# Patient Record
Sex: MALE | Race: WHITE | NOT HISPANIC OR LATINO | Employment: OTHER | URBAN - NONMETROPOLITAN AREA
[De-identification: names, ages, dates, MRNs, and addresses within clinical notes are randomized per-mention and may not be internally consistent; named-entity substitution may affect disease eponyms.]

---

## 2023-04-07 ENCOUNTER — APPOINTMENT (OUTPATIENT)
Dept: CT IMAGING | Facility: HOSPITAL | Age: 78
End: 2023-04-07
Payer: MEDICARE

## 2023-04-07 ENCOUNTER — HOSPITAL ENCOUNTER (EMERGENCY)
Facility: HOSPITAL | Age: 78
Discharge: ANOTHER HOSPITAL | End: 2023-04-07
Attending: EMERGENCY MEDICINE | Admitting: EMERGENCY MEDICINE
Payer: MEDICARE

## 2023-04-07 VITALS
DIASTOLIC BLOOD PRESSURE: 84 MMHG | OXYGEN SATURATION: 97 % | HEIGHT: 72 IN | HEART RATE: 79 BPM | BODY MASS INDEX: 23.03 KG/M2 | TEMPERATURE: 98.3 F | SYSTOLIC BLOOD PRESSURE: 135 MMHG | WEIGHT: 170 LBS | RESPIRATION RATE: 16 BRPM

## 2023-04-07 DIAGNOSIS — I96 GANGRENE: ICD-10-CM

## 2023-04-07 DIAGNOSIS — H60.12 CELLULITIS OF LEFT PINNA: ICD-10-CM

## 2023-04-07 DIAGNOSIS — H70.92 MASTOIDITIS OF LEFT SIDE: Primary | ICD-10-CM

## 2023-04-07 DIAGNOSIS — M86.28 SUBACUTE OSTEOMYELITIS, OTHER SITE: ICD-10-CM

## 2023-04-07 LAB
ALBUMIN SERPL-MCNC: 3.5 G/DL (ref 3.5–5.2)
ALBUMIN/GLOB SERPL: 1.3 G/DL
ALP SERPL-CCNC: 53 U/L (ref 39–117)
ALT SERPL W P-5'-P-CCNC: 11 U/L (ref 1–41)
ANION GAP SERPL CALCULATED.3IONS-SCNC: 10.7 MMOL/L (ref 5–15)
AST SERPL-CCNC: 18 U/L (ref 1–40)
BASOPHILS # BLD AUTO: 0.01 10*3/MM3 (ref 0–0.2)
BASOPHILS NFR BLD AUTO: 0.2 % (ref 0–1.5)
BILIRUB SERPL-MCNC: 0.6 MG/DL (ref 0–1.2)
BUN SERPL-MCNC: 7 MG/DL (ref 8–23)
BUN/CREAT SERPL: 6 (ref 7–25)
CALCIUM SPEC-SCNC: 8.9 MG/DL (ref 8.6–10.5)
CHLORIDE SERPL-SCNC: 101 MMOL/L (ref 98–107)
CO2 SERPL-SCNC: 25.3 MMOL/L (ref 22–29)
CREAT SERPL-MCNC: 1.16 MG/DL (ref 0.76–1.27)
CRP SERPL-MCNC: 2.76 MG/DL (ref 0–0.5)
D-LACTATE SERPL-SCNC: 1.1 MMOL/L (ref 0.5–2)
DEPRECATED RDW RBC AUTO: 45.8 FL (ref 37–54)
EGFRCR SERPLBLD CKD-EPI 2021: 64.1 ML/MIN/1.73
EOSINOPHIL # BLD AUTO: 0.02 10*3/MM3 (ref 0–0.4)
EOSINOPHIL NFR BLD AUTO: 0.3 % (ref 0.3–6.2)
ERYTHROCYTE [DISTWIDTH] IN BLOOD BY AUTOMATED COUNT: 13.8 % (ref 12.3–15.4)
ERYTHROCYTE [SEDIMENTATION RATE] IN BLOOD: 10 MM/HR (ref 0–20)
GLOBULIN UR ELPH-MCNC: 2.7 GM/DL
GLUCOSE SERPL-MCNC: 104 MG/DL (ref 65–99)
HCT VFR BLD AUTO: 26.4 % (ref 37.5–51)
HGB BLD-MCNC: 8.5 G/DL (ref 13–17.7)
HOLD SPECIMEN: NORMAL
HOLD SPECIMEN: NORMAL
IMM GRANULOCYTES # BLD AUTO: 0.03 10*3/MM3 (ref 0–0.05)
IMM GRANULOCYTES NFR BLD AUTO: 0.5 % (ref 0–0.5)
LYMPHOCYTES # BLD AUTO: 0.91 10*3/MM3 (ref 0.7–3.1)
LYMPHOCYTES NFR BLD AUTO: 14.9 % (ref 19.6–45.3)
MCH RBC QN AUTO: 29.2 PG (ref 26.6–33)
MCHC RBC AUTO-ENTMCNC: 32.2 G/DL (ref 31.5–35.7)
MCV RBC AUTO: 90.7 FL (ref 79–97)
MONOCYTES # BLD AUTO: 0.42 10*3/MM3 (ref 0.1–0.9)
MONOCYTES NFR BLD AUTO: 6.9 % (ref 5–12)
NEUTROPHILS NFR BLD AUTO: 4.7 10*3/MM3 (ref 1.7–7)
NEUTROPHILS NFR BLD AUTO: 77.2 % (ref 42.7–76)
NRBC BLD AUTO-RTO: 0 /100 WBC (ref 0–0.2)
PLATELET # BLD AUTO: 190 10*3/MM3 (ref 140–450)
PMV BLD AUTO: 9.6 FL (ref 6–12)
POTASSIUM SERPL-SCNC: 3.4 MMOL/L (ref 3.5–5.2)
PROT SERPL-MCNC: 6.2 G/DL (ref 6–8.5)
RBC # BLD AUTO: 2.91 10*6/MM3 (ref 4.14–5.8)
SODIUM SERPL-SCNC: 137 MMOL/L (ref 136–145)
WBC NRBC COR # BLD: 6.09 10*3/MM3 (ref 3.4–10.8)
WHOLE BLOOD HOLD COAG: NORMAL
WHOLE BLOOD HOLD SPECIMEN: NORMAL

## 2023-04-07 PROCEDURE — 70460 CT HEAD/BRAIN W/DYE: CPT

## 2023-04-07 PROCEDURE — 25010000002 CEFEPIME-DEXTROSE 2-5 GM-%(50ML) RECONSTITUTED SOLUTION: Performed by: PHYSICIAN ASSISTANT

## 2023-04-07 PROCEDURE — 99284 EMERGENCY DEPT VISIT MOD MDM: CPT

## 2023-04-07 PROCEDURE — 86140 C-REACTIVE PROTEIN: CPT | Performed by: PHYSICIAN ASSISTANT

## 2023-04-07 PROCEDURE — 83605 ASSAY OF LACTIC ACID: CPT | Performed by: PHYSICIAN ASSISTANT

## 2023-04-07 PROCEDURE — 25510000001 IOPAMIDOL 61 % SOLUTION: Performed by: EMERGENCY MEDICINE

## 2023-04-07 PROCEDURE — 87150 DNA/RNA AMPLIFIED PROBE: CPT | Performed by: PHYSICIAN ASSISTANT

## 2023-04-07 PROCEDURE — 96366 THER/PROPH/DIAG IV INF ADDON: CPT

## 2023-04-07 PROCEDURE — 25010000002 MORPHINE PER 10 MG: Performed by: EMERGENCY MEDICINE

## 2023-04-07 PROCEDURE — 96367 TX/PROPH/DG ADDL SEQ IV INF: CPT

## 2023-04-07 PROCEDURE — 85652 RBC SED RATE AUTOMATED: CPT | Performed by: PHYSICIAN ASSISTANT

## 2023-04-07 PROCEDURE — 80053 COMPREHEN METABOLIC PANEL: CPT | Performed by: PHYSICIAN ASSISTANT

## 2023-04-07 PROCEDURE — 87040 BLOOD CULTURE FOR BACTERIA: CPT | Performed by: PHYSICIAN ASSISTANT

## 2023-04-07 PROCEDURE — 87186 SC STD MICRODIL/AGAR DIL: CPT | Performed by: PHYSICIAN ASSISTANT

## 2023-04-07 PROCEDURE — 87077 CULTURE AEROBIC IDENTIFY: CPT | Performed by: PHYSICIAN ASSISTANT

## 2023-04-07 PROCEDURE — 85025 COMPLETE CBC W/AUTO DIFF WBC: CPT | Performed by: PHYSICIAN ASSISTANT

## 2023-04-07 PROCEDURE — 36415 COLL VENOUS BLD VENIPUNCTURE: CPT

## 2023-04-07 PROCEDURE — 96365 THER/PROPH/DIAG IV INF INIT: CPT

## 2023-04-07 PROCEDURE — 25010000002 VANCOMYCIN 5 G RECONSTITUTED SOLUTION: Performed by: PHYSICIAN ASSISTANT

## 2023-04-07 RX ORDER — SODIUM CHLORIDE 0.9 % (FLUSH) 0.9 %
10 SYRINGE (ML) INJECTION AS NEEDED
Status: DISCONTINUED | OUTPATIENT
Start: 2023-04-07 | End: 2023-04-07 | Stop reason: HOSPADM

## 2023-04-07 RX ORDER — CEFEPIME HYDROCHLORIDE 2 G/50ML
2 INJECTION, SOLUTION INTRAVENOUS ONCE
Status: COMPLETED | OUTPATIENT
Start: 2023-04-07 | End: 2023-04-07

## 2023-04-07 RX ADMIN — VANCOMYCIN HYDROCHLORIDE 1500 MG: 5 INJECTION, POWDER, LYOPHILIZED, FOR SOLUTION INTRAVENOUS at 13:34

## 2023-04-07 RX ADMIN — IOPAMIDOL 100 ML: 612 INJECTION, SOLUTION INTRAVENOUS at 14:48

## 2023-04-07 RX ADMIN — MORPHINE SULFATE 4 MG: 4 INJECTION, SOLUTION INTRAMUSCULAR; INTRAVENOUS at 13:47

## 2023-04-07 RX ADMIN — CEFEPIME HYDROCHLORIDE 2 G: 2 INJECTION, SOLUTION INTRAVENOUS at 12:57

## 2023-04-07 NOTE — ED NOTES
Multiple small bugs found crawling on pt per lab staff while @ [facility]. Primary nurse notified, precautions taken. Pts belongings gathered and placed in clear bags in pts room per Jay Jay LUCAS, a new gown and linens provided and placed on pts stretcher.

## 2023-04-07 NOTE — ED PROVIDER NOTES
Subjective   History of Present Illness  Chief Complaint: Left head and ear bleeding  History of Present Illness: This is a 78-year-old male poor historian and has not followed up with a doctor in many many years, presents today for evaluation of bleeding to the left side of his head and left ear.  Was seen at [facility] yesterday was told it was cancer and discharged and told to see his primary care doctor.  States is just continue to ooze, says he has had these lesions for at least a year.  Onset: 1 year  Duration: Worsening  Exacerbating / Alleviating factors: None  Associated symptoms: None    Nurses Notes reviewed and agree, including vitals, allergies, social history and prior medical history.    REVIEW OF SYSTEMS: All systems reviewed and not pertinent unless noted.    Positive for: Necrotic mass left head near    Negative for: All other review of systems reviewed negative unspecified  Review of Systems    History reviewed. No pertinent past medical history.    Allergies   Allergen Reactions   • Aspirin Unknown - Low Severity       History reviewed. No pertinent surgical history.    History reviewed. No pertinent family history.    Social History     Socioeconomic History   • Marital status:    Tobacco Use   • Smoking status: Never   • Smokeless tobacco: Current     Types: Chew   Vaping Use   • Vaping Use: Never used   Substance and Sexual Activity   • Alcohol use: Never   • Drug use: Never   • Sexual activity: Defer           Objective   Physical Exam  CONSTITUTIONAL: Well developed, obese male,  in no acute distress.  VITAL SIGNS: Per nursing, reviewed and noted  Head: Patient has a large necrotic mass to his left ear that appears to be eating away at the mastoid on the left, there is necrotic tissue and purulent drainage as well as oozing of blood, no acute bleeding noted, also maggots present inside the wound.  EYES: PERRLA. EOMI.  ENT: Normal voice.  Patient maintained wearing a mask throughout patient  encounter due to coronavirus pandemic  RESPIRATORY:  No increased work of breathing. No retractions.  CARDIOVASCULAR:  Regular rate and rhythm, no murmurs.  Good Peripheral pulses. Good cap refill to extremities.  MUSCULOSKELETAL:  No tenderness. Full ROM. Strength and tone grossly normal.  no spasms. no neck or back tenderness or spasm.  NEUROLOGIC: Alert, oriented x 3. No gross deficits. GCS 15.  PSYCH: Appropriate affect.      Procedures           ED Course  ED Course as of 04/07/23 1604   Fri Apr 07, 2023 1207 BP: 144/76 [CC]   1208 Temp: 98.3 °F (36.8 °C) [CC]   1208 Temp src: Oral [CC]   1208 Heart Rate: 79 [CC]   1208 Resp: 16 [CC]   1208 SpO2: 99 % [CC]   1208 Patient Position: Sitting [CC]   1546 Awaiting return call from [facility]. [CC]      ED Course User Index  [CC] Willie Jernigan, PA                                           Medical Decision Making  Initial impression of presenting illness: 78-year-old male presents for evaluation of bleeding and mass to the left side of his head and left ear he says been present for at least a year, was seen yesterday at [facility] and just discharged home and told it was cancer without any work-up done    DDX includes but is not limited to necrotic cancerous mass    Patient arrives hemodynamically stable, afebrile with vitals interpreted by me. Pertinent features from physical exam: Very malodorous, large necrotic mass to the left ear and left mastoid with oozing of blood, purulent drainage, necrotic tissue and maggots.    Initial diagnostic plan: Septic work-up, CT scan, spoke with radiologist who recommended a CT scan of the head with contrast for best visualization    Results from initial plan were reviewed and interpreted by me revealing Labs are grossly unremarkable, CT scan does show osteomyelitis and mastoiditis and gangrene of the left pinna with a large abscess and mass at this sternocleidomastoid muscle.    Diagnostic information from other  sources: Reviewed note from Tiesha yesterday    Interventions / Re-evaluation: Patient given pain medicine, cefepime and vancomycin    Results/clinical rationale were discussed with the patient    Consultations/Discussion of results with other physicians: I spoke with  also spoke with ENT at [facility], they accepted the patient into the emergency department and patient will be going via EMS.    Disposition plan: Patient stable for transfer, be transferred to [facility] for higher level of care.    Amount and/or Complexity of Data Reviewed  Labs: ordered.  Radiology: ordered.      Risk  Prescription drug management.          Final diagnoses:   Mastoiditis of left side   Subacute osteomyelitis, other site   Gangrene   Cellulitis of left pinna       ED Disposition  ED Disposition     ED Disposition   Transfer to Another Facility     Condition   --    Comment   [facility]             No follow-up provider specified.       Medication List      No changes were made to your prescriptions during this visit.          Willie Jernigan, PA  04/07/23 1600

## 2023-04-08 LAB — BACTERIA BLD CULT: NORMAL

## 2023-04-12 LAB — BACTERIA SPEC AEROBE CULT: NORMAL

## 2023-04-18 LAB
BACTERIA SPEC AEROBE CULT: ABNORMAL
BACTERIA SPEC AEROBE CULT: ABNORMAL
GRAM STN SPEC: ABNORMAL
ISOLATED FROM: ABNORMAL
ISOLATED FROM: ABNORMAL

## 2023-05-10 ENCOUNTER — CONSULT (OUTPATIENT)
Dept: ONCOLOGY | Facility: CLINIC | Age: 78
End: 2023-05-10
Payer: MEDICARE

## 2023-05-10 ENCOUNTER — HOSPITAL ENCOUNTER (OUTPATIENT)
Dept: MRI IMAGING | Facility: HOSPITAL | Age: 78
Discharge: HOME | End: 2023-05-10
Admitting: INTERNAL MEDICINE
Payer: MEDICARE

## 2023-05-10 ENCOUNTER — DOCUMENTATION (OUTPATIENT)
Dept: FAMILY MEDICINE CLINIC | Facility: CLINIC | Age: 78
End: 2023-05-10
Payer: MEDICARE

## 2023-05-10 VITALS
RESPIRATION RATE: 16 BRPM | BODY MASS INDEX: 27.09 KG/M2 | HEART RATE: 74 BPM | SYSTOLIC BLOOD PRESSURE: 127 MMHG | TEMPERATURE: 98 F | OXYGEN SATURATION: 99 % | WEIGHT: 200 LBS | HEIGHT: 72 IN | DIASTOLIC BLOOD PRESSURE: 62 MMHG

## 2023-05-10 DIAGNOSIS — C44.229 SQUAMOUS CELL CARCINOMA OF SKIN OF LEFT EAR AND EXTERNAL AURICULAR CANAL: Primary | ICD-10-CM

## 2023-05-10 DIAGNOSIS — Z51.81 ENCOUNTER FOR THERAPEUTIC DRUG MONITORING: ICD-10-CM

## 2023-05-10 DIAGNOSIS — C44.229 SQUAMOUS CELL CARCINOMA OF SKIN OF LEFT EAR AND EXTERNAL AURICULAR CANAL: ICD-10-CM

## 2023-05-10 PROBLEM — F17.220 NICOTINE DEPENDENCE, CHEWING TOBACCO, UNCOMPLICATED: Status: ACTIVE | Noted: 2023-05-10

## 2023-05-10 PROBLEM — I80.9 PHLEBITIS AND THROMBOPHLEBITIS OF UNSPECIFIED SITE: Status: ACTIVE | Noted: 2023-05-10

## 2023-05-10 PROBLEM — H70.92: Status: ACTIVE | Noted: 2023-05-10

## 2023-05-10 PROBLEM — M86.10 ACUTE OSTEOMYELITIS: Status: ACTIVE | Noted: 2023-05-10

## 2023-05-10 PROBLEM — R52 PAIN: Status: ACTIVE | Noted: 2023-05-10

## 2023-05-10 PROBLEM — H60.12 CELLULITIS OF LEFT EXTERNAL EAR: Status: ACTIVE | Noted: 2023-05-10

## 2023-05-10 PROBLEM — G89.3 NEOPLASM RELATED PAIN (ACUTE) (CHRONIC): Status: ACTIVE | Noted: 2023-05-10

## 2023-05-10 PROBLEM — D64.9 ANEMIA, UNSPECIFIED: Status: ACTIVE | Noted: 2023-05-10

## 2023-05-10 PROBLEM — N40.0 BENIGN PROSTATIC HYPERPLASIA WITHOUT LOWER URINARY TRACT SYMPTOMS: Status: ACTIVE | Noted: 2023-05-10

## 2023-05-10 PROBLEM — I96 GANGRENE, NOT ELSEWHERE CLASSIFIED: Status: ACTIVE | Noted: 2023-05-10

## 2023-05-10 PROBLEM — Z51.5 PALLIATIVE CARE BY SPECIALIST: Status: ACTIVE | Noted: 2023-05-10

## 2023-05-10 PROCEDURE — A9577 INJ MULTIHANCE: HCPCS | Performed by: INTERNAL MEDICINE

## 2023-05-10 PROCEDURE — 70553 MRI BRAIN STEM W/O & W/DYE: CPT

## 2023-05-10 PROCEDURE — 0 GADOBENATE DIMEGLUMINE 529 MG/ML SOLUTION: Performed by: INTERNAL MEDICINE

## 2023-05-10 RX ORDER — CYANOCOBALAMIN 1000 UG/ML
100 INJECTION, SOLUTION INTRAMUSCULAR; SUBCUTANEOUS
COMMUNITY
Start: 2023-05-17

## 2023-05-10 RX ORDER — HYDROCORTISONE 25 MG/G
1 CREAM TOPICAL
COMMUNITY
Start: 2023-05-09

## 2023-05-10 RX ORDER — BISACODYL 10 MG
10 SUPPOSITORY, RECTAL RECTAL DAILY PRN
COMMUNITY
Start: 2023-05-09 | End: 2023-05-19

## 2023-05-10 RX ORDER — FINASTERIDE 5 MG/1
5 TABLET, FILM COATED ORAL DAILY
Qty: 30 TABLET | Refills: 1 | COMMUNITY
Start: 2023-05-10 | End: 2023-07-09

## 2023-05-10 RX ORDER — TAMSULOSIN HYDROCHLORIDE 0.4 MG/1
0.4 CAPSULE ORAL
COMMUNITY
Start: 2023-05-09

## 2023-05-10 RX ORDER — ONDANSETRON HYDROCHLORIDE 8 MG/1
8 TABLET, FILM COATED ORAL 3 TIMES DAILY PRN
Qty: 30 TABLET | Refills: 5 | Status: SHIPPED | OUTPATIENT
Start: 2023-05-10

## 2023-05-10 RX ORDER — ACETAMINOPHEN 325 MG/1
650 TABLET ORAL EVERY 6 HOURS PRN
COMMUNITY

## 2023-05-10 RX ORDER — SENNA PLUS 8.6 MG/1
1 TABLET ORAL DAILY PRN
COMMUNITY

## 2023-05-10 RX ORDER — SODIUM CHLORIDE 9 MG/ML
250 INJECTION, SOLUTION INTRAVENOUS ONCE
OUTPATIENT
Start: 2023-06-12

## 2023-05-10 RX ORDER — POLYETHYLENE GLYCOL 3350 17 G/17G
17 POWDER, FOR SOLUTION ORAL DAILY
Qty: 51 G | Refills: 0 | COMMUNITY
Start: 2023-05-10 | End: 2023-05-13

## 2023-05-10 RX ORDER — DULOXETIN HYDROCHLORIDE 30 MG/1
30 CAPSULE, DELAYED RELEASE ORAL DAILY
Qty: 30 CAPSULE | Refills: 1 | COMMUNITY
Start: 2023-05-10 | End: 2023-07-09

## 2023-05-10 RX ORDER — OXYCODONE HYDROCHLORIDE 5 MG/1
5 TABLET ORAL EVERY 6 HOURS PRN
COMMUNITY
Start: 2023-05-09 | End: 2023-05-14

## 2023-05-10 RX ORDER — HYDROCORTISONE 25 MG/G
1 CREAM TOPICAL DAILY PRN
COMMUNITY
Start: 2023-05-09

## 2023-05-10 RX ORDER — SODIUM CHLORIDE 9 MG/ML
250 INJECTION, SOLUTION INTRAVENOUS ONCE
OUTPATIENT
Start: 2023-05-22

## 2023-05-10 RX ADMIN — GADOBENATE DIMEGLUMINE 18 ML: 529 INJECTION, SOLUTION INTRAVENOUS at 19:16

## 2023-05-10 NOTE — PROGRESS NOTES
DATE OF CONSULTATION: 5/10/2023    REFERRING PHYSICIAN: Provider, No Known    Dear  Provider, No Known  Thank you for asking for my medical advice on this patient. I saw him in the  [place] office on 5/10/2023    REASON FOR CONSULTATION: Locally advanced left ear squamous cell carcinoma    PROBLEM LIST:   1.  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  Presented with gradually growing mass over the left ear for the last 3 years.  B.  Patient did not seek any medical attention until April 2023.  C.  CT neck and chest done at  April 2023 confirmed locally advanced lesion eroding into the left mastoid with left cervical lymphadenopathy but no evidence of chest metastases.  D.  Biopsy done April 2023 confirmed squamous cell carcinoma.  E.  Status post palliative radiation completed April 28, 2023  F.  We will start immunotherapy with Libtayo 350 mg IV every 3 weeks May 22, 2023.  2.  Cancer-related pain  3.  Mild depression  4.  BPH    HISTORY OF PRESENT ILLNESS: The patient is a very pleasant 78 y.o.  male   who was in his usual state of health until April 2023.  The patient was admitted to  hospital with growing left ear mass that has been neglected for more than 2 years.  He had scans done that revealed locally advanced malignancy no evidence of distant metastases.  Biopsy-proven squamous cell carcinoma.  Medical oncology were consulted patient was not a candidate for chemotherapy.  ENT also were consulted did not recommend surgery given the extension of the malignancy and local invasion.  He completed short course of prior radiation and was referred to me for further recommendations.    SUBJECTIVE: When I saw the patient today he is here with his son and daughter-in-law.  He is complaining of pain at the lesion site.  Never been diagnosed with cancer before.  He is currently getting rehab however he will be getting discharged home soon.    Review of Systems   Constitutional:  Positive for fatigue.   HENT: Positive for ear pain.    Eyes: Negative.    Respiratory: Negative.    Cardiovascular: Negative.    Gastrointestinal: Negative.    Endocrine: Negative.    Genitourinary: Negative.    Musculoskeletal: Positive for arthralgias.   Allergic/Immunologic: Negative.    Neurological: Positive for dizziness.   Hematological: Negative.    Psychiatric/Behavioral: The patient is nervous/anxious.        Past Medical History:   Diagnosis Date   • Anemia    • Arthritis    • Cancer        Social History     Socioeconomic History   • Marital status:    Tobacco Use   • Smoking status: Never   • Smokeless tobacco: Current     Types: Chew   Vaping Use   • Vaping Use: Never used   Substance and Sexual Activity   • Alcohol use: Not Currently   • Drug use: Never   • Sexual activity: Defer       Family History   Problem Relation Age of Onset   • Cancer Mother    • Cancer Father        Past Surgical History:   Procedure Laterality Date   • NAIL BIOPSY N/A    • NECK DISSECTION N/A    • PAROTIDECTOMY N/A    • SKIN CANCER EXCISION         No Known Allergies       Current Outpatient Medications:   •  acetaminophen (TYLENOL) 325 MG tablet, Take 2 tablets by mouth Every 6 (Six) Hours As Needed., Disp: , Rfl:   •  bisacodyl (DULCOLAX) 10 MG suppository, Insert 1 suppository into the rectum., Disp: , Rfl:   •  [START ON 5/17/2023] cyanocobalamin 1000 MCG/ML injection, Inject 100 mcg into the appropriate muscle as directed by prescriber Every 30 (Thirty) Days., Disp: , Rfl:   •  DULoxetine (CYMBALTA) 30 MG capsule, Take 1 capsule by mouth Daily., Disp: 30 capsule, Rfl: 1  •  finasteride (PROSCAR) 5 MG tablet, Take 1 tablet by mouth Daily., Disp: 30 tablet, Rfl: 1  •  Hydrocortisone, Perianal, (ANUSOL-HC) 2.5 % rectal cream, Insert 1 application into the rectum., Disp: , Rfl:   •  oxyCODONE (ROXICODONE) 5 MG immediate release tablet, Take 1 tablet by mouth Every 6 (Six) Hours As Needed., Disp: , Rfl:   •   "polyethylene glycol (MIRALAX) 17 GM/SCOOP powder, Take 17 g by mouth Daily., Disp: 51 g, Rfl: 0  •  Procto-Med HC 2.5 % rectal cream, , Disp: , Rfl:   •  senna 8.6 MG tablet, Take 1 tablet by mouth Daily., Disp: , Rfl:   •  tamsulosin (FLOMAX) 0.4 MG capsule 24 hr capsule, Take 1 capsule by mouth., Disp: , Rfl:     PHYSICAL EXAMINATION:   /62   Pulse 74   Temp 98 °F (36.7 °C) (Temporal)   Resp 16   Ht 182.9 cm (72\")   Wt 90.7 kg (200 lb)   SpO2 99%   BMI 27.12 kg/m²   Pain Score    05/10/23 0927   PainSc: 0-No pain                   ECOG Performance Status: 1 - Symptomatic but completely ambulatory  General Appearance:  alert, cooperative, no apparent distress and appears stated age   Neurologic/Psychiatric: A&O x 3, gait steady, appropriate affect, strength 5/5 in all muscle groups   HEENT:  Normocephalic, without obvious abnormality, mucous membranes moist   Neck: Supple, symmetrical, trachea midline, no adenopathy;  No thyromegaly, masses, or tenderness   Lungs:   Clear to auscultation bilaterally; respirations regular, even, and unlabored bilaterally   Heart:  Regular rate and rhythm, no murmurs appreciated   Abdomen:   Soft, non-tender, non-distended and no organomegaly   Lymph nodes: No cervical, supraclavicular, inguinal or axillary adenopathy noted   Extremities: Normal, atraumatic; no clubbing, cyanosis, or edema    Skin: No rashes, ulcers, or suspicious lesions noted       No visits with results within 2 Week(s) from this visit.   Latest known visit with results is:   Admission on 04/07/2023, Discharged on 04/07/2023   Component Date Value Ref Range Status   • Glucose 04/07/2023 104 (H)  65 - 99 mg/dL Final   • BUN 04/07/2023 7 (L)  8 - 23 mg/dL Final   • Creatinine 04/07/2023 1.16  0.76 - 1.27 mg/dL Final   • Sodium 04/07/2023 137  136 - 145 mmol/L Final   • Potassium 04/07/2023 3.4 (L)  3.5 - 5.2 mmol/L Final   • Chloride 04/07/2023 101  98 - 107 mmol/L Final   • CO2 04/07/2023 25.3  22.0 - " 29.0 mmol/L Final   • Calcium 04/07/2023 8.9  8.6 - 10.5 mg/dL Final   • Total Protein 04/07/2023 6.2  6.0 - 8.5 g/dL Final   • Albumin 04/07/2023 3.5  3.5 - 5.2 g/dL Final   • ALT (SGPT) 04/07/2023 11  1 - 41 U/L Final   • AST (SGOT) 04/07/2023 18  1 - 40 U/L Final   • Alkaline Phosphatase 04/07/2023 53  39 - 117 U/L Final   • Total Bilirubin 04/07/2023 0.6  0.0 - 1.2 mg/dL Final   • Globulin 04/07/2023 2.7  gm/dL Final   • A/G Ratio 04/07/2023 1.3  g/dL Final   • BUN/Creatinine Ratio 04/07/2023 6.0 (L)  7.0 - 25.0 Final   • Anion Gap 04/07/2023 10.7  5.0 - 15.0 mmol/L Final   • eGFR 04/07/2023 64.1  >60.0 mL/min/1.73 Final   • Lactate 04/07/2023 1.1  0.5 - 2.0 mmol/L Final   • Blood Culture 04/07/2023 No growth at 5 days   Final   • Blood Culture 04/07/2023 Wohlfahrtiimonas chitiniclastica    ID/Sensi performed at [facility] laboratory, see attached report.    Final   • Isolated from 04/07/2023 Anaerobic Bottle   Final   • Blood Culture 04/07/2023 Aerococcus viridans (C)   Final      Aerococcus viridans is usually susceptible to vancomycin. Resistance has been reported to the fluoroquinolones and beta-lactams. Please call lab to request further workup.   • Isolated from 04/07/2023 Anaerobic Bottle   Final   • Gram Stain 04/07/2023 Aerobic Bottle Gram positive cocci in pairs (C)   Final   • Extra Tube 04/07/2023 Hold for add-ons.   Final    Auto resulted.   • Extra Tube 04/07/2023 hold for add-on   Final    Auto resulted   • Extra Tube 04/07/2023 Hold for add-ons.   Final    Auto resulted.   • Extra Tube 04/07/2023 Hold for add-ons.   Final    Auto resulted   • WBC 04/07/2023 6.09  3.40 - 10.80 10*3/mm3 Final   • RBC 04/07/2023 2.91 (L)  4.14 - 5.80 10*6/mm3 Final   • Hemoglobin 04/07/2023 8.5 (L)  13.0 - 17.7 g/dL Final   • Hematocrit 04/07/2023 26.4 (L)  37.5 - 51.0 % Final   • MCV 04/07/2023 90.7  79.0 - 97.0 fL Final   • MCH 04/07/2023 29.2  26.6 - 33.0 pg Final   • MCHC 04/07/2023 32.2  31.5 - 35.7 g/dL Final   •  RDW 04/07/2023 13.8  12.3 - 15.4 % Final   • RDW-SD 04/07/2023 45.8  37.0 - 54.0 fl Final   • MPV 04/07/2023 9.6  6.0 - 12.0 fL Final   • Platelets 04/07/2023 190  140 - 450 10*3/mm3 Final   • Neutrophil % 04/07/2023 77.2 (H)  42.7 - 76.0 % Final   • Lymphocyte % 04/07/2023 14.9 (L)  19.6 - 45.3 % Final   • Monocyte % 04/07/2023 6.9  5.0 - 12.0 % Final   • Eosinophil % 04/07/2023 0.3  0.3 - 6.2 % Final   • Basophil % 04/07/2023 0.2  0.0 - 1.5 % Final   • Immature Grans % 04/07/2023 0.5  0.0 - 0.5 % Final   • Neutrophils, Absolute 04/07/2023 4.70  1.70 - 7.00 10*3/mm3 Final   • Lymphocytes, Absolute 04/07/2023 0.91  0.70 - 3.10 10*3/mm3 Final   • Monocytes, Absolute 04/07/2023 0.42  0.10 - 0.90 10*3/mm3 Final   • Eosinophils, Absolute 04/07/2023 0.02  0.00 - 0.40 10*3/mm3 Final   • Basophils, Absolute 04/07/2023 0.01  0.00 - 0.20 10*3/mm3 Final   • Immature Grans, Absolute 04/07/2023 0.03  0.00 - 0.05 10*3/mm3 Final   • nRBC 04/07/2023 0.0  0.0 - 0.2 /100 WBC Final   • Sed Rate 04/07/2023 10  0 - 20 mm/hr Final   • C-Reactive Protein 04/07/2023 2.76 (H)  0.00 - 0.50 mg/dL Final   • BCID, PCR 04/07/2023 Negative by BCID PCR. Culture to Follow.  Negative by BCID PCR. Culture to Follow. Final        XR Abdomen 1 View    Result Date: 5/7/2023  Narrative: CLINICAL INDICATION: Assess for stool burden/constipation TECHNIQUE: XR ABDOMEN 1 VIEW COMPARISON: None. FINDINGS: Gas and stool throughout normal caliber colon with moderate stool burden. No gas-filled dilated small bowel. Rounded calcification projecting over the pelvis may represent a bladder calculus. Imaged lungs are clear.    Impression: Nonobstructive bowel gas pattern. Moderate stool burden. CRITICAL RESULT:   No. COMMUNICATION: Per this written report. Drafted by Margaux Villa MD on 5/7/2023 6:39 PM Final report signed by Margaux Villa MD on 5/7/2023 6:41 PM        DIAGNOSTIC DATA:   1.  Extensive patient medical records including doctor notes blood results,  pathology report and imaging reports reviewed by me and documented in the patient's chart.    ASSESSMENT: The patient is a very pleasant 78 y.o.  male  with locally advanced left ear squamous cell carcinoma    PLAN:     1.  Locally advanced left ear squamous cell carcinoma T4 N1 M0 stage IV:  A.  I had a long discussion today with the patient and his son and daughter-in-law about his new diagnosis of locally advanced skin cancer. I did go over the final pathology report in detail with both of them. I reviewed the patient's documents including refereing provider's notes, lab results, imaging, and path report.   B.  The patient is not a candidate for surgical excision given the local invasion.  C.  He will be treated with IV immunotherapy using Libtayo 350 mg IV every 3 weeks.  This is an FDA approved treatment.  D. We discussed potential side effects of immunotherapy including but not limited to immune mediated reactions with thyroiditis, pneumonitis, hepatitis, colitis, rash, and electrolyte abnormalities, fatigue, multiorgan failure, and possibly death.  E.  I will monitor the patient blood work including blood counts kidney function liver function and electrolytes per  F.  I will order MRI brain with and without contrast to evaluate the local invasion.  G.  I will set him up to meet with my nurse practitioner as well as oncology pharmacist for treatment education.  H.  We will plan start treatment in 12 days to give enough time for wound healing after radiation.  I.  He will follow-up with me for cycle #2.    2.  Cancer-related pain:  A.  He will continue oxycodone as needed.    3.  Wound care:  A.  I explained to the patient and the family this is extremely important to assure wound cleanness.  B.  He will have home health with wound care after getting discharged from rehab.    4.  Depression:  A.  He will continue Cymbalta daily    FOLLOW UP: With cycle #2.    Total time of patient care including preparation of  patient chart prior to arrival, face-to-face with patient and following visit spent in reviewing records, lab results, imaging studies, discussion with patient, and documentation/charting was 60 minutes         Benitez Medellin MD  5/10/2023

## 2023-05-15 NOTE — PROGRESS NOTES
"CHEMOTHERAPY PREPARATION    Hardik Natarajan  [id number]  1945    Subjective   Chief Complaint: Treatment Preparation and Needs Assessment    History of present illness:  Hardik Natarajan is a 78 y.o. year old male who is here today for chemotherapy preparation and needs assessment. 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     Past Medical History, Past Surgical History, Social History, Family History have been reviewed and are without significant changes except as mentioned.      Review of Systems   Constitutional: Positive for fatigue.   Skin: Positive for wound.        Left ear squamous cell   Neurological: Positive for weakness.   Psychiatric/Behavioral: The patient is nervous/anxious.        Objective   Physical Exam  Vital Signs: /66   Pulse 112   Temp 97.3 °F (36.3 °C) (Temporal)   Resp 16   Ht 182.9 cm (72\")   Wt 90.7 kg (200 lb)   SpO2 97%   BMI 27.12 kg/m²    General Appearance:  alert, cooperative, no apparent distress and appears stated age   Neurologic/Psychiatric: A&O x 3, gait steady, appropriate affect   HEENT:  Normocephalic, without obvious abnormality, mucous membranes moist   Lungs:   Clear to auscultation bilaterally; respirations regular, even, and unlabored bilaterally   Heart:  Regular rate and rhythm, no " murmurs appreciated   Extremities: Normal, atraumatic; no clubbing, cyanosis, or edema    Skin: No rashes, lesions, or abnormal coloration noted     ECOG Performance Status: 1 - Symptomatic but completely ambulatory            NEEDS ASSESSMENTS    Genetics  The patient's new diagnosis and family history have been reviewed for genetic counseling needs. A genetic referral is not recommended.     Psychosocial  The patient has completed a PHQ-9 Depression Screening and the Distress Thermometer (DT) today.   PHQ-9 results show 20-27 (Severe Depression). The patient scored their distress today as 10 on a scale of 0-10 with 0 being no distress and 10 being extreme distress.   Problems marked as being an issue for him within the last week include practical problems, emotional problems and physical problems.   Results were reviewed along with psychosocial resources offered by our cancer center. Our oncology social worker will be flagged for a DT score of 4 or above, and a same day call will be made for a score of 9 or 10. A mental health referral is recommended at this time. The patient is not accepting of a referral to [facility].   Copies of patient's questionnaires will be scanned into EMR for details and further reference.    Barriers to care  A barriers form was also completed by the patient today. We discussed services offered by our facility to help him have adequate access to care. The patient was given the name and card for our Oncology Social Worker. Based upon barriers assessment today, the patient will not require a follow-up call from the  to further discuss needs.   A copy of the barriers form will also be scanned into EMR for details and further reference.     VAD Assessment  The patient and I discussed planned intervenous chemotherapy as well as other IV treatments that are often needed throughout the course of treatment. These may include, but are not limited to blood transfusions,  "antibiotics, and IV hydration. The patient's vasculature does appear to be adequate for multiple peripheral IVs throughout their treatment course. Discussed risks and benefits of VADs. The patient would not like to pursue Port-A-Cath insertion prior to initiation of treatment.       Advance Care Planning   The patient and I discussed advanced care planning, \"Conversations that Matter\".   This service was offered, free of charge, for development of advance directives with a certified ACP facilitator.  The patient does not have an up-to-date advanced directive. This document is not on file with our office. The patient is not interested in an appointment with one of our facilitators to create or update their advanced directives.         Palliative Care  The patient and I discussed palliative care services. Palliative care is not the same as Hospice care. This is specialized medical care for people living with serious illness with the goal of improving quality of life for the patient and their family. [facility] has partnered with [facility] to offer our patients outpatient palliative care early along with their treatment to assist in coordination of care, symptom management, pain management, and medical decision making.  Oncology criteria for palliative care referral is not met at this time. The patient is not interested in a palliative care consultation.     Additional Referral needs  Nutrition      IV CHEMOTHERAPY EDUCATION    Booklets Given: Chemotherapy and You [x]  Eating Hints [x]    Sexuality/Fertility Books []      Chemotherapy/Biotherapy Education Sheets: (list all that apply)  nausea management, acid reflux management, diarrhea management, Cancer resourse contacts information, skin and mouth care and vaccination information                                                                                                                                                                 Chemotherapy " Regimen:   Treatment Plans     Name Type Plan Dates Plan Provider         Active    OP [facility] Cemiplimab-rwlc ONCOLOGY TREATMENT  5/10/2023 - Present Benitez Medellin MD                        Chemotherapy education comprehension reviewed. Questions answered and additional information discussed on topics including:  Anemia, Thrombocytopenia, Neutropenia, Nutrition and appetite changes, Constipation, Diarrhea, Nausea & vomiting, Mouth sores, Alopecia, Infertility & sexuality, Nervous system changes, Pain, Skin & nail changes, Organ toxicities, Survivorship, Home care and Vaccinations         TOPICS EDUCATION PROVIDED EDUCATION REINFORCED COMMENTS   ANEMIA:  role of RBC, cause, s/s, ways to manage, role of transfusion [x] []    THROMBOCYTOPENIA:  role of platelet, cause, s/s, ways to prevent bleeding, things to avoid, when to seek help [x] []    NEUTROPENIA:  role of WBC, cause, infection precautions, s/s of infection, when to call MD [x] []    NUTRITION & APPETITE CHANGES:  importance of maintaining healthy diet & weight, ways to manage to improve intake, dietary consult, exercise regimen [x] []    DIARRHEA:  causes, s/s of dehydration, ways to manage, dietary changes, when to call MD [x] []    CONSTIPATION:  causes, ways to manage, dietary changes, when to call MD [x] []    NAUSEA & VOMITING:  cause, use of antiemetics, dietary changes, when to call MD [x] []    MOUTH SORES:  causes, oral care, ways to manage [x] []    ALOPECIA:  cause, ways to manage, resources [] []    INFERTILITY & SEXUALITY:  causes, fertility preservation options, sexuality changes, ways to manage, importance of birth control [] []    NERVOUS SYSTEM CHANGES:  causes, s/s, neuropathies, cognitive changes, ways to manage [x] []    PAIN:  causes, ways to manage [x] []    SKIN & NAIL CHANGES:  cause, s/s, ways to manage [x] []    ORGAN TOXICITIES:  cause, s/s, need for diagnostic tests, labs, when to notify MD [x] []    SURVIVORSHIP:  distress,  distress assessment, secondary malignancies, early/late effects, follow-up, social issues, social support [x] []    HOME CARE:  use of spill kits, storing of PO chemo, how to manage bodily fluids [x] []    MISCELLANEOUS:  drug interactions, administration, vesicant, et [x] []                Assessment and Plan:    Diagnoses and all orders for this visit:    1. 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   The patient and I have reviewed their new cancer diagnosis and scheduled treatment plan. Needs assessment was completed including genetics, psychosocial needs, barriers to care, VAD evaluation, advanced care planning, and palliative care services. Referrals have been ordered as appropriate based upon our evaluation and patient desires.     Zofran was sent to his pharmacy.  We discussed he will take 1 tablet by mouth every 8 hours as needed for nausea and vomiting.    Nutrition will see patient on day 1 of treatment.  Social work has been initiated at his long-term care facility.  He is pending discharge tomorrow.  Declines referral to mental health provider and pastoral care along with financial counseling.  They will notify us if they change their mind.    Advance Care Planning   ACP discussion was held with the patient during this visit. Patient does not have an advance directive, declines further assistance.       IV chemotherapy teaching was also completed today as documented above. Adequate time was given to answer all questions to his satisfaction. Patient and family are aware of their care team members and contact information if they have questions or problems throughout the treatment course. Needs assessments and education has been completed. The patient is adequately prepared to begin treatment as scheduled.     Squamous cell carcinoma of  the left ear.  This does have a significant odor today.  This is a worsening odor.  There was a slight odor at last visit 1 week ago today.  However, this is worsened significantly.  The long-term care facility has initiated wound care.  I do recommend that he be seen by a nurse practitioner or physician prior to discharge to consider infection and culture of the left ear.    Electronically signed by MARTÍNE[facility] Gaines on 05/16/23 at 12:22 EDT.

## 2023-05-16 ENCOUNTER — OFFICE VISIT (OUTPATIENT)
Dept: ONCOLOGY | Facility: CLINIC | Age: 78
End: 2023-05-16
Payer: MEDICARE

## 2023-05-16 ENCOUNTER — NURSING HOME (OUTPATIENT)
Dept: FAMILY MEDICINE CLINIC | Facility: CLINIC | Age: 78
End: 2023-05-16
Payer: MEDICARE

## 2023-05-16 VITALS
OXYGEN SATURATION: 97 % | BODY MASS INDEX: 27.09 KG/M2 | HEART RATE: 112 BPM | HEIGHT: 72 IN | TEMPERATURE: 97.3 F | RESPIRATION RATE: 16 BRPM | WEIGHT: 200 LBS | SYSTOLIC BLOOD PRESSURE: 125 MMHG | DIASTOLIC BLOOD PRESSURE: 66 MMHG

## 2023-05-16 VITALS
BODY MASS INDEX: 26.83 KG/M2 | OXYGEN SATURATION: 100 % | RESPIRATION RATE: 18 BRPM | HEART RATE: 100 BPM | WEIGHT: 197.8 LBS | SYSTOLIC BLOOD PRESSURE: 125 MMHG | DIASTOLIC BLOOD PRESSURE: 77 MMHG | TEMPERATURE: 98.2 F

## 2023-05-16 DIAGNOSIS — C44.229 SQUAMOUS CELL CARCINOMA OF SKIN OF LEFT EAR AND EXTERNAL AURICULAR CANAL: Primary | ICD-10-CM

## 2023-05-16 DIAGNOSIS — C44.229 SQUAMOUS CELL CARCINOMA OF SKIN OF LEFT EAR AND EXTERNAL AURICULAR CANAL: ICD-10-CM

## 2023-05-16 DIAGNOSIS — L24.A9 DRAINAGE FROM WOUND: ICD-10-CM

## 2023-05-16 DIAGNOSIS — Z51.81 ENCOUNTER FOR THERAPEUTIC DRUG MONITORING: ICD-10-CM

## 2023-05-16 DIAGNOSIS — M86.10 ACUTE OSTEOMYELITIS: ICD-10-CM

## 2023-05-16 DIAGNOSIS — H60.12 CELLULITIS OF LEFT EXTERNAL EAR: Primary | ICD-10-CM

## 2023-05-16 RX ORDER — ONDANSETRON HYDROCHLORIDE 8 MG/1
8 TABLET, FILM COATED ORAL 3 TIMES DAILY PRN
Qty: 30 TABLET | Refills: 5 | Status: SHIPPED | OUTPATIENT
Start: 2023-05-16

## 2023-05-16 NOTE — LETTER
Nursing Home Follow Up Note      Ashwin Aldana DO []   [facility] [x]  [address] [place] [address]  Phone: [phone]  Fax: [phone] Kennedy Olvera MD []    Juan Rojo DO []   [address] [place] [address]  Phone: [phone]  Fax: [phone]     PATIENT NAME: Hardik Natarajan                                                                          YOB: 1945           DATE OF SERVICE: 05/16/2023  FACILITY:  [][facility]   [] [facility]   [x] [facility]   [] [facility]   [] Other ______________________________________________________________________      CHIEF COMPLAINT:  Discharging home tomorrow.       HISTORY OF PRESENT ILLNESS:     Patient is a 79 yo male that originally presented to [facility] on 4/7 for a mass, bleeding and green drainage from left ear. Wound also found to contain maggots. 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 He was also noted to have enlarge surrounding lymph nodes concerning for metastasis. Surgical intervention was discussed with patient and family and they did not wich to proceed with this. He did agree to follow up with Oncology after some rehabilitation and strengthening here in facility. He was admitted her on 5/9, and has done very well with therapy. He is able to move about facility on his own. He did see Oncology yesterday to discuss a chemotherapy regimen.  But due to increased outer drainage there were concerns about infection and chemotherapy was not started yesterday. Palliative care was also discussed with him. Will obtain culture from the wound and contact POA with results when they are available in 72 hours or so.  We will go ahead and start doxycycline 100 mg p.o. twice daily for 10 days while awaiting results he will start this tomorrow as he gets home.    His vital  signs and labs have remained stable during his visit and he does not have any known medical history but according to his POA/son he does not have a PCP that he follows and has only been to the doctor about 5 times in his entire life.  An appointment has been made with the PCP for him.  He has no complaints today and is looking forward to go home tomorrow.    PAST MEDICAL & SURGICAL HISTORY:   Past Medical History:   Diagnosis Date   • Anemia    • Arthritis    • Cancer       Past Surgical History:   Procedure Laterality Date   • NAIL BIOPSY N/A    • NECK DISSECTION N/A    • PAROTIDECTOMY N/A    • SKIN CANCER EXCISION           MEDICATIONS:  I have reviewed and reconciled the patients medication list in the patients chart at the [facility] nursing [facility] today.      ALLERGIES:    No Known Allergies      SOCIAL HISTORY:    Social History     Socioeconomic History   • Marital status:    Tobacco Use   • Smoking status: Never   • Smokeless tobacco: Current     Types: Chew   Vaping Use   • Vaping Use: Never used   Substance and Sexual Activity   • Alcohol use: Not Currently   • Drug use: Never   • Sexual activity: Defer       FAMILY HISTORY:    Family History   Problem Relation Age of Onset   • Cancer Mother    • Cancer Father        REVIEW OF SYSTEMS:    Review of Systems   Constitutional: Negative for activity change, appetite change, chills, diaphoresis, fatigue, fever, unexpected weight gain and unexpected weight loss.   HENT: Positive for ear pain (external left ear) and swollen glands. Negative for congestion, mouth sores, nosebleeds, postnasal drip, rhinorrhea, sneezing, sore throat and trouble swallowing.    Eyes: Negative for pain, discharge, redness, itching and visual disturbance.   Respiratory: Negative for apnea, cough, choking, chest tightness, shortness of breath and wheezing.    Cardiovascular: Negative for chest pain, palpitations and leg swelling.   Gastrointestinal: Negative for abdominal  distention, abdominal pain, blood in stool, constipation, diarrhea, nausea, vomiting, GERD and indigestion.   Genitourinary: Negative for decreased urine volume, difficulty urinating, dysuria, flank pain, frequency, hematuria, urgency and urinary incontinence.   Musculoskeletal: Positive for arthralgias. Negative for back pain, gait problem, joint swelling and myalgias.   Skin: Positive for skin lesions and wound. Negative for dry skin and rash.        Left ear    Foul odor to left ear   Neurological: Positive for memory problem. Negative for dizziness, seizures, speech difficulty, weakness and confusion.   Psychiatric/Behavioral: Negative for behavioral problems, dysphoric mood, hallucinations, sleep disturbance and depressed mood. The patient is not nervous/anxious.          PHYSICAL EXAMINATION:   VITAL SIGNS:   Vitals:    05/16/23 1507   BP: 125/77   Pulse: 100   Resp: 18   Temp: 98.2 °F (36.8 °C)   SpO2: 100%   Weight: 89.7 kg (197 lb 12.8 oz)       Physical Exam  Vitals and nursing note reviewed.   Constitutional:       Appearance: He is well-developed.   HENT:      Head: Normocephalic.      Right Ear: External ear normal.      Left Ear: Drainage, swelling and tenderness present.      Ears:        Comments: Left ear drainage with foul odor     Nose: Nose normal.   Eyes:      Conjunctiva/sclera: Conjunctivae normal.   Cardiovascular:      Rate and Rhythm: Normal rate and regular rhythm.      Heart sounds: Normal heart sounds.   Pulmonary:      Effort: Pulmonary effort is normal. No respiratory distress.      Breath sounds: Normal breath sounds. No wheezing or rales.   Abdominal:      General: Bowel sounds are normal. There is no distension.      Palpations: Abdomen is soft. There is no mass.      Tenderness: There is no abdominal tenderness.   Musculoskeletal:      Cervical back: Normal range of motion.      Comments: NROM all major joints   Skin:     General: Skin is warm and dry.      Findings: No rash.    Neurological:      Mental Status: He is alert and oriented to person, place, and time.   Psychiatric:         Mood and Affect: Mood and affect normal.         Speech: Speech normal.         Behavior: Behavior normal.         Cognition and Memory: Cognition and memory normal.         RECORDS REVIEW:   I have reviewed and interpreted the records in [facility] and [facility]    ASSESSMENT     Diagnoses and all orders for this visit:    1. Cellulitis of left external ear (Primary)    2. 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 Drainage from wound    4. Acute osteomyelitis        PLAN    He is discharging home with family tomorrow. He lives alone but family are near. He will continue to follow up with Oncology as directed. A culture will be obtained of the cancerous wound of the left ear and will contact the son/POA about treatment. Will start Doxycycline for him to take while awaiting culture results. He is aware when to seek treatment at emergency department, such as fever, mental status changes, etc. An appointment was made with a PCP. Patient and family have no concerns at this time.    Patient and nursing encouraged to keep me informed of any acute changes, or any new concerning symptoms.    Continue supportive care for all ADLs.     [x]  Discussed Patient in detail with nursing/staff, addressed all needs today.     [x]  Plan of Care Reviewed   [x]  PT/OT Reviewed   [x]  Order Changes  [x]  Discharge Plans Reviewed  [x]  Advance Directive on file with Nursing Home.   [x]  POA on file with Nursing Home.   [x]  Code Status listed: [x]  Full Code   []  DNR     I spent 40 minutes caring for Hardik on this date of service. This time includes time spent by me in the following activities:preparing for the visit, reviewing tests, obtaining and/or reviewing a separately obtained history, performing a medically appropriate examination and/or evaluation , counseling and educating the  patient/family/caregiver, ordering medications, tests, or procedures, referring and communicating with other health care professionals , documenting information in the medical record, independently interpreting results and communicating that information with the patient/family/caregiver and care coordination  [facility] Salgado.

## 2023-05-17 ENCOUNTER — DOCUMENTATION (OUTPATIENT)
Dept: SOCIAL WORK | Facility: HOSPITAL | Age: 78
End: 2023-05-17
Payer: MEDICARE

## 2023-05-17 NOTE — PROGRESS NOTES
Case Management/Social Work    Patient Name:  Hardik Natarajan  YOB: 1945  MRN: [id number]  Admit Date:  (Not on file)        SW called and spoke with Pt. Son. Pt. Son stated that Pt. Will be discharging from [facility] today. Pt. Son stated that they currently do not have any needs. Pt. Son stated that the facility has set up home health services for Pt. SW will follow up as needed.       Electronically signed by:  Jaime Bernal  05/17/23 15:09 EDT

## 2023-05-17 NOTE — PROGRESS NOTES
Nursing Home Follow Up Note      Ashwin Aldana DO []   MARTÍNEZ Peters [x]  [address] [place] [address]  Phone: [phone]  Fax: [phone] Kennedy Olvera MD []    Juan Rojo DO []   [address] [place] [address]  Phone: [phone]  Fax: [phone]     PATIENT NAME: Hardik Natarajan                                                                          YOB: 1945           DATE OF SERVICE: 05/16/2023  FACILITY:  [][facility]   [] [facility]   [x] [facility]   [] [facility]   [] Other ______________________________________________________________________      CHIEF COMPLAINT:  Discharging home tomorrow.       HISTORY OF PRESENT ILLNESS:     Patient is a 77 yo male that originally presented to [facility] on 4/7 for a mass, bleeding and green drainage from left ear. Wound also found to contain maggots. 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 He was also noted to have enlarge surrounding lymph nodes concerning for metastasis. Surgical intervention was discussed with patient and family and they did not wich to proceed with this. He did agree to follow up with Oncology after some rehabilitation and strengthening here in facility. He was admitted her on 5/9, and has done very well with therapy. He is able to move about facility on his own. He did see Oncology yesterday to discuss a chemotherapy regimen.  But due to increased outer drainage there were concerns about infection and chemotherapy was not started yesterday. Palliative care was also discussed with him. Will obtain culture from the wound and contact POA with results when they are available in 72 hours or so.  We will go ahead and start doxycycline 100 mg p.o. twice daily for 10 days while awaiting results he will start this tomorrow as he gets home.    His vital  signs and labs have remained stable during his visit and he does not have any known medical history but according to his POA/son he does not have a PCP that he follows and has only been to the doctor about 5 times in his entire life.  An appointment has been made with the PCP for him.  He has no complaints today and is looking forward to go home tomorrow.    PAST MEDICAL & SURGICAL HISTORY:   Past Medical History:   Diagnosis Date   • Anemia    • Arthritis    • Cancer       Past Surgical History:   Procedure Laterality Date   • NAIL BIOPSY N/A    • NECK DISSECTION N/A    • PAROTIDECTOMY N/A    • SKIN CANCER EXCISION           MEDICATIONS:  I have reviewed and reconciled the patients medication list in the patients chart at the [facility] today.      ALLERGIES:    No Known Allergies      SOCIAL HISTORY:    Social History     Socioeconomic History   • Marital status:    Tobacco Use   • Smoking status: Never   • Smokeless tobacco: Current     Types: Chew   Vaping Use   • Vaping Use: Never used   Substance and Sexual Activity   • Alcohol use: Not Currently   • Drug use: Never   • Sexual activity: Defer       FAMILY HISTORY:    Family History   Problem Relation Age of Onset   • Cancer Mother    • Cancer Father        REVIEW OF SYSTEMS:    Review of Systems   Constitutional: Negative for activity change, appetite change, chills, diaphoresis, fatigue, fever, unexpected weight gain and unexpected weight loss.   HENT: Positive for ear pain (external left ear) and swollen glands. Negative for congestion, mouth sores, nosebleeds, postnasal drip, rhinorrhea, sneezing, sore throat and trouble swallowing.    Eyes: Negative for pain, discharge, redness, itching and visual disturbance.   Respiratory: Negative for apnea, cough, choking, chest tightness, shortness of breath and wheezing.    Cardiovascular: Negative for chest pain, palpitations and leg swelling.   Gastrointestinal: Negative for abdominal  distention, abdominal pain, blood in stool, constipation, diarrhea, nausea, vomiting, GERD and indigestion.   Genitourinary: Negative for decreased urine volume, difficulty urinating, dysuria, flank pain, frequency, hematuria, urgency and urinary incontinence.   Musculoskeletal: Positive for arthralgias. Negative for back pain, gait problem, joint swelling and myalgias.   Skin: Positive for skin lesions and wound. Negative for dry skin and rash.        Left ear    Foul odor to left ear   Neurological: Positive for memory problem. Negative for dizziness, seizures, speech difficulty, weakness and confusion.   Psychiatric/Behavioral: Negative for behavioral problems, dysphoric mood, hallucinations, sleep disturbance and depressed mood. The patient is not nervous/anxious.          PHYSICAL EXAMINATION:   VITAL SIGNS:   Vitals:    05/16/23 1507   BP: 125/77   Pulse: 100   Resp: 18   Temp: 98.2 °F (36.8 °C)   SpO2: 100%   Weight: 89.7 kg (197 lb 12.8 oz)       Physical Exam  Vitals and nursing note reviewed.   Constitutional:       Appearance: He is well-developed.   HENT:      Head: Normocephalic.      Right Ear: External ear normal.      Left Ear: Drainage, swelling and tenderness present.      Ears:        Comments: Left ear drainage with foul odor     Nose: Nose normal.   Eyes:      Conjunctiva/sclera: Conjunctivae normal.   Cardiovascular:      Rate and Rhythm: Normal rate and regular rhythm.      Heart sounds: Normal heart sounds.   Pulmonary:      Effort: Pulmonary effort is normal. No respiratory distress.      Breath sounds: Normal breath sounds. No wheezing or rales.   Abdominal:      General: Bowel sounds are normal. There is no distension.      Palpations: Abdomen is soft. There is no mass.      Tenderness: There is no abdominal tenderness.   Musculoskeletal:      Cervical back: Normal range of motion.      Comments: NROM all major joints   Skin:     General: Skin is warm and dry.      Findings: No rash.    Neurological:      Mental Status: He is alert and oriented to person, place, and time.   Psychiatric:         Mood and Affect: Mood and affect normal.         Speech: Speech normal.         Behavior: Behavior normal.         Cognition and Memory: Cognition and memory normal.         RECORDS REVIEW:   I have reviewed and interpreted the records in [facility] and [facility]    ASSESSMENT     Diagnoses and all orders for this visit:    1. Cellulitis of left external ear (Primary)    2. 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 Drainage from wound    4. Acute osteomyelitis        PLAN    He is discharging home with family tomorrow. He lives alone but family are near. He will continue to follow up with Oncology as directed. A culture will be obtained of the cancerous wound of the left ear and will contact the son/POA about treatment. Will start Doxycycline for him to take while awaiting culture results. He is aware when to seek treatment at emergency department, such as fever, mental status changes, etc. An appointment was made with a PCP. Patient and family have no concerns at this time.    Patient and nursing encouraged to keep me informed of any acute changes, or any new concerning symptoms.    Continue supportive care for all ADLs.     [x]  Discussed Patient in detail with nursing/staff, addressed all needs today.     [x]  Plan of Care Reviewed   [x]  PT/OT Reviewed   [x]  Order Changes  [x]  Discharge Plans Reviewed  [x]  Advance Directive on file with Nursing Home.   [x]  POA on file with Nursing Home.   [x]  Code Status listed: [x]  Full Code   []  DNR     I spent 40 minutes caring for Hardik on this date of service. This time includes time spent by me in the following activities:preparing for the visit, reviewing tests, obtaining and/or reviewing a separately obtained history, performing a medically appropriate examination and/or evaluation , counseling and educating the  patient/family/caregiver, ordering medications, tests, or procedures, referring and communicating with other health care professionals , documenting information in the medical record, independently interpreting results and communicating that information with the patient/family/caregiver and care coordination  MARTÍNEZ Salgado.

## 2023-05-19 ENCOUNTER — TELEPHONE (OUTPATIENT)
Dept: ONCOLOGY | Facility: CLINIC | Age: 78
End: 2023-05-19
Payer: MEDICARE

## 2023-05-19 NOTE — TELEPHONE ENCOUNTER
Caller: SEBASTIAN    Relationship: [facility]    Best call back number: [phone]    Who are you requesting to speak with (clinical staff, provider,  specific staff member): CLINICAL    What was the call regarding:CALLING TO VERIFY IF MD. SWAN WILL INTIATE ORDERS FOR HOME HEALTH AS PATIENT IS NOT ESTABLISHED WITH A PCP    Do you require a callback: YES

## 2023-05-19 NOTE — TELEPHONE ENCOUNTER
I returned Jyoti's phone call and she advised they have no one to sign home health orders for patient and have got him an appointment late next week to establish with a PCP.  I advised I will talk to Yady on Monday when she is back in office and see if ok to initiate home health for him.

## 2023-05-22 ENCOUNTER — INFUSION (OUTPATIENT)
Dept: ONCOLOGY | Facility: HOSPITAL | Age: 78
End: 2023-05-22
Payer: MEDICARE

## 2023-05-22 VITALS
HEART RATE: 78 BPM | RESPIRATION RATE: 16 BRPM | BODY MASS INDEX: 27.08 KG/M2 | DIASTOLIC BLOOD PRESSURE: 70 MMHG | OXYGEN SATURATION: 98 % | SYSTOLIC BLOOD PRESSURE: 145 MMHG | WEIGHT: 199.7 LBS

## 2023-05-22 DIAGNOSIS — C44.229 SQUAMOUS CELL CARCINOMA OF SKIN OF LEFT EAR AND EXTERNAL AURICULAR CANAL: ICD-10-CM

## 2023-05-22 DIAGNOSIS — Z51.81 ENCOUNTER FOR THERAPEUTIC DRUG MONITORING: Primary | ICD-10-CM

## 2023-05-22 LAB
ALBUMIN SERPL-MCNC: 3.2 G/DL (ref 3.5–5.2)
ALBUMIN/GLOB SERPL: 1.1 G/DL
ALP SERPL-CCNC: 54 U/L (ref 39–117)
ALT SERPL W P-5'-P-CCNC: 20 U/L (ref 1–41)
ANION GAP SERPL CALCULATED.3IONS-SCNC: 11.6 MMOL/L (ref 5–15)
AST SERPL-CCNC: 19 U/L (ref 1–40)
BASOPHILS # BLD AUTO: 0.03 10*3/MM3 (ref 0–0.2)
BASOPHILS NFR BLD AUTO: 0.8 % (ref 0–1.5)
BILIRUB SERPL-MCNC: 0.3 MG/DL (ref 0–1.2)
BUN SERPL-MCNC: 9 MG/DL (ref 8–23)
BUN/CREAT SERPL: 9.4 (ref 7–25)
CALCIUM SPEC-SCNC: 8.8 MG/DL (ref 8.6–10.5)
CHLORIDE SERPL-SCNC: 101 MMOL/L (ref 98–107)
CO2 SERPL-SCNC: 23.4 MMOL/L (ref 22–29)
CREAT SERPL-MCNC: 0.96 MG/DL (ref 0.76–1.27)
DEPRECATED RDW RBC AUTO: 44.2 FL (ref 37–54)
EGFRCR SERPLBLD CKD-EPI 2021: 80.4 ML/MIN/1.73
EOSINOPHIL # BLD AUTO: 0.02 10*3/MM3 (ref 0–0.4)
EOSINOPHIL NFR BLD AUTO: 0.5 % (ref 0.3–6.2)
ERYTHROCYTE [DISTWIDTH] IN BLOOD BY AUTOMATED COUNT: 13.9 % (ref 12.3–15.4)
GLOBULIN UR ELPH-MCNC: 2.8 GM/DL
GLUCOSE SERPL-MCNC: 105 MG/DL (ref 65–99)
HCT VFR BLD AUTO: 27.7 % (ref 37.5–51)
HGB BLD-MCNC: 8.6 G/DL (ref 13–17.7)
IMM GRANULOCYTES # BLD AUTO: 0.04 10*3/MM3 (ref 0–0.05)
IMM GRANULOCYTES NFR BLD AUTO: 1.1 % (ref 0–0.5)
LYMPHOCYTES # BLD AUTO: 1.06 10*3/MM3 (ref 0.7–3.1)
LYMPHOCYTES NFR BLD AUTO: 29 % (ref 19.6–45.3)
MCH RBC QN AUTO: 26.8 PG (ref 26.6–33)
MCHC RBC AUTO-ENTMCNC: 31 G/DL (ref 31.5–35.7)
MCV RBC AUTO: 86.3 FL (ref 79–97)
MONOCYTES # BLD AUTO: 0.3 10*3/MM3 (ref 0.1–0.9)
MONOCYTES NFR BLD AUTO: 8.2 % (ref 5–12)
NEUTROPHILS NFR BLD AUTO: 2.2 10*3/MM3 (ref 1.7–7)
NEUTROPHILS NFR BLD AUTO: 60.4 % (ref 42.7–76)
NRBC BLD AUTO-RTO: 0 /100 WBC (ref 0–0.2)
PLATELET # BLD AUTO: 181 10*3/MM3 (ref 140–450)
PMV BLD AUTO: 8.4 FL (ref 6–12)
POTASSIUM SERPL-SCNC: 3.6 MMOL/L (ref 3.5–5.2)
PROT SERPL-MCNC: 6 G/DL (ref 6–8.5)
RBC # BLD AUTO: 3.21 10*6/MM3 (ref 4.14–5.8)
SODIUM SERPL-SCNC: 136 MMOL/L (ref 136–145)
T4 FREE SERPL-MCNC: 0.79 NG/DL (ref 0.93–1.7)
TSH SERPL DL<=0.05 MIU/L-ACNC: 6.33 UIU/ML (ref 0.27–4.2)
WBC NRBC COR # BLD: 3.65 10*3/MM3 (ref 3.4–10.8)

## 2023-05-22 PROCEDURE — 80053 COMPREHEN METABOLIC PANEL: CPT

## 2023-05-22 PROCEDURE — 84439 ASSAY OF FREE THYROXINE: CPT

## 2023-05-22 PROCEDURE — 85025 COMPLETE CBC W/AUTO DIFF WBC: CPT

## 2023-05-22 PROCEDURE — 96413 CHEMO IV INFUSION 1 HR: CPT

## 2023-05-22 PROCEDURE — 36415 COLL VENOUS BLD VENIPUNCTURE: CPT

## 2023-05-22 PROCEDURE — 84443 ASSAY THYROID STIM HORMONE: CPT

## 2023-05-22 PROCEDURE — 25010000002 CEMIPLIMAB-RWLC 350 MG/7ML SOLUTION 7 ML VIAL: Performed by: INTERNAL MEDICINE

## 2023-05-22 RX ORDER — SODIUM CHLORIDE 9 MG/ML
250 INJECTION, SOLUTION INTRAVENOUS ONCE
Status: DISCONTINUED | OUTPATIENT
Start: 2023-05-22 | End: 2023-05-22 | Stop reason: HOSPADM

## 2023-05-22 RX ADMIN — CEMIPLIMAB-RWLC 350 MG: 50 INJECTION INTRAVENOUS at 11:31

## 2023-05-22 NOTE — TELEPHONE ENCOUNTER
Yes. However, they need to establish care in the next week or so and they will need to take over. I told them during chemotherapy visit last week he had to establish care with a PCP that we would not be able to be his primary care and his wound needs to be monitored by a PCP.

## 2023-05-22 NOTE — TELEPHONE ENCOUNTER
I returned Jyoti's call and advised we are willing to sign the initiation orders bu the PCP will need to take over in next week or so as we are unable to be his primary care and wound needs to be monitored by PCP.  She verbalized understanding.

## 2023-05-31 ENCOUNTER — DOCUMENTATION (OUTPATIENT)
Dept: NUTRITION | Facility: HOSPITAL | Age: 78
End: 2023-05-31

## 2023-05-31 NOTE — PROGRESS NOTES
"Adult Outpatient Nutrition  Assessment/PES    Patient Name:  Hardik Natarajan  YOB: 1945  MRN: [id number]    Assessment Date:  5/31/2023    Comments:    Oncology Nutrition Screening    Patient Name:  Hardik Natarajan  YOB: 1945  MRN: [id number]  Date:  05/31/23  Physician:  Dr. Guajardo    Type of Cancer Treatment:   Chemotherapy    RD called pt 5/30/21 regarding referral from Dr. Guajardo. Pt son answered the phone, provided medical history, pertinent information. Pt does not have any other medical conditions. His -262 lbs,  lbs per son report. He states that he had lost some weight, has started gaining it back. His appetite \"is better than it has been in a long time\", no nutrition-related symptoms. He remains active, mows his lawn often. Pt son did note pt has reported some occasional ear pain, RD encouraged him to reach out to PCP, oncologist. No questions or concerns at this time, RD encouraged them to reach out with future issues.     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   • Anemia, unspecified   • Nicotine dependence, chewing tobacco, uncomplicated   • Neoplasm related pain (acute) (chronic)   • Unspecified mastoiditis, left ear   • Cellulitis of left external ear   • Phlebitis and thrombophlebitis of unspecified site   • Gangrene, not elsewhere classified   • Acute osteomyelitis   • Benign prostatic hyperplasia without lower urinary tract symptoms   • Pain   • Palliative care by specialist   • Encounter for therapeutic drug monitoring       Current Outpatient Medications   Medication Sig Dispense Refill   • acetaminophen (TYLENOL) 325 MG tablet Take 2 tablets by mouth Every 6 (Six) Hours As Needed.     • cyanocobalamin 1000 MCG/ML injection Inject 100 mcg into the appropriate muscle as directed by prescriber Every 30 (Thirty) Days.     • DULoxetine (CYMBALTA) 30 MG capsule Take 1 capsule by mouth " "Daily. 30 capsule 1   • finasteride (PROSCAR) 5 MG tablet Take 1 tablet by mouth Daily. 30 tablet 1   • Hydrocortisone, Perianal, (ANUSOL-HC) 2.5 % rectal cream Insert 1 application into the rectum.     • ondansetron (ZOFRAN) 8 MG tablet Take 1 tablet by mouth 3 (Three) Times a Day As Needed for Nausea or Vomiting. 30 tablet 5   • Procto-Med HC 2.5 % rectal cream 1 application Daily As Needed.     • senna (SENOKOT) 8.6 MG tablet Take 1 tablet by mouth Daily As Needed.     • tamsulosin (FLOMAX) 0.4 MG capsule 24 hr capsule Take 1 capsule by mouth.       No current facility-administered medications for this visit.     Weight:   Height: 6 ' (72\")  Weight: 201 lbs.  Usual Body Weight: 230-262 lbs.   BMI: 27.26  Normal  Weight has been stable    Oral Food Intake:  Regular Diet - No Restrictions    Enteral Feeding:   N/A    Nutrition Symptoms:   None    Activity:   Normal with no limitations     reports that he has never smoked. His smokeless tobacco use includes chew. He reports that he does not currently use alcohol. He reports that he does not use drugs.    Evaluation of Nutritional Risk:   Patient is not identified as a nutritional risk at time of screening; will follow patient through course of treatment for nutritional consultation as warranted.        Electronically signed by:  Kellen Barton RD  15:43 EDT        "

## 2023-06-06 NOTE — PROGRESS NOTES
DATE OF VISIT: 6/7/2023    REASON FOR VISIT: Followup for locally advanced squamous cell carcinoma of the left ear     PROBLEM LIST:  1. 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  Presented with gradually growing mass over the left ear for the last 3 years.  B.  Patient did not seek any medical attention until April 2023.  C.  CT neck and chest done at  April 2023 confirmed locally advanced lesion eroding into the left mastoid with left cervical lymphadenopathy but no evidence of chest metastases.  D.  Biopsy done April 2023 confirmed squamous cell carcinoma.  E.  Status post palliative radiation completed April 28, 2023  F.  We will start immunotherapy with Libtayo 350 mg IV every 3 weeks May 22, 2023.  2.  Cancer-related pain  3.  Mild depression  4.  BPH    HISTORY OF PRESENT ILLNESS: The patient is a very pleasant 78 y.o. male  with past medical history significant for locally advanced left ear squamous cell carcinoma diagnosed April 2023.  The patient was started on Libtayo May 16, 2023. The patient is here today for scheduled follow-up visit with treatment cycle #2.    SUBJECTIVE: The patient is here today with his son and daughter-in-law.  He is complaining of severe pain in his left ear however he believes the color has improved significantly since he started immunotherapy.    Past History:  Medical History: has a past medical history of Anemia, Arthritis, and Cancer.   Surgical History: has a past surgical history that includes Skin cancer excision; Neck dissection (N/A); Parotidectomy (N/A); and NAIL BIOPSY (N/A).   Family History: family history includes Cancer in his father and mother.   Social History: reports that he has never smoked. His smokeless tobacco use includes chew. He reports that he does not currently use alcohol. He reports that he does not use drugs.    (Not in a hospital admission)     Allergies: Patient has no known allergies.     Review of Systems  "  Constitutional:  Positive for fatigue.   Respiratory: Negative.     Cardiovascular: Negative.    Gastrointestinal: Negative.      PHYSICAL EXAMINATION:   /74   Pulse 77   Temp 97.9 °F (36.6 °C) (Infrared)   Ht 182.9 cm (72.01\")   Wt 95.7 kg (211 lb)   SpO2 99%   BMI 28.61 kg/m²    Pain Score    06/07/23 1456   PainSc:   9                     ECOG Performance Status: 1 - Symptomatic but completely ambulatory      General Appearance:      alert, cooperative, no apparent distress and appears stated age   Lungs:   Clear to auscultation bilaterally; respirations regular, even, and unlabored bilaterally   Heart:  Regular rate and rhythm, no murmurs appreciated   Abdomen:   Soft, non-tender, non-distended, and no organomegaly                 No visits with results within 2 Week(s) from this visit.   Latest known visit with results is:   Infusion on 05/22/2023   Component Date Value Ref Range Status    Free T4 05/22/2023 0.79 (L)  0.93 - 1.70 ng/dL Final    TSH 05/22/2023 6.330 (H)  0.270 - 4.200 uIU/mL Final    Glucose 05/22/2023 105 (H)  65 - 99 mg/dL Final    BUN 05/22/2023 9  8 - 23 mg/dL Final    Creatinine 05/22/2023 0.96  0.76 - 1.27 mg/dL Final    Sodium 05/22/2023 136  136 - 145 mmol/L Final    Potassium 05/22/2023 3.6  3.5 - 5.2 mmol/L Final    Chloride 05/22/2023 101  98 - 107 mmol/L Final    CO2 05/22/2023 23.4  22.0 - 29.0 mmol/L Final    Calcium 05/22/2023 8.8  8.6 - 10.5 mg/dL Final    Total Protein 05/22/2023 6.0  6.0 - 8.5 g/dL Final    Albumin 05/22/2023 3.2 (L)  3.5 - 5.2 g/dL Final    ALT (SGPT) 05/22/2023 20  1 - 41 U/L Final    AST (SGOT) 05/22/2023 19  1 - 40 U/L Final    Alkaline Phosphatase 05/22/2023 54  39 - 117 U/L Final    Total Bilirubin 05/22/2023 0.3  0.0 - 1.2 mg/dL Final    Globulin 05/22/2023 2.8  gm/dL Final    A/G Ratio 05/22/2023 1.1  g/dL Final    BUN/Creatinine Ratio 05/22/2023 9.4  7.0 - 25.0 Final    Anion Gap 05/22/2023 11.6  5.0 - 15.0 mmol/L Final    eGFR 05/22/2023 " 80.4  >60.0 mL/min/1.73 Final    WBC 05/22/2023 3.65  3.40 - 10.80 10*3/mm3 Final    RBC 05/22/2023 3.21 (L)  4.14 - 5.80 10*6/mm3 Final    Hemoglobin 05/22/2023 8.6 (L)  13.0 - 17.7 g/dL Final    Hematocrit 05/22/2023 27.7 (L)  37.5 - 51.0 % Final    MCV 05/22/2023 86.3  79.0 - 97.0 fL Final    MCH 05/22/2023 26.8  26.6 - 33.0 pg Final    MCHC 05/22/2023 31.0 (L)  31.5 - 35.7 g/dL Final    RDW 05/22/2023 13.9  12.3 - 15.4 % Final    RDW-SD 05/22/2023 44.2  37.0 - 54.0 fl Final    MPV 05/22/2023 8.4  6.0 - 12.0 fL Final    Platelets 05/22/2023 181  140 - 450 10*3/mm3 Final    Neutrophil % 05/22/2023 60.4  42.7 - 76.0 % Final    Lymphocyte % 05/22/2023 29.0  19.6 - 45.3 % Final    Monocyte % 05/22/2023 8.2  5.0 - 12.0 % Final    Eosinophil % 05/22/2023 0.5  0.3 - 6.2 % Final    Basophil % 05/22/2023 0.8  0.0 - 1.5 % Final    Immature Grans % 05/22/2023 1.1 (H)  0.0 - 0.5 % Final    Neutrophils, Absolute 05/22/2023 2.20  1.70 - 7.00 10*3/mm3 Final    Lymphocytes, Absolute 05/22/2023 1.06  0.70 - 3.10 10*3/mm3 Final    Monocytes, Absolute 05/22/2023 0.30  0.10 - 0.90 10*3/mm3 Final    Eosinophils, Absolute 05/22/2023 0.02  0.00 - 0.40 10*3/mm3 Final    Basophils, Absolute 05/22/2023 0.03  0.00 - 0.20 10*3/mm3 Final    Immature Grans, Absolute 05/22/2023 0.04  0.00 - 0.05 10*3/mm3 Final    nRBC 05/22/2023 0.0  0.0 - 0.2 /100 WBC Final        MRI Brain With & Without Contrast    Result Date: 5/11/2023  Narrative: PROCEDURE: MRI BRAIN W WO CONTRAST-  HISTORY: staging scans; C44.229-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  FINDINGS: Diffusion sequences show no signal abnormalities to indicate acute infarct or other process.  Mild generalized atrophy is present. Moderate chronic microvascular changes are noted in the periventricular regions.  A dominant infiltrative mass is noted of the left  auricular region. The mass involves the entire ear as well as pre and posterior auricular tissues extending into the calvarium. The mass extends into the external auditory canal to the level of the tympanic membrane. There is no definite middle ear space invasion. However, tumor infiltrates into the left mastoid air cells. The calvarial defect associated with bone destruction measures up to 39 mm in diameter. The mass extends to invade the dura abutting the transverse sinus-sigmoid sinus junction without definite intraluminal invasion. However the mass invades the sinus wall. There is no direct invasion of the brain parenchyma.  A necrotic mass is noted in the infraauricular region posterior to the left mandibular ramus suspicious for a dominant necrotic lymph node. This measures 4.9 x 3.9 cm.  There is fluid filling the left middle ear space, external auditory canal and left mastoid air cells, compatible with otitis media and mastoiditis.      Impression: 1. Large mass of the left ureter with extensive invasion and infiltration. There is a nearly 4 cm area of calvarial destruction involving the mastoid bone with tumor extension intracranially to involve the mastoid air cells, underlying dura and external auditory canal. No invasion of the adjacent brain parenchyma. 2. Approximately 5 cm necrotic appearing inferior irregular mass most likely due to a dominant necrotic lymph node.  This report was signed and finalized on 5/11/2023 8:26 AM by Hayden Chavarria MD.     ASSESSMENT: The patient is a very pleasant 78 y.o. male  with locally advanced left ear squamous cell carcinoma      PLAN:    1.  Locally advanced squamous cell carcinoma of the left ear T4 N1 M0 stage IV:  A.  I will proceed with treatment as scheduled today Libtayo IV every 3 weeks cycle #2.  B.  The patient will follow-up with us in 3 weeks for cycle #2.  C.  I will monitor the patient blood work including blood counts kidney function liver function and  electrolytes.  D.  I will hold off on doing imaging.  We will follow patient clinically for response.  E.  I explained to the patient the goal of treatment is palliative given his locally advanced disease with ipsilateral lymph node metastases.  F. We discussed potential side effects of immunotherapy including but not limited to immune mediated reactions with thyroiditis, pneumonitis, hepatitis, colitis, rash, and electrolyte abnormalities, fatigue, multiorgan failure, and possibly death.    2. Cancer-related pain:  A.  He will continue oxycodone as needed.     3.  Wound care:  A.  I explained to the patient and the family this is extremely important to assure wound cleanness.  B.  We will continue wound care with home health.     4.  Depression:  A.  He will continue Cymbalta daily    FOLLOW UP: 3 weeks with cycle #3.    Benitez Medellin MD  6/7/2023

## 2023-06-07 ENCOUNTER — OFFICE VISIT (OUTPATIENT)
Dept: ONCOLOGY | Facility: CLINIC | Age: 78
End: 2023-06-07
Payer: MEDICARE

## 2023-06-07 VITALS
WEIGHT: 211 LBS | BODY MASS INDEX: 28.58 KG/M2 | TEMPERATURE: 97.9 F | HEIGHT: 72 IN | DIASTOLIC BLOOD PRESSURE: 74 MMHG | SYSTOLIC BLOOD PRESSURE: 140 MMHG | HEART RATE: 77 BPM | OXYGEN SATURATION: 99 %

## 2023-06-07 DIAGNOSIS — C44.229 SQUAMOUS CELL CARCINOMA OF SKIN OF LEFT EAR AND EXTERNAL AURICULAR CANAL: Primary | ICD-10-CM

## 2023-06-07 PROCEDURE — 1125F AMNT PAIN NOTED PAIN PRSNT: CPT | Performed by: INTERNAL MEDICINE

## 2023-06-07 PROCEDURE — 99214 OFFICE O/P EST MOD 30 MIN: CPT | Performed by: INTERNAL MEDICINE

## 2023-06-07 RX ORDER — DICLOFENAC SODIUM 75 MG/1
TABLET, DELAYED RELEASE ORAL
COMMUNITY
Start: 2023-05-24

## 2023-06-07 RX ORDER — DOXYCYCLINE HYCLATE 100 MG/1
CAPSULE ORAL
COMMUNITY
Start: 2023-05-18

## 2023-06-08 ENCOUNTER — HOSPITAL ENCOUNTER (EMERGENCY)
Facility: HOSPITAL | Age: 78
Discharge: HOME | End: 2023-06-08
Attending: EMERGENCY MEDICINE
Payer: MEDICARE

## 2023-06-08 VITALS
TEMPERATURE: 98.1 F | BODY MASS INDEX: 28.58 KG/M2 | HEART RATE: 80 BPM | OXYGEN SATURATION: 94 % | HEIGHT: 72 IN | WEIGHT: 211 LBS | RESPIRATION RATE: 18 BRPM | SYSTOLIC BLOOD PRESSURE: 137 MMHG | DIASTOLIC BLOOD PRESSURE: 87 MMHG

## 2023-06-08 DIAGNOSIS — B87.9 MAGGOT INFESTATION: Primary | ICD-10-CM

## 2023-06-08 DIAGNOSIS — G89.3 CANCER RELATED PAIN: ICD-10-CM

## 2023-06-08 DIAGNOSIS — C44.229 SQUAMOUS CELL CARCINOMA, EAR, LEFT: ICD-10-CM

## 2023-06-08 PROCEDURE — 99283 EMERGENCY DEPT VISIT LOW MDM: CPT

## 2023-06-08 RX ORDER — HYDROCODONE BITARTRATE AND ACETAMINOPHEN 10; 325 MG/1; MG/1
1 TABLET ORAL ONCE
Status: COMPLETED | OUTPATIENT
Start: 2023-06-08 | End: 2023-06-08

## 2023-06-08 RX ORDER — CEPHALEXIN 250 MG/1
500 CAPSULE ORAL ONCE
Status: COMPLETED | OUTPATIENT
Start: 2023-06-08 | End: 2023-06-08

## 2023-06-08 RX ORDER — OXYCODONE HYDROCHLORIDE 5 MG/1
5 TABLET ORAL EVERY 6 HOURS PRN
Qty: 20 TABLET | Refills: 0 | Status: SHIPPED | OUTPATIENT
Start: 2023-06-08

## 2023-06-08 RX ADMIN — HYDROCODONE BITARTRATE AND ACETAMINOPHEN 1 TABLET: 10; 325 TABLET ORAL at 03:24

## 2023-06-08 RX ADMIN — CEPHALEXIN 500 MG: 250 CAPSULE ORAL at 03:24

## 2023-06-08 NOTE — DISCHARGE INSTRUCTIONS
I placed a consult to our case management team to help facilitate wound care.  Please contact the ER at [phone] if you have any questions

## 2023-06-08 NOTE — ED PROVIDER NOTES
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  Family also reports that they have been doing daily dressing changes and home health has been coming out once a week, patient's son was doing dressing change yesterday and noticed maggots.  They notified patient's oncologist who requested home health to come out later today.  Due to patient's pain patient presents to ER for further evaluation    Nurses Notes reviewed and agree, including vitals, allergies, social history and prior medical history.     REVIEW OF SYSTEMS: All systems reviewed and not pertinent unless noted.  Review of Systems      Positive for: Maggots in wound, ear pain related to locally advanced left ear squamous cell carcinoma    Negative for: Fever, bleeding, drainage    Past Medical History:   Diagnosis Date    Anemia     Arthritis     Cancer        Allergies:    Patient has no known allergies.      Past Surgical History:   Procedure Laterality Date    NAIL BIOPSY N/A     NECK DISSECTION N/A     PAROTIDECTOMY N/A     SKIN CANCER EXCISION           Social History     Socioeconomic History    Marital status:    Tobacco Use    Smoking status: Never    Smokeless tobacco: Current     Types: Chew   Vaping Use    Vaping Use: Never used   Substance and Sexual Activity    Alcohol use: Not Currently    Drug use: Never    Sexual activity: Defer         Family History   Problem Relation Age of Onset    Cancer Mother     Cancer Father        Objective  Physical Exam:  /85 (BP Location: Left arm, Patient Position: Sitting)   Pulse 78   Temp 98.2 °F (36.8 °C) (Oral)   Resp 16   Ht 182.9 cm (72\")   Wt 95.7 kg (211 lb)   SpO2 98%   BMI 28.62 kg/m²      Physical " Exam    CONSTITUTIONAL: Well developed, nontoxic 78-year-old male,  in mild discomfort.  VITAL SIGNS: per nursing, reviewed and noted  SKIN: Gauze dressing in place over the left ear.  Dressing was taken down revealing extensive maggot infestation with eroding lesion extending to the temporal region and the dorsum of the ear with posterior segment of the ear  from the scalp.  Otherwise no significant cellulitis.  No drainage, overlying chronic appearing skin changes.  EYES: Grossly EOMI, no icterus  ENT: Normal voice.  Moist mucous membranes.  left ear and temporal scalp pain exam as previously mentioned  RESPIRATORY:  No increased work of breathing. No retractions.   CARDIOVASCULAR:   Extremities pink and warm.  Good cap refill to extremities.   MUSCULOSKELETAL: Age-appropriate bulk and tone, moves all 4 extremities  NEUROLOGIC: Alert, oriented x 3. No gross deficits. GCS 15.   PSYCH: appropriate affect.      Procedures  Wound debridement.  Foreign body removals.  Several dozen maggots were removed with saline and peroxide irrigation as well as forceps removal.  Minimal debridement with forceps of granulated tissue.  No further maggots were identified on exam.  Tolerated well.  No complication.  Covered with Betadine and roll gauze.      ED Course:    Lab Results (last 24 hours)       ** No results found for the last 24 hours. 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  Family also reports that they have been doing daily dressing changes and home health has been coming out once a week, patient's son was doing dressing change yesterday and noticed maggots    DDX: includes but is not limited to: Maggot infestation  cancerous bed left ear and scalp         Patient arrives hypertensive blood pressure 150/85 afebrile no tachycardia oxygen saturations 98% with vitals interpreted by myself.     Pertinent features from physical exam: extensive maggot infestation with eroding lesion extending to the temporal region and the dorsum of the ear with posterior segment of the ear  from the scalp.  Otherwise no significant cellulitis.  No drainage,.    Initial diagnostic plan: Defer diagnostics, performed foreign body removal of several dozen maggots  Diagnostic information from other sources: Patient's son and daughter-in-law    Interventions / Re-evaluation: Norco for pain while in the emergency department, added Keflex    Results/clinical rationale were discussed with patient and family members    Consultations/Discussion of results with other physicians: None    Disposition plan: Patient will need daily wound care assessments for evaluation and removal of potential further maggot infestation.  Will place order for case management to assist with patient's home health for increased frequency of wound care.  Will provide oxycodone for pain, 5 mg immediate release #20 prescription as patient does not have appointment to see oncology again until the 13th.  Prescription Keflex.  Outpatient follow-up and return precautions were discussed with the patient and family.  -----      Final diagnoses:   Maggot infestation   Squamous cell carcinoma, ear, left   Cancer related pain     SAMIA reviewed by Keyur Lemos, Keyur Hammond,   06/08/23 0451       Keyur Lemos,   06/08/23 0457

## 2023-06-09 ENCOUNTER — DOCUMENTATION (OUTPATIENT)
Dept: SOCIAL WORK | Facility: HOSPITAL | Age: 78
End: 2023-06-09
Payer: MEDICARE

## 2023-06-09 NOTE — PROGRESS NOTES
Case Management/Social Work    Patient Name:  Hardik Natarajan  YOB: 1945  MRN: [id number]  Admit Date:  (Not on file)        CLARISSA received consult regarding additional wound care services for HH. CLARISSA spoke with daughter Leonora who states pt is receiving HH through [facility] [phone] 1x day for wound care. Pt still has maggots and family is wanting daily cleaning. CLARISSA advised daughter, HH usually does not come to home 1x day. SW did contact agency to see about additional resources, spoke to  and waiting on return call back from On-call RN at agency. SW provided daughter with office number for any additional questions or concerns.       Electronically signed by:  TAMIR Hall  06/09/23 15:24 EDT

## 2023-06-13 ENCOUNTER — INFUSION (OUTPATIENT)
Dept: ONCOLOGY | Facility: HOSPITAL | Age: 78
End: 2023-06-13
Payer: MEDICARE

## 2023-06-13 VITALS
HEART RATE: 74 BPM | WEIGHT: 209.7 LBS | TEMPERATURE: 97.8 F | RESPIRATION RATE: 16 BRPM | OXYGEN SATURATION: 99 % | SYSTOLIC BLOOD PRESSURE: 138 MMHG | DIASTOLIC BLOOD PRESSURE: 72 MMHG | BODY MASS INDEX: 28.44 KG/M2

## 2023-06-13 DIAGNOSIS — C44.229 SQUAMOUS CELL CARCINOMA OF SKIN OF LEFT EAR AND EXTERNAL AURICULAR CANAL: Primary | ICD-10-CM

## 2023-06-13 DIAGNOSIS — Z51.81 ENCOUNTER FOR THERAPEUTIC DRUG MONITORING: ICD-10-CM

## 2023-06-13 LAB
ALBUMIN SERPL-MCNC: 3.2 G/DL (ref 3.5–5.2)
ALBUMIN/GLOB SERPL: 1.1 G/DL
ALP SERPL-CCNC: 63 U/L (ref 39–117)
ALT SERPL W P-5'-P-CCNC: 16 U/L (ref 1–41)
ANION GAP SERPL CALCULATED.3IONS-SCNC: 11.1 MMOL/L (ref 5–15)
AST SERPL-CCNC: 19 U/L (ref 1–40)
BASOPHILS # BLD AUTO: 0.04 10*3/MM3 (ref 0–0.2)
BASOPHILS NFR BLD AUTO: 1 % (ref 0–1.5)
BILIRUB SERPL-MCNC: 0.4 MG/DL (ref 0–1.2)
BUN SERPL-MCNC: 12 MG/DL (ref 8–23)
BUN/CREAT SERPL: 9.9 (ref 7–25)
CALCIUM SPEC-SCNC: 9.1 MG/DL (ref 8.6–10.5)
CHLORIDE SERPL-SCNC: 99 MMOL/L (ref 98–107)
CO2 SERPL-SCNC: 22.9 MMOL/L (ref 22–29)
CREAT SERPL-MCNC: 1.21 MG/DL (ref 0.76–1.27)
DEPRECATED RDW RBC AUTO: 48.8 FL (ref 37–54)
EGFRCR SERPLBLD CKD-EPI 2021: 60.9 ML/MIN/1.73
EOSINOPHIL # BLD AUTO: 0.08 10*3/MM3 (ref 0–0.4)
EOSINOPHIL NFR BLD AUTO: 1.9 % (ref 0.3–6.2)
ERYTHROCYTE [DISTWIDTH] IN BLOOD BY AUTOMATED COUNT: 15.9 % (ref 12.3–15.4)
GLOBULIN UR ELPH-MCNC: 3 GM/DL
GLUCOSE SERPL-MCNC: 101 MG/DL (ref 65–99)
HCT VFR BLD AUTO: 29.2 % (ref 37.5–51)
HGB BLD-MCNC: 9.3 G/DL (ref 13–17.7)
IMM GRANULOCYTES # BLD AUTO: 0.04 10*3/MM3 (ref 0–0.05)
IMM GRANULOCYTES NFR BLD AUTO: 1 % (ref 0–0.5)
LYMPHOCYTES # BLD AUTO: 1.3 10*3/MM3 (ref 0.7–3.1)
LYMPHOCYTES NFR BLD AUTO: 31.4 % (ref 19.6–45.3)
MCH RBC QN AUTO: 27 PG (ref 26.6–33)
MCHC RBC AUTO-ENTMCNC: 31.8 G/DL (ref 31.5–35.7)
MCV RBC AUTO: 84.6 FL (ref 79–97)
MONOCYTES # BLD AUTO: 0.42 10*3/MM3 (ref 0.1–0.9)
MONOCYTES NFR BLD AUTO: 10.1 % (ref 5–12)
NEUTROPHILS NFR BLD AUTO: 2.26 10*3/MM3 (ref 1.7–7)
NEUTROPHILS NFR BLD AUTO: 54.6 % (ref 42.7–76)
NRBC BLD AUTO-RTO: 0 /100 WBC (ref 0–0.2)
PLATELET # BLD AUTO: 169 10*3/MM3 (ref 140–450)
PMV BLD AUTO: 9.3 FL (ref 6–12)
POTASSIUM SERPL-SCNC: 4.4 MMOL/L (ref 3.5–5.2)
PROT SERPL-MCNC: 6.2 G/DL (ref 6–8.5)
RBC # BLD AUTO: 3.45 10*6/MM3 (ref 4.14–5.8)
SODIUM SERPL-SCNC: 133 MMOL/L (ref 136–145)
WBC NRBC COR # BLD: 4.14 10*3/MM3 (ref 3.4–10.8)

## 2023-06-13 PROCEDURE — 36415 COLL VENOUS BLD VENIPUNCTURE: CPT

## 2023-06-13 PROCEDURE — 25010000002 CEMIPLIMAB-RWLC 350 MG/7ML SOLUTION 7 ML VIAL: Performed by: INTERNAL MEDICINE

## 2023-06-13 PROCEDURE — 80053 COMPREHEN METABOLIC PANEL: CPT

## 2023-06-13 PROCEDURE — 85025 COMPLETE CBC W/AUTO DIFF WBC: CPT

## 2023-06-13 PROCEDURE — 96413 CHEMO IV INFUSION 1 HR: CPT

## 2023-06-13 RX ORDER — SODIUM CHLORIDE 9 MG/ML
250 INJECTION, SOLUTION INTRAVENOUS ONCE
Status: DISCONTINUED | OUTPATIENT
Start: 2023-06-13 | End: 2023-06-13 | Stop reason: HOSPADM

## 2023-06-13 RX ADMIN — CEMIPLIMAB-RWLC 350 MG: 50 INJECTION INTRAVENOUS at 10:18

## 2023-07-18 ENCOUNTER — TELEPHONE (OUTPATIENT)
Dept: ONCOLOGY | Facility: CLINIC | Age: 78
End: 2023-07-18

## 2023-07-18 NOTE — TELEPHONE ENCOUNTER
I called [facility] as no release to speak with them and he gave permission on the phone to speak with Leonora.  I called Leonora and she said last visit that Yobany was not allowed to go back for the appointment.  I advised I apologize for this and don't understand why not allowed back, but that he can have a visitor at his appointments.  She said patient thought we were doing surgery on him at his return visit this next time and I reassured her we do not do surgery.  She is sure he just misunderstood and I advised her Dr. Medellin's note doesn't even mention him needing surgery in the future at this point.  I told her when they come in we need patient to sign a release giving us permission to speak with them and that patient is always allowed to have a visitor with him and we like him to since he is hard of hearing.

## 2023-07-18 NOTE — TELEPHONE ENCOUNTER
Caller: MAC BROWNE/ZITA (NO BH VERBAL)    Relationship: Emergency Contact    Best call back number: [phone]     What is the best time to reach you: ANYTIME    Who are you requesting to speak with (clinical staff, provider,  specific staff member): CLINICAL    What was the call regarding: PT DAUGHTER IN LAW IS CALLING TO SEE IF THEY CAN SEE WHAT IS GOING ON AND SEE WHAT WAS SAID AT THE LAST OFFICE VISIT. PT SON WAS NOT ABLE TO GO BACK WITH THE PT AT THE LAST OFFICE VISIT AND WOULD JUST LIKE SOME CLARIFICATION ON IF THE PT IS HAVING A SURGERY.

## 2023-07-26 ENCOUNTER — INFUSION (OUTPATIENT)
Dept: ONCOLOGY | Facility: HOSPITAL | Age: 78
End: 2023-07-26
Payer: MEDICARE

## 2023-07-26 ENCOUNTER — OFFICE VISIT (OUTPATIENT)
Dept: ONCOLOGY | Facility: CLINIC | Age: 78
End: 2023-07-26
Payer: MEDICARE

## 2023-07-26 VITALS
BODY MASS INDEX: 28.85 KG/M2 | TEMPERATURE: 97.7 F | RESPIRATION RATE: 18 BRPM | DIASTOLIC BLOOD PRESSURE: 75 MMHG | SYSTOLIC BLOOD PRESSURE: 147 MMHG | HEART RATE: 76 BPM | WEIGHT: 213 LBS | OXYGEN SATURATION: 99 % | HEIGHT: 72 IN

## 2023-07-26 DIAGNOSIS — Z51.81 ENCOUNTER FOR THERAPEUTIC DRUG MONITORING: ICD-10-CM

## 2023-07-26 DIAGNOSIS — C44.229 SQUAMOUS CELL CARCINOMA OF SKIN OF LEFT EAR AND EXTERNAL AURICULAR CANAL: Primary | ICD-10-CM

## 2023-07-26 LAB
ALBUMIN SERPL-MCNC: 4 G/DL (ref 3.5–5.2)
ALBUMIN/GLOB SERPL: 1.7 G/DL
ALP SERPL-CCNC: 55 U/L (ref 39–117)
ALT SERPL W P-5'-P-CCNC: 21 U/L (ref 1–41)
ANION GAP SERPL CALCULATED.3IONS-SCNC: 11.3 MMOL/L (ref 5–15)
AST SERPL-CCNC: 24 U/L (ref 1–40)
BASOPHILS # BLD AUTO: 0.04 10*3/MM3 (ref 0–0.2)
BASOPHILS NFR BLD AUTO: 1.3 % (ref 0–1.5)
BILIRUB SERPL-MCNC: 0.8 MG/DL (ref 0–1.2)
BUN SERPL-MCNC: 10 MG/DL (ref 8–23)
BUN/CREAT SERPL: 7.6 (ref 7–25)
CALCIUM SPEC-SCNC: 9.2 MG/DL (ref 8.6–10.5)
CHLORIDE SERPL-SCNC: 98 MMOL/L (ref 98–107)
CO2 SERPL-SCNC: 24.7 MMOL/L (ref 22–29)
CREAT SERPL-MCNC: 1.31 MG/DL (ref 0.76–1.27)
DEPRECATED RDW RBC AUTO: 56.2 FL (ref 37–54)
EGFRCR SERPLBLD CKD-EPI 2021: 55.4 ML/MIN/1.73
EOSINOPHIL # BLD AUTO: 0.17 10*3/MM3 (ref 0–0.4)
EOSINOPHIL NFR BLD AUTO: 5.3 % (ref 0.3–6.2)
ERYTHROCYTE [DISTWIDTH] IN BLOOD BY AUTOMATED COUNT: 17.8 % (ref 12.3–15.4)
GLOBULIN UR ELPH-MCNC: 2.4 GM/DL
GLUCOSE SERPL-MCNC: 91 MG/DL (ref 65–99)
HCT VFR BLD AUTO: 30.6 % (ref 37.5–51)
HGB BLD-MCNC: 9.9 G/DL (ref 13–17.7)
IMM GRANULOCYTES # BLD AUTO: 0.02 10*3/MM3 (ref 0–0.05)
IMM GRANULOCYTES NFR BLD AUTO: 0.6 % (ref 0–0.5)
LYMPHOCYTES # BLD AUTO: 1.06 10*3/MM3 (ref 0.7–3.1)
LYMPHOCYTES NFR BLD AUTO: 33.3 % (ref 19.6–45.3)
MCH RBC QN AUTO: 27.7 PG (ref 26.6–33)
MCHC RBC AUTO-ENTMCNC: 32.4 G/DL (ref 31.5–35.7)
MCV RBC AUTO: 85.5 FL (ref 79–97)
MONOCYTES # BLD AUTO: 0.4 10*3/MM3 (ref 0.1–0.9)
MONOCYTES NFR BLD AUTO: 12.6 % (ref 5–12)
NEUTROPHILS NFR BLD AUTO: 1.49 10*3/MM3 (ref 1.7–7)
NEUTROPHILS NFR BLD AUTO: 46.9 % (ref 42.7–76)
NRBC BLD AUTO-RTO: 0 /100 WBC (ref 0–0.2)
PLATELET # BLD AUTO: 152 10*3/MM3 (ref 140–450)
PMV BLD AUTO: 9.6 FL (ref 6–12)
POTASSIUM SERPL-SCNC: 4.8 MMOL/L (ref 3.5–5.2)
PROT SERPL-MCNC: 6.4 G/DL (ref 6–8.5)
RBC # BLD AUTO: 3.58 10*6/MM3 (ref 4.14–5.8)
SODIUM SERPL-SCNC: 134 MMOL/L (ref 136–145)
WBC NRBC COR # BLD: 3.18 10*3/MM3 (ref 3.4–10.8)

## 2023-07-26 PROCEDURE — 25010000002 CEMIPLIMAB-RWLC 350 MG/7ML SOLUTION 7 ML VIAL: Performed by: NURSE PRACTITIONER

## 2023-07-26 PROCEDURE — 80053 COMPREHEN METABOLIC PANEL: CPT

## 2023-07-26 PROCEDURE — 36415 COLL VENOUS BLD VENIPUNCTURE: CPT

## 2023-07-26 PROCEDURE — 85025 COMPLETE CBC W/AUTO DIFF WBC: CPT

## 2023-07-26 PROCEDURE — 96413 CHEMO IV INFUSION 1 HR: CPT

## 2023-07-26 RX ORDER — SODIUM CHLORIDE 9 MG/ML
250 INJECTION, SOLUTION INTRAVENOUS ONCE
Status: CANCELLED | OUTPATIENT
Start: 2023-07-26

## 2023-07-26 RX ORDER — SODIUM CHLORIDE 9 MG/ML
250 INJECTION, SOLUTION INTRAVENOUS ONCE
Status: DISCONTINUED | OUTPATIENT
Start: 2023-07-26 | End: 2023-07-26 | Stop reason: HOSPADM

## 2023-07-26 RX ADMIN — CEMIPLIMAB-RWLC 350 MG: 50 INJECTION INTRAVENOUS at 10:41

## 2023-07-26 NOTE — PROGRESS NOTES
DATE OF VISIT: 7/26/2023    REASON FOR VISIT: Followup for locally advanced squamous cell carcinoma of the left ear     PROBLEM LIST:  1. 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  Presented with gradually growing mass over the left ear for the last 3 years.  B.  Patient did not seek any medical attention until April 2023.  C.  CT neck and chest done at  April 2023 confirmed locally advanced lesion eroding into the left mastoid with left cervical lymphadenopathy but no evidence of chest metastases.  D.  Biopsy done April 2023 confirmed squamous cell carcinoma.  E.  Status post palliative radiation completed April 28, 2023  F.  Started immunotherapy with Libtayo 350 mg IV every 3 weeks May 22, 2023.  Status post 2 cycles.  2.  Cancer-related pain  3.  Mild depression  4.  BPH    HISTORY OF PRESENT ILLNESS: The patient is a very pleasant 78 y.o. male  with past medical history significant for locally advanced left ear squamous cell carcinoma diagnosed April 2023.  The patient was started on Libtayo May 16, 2023. The patient is here today for scheduled follow-up visit with treatment cycle #3.    SUBJECTIVE: The patient is here today with his son.  He has been able to tolerate treatment fairly well.  Lesion continues to improve.  He continues with home health to assist with dressings.  Patient's son states home health nurse measurements are down 25% since starting treatment.  Denies any shortness of breath, nausea, vomiting, and diarrhea.      Past History:  Medical History: has a past medical history of Anemia, Arthritis, and Cancer.   Surgical History: has a past surgical history that includes Skin cancer excision; Neck dissection (N/A); Parotidectomy (N/A); and NAIL BIOPSY (N/A).   Family History: family history includes Cancer in his father and mother.   Social History: reports that he has never smoked. His smokeless tobacco use includes chew. He reports that he does not currently  "use alcohol. He reports that he does not use drugs.    (Not in a hospital admission)     Allergies: Patient has no known allergies.     Review of Systems   Constitutional:  Positive for fatigue.   Respiratory: Negative.     Gastrointestinal: Negative.    All other systems reviewed and are negative.    PHYSICAL EXAMINATION:   /75   Pulse 76   Temp 97.7 °F (36.5 °C) (Infrared)   Resp 18   Ht 182.9 cm (72.01\")   Wt 96.6 kg (213 lb)   SpO2 99%   BMI 28.88 kg/m²    Pain Score    07/26/23 0858   PainSc: 0-No pain                       ECOG Performance Status: 1 - Symptomatic but completely ambulatory      General Appearance:      alert, cooperative, no apparent distress and appears stated age   Lungs:   Clear to auscultation bilaterally; respirations regular, even, and unlabored bilaterally   Heart:  Regular rate and rhythm, no murmurs appreciated   Abdomen:   Soft, non-tender, and non-distended                 No visits with results within 2 Week(s) from this visit.   Latest known visit with results is:   Infusion on 07/05/2023   Component Date Value Ref Range Status    Free T4 07/05/2023 0.54 (L)  0.93 - 1.70 ng/dL Final    TSH 07/05/2023 30.370 (H)  0.270 - 4.200 uIU/mL Final    Glucose 07/05/2023 98  65 - 99 mg/dL Final    BUN 07/05/2023 10  8 - 23 mg/dL Final    Creatinine 07/05/2023 1.25  0.76 - 1.27 mg/dL Final    Sodium 07/05/2023 133 (L)  136 - 145 mmol/L Final    Potassium 07/05/2023 4.2  3.5 - 5.2 mmol/L Final    Chloride 07/05/2023 99  98 - 107 mmol/L Final    CO2 07/05/2023 24.9  22.0 - 29.0 mmol/L Final    Calcium 07/05/2023 9.2  8.6 - 10.5 mg/dL Final    Total Protein 07/05/2023 6.6  6.0 - 8.5 g/dL Final    Albumin 07/05/2023 3.6  3.5 - 5.2 g/dL Final    ALT (SGPT) 07/05/2023 17  1 - 41 U/L Final    AST (SGOT) 07/05/2023 24  1 - 40 U/L Final    Alkaline Phosphatase 07/05/2023 56  39 - 117 U/L Final    Total Bilirubin 07/05/2023 0.6  0.0 - 1.2 mg/dL Final    Globulin 07/05/2023 3.0  gm/dL Final "    A/G Ratio 07/05/2023 1.2  g/dL Final    BUN/Creatinine Ratio 07/05/2023 8.0  7.0 - 25.0 Final    Anion Gap 07/05/2023 9.1  5.0 - 15.0 mmol/L Final    eGFR 07/05/2023 58.6 (L)  >60.0 mL/min/1.73 Final    WBC 07/05/2023 2.90 (L)  3.40 - 10.80 10*3/mm3 Final    RBC 07/05/2023 3.68 (L)  4.14 - 5.80 10*6/mm3 Final    Hemoglobin 07/05/2023 9.9 (L)  13.0 - 17.7 g/dL Final    Hematocrit 07/05/2023 31.3 (L)  37.5 - 51.0 % Final    MCV 07/05/2023 85.1  79.0 - 97.0 fL Final    MCH 07/05/2023 26.9  26.6 - 33.0 pg Final    MCHC 07/05/2023 31.6  31.5 - 35.7 g/dL Final    RDW 07/05/2023 17.4 (H)  12.3 - 15.4 % Final    RDW-SD 07/05/2023 53.9  37.0 - 54.0 fl Final    MPV 07/05/2023 9.1  6.0 - 12.0 fL Final    Platelets 07/05/2023 139 (L)  140 - 450 10*3/mm3 Final    Neutrophil % 07/05/2023 53.2  42.7 - 76.0 % Final    Lymphocyte % 07/05/2023 30.3  19.6 - 45.3 % Final    Monocyte % 07/05/2023 12.4 (H)  5.0 - 12.0 % Final    Eosinophil % 07/05/2023 2.8  0.3 - 6.2 % Final    Basophil % 07/05/2023 1.0  0.0 - 1.5 % Final    Immature Grans % 07/05/2023 0.3  0.0 - 0.5 % Final    Neutrophils, Absolute 07/05/2023 1.54 (L)  1.70 - 7.00 10*3/mm3 Final    Lymphocytes, Absolute 07/05/2023 0.88  0.70 - 3.10 10*3/mm3 Final    Monocytes, Absolute 07/05/2023 0.36  0.10 - 0.90 10*3/mm3 Final    Eosinophils, Absolute 07/05/2023 0.08  0.00 - 0.40 10*3/mm3 Final    Basophils, Absolute 07/05/2023 0.03  0.00 - 0.20 10*3/mm3 Final    Immature Grans, Absolute 07/05/2023 0.01  0.00 - 0.05 10*3/mm3 Final    nRBC 07/05/2023 0.0  0.0 - 0.2 /100 WBC Final        No results found.    ASSESSMENT: The patient is a very pleasant 78 y.o. male  with locally advanced left ear squamous cell carcinoma      PLAN:    1.  Locally advanced squamous cell carcinoma of the left ear T4 N1 M0 stage IV:  A.  I will proceed with treatment as scheduled today Libtayo IV every 3 weeks cycle #4.  B.  The patient will follow-up with us in 3 weeks for cycle #5.  C.  I will continue  to monitor the patient blood work including blood counts kidney function liver function and electrolytes.  D.  I will hold off on doing imaging.  We will follow patient clinically for response.  E.  I explained to the patient the goal of treatment is palliative given his locally advanced disease with ipsilateral lymph node metastases.  F. We reviewed potential side effects of immunotherapy including but not limited to immune mediated reactions with thyroiditis, pneumonitis, hepatitis, colitis, rash, and electrolyte abnormalities, fatigue, multiorgan failure, and possibly death.    2. Cancer-related pain:  A.  He will continue oxycodone as needed.     3.  Wound care:  A.  I reviewed with the patient and the family this is extremely important to assure wound cleanness.  B.  We will continue wound care with home health.     4.  Depression:  A.  He will continue Cymbalta daily    FOLLOW UP: 3 weeks with cycle #5.    Total time of patient care including time prior to, face to face with patient, and following visit spent in reviewing records, lab results, imaging studies, discussion with patient, and documentation/charting was > 32 minutes    Yady Guajardo, APRN  7/26/2023

## 2023-08-15 ENCOUNTER — TELEPHONE (OUTPATIENT)
Dept: ONCOLOGY | Facility: CLINIC | Age: 78
End: 2023-08-15
Payer: MEDICARE

## 2023-08-15 NOTE — TELEPHONE ENCOUNTER
Caller: MICHAEL    Relationship: [facility]    Best call back number: [phone]    What is the best time to reach you: ANY    Who are you requesting to speak with (clinical staff, provider,  specific staff member): CLINICAL        What was the call regarding: MICHAEL FROM [facility] CALLED TO SEE IF SHE CAN PICTURES OF PATIENT'S EARS TO DR SWAN VIA EMAIL OR CELL PHONE. OFFERED FAX NUMBER BUT SHE IS UNABLE TO SEND THAT WAY.    Is it okay if the provider responds through [facility]: NO

## 2023-08-15 NOTE — TELEPHONE ENCOUNTER
I called and let her know Dr. Medellin would like a pic monthly if they can.  Gave my work email to send to.

## 2023-08-16 ENCOUNTER — INFUSION (OUTPATIENT)
Dept: ONCOLOGY | Facility: HOSPITAL | Age: 78
End: 2023-08-16
Payer: MEDICARE

## 2023-08-16 ENCOUNTER — OFFICE VISIT (OUTPATIENT)
Dept: ONCOLOGY | Facility: CLINIC | Age: 78
End: 2023-08-16
Payer: MEDICARE

## 2023-08-16 VITALS
DIASTOLIC BLOOD PRESSURE: 74 MMHG | BODY MASS INDEX: 28.58 KG/M2 | RESPIRATION RATE: 16 BRPM | TEMPERATURE: 97.3 F | WEIGHT: 211 LBS | HEIGHT: 72 IN | SYSTOLIC BLOOD PRESSURE: 154 MMHG | OXYGEN SATURATION: 94 % | HEART RATE: 72 BPM

## 2023-08-16 DIAGNOSIS — C44.229 SQUAMOUS CELL CARCINOMA OF SKIN OF LEFT EAR AND EXTERNAL AURICULAR CANAL: ICD-10-CM

## 2023-08-16 DIAGNOSIS — Z51.81 ENCOUNTER FOR THERAPEUTIC DRUG MONITORING: Primary | ICD-10-CM

## 2023-08-16 DIAGNOSIS — C44.229 SQUAMOUS CELL CARCINOMA OF SKIN OF LEFT EAR AND EXTERNAL AURICULAR CANAL: Primary | ICD-10-CM

## 2023-08-16 DIAGNOSIS — Z51.81 ENCOUNTER FOR THERAPEUTIC DRUG MONITORING: ICD-10-CM

## 2023-08-16 LAB
ALBUMIN SERPL-MCNC: 4.2 G/DL (ref 3.5–5.2)
ALBUMIN/GLOB SERPL: 1.6 G/DL
ALP SERPL-CCNC: 53 U/L (ref 39–117)
ALT SERPL W P-5'-P-CCNC: 17 U/L (ref 1–41)
ANION GAP SERPL CALCULATED.3IONS-SCNC: 9.1 MMOL/L (ref 5–15)
AST SERPL-CCNC: 25 U/L (ref 1–40)
BASOPHILS # BLD AUTO: 0.03 10*3/MM3 (ref 0–0.2)
BASOPHILS NFR BLD AUTO: 1 % (ref 0–1.5)
BILIRUB SERPL-MCNC: 0.9 MG/DL (ref 0–1.2)
BUN SERPL-MCNC: 12 MG/DL (ref 8–23)
BUN/CREAT SERPL: 8.1 (ref 7–25)
CALCIUM SPEC-SCNC: 9.5 MG/DL (ref 8.6–10.5)
CHLORIDE SERPL-SCNC: 100 MMOL/L (ref 98–107)
CO2 SERPL-SCNC: 25.9 MMOL/L (ref 22–29)
CREAT SERPL-MCNC: 1.48 MG/DL (ref 0.76–1.27)
DEPRECATED RDW RBC AUTO: 56.1 FL (ref 37–54)
EGFRCR SERPLBLD CKD-EPI 2021: 47.8 ML/MIN/1.73
EOSINOPHIL # BLD AUTO: 0.15 10*3/MM3 (ref 0–0.4)
EOSINOPHIL NFR BLD AUTO: 4.9 % (ref 0.3–6.2)
ERYTHROCYTE [DISTWIDTH] IN BLOOD BY AUTOMATED COUNT: 17.5 % (ref 12.3–15.4)
GLOBULIN UR ELPH-MCNC: 2.7 GM/DL
GLUCOSE SERPL-MCNC: 94 MG/DL (ref 65–99)
HCT VFR BLD AUTO: 34.4 % (ref 37.5–51)
HGB BLD-MCNC: 11.1 G/DL (ref 13–17.7)
IMM GRANULOCYTES # BLD AUTO: 0.01 10*3/MM3 (ref 0–0.05)
IMM GRANULOCYTES NFR BLD AUTO: 0.3 % (ref 0–0.5)
LYMPHOCYTES # BLD AUTO: 1.29 10*3/MM3 (ref 0.7–3.1)
LYMPHOCYTES NFR BLD AUTO: 41.7 % (ref 19.6–45.3)
MCH RBC QN AUTO: 28 PG (ref 26.6–33)
MCHC RBC AUTO-ENTMCNC: 32.3 G/DL (ref 31.5–35.7)
MCV RBC AUTO: 86.9 FL (ref 79–97)
MONOCYTES # BLD AUTO: 0.36 10*3/MM3 (ref 0.1–0.9)
MONOCYTES NFR BLD AUTO: 11.7 % (ref 5–12)
NEUTROPHILS NFR BLD AUTO: 1.25 10*3/MM3 (ref 1.7–7)
NEUTROPHILS NFR BLD AUTO: 40.4 % (ref 42.7–76)
NRBC BLD AUTO-RTO: 0 /100 WBC (ref 0–0.2)
PLATELET # BLD AUTO: 181 10*3/MM3 (ref 140–450)
PMV BLD AUTO: 10.2 FL (ref 6–12)
POTASSIUM SERPL-SCNC: 4.8 MMOL/L (ref 3.5–5.2)
PROT SERPL-MCNC: 6.9 G/DL (ref 6–8.5)
RBC # BLD AUTO: 3.96 10*6/MM3 (ref 4.14–5.8)
SODIUM SERPL-SCNC: 135 MMOL/L (ref 136–145)
T4 FREE SERPL-MCNC: 1.06 NG/DL (ref 0.93–1.7)
TSH SERPL DL<=0.05 MIU/L-ACNC: 5.44 UIU/ML (ref 0.27–4.2)
WBC NRBC COR # BLD: 3.09 10*3/MM3 (ref 3.4–10.8)

## 2023-08-16 PROCEDURE — 84439 ASSAY OF FREE THYROXINE: CPT

## 2023-08-16 PROCEDURE — 25010000002 CEMIPLIMAB-RWLC 350 MG/7ML SOLUTION 7 ML VIAL: Performed by: NURSE PRACTITIONER

## 2023-08-16 PROCEDURE — 80053 COMPREHEN METABOLIC PANEL: CPT

## 2023-08-16 PROCEDURE — 84443 ASSAY THYROID STIM HORMONE: CPT

## 2023-08-16 PROCEDURE — 85025 COMPLETE CBC W/AUTO DIFF WBC: CPT

## 2023-08-16 PROCEDURE — 96413 CHEMO IV INFUSION 1 HR: CPT

## 2023-08-16 PROCEDURE — 36415 COLL VENOUS BLD VENIPUNCTURE: CPT

## 2023-08-16 RX ORDER — HYDROXYZINE HYDROCHLORIDE 25 MG/1
25 TABLET, FILM COATED ORAL EVERY 6 HOURS PRN
Qty: 60 TABLET | Refills: 2 | Status: SHIPPED | OUTPATIENT
Start: 2023-08-16

## 2023-08-16 RX ORDER — SODIUM CHLORIDE 9 MG/ML
250 INJECTION, SOLUTION INTRAVENOUS ONCE
Status: DISCONTINUED | OUTPATIENT
Start: 2023-08-16 | End: 2023-08-16 | Stop reason: HOSPADM

## 2023-08-16 RX ORDER — SODIUM CHLORIDE 9 MG/ML
250 INJECTION, SOLUTION INTRAVENOUS ONCE
Status: CANCELLED | OUTPATIENT
Start: 2023-08-16

## 2023-08-16 RX ADMIN — CEMIPLIMAB-RWLC 350 MG: 50 INJECTION INTRAVENOUS at 10:14

## 2023-08-16 NOTE — PROGRESS NOTES
DATE OF VISIT: 8/16/2023    REASON FOR VISIT: Followup for locally advanced squamous cell carcinoma of the left ear     PROBLEM LIST:  1. 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  Presented with gradually growing mass over the left ear for the last 3 years.  B.  Patient did not seek any medical attention until April 2023.  C.  CT neck and chest done at  April 2023 confirmed locally advanced lesion eroding into the left mastoid with left cervical lymphadenopathy but no evidence of chest metastases.  D.  Biopsy done April 2023 confirmed squamous cell carcinoma.  E.  Status post palliative radiation completed April 28, 2023  F.  Started immunotherapy with Libtayo 350 mg IV every 3 weeks May 22, 2023.  Status post 2 cycles.  2.  Cancer-related pain  3.  Mild depression  4.  BPH    HISTORY OF PRESENT ILLNESS: The patient is a very pleasant 78 y.o. male  with past medical history significant for locally advanced left ear squamous cell carcinoma diagnosed April 2023.  The patient was started on Libtayo May 16, 2023. The patient is here today for scheduled follow-up visit with treatment cycle #4.    SUBJECTIVE: The patient is here today by himself.  The son declines to come into the office visit today.  He has been able to tolerate treatment fairly well.  Home health reports that the lesion continues to improve and is supposed to send us imaging via email in the next few days.  He has noticed that he is itching quite a bit.  He has tried a few things but he is unable to tell me what he has tried.  Home health continues to assist with dressings.  Denies any shortness of breath, nausea, vomiting, diarrhea.     Past History:  Medical History: has a past medical history of Anemia, Arthritis, and Cancer.   Surgical History: has a past surgical history that includes Skin cancer excision; Neck dissection (N/A); Parotidectomy (N/A); and NAIL BIOPSY (N/A).   Family History: family history includes  "Cancer in his father and mother.   Social History: reports that he has never smoked. His smokeless tobacco use includes chew. He reports that he does not currently use alcohol. He reports that he does not use drugs.    (Not in a hospital admission)     Allergies: Patient has no known allergies.     Review of Systems   Constitutional:  Positive for fatigue.   Respiratory: Negative.     Gastrointestinal: Negative.    Skin:  Positive for wound.   All other systems reviewed and are negative.    PHYSICAL EXAMINATION:   /74   Pulse 72   Temp 97.3 øF (36.3 øC) (Temporal)   Resp 16   Ht 182.9 cm (72\")   Wt 95.7 kg (211 lb)   SpO2 94%   BMI 28.62 kg/mý    Pain Score    08/16/23 0857   PainSc: 0-No pain                       ECOG Performance Status: 1 - Symptomatic but completely ambulatory      General Appearance:      alert, cooperative, no apparent distress and appears stated age   Lungs:   Clear to auscultation bilaterally; respirations regular, even, and unlabored bilaterally   Heart:  Regular rate and rhythm, no murmurs appreciated   Abdomen:   Soft, non-tender, and non-distended                 No visits with results within 2 Week(s) from this visit.   Latest known visit with results is:   Infusion on 07/26/2023   Component Date Value Ref Range Status    Glucose 07/26/2023 91  65 - 99 mg/dL Final    BUN 07/26/2023 10  8 - 23 mg/dL Final    Creatinine 07/26/2023 1.31 (H)  0.76 - 1.27 mg/dL Final    Sodium 07/26/2023 134 (L)  136 - 145 mmol/L Final    Potassium 07/26/2023 4.8  3.5 - 5.2 mmol/L Final    Chloride 07/26/2023 98  98 - 107 mmol/L Final    CO2 07/26/2023 24.7  22.0 - 29.0 mmol/L Final    Calcium 07/26/2023 9.2  8.6 - 10.5 mg/dL Final    Total Protein 07/26/2023 6.4  6.0 - 8.5 g/dL Final    Albumin 07/26/2023 4.0  3.5 - 5.2 g/dL Final    ALT (SGPT) 07/26/2023 21  1 - 41 U/L Final    AST (SGOT) 07/26/2023 24  1 - 40 U/L Final    Alkaline Phosphatase 07/26/2023 55  39 - 117 U/L Final    Total " Bilirubin 07/26/2023 0.8  0.0 - 1.2 mg/dL Final    Globulin 07/26/2023 2.4  gm/dL Final    A/G Ratio 07/26/2023 1.7  g/dL Final    BUN/Creatinine Ratio 07/26/2023 7.6  7.0 - 25.0 Final    Anion Gap 07/26/2023 11.3  5.0 - 15.0 mmol/L Final    eGFR 07/26/2023 55.4 (L)  >60.0 mL/min/1.73 Final    WBC 07/26/2023 3.18 (L)  3.40 - 10.80 10*3/mm3 Final    RBC 07/26/2023 3.58 (L)  4.14 - 5.80 10*6/mm3 Final    Hemoglobin 07/26/2023 9.9 (L)  13.0 - 17.7 g/dL Final    Hematocrit 07/26/2023 30.6 (L)  37.5 - 51.0 % Final    MCV 07/26/2023 85.5  79.0 - 97.0 fL Final    MCH 07/26/2023 27.7  26.6 - 33.0 pg Final    MCHC 07/26/2023 32.4  31.5 - 35.7 g/dL Final    RDW 07/26/2023 17.8 (H)  12.3 - 15.4 % Final    RDW-SD 07/26/2023 56.2 (H)  37.0 - 54.0 fl Final    MPV 07/26/2023 9.6  6.0 - 12.0 fL Final    Platelets 07/26/2023 152  140 - 450 10*3/mm3 Final    Neutrophil % 07/26/2023 46.9  42.7 - 76.0 % Final    Lymphocyte % 07/26/2023 33.3  19.6 - 45.3 % Final    Monocyte % 07/26/2023 12.6 (H)  5.0 - 12.0 % Final    Eosinophil % 07/26/2023 5.3  0.3 - 6.2 % Final    Basophil % 07/26/2023 1.3  0.0 - 1.5 % Final    Immature Grans % 07/26/2023 0.6 (H)  0.0 - 0.5 % Final    Neutrophils, Absolute 07/26/2023 1.49 (L)  1.70 - 7.00 10*3/mm3 Final    Lymphocytes, Absolute 07/26/2023 1.06  0.70 - 3.10 10*3/mm3 Final    Monocytes, Absolute 07/26/2023 0.40  0.10 - 0.90 10*3/mm3 Final    Eosinophils, Absolute 07/26/2023 0.17  0.00 - 0.40 10*3/mm3 Final    Basophils, Absolute 07/26/2023 0.04  0.00 - 0.20 10*3/mm3 Final    Immature Grans, Absolute 07/26/2023 0.02  0.00 - 0.05 10*3/mm3 Final    nRBC 07/26/2023 0.0  0.0 - 0.2 /100 WBC Final        No results found.    ASSESSMENT: The patient is a very pleasant 78 y.o. male  with locally advanced left ear squamous cell carcinoma      PLAN:    1.  Locally advanced squamous cell carcinoma of the left ear T4 N1 M0 stage IV:  A.  I will proceed with treatment as scheduled today Libtayo IV every 3 weeks  cycle #5.  B.  The patient will follow-up with us in 3 weeks for cycle #6.  C.  I will continue to monitor the patient blood work including blood counts kidney function liver function and electrolytes.  D.  I will hold off on doing imaging.  We will follow patient clinically for response.  Discussed with home health nurse to send recent imaging of his left ear.  This is supposed to arrive via email in the next 2 to 3 days.  At that time we will load into his chart as well as into our note.  E.  I explained to the patient the goal of treatment is palliative given his locally advanced disease with ipsilateral lymph node metastases.  F. We reviewed potential side effects of immunotherapy including but not limited to immune mediated reactions with thyroiditis, pneumonitis, hepatitis, colitis, rash, and electrolyte abnormalities, fatigue, multiorgan failure, and possibly death.    2. Cancer-related pain:  A.  He will continue oxycodone as needed.     3.  Wound care:  A.  I reviewed with the patient and the family this is extremely important to assure wound cleanness.  B.  We will continue wound care with home health.     4.  Depression:  A.  He will continue Cymbalta daily    5.  Treatment related itching  A.  I will send in hydroxyzine to see if this will help with his itching  B.  I asked the patient to make sure he is keeping his skin hydrated with long perfumed lotions as well.    FOLLOW UP: 3 weeks with cycle #6.    Total time of patient care including time prior to, face to face with patient, and following visit spent in reviewing records, lab results, imaging studies, discussion with patient, and documentation/charting was > 35 minutes      Yady Guajardo, APRN  8/16/2023

## 2023-09-06 ENCOUNTER — OFFICE VISIT (OUTPATIENT)
Dept: ONCOLOGY | Facility: CLINIC | Age: 78
End: 2023-09-06
Payer: MEDICARE

## 2023-09-06 ENCOUNTER — INFUSION (OUTPATIENT)
Dept: ONCOLOGY | Facility: HOSPITAL | Age: 78
End: 2023-09-06
Payer: MEDICARE

## 2023-09-06 VITALS
WEIGHT: 209 LBS | BODY MASS INDEX: 28.31 KG/M2 | OXYGEN SATURATION: 98 % | HEART RATE: 81 BPM | HEIGHT: 72 IN | TEMPERATURE: 98.4 F | RESPIRATION RATE: 16 BRPM | DIASTOLIC BLOOD PRESSURE: 75 MMHG | SYSTOLIC BLOOD PRESSURE: 127 MMHG

## 2023-09-06 DIAGNOSIS — C44.229 SQUAMOUS CELL CARCINOMA OF SKIN OF LEFT EAR AND EXTERNAL AURICULAR CANAL: Primary | ICD-10-CM

## 2023-09-06 DIAGNOSIS — Z51.81 ENCOUNTER FOR THERAPEUTIC DRUG MONITORING: Primary | ICD-10-CM

## 2023-09-06 DIAGNOSIS — C44.229 SQUAMOUS CELL CARCINOMA OF SKIN OF LEFT EAR AND EXTERNAL AURICULAR CANAL: ICD-10-CM

## 2023-09-06 DIAGNOSIS — Z51.81 ENCOUNTER FOR THERAPEUTIC DRUG MONITORING: ICD-10-CM

## 2023-09-06 LAB
ALBUMIN SERPL-MCNC: 4.3 G/DL (ref 3.5–5.2)
ALBUMIN/GLOB SERPL: 1.7 G/DL
ALP SERPL-CCNC: 49 U/L (ref 39–117)
ALT SERPL W P-5'-P-CCNC: 13 U/L (ref 1–41)
ANION GAP SERPL CALCULATED.3IONS-SCNC: 11.6 MMOL/L (ref 5–15)
AST SERPL-CCNC: 22 U/L (ref 1–40)
BASOPHILS # BLD AUTO: 0.04 10*3/MM3 (ref 0–0.2)
BASOPHILS NFR BLD AUTO: 1.2 % (ref 0–1.5)
BILIRUB SERPL-MCNC: 1 MG/DL (ref 0–1.2)
BUN SERPL-MCNC: 14 MG/DL (ref 8–23)
BUN/CREAT SERPL: 8.4 (ref 7–25)
CALCIUM SPEC-SCNC: 9.6 MG/DL (ref 8.6–10.5)
CHLORIDE SERPL-SCNC: 102 MMOL/L (ref 98–107)
CO2 SERPL-SCNC: 25.4 MMOL/L (ref 22–29)
CREAT SERPL-MCNC: 1.66 MG/DL (ref 0.76–1.27)
DEPRECATED RDW RBC AUTO: 52.6 FL (ref 37–54)
EGFRCR SERPLBLD CKD-EPI 2021: 41.7 ML/MIN/1.73
EOSINOPHIL # BLD AUTO: 0.09 10*3/MM3 (ref 0–0.4)
EOSINOPHIL NFR BLD AUTO: 2.6 % (ref 0.3–6.2)
ERYTHROCYTE [DISTWIDTH] IN BLOOD BY AUTOMATED COUNT: 15.9 % (ref 12.3–15.4)
GLOBULIN UR ELPH-MCNC: 2.5 GM/DL
GLUCOSE SERPL-MCNC: 92 MG/DL (ref 65–99)
HCT VFR BLD AUTO: 33.8 % (ref 37.5–51)
HGB BLD-MCNC: 11.1 G/DL (ref 13–17.7)
IMM GRANULOCYTES # BLD AUTO: 0.01 10*3/MM3 (ref 0–0.05)
IMM GRANULOCYTES NFR BLD AUTO: 0.3 % (ref 0–0.5)
LYMPHOCYTES # BLD AUTO: 1.33 10*3/MM3 (ref 0.7–3.1)
LYMPHOCYTES NFR BLD AUTO: 39 % (ref 19.6–45.3)
MCH RBC QN AUTO: 29.5 PG (ref 26.6–33)
MCHC RBC AUTO-ENTMCNC: 32.8 G/DL (ref 31.5–35.7)
MCV RBC AUTO: 89.9 FL (ref 79–97)
MONOCYTES # BLD AUTO: 0.36 10*3/MM3 (ref 0.1–0.9)
MONOCYTES NFR BLD AUTO: 10.6 % (ref 5–12)
NEUTROPHILS NFR BLD AUTO: 1.58 10*3/MM3 (ref 1.7–7)
NEUTROPHILS NFR BLD AUTO: 46.3 % (ref 42.7–76)
NRBC BLD AUTO-RTO: 0 /100 WBC (ref 0–0.2)
PLATELET # BLD AUTO: 178 10*3/MM3 (ref 140–450)
PMV BLD AUTO: 9.6 FL (ref 6–12)
POTASSIUM SERPL-SCNC: 4.3 MMOL/L (ref 3.5–5.2)
PROT SERPL-MCNC: 6.8 G/DL (ref 6–8.5)
RBC # BLD AUTO: 3.76 10*6/MM3 (ref 4.14–5.8)
SODIUM SERPL-SCNC: 139 MMOL/L (ref 136–145)
WBC NRBC COR # BLD: 3.41 10*3/MM3 (ref 3.4–10.8)

## 2023-09-06 PROCEDURE — 25010000002 CEMIPLIMAB-RWLC 350 MG/7ML SOLUTION 7 ML VIAL: Performed by: NURSE PRACTITIONER

## 2023-09-06 PROCEDURE — 96413 CHEMO IV INFUSION 1 HR: CPT

## 2023-09-06 PROCEDURE — 36415 COLL VENOUS BLD VENIPUNCTURE: CPT

## 2023-09-06 PROCEDURE — 96361 HYDRATE IV INFUSION ADD-ON: CPT

## 2023-09-06 PROCEDURE — 85025 COMPLETE CBC W/AUTO DIFF WBC: CPT

## 2023-09-06 PROCEDURE — 80053 COMPREHEN METABOLIC PANEL: CPT

## 2023-09-06 RX ORDER — SODIUM CHLORIDE 9 MG/ML
250 INJECTION, SOLUTION INTRAVENOUS ONCE
Status: CANCELLED | OUTPATIENT
Start: 2023-09-06

## 2023-09-06 RX ORDER — SODIUM CHLORIDE 9 MG/ML
250 INJECTION, SOLUTION INTRAVENOUS ONCE
Status: DISCONTINUED | OUTPATIENT
Start: 2023-09-06 | End: 2023-09-06 | Stop reason: HOSPADM

## 2023-09-06 RX ADMIN — SODIUM CHLORIDE 1000 ML: 9 INJECTION, SOLUTION INTRAVENOUS at 11:00

## 2023-09-06 RX ADMIN — CEMIPLIMAB-RWLC 350 MG: 50 INJECTION INTRAVENOUS at 10:48

## 2023-09-06 NOTE — PROGRESS NOTES
DATE OF VISIT: 9/6/2023    REASON FOR VISIT: Followup for locally advanced squamous cell carcinoma of the left ear     PROBLEM LIST:  1. 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  Presented with gradually growing mass over the left ear for the last 3 years.  B.  Patient did not seek any medical attention until April 2023.  C.  CT neck and chest done at  April 2023 confirmed locally advanced lesion eroding into the left mastoid with left cervical lymphadenopathy but no evidence of chest metastases.  D.  Biopsy done April 2023 confirmed squamous cell carcinoma.  E.  Status post palliative radiation completed April 28, 2023  F.  Started immunotherapy with Libtayo 350 mg IV every 3 weeks May 22, 2023.  Status post 5 cycles.  2.  Cancer-related pain  3.  Mild depression  4.  BPH    HISTORY OF PRESENT ILLNESS: The patient is a very pleasant 78 y.o. male  with past medical history significant for locally advanced left ear squamous cell carcinoma diagnosed April 2023.  The patient was started on Libtayo May 16, 2023. The patient is here today for scheduled follow-up visit with treatment cycle #6.    SUBJECTIVE: The patient is here today by himself.  The son declines to come into the office for visit today.  He continues to tolerate treatment fairly well.  Home health reports lesion continues to improve.  Patient reports its nearly half the size as it was in the beginning. Home health continues to assist with dressings.  The son change his dressing every morning.  Denies any shortness of breath, nausea, vomiting, diarrhea.  He continues to have significant itching.  He says he feels like his whole body itches at times.  It is worse at the site of the wound.    Past History:  Medical History: has a past medical history of Anemia, Arthritis, and Cancer.   Surgical History: has a past surgical history that includes Skin cancer excision; Neck dissection (N/A); Parotidectomy (N/A); and NAIL  "BIOPSY (N/A).   Family History: family history includes Cancer in his father and mother.   Social History: reports that he has never smoked. His smokeless tobacco use includes chew. He reports that he does not currently use alcohol. He reports that he does not use drugs.    (Not in a hospital admission)     Allergies: Patient has no known allergies.     Review of Systems   Constitutional:  Positive for fatigue.   Respiratory: Negative.     Gastrointestinal: Negative.    Skin:  Positive for wound.   Neurological: Negative.    Psychiatric/Behavioral: Negative.     All other systems reviewed and are negative.    PHYSICAL EXAMINATION:   /75   Pulse 81   Temp 98.4 °F (36.9 °C) (Temporal)   Resp 16   Ht 182.9 cm (72\")   Wt 94.8 kg (209 lb)   SpO2 98%   BMI 28.35 kg/m²    Pain Score    09/06/23 0900   PainSc: 0-No pain                       ECOG Performance Status: 1 - Symptomatic but completely ambulatory      General Appearance:      alert, cooperative, no apparent distress and appears stated age   Lungs:   Clear to auscultation bilaterally; respirations regular, even, and unlabored bilaterally   Heart:  Regular rate and rhythm, no murmurs appreciated   Abdomen:   Soft, non-tender, and non-distended                 No visits with results within 2 Week(s) from this visit.   Latest known visit with results is:   Infusion on 08/16/2023   Component Date Value Ref Range Status    Glucose 08/16/2023 94  65 - 99 mg/dL Final    BUN 08/16/2023 12  8 - 23 mg/dL Final    Creatinine 08/16/2023 1.48 (H)  0.76 - 1.27 mg/dL Final    Sodium 08/16/2023 135 (L)  136 - 145 mmol/L Final    Potassium 08/16/2023 4.8  3.5 - 5.2 mmol/L Final    Slight hemolysis detected by analyzer. Results may be affected.    Chloride 08/16/2023 100  98 - 107 mmol/L Final    CO2 08/16/2023 25.9  22.0 - 29.0 mmol/L Final    Calcium 08/16/2023 9.5  8.6 - 10.5 mg/dL Final    Total Protein 08/16/2023 6.9  6.0 - 8.5 g/dL Final    Albumin 08/16/2023 4.2 "  3.5 - 5.2 g/dL Final    ALT (SGPT) 08/16/2023 17  1 - 41 U/L Final    AST (SGOT) 08/16/2023 25  1 - 40 U/L Final    Alkaline Phosphatase 08/16/2023 53  39 - 117 U/L Final    Total Bilirubin 08/16/2023 0.9  0.0 - 1.2 mg/dL Final    Globulin 08/16/2023 2.7  gm/dL Final    A/G Ratio 08/16/2023 1.6  g/dL Final    BUN/Creatinine Ratio 08/16/2023 8.1  7.0 - 25.0 Final    Anion Gap 08/16/2023 9.1  5.0 - 15.0 mmol/L Final    eGFR 08/16/2023 47.8 (L)  >60.0 mL/min/1.73 Final    TSH 08/16/2023 5.440 (H)  0.270 - 4.200 uIU/mL Final    Free T4 08/16/2023 1.06  0.93 - 1.70 ng/dL Final    WBC 08/16/2023 3.09 (L)  3.40 - 10.80 10*3/mm3 Final    RBC 08/16/2023 3.96 (L)  4.14 - 5.80 10*6/mm3 Final    Hemoglobin 08/16/2023 11.1 (L)  13.0 - 17.7 g/dL Final    Hematocrit 08/16/2023 34.4 (L)  37.5 - 51.0 % Final    MCV 08/16/2023 86.9  79.0 - 97.0 fL Final    MCH 08/16/2023 28.0  26.6 - 33.0 pg Final    MCHC 08/16/2023 32.3  31.5 - 35.7 g/dL Final    RDW 08/16/2023 17.5 (H)  12.3 - 15.4 % Final    RDW-SD 08/16/2023 56.1 (H)  37.0 - 54.0 fl Final    MPV 08/16/2023 10.2  6.0 - 12.0 fL Final    Platelets 08/16/2023 181  140 - 450 10*3/mm3 Final    Neutrophil % 08/16/2023 40.4 (L)  42.7 - 76.0 % Final    Lymphocyte % 08/16/2023 41.7  19.6 - 45.3 % Final    Monocyte % 08/16/2023 11.7  5.0 - 12.0 % Final    Eosinophil % 08/16/2023 4.9  0.3 - 6.2 % Final    Basophil % 08/16/2023 1.0  0.0 - 1.5 % Final    Immature Grans % 08/16/2023 0.3  0.0 - 0.5 % Final    Neutrophils, Absolute 08/16/2023 1.25 (L)  1.70 - 7.00 10*3/mm3 Final    Lymphocytes, Absolute 08/16/2023 1.29  0.70 - 3.10 10*3/mm3 Final    Monocytes, Absolute 08/16/2023 0.36  0.10 - 0.90 10*3/mm3 Final    Eosinophils, Absolute 08/16/2023 0.15  0.00 - 0.40 10*3/mm3 Final    Basophils, Absolute 08/16/2023 0.03  0.00 - 0.20 10*3/mm3 Final    Immature Grans, Absolute 08/16/2023 0.01  0.00 - 0.05 10*3/mm3 Final    nRBC 08/16/2023 0.0  0.0 - 0.2 /100 WBC Final        No results  found.  05/23/23 08/01/2023      ASSESSMENT: The patient is a very pleasant 78 y.o. male  with locally advanced left ear squamous cell carcinoma      PLAN:    1.  Locally advanced squamous cell carcinoma of the left ear T4 N1 M0 stage IV:  A.  I will proceed with treatment as scheduled today Libtayo IV every 3 weeks cycle #6.  B.  The patient will follow-up with us in 3 weeks for cycle #7.  C.  I will continue to monitor the patient blood work including blood counts kidney function liver function and electrolytes.  D.  I will continue to hold off on doing imaging.  We will follow patient clinically for response.  We again discussed with home health nurse to send recent imaging of his left ear.  This is supposed to arrive via email in the next 2 to 3 days.  At that time we will load into his chart as well as into our note.  E.  I explained to the patient the goal of treatment is palliative given his locally advanced disease with ipsilateral lymph node metastases.  F. We reviewed potential side effects of immunotherapy including but not limited to immune mediated reactions with thyroiditis, pneumonitis, hepatitis, colitis, rash, and electrolyte abnormalities, fatigue, multiorgan failure, and possibly death.    2. Cancer-related pain:  A.  He will continue oxycodone as needed.     3.  Wound care:  A.  I reviewed with the patient and the family this is extremely important to assure wound cleanness.  B.  We will continue wound care with home health.  C.  We again requested imaging from home health.     4.  Depression:  A.  He will continue Cymbalta daily    5.  Treatment related itching  A.  I we will continue hydroxyzine.  We will try to increase his dose to 50 mg to see if this helps with itching.  I did advise the patient and his son to be careful with sedation and if this occurs to stop.  B.  I asked the patient to make sure he is keeping his skin hydrated with non perfumed lotions as well.    FOLLOW UP: 3 weeks  with cycle #7.    Total time of patient care including time prior to, face to face with patient, and following visit spent in reviewing records, lab results, imaging studies, discussion with patient, and documentation/charting was > 35 minutes          Yady Guajardo, MARTÍNEZ  9/6/2023

## 2023-09-13 ENCOUNTER — TELEPHONE (OUTPATIENT)
Dept: ONCOLOGY | Facility: CLINIC | Age: 78
End: 2023-09-13

## 2023-09-13 NOTE — TELEPHONE ENCOUNTER
Caller: CHANDLER BROWNE    Relationship to patient: DAUGHTER    Best call back number: [phone]    [facility] IS CALLING TO GET AV[employer] PAPER SENT TO THEM. PLEASE MAIL TO:  [id number], [place] [address].

## 2023-09-26 NOTE — PROGRESS NOTES
DATE OF VISIT: 9/27/2023    REASON FOR VISIT: Followup for locally advanced squamous cell carcinoma of the left ear     PROBLEM LIST:  1. 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  Presented with gradually growing mass over the left ear for the last 3 years.  B.  Patient did not seek any medical attention until April 2023.  C.  CT neck and chest done at  April 2023 confirmed locally advanced lesion eroding into the left mastoid with left cervical lymphadenopathy but no evidence of chest metastases.  D.  Biopsy done April 2023 confirmed squamous cell carcinoma.  E.  Status post palliative radiation completed April 28, 2023  F.  Started immunotherapy with Libtayo 350 mg IV every 3 weeks May 22, 2023.  Status post 6 cycles.  2.  Cancer-related pain  3.  Mild depression  4.  BPH    HISTORY OF PRESENT ILLNESS: The patient is a very pleasant 78 y.o. male  with past medical history significant for locally advanced left ear squamous cell carcinoma diagnosed April 2023.  The patient was started on Libtayo May 16, 2023. The patient is here today for scheduled follow-up visit with treatment cycle #7.    SUBJECTIVE: The patient is here today by himself.  He has been able to tolerate treatment without any serious side effects.  His wound is healing nicely.    Past History:  Medical History: has a past medical history of Anemia, Arthritis, and Cancer.   Surgical History: has a past surgical history that includes Skin cancer excision; Neck dissection (N/A); Parotidectomy (N/A); and NAIL BIOPSY (N/A).   Family History: family history includes Cancer in his father and mother.   Social History: reports that he has never smoked. His smokeless tobacco use includes chew. He reports that he does not currently use alcohol. He reports that he does not use drugs.    (Not in a hospital admission)     Allergies: Patient has no known allergies.     Review of Systems   Constitutional:  Positive for fatigue.    Respiratory: Negative.     Gastrointestinal: Negative.    Skin:  Positive for wound.   Neurological: Negative.    Psychiatric/Behavioral: Negative.     All other systems reviewed and are negative.    PHYSICAL EXAMINATION:   There were no vitals taken for this visit.   There were no vitals filed for this visit.                      ECOG Performance Status: 1 - Symptomatic but completely ambulatory      General Appearance:      alert, cooperative, no apparent distress and appears stated age   Lungs:   Clear to auscultation bilaterally; respirations regular, even, and unlabored bilaterally   Heart:  Regular rate and rhythm, no murmurs appreciated   Abdomen:   Soft, non-tender, and non-distended                 No visits with results within 2 Week(s) from this visit.   Latest known visit with results is:   Infusion on 09/06/2023   Component Date Value Ref Range Status    Glucose 09/06/2023 92  65 - 99 mg/dL Final    BUN 09/06/2023 14  8 - 23 mg/dL Final    Creatinine 09/06/2023 1.66 (H)  0.76 - 1.27 mg/dL Final    Sodium 09/06/2023 139  136 - 145 mmol/L Final    Potassium 09/06/2023 4.3  3.5 - 5.2 mmol/L Final    Chloride 09/06/2023 102  98 - 107 mmol/L Final    CO2 09/06/2023 25.4  22.0 - 29.0 mmol/L Final    Calcium 09/06/2023 9.6  8.6 - 10.5 mg/dL Final    Total Protein 09/06/2023 6.8  6.0 - 8.5 g/dL Final    Albumin 09/06/2023 4.3  3.5 - 5.2 g/dL Final    ALT (SGPT) 09/06/2023 13  1 - 41 U/L Final    AST (SGOT) 09/06/2023 22  1 - 40 U/L Final    Alkaline Phosphatase 09/06/2023 49  39 - 117 U/L Final    Total Bilirubin 09/06/2023 1.0  0.0 - 1.2 mg/dL Final    Globulin 09/06/2023 2.5  gm/dL Final    A/G Ratio 09/06/2023 1.7  g/dL Final    BUN/Creatinine Ratio 09/06/2023 8.4  7.0 - 25.0 Final    Anion Gap 09/06/2023 11.6  5.0 - 15.0 mmol/L Final    eGFR 09/06/2023 41.7 (L)  >60.0 mL/min/1.73 Final    WBC 09/06/2023 3.41  3.40 - 10.80 10*3/mm3 Final    RBC 09/06/2023 3.76 (L)  4.14 - 5.80 10*6/mm3 Final    Hemoglobin  09/06/2023 11.1 (L)  13.0 - 17.7 g/dL Final    Hematocrit 09/06/2023 33.8 (L)  37.5 - 51.0 % Final    MCV 09/06/2023 89.9  79.0 - 97.0 fL Final    MCH 09/06/2023 29.5  26.6 - 33.0 pg Final    MCHC 09/06/2023 32.8  31.5 - 35.7 g/dL Final    RDW 09/06/2023 15.9 (H)  12.3 - 15.4 % Final    RDW-SD 09/06/2023 52.6  37.0 - 54.0 fl Final    MPV 09/06/2023 9.6  6.0 - 12.0 fL Final    Platelets 09/06/2023 178  140 - 450 10*3/mm3 Final    Neutrophil % 09/06/2023 46.3  42.7 - 76.0 % Final    Lymphocyte % 09/06/2023 39.0  19.6 - 45.3 % Final    Monocyte % 09/06/2023 10.6  5.0 - 12.0 % Final    Eosinophil % 09/06/2023 2.6  0.3 - 6.2 % Final    Basophil % 09/06/2023 1.2  0.0 - 1.5 % Final    Immature Grans % 09/06/2023 0.3  0.0 - 0.5 % Final    Neutrophils, Absolute 09/06/2023 1.58 (L)  1.70 - 7.00 10*3/mm3 Final    Lymphocytes, Absolute 09/06/2023 1.33  0.70 - 3.10 10*3/mm3 Final    Monocytes, Absolute 09/06/2023 0.36  0.10 - 0.90 10*3/mm3 Final    Eosinophils, Absolute 09/06/2023 0.09  0.00 - 0.40 10*3/mm3 Final    Basophils, Absolute 09/06/2023 0.04  0.00 - 0.20 10*3/mm3 Final    Immature Grans, Absolute 09/06/2023 0.01  0.00 - 0.05 10*3/mm3 Final    nRBC 09/06/2023 0.0  0.0 - 0.2 /100 WBC Final        No results found.  05/23/23    08/01/2023      ASSESSMENT: The patient is a very pleasant 78 y.o. male  with locally advanced left ear squamous cell carcinoma      PLAN:    1.  Locally advanced squamous cell carcinoma of the left ear T4 N1 M0 stage IV:  A.  I will proceed with treatment as scheduled today Libtayo IV every 3 weeks cycle #7.  B.  The patient will follow-up with us in 3 weeks for cycle #8.  C.  I will continue to monitor the patient blood work including blood counts kidney function liver function and electrolytes.  D.  I will continue to hold off on doing imaging.  We will follow patient clinically for response.    E.  I explained to the patient the goal of treatment is palliative given his locally advanced disease  with ipsilateral lymph node metastases.  F. We reviewed potential side effects of immunotherapy including but not limited to immune mediated reactions with thyroiditis, pneumonitis, hepatitis, colitis, rash, and electrolyte abnormalities, fatigue, multiorgan failure, and possibly death.    2. Cancer-related pain:  A.  He will continue oxycodone as needed.     3.  Wound care:  A.  I reviewed with the patient and the family this is extremely important to assure wound cleanness.  B.  We will continue wound care with home health.  C.  We again requested imaging from home health.     4.  Depression:  A.  He will continue Cymbalta daily    5.  Treatment related itching  A.  I we will continue hydroxyzine.  We will try to increase his dose to 50 mg to see if this helps with itching.  I did advise the patient and his son to be careful with sedation and if this occurs to stop.  B.  I asked the patient to make sure he is keeping his skin hydrated with non perfumed lotions as well.    FOLLOW UP: 3 weeks with cycle #8.      Benitez Medellin MD  9/27/2023

## 2023-09-27 ENCOUNTER — INFUSION (OUTPATIENT)
Dept: ONCOLOGY | Facility: HOSPITAL | Age: 78
End: 2023-09-27
Payer: MEDICARE

## 2023-09-27 ENCOUNTER — OFFICE VISIT (OUTPATIENT)
Dept: ONCOLOGY | Facility: CLINIC | Age: 78
End: 2023-09-27
Payer: MEDICARE

## 2023-09-27 VITALS
SYSTOLIC BLOOD PRESSURE: 128 MMHG | RESPIRATION RATE: 16 BRPM | HEIGHT: 72 IN | HEART RATE: 92 BPM | OXYGEN SATURATION: 99 % | DIASTOLIC BLOOD PRESSURE: 65 MMHG | WEIGHT: 211 LBS | BODY MASS INDEX: 28.58 KG/M2 | TEMPERATURE: 97.7 F

## 2023-09-27 DIAGNOSIS — Z51.81 ENCOUNTER FOR THERAPEUTIC DRUG MONITORING: ICD-10-CM

## 2023-09-27 DIAGNOSIS — C44.229 SQUAMOUS CELL CARCINOMA OF SKIN OF LEFT EAR AND EXTERNAL AURICULAR CANAL: Primary | ICD-10-CM

## 2023-09-27 LAB
ALBUMIN SERPL-MCNC: 4.4 G/DL (ref 3.5–5.2)
ALBUMIN/GLOB SERPL: 1.8 G/DL
ALP SERPL-CCNC: 47 U/L (ref 39–117)
ALT SERPL W P-5'-P-CCNC: 16 U/L (ref 1–41)
ANION GAP SERPL CALCULATED.3IONS-SCNC: 10.9 MMOL/L (ref 5–15)
AST SERPL-CCNC: 26 U/L (ref 1–40)
BASOPHILS # BLD AUTO: 0.03 10*3/MM3 (ref 0–0.2)
BASOPHILS NFR BLD AUTO: 0.8 % (ref 0–1.5)
BILIRUB SERPL-MCNC: 1.1 MG/DL (ref 0–1.2)
BUN SERPL-MCNC: 12 MG/DL (ref 8–23)
BUN/CREAT SERPL: 8.5 (ref 7–25)
CALCIUM SPEC-SCNC: 9.7 MG/DL (ref 8.6–10.5)
CHLORIDE SERPL-SCNC: 100 MMOL/L (ref 98–107)
CO2 SERPL-SCNC: 25.1 MMOL/L (ref 22–29)
CREAT SERPL-MCNC: 1.41 MG/DL (ref 0.76–1.27)
DEPRECATED RDW RBC AUTO: 50.5 FL (ref 37–54)
EGFRCR SERPLBLD CKD-EPI 2021: 50.7 ML/MIN/1.73
EOSINOPHIL # BLD AUTO: 0.08 10*3/MM3 (ref 0–0.4)
EOSINOPHIL NFR BLD AUTO: 2.1 % (ref 0.3–6.2)
ERYTHROCYTE [DISTWIDTH] IN BLOOD BY AUTOMATED COUNT: 14.9 % (ref 12.3–15.4)
GLOBULIN UR ELPH-MCNC: 2.4 GM/DL
GLUCOSE SERPL-MCNC: 97 MG/DL (ref 65–99)
HCT VFR BLD AUTO: 36.6 % (ref 37.5–51)
HGB BLD-MCNC: 12.1 G/DL (ref 13–17.7)
IMM GRANULOCYTES # BLD AUTO: 0.01 10*3/MM3 (ref 0–0.05)
IMM GRANULOCYTES NFR BLD AUTO: 0.3 % (ref 0–0.5)
LYMPHOCYTES # BLD AUTO: 1.22 10*3/MM3 (ref 0.7–3.1)
LYMPHOCYTES NFR BLD AUTO: 32.1 % (ref 19.6–45.3)
MCH RBC QN AUTO: 30.4 PG (ref 26.6–33)
MCHC RBC AUTO-ENTMCNC: 33.1 G/DL (ref 31.5–35.7)
MCV RBC AUTO: 92 FL (ref 79–97)
MONOCYTES # BLD AUTO: 0.33 10*3/MM3 (ref 0.1–0.9)
MONOCYTES NFR BLD AUTO: 8.7 % (ref 5–12)
NEUTROPHILS NFR BLD AUTO: 2.13 10*3/MM3 (ref 1.7–7)
NEUTROPHILS NFR BLD AUTO: 56 % (ref 42.7–76)
NRBC BLD AUTO-RTO: 0 /100 WBC (ref 0–0.2)
PLATELET # BLD AUTO: 163 10*3/MM3 (ref 140–450)
PMV BLD AUTO: 10.5 FL (ref 6–12)
POTASSIUM SERPL-SCNC: 4.6 MMOL/L (ref 3.5–5.2)
PROT SERPL-MCNC: 6.8 G/DL (ref 6–8.5)
RBC # BLD AUTO: 3.98 10*6/MM3 (ref 4.14–5.8)
SODIUM SERPL-SCNC: 136 MMOL/L (ref 136–145)
T4 FREE SERPL-MCNC: 1.11 NG/DL (ref 0.93–1.7)
TSH SERPL DL<=0.05 MIU/L-ACNC: 2.42 UIU/ML (ref 0.27–4.2)
WBC NRBC COR # BLD: 3.8 10*3/MM3 (ref 3.4–10.8)

## 2023-09-27 PROCEDURE — 99214 OFFICE O/P EST MOD 30 MIN: CPT | Performed by: INTERNAL MEDICINE

## 2023-09-27 PROCEDURE — 96413 CHEMO IV INFUSION 1 HR: CPT

## 2023-09-27 PROCEDURE — 85025 COMPLETE CBC W/AUTO DIFF WBC: CPT

## 2023-09-27 PROCEDURE — 25010000002 CEMIPLIMAB-RWLC 350 MG/7ML SOLUTION 7 ML VIAL: Performed by: INTERNAL MEDICINE

## 2023-09-27 PROCEDURE — 80053 COMPREHEN METABOLIC PANEL: CPT

## 2023-09-27 PROCEDURE — 84443 ASSAY THYROID STIM HORMONE: CPT

## 2023-09-27 PROCEDURE — 1126F AMNT PAIN NOTED NONE PRSNT: CPT | Performed by: INTERNAL MEDICINE

## 2023-09-27 PROCEDURE — 84439 ASSAY OF FREE THYROXINE: CPT

## 2023-09-27 PROCEDURE — 36415 COLL VENOUS BLD VENIPUNCTURE: CPT

## 2023-09-27 RX ORDER — SODIUM CHLORIDE 9 MG/ML
250 INJECTION, SOLUTION INTRAVENOUS ONCE
Status: DISCONTINUED | OUTPATIENT
Start: 2023-09-27 | End: 2023-09-27 | Stop reason: HOSPADM

## 2023-09-27 RX ORDER — DULOXETIN HYDROCHLORIDE 30 MG/1
CAPSULE, DELAYED RELEASE ORAL
COMMUNITY
Start: 2023-09-11

## 2023-09-27 RX ORDER — FINASTERIDE 5 MG/1
TABLET, FILM COATED ORAL
COMMUNITY
Start: 2023-09-11

## 2023-09-27 RX ORDER — SODIUM CHLORIDE 9 MG/ML
250 INJECTION, SOLUTION INTRAVENOUS ONCE
Status: CANCELLED | OUTPATIENT
Start: 2023-09-27

## 2023-09-27 RX ADMIN — CEMIPLIMAB-RWLC 350 MG: 50 INJECTION INTRAVENOUS at 14:40

## 2023-10-18 ENCOUNTER — INFUSION (OUTPATIENT)
Dept: ONCOLOGY | Facility: HOSPITAL | Age: 78
End: 2023-10-18
Payer: MEDICARE

## 2023-10-18 ENCOUNTER — OFFICE VISIT (OUTPATIENT)
Dept: ONCOLOGY | Facility: CLINIC | Age: 78
End: 2023-10-18
Payer: MEDICARE

## 2023-10-18 VITALS
DIASTOLIC BLOOD PRESSURE: 68 MMHG | OXYGEN SATURATION: 99 % | RESPIRATION RATE: 16 BRPM | SYSTOLIC BLOOD PRESSURE: 123 MMHG | WEIGHT: 211 LBS | TEMPERATURE: 97.5 F | HEART RATE: 89 BPM | BODY MASS INDEX: 28.58 KG/M2 | HEIGHT: 72 IN

## 2023-10-18 DIAGNOSIS — Z51.81 ENCOUNTER FOR THERAPEUTIC DRUG MONITORING: Primary | ICD-10-CM

## 2023-10-18 DIAGNOSIS — Z51.81 ENCOUNTER FOR THERAPEUTIC DRUG MONITORING: ICD-10-CM

## 2023-10-18 DIAGNOSIS — C44.229 SQUAMOUS CELL CARCINOMA OF SKIN OF LEFT EAR AND EXTERNAL AURICULAR CANAL: ICD-10-CM

## 2023-10-18 DIAGNOSIS — E03.2 HYPOTHYROIDISM DUE TO MEDICATION: ICD-10-CM

## 2023-10-18 DIAGNOSIS — C44.229 SQUAMOUS CELL CARCINOMA OF SKIN OF LEFT EAR AND EXTERNAL AURICULAR CANAL: Primary | ICD-10-CM

## 2023-10-18 LAB
ALBUMIN SERPL-MCNC: 4.4 G/DL (ref 3.5–5.2)
ALBUMIN/GLOB SERPL: 1.6 G/DL
ALP SERPL-CCNC: 45 U/L (ref 39–117)
ALT SERPL W P-5'-P-CCNC: 15 U/L (ref 1–41)
ANION GAP SERPL CALCULATED.3IONS-SCNC: 11.4 MMOL/L (ref 5–15)
AST SERPL-CCNC: 28 U/L (ref 1–40)
BASOPHILS # BLD AUTO: 0.03 10*3/MM3 (ref 0–0.2)
BASOPHILS NFR BLD AUTO: 0.8 % (ref 0–1.5)
BILIRUB SERPL-MCNC: 1.2 MG/DL (ref 0–1.2)
BUN SERPL-MCNC: 14 MG/DL (ref 8–23)
BUN/CREAT SERPL: 8.5 (ref 7–25)
CALCIUM SPEC-SCNC: 9.9 MG/DL (ref 8.6–10.5)
CHLORIDE SERPL-SCNC: 102 MMOL/L (ref 98–107)
CO2 SERPL-SCNC: 24.6 MMOL/L (ref 22–29)
CREAT SERPL-MCNC: 1.65 MG/DL (ref 0.76–1.27)
DEPRECATED RDW RBC AUTO: 47.5 FL (ref 37–54)
EGFRCR SERPLBLD CKD-EPI 2021: 42 ML/MIN/1.73
EOSINOPHIL # BLD AUTO: 0.12 10*3/MM3 (ref 0–0.4)
EOSINOPHIL NFR BLD AUTO: 3.3 % (ref 0.3–6.2)
ERYTHROCYTE [DISTWIDTH] IN BLOOD BY AUTOMATED COUNT: 14 % (ref 12.3–15.4)
GLOBULIN UR ELPH-MCNC: 2.7 GM/DL
GLUCOSE SERPL-MCNC: 93 MG/DL (ref 65–99)
HCT VFR BLD AUTO: 35.4 % (ref 37.5–51)
HGB BLD-MCNC: 11.7 G/DL (ref 13–17.7)
IMM GRANULOCYTES # BLD AUTO: 0.03 10*3/MM3 (ref 0–0.05)
IMM GRANULOCYTES NFR BLD AUTO: 0.8 % (ref 0–0.5)
LYMPHOCYTES # BLD AUTO: 1.24 10*3/MM3 (ref 0.7–3.1)
LYMPHOCYTES NFR BLD AUTO: 34.3 % (ref 19.6–45.3)
MCH RBC QN AUTO: 30.4 PG (ref 26.6–33)
MCHC RBC AUTO-ENTMCNC: 33.1 G/DL (ref 31.5–35.7)
MCV RBC AUTO: 91.9 FL (ref 79–97)
MONOCYTES # BLD AUTO: 0.38 10*3/MM3 (ref 0.1–0.9)
MONOCYTES NFR BLD AUTO: 10.5 % (ref 5–12)
NEUTROPHILS NFR BLD AUTO: 1.82 10*3/MM3 (ref 1.7–7)
NEUTROPHILS NFR BLD AUTO: 50.3 % (ref 42.7–76)
NRBC BLD AUTO-RTO: 0 /100 WBC (ref 0–0.2)
PLATELET # BLD AUTO: 170 10*3/MM3 (ref 140–450)
PMV BLD AUTO: 9.9 FL (ref 6–12)
POTASSIUM SERPL-SCNC: 4.5 MMOL/L (ref 3.5–5.2)
PROT SERPL-MCNC: 7.1 G/DL (ref 6–8.5)
RBC # BLD AUTO: 3.85 10*6/MM3 (ref 4.14–5.8)
SODIUM SERPL-SCNC: 138 MMOL/L (ref 136–145)
WBC NRBC COR # BLD: 3.62 10*3/MM3 (ref 3.4–10.8)

## 2023-10-18 PROCEDURE — 36415 COLL VENOUS BLD VENIPUNCTURE: CPT

## 2023-10-18 PROCEDURE — 85025 COMPLETE CBC W/AUTO DIFF WBC: CPT

## 2023-10-18 PROCEDURE — 80053 COMPREHEN METABOLIC PANEL: CPT

## 2023-10-18 PROCEDURE — 25010000002 CEMIPLIMAB-RWLC 350 MG/7ML SOLUTION 7 ML VIAL: Performed by: NURSE PRACTITIONER

## 2023-10-18 PROCEDURE — 96413 CHEMO IV INFUSION 1 HR: CPT

## 2023-10-18 RX ORDER — LEVOTHYROXINE SODIUM 0.05 MG/1
50 TABLET ORAL DAILY
Qty: 30 TABLET | Refills: 3 | Status: SHIPPED | OUTPATIENT
Start: 2023-10-18

## 2023-10-18 RX ORDER — SODIUM CHLORIDE 9 MG/ML
250 INJECTION, SOLUTION INTRAVENOUS ONCE
Status: DISCONTINUED | OUTPATIENT
Start: 2023-10-18 | End: 2023-10-18 | Stop reason: HOSPADM

## 2023-10-18 RX ORDER — SODIUM CHLORIDE 9 MG/ML
250 INJECTION, SOLUTION INTRAVENOUS ONCE
Status: CANCELLED | OUTPATIENT
Start: 2023-10-18

## 2023-10-18 RX ADMIN — CEMIPLIMAB-RWLC 350 MG: 50 INJECTION INTRAVENOUS at 11:05

## 2023-10-18 NOTE — PROGRESS NOTES
DATE OF VISIT: 10/18/2023    REASON FOR VISIT: Followup for locally advanced squamous cell carcinoma of the left ear     PROBLEM LIST:  1. 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  Presented with gradually growing mass over the left ear for the last 3 years.  B.  Patient did not seek any medical attention until April 2023.  C.  CT neck and chest done at  April 2023 confirmed locally advanced lesion eroding into the left mastoid with left cervical lymphadenopathy but no evidence of chest metastases.  D.  Biopsy done April 2023 confirmed squamous cell carcinoma.  E.  Status post palliative radiation completed April 28, 2023  F.  Started immunotherapy with Libtayo 350 mg IV every 3 weeks May 22, 2023.  Status post 6 cycles.  2.  Cancer-related pain  3.  Mild depression  4.  BPH    HISTORY OF PRESENT ILLNESS: The patient is a very pleasant 78 y.o. male  with past medical history significant for locally advanced left ear squamous cell carcinoma diagnosed April 2023.  The patient was started on Libtayo May 16, 2023. The patient is here today for scheduled follow-up visit with treatment cycle #7.    SUBJECTIVE: The patient is here today by himself.  His son is in the waiting room.  He has been able to tolerate treatment without any serious side effects.  Home health reports his wound continues to heal nicely.  Denies any fever, shortness of breath.     Past History:  Medical History: has a past medical history of Anemia, Arthritis, and Cancer.   Surgical History: has a past surgical history that includes Skin cancer excision; Neck dissection (N/A); Parotidectomy (N/A); and NAIL BIOPSY (N/A).   Family History: family history includes Cancer in his father and mother.   Social History: reports that he has never smoked. His smokeless tobacco use includes chew. He reports that he does not currently use alcohol. He reports that he does not use drugs.    (Not in a hospital admission)    "  Allergies: Patient has no known allergies.     Review of Systems   Constitutional:  Positive for fatigue.   HENT:  Positive for hearing loss.    Respiratory: Negative.     Gastrointestinal: Negative.    Skin:  Positive for wound.   Neurological: Negative.    Psychiatric/Behavioral: Negative.     All other systems reviewed and are negative.      PHYSICAL EXAMINATION:   /68   Pulse 89   Temp 97.5 °F (36.4 °C) (Temporal)   Resp 16   Ht 182.9 cm (72\")   Wt 95.7 kg (211 lb)   SpO2 99%   BMI 28.62 kg/m²    Pain Score    10/18/23 0900   PainSc: 0-No pain                         ECOG Performance Status: 1 - Symptomatic but completely ambulatory      General Appearance:      alert, cooperative, no apparent distress and appears stated age   Lungs:   Clear to auscultation bilaterally; respirations regular, even, and unlabored bilaterally   Heart:  Regular rate and rhythm, no murmurs appreciated   Abdomen:   Soft, non-tender, and non-distended                 No visits with results within 2 Week(s) from this visit.   Latest known visit with results is:   Infusion on 09/27/2023   Component Date Value Ref Range Status    Glucose 09/27/2023 97  65 - 99 mg/dL Final    BUN 09/27/2023 12  8 - 23 mg/dL Final    Creatinine 09/27/2023 1.41 (H)  0.76 - 1.27 mg/dL Final    Sodium 09/27/2023 136  136 - 145 mmol/L Final    Potassium 09/27/2023 4.6  3.5 - 5.2 mmol/L Final    Chloride 09/27/2023 100  98 - 107 mmol/L Final    CO2 09/27/2023 25.1  22.0 - 29.0 mmol/L Final    Calcium 09/27/2023 9.7  8.6 - 10.5 mg/dL Final    Total Protein 09/27/2023 6.8  6.0 - 8.5 g/dL Final    Albumin 09/27/2023 4.4  3.5 - 5.2 g/dL Final    ALT (SGPT) 09/27/2023 16  1 - 41 U/L Final    AST (SGOT) 09/27/2023 26  1 - 40 U/L Final    Alkaline Phosphatase 09/27/2023 47  39 - 117 U/L Final    Total Bilirubin 09/27/2023 1.1  0.0 - 1.2 mg/dL Final    Globulin 09/27/2023 2.4  gm/dL Final    A/G Ratio 09/27/2023 1.8  g/dL Final    BUN/Creatinine Ratio " 09/27/2023 8.5  7.0 - 25.0 Final    Anion Gap 09/27/2023 10.9  5.0 - 15.0 mmol/L Final    eGFR 09/27/2023 50.7 (L)  >60.0 mL/min/1.73 Final    TSH 09/27/2023 2.420  0.270 - 4.200 uIU/mL Final    Free T4 09/27/2023 1.11  0.93 - 1.70 ng/dL Final    WBC 09/27/2023 3.80  3.40 - 10.80 10*3/mm3 Final    RBC 09/27/2023 3.98 (L)  4.14 - 5.80 10*6/mm3 Final    Hemoglobin 09/27/2023 12.1 (L)  13.0 - 17.7 g/dL Final    Hematocrit 09/27/2023 36.6 (L)  37.5 - 51.0 % Final    MCV 09/27/2023 92.0  79.0 - 97.0 fL Final    MCH 09/27/2023 30.4  26.6 - 33.0 pg Final    MCHC 09/27/2023 33.1  31.5 - 35.7 g/dL Final    RDW 09/27/2023 14.9  12.3 - 15.4 % Final    RDW-SD 09/27/2023 50.5  37.0 - 54.0 fl Final    MPV 09/27/2023 10.5  6.0 - 12.0 fL Final    Platelets 09/27/2023 163  140 - 450 10*3/mm3 Final    Neutrophil % 09/27/2023 56.0  42.7 - 76.0 % Final    Lymphocyte % 09/27/2023 32.1  19.6 - 45.3 % Final    Monocyte % 09/27/2023 8.7  5.0 - 12.0 % Final    Eosinophil % 09/27/2023 2.1  0.3 - 6.2 % Final    Basophil % 09/27/2023 0.8  0.0 - 1.5 % Final    Immature Grans % 09/27/2023 0.3  0.0 - 0.5 % Final    Neutrophils, Absolute 09/27/2023 2.13  1.70 - 7.00 10*3/mm3 Final    Lymphocytes, Absolute 09/27/2023 1.22  0.70 - 3.10 10*3/mm3 Final    Monocytes, Absolute 09/27/2023 0.33  0.10 - 0.90 10*3/mm3 Final    Eosinophils, Absolute 09/27/2023 0.08  0.00 - 0.40 10*3/mm3 Final    Basophils, Absolute 09/27/2023 0.03  0.00 - 0.20 10*3/mm3 Final    Immature Grans, Absolute 09/27/2023 0.01  0.00 - 0.05 10*3/mm3 Final    nRBC 09/27/2023 0.0  0.0 - 0.2 /100 WBC Final        No results found.  05/23/23 08/01/2023      ASSESSMENT: The patient is a very pleasant 78 y.o. male  with locally advanced left ear squamous cell carcinoma      PLAN:    1.  Locally advanced squamous cell carcinoma of the left ear T4 N1 M0 stage IV:  A.  I will proceed with treatment as scheduled today Libtayo IV every 3 weeks cycle #8.  B.  The patient will follow-up with us  in 3 weeks for cycle #9.  C.  I will continue to monitor the patient blood work including blood counts kidney function liver function and electrolytes.  D.  I will continue to hold off on doing imaging.  We will follow patient clinically for response.    E.  I explained to the patient the goal of treatment is palliative given his locally advanced disease with ipsilateral lymph node metastases.  F. We reviewed potential side effects of immunotherapy including but not limited to immune mediated reactions with thyroiditis, pneumonitis, hepatitis, colitis, rash, and electrolyte abnormalities, fatigue, multiorgan failure, and possibly death.    2. Cancer-related pain:  A.  He will continue oxycodone as needed.     3.  Wound care:  A.  I reviewed with the patient and the family this is extremely important to assure wound cleanness.  B.  We will continue wound care with home health.  C.  We requested imaging from home [facility] for updated picture this month.     4.  Depression:  A.  He will continue Cymbalta daily    5.  Treatment related itching  A.  I we will continue hydroxyzine.  We will try to increase his dose to 50 mg to see if this helps with itching.  I did advise the patient and his son to be careful with sedation and if this occurs to stop.  B.  I asked the patient to make sure he is keeping his skin hydrated with non perfumed lotions as well.    6. Treatment related hypothyroidism  A. Continue Synthroid 50mcg.   B. I will refill today    FOLLOW UP: 3 weeks with cycle #9.      Yady Guajardo, APRN  10/18/2023

## 2023-11-08 ENCOUNTER — INFUSION (OUTPATIENT)
Dept: ONCOLOGY | Facility: HOSPITAL | Age: 78
End: 2023-11-08
Payer: MEDICARE

## 2023-11-08 ENCOUNTER — OFFICE VISIT (OUTPATIENT)
Dept: ONCOLOGY | Facility: CLINIC | Age: 78
End: 2023-11-08
Payer: MEDICARE

## 2023-11-08 VITALS
HEART RATE: 100 BPM | DIASTOLIC BLOOD PRESSURE: 68 MMHG | RESPIRATION RATE: 18 BRPM | SYSTOLIC BLOOD PRESSURE: 132 MMHG | OXYGEN SATURATION: 99 % | HEIGHT: 72 IN | WEIGHT: 213 LBS | TEMPERATURE: 97.5 F | BODY MASS INDEX: 28.85 KG/M2

## 2023-11-08 DIAGNOSIS — C44.229 SQUAMOUS CELL CARCINOMA OF SKIN OF LEFT EAR AND EXTERNAL AURICULAR CANAL: ICD-10-CM

## 2023-11-08 DIAGNOSIS — Z51.81 ENCOUNTER FOR THERAPEUTIC DRUG MONITORING: ICD-10-CM

## 2023-11-08 DIAGNOSIS — Z51.81 ENCOUNTER FOR THERAPEUTIC DRUG MONITORING: Primary | ICD-10-CM

## 2023-11-08 DIAGNOSIS — C44.229 SQUAMOUS CELL CARCINOMA OF SKIN OF LEFT EAR AND EXTERNAL AURICULAR CANAL: Primary | ICD-10-CM

## 2023-11-08 LAB
ALBUMIN SERPL-MCNC: 4.3 G/DL (ref 3.5–5.2)
ALBUMIN/GLOB SERPL: 1.6 G/DL
ALP SERPL-CCNC: 50 U/L (ref 39–117)
ALT SERPL W P-5'-P-CCNC: 12 U/L (ref 1–41)
ANION GAP SERPL CALCULATED.3IONS-SCNC: 9.6 MMOL/L (ref 5–15)
AST SERPL-CCNC: 25 U/L (ref 1–40)
BASOPHILS # BLD AUTO: 0.04 10*3/MM3 (ref 0–0.2)
BASOPHILS NFR BLD AUTO: 1.1 % (ref 0–1.5)
BILIRUB SERPL-MCNC: 1.1 MG/DL (ref 0–1.2)
BUN SERPL-MCNC: 13 MG/DL (ref 8–23)
BUN/CREAT SERPL: 9.4 (ref 7–25)
CALCIUM SPEC-SCNC: 9.6 MG/DL (ref 8.6–10.5)
CHLORIDE SERPL-SCNC: 100 MMOL/L (ref 98–107)
CO2 SERPL-SCNC: 25.4 MMOL/L (ref 22–29)
CREAT SERPL-MCNC: 1.39 MG/DL (ref 0.76–1.27)
DEPRECATED RDW RBC AUTO: 46.1 FL (ref 37–54)
EGFRCR SERPLBLD CKD-EPI 2021: 51.6 ML/MIN/1.73
EOSINOPHIL # BLD AUTO: 0.16 10*3/MM3 (ref 0–0.4)
EOSINOPHIL NFR BLD AUTO: 4.3 % (ref 0.3–6.2)
ERYTHROCYTE [DISTWIDTH] IN BLOOD BY AUTOMATED COUNT: 13.4 % (ref 12.3–15.4)
GLOBULIN UR ELPH-MCNC: 2.7 GM/DL
GLUCOSE SERPL-MCNC: 94 MG/DL (ref 65–99)
HCT VFR BLD AUTO: 34.4 % (ref 37.5–51)
HGB BLD-MCNC: 11.5 G/DL (ref 13–17.7)
IMM GRANULOCYTES # BLD AUTO: 0.02 10*3/MM3 (ref 0–0.05)
IMM GRANULOCYTES NFR BLD AUTO: 0.5 % (ref 0–0.5)
LYMPHOCYTES # BLD AUTO: 1.04 10*3/MM3 (ref 0.7–3.1)
LYMPHOCYTES NFR BLD AUTO: 28 % (ref 19.6–45.3)
MCH RBC QN AUTO: 30.7 PG (ref 26.6–33)
MCHC RBC AUTO-ENTMCNC: 33.4 G/DL (ref 31.5–35.7)
MCV RBC AUTO: 92 FL (ref 79–97)
MONOCYTES # BLD AUTO: 0.33 10*3/MM3 (ref 0.1–0.9)
MONOCYTES NFR BLD AUTO: 8.9 % (ref 5–12)
NEUTROPHILS NFR BLD AUTO: 2.13 10*3/MM3 (ref 1.7–7)
NEUTROPHILS NFR BLD AUTO: 57.2 % (ref 42.7–76)
NRBC BLD AUTO-RTO: 0 /100 WBC (ref 0–0.2)
PLATELET # BLD AUTO: 165 10*3/MM3 (ref 140–450)
PMV BLD AUTO: 9.7 FL (ref 6–12)
POTASSIUM SERPL-SCNC: 4.4 MMOL/L (ref 3.5–5.2)
PROT SERPL-MCNC: 7 G/DL (ref 6–8.5)
RBC # BLD AUTO: 3.74 10*6/MM3 (ref 4.14–5.8)
SODIUM SERPL-SCNC: 135 MMOL/L (ref 136–145)
T4 FREE SERPL-MCNC: 0.85 NG/DL (ref 0.93–1.7)
TSH SERPL DL<=0.05 MIU/L-ACNC: 6.31 UIU/ML (ref 0.27–4.2)
WBC NRBC COR # BLD: 3.72 10*3/MM3 (ref 3.4–10.8)

## 2023-11-08 PROCEDURE — 85025 COMPLETE CBC W/AUTO DIFF WBC: CPT

## 2023-11-08 PROCEDURE — 84443 ASSAY THYROID STIM HORMONE: CPT

## 2023-11-08 PROCEDURE — 84439 ASSAY OF FREE THYROXINE: CPT

## 2023-11-08 PROCEDURE — 80053 COMPREHEN METABOLIC PANEL: CPT

## 2023-11-08 PROCEDURE — 1126F AMNT PAIN NOTED NONE PRSNT: CPT | Performed by: INTERNAL MEDICINE

## 2023-11-08 PROCEDURE — 25010000002 CEMIPLIMAB-RWLC 350 MG/7ML SOLUTION 7 ML VIAL: Performed by: INTERNAL MEDICINE

## 2023-11-08 PROCEDURE — 96413 CHEMO IV INFUSION 1 HR: CPT

## 2023-11-08 PROCEDURE — 36415 COLL VENOUS BLD VENIPUNCTURE: CPT

## 2023-11-08 RX ORDER — SODIUM CHLORIDE 9 MG/ML
250 INJECTION, SOLUTION INTRAVENOUS ONCE
OUTPATIENT
Start: 2023-11-29

## 2023-11-08 RX ORDER — SODIUM CHLORIDE 9 MG/ML
250 INJECTION, SOLUTION INTRAVENOUS ONCE
Status: CANCELLED | OUTPATIENT
Start: 2023-11-08

## 2023-11-08 RX ORDER — SODIUM CHLORIDE 9 MG/ML
250 INJECTION, SOLUTION INTRAVENOUS ONCE
Status: DISCONTINUED | OUTPATIENT
Start: 2023-11-08 | End: 2023-11-08 | Stop reason: HOSPADM

## 2023-11-08 RX ADMIN — CEMIPLIMAB-RWLC 350 MG: 50 INJECTION INTRAVENOUS at 11:01

## 2023-11-29 ENCOUNTER — OFFICE VISIT (OUTPATIENT)
Dept: ONCOLOGY | Facility: CLINIC | Age: 78
End: 2023-11-29
Payer: MEDICARE

## 2023-11-29 ENCOUNTER — INFUSION (OUTPATIENT)
Dept: ONCOLOGY | Facility: HOSPITAL | Age: 78
End: 2023-11-29
Payer: MEDICARE

## 2023-11-29 VITALS
HEART RATE: 100 BPM | RESPIRATION RATE: 16 BRPM | DIASTOLIC BLOOD PRESSURE: 69 MMHG | OXYGEN SATURATION: 99 % | HEIGHT: 72 IN | BODY MASS INDEX: 28.71 KG/M2 | WEIGHT: 212 LBS | TEMPERATURE: 97.8 F | SYSTOLIC BLOOD PRESSURE: 106 MMHG

## 2023-11-29 DIAGNOSIS — C44.229 SQUAMOUS CELL CARCINOMA OF SKIN OF LEFT EAR AND EXTERNAL AURICULAR CANAL: Primary | ICD-10-CM

## 2023-11-29 DIAGNOSIS — C44.229 SQUAMOUS CELL CARCINOMA OF SKIN OF LEFT EAR AND EXTERNAL AURICULAR CANAL: ICD-10-CM

## 2023-11-29 DIAGNOSIS — Z51.81 ENCOUNTER FOR THERAPEUTIC DRUG MONITORING: Primary | ICD-10-CM

## 2023-11-29 DIAGNOSIS — Z51.81 ENCOUNTER FOR THERAPEUTIC DRUG MONITORING: ICD-10-CM

## 2023-11-29 LAB
ALBUMIN SERPL-MCNC: 4 G/DL (ref 3.5–5.2)
ALBUMIN/GLOB SERPL: 1.4 G/DL
ALP SERPL-CCNC: 62 U/L (ref 39–117)
ALT SERPL W P-5'-P-CCNC: 11 U/L (ref 1–41)
ANION GAP SERPL CALCULATED.3IONS-SCNC: 10.4 MMOL/L (ref 5–15)
AST SERPL-CCNC: 22 U/L (ref 1–40)
BASOPHILS # BLD AUTO: 0.04 10*3/MM3 (ref 0–0.2)
BASOPHILS NFR BLD AUTO: 0.8 % (ref 0–1.5)
BILIRUB SERPL-MCNC: 0.9 MG/DL (ref 0–1.2)
BUN SERPL-MCNC: 12 MG/DL (ref 8–23)
BUN/CREAT SERPL: 8.6 (ref 7–25)
CALCIUM SPEC-SCNC: 9.7 MG/DL (ref 8.6–10.5)
CHLORIDE SERPL-SCNC: 97 MMOL/L (ref 98–107)
CO2 SERPL-SCNC: 25.6 MMOL/L (ref 22–29)
CREAT SERPL-MCNC: 1.39 MG/DL (ref 0.76–1.27)
DEPRECATED RDW RBC AUTO: 42.5 FL (ref 37–54)
EGFRCR SERPLBLD CKD-EPI 2021: 51.6 ML/MIN/1.73
EOSINOPHIL # BLD AUTO: 0.14 10*3/MM3 (ref 0–0.4)
EOSINOPHIL NFR BLD AUTO: 2.9 % (ref 0.3–6.2)
ERYTHROCYTE [DISTWIDTH] IN BLOOD BY AUTOMATED COUNT: 12.7 % (ref 12.3–15.4)
GLOBULIN UR ELPH-MCNC: 2.8 GM/DL
GLUCOSE SERPL-MCNC: 90 MG/DL (ref 65–99)
HCT VFR BLD AUTO: 34.5 % (ref 37.5–51)
HGB BLD-MCNC: 11.8 G/DL (ref 13–17.7)
IMM GRANULOCYTES # BLD AUTO: 0.04 10*3/MM3 (ref 0–0.05)
IMM GRANULOCYTES NFR BLD AUTO: 0.8 % (ref 0–0.5)
LYMPHOCYTES # BLD AUTO: 0.91 10*3/MM3 (ref 0.7–3.1)
LYMPHOCYTES NFR BLD AUTO: 19.1 % (ref 19.6–45.3)
MCH RBC QN AUTO: 30.9 PG (ref 26.6–33)
MCHC RBC AUTO-ENTMCNC: 34.2 G/DL (ref 31.5–35.7)
MCV RBC AUTO: 90.3 FL (ref 79–97)
MONOCYTES # BLD AUTO: 0.41 10*3/MM3 (ref 0.1–0.9)
MONOCYTES NFR BLD AUTO: 8.6 % (ref 5–12)
NEUTROPHILS NFR BLD AUTO: 3.22 10*3/MM3 (ref 1.7–7)
NEUTROPHILS NFR BLD AUTO: 67.8 % (ref 42.7–76)
NRBC BLD AUTO-RTO: 0 /100 WBC (ref 0–0.2)
PLATELET # BLD AUTO: 208 10*3/MM3 (ref 140–450)
PMV BLD AUTO: 9.3 FL (ref 6–12)
POTASSIUM SERPL-SCNC: 4.3 MMOL/L (ref 3.5–5.2)
PROT SERPL-MCNC: 6.8 G/DL (ref 6–8.5)
RBC # BLD AUTO: 3.82 10*6/MM3 (ref 4.14–5.8)
SODIUM SERPL-SCNC: 133 MMOL/L (ref 136–145)
T4 FREE SERPL-MCNC: 0.83 NG/DL (ref 0.93–1.7)
TSH SERPL DL<=0.05 MIU/L-ACNC: 6.68 UIU/ML (ref 0.27–4.2)
WBC NRBC COR # BLD AUTO: 4.76 10*3/MM3 (ref 3.4–10.8)

## 2023-11-29 PROCEDURE — 80053 COMPREHEN METABOLIC PANEL: CPT

## 2023-11-29 PROCEDURE — 84439 ASSAY OF FREE THYROXINE: CPT

## 2023-11-29 PROCEDURE — 85025 COMPLETE CBC W/AUTO DIFF WBC: CPT

## 2023-11-29 PROCEDURE — 25010000002 CEMIPLIMAB-RWLC 350 MG/7ML SOLUTION 7 ML VIAL: Performed by: INTERNAL MEDICINE

## 2023-11-29 PROCEDURE — 84443 ASSAY THYROID STIM HORMONE: CPT

## 2023-11-29 PROCEDURE — 96413 CHEMO IV INFUSION 1 HR: CPT

## 2023-11-29 PROCEDURE — 36415 COLL VENOUS BLD VENIPUNCTURE: CPT

## 2023-11-29 RX ORDER — SODIUM CHLORIDE 9 MG/ML
250 INJECTION, SOLUTION INTRAVENOUS ONCE
Status: DISCONTINUED | OUTPATIENT
Start: 2023-11-29 | End: 2023-11-29 | Stop reason: HOSPADM

## 2023-11-29 RX ORDER — LEVOTHYROXINE SODIUM 0.07 MG/1
75 TABLET ORAL DAILY
Qty: 30 TABLET | Refills: 3 | Status: SHIPPED | OUTPATIENT
Start: 2023-11-29

## 2023-11-29 RX ORDER — SODIUM CHLORIDE 9 MG/ML
250 INJECTION, SOLUTION INTRAVENOUS ONCE
OUTPATIENT
Start: 2023-12-20

## 2023-11-29 RX ADMIN — CEMIPLIMAB-RWLC 350 MG: 50 INJECTION INTRAVENOUS at 11:05

## 2023-11-29 NOTE — PROGRESS NOTES
DATE OF VISIT: 11/29/2023    REASON FOR VISIT: Followup for locally advanced squamous cell carcinoma of the left ear     PROBLEM LIST:  1. 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  Presented with gradually growing mass over the left ear for the last 3 years.  B.  Patient did not seek any medical attention until April 2023.  C.  CT neck and chest done at  April 2023 confirmed locally advanced lesion eroding into the left mastoid with left cervical lymphadenopathy but no evidence of chest metastases.  D.  Biopsy done April 2023 confirmed squamous cell carcinoma.  E.  Status post palliative radiation completed April 28, 2023  F.  Started immunotherapy with Libtayo 350 mg IV every 3 weeks May 22, 2023.  Status post 9 cycles.  2.  Cancer-related pain  3.  Mild depression  4.  BPH    HISTORY OF PRESENT ILLNESS: The patient is a very pleasant 78 y.o. male with past medical history significant for locally advanced left ear squamous cell carcinoma diagnosed April 2023.  The patient was started on Libtayo May 16, 2023. The patient is here today for scheduled follow-up visit with treatment cycle #10.    SUBJECTIVE: The patient is here today by himself.  His son has chosen to stay in the waiting room.  Overall he is doing well.  He reports that his left ear has significantly improved.  He continues to see home health once a week.  His son is doing dressing changes.  Overall he feels like he is doing well.  Eating and drinking normally.  Denies any nausea or vomiting.  Bowels are moving normally.    Past History:  Medical History: has a past medical history of Anemia, Arthritis, and Cancer.   Surgical History: has a past surgical history that includes Skin cancer excision; Neck dissection (N/A); Parotidectomy (N/A); and NAIL BIOPSY (N/A).   Family History: family history includes Cancer in his father and mother.   Social History: reports that he has never smoked. His smokeless tobacco use  "includes chew. He reports that he does not currently use alcohol. He reports that he does not use drugs.    (Not in a hospital admission)     Allergies: Patient has no known allergies.     Review of Systems   Constitutional:  Positive for fatigue.   HENT:  Positive for hearing loss.    Respiratory: Negative.     Gastrointestinal: Negative.    Skin:  Positive for wound.   Neurological: Negative.    Psychiatric/Behavioral: Negative.         PHYSICAL EXAMINATION:   /69   Pulse 100   Temp 97.8 °F (36.6 °C) (Temporal)   Resp 16   Ht 182.9 cm (72\")   Wt 96.2 kg (212 lb)   SpO2 99%   BMI 28.75 kg/m²    Pain Score    11/29/23 0924   PainSc: 0-No pain                           ECOG Performance Status: 1 - Symptomatic but completely ambulatory      General Appearance:      alert, cooperative, no apparent distress and appears stated age   Lungs:   Clear to auscultation bilaterally; respirations regular, even, and unlabored bilaterally   Heart:  Regular rate and rhythm, no murmurs appreciated   Abdomen:   Soft, non-tender, and non-distended                 No visits with results within 2 Week(s) from this visit.   Latest known visit with results is:   Infusion on 11/08/2023   Component Date Value Ref Range Status    Free T4 11/08/2023 0.85 (L)  0.93 - 1.70 ng/dL Final    TSH 11/08/2023 6.310 (H)  0.270 - 4.200 uIU/mL Final    Glucose 11/08/2023 94  65 - 99 mg/dL Final    BUN 11/08/2023 13  8 - 23 mg/dL Final    Creatinine 11/08/2023 1.39 (H)  0.76 - 1.27 mg/dL Final    Sodium 11/08/2023 135 (L)  136 - 145 mmol/L Final    Potassium 11/08/2023 4.4  3.5 - 5.2 mmol/L Final    Chloride 11/08/2023 100  98 - 107 mmol/L Final    CO2 11/08/2023 25.4  22.0 - 29.0 mmol/L Final    Calcium 11/08/2023 9.6  8.6 - 10.5 mg/dL Final    Total Protein 11/08/2023 7.0  6.0 - 8.5 g/dL Final    Albumin 11/08/2023 4.3  3.5 - 5.2 g/dL Final    ALT (SGPT) 11/08/2023 12  1 - 41 U/L Final    AST (SGOT) 11/08/2023 25  1 - 40 U/L Final    " Alkaline Phosphatase 11/08/2023 50  39 - 117 U/L Final    Total Bilirubin 11/08/2023 1.1  0.0 - 1.2 mg/dL Final    Globulin 11/08/2023 2.7  gm/dL Final    A/G Ratio 11/08/2023 1.6  g/dL Final    BUN/Creatinine Ratio 11/08/2023 9.4  7.0 - 25.0 Final    Anion Gap 11/08/2023 9.6  5.0 - 15.0 mmol/L Final    eGFR 11/08/2023 51.6 (L)  >60.0 mL/min/1.73 Final    WBC 11/08/2023 3.72  3.40 - 10.80 10*3/mm3 Final    RBC 11/08/2023 3.74 (L)  4.14 - 5.80 10*6/mm3 Final    Hemoglobin 11/08/2023 11.5 (L)  13.0 - 17.7 g/dL Final    Hematocrit 11/08/2023 34.4 (L)  37.5 - 51.0 % Final    MCV 11/08/2023 92.0  79.0 - 97.0 fL Final    MCH 11/08/2023 30.7  26.6 - 33.0 pg Final    MCHC 11/08/2023 33.4  31.5 - 35.7 g/dL Final    RDW 11/08/2023 13.4  12.3 - 15.4 % Final    RDW-SD 11/08/2023 46.1  37.0 - 54.0 fl Final    MPV 11/08/2023 9.7  6.0 - 12.0 fL Final    Platelets 11/08/2023 165  140 - 450 10*3/mm3 Final    Neutrophil % 11/08/2023 57.2  42.7 - 76.0 % Final    Lymphocyte % 11/08/2023 28.0  19.6 - 45.3 % Final    Monocyte % 11/08/2023 8.9  5.0 - 12.0 % Final    Eosinophil % 11/08/2023 4.3  0.3 - 6.2 % Final    Basophil % 11/08/2023 1.1  0.0 - 1.5 % Final    Immature Grans % 11/08/2023 0.5  0.0 - 0.5 % Final    Neutrophils, Absolute 11/08/2023 2.13  1.70 - 7.00 10*3/mm3 Final    Lymphocytes, Absolute 11/08/2023 1.04  0.70 - 3.10 10*3/mm3 Final    Monocytes, Absolute 11/08/2023 0.33  0.10 - 0.90 10*3/mm3 Final    Eosinophils, Absolute 11/08/2023 0.16  0.00 - 0.40 10*3/mm3 Final    Basophils, Absolute 11/08/2023 0.04  0.00 - 0.20 10*3/mm3 Final    Immature Grans, Absolute 11/08/2023 0.02  0.00 - 0.05 10*3/mm3 Final    nRBC 11/08/2023 0.0  0.0 - 0.2 /100 WBC Final        No results found.  05/23/23 08/01/2023 11/30/2023      ASSESSMENT: The patient is a very pleasant 78 y.o. male  with locally advanced left ear squamous cell carcinoma      PLAN:    1.  Locally advanced squamous cell carcinoma of the left ear T4 N1 M0 stage  IV:  A.  I will proceed with treatment as scheduled today Libtayo IV every 3 weeks cycle #10.  B.  The patient will follow-up with us in 3 weeks for cycle #11.  C.  I will continue to monitor the patient blood work including blood counts kidney function liver function and electrolytes.  D.  I will continue to hold off on doing imaging.  We will follow patient clinically for response.    E.  I reviewed with the patient the goal of treatment is palliative given his locally advanced disease with ipsilateral lymph node metastases.  F. We reviewed potential side effects of immunotherapy including but not limited to immune mediated reactions with thyroiditis, pneumonitis, hepatitis, colitis, rash, and electrolyte abnormalities, fatigue, multiorgan failure, and possibly death.  G.  I did go over the blood work results with the patient from November 8 , 2023.  His creatinine did increase to 1.39.  Will continue to monitor closely.  We will consider stopping treatment with some steroids if it increases above 2.    2. Cancer-related pain:  A.  He will continue oxycodone as needed.     3.  Wound care:  A.  I reviewed with the patient this is extremely important to assure wound cleanness.  B.  We will continue wound care with home health.  C.  We requested imaging from home [facility] for updated picture this month.     4.  Depression:  A.  He will continue Cymbalta daily    5.  Treatment related itching  A.  I will continue hydroxyzine.  Continue increased dose at 50 mg to see if this helps with itching.  I did advise the patient and his son to be careful with sedation or dizziness and if this occurs to stop.  B.  I asked the patient to make sure he is keeping his skin hydrated with non perfumed lotions as well.    6. Treatment related hypothyroidism  A. Continue Synthroid 50mcg.   B. I will refill today    FOLLOW UP: 3 weeks with cycle #11.    Addendum.  TSH is elevated today at 6 with decreased T4.  We will increase his  Synthroid to 75 mcg.  As discussed with the patient sent the new prescription in for      Yady Guajardo, APRN  11/29/2023

## 2023-12-20 ENCOUNTER — INFUSION (OUTPATIENT)
Dept: ONCOLOGY | Facility: HOSPITAL | Age: 78
End: 2023-12-20
Payer: MEDICARE

## 2023-12-20 ENCOUNTER — OFFICE VISIT (OUTPATIENT)
Dept: ONCOLOGY | Facility: CLINIC | Age: 78
End: 2023-12-20
Payer: MEDICARE

## 2023-12-20 VITALS
DIASTOLIC BLOOD PRESSURE: 73 MMHG | WEIGHT: 211 LBS | TEMPERATURE: 97.4 F | OXYGEN SATURATION: 98 % | HEART RATE: 92 BPM | SYSTOLIC BLOOD PRESSURE: 131 MMHG | BODY MASS INDEX: 28.58 KG/M2 | HEIGHT: 72 IN | RESPIRATION RATE: 16 BRPM

## 2023-12-20 DIAGNOSIS — C44.229 SQUAMOUS CELL CARCINOMA OF SKIN OF LEFT EAR AND EXTERNAL AURICULAR CANAL: Primary | ICD-10-CM

## 2023-12-20 DIAGNOSIS — Z51.81 ENCOUNTER FOR THERAPEUTIC DRUG MONITORING: ICD-10-CM

## 2023-12-20 LAB
ALBUMIN SERPL-MCNC: 4.3 G/DL (ref 3.5–5.2)
ALBUMIN/GLOB SERPL: 1.5 G/DL
ALP SERPL-CCNC: 50 U/L (ref 39–117)
ALT SERPL W P-5'-P-CCNC: 13 U/L (ref 1–41)
ANION GAP SERPL CALCULATED.3IONS-SCNC: 8.5 MMOL/L (ref 5–15)
AST SERPL-CCNC: 26 U/L (ref 1–40)
BASOPHILS # BLD AUTO: 0.03 10*3/MM3 (ref 0–0.2)
BASOPHILS NFR BLD AUTO: 0.7 % (ref 0–1.5)
BILIRUB SERPL-MCNC: 1 MG/DL (ref 0–1.2)
BUN SERPL-MCNC: 13 MG/DL (ref 8–23)
BUN/CREAT SERPL: 9.4 (ref 7–25)
CALCIUM SPEC-SCNC: 9.8 MG/DL (ref 8.6–10.5)
CHLORIDE SERPL-SCNC: 99 MMOL/L (ref 98–107)
CO2 SERPL-SCNC: 27.5 MMOL/L (ref 22–29)
CREAT SERPL-MCNC: 1.38 MG/DL (ref 0.76–1.27)
DEPRECATED RDW RBC AUTO: 44.3 FL (ref 37–54)
EGFRCR SERPLBLD CKD-EPI 2021: 52 ML/MIN/1.73
EOSINOPHIL # BLD AUTO: 0.15 10*3/MM3 (ref 0–0.4)
EOSINOPHIL NFR BLD AUTO: 3.5 % (ref 0.3–6.2)
ERYTHROCYTE [DISTWIDTH] IN BLOOD BY AUTOMATED COUNT: 13.2 % (ref 12.3–15.4)
GLOBULIN UR ELPH-MCNC: 2.8 GM/DL
GLUCOSE SERPL-MCNC: 99 MG/DL (ref 65–99)
HCT VFR BLD AUTO: 35 % (ref 37.5–51)
HGB BLD-MCNC: 11.8 G/DL (ref 13–17.7)
IMM GRANULOCYTES # BLD AUTO: 0.03 10*3/MM3 (ref 0–0.05)
IMM GRANULOCYTES NFR BLD AUTO: 0.7 % (ref 0–0.5)
LYMPHOCYTES # BLD AUTO: 1 10*3/MM3 (ref 0.7–3.1)
LYMPHOCYTES NFR BLD AUTO: 23.4 % (ref 19.6–45.3)
MCH RBC QN AUTO: 31.1 PG (ref 26.6–33)
MCHC RBC AUTO-ENTMCNC: 33.7 G/DL (ref 31.5–35.7)
MCV RBC AUTO: 92.1 FL (ref 79–97)
MONOCYTES # BLD AUTO: 0.43 10*3/MM3 (ref 0.1–0.9)
MONOCYTES NFR BLD AUTO: 10 % (ref 5–12)
NEUTROPHILS NFR BLD AUTO: 2.64 10*3/MM3 (ref 1.7–7)
NEUTROPHILS NFR BLD AUTO: 61.7 % (ref 42.7–76)
NRBC BLD AUTO-RTO: 0 /100 WBC (ref 0–0.2)
PLATELET # BLD AUTO: 155 10*3/MM3 (ref 140–450)
PMV BLD AUTO: 9.5 FL (ref 6–12)
POTASSIUM SERPL-SCNC: 4.6 MMOL/L (ref 3.5–5.2)
PROT SERPL-MCNC: 7.1 G/DL (ref 6–8.5)
RBC # BLD AUTO: 3.8 10*6/MM3 (ref 4.14–5.8)
SODIUM SERPL-SCNC: 135 MMOL/L (ref 136–145)
T4 FREE SERPL-MCNC: 1.09 NG/DL (ref 0.93–1.7)
TSH SERPL DL<=0.05 MIU/L-ACNC: 2.13 UIU/ML (ref 0.27–4.2)
WBC NRBC COR # BLD AUTO: 4.28 10*3/MM3 (ref 3.4–10.8)

## 2023-12-20 PROCEDURE — 96413 CHEMO IV INFUSION 1 HR: CPT

## 2023-12-20 PROCEDURE — 36415 COLL VENOUS BLD VENIPUNCTURE: CPT

## 2023-12-20 PROCEDURE — 80053 COMPREHEN METABOLIC PANEL: CPT

## 2023-12-20 PROCEDURE — 25010000002 CEMIPLIMAB-RWLC 350 MG/7ML SOLUTION 7 ML VIAL: Performed by: NURSE PRACTITIONER

## 2023-12-20 PROCEDURE — 85025 COMPLETE CBC W/AUTO DIFF WBC: CPT

## 2023-12-20 RX ORDER — SODIUM CHLORIDE 9 MG/ML
250 INJECTION, SOLUTION INTRAVENOUS ONCE
OUTPATIENT
Start: 2024-01-10

## 2023-12-20 RX ORDER — SODIUM CHLORIDE 9 MG/ML
250 INJECTION, SOLUTION INTRAVENOUS ONCE
Status: DISCONTINUED | OUTPATIENT
Start: 2023-12-20 | End: 2023-12-20 | Stop reason: HOSPADM

## 2023-12-20 RX ADMIN — CEMIPLIMAB-RWLC 350 MG: 50 INJECTION INTRAVENOUS at 10:50

## 2023-12-20 NOTE — PROGRESS NOTES
DATE OF VISIT: 12/20/2023    REASON FOR VISIT: Followup for locally advanced squamous cell carcinoma of the left ear     PROBLEM LIST:  1. 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  Presented with gradually growing mass over the left ear for the last 3 years.  B.  Patient did not seek any medical attention until April 2023.  C.  CT neck and chest done at  April 2023 confirmed locally advanced lesion eroding into the left mastoid with left cervical lymphadenopathy but no evidence of chest metastases.  D.  Biopsy done April 2023 confirmed squamous cell carcinoma.  E.  Status post palliative radiation completed April 28, 2023  F.  Started immunotherapy with Libtayo 350 mg IV every 3 weeks May 22, 2023.  Status post 10 cycles.  2.  Cancer-related pain  3.  Mild depression  4.  BPH    HISTORY OF PRESENT ILLNESS: The patient is a very pleasant 78 y.o. male with past medical history significant for locally advanced left ear squamous cell carcinoma diagnosed April 2023.  The patient was started on Libtayo May 16, 2023. The patient is here today for scheduled follow-up visit with treatment cycle #11.    SUBJECTIVE: The patient is here today by himself.  All in all he is doing well.  He is any fever chills or night sweats.    Past History:  Medical History: has a past medical history of Anemia, Arthritis, and Cancer.   Surgical History: has a past surgical history that includes Skin cancer excision; Neck dissection (N/A); Parotidectomy (N/A); and NAIL BIOPSY (N/A).   Family History: family history includes Cancer in his father and mother.   Social History: reports that he has never smoked. His smokeless tobacco use includes chew. He reports that he does not currently use alcohol. He reports that he does not use drugs.    (Not in a hospital admission)     Allergies: Patient has no known allergies.     Review of Systems   Constitutional:  Positive for fatigue.   HENT:  Positive for hearing  "loss.    Respiratory: Negative.     Gastrointestinal: Negative.    Skin:  Positive for wound.   Neurological: Negative.    Psychiatric/Behavioral: Negative.         PHYSICAL EXAMINATION:   /73   Pulse 92   Temp 97.4 °F (36.3 °C) (Temporal)   Resp 16   Ht 182.9 cm (72\")   Wt 95.7 kg (211 lb)   SpO2 98%   BMI 28.62 kg/m²    Pain Score    12/20/23 0905   PainSc: 0-No pain                             ECOG Performance Status: 1 - Symptomatic but completely ambulatory      General Appearance:      alert, cooperative, no apparent distress and appears stated age   Lungs:   Clear to auscultation bilaterally; respirations regular, even, and unlabored bilaterally   Heart:  Regular rate and rhythm, no murmurs appreciated   Abdomen:   Soft, non-tender, and non-distended                 No visits with results within 2 Week(s) from this visit.   Latest known visit with results is:   Infusion on 11/29/2023   Component Date Value Ref Range Status    Glucose 11/29/2023 90  65 - 99 mg/dL Final    BUN 11/29/2023 12  8 - 23 mg/dL Final    Creatinine 11/29/2023 1.39 (H)  0.76 - 1.27 mg/dL Final    Sodium 11/29/2023 133 (L)  136 - 145 mmol/L Final    Potassium 11/29/2023 4.3  3.5 - 5.2 mmol/L Final    Chloride 11/29/2023 97 (L)  98 - 107 mmol/L Final    CO2 11/29/2023 25.6  22.0 - 29.0 mmol/L Final    Calcium 11/29/2023 9.7  8.6 - 10.5 mg/dL Final    Total Protein 11/29/2023 6.8  6.0 - 8.5 g/dL Final    Albumin 11/29/2023 4.0  3.5 - 5.2 g/dL Final    ALT (SGPT) 11/29/2023 11  1 - 41 U/L Final    AST (SGOT) 11/29/2023 22  1 - 40 U/L Final    Alkaline Phosphatase 11/29/2023 62  39 - 117 U/L Final    Total Bilirubin 11/29/2023 0.9  0.0 - 1.2 mg/dL Final    Globulin 11/29/2023 2.8  gm/dL Final    A/G Ratio 11/29/2023 1.4  g/dL Final    BUN/Creatinine Ratio 11/29/2023 8.6  7.0 - 25.0 Final    Anion Gap 11/29/2023 10.4  5.0 - 15.0 mmol/L Final    eGFR 11/29/2023 51.6 (L)  >60.0 mL/min/1.73 Final    WBC 11/29/2023 4.76  3.40 - 10.80 " 10*3/mm3 Final    RBC 11/29/2023 3.82 (L)  4.14 - 5.80 10*6/mm3 Final    Hemoglobin 11/29/2023 11.8 (L)  13.0 - 17.7 g/dL Final    Hematocrit 11/29/2023 34.5 (L)  37.5 - 51.0 % Final    MCV 11/29/2023 90.3  79.0 - 97.0 fL Final    MCH 11/29/2023 30.9  26.6 - 33.0 pg Final    MCHC 11/29/2023 34.2  31.5 - 35.7 g/dL Final    RDW 11/29/2023 12.7  12.3 - 15.4 % Final    RDW-SD 11/29/2023 42.5  37.0 - 54.0 fl Final    MPV 11/29/2023 9.3  6.0 - 12.0 fL Final    Platelets 11/29/2023 208  140 - 450 10*3/mm3 Final    Neutrophil % 11/29/2023 67.8  42.7 - 76.0 % Final    Lymphocyte % 11/29/2023 19.1 (L)  19.6 - 45.3 % Final    Monocyte % 11/29/2023 8.6  5.0 - 12.0 % Final    Eosinophil % 11/29/2023 2.9  0.3 - 6.2 % Final    Basophil % 11/29/2023 0.8  0.0 - 1.5 % Final    Immature Grans % 11/29/2023 0.8 (H)  0.0 - 0.5 % Final    Neutrophils, Absolute 11/29/2023 3.22  1.70 - 7.00 10*3/mm3 Final    Lymphocytes, Absolute 11/29/2023 0.91  0.70 - 3.10 10*3/mm3 Final    Monocytes, Absolute 11/29/2023 0.41  0.10 - 0.90 10*3/mm3 Final    Eosinophils, Absolute 11/29/2023 0.14  0.00 - 0.40 10*3/mm3 Final    Basophils, Absolute 11/29/2023 0.04  0.00 - 0.20 10*3/mm3 Final    Immature Grans, Absolute 11/29/2023 0.04  0.00 - 0.05 10*3/mm3 Final    nRBC 11/29/2023 0.0  0.0 - 0.2 /100 WBC Final    TSH 11/29/2023 6.680 (H)  0.270 - 4.200 uIU/mL Final    Free T4 11/29/2023 0.83 (L)  0.93 - 1.70 ng/dL Final        No results found.  05/23/23 08/01/2023 11/30/2023      ASSESSMENT: The patient is a very pleasant 78 y.o. male  with locally advanced left ear squamous cell carcinoma      PLAN:    1.  Locally advanced squamous cell carcinoma of the left ear T4 N1 M0 stage IV:  A.  I will proceed with treatment as scheduled today Libtayo IV every 3 weeks cycle #11.  B.  The patient will follow-up with us in 3 weeks for cycle #12.  C.  I will continue to monitor the patient blood work including blood counts kidney function liver function and  electrolytes.  D.  I will continue to hold off on doing imaging.  We will follow patient clinically for response.    E.  I reviewed with the patient the goal of treatment is palliative given his locally advanced disease with ipsilateral lymph node metastases.  F. We reviewed potential side effects of immunotherapy including but not limited to immune mediated reactions with thyroiditis, pneumonitis, hepatitis, colitis, rash, and electrolyte abnormalities, fatigue, multiorgan failure, and possibly death.  G.  Go over the blood results with the patient from November 29, 2023.  Hemoglobin stable at 11.8 creatinine improved to 1.4.  TSH elevated 6.7.    2. Cancer-related pain:  A.  He will continue oxycodone as needed.     3.  Wound care:  A.  I reviewed with the patient this is extremely important to assure wound cleanness.  B.  We will continue wound care with home health.  C.  We requested imaging from home [facility] for updated picture this month.     4.  Depression:  A.  He will continue Cymbalta daily    5.  Treatment related itching  A.  I will continue hydroxyzine.  Continue increased dose at 50 mg to see if this helps with itching.  I did advise the patient and his son to be careful with sedation or dizziness and if this occurs to stop.  B.  I asked the patient to make sure he is keeping his skin hydrated with non perfumed lotions as well.    6. Treatment related hypothyroidism  A. Continue Synthroid 75 mcg.   B. I will follow-up on thyroid function from today.    FOLLOW UP: 3 weeks with cycle #12.    Benitze Medellin MD  12/20/2023

## 2024-01-15 NOTE — PROGRESS NOTES
DATE OF VISIT: 1/18/2024    REASON FOR VISIT: Followup for locally advanced squamous cell carcinoma of the left ear     PROBLEM LIST:  1. 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  Presented with gradually growing mass over the left ear for the last 3 years.  B.  Patient did not seek any medical attention until April 2023.  C.  CT neck and chest done at  April 2023 confirmed locally advanced lesion eroding into the left mastoid with left cervical lymphadenopathy but no evidence of chest metastases.  D.  Biopsy done April 2023 confirmed squamous cell carcinoma.  E.  Status post palliative radiation completed April 28, 2023  F.  Started immunotherapy with Libtayo 350 mg IV every 3 weeks May 22, 2023.  Status post 11 cycles.  2.  Cancer-related pain  3.  Mild depression  4.  BPH    HISTORY OF PRESENT ILLNESS: The patient is a very pleasant 78 y.o. male with past medical history significant for locally advanced left ear squamous cell carcinoma diagnosed April 2023.  The patient was started on Libtayo May 16, 2023. The patient is here today for scheduled follow-up visit with treatment cycle #12.    SUBJECTIVE: The patient is here today by himself.  Overall he is doing fairly well.  He reports that his ear has almost completely healed.  There is a small area that is still healing.  He keeps it covered because he tends to scratch and pick at it.  But overall he is doing well.  Denies any fever or chills.  No night sweats.  No nausea or vomiting.  Bowels are moving normally.        Past History:  Medical History: has a past medical history of Anemia, Arthritis, and Cancer.   Surgical History: has a past surgical history that includes Skin cancer excision; Neck dissection (N/A); Parotidectomy (N/A); and NAIL BIOPSY (N/A).   Family History: family history includes Cancer in his father and mother.   Social History: reports that he has never smoked. His smokeless tobacco use includes chew. He  "reports that he does not currently use alcohol. He reports that he does not use drugs.    (Not in a hospital admission)     Allergies: Patient has no known allergies.     Review of Systems   Constitutional:  Positive for fatigue.   HENT:  Positive for hearing loss.    Respiratory: Negative.     Gastrointestinal: Negative.    Skin:  Positive for wound.   Neurological: Negative.    Psychiatric/Behavioral: Negative.         PHYSICAL EXAMINATION:   /70   Pulse 89   Temp 97.5 °F (36.4 °C)   Resp 16   Ht 182.9 cm (72\")   Wt 96.2 kg (212 lb)   SpO2 98%   BMI 28.75 kg/m²    Pain Score    01/18/24 0857   PainSc: 0-No pain                               ECOG Performance Status: 1 - Symptomatic but completely ambulatory      General Appearance:      alert, cooperative, no apparent distress and appears stated age   Lungs:   Clear to auscultation bilaterally; respirations regular, even, and unlabored bilaterally   Heart:  Regular rate and rhythm, no murmurs appreciated   Abdomen:   Soft, non-tender, and non-distended                 No visits with results within 2 Week(s) from this visit.   Latest known visit with results is:   Infusion on 12/20/2023   Component Date Value Ref Range Status    Glucose 12/20/2023 99  65 - 99 mg/dL Final    BUN 12/20/2023 13  8 - 23 mg/dL Final    Creatinine 12/20/2023 1.38 (H)  0.76 - 1.27 mg/dL Final    Sodium 12/20/2023 135 (L)  136 - 145 mmol/L Final    Potassium 12/20/2023 4.6  3.5 - 5.2 mmol/L Final    Chloride 12/20/2023 99  98 - 107 mmol/L Final    CO2 12/20/2023 27.5  22.0 - 29.0 mmol/L Final    Calcium 12/20/2023 9.8  8.6 - 10.5 mg/dL Final    Total Protein 12/20/2023 7.1  6.0 - 8.5 g/dL Final    Albumin 12/20/2023 4.3  3.5 - 5.2 g/dL Final    ALT (SGPT) 12/20/2023 13  1 - 41 U/L Final    AST (SGOT) 12/20/2023 26  1 - 40 U/L Final    Alkaline Phosphatase 12/20/2023 50  39 - 117 U/L Final    Total Bilirubin 12/20/2023 1.0  0.0 - 1.2 mg/dL Final    Globulin 12/20/2023 2.8  gm/dL " Final    A/G Ratio 12/20/2023 1.5  g/dL Final    BUN/Creatinine Ratio 12/20/2023 9.4  7.0 - 25.0 Final    Anion Gap 12/20/2023 8.5  5.0 - 15.0 mmol/L Final    eGFR 12/20/2023 52.0 (L)  >60.0 mL/min/1.73 Final    WBC 12/20/2023 4.28  3.40 - 10.80 10*3/mm3 Final    RBC 12/20/2023 3.80 (L)  4.14 - 5.80 10*6/mm3 Final    Hemoglobin 12/20/2023 11.8 (L)  13.0 - 17.7 g/dL Final    Hematocrit 12/20/2023 35.0 (L)  37.5 - 51.0 % Final    MCV 12/20/2023 92.1  79.0 - 97.0 fL Final    MCH 12/20/2023 31.1  26.6 - 33.0 pg Final    MCHC 12/20/2023 33.7  31.5 - 35.7 g/dL Final    RDW 12/20/2023 13.2  12.3 - 15.4 % Final    RDW-SD 12/20/2023 44.3  37.0 - 54.0 fl Final    MPV 12/20/2023 9.5  6.0 - 12.0 fL Final    Platelets 12/20/2023 155  140 - 450 10*3/mm3 Final    Neutrophil % 12/20/2023 61.7  42.7 - 76.0 % Final    Lymphocyte % 12/20/2023 23.4  19.6 - 45.3 % Final    Monocyte % 12/20/2023 10.0  5.0 - 12.0 % Final    Eosinophil % 12/20/2023 3.5  0.3 - 6.2 % Final    Basophil % 12/20/2023 0.7  0.0 - 1.5 % Final    Immature Grans % 12/20/2023 0.7 (H)  0.0 - 0.5 % Final    Neutrophils, Absolute 12/20/2023 2.64  1.70 - 7.00 10*3/mm3 Final    Lymphocytes, Absolute 12/20/2023 1.00  0.70 - 3.10 10*3/mm3 Final    Monocytes, Absolute 12/20/2023 0.43  0.10 - 0.90 10*3/mm3 Final    Eosinophils, Absolute 12/20/2023 0.15  0.00 - 0.40 10*3/mm3 Final    Basophils, Absolute 12/20/2023 0.03  0.00 - 0.20 10*3/mm3 Final    Immature Grans, Absolute 12/20/2023 0.03  0.00 - 0.05 10*3/mm3 Final    nRBC 12/20/2023 0.0  0.0 - 0.2 /100 WBC Final        No results found.  05/23/23 08/01/2023 11/30/2023      ASSESSMENT: The patient is a very pleasant 78 y.o. male  with locally advanced left ear squamous cell carcinoma      PLAN:    1.  Locally advanced squamous cell carcinoma of the left ear T4 N1 M0 stage IV:  A.  I will proceed with treatment as scheduled today Libtayo IV every 3 weeks cycle #12.  B.  The patient will follow-up with us in 3 weeks  for cycle #13.  C.  I will continue to monitor the patient blood work including blood counts kidney function liver function and electrolytes.  D.  I will continue to hold off on doing imaging.  We will follow patient clinically for response.  Clinically, patient continues to have response.  E.  I reviewed with the patient the goal of treatment is palliative given his locally advanced disease with ipsilateral lymph node metastases.  F. We reviewed potential side effects of immunotherapy including but not limited to immune mediated reactions with thyroiditis, pneumonitis, hepatitis, colitis, rash, and electrolyte abnormalities, fatigue, multiorgan failure, and possibly death.  G.  I did go over the blood results with the patient from December 20 , 2023.  Hemoglobin stable at 11.8 creatinine improved to 1.38.  TSH improved to 2.13    2. Cancer-related pain:  A.  He will continue oxycodone as needed.     3.  Wound care:  A.  I again reviewed with the patient this is extremely important to assure wound cleanness.  B.  We will continue wound care with home health.  C.  Today, we requested imaging from home health for updated picture this month.     4.  Depression:  A.  He will continue Cymbalta daily    5.  Treatment related itching  A.  Improved.  I will continue hydroxyzine.  Continue increased dose at 50 mg to see if this helps with itching.  I did again advise the patient and his son to be careful with sedation or dizziness and if this occurs to stop.  B.  I asked the patient to make sure he is keeping his skin hydrated with non perfumed lotions as well.    6. Treatment related hypothyroidism  A. Continue Synthroid 75 mcg.   B. I will follow-up on thyroid function from today.    FOLLOW UP: 3 weeks with cycle #13.    Yady Guajardo, APRN  1/18/2024

## 2024-01-18 ENCOUNTER — OFFICE VISIT (OUTPATIENT)
Dept: ONCOLOGY | Facility: CLINIC | Age: 79
End: 2024-01-18
Payer: MEDICARE

## 2024-01-18 ENCOUNTER — INFUSION (OUTPATIENT)
Dept: ONCOLOGY | Facility: HOSPITAL | Age: 79
End: 2024-01-18
Payer: MEDICARE

## 2024-01-18 VITALS
OXYGEN SATURATION: 98 % | HEIGHT: 72 IN | BODY MASS INDEX: 28.71 KG/M2 | TEMPERATURE: 97.5 F | SYSTOLIC BLOOD PRESSURE: 160 MMHG | DIASTOLIC BLOOD PRESSURE: 70 MMHG | HEART RATE: 89 BPM | WEIGHT: 212 LBS | RESPIRATION RATE: 16 BRPM

## 2024-01-18 DIAGNOSIS — Z51.81 ENCOUNTER FOR THERAPEUTIC DRUG MONITORING: ICD-10-CM

## 2024-01-18 DIAGNOSIS — Z51.81 ENCOUNTER FOR THERAPEUTIC DRUG MONITORING: Primary | ICD-10-CM

## 2024-01-18 DIAGNOSIS — C44.229 SQUAMOUS CELL CARCINOMA OF SKIN OF LEFT EAR AND EXTERNAL AURICULAR CANAL: Primary | ICD-10-CM

## 2024-01-18 DIAGNOSIS — C44.229 SQUAMOUS CELL CARCINOMA OF SKIN OF LEFT EAR AND EXTERNAL AURICULAR CANAL: ICD-10-CM

## 2024-01-18 LAB
ALBUMIN SERPL-MCNC: 4.2 G/DL (ref 3.5–5.2)
ALBUMIN/GLOB SERPL: 1.5 G/DL
ALP SERPL-CCNC: 45 U/L (ref 39–117)
ALT SERPL W P-5'-P-CCNC: 12 U/L (ref 1–41)
ANION GAP SERPL CALCULATED.3IONS-SCNC: 8.7 MMOL/L (ref 5–15)
AST SERPL-CCNC: 21 U/L (ref 1–40)
BASOPHILS # BLD AUTO: 0.03 10*3/MM3 (ref 0–0.2)
BASOPHILS NFR BLD AUTO: 0.8 % (ref 0–1.5)
BILIRUB SERPL-MCNC: 1.1 MG/DL (ref 0–1.2)
BUN SERPL-MCNC: 14 MG/DL (ref 8–23)
BUN/CREAT SERPL: 9.9 (ref 7–25)
CALCIUM SPEC-SCNC: 9.5 MG/DL (ref 8.6–10.5)
CHLORIDE SERPL-SCNC: 101 MMOL/L (ref 98–107)
CO2 SERPL-SCNC: 27.3 MMOL/L (ref 22–29)
CREAT SERPL-MCNC: 1.42 MG/DL (ref 0.76–1.27)
DEPRECATED RDW RBC AUTO: 46.4 FL (ref 37–54)
EGFRCR SERPLBLD CKD-EPI 2021: 50.3 ML/MIN/1.73
EOSINOPHIL # BLD AUTO: 0.13 10*3/MM3 (ref 0–0.4)
EOSINOPHIL NFR BLD AUTO: 3.3 % (ref 0.3–6.2)
ERYTHROCYTE [DISTWIDTH] IN BLOOD BY AUTOMATED COUNT: 13.5 % (ref 12.3–15.4)
GLOBULIN UR ELPH-MCNC: 2.8 GM/DL
GLUCOSE SERPL-MCNC: 88 MG/DL (ref 65–99)
HCT VFR BLD AUTO: 35.4 % (ref 37.5–51)
HGB BLD-MCNC: 11.6 G/DL (ref 13–17.7)
IMM GRANULOCYTES # BLD AUTO: 0.02 10*3/MM3 (ref 0–0.05)
IMM GRANULOCYTES NFR BLD AUTO: 0.5 % (ref 0–0.5)
LYMPHOCYTES # BLD AUTO: 1.23 10*3/MM3 (ref 0.7–3.1)
LYMPHOCYTES NFR BLD AUTO: 30.8 % (ref 19.6–45.3)
MCH RBC QN AUTO: 30.3 PG (ref 26.6–33)
MCHC RBC AUTO-ENTMCNC: 32.8 G/DL (ref 31.5–35.7)
MCV RBC AUTO: 92.4 FL (ref 79–97)
MONOCYTES # BLD AUTO: 0.41 10*3/MM3 (ref 0.1–0.9)
MONOCYTES NFR BLD AUTO: 10.3 % (ref 5–12)
NEUTROPHILS NFR BLD AUTO: 2.18 10*3/MM3 (ref 1.7–7)
NEUTROPHILS NFR BLD AUTO: 54.3 % (ref 42.7–76)
NRBC BLD AUTO-RTO: 0 /100 WBC (ref 0–0.2)
PLATELET # BLD AUTO: 173 10*3/MM3 (ref 140–450)
PMV BLD AUTO: 9.5 FL (ref 6–12)
POTASSIUM SERPL-SCNC: 4.2 MMOL/L (ref 3.5–5.2)
PROT SERPL-MCNC: 7 G/DL (ref 6–8.5)
RBC # BLD AUTO: 3.83 10*6/MM3 (ref 4.14–5.8)
SODIUM SERPL-SCNC: 137 MMOL/L (ref 136–145)
T4 FREE SERPL-MCNC: 1.05 NG/DL (ref 0.93–1.7)
TSH SERPL DL<=0.05 MIU/L-ACNC: 2.59 UIU/ML (ref 0.27–4.2)
WBC NRBC COR # BLD AUTO: 4 10*3/MM3 (ref 3.4–10.8)

## 2024-01-18 PROCEDURE — 80053 COMPREHEN METABOLIC PANEL: CPT

## 2024-01-18 PROCEDURE — 84439 ASSAY OF FREE THYROXINE: CPT

## 2024-01-18 PROCEDURE — 25010000002 CEMIPLIMAB-RWLC 350 MG/7ML SOLUTION 7 ML VIAL: Performed by: INTERNAL MEDICINE

## 2024-01-18 PROCEDURE — 96413 CHEMO IV INFUSION 1 HR: CPT

## 2024-01-18 PROCEDURE — 85025 COMPLETE CBC W/AUTO DIFF WBC: CPT

## 2024-01-18 PROCEDURE — 36415 COLL VENOUS BLD VENIPUNCTURE: CPT

## 2024-01-18 PROCEDURE — 84443 ASSAY THYROID STIM HORMONE: CPT

## 2024-01-18 PROCEDURE — 96365 THER/PROPH/DIAG IV INF INIT: CPT

## 2024-01-18 RX ORDER — SODIUM CHLORIDE 9 MG/ML
250 INJECTION, SOLUTION INTRAVENOUS ONCE
Status: DISCONTINUED | OUTPATIENT
Start: 2024-01-18 | End: 2024-01-18 | Stop reason: HOSPADM

## 2024-01-18 RX ADMIN — CEMIPLIMAB-RWLC 350 MG: 50 INJECTION INTRAVENOUS at 10:20

## 2024-02-07 ENCOUNTER — OFFICE VISIT (OUTPATIENT)
Dept: ONCOLOGY | Facility: CLINIC | Age: 79
End: 2024-02-07
Payer: MEDICARE

## 2024-02-07 ENCOUNTER — INFUSION (OUTPATIENT)
Dept: ONCOLOGY | Facility: HOSPITAL | Age: 79
End: 2024-02-07
Payer: MEDICARE

## 2024-02-07 VITALS
WEIGHT: 212 LBS | OXYGEN SATURATION: 95 % | HEIGHT: 72 IN | BODY MASS INDEX: 28.71 KG/M2 | DIASTOLIC BLOOD PRESSURE: 77 MMHG | HEART RATE: 119 BPM | SYSTOLIC BLOOD PRESSURE: 129 MMHG | TEMPERATURE: 97.9 F | RESPIRATION RATE: 16 BRPM

## 2024-02-07 DIAGNOSIS — C44.229 SQUAMOUS CELL CARCINOMA OF SKIN OF LEFT EAR AND EXTERNAL AURICULAR CANAL: Primary | ICD-10-CM

## 2024-02-07 DIAGNOSIS — Z51.81 ENCOUNTER FOR THERAPEUTIC DRUG MONITORING: ICD-10-CM

## 2024-02-07 LAB
ALBUMIN SERPL-MCNC: 4.4 G/DL (ref 3.5–5.2)
ALBUMIN/GLOB SERPL: 1.6 G/DL
ALP SERPL-CCNC: 50 U/L (ref 39–117)
ALT SERPL W P-5'-P-CCNC: 11 U/L (ref 1–41)
ANION GAP SERPL CALCULATED.3IONS-SCNC: 11.4 MMOL/L (ref 5–15)
AST SERPL-CCNC: 22 U/L (ref 1–40)
BASOPHILS # BLD AUTO: 0.04 10*3/MM3 (ref 0–0.2)
BASOPHILS NFR BLD AUTO: 0.8 % (ref 0–1.5)
BILIRUB SERPL-MCNC: 0.9 MG/DL (ref 0–1.2)
BUN SERPL-MCNC: 17 MG/DL (ref 8–23)
BUN/CREAT SERPL: 12.3 (ref 7–25)
CALCIUM SPEC-SCNC: 9.3 MG/DL (ref 8.6–10.5)
CHLORIDE SERPL-SCNC: 99 MMOL/L (ref 98–107)
CO2 SERPL-SCNC: 24.6 MMOL/L (ref 22–29)
CREAT SERPL-MCNC: 1.38 MG/DL (ref 0.76–1.27)
DEPRECATED RDW RBC AUTO: 44.2 FL (ref 37–54)
EGFRCR SERPLBLD CKD-EPI 2021: 52 ML/MIN/1.73
EOSINOPHIL # BLD AUTO: 0.09 10*3/MM3 (ref 0–0.4)
EOSINOPHIL NFR BLD AUTO: 1.9 % (ref 0.3–6.2)
ERYTHROCYTE [DISTWIDTH] IN BLOOD BY AUTOMATED COUNT: 13.3 % (ref 12.3–15.4)
GLOBULIN UR ELPH-MCNC: 2.7 GM/DL
GLUCOSE SERPL-MCNC: 95 MG/DL (ref 65–99)
HCT VFR BLD AUTO: 35.9 % (ref 37.5–51)
HGB BLD-MCNC: 12.1 G/DL (ref 13–17.7)
IMM GRANULOCYTES # BLD AUTO: 0.06 10*3/MM3 (ref 0–0.05)
IMM GRANULOCYTES NFR BLD AUTO: 1.2 % (ref 0–0.5)
LYMPHOCYTES # BLD AUTO: 0.94 10*3/MM3 (ref 0.7–3.1)
LYMPHOCYTES NFR BLD AUTO: 19.4 % (ref 19.6–45.3)
MCH RBC QN AUTO: 30.4 PG (ref 26.6–33)
MCHC RBC AUTO-ENTMCNC: 33.7 G/DL (ref 31.5–35.7)
MCV RBC AUTO: 90.2 FL (ref 79–97)
MONOCYTES # BLD AUTO: 0.38 10*3/MM3 (ref 0.1–0.9)
MONOCYTES NFR BLD AUTO: 7.9 % (ref 5–12)
NEUTROPHILS NFR BLD AUTO: 3.33 10*3/MM3 (ref 1.7–7)
NEUTROPHILS NFR BLD AUTO: 68.8 % (ref 42.7–76)
NRBC BLD AUTO-RTO: 0 /100 WBC (ref 0–0.2)
PLATELET # BLD AUTO: 211 10*3/MM3 (ref 140–450)
PMV BLD AUTO: 10 FL (ref 6–12)
POTASSIUM SERPL-SCNC: 4.9 MMOL/L (ref 3.5–5.2)
PROT SERPL-MCNC: 7.1 G/DL (ref 6–8.5)
RBC # BLD AUTO: 3.98 10*6/MM3 (ref 4.14–5.8)
SODIUM SERPL-SCNC: 135 MMOL/L (ref 136–145)
T4 FREE SERPL-MCNC: 0.98 NG/DL (ref 0.93–1.7)
TSH SERPL DL<=0.05 MIU/L-ACNC: 4.05 UIU/ML (ref 0.27–4.2)
WBC NRBC COR # BLD AUTO: 4.84 10*3/MM3 (ref 3.4–10.8)

## 2024-02-07 PROCEDURE — 84439 ASSAY OF FREE THYROXINE: CPT

## 2024-02-07 PROCEDURE — 85025 COMPLETE CBC W/AUTO DIFF WBC: CPT

## 2024-02-07 PROCEDURE — 80053 COMPREHEN METABOLIC PANEL: CPT

## 2024-02-07 PROCEDURE — 25010000002 CEMIPLIMAB-RWLC 350 MG/7ML SOLUTION 7 ML VIAL: Performed by: INTERNAL MEDICINE

## 2024-02-07 PROCEDURE — 96413 CHEMO IV INFUSION 1 HR: CPT

## 2024-02-07 PROCEDURE — 1126F AMNT PAIN NOTED NONE PRSNT: CPT | Performed by: INTERNAL MEDICINE

## 2024-02-07 PROCEDURE — 84443 ASSAY THYROID STIM HORMONE: CPT

## 2024-02-07 PROCEDURE — 36415 COLL VENOUS BLD VENIPUNCTURE: CPT

## 2024-02-07 PROCEDURE — 99214 OFFICE O/P EST MOD 30 MIN: CPT | Performed by: INTERNAL MEDICINE

## 2024-02-07 PROCEDURE — 96365 THER/PROPH/DIAG IV INF INIT: CPT

## 2024-02-07 RX ORDER — SODIUM CHLORIDE 9 MG/ML
250 INJECTION, SOLUTION INTRAVENOUS ONCE
Status: DISCONTINUED | OUTPATIENT
Start: 2024-02-07 | End: 2024-02-07 | Stop reason: HOSPADM

## 2024-02-07 RX ORDER — SODIUM CHLORIDE 9 MG/ML
250 INJECTION, SOLUTION INTRAVENOUS ONCE
Status: CANCELLED | OUTPATIENT
Start: 2024-02-08

## 2024-02-07 RX ADMIN — CEMIPLIMAB-RWLC 350 MG: 50 INJECTION INTRAVENOUS at 12:28

## 2024-02-07 NOTE — PROGRESS NOTES
DATE OF VISIT: 2/7/2024    REASON FOR VISIT: Followup for locally advanced squamous cell carcinoma of the left ear     PROBLEM LIST:  1. 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  Presented with gradually growing mass over the left ear for the last 3 years.  B.  Patient did not seek any medical attention until April 2023.  C.  CT neck and chest done at  April 2023 confirmed locally advanced lesion eroding into the left mastoid with left cervical lymphadenopathy but no evidence of chest metastases.  D.  Biopsy done April 2023 confirmed squamous cell carcinoma.  E.  Status post palliative radiation completed April 28, 2023  F.  Started immunotherapy with Libtayo 350 mg IV every 3 weeks May 22, 2023.  Status post 12 cycles.  2.  Cancer-related pain  3.  Mild depression  4.  BPH    HISTORY OF PRESENT ILLNESS: The patient is a very pleasant 78 y.o. male with past medical history significant for locally advanced left ear squamous cell carcinoma diagnosed April 2023.  The patient was started on Libtayo May 16, 2023. The patient is here today for scheduled follow-up visit with treatment cycle #13.    SUBJECTIVE: Hardik is here today by himself.  He is doing fairly well.  He is feeling better.      Past History:  Medical History: has a past medical history of Anemia, Arthritis, and Cancer.   Surgical History: has a past surgical history that includes Skin cancer excision; Neck dissection (N/A); Parotidectomy (N/A); and NAIL BIOPSY (N/A).   Family History: family history includes Cancer in his father and mother.   Social History: reports that he has never smoked. His smokeless tobacco use includes chew. He reports that he does not currently use alcohol. He reports that he does not use drugs.    (Not in a hospital admission)     Allergies: Patient has no known allergies.     Review of Systems   Constitutional:  Positive for fatigue.   Respiratory: Negative.     Gastrointestinal: Negative.   "  Skin:  Positive for wound.   Psychiatric/Behavioral: Negative.         PHYSICAL EXAMINATION:   /77   Pulse 119   Temp 97.9 °F (36.6 °C)   Resp 16   Ht 182.9 cm (72\")   Wt 96.2 kg (212 lb)   SpO2 95%   BMI 28.75 kg/m²    Pain Score    02/07/24 1037   PainSc: 0-No pain                               ECOG Performance Status: 1 - Symptomatic but completely ambulatory      General Appearance:      alert, cooperative, no apparent distress and appears stated age   Lungs:   Clear to auscultation bilaterally; respirations regular, even, and unlabored bilaterally   Heart:  Regular rate and rhythm, no murmurs appreciated   Abdomen:   Soft, non-tender, and non-distended                 No visits with results within 2 Week(s) from this visit.   Latest known visit with results is:   Infusion on 01/18/2024   Component Date Value Ref Range Status    TSH 01/18/2024 2.590  0.270 - 4.200 uIU/mL Final    Free T4 01/18/2024 1.05  0.93 - 1.70 ng/dL Final    Glucose 01/18/2024 88  65 - 99 mg/dL Final    BUN 01/18/2024 14  8 - 23 mg/dL Final    Creatinine 01/18/2024 1.42 (H)  0.76 - 1.27 mg/dL Final    Sodium 01/18/2024 137  136 - 145 mmol/L Final    Potassium 01/18/2024 4.2  3.5 - 5.2 mmol/L Final    Chloride 01/18/2024 101  98 - 107 mmol/L Final    CO2 01/18/2024 27.3  22.0 - 29.0 mmol/L Final    Calcium 01/18/2024 9.5  8.6 - 10.5 mg/dL Final    Total Protein 01/18/2024 7.0  6.0 - 8.5 g/dL Final    Albumin 01/18/2024 4.2  3.5 - 5.2 g/dL Final    ALT (SGPT) 01/18/2024 12  1 - 41 U/L Final    AST (SGOT) 01/18/2024 21  1 - 40 U/L Final    Alkaline Phosphatase 01/18/2024 45  39 - 117 U/L Final    Total Bilirubin 01/18/2024 1.1  0.0 - 1.2 mg/dL Final    Globulin 01/18/2024 2.8  gm/dL Final    A/G Ratio 01/18/2024 1.5  g/dL Final    BUN/Creatinine Ratio 01/18/2024 9.9  7.0 - 25.0 Final    Anion Gap 01/18/2024 8.7  5.0 - 15.0 mmol/L Final    eGFR 01/18/2024 50.3 (L)  >60.0 mL/min/1.73 Final    WBC 01/18/2024 4.00  3.40 - 10.80 " 10*3/mm3 Final    RBC 01/18/2024 3.83 (L)  4.14 - 5.80 10*6/mm3 Final    Hemoglobin 01/18/2024 11.6 (L)  13.0 - 17.7 g/dL Final    Hematocrit 01/18/2024 35.4 (L)  37.5 - 51.0 % Final    MCV 01/18/2024 92.4  79.0 - 97.0 fL Final    MCH 01/18/2024 30.3  26.6 - 33.0 pg Final    MCHC 01/18/2024 32.8  31.5 - 35.7 g/dL Final    RDW 01/18/2024 13.5  12.3 - 15.4 % Final    RDW-SD 01/18/2024 46.4  37.0 - 54.0 fl Final    MPV 01/18/2024 9.5  6.0 - 12.0 fL Final    Platelets 01/18/2024 173  140 - 450 10*3/mm3 Final    Neutrophil % 01/18/2024 54.3  42.7 - 76.0 % Final    Lymphocyte % 01/18/2024 30.8  19.6 - 45.3 % Final    Monocyte % 01/18/2024 10.3  5.0 - 12.0 % Final    Eosinophil % 01/18/2024 3.3  0.3 - 6.2 % Final    Basophil % 01/18/2024 0.8  0.0 - 1.5 % Final    Immature Grans % 01/18/2024 0.5  0.0 - 0.5 % Final    Neutrophils, Absolute 01/18/2024 2.18  1.70 - 7.00 10*3/mm3 Final    Lymphocytes, Absolute 01/18/2024 1.23  0.70 - 3.10 10*3/mm3 Final    Monocytes, Absolute 01/18/2024 0.41  0.10 - 0.90 10*3/mm3 Final    Eosinophils, Absolute 01/18/2024 0.13  0.00 - 0.40 10*3/mm3 Final    Basophils, Absolute 01/18/2024 0.03  0.00 - 0.20 10*3/mm3 Final    Immature Grans, Absolute 01/18/2024 0.02  0.00 - 0.05 10*3/mm3 Final    nRBC 01/18/2024 0.0  0.0 - 0.2 /100 WBC Final        No results found.  05/23/23 08/01/2023 11/30/2023      ASSESSMENT: The patient is a very pleasant 78 y.o. male  with locally advanced left ear squamous cell carcinoma      PLAN:    1.  Locally advanced squamous cell carcinoma of the left ear T4 N1 M0 stage IV:  A.  I will proceed with treatment as scheduled today Libtayo IV every 3 weeks cycle #13.  B.  The patient will follow-up with us in 3 weeks for cycle #14.  C.  I will continue to monitor the patient blood work including blood counts kidney function liver function and electrolytes.  D.  I will continue to hold off on doing imaging.  We will follow patient clinically for response.   Clinically, patient continues to have response.  E.  I reviewed with the patient the goal of treatment is palliative given his locally advanced disease with ipsilateral lymph node metastases.  F. We reviewed potential side effects of immunotherapy including but not limited to immune mediated reactions with thyroiditis, pneumonitis, hepatitis, colitis, rash, and electrolyte abnormalities, fatigue, multiorgan failure, and possibly death.  G.  I did go over the blood results with the patient from January 18, 2024.  His hemoglobin is stable at 11.6.  Creatinine stable at 1.4.    2. Cancer-related pain:  A.  He will continue oxycodone as needed.     3.  Wound care:  A.  I again reviewed with the patient this is extremely important to assure wound cleanness.  B.  We will continue wound care with home health.  C.  Today, we requested imaging from home [facility] for updated picture this month.     4.  Depression:  A.  He will continue Cymbalta daily    5.  Treatment related itching  A.  Improved.  I will continue hydroxyzine.  Continue increased dose at 50 mg to see if this helps with itching.  I did again advise the patient and his son to be careful with sedation or dizziness and if this occurs to stop.  B.  I asked the patient to make sure he is keeping his skin hydrated with non perfumed lotions as well.    6. Treatment related hypothyroidism  A. Continue Synthroid 75 mcg.   B. I will follow-up on thyroid function from today.    FOLLOW UP: 3 weeks with cycle #14.    Benitez Medellin MD  2/7/2024

## 2024-02-29 ENCOUNTER — OFFICE VISIT (OUTPATIENT)
Dept: ONCOLOGY | Facility: CLINIC | Age: 79
End: 2024-02-29
Payer: MEDICARE

## 2024-02-29 ENCOUNTER — INFUSION (OUTPATIENT)
Dept: ONCOLOGY | Facility: HOSPITAL | Age: 79
End: 2024-02-29
Payer: MEDICARE

## 2024-02-29 VITALS
WEIGHT: 214 LBS | OXYGEN SATURATION: 90 % | TEMPERATURE: 97.3 F | BODY MASS INDEX: 28.99 KG/M2 | SYSTOLIC BLOOD PRESSURE: 132 MMHG | HEIGHT: 72 IN | HEART RATE: 88 BPM | RESPIRATION RATE: 16 BRPM | DIASTOLIC BLOOD PRESSURE: 65 MMHG

## 2024-02-29 DIAGNOSIS — C44.229 SQUAMOUS CELL CARCINOMA OF SKIN OF LEFT EAR AND EXTERNAL AURICULAR CANAL: ICD-10-CM

## 2024-02-29 DIAGNOSIS — Z51.81 ENCOUNTER FOR THERAPEUTIC DRUG MONITORING: ICD-10-CM

## 2024-02-29 DIAGNOSIS — Z51.81 ENCOUNTER FOR THERAPEUTIC DRUG MONITORING: Primary | ICD-10-CM

## 2024-02-29 DIAGNOSIS — C44.229 SQUAMOUS CELL CARCINOMA OF SKIN OF LEFT EAR AND EXTERNAL AURICULAR CANAL: Primary | ICD-10-CM

## 2024-02-29 LAB
ALBUMIN SERPL-MCNC: 4.2 G/DL (ref 3.5–5.2)
ALBUMIN/GLOB SERPL: 1.6 G/DL
ALP SERPL-CCNC: 54 U/L (ref 39–117)
ALT SERPL W P-5'-P-CCNC: 10 U/L (ref 1–41)
ANION GAP SERPL CALCULATED.3IONS-SCNC: 11 MMOL/L (ref 5–15)
AST SERPL-CCNC: 22 U/L (ref 1–40)
BASOPHILS # BLD AUTO: 0.03 10*3/MM3 (ref 0–0.2)
BASOPHILS NFR BLD AUTO: 0.8 % (ref 0–1.5)
BILIRUB SERPL-MCNC: 0.8 MG/DL (ref 0–1.2)
BUN SERPL-MCNC: 19 MG/DL (ref 8–23)
BUN/CREAT SERPL: 14.1 (ref 7–25)
CALCIUM SPEC-SCNC: 9.5 MG/DL (ref 8.6–10.5)
CHLORIDE SERPL-SCNC: 100 MMOL/L (ref 98–107)
CK SERPL-CCNC: 118 U/L (ref 20–200)
CO2 SERPL-SCNC: 24 MMOL/L (ref 22–29)
CREAT SERPL-MCNC: 1.35 MG/DL (ref 0.76–1.27)
CRP SERPL-MCNC: 2.03 MG/DL (ref 0–0.5)
DEPRECATED RDW RBC AUTO: 43.7 FL (ref 37–54)
EGFRCR SERPLBLD CKD-EPI 2021: 53.4 ML/MIN/1.73
EOSINOPHIL # BLD AUTO: 0.12 10*3/MM3 (ref 0–0.4)
EOSINOPHIL NFR BLD AUTO: 3.4 % (ref 0.3–6.2)
ERYTHROCYTE [DISTWIDTH] IN BLOOD BY AUTOMATED COUNT: 13.2 % (ref 12.3–15.4)
ERYTHROCYTE [SEDIMENTATION RATE] IN BLOOD: 26 MM/HR (ref 0–20)
GLOBULIN UR ELPH-MCNC: 2.6 GM/DL
GLUCOSE SERPL-MCNC: 92 MG/DL (ref 65–99)
HCT VFR BLD AUTO: 32.9 % (ref 37.5–51)
HGB BLD-MCNC: 11.2 G/DL (ref 13–17.7)
IMM GRANULOCYTES # BLD AUTO: 0.04 10*3/MM3 (ref 0–0.05)
IMM GRANULOCYTES NFR BLD AUTO: 1.1 % (ref 0–0.5)
LDH SERPL-CCNC: 151 U/L (ref 135–225)
LYMPHOCYTES # BLD AUTO: 0.8 10*3/MM3 (ref 0.7–3.1)
LYMPHOCYTES NFR BLD AUTO: 22.6 % (ref 19.6–45.3)
MCH RBC QN AUTO: 30.6 PG (ref 26.6–33)
MCHC RBC AUTO-ENTMCNC: 34 G/DL (ref 31.5–35.7)
MCV RBC AUTO: 89.9 FL (ref 79–97)
MONOCYTES # BLD AUTO: 0.35 10*3/MM3 (ref 0.1–0.9)
MONOCYTES NFR BLD AUTO: 9.9 % (ref 5–12)
NEUTROPHILS NFR BLD AUTO: 2.2 10*3/MM3 (ref 1.7–7)
NEUTROPHILS NFR BLD AUTO: 62.2 % (ref 42.7–76)
NRBC BLD AUTO-RTO: 0 /100 WBC (ref 0–0.2)
PLATELET # BLD AUTO: 185 10*3/MM3 (ref 140–450)
PMV BLD AUTO: 8.9 FL (ref 6–12)
POTASSIUM SERPL-SCNC: 4.6 MMOL/L (ref 3.5–5.2)
PROT SERPL-MCNC: 6.8 G/DL (ref 6–8.5)
RBC # BLD AUTO: 3.66 10*6/MM3 (ref 4.14–5.8)
SODIUM SERPL-SCNC: 135 MMOL/L (ref 136–145)
T4 FREE SERPL-MCNC: 0.86 NG/DL (ref 0.93–1.7)
TSH SERPL DL<=0.05 MIU/L-ACNC: 5.05 UIU/ML (ref 0.27–4.2)
WBC NRBC COR # BLD AUTO: 3.54 10*3/MM3 (ref 3.4–10.8)

## 2024-02-29 PROCEDURE — 36415 COLL VENOUS BLD VENIPUNCTURE: CPT

## 2024-02-29 PROCEDURE — 96365 THER/PROPH/DIAG IV INF INIT: CPT

## 2024-02-29 PROCEDURE — 80053 COMPREHEN METABOLIC PANEL: CPT

## 2024-02-29 PROCEDURE — 82550 ASSAY OF CK (CPK): CPT

## 2024-02-29 PROCEDURE — 96413 CHEMO IV INFUSION 1 HR: CPT

## 2024-02-29 PROCEDURE — 85025 COMPLETE CBC W/AUTO DIFF WBC: CPT

## 2024-02-29 PROCEDURE — 86140 C-REACTIVE PROTEIN: CPT

## 2024-02-29 PROCEDURE — 85652 RBC SED RATE AUTOMATED: CPT

## 2024-02-29 PROCEDURE — 83615 LACTATE (LD) (LDH) ENZYME: CPT

## 2024-02-29 PROCEDURE — 84439 ASSAY OF FREE THYROXINE: CPT

## 2024-02-29 PROCEDURE — 84443 ASSAY THYROID STIM HORMONE: CPT

## 2024-02-29 PROCEDURE — 25010000002 CEMIPLIMAB-RWLC 350 MG/7ML SOLUTION 7 ML VIAL: Performed by: NURSE PRACTITIONER

## 2024-02-29 RX ORDER — SODIUM CHLORIDE 9 MG/ML
250 INJECTION, SOLUTION INTRAVENOUS ONCE
Status: CANCELLED | OUTPATIENT
Start: 2024-02-29

## 2024-02-29 RX ORDER — SODIUM CHLORIDE 9 MG/ML
250 INJECTION, SOLUTION INTRAVENOUS ONCE
Status: DISCONTINUED | OUTPATIENT
Start: 2024-02-29 | End: 2024-02-29 | Stop reason: HOSPADM

## 2024-02-29 RX ADMIN — CEMIPLIMAB-RWLC 350 MG: 50 INJECTION INTRAVENOUS at 10:52

## 2024-02-29 NOTE — PROGRESS NOTES
DATE OF VISIT: 2/29/2024    REASON FOR VISIT: Followup for locally advanced squamous cell carcinoma of the left ear     PROBLEM LIST:  1. 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  Presented with gradually growing mass over the left ear for the last 3 years.  B.  Patient did not seek any medical attention until April 2023.  C.  CT neck and chest done at  April 2023 confirmed locally advanced lesion eroding into the left mastoid with left cervical lymphadenopathy but no evidence of chest metastases.  D.  Biopsy done April 2023 confirmed squamous cell carcinoma.  E.  Status post palliative radiation completed April 28, 2023  F.  Started immunotherapy with Libtayo 350 mg IV every 3 weeks May 22, 2023.  Status post 13 cycles.  2.  Cancer-related pain  3.  Mild depression  4.  BPH    HISTORY OF PRESENT ILLNESS: The patient is a very pleasant 78 y.o. male with past medical history significant for locally advanced left ear squamous cell carcinoma diagnosed April 2023.  The patient was started on Libtayo May 16, 2023. The patient is here today for scheduled follow-up visit with treatment cycle #14.    SUBJECTIVE: Hardik is here today by himself.  He is doing fairly well.  He is feeling better.      Past History:  Medical History: has a past medical history of Anemia, Arthritis, and Cancer.   Surgical History: has a past surgical history that includes Skin cancer excision; Neck dissection (N/A); Parotidectomy (N/A); and NAIL BIOPSY (N/A).   Family History: family history includes Cancer in his father and mother.   Social History: reports that he has never smoked. His smokeless tobacco use includes chew. He reports that he does not currently use alcohol. He reports that he does not use drugs.    (Not in a hospital admission)     Allergies: Patient has no known allergies.     Review of Systems   Constitutional:  Positive for fatigue.   Respiratory: Negative.     Gastrointestinal: Negative.   "  Skin:  Positive for wound.   Psychiatric/Behavioral: Negative.         PHYSICAL EXAMINATION:   /65   Pulse 88   Temp 97.3 °F (36.3 °C)   Resp 16   Ht 182.9 cm (72\")   Wt 97.1 kg (214 lb)   SpO2 90%   BMI 29.02 kg/m²    Pain Score    02/29/24 0932   PainSc: 0-No pain                                 ECOG Performance Status: 1 - Symptomatic but completely ambulatory      General Appearance:      alert, cooperative, no apparent distress and appears stated age   Lungs:   Clear to auscultation bilaterally; respirations regular, even, and unlabored bilaterally   Heart:  Regular rate and rhythm, no murmurs appreciated   Abdomen:   Soft, non-tender, and non-distended                 No visits with results within 2 Week(s) from this visit.   Latest known visit with results is:   Infusion on 02/07/2024   Component Date Value Ref Range Status    Glucose 02/07/2024 95  65 - 99 mg/dL Final    BUN 02/07/2024 17  8 - 23 mg/dL Final    Creatinine 02/07/2024 1.38 (H)  0.76 - 1.27 mg/dL Final    Sodium 02/07/2024 135 (L)  136 - 145 mmol/L Final    Potassium 02/07/2024 4.9  3.5 - 5.2 mmol/L Final    Slight hemolysis detected by analyzer. Result may be falsely elevated.    Chloride 02/07/2024 99  98 - 107 mmol/L Final    CO2 02/07/2024 24.6  22.0 - 29.0 mmol/L Final    Calcium 02/07/2024 9.3  8.6 - 10.5 mg/dL Final    Total Protein 02/07/2024 7.1  6.0 - 8.5 g/dL Final    Albumin 02/07/2024 4.4  3.5 - 5.2 g/dL Final    ALT (SGPT) 02/07/2024 11  1 - 41 U/L Final    AST (SGOT) 02/07/2024 22  1 - 40 U/L Final    Slight hemolysis detected by analyzer. Result may be falsely elevated.    Alkaline Phosphatase 02/07/2024 50  39 - 117 U/L Final    Total Bilirubin 02/07/2024 0.9  0.0 - 1.2 mg/dL Final    Globulin 02/07/2024 2.7  gm/dL Final    A/G Ratio 02/07/2024 1.6  g/dL Final    BUN/Creatinine Ratio 02/07/2024 12.3  7.0 - 25.0 Final    Anion Gap 02/07/2024 11.4  5.0 - 15.0 mmol/L Final    eGFR 02/07/2024 52.0 (L)  >60.0 " mL/min/1.73 Final    TSH 02/07/2024 4.050  0.270 - 4.200 uIU/mL Final    Free T4 02/07/2024 0.98  0.93 - 1.70 ng/dL Final    WBC 02/07/2024 4.84  3.40 - 10.80 10*3/mm3 Final    RBC 02/07/2024 3.98 (L)  4.14 - 5.80 10*6/mm3 Final    Hemoglobin 02/07/2024 12.1 (L)  13.0 - 17.7 g/dL Final    Hematocrit 02/07/2024 35.9 (L)  37.5 - 51.0 % Final    MCV 02/07/2024 90.2  79.0 - 97.0 fL Final    MCH 02/07/2024 30.4  26.6 - 33.0 pg Final    MCHC 02/07/2024 33.7  31.5 - 35.7 g/dL Final    RDW 02/07/2024 13.3  12.3 - 15.4 % Final    RDW-SD 02/07/2024 44.2  37.0 - 54.0 fl Final    MPV 02/07/2024 10.0  6.0 - 12.0 fL Final    Platelets 02/07/2024 211  140 - 450 10*3/mm3 Final    Neutrophil % 02/07/2024 68.8  42.7 - 76.0 % Final    Lymphocyte % 02/07/2024 19.4 (L)  19.6 - 45.3 % Final    Monocyte % 02/07/2024 7.9  5.0 - 12.0 % Final    Eosinophil % 02/07/2024 1.9  0.3 - 6.2 % Final    Basophil % 02/07/2024 0.8  0.0 - 1.5 % Final    Immature Grans % 02/07/2024 1.2 (H)  0.0 - 0.5 % Final    Neutrophils, Absolute 02/07/2024 3.33  1.70 - 7.00 10*3/mm3 Final    Lymphocytes, Absolute 02/07/2024 0.94  0.70 - 3.10 10*3/mm3 Final    Monocytes, Absolute 02/07/2024 0.38  0.10 - 0.90 10*3/mm3 Final    Eosinophils, Absolute 02/07/2024 0.09  0.00 - 0.40 10*3/mm3 Final    Basophils, Absolute 02/07/2024 0.04  0.00 - 0.20 10*3/mm3 Final    Immature Grans, Absolute 02/07/2024 0.06 (H)  0.00 - 0.05 10*3/mm3 Final    nRBC 02/07/2024 0.0  0.0 - 0.2 /100 WBC Final        No results found.  05/23/23 08/01/2023 11/30/2023      ASSESSMENT: The patient is a very pleasant 78 y.o. male  with locally advanced left ear squamous cell carcinoma      PLAN:    1.  Locally advanced squamous cell carcinoma of the left ear T4 N1 M0 stage IV:  A.  I will proceed with treatment as scheduled today Libtayo IV every 3 weeks cycle #14.  B.  The patient will follow-up with us in 3 weeks for cycle #15.  C.  I will continue to monitor the patient blood work including  blood counts kidney function liver function and electrolytes.  D.  I will continue to hold off on doing imaging.  We will follow patient clinically for response.  Clinically, patient continues to have response.  E.  I reviewed with the patient the goal of treatment is palliative given his locally advanced disease with ipsilateral lymph node metastases.  F. We reviewed potential side effects of immunotherapy including but not limited to immune mediated reactions with thyroiditis, pneumonitis, hepatitis, colitis, rash, and electrolyte abnormalities, fatigue, multiorgan failure, and possibly death.  G.  I did go over the blood results with the patient from February 7, 2024.  His hemoglobin is stable at 12.1.  Creatinine stable at 1.38.    2. Cancer-related pain:  A.  He will continue oxycodone as needed.     3.  Wound care:  A.  I again reviewed with the patient this is extremely important to assure wound cleanness.  B.  We will continue wound care with home health.  C.  Today, we requested imaging from home [facility] for updated picture this month.     4.  Depression:  A.  He will continue Cymbalta daily    5.  Treatment related itching  A.  Improved.  I will continue hydroxyzine.  Continue increased dose at 50 mg to see if this helps with itching.  I did again advise the patient and his son to be careful with sedation or dizziness and if this occurs to stop.  B.  I asked the patient to make sure he is keeping his skin hydrated with non perfumed lotions as well.    6. Treatment related hypothyroidism  A. Continue Synthroid 75 mcg.   B. I will follow-up on thyroid function from today.    7. Muscle pain  A.  I will add CK, LDH, sed rate, and CPK to plan to ensure he is not having myositis related to his Libtayo.  I have ordered the stat we will follow-up with those today.    FOLLOW UP: 3 weeks with cycle #14.    Yady Guajardo, APRN  2/29/2024

## 2024-03-20 ENCOUNTER — INFUSION (OUTPATIENT)
Dept: ONCOLOGY | Facility: HOSPITAL | Age: 79
End: 2024-03-20
Payer: MEDICARE

## 2024-03-20 ENCOUNTER — OFFICE VISIT (OUTPATIENT)
Dept: ONCOLOGY | Facility: CLINIC | Age: 79
End: 2024-03-20
Payer: MEDICARE

## 2024-03-20 VITALS
WEIGHT: 219 LBS | DIASTOLIC BLOOD PRESSURE: 74 MMHG | TEMPERATURE: 97.3 F | BODY MASS INDEX: 29.66 KG/M2 | OXYGEN SATURATION: 97 % | RESPIRATION RATE: 16 BRPM | HEIGHT: 72 IN | SYSTOLIC BLOOD PRESSURE: 160 MMHG | HEART RATE: 80 BPM

## 2024-03-20 DIAGNOSIS — C44.229 SQUAMOUS CELL CARCINOMA OF SKIN OF LEFT EAR AND EXTERNAL AURICULAR CANAL: Primary | ICD-10-CM

## 2024-03-20 DIAGNOSIS — Z51.81 ENCOUNTER FOR THERAPEUTIC DRUG MONITORING: Primary | ICD-10-CM

## 2024-03-20 DIAGNOSIS — C44.229 SQUAMOUS CELL CARCINOMA OF SKIN OF LEFT EAR AND EXTERNAL AURICULAR CANAL: ICD-10-CM

## 2024-03-20 DIAGNOSIS — Z51.81 ENCOUNTER FOR THERAPEUTIC DRUG MONITORING: ICD-10-CM

## 2024-03-20 DIAGNOSIS — R42 DIZZINESS: ICD-10-CM

## 2024-03-20 LAB
ALBUMIN SERPL-MCNC: 3.8 G/DL (ref 3.5–5.2)
ALBUMIN/GLOB SERPL: 1.5 G/DL
ALP SERPL-CCNC: 55 U/L (ref 39–117)
ALT SERPL W P-5'-P-CCNC: 12 U/L (ref 1–41)
ANION GAP SERPL CALCULATED.3IONS-SCNC: 8.2 MMOL/L (ref 5–15)
AST SERPL-CCNC: 21 U/L (ref 1–40)
BASOPHILS # BLD AUTO: 0.03 10*3/MM3 (ref 0–0.2)
BASOPHILS NFR BLD AUTO: 0.9 % (ref 0–1.5)
BILIRUB SERPL-MCNC: 0.8 MG/DL (ref 0–1.2)
BUN SERPL-MCNC: 15 MG/DL (ref 8–23)
BUN/CREAT SERPL: 11.1 (ref 7–25)
CALCIUM SPEC-SCNC: 9.2 MG/DL (ref 8.6–10.5)
CHLORIDE SERPL-SCNC: 100 MMOL/L (ref 98–107)
CO2 SERPL-SCNC: 24.8 MMOL/L (ref 22–29)
CREAT SERPL-MCNC: 1.35 MG/DL (ref 0.76–1.27)
DEPRECATED RDW RBC AUTO: 44 FL (ref 37–54)
EGFRCR SERPLBLD CKD-EPI 2021: 53.4 ML/MIN/1.73
EOSINOPHIL # BLD AUTO: 0.21 10*3/MM3 (ref 0–0.4)
EOSINOPHIL NFR BLD AUTO: 6.2 % (ref 0.3–6.2)
ERYTHROCYTE [DISTWIDTH] IN BLOOD BY AUTOMATED COUNT: 13.3 % (ref 12.3–15.4)
GLOBULIN UR ELPH-MCNC: 2.6 GM/DL
GLUCOSE SERPL-MCNC: 90 MG/DL (ref 65–99)
HCT VFR BLD AUTO: 31.1 % (ref 37.5–51)
HGB BLD-MCNC: 10.4 G/DL (ref 13–17.7)
IMM GRANULOCYTES # BLD AUTO: 0.03 10*3/MM3 (ref 0–0.05)
IMM GRANULOCYTES NFR BLD AUTO: 0.9 % (ref 0–0.5)
LYMPHOCYTES # BLD AUTO: 0.78 10*3/MM3 (ref 0.7–3.1)
LYMPHOCYTES NFR BLD AUTO: 23.1 % (ref 19.6–45.3)
MCH RBC QN AUTO: 30 PG (ref 26.6–33)
MCHC RBC AUTO-ENTMCNC: 33.4 G/DL (ref 31.5–35.7)
MCV RBC AUTO: 89.6 FL (ref 79–97)
MONOCYTES # BLD AUTO: 0.26 10*3/MM3 (ref 0.1–0.9)
MONOCYTES NFR BLD AUTO: 7.7 % (ref 5–12)
NEUTROPHILS NFR BLD AUTO: 2.07 10*3/MM3 (ref 1.7–7)
NEUTROPHILS NFR BLD AUTO: 61.2 % (ref 42.7–76)
NRBC BLD AUTO-RTO: 0 /100 WBC (ref 0–0.2)
PLATELET # BLD AUTO: 192 10*3/MM3 (ref 140–450)
PMV BLD AUTO: 9.3 FL (ref 6–12)
POTASSIUM SERPL-SCNC: 4.5 MMOL/L (ref 3.5–5.2)
PROT SERPL-MCNC: 6.4 G/DL (ref 6–8.5)
RBC # BLD AUTO: 3.47 10*6/MM3 (ref 4.14–5.8)
SODIUM SERPL-SCNC: 133 MMOL/L (ref 136–145)
T4 FREE SERPL-MCNC: 0.85 NG/DL (ref 0.93–1.7)
TSH SERPL DL<=0.05 MIU/L-ACNC: 6.15 UIU/ML (ref 0.27–4.2)
WBC NRBC COR # BLD AUTO: 3.38 10*3/MM3 (ref 3.4–10.8)

## 2024-03-20 PROCEDURE — 96413 CHEMO IV INFUSION 1 HR: CPT

## 2024-03-20 PROCEDURE — 84443 ASSAY THYROID STIM HORMONE: CPT

## 2024-03-20 PROCEDURE — 36415 COLL VENOUS BLD VENIPUNCTURE: CPT

## 2024-03-20 PROCEDURE — 96365 THER/PROPH/DIAG IV INF INIT: CPT

## 2024-03-20 PROCEDURE — 84439 ASSAY OF FREE THYROXINE: CPT

## 2024-03-20 PROCEDURE — 85025 COMPLETE CBC W/AUTO DIFF WBC: CPT

## 2024-03-20 PROCEDURE — 80053 COMPREHEN METABOLIC PANEL: CPT

## 2024-03-20 PROCEDURE — 25010000002 CEMIPLIMAB-RWLC 350 MG/7ML SOLUTION 7 ML VIAL: Performed by: NURSE PRACTITIONER

## 2024-03-20 RX ORDER — SODIUM CHLORIDE 9 MG/ML
250 INJECTION, SOLUTION INTRAVENOUS ONCE
Status: DISCONTINUED | OUTPATIENT
Start: 2024-03-20 | End: 2024-03-20 | Stop reason: HOSPADM

## 2024-03-20 RX ORDER — SODIUM CHLORIDE 9 MG/ML
250 INJECTION, SOLUTION INTRAVENOUS ONCE
Status: CANCELLED | OUTPATIENT
Start: 2024-03-21

## 2024-03-20 RX ADMIN — CEMIPLIMAB-RWLC 350 MG: 50 INJECTION INTRAVENOUS at 11:05

## 2024-03-20 NOTE — PROGRESS NOTES
DATE OF VISIT: 3/20/2024    REASON FOR VISIT: Followup for locally advanced squamous cell carcinoma of the left ear     PROBLEM LIST:  1. 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  Presented with gradually growing mass over the left ear for the last 3 years.  B.  Patient did not seek any medical attention until April 2023.  C.  CT neck and chest done at  April 2023 confirmed locally advanced lesion eroding into the left mastoid with left cervical lymphadenopathy but no evidence of chest metastases.  D.  Biopsy done April 2023 confirmed squamous cell carcinoma.  E.  Status post palliative radiation completed April 28, 2023  F.  Started immunotherapy with Libtayo 350 mg IV every 3 weeks May 22, 2023.  Status post 14 cycles.  2.  Cancer-related pain  3.  Mild depression  4.  BPH    HISTORY OF PRESENT ILLNESS: The patient is a very pleasant 78 y.o. male with past medical history significant for locally advanced left ear squamous cell carcinoma diagnosed April 2023.  The patient was started on Libtayo May 16, 2023. The patient is here today for scheduled follow-up visit with treatment cycle #15.    SUBJECTIVE: Peter is here today by himself.  Overall he is doing fairly well.  He says his ear is getting better and better.  He is happy with how it looks.  However he continues to have some dizziness.  He saw his primary care doctor who remove wax and that has not helped.      Past History:  Medical History: has a past medical history of Anemia, Arthritis, and Cancer.   Surgical History: has a past surgical history that includes Skin cancer excision; Neck dissection (N/A); Parotidectomy (N/A); and NAIL BIOPSY (N/A).   Family History: family history includes Cancer in his father and mother.   Social History: reports that he has never smoked. His smokeless tobacco use includes chew. He reports that he does not currently use alcohol. He reports that he does not use drugs.    (Not in a  "hospital admission)     Allergies: Patient has no known allergies.     Review of Systems   Constitutional:  Positive for fatigue.   Respiratory: Negative.     Gastrointestinal: Negative.    Skin:  Positive for wound.   Psychiatric/Behavioral: Negative.         PHYSICAL EXAMINATION:   /74   Pulse 80   Temp 97.3 °F (36.3 °C)   Resp 16   Ht 182.9 cm (72\")   Wt 99.3 kg (219 lb)   SpO2 97%   BMI 29.70 kg/m²    Pain Score    03/20/24 0918   PainSc: 0-No pain                                 ECOG Performance Status: 1 - Symptomatic but completely ambulatory      General Appearance:      alert, cooperative, no apparent distress and appears stated age   Lungs:   Clear to auscultation bilaterally; respirations regular, even, and unlabored bilaterally   Heart:  Regular rate and rhythm, no murmurs appreciated   Abdomen:   Soft, non-tender, and non-distended                 No visits with results within 2 Week(s) from this visit.   Latest known visit with results is:   Infusion on 02/29/2024   Component Date Value Ref Range Status    Glucose 02/29/2024 92  65 - 99 mg/dL Final    BUN 02/29/2024 19  8 - 23 mg/dL Final    Creatinine 02/29/2024 1.35 (H)  0.76 - 1.27 mg/dL Final    Sodium 02/29/2024 135 (L)  136 - 145 mmol/L Final    Potassium 02/29/2024 4.6  3.5 - 5.2 mmol/L Final    Chloride 02/29/2024 100  98 - 107 mmol/L Final    CO2 02/29/2024 24.0  22.0 - 29.0 mmol/L Final    Calcium 02/29/2024 9.5  8.6 - 10.5 mg/dL Final    Total Protein 02/29/2024 6.8  6.0 - 8.5 g/dL Final    Albumin 02/29/2024 4.2  3.5 - 5.2 g/dL Final    ALT (SGPT) 02/29/2024 10  1 - 41 U/L Final    AST (SGOT) 02/29/2024 22  1 - 40 U/L Final    Alkaline Phosphatase 02/29/2024 54  39 - 117 U/L Final    Total Bilirubin 02/29/2024 0.8  0.0 - 1.2 mg/dL Final    Globulin 02/29/2024 2.6  gm/dL Final    A/G Ratio 02/29/2024 1.6  g/dL Final    BUN/Creatinine Ratio 02/29/2024 14.1  7.0 - 25.0 Final    Anion Gap 02/29/2024 11.0  5.0 - 15.0 mmol/L Final    " eGFR 02/29/2024 53.4 (L)  >60.0 mL/min/1.73 Final    WBC 02/29/2024 3.54  3.40 - 10.80 10*3/mm3 Final    RBC 02/29/2024 3.66 (L)  4.14 - 5.80 10*6/mm3 Final    Hemoglobin 02/29/2024 11.2 (L)  13.0 - 17.7 g/dL Final    Hematocrit 02/29/2024 32.9 (L)  37.5 - 51.0 % Final    MCV 02/29/2024 89.9  79.0 - 97.0 fL Final    MCH 02/29/2024 30.6  26.6 - 33.0 pg Final    MCHC 02/29/2024 34.0  31.5 - 35.7 g/dL Final    RDW 02/29/2024 13.2  12.3 - 15.4 % Final    RDW-SD 02/29/2024 43.7  37.0 - 54.0 fl Final    MPV 02/29/2024 8.9  6.0 - 12.0 fL Final    Platelets 02/29/2024 185  140 - 450 10*3/mm3 Final    Neutrophil % 02/29/2024 62.2  42.7 - 76.0 % Final    Lymphocyte % 02/29/2024 22.6  19.6 - 45.3 % Final    Monocyte % 02/29/2024 9.9  5.0 - 12.0 % Final    Eosinophil % 02/29/2024 3.4  0.3 - 6.2 % Final    Basophil % 02/29/2024 0.8  0.0 - 1.5 % Final    Immature Grans % 02/29/2024 1.1 (H)  0.0 - 0.5 % Final    Neutrophils, Absolute 02/29/2024 2.20  1.70 - 7.00 10*3/mm3 Final    Lymphocytes, Absolute 02/29/2024 0.80  0.70 - 3.10 10*3/mm3 Final    Monocytes, Absolute 02/29/2024 0.35  0.10 - 0.90 10*3/mm3 Final    Eosinophils, Absolute 02/29/2024 0.12  0.00 - 0.40 10*3/mm3 Final    Basophils, Absolute 02/29/2024 0.03  0.00 - 0.20 10*3/mm3 Final    Immature Grans, Absolute 02/29/2024 0.04  0.00 - 0.05 10*3/mm3 Final    nRBC 02/29/2024 0.0  0.0 - 0.2 /100 WBC Final    TSH 02/29/2024 5.050 (H)  0.270 - 4.200 uIU/mL Final    Free T4 02/29/2024 0.86 (L)  0.93 - 1.70 ng/dL Final    Creatine Kinase 02/29/2024 118  20 - 200 U/L Final    LDH 02/29/2024 151  135 - 225 U/L Final    Sed Rate 02/29/2024 26 (H)  0 - 20 mm/hr Final    C-Reactive Protein 02/29/2024 2.03 (H)  0.00 - 0.50 mg/dL Final        No results found.  05/23/23 03/20/24      ASSESSMENT: The patient is a very pleasant 78 y.o. male  with locally advanced left ear squamous cell carcinoma      PLAN:    1.  Locally advanced squamous cell carcinoma of the left ear T4 N1 M0 stage  IV:  A.  I will proceed with treatment as scheduled today Libtayo IV every 3 weeks cycle #15.  B.  The patient will follow-up with us in 3 weeks for cycle #16.  C.  I will continue to monitor the patient blood work including blood counts kidney function liver function and electrolytes.  D.  I will continue to hold off on doing imaging.  We will follow patient clinically for response.  Clinically, patient continues to have response.  E.  I again reviewed with the patient the goal of treatment is palliative given his locally advanced disease with ipsilateral lymph node metastases.  F. We reviewed potential side effects of immunotherapy including but not limited to immune mediated reactions with thyroiditis, pneumonitis, hepatitis, colitis, rash, and electrolyte abnormalities, fatigue, multiorgan failure, and possibly death.  G.  I did go over the blood results with the patient from February 29, 2024.  His hemoglobin is stable at 11.2.  Creatinine stable at 1.35.    2. Cancer-related pain:  A.  He will continue oxycodone as needed.     3.  Wound care:  A.  I reviewed with the patient this is extremely important to assure wound cleanness.  B.  We will continue wound care with home health.  C.  Wound reviewed today images below.     4.  Depression:  A.  He will continue Cymbalta daily    5.  Treatment related itching  A.  Improved.  I will continue hydroxyzine.  Continue increased dose at 50 mg to see if this helps with itching.  I did again advise the patient and his son to be careful with sedation or dizziness and if this occurs to stop.  B.  I asked the patient to make sure he is keeping his skin hydrated with non perfumed lotions as well.    6. Treatment related hypothyroidism  A. Continue Synthroid 75 mcg.   B. I will follow-up on thyroid function from today.    7. Muscle pain  A.  I will add CK, LDH, sed rate, and CPK to plan to ensure he is not having myositis related to his Libtayo.  I have ordered the stat we  will follow-up with those today.    FOLLOW UP: 3 weeks with cycle #16.    Yady Guajardo, APRN  3/20/2024

## 2024-04-10 ENCOUNTER — INFUSION (OUTPATIENT)
Dept: ONCOLOGY | Facility: HOSPITAL | Age: 79
End: 2024-04-10
Payer: MEDICARE

## 2024-04-10 ENCOUNTER — OFFICE VISIT (OUTPATIENT)
Dept: ONCOLOGY | Facility: CLINIC | Age: 79
End: 2024-04-10
Payer: MEDICARE

## 2024-04-10 VITALS
SYSTOLIC BLOOD PRESSURE: 141 MMHG | WEIGHT: 216 LBS | OXYGEN SATURATION: 97 % | BODY MASS INDEX: 29.26 KG/M2 | TEMPERATURE: 98 F | DIASTOLIC BLOOD PRESSURE: 81 MMHG | HEART RATE: 99 BPM | HEIGHT: 72 IN

## 2024-04-10 DIAGNOSIS — C44.229 SQUAMOUS CELL CARCINOMA OF SKIN OF LEFT EAR AND EXTERNAL AURICULAR CANAL: ICD-10-CM

## 2024-04-10 DIAGNOSIS — C44.229 SQUAMOUS CELL CARCINOMA OF SKIN OF LEFT EAR AND EXTERNAL AURICULAR CANAL: Primary | ICD-10-CM

## 2024-04-10 DIAGNOSIS — Z51.81 ENCOUNTER FOR THERAPEUTIC DRUG MONITORING: Primary | ICD-10-CM

## 2024-04-10 LAB
ALBUMIN SERPL-MCNC: 4.1 G/DL (ref 3.5–5.2)
ALBUMIN/GLOB SERPL: 1.5 G/DL
ALP SERPL-CCNC: 62 U/L (ref 39–117)
ALT SERPL W P-5'-P-CCNC: 11 U/L (ref 1–41)
ANION GAP SERPL CALCULATED.3IONS-SCNC: 10.5 MMOL/L (ref 5–15)
AST SERPL-CCNC: 21 U/L (ref 1–40)
BASOPHILS # BLD AUTO: 0.03 10*3/MM3 (ref 0–0.2)
BASOPHILS NFR BLD AUTO: 0.7 % (ref 0–1.5)
BILIRUB SERPL-MCNC: 1 MG/DL (ref 0–1.2)
BUN SERPL-MCNC: 13 MG/DL (ref 8–23)
BUN/CREAT SERPL: 8.4 (ref 7–25)
CALCIUM SPEC-SCNC: 9.5 MG/DL (ref 8.6–10.5)
CHLORIDE SERPL-SCNC: 98 MMOL/L (ref 98–107)
CO2 SERPL-SCNC: 24.5 MMOL/L (ref 22–29)
CREAT SERPL-MCNC: 1.55 MG/DL (ref 0.76–1.27)
DEPRECATED RDW RBC AUTO: 43.8 FL (ref 37–54)
EGFRCR SERPLBLD CKD-EPI 2021: 45 ML/MIN/1.73
EOSINOPHIL # BLD AUTO: 0.21 10*3/MM3 (ref 0–0.4)
EOSINOPHIL NFR BLD AUTO: 4.9 % (ref 0.3–6.2)
ERYTHROCYTE [DISTWIDTH] IN BLOOD BY AUTOMATED COUNT: 13.4 % (ref 12.3–15.4)
GLOBULIN UR ELPH-MCNC: 2.8 GM/DL
GLUCOSE SERPL-MCNC: 93 MG/DL (ref 65–99)
HCT VFR BLD AUTO: 32.7 % (ref 37.5–51)
HGB BLD-MCNC: 11 G/DL (ref 13–17.7)
IMM GRANULOCYTES # BLD AUTO: 0.03 10*3/MM3 (ref 0–0.05)
IMM GRANULOCYTES NFR BLD AUTO: 0.7 % (ref 0–0.5)
LYMPHOCYTES # BLD AUTO: 1.27 10*3/MM3 (ref 0.7–3.1)
LYMPHOCYTES NFR BLD AUTO: 29.5 % (ref 19.6–45.3)
MCH RBC QN AUTO: 29.7 PG (ref 26.6–33)
MCHC RBC AUTO-ENTMCNC: 33.6 G/DL (ref 31.5–35.7)
MCV RBC AUTO: 88.4 FL (ref 79–97)
MONOCYTES # BLD AUTO: 0.44 10*3/MM3 (ref 0.1–0.9)
MONOCYTES NFR BLD AUTO: 10.2 % (ref 5–12)
NEUTROPHILS NFR BLD AUTO: 2.33 10*3/MM3 (ref 1.7–7)
NEUTROPHILS NFR BLD AUTO: 54 % (ref 42.7–76)
NRBC BLD AUTO-RTO: 0 /100 WBC (ref 0–0.2)
PLATELET # BLD AUTO: 207 10*3/MM3 (ref 140–450)
PMV BLD AUTO: 9.1 FL (ref 6–12)
POTASSIUM SERPL-SCNC: 4.9 MMOL/L (ref 3.5–5.2)
PROT SERPL-MCNC: 6.9 G/DL (ref 6–8.5)
RBC # BLD AUTO: 3.7 10*6/MM3 (ref 4.14–5.8)
SODIUM SERPL-SCNC: 133 MMOL/L (ref 136–145)
WBC NRBC COR # BLD AUTO: 4.31 10*3/MM3 (ref 3.4–10.8)

## 2024-04-10 PROCEDURE — 25010000002 CEMIPLIMAB-RWLC 350 MG/7ML SOLUTION 7 ML VIAL: Performed by: INTERNAL MEDICINE

## 2024-04-10 PROCEDURE — 80053 COMPREHEN METABOLIC PANEL: CPT

## 2024-04-10 PROCEDURE — 99214 OFFICE O/P EST MOD 30 MIN: CPT | Performed by: INTERNAL MEDICINE

## 2024-04-10 PROCEDURE — 96365 THER/PROPH/DIAG IV INF INIT: CPT

## 2024-04-10 PROCEDURE — 96413 CHEMO IV INFUSION 1 HR: CPT

## 2024-04-10 PROCEDURE — 36415 COLL VENOUS BLD VENIPUNCTURE: CPT

## 2024-04-10 PROCEDURE — 1125F AMNT PAIN NOTED PAIN PRSNT: CPT | Performed by: INTERNAL MEDICINE

## 2024-04-10 PROCEDURE — 85025 COMPLETE CBC W/AUTO DIFF WBC: CPT

## 2024-04-10 RX ORDER — SODIUM CHLORIDE 9 MG/ML
250 INJECTION, SOLUTION INTRAVENOUS ONCE
Status: CANCELLED | OUTPATIENT
Start: 2024-04-11

## 2024-04-10 RX ORDER — SODIUM CHLORIDE 9 MG/ML
250 INJECTION, SOLUTION INTRAVENOUS ONCE
Status: DISCONTINUED | OUTPATIENT
Start: 2024-04-10 | End: 2024-04-10 | Stop reason: HOSPADM

## 2024-04-10 RX ADMIN — CEMIPLIMAB-RWLC 350 MG: 50 INJECTION INTRAVENOUS at 10:12

## 2024-04-10 NOTE — PROGRESS NOTES
DATE OF VISIT: 4/10/2024    REASON FOR VISIT: Followup for locally advanced squamous cell carcinoma of the left ear     PROBLEM LIST:  1. 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  Presented with gradually growing mass over the left ear for the last 3 years.  B.  Patient did not seek any medical attention until April 2023.  C.  CT neck and chest done at  April 2023 confirmed locally advanced lesion eroding into the left mastoid with left cervical lymphadenopathy but no evidence of chest metastases.  D.  Biopsy done April 2023 confirmed squamous cell carcinoma.  E.  Status post palliative radiation completed April 28, 2023  F.  Started immunotherapy with Libtayo 350 mg IV every 3 weeks May 22, 2023.  Status post 14 cycles.  2.  Cancer-related pain  3.  Mild depression  4.  BPH    HISTORY OF PRESENT ILLNESS: The patient is a very pleasant 79 y.o. male with past medical history significant for locally advanced left ear squamous cell carcinoma diagnosed April 2023.  The patient was started on Libtayo May 16, 2023. The patient is here today for scheduled follow-up visit with treatment cycle #16.    SUBJECTIVE: Peter is here today by himself.  He is doing fairly well.  Denies any fever chills or night sweats.  All in all he is doing good.    Past History:  Medical History: has a past medical history of Anemia, Arthritis, and Cancer.   Surgical History: has a past surgical history that includes Skin cancer excision; Neck dissection (N/A); Parotidectomy (N/A); and NAIL BIOPSY (N/A).   Family History: family history includes Cancer in his father and mother.   Social History: reports that he has never smoked. His smokeless tobacco use includes chew. He reports that he does not currently use alcohol. He reports that he does not use drugs.    (Not in a hospital admission)     Allergies: Patient has no known allergies.     Review of Systems   Constitutional:  Positive for fatigue.  "  Respiratory: Negative.     Gastrointestinal: Negative.    Skin:  Positive for wound.   Psychiatric/Behavioral: Negative.         PHYSICAL EXAMINATION:   /81   Pulse 99   Temp 98 °F (36.7 °C)   Ht 182.9 cm (72\")   Wt 98 kg (216 lb)   SpO2 97%   BMI 29.29 kg/m²    Pain Score    04/10/24 0918   PainSc: 10-Worst pain ever   PainLoc: Shoulder                                 ECOG Performance Status: 1 - Symptomatic but completely ambulatory      General Appearance:      alert, cooperative, no apparent distress and appears stated age   Lungs:   Clear to auscultation bilaterally; respirations regular, even, and unlabored bilaterally   Heart:  Regular rate and rhythm, no murmurs appreciated   Abdomen:   Soft, non-tender, and non-distended                 Infusion on 04/10/2024   Component Date Value Ref Range Status    WBC 04/10/2024 4.31  3.40 - 10.80 10*3/mm3 Final    RBC 04/10/2024 3.70 (L)  4.14 - 5.80 10*6/mm3 Final    Hemoglobin 04/10/2024 11.0 (L)  13.0 - 17.7 g/dL Final    Hematocrit 04/10/2024 32.7 (L)  37.5 - 51.0 % Final    MCV 04/10/2024 88.4  79.0 - 97.0 fL Final    MCH 04/10/2024 29.7  26.6 - 33.0 pg Final    MCHC 04/10/2024 33.6  31.5 - 35.7 g/dL Final    RDW 04/10/2024 13.4  12.3 - 15.4 % Final    RDW-SD 04/10/2024 43.8  37.0 - 54.0 fl Final    MPV 04/10/2024 9.1  6.0 - 12.0 fL Final    Platelets 04/10/2024 207  140 - 450 10*3/mm3 Final    Neutrophil % 04/10/2024 54.0  42.7 - 76.0 % Final    Lymphocyte % 04/10/2024 29.5  19.6 - 45.3 % Final    Monocyte % 04/10/2024 10.2  5.0 - 12.0 % Final    Eosinophil % 04/10/2024 4.9  0.3 - 6.2 % Final    Basophil % 04/10/2024 0.7  0.0 - 1.5 % Final    Immature Grans % 04/10/2024 0.7 (H)  0.0 - 0.5 % Final    Neutrophils, Absolute 04/10/2024 2.33  1.70 - 7.00 10*3/mm3 Final    Lymphocytes, Absolute 04/10/2024 1.27  0.70 - 3.10 10*3/mm3 Final    Monocytes, Absolute 04/10/2024 0.44  0.10 - 0.90 10*3/mm3 Final    Eosinophils, Absolute 04/10/2024 0.21  0.00 - " 0.40 10*3/mm3 Final    Basophils, Absolute 04/10/2024 0.03  0.00 - 0.20 10*3/mm3 Final    Immature Grans, Absolute 04/10/2024 0.03  0.00 - 0.05 10*3/mm3 Final    nRBC 04/10/2024 0.0  0.0 - 0.2 /100 WBC Final        No results found.  05/23/23 03/20/24      ASSESSMENT: The patient is a very pleasant 79 y.o. male  with locally advanced left ear squamous cell carcinoma      PLAN:    1.  Locally advanced squamous cell carcinoma of the left ear T4 N1 M0 stage IV:  A.  I will proceed with treatment as scheduled today Libtayo IV every 3 weeks cycle #16.  B.  The patient will follow-up with us in 3 weeks for cycle #17.  C.  I will continue to monitor the patient blood work including blood counts kidney function liver function and electrolytes.  D.  I will continue to hold off on doing imaging.  We will follow patient clinically for response.  Clinically, patient continues to have response.  E.  I again reviewed with the patient the goal of treatment is palliative given his locally advanced disease with ipsilateral lymph node metastases.  F. We reviewed potential side effects of immunotherapy including but not limited to immune mediated reactions with thyroiditis, pneumonitis, hepatitis, colitis, rash, and electrolyte abnormalities, fatigue, multiorgan failure, and possibly death.  G.  I did go over the blood results with the patient from today.  His CBC revealed stable anemia hemoglobin 11.0.  Normal white cells and platelets.  I will follow-up on the CMP.    2. Cancer-related pain:  A.  He will continue oxycodone as needed.     3.  Wound care:  A.  I reviewed with the patient this is extremely important to assure wound cleanness.  B.  We will continue wound care with home health.  C.  Wound reviewed today images below.     4.  Depression:  A.  He will continue Cymbalta daily    5.  Treatment related itching  A.  Improved.  I will continue hydroxyzine.  Continue increased dose at 50 mg to see if this helps with itching.   I did again advise the patient and his son to be careful with sedation or dizziness and if this occurs to stop.  B.  I asked the patient to make sure he is keeping his skin hydrated with non perfumed lotions as well.    6. Treatment related hypothyroidism  A. Continue Synthroid 75 mcg.   B. I will follow-up on thyroid function from today.    7. Muscle pain  A.  I will add CK, LDH, sed rate, and CPK to plan to ensure he is not having myositis related to his Libtayo.  I have ordered the stat we will follow-up with those today.    FOLLOW UP: 3 weeks with cycle #17.    Benitez Medellin MD  4/10/2024

## 2024-05-01 ENCOUNTER — OFFICE VISIT (OUTPATIENT)
Dept: ONCOLOGY | Facility: CLINIC | Age: 79
End: 2024-05-01
Payer: MEDICARE

## 2024-05-01 ENCOUNTER — INFUSION (OUTPATIENT)
Dept: ONCOLOGY | Facility: HOSPITAL | Age: 79
End: 2024-05-01
Payer: MEDICARE

## 2024-05-01 VITALS
WEIGHT: 218 LBS | DIASTOLIC BLOOD PRESSURE: 76 MMHG | OXYGEN SATURATION: 98 % | HEIGHT: 72 IN | SYSTOLIC BLOOD PRESSURE: 144 MMHG | BODY MASS INDEX: 29.53 KG/M2 | TEMPERATURE: 97.1 F | HEART RATE: 85 BPM | RESPIRATION RATE: 16 BRPM

## 2024-05-01 DIAGNOSIS — C44.229 SQUAMOUS CELL CARCINOMA OF SKIN OF LEFT EAR AND EXTERNAL AURICULAR CANAL: Primary | ICD-10-CM

## 2024-05-01 DIAGNOSIS — R42 DIZZINESS: ICD-10-CM

## 2024-05-01 DIAGNOSIS — C44.229 SQUAMOUS CELL CARCINOMA OF SKIN OF LEFT EAR AND EXTERNAL AURICULAR CANAL: ICD-10-CM

## 2024-05-01 DIAGNOSIS — Z51.81 ENCOUNTER FOR THERAPEUTIC DRUG MONITORING: Primary | ICD-10-CM

## 2024-05-01 DIAGNOSIS — Z51.81 ENCOUNTER FOR THERAPEUTIC DRUG MONITORING: ICD-10-CM

## 2024-05-01 LAB
ALBUMIN SERPL-MCNC: 3.9 G/DL (ref 3.5–5.2)
ALBUMIN/GLOB SERPL: 1.3 G/DL
ALP SERPL-CCNC: 59 U/L (ref 39–117)
ALT SERPL W P-5'-P-CCNC: 11 U/L (ref 1–41)
ANION GAP SERPL CALCULATED.3IONS-SCNC: 10.3 MMOL/L (ref 5–15)
AST SERPL-CCNC: 22 U/L (ref 1–40)
BASOPHILS # BLD AUTO: 0.03 10*3/MM3 (ref 0–0.2)
BASOPHILS NFR BLD AUTO: 0.8 % (ref 0–1.5)
BILIRUB SERPL-MCNC: 0.9 MG/DL (ref 0–1.2)
BUN SERPL-MCNC: 15 MG/DL (ref 8–23)
BUN/CREAT SERPL: 11 (ref 7–25)
CALCIUM SPEC-SCNC: 9.7 MG/DL (ref 8.6–10.5)
CHLORIDE SERPL-SCNC: 98 MMOL/L (ref 98–107)
CK SERPL-CCNC: 104 U/L (ref 20–200)
CO2 SERPL-SCNC: 23.7 MMOL/L (ref 22–29)
CREAT SERPL-MCNC: 1.36 MG/DL (ref 0.76–1.27)
DEPRECATED RDW RBC AUTO: 44.8 FL (ref 37–54)
EGFRCR SERPLBLD CKD-EPI 2021: 52.6 ML/MIN/1.73
EOSINOPHIL # BLD AUTO: 0.18 10*3/MM3 (ref 0–0.4)
EOSINOPHIL NFR BLD AUTO: 4.8 % (ref 0.3–6.2)
ERYTHROCYTE [DISTWIDTH] IN BLOOD BY AUTOMATED COUNT: 13.7 % (ref 12.3–15.4)
ERYTHROCYTE [SEDIMENTATION RATE] IN BLOOD: 22 MM/HR (ref 0–20)
GLOBULIN UR ELPH-MCNC: 3 GM/DL
GLUCOSE SERPL-MCNC: 89 MG/DL (ref 65–99)
HCT VFR BLD AUTO: 32.1 % (ref 37.5–51)
HGB BLD-MCNC: 10.8 G/DL (ref 13–17.7)
IMM GRANULOCYTES # BLD AUTO: 0.02 10*3/MM3 (ref 0–0.05)
IMM GRANULOCYTES NFR BLD AUTO: 0.5 % (ref 0–0.5)
LDH SERPL-CCNC: 155 U/L (ref 135–225)
LYMPHOCYTES # BLD AUTO: 0.93 10*3/MM3 (ref 0.7–3.1)
LYMPHOCYTES NFR BLD AUTO: 24.8 % (ref 19.6–45.3)
MCH RBC QN AUTO: 30 PG (ref 26.6–33)
MCHC RBC AUTO-ENTMCNC: 33.6 G/DL (ref 31.5–35.7)
MCV RBC AUTO: 89.2 FL (ref 79–97)
MONOCYTES # BLD AUTO: 0.41 10*3/MM3 (ref 0.1–0.9)
MONOCYTES NFR BLD AUTO: 10.9 % (ref 5–12)
NEUTROPHILS NFR BLD AUTO: 2.18 10*3/MM3 (ref 1.7–7)
NEUTROPHILS NFR BLD AUTO: 58.2 % (ref 42.7–76)
NRBC BLD AUTO-RTO: 0 /100 WBC (ref 0–0.2)
PLATELET # BLD AUTO: 187 10*3/MM3 (ref 140–450)
PMV BLD AUTO: 8.8 FL (ref 6–12)
POTASSIUM SERPL-SCNC: 4.9 MMOL/L (ref 3.5–5.2)
PROT SERPL-MCNC: 6.9 G/DL (ref 6–8.5)
RBC # BLD AUTO: 3.6 10*6/MM3 (ref 4.14–5.8)
SODIUM SERPL-SCNC: 132 MMOL/L (ref 136–145)
T4 FREE SERPL-MCNC: 0.96 NG/DL (ref 0.93–1.7)
TSH SERPL DL<=0.05 MIU/L-ACNC: 8.38 UIU/ML (ref 0.27–4.2)
WBC NRBC COR # BLD AUTO: 3.75 10*3/MM3 (ref 3.4–10.8)

## 2024-05-01 PROCEDURE — 85025 COMPLETE CBC W/AUTO DIFF WBC: CPT

## 2024-05-01 PROCEDURE — 84443 ASSAY THYROID STIM HORMONE: CPT

## 2024-05-01 PROCEDURE — 85652 RBC SED RATE AUTOMATED: CPT

## 2024-05-01 PROCEDURE — 36415 COLL VENOUS BLD VENIPUNCTURE: CPT

## 2024-05-01 PROCEDURE — 96413 CHEMO IV INFUSION 1 HR: CPT

## 2024-05-01 PROCEDURE — 83615 LACTATE (LD) (LDH) ENZYME: CPT

## 2024-05-01 PROCEDURE — 82550 ASSAY OF CK (CPK): CPT

## 2024-05-01 PROCEDURE — 25010000002 CEMIPLIMAB-RWLC 350 MG/7ML SOLUTION 7 ML VIAL: Performed by: NURSE PRACTITIONER

## 2024-05-01 PROCEDURE — 84439 ASSAY OF FREE THYROXINE: CPT

## 2024-05-01 PROCEDURE — 80053 COMPREHEN METABOLIC PANEL: CPT

## 2024-05-01 PROCEDURE — 1125F AMNT PAIN NOTED PAIN PRSNT: CPT | Performed by: NURSE PRACTITIONER

## 2024-05-01 RX ORDER — SODIUM CHLORIDE 9 MG/ML
250 INJECTION, SOLUTION INTRAVENOUS ONCE
Status: DISCONTINUED | OUTPATIENT
Start: 2024-05-01 | End: 2024-05-01 | Stop reason: HOSPADM

## 2024-05-01 RX ORDER — SODIUM CHLORIDE 9 MG/ML
250 INJECTION, SOLUTION INTRAVENOUS ONCE
Status: CANCELLED | OUTPATIENT
Start: 2024-05-02

## 2024-05-01 RX ADMIN — CEMIPLIMAB-RWLC 350 MG: 50 INJECTION INTRAVENOUS at 11:28

## 2024-05-01 NOTE — PROGRESS NOTES
DATE OF VISIT: 5/1/2024    REASON FOR VISIT: Followup for locally advanced squamous cell carcinoma of the left ear     PROBLEM LIST:  1. 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  Presented with gradually growing mass over the left ear for the last 3 years.  B.  Patient did not seek any medical attention until April 2023.  C.  CT neck and chest done at  April 2023 confirmed locally advanced lesion eroding into the left mastoid with left cervical lymphadenopathy but no evidence of chest metastases.  D.  Biopsy done April 2023 confirmed squamous cell carcinoma.  E.  Status post palliative radiation completed April 28, 2023  F.  Started immunotherapy with Libtayo 350 mg IV every 3 weeks May 22, 2023.  Status post 14 cycles.  2.  Cancer-related pain  3.  Mild depression  4.  BPH    HISTORY OF PRESENT ILLNESS: The patient is a very pleasant 79 y.o. male with past medical history significant for locally advanced left ear squamous cell carcinoma diagnosed April 2023.  The patient was started on Libtayo May 16, 2023. The patient is here today for scheduled follow-up visit with treatment cycle #17.    SUBJECTIVE: Peter is here today by himself.  His son is in the waiting room.  Overall he is doing fairly well.  Denies any fever, chills, sweats.  Overall he is doing well and he is very happy with how his skin is looking by his ear.  He still covers it but there is very little scab left at this point.        Past History:  Medical History: has a past medical history of Anemia, Arthritis, and Cancer.   Surgical History: has a past surgical history that includes Skin cancer excision; Neck dissection (N/A); Parotidectomy (N/A); and NAIL BIOPSY (N/A).   Family History: family history includes Cancer in his father and mother.   Social History: reports that he has never smoked. His smokeless tobacco use includes chew. He reports that he does not currently use alcohol. He reports that he does not  use drugs.    (Not in a hospital admission)     Allergies: Patient has no known allergies.     Review of Systems   Constitutional:  Positive for fatigue.   Respiratory: Negative.     Gastrointestinal: Negative.    Skin:  Positive for wound.   Psychiatric/Behavioral: Negative.         PHYSICAL EXAMINATION:   There were no vitals taken for this visit.   There were no vitals filed for this visit.                                ECOG Performance Status: 1 - Symptomatic but completely ambulatory      General Appearance:      alert, cooperative, no apparent distress and appears stated age   Lungs:   Clear to auscultation bilaterally; respirations regular, even, and unlabored bilaterally   Heart:  Regular rate and rhythm, no murmurs appreciated   Abdomen:   Soft, non-tender, and non-distended                 No visits with results within 2 Week(s) from this visit.   Latest known visit with results is:   Infusion on 04/10/2024   Component Date Value Ref Range Status    Glucose 04/10/2024 93  65 - 99 mg/dL Final    BUN 04/10/2024 13  8 - 23 mg/dL Final    Creatinine 04/10/2024 1.55 (H)  0.76 - 1.27 mg/dL Final    Sodium 04/10/2024 133 (L)  136 - 145 mmol/L Final    Potassium 04/10/2024 4.9  3.5 - 5.2 mmol/L Final    Chloride 04/10/2024 98  98 - 107 mmol/L Final    CO2 04/10/2024 24.5  22.0 - 29.0 mmol/L Final    Calcium 04/10/2024 9.5  8.6 - 10.5 mg/dL Final    Total Protein 04/10/2024 6.9  6.0 - 8.5 g/dL Final    Albumin 04/10/2024 4.1  3.5 - 5.2 g/dL Final    ALT (SGPT) 04/10/2024 11  1 - 41 U/L Final    AST (SGOT) 04/10/2024 21  1 - 40 U/L Final    Alkaline Phosphatase 04/10/2024 62  39 - 117 U/L Final    Total Bilirubin 04/10/2024 1.0  0.0 - 1.2 mg/dL Final    Globulin 04/10/2024 2.8  gm/dL Final    A/G Ratio 04/10/2024 1.5  g/dL Final    BUN/Creatinine Ratio 04/10/2024 8.4  7.0 - 25.0 Final    Anion Gap 04/10/2024 10.5  5.0 - 15.0 mmol/L Final    eGFR 04/10/2024 45.0 (L)  >60.0 mL/min/1.73 Final    WBC 04/10/2024 4.31   3.40 - 10.80 10*3/mm3 Final    RBC 04/10/2024 3.70 (L)  4.14 - 5.80 10*6/mm3 Final    Hemoglobin 04/10/2024 11.0 (L)  13.0 - 17.7 g/dL Final    Hematocrit 04/10/2024 32.7 (L)  37.5 - 51.0 % Final    MCV 04/10/2024 88.4  79.0 - 97.0 fL Final    MCH 04/10/2024 29.7  26.6 - 33.0 pg Final    MCHC 04/10/2024 33.6  31.5 - 35.7 g/dL Final    RDW 04/10/2024 13.4  12.3 - 15.4 % Final    RDW-SD 04/10/2024 43.8  37.0 - 54.0 fl Final    MPV 04/10/2024 9.1  6.0 - 12.0 fL Final    Platelets 04/10/2024 207  140 - 450 10*3/mm3 Final    Neutrophil % 04/10/2024 54.0  42.7 - 76.0 % Final    Lymphocyte % 04/10/2024 29.5  19.6 - 45.3 % Final    Monocyte % 04/10/2024 10.2  5.0 - 12.0 % Final    Eosinophil % 04/10/2024 4.9  0.3 - 6.2 % Final    Basophil % 04/10/2024 0.7  0.0 - 1.5 % Final    Immature Grans % 04/10/2024 0.7 (H)  0.0 - 0.5 % Final    Neutrophils, Absolute 04/10/2024 2.33  1.70 - 7.00 10*3/mm3 Final    Lymphocytes, Absolute 04/10/2024 1.27  0.70 - 3.10 10*3/mm3 Final    Monocytes, Absolute 04/10/2024 0.44  0.10 - 0.90 10*3/mm3 Final    Eosinophils, Absolute 04/10/2024 0.21  0.00 - 0.40 10*3/mm3 Final    Basophils, Absolute 04/10/2024 0.03  0.00 - 0.20 10*3/mm3 Final    Immature Grans, Absolute 04/10/2024 0.03  0.00 - 0.05 10*3/mm3 Final    nRBC 04/10/2024 0.0  0.0 - 0.2 /100 WBC Final        No results found.  05/23/23 05/01/2024      ASSESSMENT: The patient is a very pleasant 79 y.o. male  with locally advanced left ear squamous cell carcinoma      PLAN:    1.  Locally advanced squamous cell carcinoma of the left ear T4 N1 M0 stage IV:  A.  I will proceed with treatment as scheduled today Libtayo IV every 3 weeks cycle #17.  B.  The patient will follow-up with us in 3 weeks for cycle #18.  C.  I will continue to monitor the patient blood work including blood counts kidney function liver function and electrolytes.  D.  I will continue to hold off on doing imaging.  We will follow patient clinically for response.   Clinically, patient continues to have response.  E.  I again reviewed with the patient the goal of treatment is palliative given his locally advanced disease with ipsilateral lymph node metastases.  F. We reviewed potential side effects of immunotherapy including but not limited to immune mediated reactions with thyroiditis, pneumonitis, hepatitis, colitis, rash, and electrolyte abnormalities, fatigue, multiorgan failure, and possibly death.  G.  I did go over the blood results with the patient from today.  His CBC revealed stable anemia hemoglobin 11.0.  Normal white cells and platelets.  I will follow-up on the CMP.    2. Cancer-related pain:  A.  He will continue oxycodone as needed.     3.  Wound care:  A.  I reviewed with the patient this is extremely important to assure wound cleanness.  B.  We will continue wound care as needed.  C.  Wound reviewed today images below.     4.  Depression:  A.  He will continue Cymbalta daily    5.  Treatment related itching  A. Stable.  I will continue hydroxyzine.  Continue increased dose at 50 mg to see if this helps with itching.  I I again dvise the patient and his son to be careful with sedation or dizziness and if this occurs to stop.  Recommended he follow-up with ENT.  B.  I asked the patient to make sure he is keeping his skin hydrated with non perfumed lotions as well.    6. Treatment related hypothyroidism  A. Continue Synthroid 75 mcg.   B. I will follow-up on thyroid function from today.    7. Muscle pain  A.  With this worsening muscle pain over the past week we will add CK, LDH, sed rate, and CPK to plan to ensure he is not having myositis related to his Libtayo.  I have ordered the stat we will follow-up with those today.    8. Dizziness.   A. I will refer to ENT for further evaluation.       FOLLOW UP: 3 weeks with cycle #18.    Yady Guajardo, APRN  5/1/2024

## 2024-05-22 ENCOUNTER — INFUSION (OUTPATIENT)
Dept: ONCOLOGY | Facility: HOSPITAL | Age: 79
End: 2024-05-22
Payer: MEDICARE

## 2024-05-22 ENCOUNTER — OFFICE VISIT (OUTPATIENT)
Dept: ONCOLOGY | Facility: CLINIC | Age: 79
End: 2024-05-22
Payer: MEDICARE

## 2024-05-22 VITALS
WEIGHT: 217 LBS | SYSTOLIC BLOOD PRESSURE: 139 MMHG | RESPIRATION RATE: 16 BRPM | TEMPERATURE: 97.5 F | HEART RATE: 74 BPM | OXYGEN SATURATION: 99 % | HEIGHT: 72 IN | BODY MASS INDEX: 29.39 KG/M2 | DIASTOLIC BLOOD PRESSURE: 81 MMHG

## 2024-05-22 DIAGNOSIS — Z51.81 ENCOUNTER FOR THERAPEUTIC DRUG MONITORING: Primary | ICD-10-CM

## 2024-05-22 DIAGNOSIS — C44.229 SQUAMOUS CELL CARCINOMA OF SKIN OF LEFT EAR AND EXTERNAL AURICULAR CANAL: Primary | ICD-10-CM

## 2024-05-22 DIAGNOSIS — Z51.81 ENCOUNTER FOR THERAPEUTIC DRUG MONITORING: ICD-10-CM

## 2024-05-22 DIAGNOSIS — C44.229 SQUAMOUS CELL CARCINOMA OF SKIN OF LEFT EAR AND EXTERNAL AURICULAR CANAL: ICD-10-CM

## 2024-05-22 LAB
ALBUMIN SERPL-MCNC: 3.9 G/DL (ref 3.5–5.2)
ALBUMIN/GLOB SERPL: 1.5 G/DL
ALP SERPL-CCNC: 65 U/L (ref 39–117)
ALT SERPL W P-5'-P-CCNC: 11 U/L (ref 1–41)
ANION GAP SERPL CALCULATED.3IONS-SCNC: 9.7 MMOL/L (ref 5–15)
AST SERPL-CCNC: 20 U/L (ref 1–40)
BASOPHILS # BLD AUTO: 0.05 10*3/MM3 (ref 0–0.2)
BASOPHILS NFR BLD AUTO: 1.4 % (ref 0–1.5)
BILIRUB SERPL-MCNC: 0.8 MG/DL (ref 0–1.2)
BUN SERPL-MCNC: 13 MG/DL (ref 8–23)
BUN/CREAT SERPL: 9.5 (ref 7–25)
CALCIUM SPEC-SCNC: 9.6 MG/DL (ref 8.6–10.5)
CHLORIDE SERPL-SCNC: 98 MMOL/L (ref 98–107)
CO2 SERPL-SCNC: 24.3 MMOL/L (ref 22–29)
CREAT SERPL-MCNC: 1.37 MG/DL (ref 0.76–1.27)
DEPRECATED RDW RBC AUTO: 45.4 FL (ref 37–54)
EGFRCR SERPLBLD CKD-EPI 2021: 52.1 ML/MIN/1.73
EOSINOPHIL # BLD AUTO: 0.29 10*3/MM3 (ref 0–0.4)
EOSINOPHIL NFR BLD AUTO: 8 % (ref 0.3–6.2)
ERYTHROCYTE [DISTWIDTH] IN BLOOD BY AUTOMATED COUNT: 14 % (ref 12.3–15.4)
GLOBULIN UR ELPH-MCNC: 2.6 GM/DL
GLUCOSE SERPL-MCNC: 90 MG/DL (ref 65–99)
HCT VFR BLD AUTO: 33.3 % (ref 37.5–51)
HGB BLD-MCNC: 11 G/DL (ref 13–17.7)
IMM GRANULOCYTES # BLD AUTO: 0.02 10*3/MM3 (ref 0–0.05)
IMM GRANULOCYTES NFR BLD AUTO: 0.5 % (ref 0–0.5)
LYMPHOCYTES # BLD AUTO: 0.92 10*3/MM3 (ref 0.7–3.1)
LYMPHOCYTES NFR BLD AUTO: 25.3 % (ref 19.6–45.3)
MCH RBC QN AUTO: 29.5 PG (ref 26.6–33)
MCHC RBC AUTO-ENTMCNC: 33 G/DL (ref 31.5–35.7)
MCV RBC AUTO: 89.3 FL (ref 79–97)
MONOCYTES # BLD AUTO: 0.39 10*3/MM3 (ref 0.1–0.9)
MONOCYTES NFR BLD AUTO: 10.7 % (ref 5–12)
NEUTROPHILS NFR BLD AUTO: 1.97 10*3/MM3 (ref 1.7–7)
NEUTROPHILS NFR BLD AUTO: 54.1 % (ref 42.7–76)
NRBC BLD AUTO-RTO: 0 /100 WBC (ref 0–0.2)
PLATELET # BLD AUTO: 187 10*3/MM3 (ref 140–450)
PMV BLD AUTO: 9 FL (ref 6–12)
POTASSIUM SERPL-SCNC: 4.5 MMOL/L (ref 3.5–5.2)
PROT SERPL-MCNC: 6.5 G/DL (ref 6–8.5)
RBC # BLD AUTO: 3.73 10*6/MM3 (ref 4.14–5.8)
SODIUM SERPL-SCNC: 132 MMOL/L (ref 136–145)
T4 FREE SERPL-MCNC: 0.84 NG/DL (ref 0.93–1.7)
TSH SERPL DL<=0.05 MIU/L-ACNC: 8.07 UIU/ML (ref 0.27–4.2)
WBC NRBC COR # BLD AUTO: 3.64 10*3/MM3 (ref 3.4–10.8)

## 2024-05-22 PROCEDURE — 36415 COLL VENOUS BLD VENIPUNCTURE: CPT

## 2024-05-22 PROCEDURE — 85025 COMPLETE CBC W/AUTO DIFF WBC: CPT

## 2024-05-22 PROCEDURE — 25010000002 CEMIPLIMAB-RWLC 350 MG/7ML SOLUTION 7 ML VIAL: Performed by: NURSE PRACTITIONER

## 2024-05-22 PROCEDURE — 96413 CHEMO IV INFUSION 1 HR: CPT

## 2024-05-22 PROCEDURE — 80053 COMPREHEN METABOLIC PANEL: CPT

## 2024-05-22 RX ORDER — SODIUM CHLORIDE 9 MG/ML
250 INJECTION, SOLUTION INTRAVENOUS ONCE
Status: CANCELLED | OUTPATIENT
Start: 2024-05-23

## 2024-05-22 RX ORDER — SODIUM CHLORIDE 9 MG/ML
250 INJECTION, SOLUTION INTRAVENOUS ONCE
Status: DISCONTINUED | OUTPATIENT
Start: 2024-05-22 | End: 2024-05-22 | Stop reason: HOSPADM

## 2024-05-22 RX ADMIN — CEMIPLIMAB-RWLC 350 MG: 50 INJECTION INTRAVENOUS at 10:50

## 2024-05-22 NOTE — PROGRESS NOTES
DATE OF VISIT: 5/22/2024    REASON FOR VISIT: Followup for locally advanced squamous cell carcinoma of the left ear     PROBLEM LIST:  1. 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  Presented with gradually growing mass over the left ear for the last 3 years.  B.  Patient did not seek any medical attention until April 2023.  C.  CT neck and chest done at  April 2023 confirmed locally advanced lesion eroding into the left mastoid with left cervical lymphadenopathy but no evidence of chest metastases.  D.  Biopsy done April 2023 confirmed squamous cell carcinoma.  E.  Status post palliative radiation completed April 28, 2023  F.  Started immunotherapy with Libtayo 350 mg IV every 3 weeks May 22, 2023.  Status post 14 cycles.  2.  Cancer-related pain  3.  Mild depression  4.  BPH    HISTORY OF PRESENT ILLNESS: The patient is a very pleasant 79 y.o. male with past medical history significant for locally advanced left ear squamous cell carcinoma diagnosed April 2023.  The patient was started on Libtayo May 16, 2023. The patient is here today for scheduled follow-up visit with treatment cycle #18.    SUBJECTIVE: Peter is here today by himself.  His son is in the waiting room.  Overall he is doing fairly well.  Denies any fever, chills, night sweats.  He is having some chronic back pain that is stable overall.  This is not new and not changed.  He has noticed that his hearing has gotten worse.  Dizziness is stable.  He has been referred to ENT in the past but has not been contacted.  Denies any fever, chills, night sweats.  His ear has almost completely healed and he is very happy with how it is looking at this point.  Unfortunately he has no hearing whatsoever from that ear.      Past History:  Medical History: has a past medical history of Anemia, Arthritis, and Cancer.   Surgical History: has a past surgical history that includes Skin cancer excision; Neck dissection (N/A);  "Parotidectomy (N/A); and NAIL BIOPSY (N/A).   Family History: family history includes Cancer in his father and mother.   Social History: reports that he has never smoked. His smokeless tobacco use includes chew. He reports that he does not currently use alcohol. He reports that he does not use drugs.    (Not in a hospital admission)     Allergies: Patient has no known allergies.     Review of Systems   Constitutional:  Positive for fatigue.   Respiratory: Negative.     Gastrointestinal: Negative.    Skin:  Positive for wound.   Psychiatric/Behavioral: Negative.         PHYSICAL EXAMINATION:   /81   Pulse 74   Temp 97.5 °F (36.4 °C)   Resp 16   Ht 182.9 cm (72\")   Wt 98.4 kg (217 lb)   SpO2 99%   BMI 29.43 kg/m²    Pain Score    05/22/24 0907   PainSc:   5   PainLoc: Back                                   ECOG Performance Status: 1 - Symptomatic but completely ambulatory      General Appearance:      alert, cooperative, no apparent distress and appears stated age   Lungs:   Clear to auscultation bilaterally; respirations regular, even, and unlabored bilaterally   Heart:  Regular rate and rhythm, no murmurs appreciated   Abdomen:   Soft, non-tender, and non-distended                 No visits with results within 2 Week(s) from this visit.   Latest known visit with results is:   Infusion on 05/01/2024   Component Date Value Ref Range Status    Glucose 05/01/2024 89  65 - 99 mg/dL Final    BUN 05/01/2024 15  8 - 23 mg/dL Final    Creatinine 05/01/2024 1.36 (H)  0.76 - 1.27 mg/dL Final    Sodium 05/01/2024 132 (L)  136 - 145 mmol/L Final    Potassium 05/01/2024 4.9  3.5 - 5.2 mmol/L Final    Chloride 05/01/2024 98  98 - 107 mmol/L Final    CO2 05/01/2024 23.7  22.0 - 29.0 mmol/L Final    Calcium 05/01/2024 9.7  8.6 - 10.5 mg/dL Final    Total Protein 05/01/2024 6.9  6.0 - 8.5 g/dL Final    Albumin 05/01/2024 3.9  3.5 - 5.2 g/dL Final    ALT (SGPT) 05/01/2024 11  1 - 41 U/L Final    AST (SGOT) 05/01/2024 22  " 1 - 40 U/L Final    Alkaline Phosphatase 05/01/2024 59  39 - 117 U/L Final    Total Bilirubin 05/01/2024 0.9  0.0 - 1.2 mg/dL Final    Globulin 05/01/2024 3.0  gm/dL Final    A/G Ratio 05/01/2024 1.3  g/dL Final    BUN/Creatinine Ratio 05/01/2024 11.0  7.0 - 25.0 Final    Anion Gap 05/01/2024 10.3  5.0 - 15.0 mmol/L Final    eGFR 05/01/2024 52.6 (L)  >60.0 mL/min/1.73 Final    TSH 05/01/2024 8.380 (H)  0.270 - 4.200 uIU/mL Final    Free T4 05/01/2024 0.96  0.93 - 1.70 ng/dL Final    Creatine Kinase 05/01/2024 104  20 - 200 U/L Final    Sed Rate 05/01/2024 22 (H)  0 - 20 mm/hr Final    LDH 05/01/2024 155  135 - 225 U/L Final    WBC 05/01/2024 3.75  3.40 - 10.80 10*3/mm3 Final    RBC 05/01/2024 3.60 (L)  4.14 - 5.80 10*6/mm3 Final    Hemoglobin 05/01/2024 10.8 (L)  13.0 - 17.7 g/dL Final    Hematocrit 05/01/2024 32.1 (L)  37.5 - 51.0 % Final    MCV 05/01/2024 89.2  79.0 - 97.0 fL Final    MCH 05/01/2024 30.0  26.6 - 33.0 pg Final    MCHC 05/01/2024 33.6  31.5 - 35.7 g/dL Final    RDW 05/01/2024 13.7  12.3 - 15.4 % Final    RDW-SD 05/01/2024 44.8  37.0 - 54.0 fl Final    MPV 05/01/2024 8.8  6.0 - 12.0 fL Final    Platelets 05/01/2024 187  140 - 450 10*3/mm3 Final    Neutrophil % 05/01/2024 58.2  42.7 - 76.0 % Final    Lymphocyte % 05/01/2024 24.8  19.6 - 45.3 % Final    Monocyte % 05/01/2024 10.9  5.0 - 12.0 % Final    Eosinophil % 05/01/2024 4.8  0.3 - 6.2 % Final    Basophil % 05/01/2024 0.8  0.0 - 1.5 % Final    Immature Grans % 05/01/2024 0.5  0.0 - 0.5 % Final    Neutrophils, Absolute 05/01/2024 2.18  1.70 - 7.00 10*3/mm3 Final    Lymphocytes, Absolute 05/01/2024 0.93  0.70 - 3.10 10*3/mm3 Final    Monocytes, Absolute 05/01/2024 0.41  0.10 - 0.90 10*3/mm3 Final    Eosinophils, Absolute 05/01/2024 0.18  0.00 - 0.40 10*3/mm3 Final    Basophils, Absolute 05/01/2024 0.03  0.00 - 0.20 10*3/mm3 Final    Immature Grans, Absolute 05/01/2024 0.02  0.00 - 0.05 10*3/mm3 Final    nRBC 05/01/2024 0.0  0.0 - 0.2 /100 WBC Final         No results found.  05/23/23 05/01/2024      ASSESSMENT: The patient is a very pleasant 79 y.o. male  with locally advanced left ear squamous cell carcinoma      PLAN:    1.  Locally advanced squamous cell carcinoma of the left ear T4 N1 M0 stage IV:  A.  I will proceed with treatment as scheduled today Libtayo IV every 3 weeks cycle #18.  B.  The patient will follow-up with us in 3 weeks for cycle #19.  C.  I will continue to monitor the patient blood work including blood counts kidney function liver function and electrolytes.  D.  I will continue to hold off on doing imaging.  We will follow patient clinically for response.  Clinically, patient continues to have response.  E.  I  reviewed with the patient the goal of treatment is palliative given his locally advanced disease with ipsilateral lymph node metastases.  F. We reviewed potential side effects of immunotherapy including but not limited to immune mediated reactions with thyroiditis, pneumonitis, hepatitis, colitis, rash, and electrolyte abnormalities, fatigue, multiorgan failure, and possibly death.  G.  I did go over the blood results with the patient from May 1, 2024 his CBC revealed stable anemia hemoglobin 10.8.  Normal white cells and platelets.  CMP showed a slightly elevated but overall improved creatinine at 1.36, sodium level is 132    2. Cancer-related pain:  A.  He will continue oxycodone as needed.     3.  Wound care:  A.  He is no longer receiving wound care as his wound has completely healed.  He is keeping it clean and dry  B.  We will continue wound care as needed.       4.  Depression:  A.  He will continue Cymbalta daily    5.  Treatment related itching  A. Stable.  I will continue hydroxyzine.  Continue increased dose at 50 mg to see if this helps with itching.   I again advises the patient and his son to be careful with sedation or dizziness and if this occurs to stop.  Recommended he follow-up with ENT.  B.  I asked the  patient to make sure he is keeping his skin hydrated with non perfumed lotions as well.    6. Treatment related hypothyroidism  A. Continue Synthroid 75 mcg.   B. I will follow-up on thyroid function from today.    7. Muscle pain  A.  I last check CK, LDH, and sed rate were within normal limits.    8. Dizziness.   A. I will follow-up with ENT referral.  I will ask my office to make the appointment.      FOLLOW UP: 3 weeks with cycle #19.    Total time of patient care including time prior to, face to face with patient, and following visit spent in reviewing records, lab results, imaging studies, discussion with patient, and documentation/charting was > 35 minutes      Yady Guajardo, APRN  5/22/2024

## 2024-06-12 ENCOUNTER — OFFICE VISIT (OUTPATIENT)
Dept: ONCOLOGY | Facility: CLINIC | Age: 79
End: 2024-06-12
Payer: MEDICARE

## 2024-06-12 ENCOUNTER — INFUSION (OUTPATIENT)
Dept: ONCOLOGY | Facility: HOSPITAL | Age: 79
End: 2024-06-12
Payer: MEDICARE

## 2024-06-12 VITALS
BODY MASS INDEX: 29.53 KG/M2 | HEIGHT: 72 IN | SYSTOLIC BLOOD PRESSURE: 161 MMHG | TEMPERATURE: 97.8 F | OXYGEN SATURATION: 99 % | DIASTOLIC BLOOD PRESSURE: 76 MMHG | WEIGHT: 218 LBS | RESPIRATION RATE: 16 BRPM | HEART RATE: 77 BPM

## 2024-06-12 DIAGNOSIS — Z51.81 ENCOUNTER FOR THERAPEUTIC DRUG MONITORING: ICD-10-CM

## 2024-06-12 DIAGNOSIS — C44.229 SQUAMOUS CELL CARCINOMA OF SKIN OF LEFT EAR AND EXTERNAL AURICULAR CANAL: Primary | ICD-10-CM

## 2024-06-12 LAB
ALBUMIN SERPL-MCNC: 3.9 G/DL (ref 3.5–5.2)
ALBUMIN/GLOB SERPL: 1.3 G/DL
ALP SERPL-CCNC: 59 U/L (ref 39–117)
ALT SERPL W P-5'-P-CCNC: 11 U/L (ref 1–41)
ANION GAP SERPL CALCULATED.3IONS-SCNC: 10.9 MMOL/L (ref 5–15)
AST SERPL-CCNC: 22 U/L (ref 1–40)
BASOPHILS # BLD AUTO: 0.02 10*3/MM3 (ref 0–0.2)
BASOPHILS NFR BLD AUTO: 0.5 % (ref 0–1.5)
BILIRUB SERPL-MCNC: 0.9 MG/DL (ref 0–1.2)
BUN SERPL-MCNC: 16 MG/DL (ref 8–23)
BUN/CREAT SERPL: 11 (ref 7–25)
CALCIUM SPEC-SCNC: 9.8 MG/DL (ref 8.6–10.5)
CHLORIDE SERPL-SCNC: 97 MMOL/L (ref 98–107)
CO2 SERPL-SCNC: 25.1 MMOL/L (ref 22–29)
CREAT SERPL-MCNC: 1.46 MG/DL (ref 0.76–1.27)
DEPRECATED RDW RBC AUTO: 47 FL (ref 37–54)
EGFRCR SERPLBLD CKD-EPI 2021: 48.3 ML/MIN/1.73
EOSINOPHIL # BLD AUTO: 0.21 10*3/MM3 (ref 0–0.4)
EOSINOPHIL NFR BLD AUTO: 5.1 % (ref 0.3–6.2)
ERYTHROCYTE [DISTWIDTH] IN BLOOD BY AUTOMATED COUNT: 14.4 % (ref 12.3–15.4)
GLOBULIN UR ELPH-MCNC: 3.1 GM/DL
GLUCOSE SERPL-MCNC: 85 MG/DL (ref 65–99)
HCT VFR BLD AUTO: 34 % (ref 37.5–51)
HGB BLD-MCNC: 11 G/DL (ref 13–17.7)
IMM GRANULOCYTES # BLD AUTO: 0.02 10*3/MM3 (ref 0–0.05)
IMM GRANULOCYTES NFR BLD AUTO: 0.5 % (ref 0–0.5)
LYMPHOCYTES # BLD AUTO: 0.78 10*3/MM3 (ref 0.7–3.1)
LYMPHOCYTES NFR BLD AUTO: 18.9 % (ref 19.6–45.3)
MCH RBC QN AUTO: 29.2 PG (ref 26.6–33)
MCHC RBC AUTO-ENTMCNC: 32.4 G/DL (ref 31.5–35.7)
MCV RBC AUTO: 90.2 FL (ref 79–97)
MONOCYTES # BLD AUTO: 0.34 10*3/MM3 (ref 0.1–0.9)
MONOCYTES NFR BLD AUTO: 8.3 % (ref 5–12)
NEUTROPHILS NFR BLD AUTO: 2.75 10*3/MM3 (ref 1.7–7)
NEUTROPHILS NFR BLD AUTO: 66.7 % (ref 42.7–76)
NRBC BLD AUTO-RTO: 0 /100 WBC (ref 0–0.2)
PLATELET # BLD AUTO: 180 10*3/MM3 (ref 140–450)
PMV BLD AUTO: 9.2 FL (ref 6–12)
POTASSIUM SERPL-SCNC: 5.1 MMOL/L (ref 3.5–5.2)
PROT SERPL-MCNC: 7 G/DL (ref 6–8.5)
RBC # BLD AUTO: 3.77 10*6/MM3 (ref 4.14–5.8)
SODIUM SERPL-SCNC: 133 MMOL/L (ref 136–145)
T4 FREE SERPL-MCNC: 0.96 NG/DL (ref 0.92–1.68)
TSH SERPL DL<=0.05 MIU/L-ACNC: 5.34 UIU/ML (ref 0.27–4.2)
WBC NRBC COR # BLD AUTO: 4.12 10*3/MM3 (ref 3.4–10.8)

## 2024-06-12 PROCEDURE — 80053 COMPREHEN METABOLIC PANEL: CPT

## 2024-06-12 PROCEDURE — 96413 CHEMO IV INFUSION 1 HR: CPT

## 2024-06-12 PROCEDURE — 1126F AMNT PAIN NOTED NONE PRSNT: CPT | Performed by: INTERNAL MEDICINE

## 2024-06-12 PROCEDURE — 85025 COMPLETE CBC W/AUTO DIFF WBC: CPT

## 2024-06-12 PROCEDURE — 84443 ASSAY THYROID STIM HORMONE: CPT

## 2024-06-12 PROCEDURE — 36415 COLL VENOUS BLD VENIPUNCTURE: CPT

## 2024-06-12 PROCEDURE — 84439 ASSAY OF FREE THYROXINE: CPT

## 2024-06-12 PROCEDURE — 25010000002 CEMIPLIMAB-RWLC 350 MG/7ML SOLUTION 7 ML VIAL: Performed by: INTERNAL MEDICINE

## 2024-06-12 PROCEDURE — 99214 OFFICE O/P EST MOD 30 MIN: CPT | Performed by: INTERNAL MEDICINE

## 2024-06-12 RX ORDER — SODIUM CHLORIDE 9 MG/ML
250 INJECTION, SOLUTION INTRAVENOUS ONCE
Status: CANCELLED | OUTPATIENT
Start: 2024-06-13

## 2024-06-12 RX ORDER — SODIUM CHLORIDE 9 MG/ML
250 INJECTION, SOLUTION INTRAVENOUS ONCE
Status: DISCONTINUED | OUTPATIENT
Start: 2024-06-12 | End: 2024-06-12 | Stop reason: HOSPADM

## 2024-06-12 RX ADMIN — CEMIPLIMAB-RWLC 350 MG: 50 INJECTION INTRAVENOUS at 12:31

## 2024-06-12 NOTE — PROGRESS NOTES
DATE OF VISIT: 6/12/2024    REASON FOR VISIT: Followup for locally advanced squamous cell carcinoma of the left ear     PROBLEM LIST:  1. 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  Presented with gradually growing mass over the left ear for the last 3 years.  B.  Patient did not seek any medical attention until April 2023.  C.  CT neck and chest done at  April 2023 confirmed locally advanced lesion eroding into the left mastoid with left cervical lymphadenopathy but no evidence of chest metastases.  D.  Biopsy done April 2023 confirmed squamous cell carcinoma.  E.  Status post palliative radiation completed April 28, 2023  F.  Started immunotherapy with Libtayo 350 mg IV every 3 weeks May 22, 2023.  Status post 18 cycles.  2.  Cancer-related pain  3.  Mild depression  4.  BPH    HISTORY OF PRESENT ILLNESS: The patient is a very pleasant 79 y.o. male with past medical history significant for locally advanced left ear squamous cell carcinoma diagnosed April 2023.  The patient was started on Libtayo May 16, 2023. The patient is here today for scheduled follow-up visit with treatment cycle #19.    SUBJECTIVE: Peter is here today with his son.  All in all he is doing well.  Denies any fever chills or night sweats.    Past History:  Medical History: has a past medical history of Anemia, Arthritis, and Cancer.   Surgical History: has a past surgical history that includes Skin cancer excision; Neck dissection (N/A); Parotidectomy (N/A); and NAIL BIOPSY (N/A).   Family History: family history includes Cancer in his father and mother.   Social History: reports that he has never smoked. His smokeless tobacco use includes chew. He reports that he does not currently use alcohol. He reports that he does not use drugs.    (Not in a hospital admission)     Allergies: Patient has no known allergies.     Review of Systems   Constitutional:  Positive for fatigue.   Respiratory: Negative.    "  Gastrointestinal: Negative.    Skin:  Positive for wound.   Psychiatric/Behavioral: Negative.         PHYSICAL EXAMINATION:   /76   Pulse 77   Temp 97.8 °F (36.6 °C)   Resp 16   Ht 182.9 cm (72.01\")   Wt 98.9 kg (218 lb)   SpO2 99%   BMI 29.56 kg/m²    Pain Score    06/12/24 1104   PainSc: 0-No pain                                  ECOG Performance Status: 1 - Symptomatic but completely ambulatory      General Appearance:      alert, cooperative, no apparent distress and appears stated age   Lungs:   Clear to auscultation bilaterally; respirations regular, even, and unlabored bilaterally   Heart:  Regular rate and rhythm, no murmurs appreciated   Abdomen:   Soft, non-tender, and non-distended                 No visits with results within 2 Week(s) from this visit.   Latest known visit with results is:   Infusion on 05/22/2024   Component Date Value Ref Range Status    Glucose 05/22/2024 90  65 - 99 mg/dL Final    BUN 05/22/2024 13  8 - 23 mg/dL Final    Creatinine 05/22/2024 1.37 (H)  0.76 - 1.27 mg/dL Final    Sodium 05/22/2024 132 (L)  136 - 145 mmol/L Final    Potassium 05/22/2024 4.5  3.5 - 5.2 mmol/L Final    Chloride 05/22/2024 98  98 - 107 mmol/L Final    CO2 05/22/2024 24.3  22.0 - 29.0 mmol/L Final    Calcium 05/22/2024 9.6  8.6 - 10.5 mg/dL Final    Total Protein 05/22/2024 6.5  6.0 - 8.5 g/dL Final    Albumin 05/22/2024 3.9  3.5 - 5.2 g/dL Final    ALT (SGPT) 05/22/2024 11  1 - 41 U/L Final    AST (SGOT) 05/22/2024 20  1 - 40 U/L Final    Alkaline Phosphatase 05/22/2024 65  39 - 117 U/L Final    Total Bilirubin 05/22/2024 0.8  0.0 - 1.2 mg/dL Final    Globulin 05/22/2024 2.6  gm/dL Final    A/G Ratio 05/22/2024 1.5  g/dL Final    BUN/Creatinine Ratio 05/22/2024 9.5  7.0 - 25.0 Final    Anion Gap 05/22/2024 9.7  5.0 - 15.0 mmol/L Final    eGFR 05/22/2024 52.1 (L)  >60.0 mL/min/1.73 Final    WBC 05/22/2024 3.64  3.40 - 10.80 10*3/mm3 Final    RBC 05/22/2024 3.73 (L)  4.14 - 5.80 10*6/mm3 " Final    Hemoglobin 05/22/2024 11.0 (L)  13.0 - 17.7 g/dL Final    Hematocrit 05/22/2024 33.3 (L)  37.5 - 51.0 % Final    MCV 05/22/2024 89.3  79.0 - 97.0 fL Final    MCH 05/22/2024 29.5  26.6 - 33.0 pg Final    MCHC 05/22/2024 33.0  31.5 - 35.7 g/dL Final    RDW 05/22/2024 14.0  12.3 - 15.4 % Final    RDW-SD 05/22/2024 45.4  37.0 - 54.0 fl Final    MPV 05/22/2024 9.0  6.0 - 12.0 fL Final    Platelets 05/22/2024 187  140 - 450 10*3/mm3 Final    Neutrophil % 05/22/2024 54.1  42.7 - 76.0 % Final    Lymphocyte % 05/22/2024 25.3  19.6 - 45.3 % Final    Monocyte % 05/22/2024 10.7  5.0 - 12.0 % Final    Eosinophil % 05/22/2024 8.0 (H)  0.3 - 6.2 % Final    Basophil % 05/22/2024 1.4  0.0 - 1.5 % Final    Immature Grans % 05/22/2024 0.5  0.0 - 0.5 % Final    Neutrophils, Absolute 05/22/2024 1.97  1.70 - 7.00 10*3/mm3 Final    Lymphocytes, Absolute 05/22/2024 0.92  0.70 - 3.10 10*3/mm3 Final    Monocytes, Absolute 05/22/2024 0.39  0.10 - 0.90 10*3/mm3 Final    Eosinophils, Absolute 05/22/2024 0.29  0.00 - 0.40 10*3/mm3 Final    Basophils, Absolute 05/22/2024 0.05  0.00 - 0.20 10*3/mm3 Final    Immature Grans, Absolute 05/22/2024 0.02  0.00 - 0.05 10*3/mm3 Final    nRBC 05/22/2024 0.0  0.0 - 0.2 /100 WBC Final        No results found.  05/23/23 05/01/2024      ASSESSMENT: The patient is a very pleasant 79 y.o. male  with locally advanced left ear squamous cell carcinoma      PLAN:    1.  Locally advanced squamous cell carcinoma of the left ear T4 N1 M0 stage IV:  A.  I will proceed with treatment as scheduled today Libtayo IV every 3 weeks cycle #19.  B.  The patient will follow-up with us in 3 weeks for cycle #20.  C.  I will continue to monitor the patient blood work including blood counts kidney function liver function and electrolytes.  D.  I will continue to hold off on doing imaging.  We will follow patient clinically for response.  Clinically, patient continues to have response.  E.  I  reviewed with the patient the  goal of treatment is palliative given his locally advanced disease with ipsilateral lymph node metastases.  F. We reviewed potential side effects of immunotherapy including but not limited to immune mediated reactions with thyroiditis, pneumonitis, hepatitis, colitis, rash, and electrolyte abnormalities, fatigue, multiorgan failure, and possibly death.  G.  I did go over the blood work results with the patient from today his hemoglobin 11.0 normal white cells and platelets.  I will follow-up on the CMP.    2. Cancer-related pain:  A.  He will continue oxycodone as needed.     3.  Wound care:  A.  He is no longer receiving wound care as his wound has completely healed.  He is keeping it clean and dry  B.  We will continue wound care as needed.       4.  Depression:  A.  He will continue Cymbalta daily    5.  Treatment related itching  A. Stable.  I will continue hydroxyzine.  Continue increased dose at 50 mg to see if this helps with itching.   I again advises the patient and his son to be careful with sedation or dizziness and if this occurs to stop.  Recommended he follow-up with ENT.  B.  I asked the patient to make sure he is keeping his skin hydrated with non perfumed lotions as well.    6. Treatment related hypothyroidism  A. Continue Synthroid 75 mcg.   B. I will follow-up on thyroid function from today.    7. Muscle pain  A.  I last check CK, LDH, and sed rate were within normal limits.    8. Dizziness.   A. I will follow-up with ENT referral.  I will ask my office to make the appointment.      FOLLOW UP: 3 weeks with cycle #20.    Benitez Medellin MD  6/12/2024

## 2024-07-03 ENCOUNTER — INFUSION (OUTPATIENT)
Dept: ONCOLOGY | Facility: HOSPITAL | Age: 79
End: 2024-07-03
Payer: MEDICARE

## 2024-07-03 ENCOUNTER — OFFICE VISIT (OUTPATIENT)
Dept: ONCOLOGY | Facility: CLINIC | Age: 79
End: 2024-07-03
Payer: MEDICARE

## 2024-07-03 VITALS
SYSTOLIC BLOOD PRESSURE: 138 MMHG | HEIGHT: 72 IN | HEART RATE: 88 BPM | OXYGEN SATURATION: 98 % | RESPIRATION RATE: 16 BRPM | BODY MASS INDEX: 29.31 KG/M2 | TEMPERATURE: 97.7 F | DIASTOLIC BLOOD PRESSURE: 70 MMHG | WEIGHT: 216.4 LBS

## 2024-07-03 DIAGNOSIS — C44.229 SQUAMOUS CELL CARCINOMA OF SKIN OF LEFT EAR AND EXTERNAL AURICULAR CANAL: Primary | ICD-10-CM

## 2024-07-03 DIAGNOSIS — Z51.81 ENCOUNTER FOR THERAPEUTIC DRUG MONITORING: ICD-10-CM

## 2024-07-03 LAB
ALBUMIN SERPL-MCNC: 4 G/DL (ref 3.5–5.2)
ALBUMIN/GLOB SERPL: 1.4 G/DL
ALP SERPL-CCNC: 64 U/L (ref 39–117)
ALT SERPL W P-5'-P-CCNC: 10 U/L (ref 1–41)
ANION GAP SERPL CALCULATED.3IONS-SCNC: 11.8 MMOL/L (ref 5–15)
AST SERPL-CCNC: 20 U/L (ref 1–40)
BASOPHILS # BLD AUTO: 0.06 10*3/MM3 (ref 0–0.2)
BASOPHILS NFR BLD AUTO: 1.8 % (ref 0–1.5)
BILIRUB SERPL-MCNC: 1 MG/DL (ref 0–1.2)
BUN SERPL-MCNC: 16 MG/DL (ref 8–23)
BUN/CREAT SERPL: 11.1 (ref 7–25)
CALCIUM SPEC-SCNC: 9.5 MG/DL (ref 8.6–10.5)
CHLORIDE SERPL-SCNC: 98 MMOL/L (ref 98–107)
CO2 SERPL-SCNC: 23.2 MMOL/L (ref 22–29)
CREAT SERPL-MCNC: 1.44 MG/DL (ref 0.76–1.27)
DEPRECATED RDW RBC AUTO: 45.5 FL (ref 37–54)
EGFRCR SERPLBLD CKD-EPI 2021: 49.1 ML/MIN/1.73
EOSINOPHIL # BLD AUTO: 0.23 10*3/MM3 (ref 0–0.4)
EOSINOPHIL NFR BLD AUTO: 6.9 % (ref 0.3–6.2)
ERYTHROCYTE [DISTWIDTH] IN BLOOD BY AUTOMATED COUNT: 14.4 % (ref 12.3–15.4)
GLOBULIN UR ELPH-MCNC: 2.9 GM/DL
GLUCOSE SERPL-MCNC: 95 MG/DL (ref 65–99)
HCT VFR BLD AUTO: 31.5 % (ref 37.5–51)
HGB BLD-MCNC: 11 G/DL (ref 13–17.7)
IMM GRANULOCYTES # BLD AUTO: 0.03 10*3/MM3 (ref 0–0.05)
IMM GRANULOCYTES NFR BLD AUTO: 0.9 % (ref 0–0.5)
LYMPHOCYTES # BLD AUTO: 0.96 10*3/MM3 (ref 0.7–3.1)
LYMPHOCYTES NFR BLD AUTO: 28.7 % (ref 19.6–45.3)
MCH RBC QN AUTO: 30.2 PG (ref 26.6–33)
MCHC RBC AUTO-ENTMCNC: 34.9 G/DL (ref 31.5–35.7)
MCV RBC AUTO: 86.5 FL (ref 79–97)
MONOCYTES # BLD AUTO: 0.4 10*3/MM3 (ref 0.1–0.9)
MONOCYTES NFR BLD AUTO: 11.9 % (ref 5–12)
NEUTROPHILS NFR BLD AUTO: 1.67 10*3/MM3 (ref 1.7–7)
NEUTROPHILS NFR BLD AUTO: 49.8 % (ref 42.7–76)
NRBC BLD AUTO-RTO: 0 /100 WBC (ref 0–0.2)
PLATELET # BLD AUTO: 177 10*3/MM3 (ref 140–450)
PMV BLD AUTO: 9.4 FL (ref 6–12)
POTASSIUM SERPL-SCNC: 4.6 MMOL/L (ref 3.5–5.2)
PROT SERPL-MCNC: 6.9 G/DL (ref 6–8.5)
RBC # BLD AUTO: 3.64 10*6/MM3 (ref 4.14–5.8)
SODIUM SERPL-SCNC: 133 MMOL/L (ref 136–145)
T4 FREE SERPL-MCNC: 0.9 NG/DL (ref 0.92–1.68)
TSH SERPL DL<=0.05 MIU/L-ACNC: 12.13 UIU/ML (ref 0.27–4.2)
WBC NRBC COR # BLD AUTO: 3.35 10*3/MM3 (ref 3.4–10.8)

## 2024-07-03 PROCEDURE — 84443 ASSAY THYROID STIM HORMONE: CPT

## 2024-07-03 PROCEDURE — 85025 COMPLETE CBC W/AUTO DIFF WBC: CPT

## 2024-07-03 PROCEDURE — 25010000002 CEMIPLIMAB-RWLC 350 MG/7ML SOLUTION 7 ML VIAL: Performed by: INTERNAL MEDICINE

## 2024-07-03 PROCEDURE — 96413 CHEMO IV INFUSION 1 HR: CPT

## 2024-07-03 PROCEDURE — 84439 ASSAY OF FREE THYROXINE: CPT

## 2024-07-03 PROCEDURE — 80053 COMPREHEN METABOLIC PANEL: CPT

## 2024-07-03 PROCEDURE — 36415 COLL VENOUS BLD VENIPUNCTURE: CPT

## 2024-07-03 RX ORDER — SODIUM CHLORIDE 9 MG/ML
250 INJECTION, SOLUTION INTRAVENOUS ONCE
Status: CANCELLED | OUTPATIENT
Start: 2024-07-04

## 2024-07-03 RX ORDER — SODIUM CHLORIDE 9 MG/ML
250 INJECTION, SOLUTION INTRAVENOUS ONCE
Status: DISCONTINUED | OUTPATIENT
Start: 2024-07-03 | End: 2024-07-03 | Stop reason: HOSPADM

## 2024-07-03 RX ADMIN — CEMIPLIMAB-RWLC 350 MG: 50 INJECTION INTRAVENOUS at 10:07

## 2024-07-03 NOTE — PROGRESS NOTES
DATE OF VISIT: 7/3/2024    REASON FOR VISIT: Followup for locally advanced squamous cell carcinoma of the left ear     PROBLEM LIST:  1. 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  Presented with gradually growing mass over the left ear for the last 3 years.  B.  Patient did not seek any medical attention until April 2023.  C.  CT neck and chest done at  April 2023 confirmed locally advanced lesion eroding into the left mastoid with left cervical lymphadenopathy but no evidence of chest metastases.  D.  Biopsy done April 2023 confirmed squamous cell carcinoma.  E.  Status post palliative radiation completed April 28, 2023  F.  Started immunotherapy with Libtayo 350 mg IV every 3 weeks May 22, 2023.  Status post 19 cycles.  2.  Cancer-related pain  3.  Mild depression  4.  BPH    HISTORY OF PRESENT ILLNESS: The patient is a very pleasant 79 y.o. male with past medical history significant for locally advanced left ear squamous cell carcinoma diagnosed April 2023.  The patient was started on Libtayo May 16, 2023. The patient is here today for scheduled follow-up visit with treatment cycle #20.    SUBJECTIVE: Peter is here today by himself.  Overall he is doing well.  He would like to take a break from treatment.    Past History:  Medical History: has a past medical history of Anemia, Arthritis, and Cancer.   Surgical History: has a past surgical history that includes Skin cancer excision; Neck dissection (N/A); Parotidectomy (N/A); and NAIL BIOPSY (N/A).   Family History: family history includes Cancer in his father and mother.   Social History: reports that he has never smoked. His smokeless tobacco use includes chew. He reports that he does not currently use alcohol. He reports that he does not use drugs.    (Not in a hospital admission)     Allergies: Patient has no known allergies.     Review of Systems   Constitutional:  Positive for fatigue.   Respiratory: Negative.      Gastrointestinal: Negative.    Skin:  Positive for wound.   Psychiatric/Behavioral: Negative.         PHYSICAL EXAMINATION:   There were no vitals taken for this visit.   There were no vitals filed for this visit.                                 ECOG Performance Status: 1 - Symptomatic but completely ambulatory      General Appearance:      alert, cooperative, no apparent distress and appears stated age   Lungs:   Clear to auscultation bilaterally; respirations regular, even, and unlabored bilaterally   Heart:  Regular rate and rhythm, no murmurs appreciated   Abdomen:   Soft, non-tender, and non-distended                 No visits with results within 2 Week(s) from this visit.   Latest known visit with results is:   Infusion on 06/12/2024   Component Date Value Ref Range Status    Glucose 06/12/2024 85  65 - 99 mg/dL Final    BUN 06/12/2024 16  8 - 23 mg/dL Final    Creatinine 06/12/2024 1.46 (H)  0.76 - 1.27 mg/dL Final    Sodium 06/12/2024 133 (L)  136 - 145 mmol/L Final    Potassium 06/12/2024 5.1  3.5 - 5.2 mmol/L Final    Chloride 06/12/2024 97 (L)  98 - 107 mmol/L Final    CO2 06/12/2024 25.1  22.0 - 29.0 mmol/L Final    Calcium 06/12/2024 9.8  8.6 - 10.5 mg/dL Final    Total Protein 06/12/2024 7.0  6.0 - 8.5 g/dL Final    Albumin 06/12/2024 3.9  3.5 - 5.2 g/dL Final    ALT (SGPT) 06/12/2024 11  1 - 41 U/L Final    AST (SGOT) 06/12/2024 22  1 - 40 U/L Final    Alkaline Phosphatase 06/12/2024 59  39 - 117 U/L Final    Total Bilirubin 06/12/2024 0.9  0.0 - 1.2 mg/dL Final    Globulin 06/12/2024 3.1  gm/dL Final    A/G Ratio 06/12/2024 1.3  g/dL Final    BUN/Creatinine Ratio 06/12/2024 11.0  7.0 - 25.0 Final    Anion Gap 06/12/2024 10.9  5.0 - 15.0 mmol/L Final    eGFR 06/12/2024 48.3 (L)  >60.0 mL/min/1.73 Final    TSH 06/12/2024 5.340 (H)  0.270 - 4.200 uIU/mL Final    Free T4 06/12/2024 0.96  0.92 - 1.68 ng/dL Final    WBC 06/12/2024 4.12  3.40 - 10.80 10*3/mm3 Final    RBC 06/12/2024 3.77 (L)  4.14 - 5.80  10*6/mm3 Final    Hemoglobin 06/12/2024 11.0 (L)  13.0 - 17.7 g/dL Final    Hematocrit 06/12/2024 34.0 (L)  37.5 - 51.0 % Final    MCV 06/12/2024 90.2  79.0 - 97.0 fL Final    MCH 06/12/2024 29.2  26.6 - 33.0 pg Final    MCHC 06/12/2024 32.4  31.5 - 35.7 g/dL Final    RDW 06/12/2024 14.4  12.3 - 15.4 % Final    RDW-SD 06/12/2024 47.0  37.0 - 54.0 fl Final    MPV 06/12/2024 9.2  6.0 - 12.0 fL Final    Platelets 06/12/2024 180  140 - 450 10*3/mm3 Final    Neutrophil % 06/12/2024 66.7  42.7 - 76.0 % Final    Lymphocyte % 06/12/2024 18.9 (L)  19.6 - 45.3 % Final    Monocyte % 06/12/2024 8.3  5.0 - 12.0 % Final    Eosinophil % 06/12/2024 5.1  0.3 - 6.2 % Final    Basophil % 06/12/2024 0.5  0.0 - 1.5 % Final    Immature Grans % 06/12/2024 0.5  0.0 - 0.5 % Final    Neutrophils, Absolute 06/12/2024 2.75  1.70 - 7.00 10*3/mm3 Final    Lymphocytes, Absolute 06/12/2024 0.78  0.70 - 3.10 10*3/mm3 Final    Monocytes, Absolute 06/12/2024 0.34  0.10 - 0.90 10*3/mm3 Final    Eosinophils, Absolute 06/12/2024 0.21  0.00 - 0.40 10*3/mm3 Final    Basophils, Absolute 06/12/2024 0.02  0.00 - 0.20 10*3/mm3 Final    Immature Grans, Absolute 06/12/2024 0.02  0.00 - 0.05 10*3/mm3 Final    nRBC 06/12/2024 0.0  0.0 - 0.2 /100 WBC Final        No results found.  05/23/23 05/01/2024      ASSESSMENT: The patient is a very pleasant 79 y.o. male  with locally advanced left ear squamous cell carcinoma      PLAN:    1.  Locally advanced squamous cell carcinoma of the left ear T4 N1 M0 stage IV:  A.  I will proceed with treatment as scheduled today Libtayo IV every 3 weeks cycle #20.  B.  The patient will follow-up with us in 3 weeks for cycle #21.  Will plan to start treatment after cycle #24.  C.  I will continue to monitor the patient blood work including blood counts kidney function liver function and electrolytes.  D.  I will continue to hold off on doing imaging.  We will follow patient clinically for response.  Clinically, patient continues  to have response.  E.  I  reviewed with the patient the goal of treatment is palliative given his locally advanced disease with ipsilateral lymph node metastases.  F. We reviewed potential side effects of immunotherapy including but not limited to immune mediated reactions with thyroiditis, pneumonitis, hepatitis, colitis, rash, and electrolyte abnormalities, fatigue, multiorgan failure, and possibly death.  G.  I did go over the blood work results with the patient from June 12, 2024.  He has mild and stable anemia hemoglobin 11.0 normal white cells and platelets.    2. Cancer-related pain:  A.  He will continue oxycodone as needed.     3.  Wound care:  A.  He is no longer receiving wound care as his wound has completely healed.  He is keeping it clean and dry  B.  We will continue wound care as needed.       4.  Depression:  A.  He will continue Cymbalta daily    5.  Treatment related itching  A. Stable.  I will continue hydroxyzine.  B.  I asked the patient to make sure he is keeping his skin hydrated with non perfumed lotions as well.    6. Treatment related hypothyroidism  A. Continue Synthroid 75 mcg.   B. I will follow-up on thyroid function from today.    FOLLOW UP: 3 weeks with cycle #21.    Benitez Medellin MD  7/3/2024

## 2024-07-08 RX ORDER — LEVOTHYROXINE SODIUM 0.07 MG/1
75 TABLET ORAL DAILY
Qty: 30 TABLET | Refills: 3 | Status: SHIPPED | OUTPATIENT
Start: 2024-07-08

## 2024-07-23 NOTE — PROGRESS NOTES
DATE OF VISIT: 7/24/2024    REASON FOR VISIT: Followup for locally advanced squamous cell carcinoma of the left ear     PROBLEM LIST:  1. 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  Presented with gradually growing mass over the left ear for the last 3 years.  B.  Patient did not seek any medical attention until April 2023.  C.  CT neck and chest done at  April 2023 confirmed locally advanced lesion eroding into the left mastoid with left cervical lymphadenopathy but no evidence of chest metastases.  D.  Biopsy done April 2023 confirmed squamous cell carcinoma.  E.  Status post palliative radiation completed April 28, 2023  F.  Started immunotherapy with Libtayo 350 mg IV every 3 weeks May 22, 2023.  Status post 20 cycles.  2.  Cancer-related pain  3.  Mild depression  4.  BPH    HISTORY OF PRESENT ILLNESS: The patient is a very pleasant 79 y.o. male with past medical history significant for locally advanced left ear squamous cell carcinoma diagnosed April 2023.  The patient was started on Libtayo May 16, 2023. The patient is here today for scheduled follow-up visit with treatment cycle #21.    SUBJECTIVE: Peter is here today by himself.  His son is in the waiting room.  Overall he is doing fairly well.  He feels like his ear is improving.  He continues to have some trouble hearing out of it but the wound has healed.  He is having some joint pain that is his normal he says.  He is a little stiff today.      Past History:  Medical History: has a past medical history of Anemia, Arthritis, and Cancer.   Surgical History: has a past surgical history that includes Skin cancer excision; Neck dissection (N/A); Parotidectomy (N/A); and NAIL BIOPSY (N/A).   Family History: family history includes Cancer in his father and mother.   Social History: reports that he has never smoked. His smokeless tobacco use includes chew. He reports that he does not currently use alcohol. He reports that he  "does not use drugs.    (Not in a hospital admission)     Allergies: Patient has no known allergies.     Review of Systems   Constitutional:  Positive for fatigue.   HENT:  Positive for hearing loss.    Respiratory: Negative.     Gastrointestinal: Negative.    Skin: Negative.    Psychiatric/Behavioral: Negative.         PHYSICAL EXAMINATION:   /76   Pulse 78   Temp 98 °F (36.7 °C)   Resp 16   Ht 182.9 cm (72.01\")   Wt 96.3 kg (212 lb 4.8 oz)   SpO2 99%   BMI 28.79 kg/m²    Pain Score    07/24/24 0904   PainSc: 0-No pain                                    ECOG Performance Status: 1 - Symptomatic but completely ambulatory      General Appearance:      alert, cooperative, no apparent distress and appears stated age   Lungs:   Clear to auscultation bilaterally; respirations regular, even, and unlabored bilaterally   Heart:  Regular rate and rhythm, no murmurs appreciated   Abdomen:   Soft, non-tender, and non-distended                 No visits with results within 2 Week(s) from this visit.   Latest known visit with results is:   Infusion on 07/03/2024   Component Date Value Ref Range Status    Glucose 07/03/2024 95  65 - 99 mg/dL Final    BUN 07/03/2024 16  8 - 23 mg/dL Final    Creatinine 07/03/2024 1.44 (H)  0.76 - 1.27 mg/dL Final    Sodium 07/03/2024 133 (L)  136 - 145 mmol/L Final    Potassium 07/03/2024 4.6  3.5 - 5.2 mmol/L Final    Chloride 07/03/2024 98  98 - 107 mmol/L Final    CO2 07/03/2024 23.2  22.0 - 29.0 mmol/L Final    Calcium 07/03/2024 9.5  8.6 - 10.5 mg/dL Final    Total Protein 07/03/2024 6.9  6.0 - 8.5 g/dL Final    Albumin 07/03/2024 4.0  3.5 - 5.2 g/dL Final    ALT (SGPT) 07/03/2024 10  1 - 41 U/L Final    AST (SGOT) 07/03/2024 20  1 - 40 U/L Final    Alkaline Phosphatase 07/03/2024 64  39 - 117 U/L Final    Total Bilirubin 07/03/2024 1.0  0.0 - 1.2 mg/dL Final    Globulin 07/03/2024 2.9  gm/dL Final    A/G Ratio 07/03/2024 1.4  g/dL Final    BUN/Creatinine Ratio 07/03/2024 11.1  7.0 " - 25.0 Final    Anion Gap 07/03/2024 11.8  5.0 - 15.0 mmol/L Final    eGFR 07/03/2024 49.1 (L)  >60.0 mL/min/1.73 Final    WBC 07/03/2024 3.35 (L)  3.40 - 10.80 10*3/mm3 Final    RBC 07/03/2024 3.64 (L)  4.14 - 5.80 10*6/mm3 Final    Hemoglobin 07/03/2024 11.0 (L)  13.0 - 17.7 g/dL Final    Hematocrit 07/03/2024 31.5 (L)  37.5 - 51.0 % Final    MCV 07/03/2024 86.5  79.0 - 97.0 fL Final    MCH 07/03/2024 30.2  26.6 - 33.0 pg Final    MCHC 07/03/2024 34.9  31.5 - 35.7 g/dL Final    RDW 07/03/2024 14.4  12.3 - 15.4 % Final    RDW-SD 07/03/2024 45.5  37.0 - 54.0 fl Final    MPV 07/03/2024 9.4  6.0 - 12.0 fL Final    Platelets 07/03/2024 177  140 - 450 10*3/mm3 Final    Neutrophil % 07/03/2024 49.8  42.7 - 76.0 % Final    Lymphocyte % 07/03/2024 28.7  19.6 - 45.3 % Final    Monocyte % 07/03/2024 11.9  5.0 - 12.0 % Final    Eosinophil % 07/03/2024 6.9 (H)  0.3 - 6.2 % Final    Basophil % 07/03/2024 1.8 (H)  0.0 - 1.5 % Final    Immature Grans % 07/03/2024 0.9 (H)  0.0 - 0.5 % Final    Neutrophils, Absolute 07/03/2024 1.67 (L)  1.70 - 7.00 10*3/mm3 Final    Lymphocytes, Absolute 07/03/2024 0.96  0.70 - 3.10 10*3/mm3 Final    Monocytes, Absolute 07/03/2024 0.40  0.10 - 0.90 10*3/mm3 Final    Eosinophils, Absolute 07/03/2024 0.23  0.00 - 0.40 10*3/mm3 Final    Basophils, Absolute 07/03/2024 0.06  0.00 - 0.20 10*3/mm3 Final    Immature Grans, Absolute 07/03/2024 0.03  0.00 - 0.05 10*3/mm3 Final    nRBC 07/03/2024 0.0  0.0 - 0.2 /100 WBC Final    TSH 07/03/2024 12.130 (H)  0.270 - 4.200 uIU/mL Final    Free T4 07/03/2024 0.90 (L)  0.92 - 1.68 ng/dL Final        No results found.  05/23/23 05/01/2024      ASSESSMENT: The patient is a very pleasant 79 y.o. male  with locally advanced left ear squamous cell carcinoma      PLAN:    1.  Locally advanced squamous cell carcinoma of the left ear T4 N1 M0 stage IV:  A.  I will proceed with treatment as scheduled today Libtayo IV every 3 weeks cycle #21.  B.  The patient will  follow-up with us in 3 weeks for cycle #22.  Will plan to stop treatment after cycle #24.  C.  I will continue to monitor the patient blood work including blood counts kidney function liver function and electrolytes.  D.  I will continue to hold off on doing imaging.  We will follow patient clinically for response.  Clinically, patient continues to have response.  E.  I  reviewed with the patient the goal of treatment is palliative given his locally advanced disease with ipsilateral lymph node metastases.  F. We reviewed potential side effects of immunotherapy including but not limited to immune mediated reactions with thyroiditis, pneumonitis, hepatitis, colitis, rash, and electrolyte abnormalities, fatigue, multiorgan failure, and possibly death.  G.  I did go over the blood work results with the patient from July 3 , 2024.  He has mild and stable anemia hemoglobin 11.6, slightly low white blood cells at 3.35, and normal platelets.  We will repeat labs today..    2. Cancer-related pain:  A.  He will continue oxycodone as needed.     3.  Wound care:  A.  He is no longer receiving wound care as his wound has completely healed.  He is keeping it clean and dry  B.  We will continue wound care as needed.       4.  Depression:  A.  He will continue Cymbalta daily    5.  Treatment related itching  A. Stable.  I will continue hydroxyzine.  B.  I asked the patient to make sure he is keeping his skin hydrated with non perfumed lotions as well.    6. Treatment related hypothyroidism  A. Continue Synthroid 75 mcg.   B. I will follow-up on thyroid function from today.    FOLLOW UP: 3 weeks with cycle #22.    Yady Guajardo, APRN  7/24/2024

## 2024-07-24 ENCOUNTER — OFFICE VISIT (OUTPATIENT)
Dept: ONCOLOGY | Facility: CLINIC | Age: 79
End: 2024-07-24
Payer: MEDICARE

## 2024-07-24 ENCOUNTER — INFUSION (OUTPATIENT)
Dept: ONCOLOGY | Facility: HOSPITAL | Age: 79
End: 2024-07-24
Payer: MEDICARE

## 2024-07-24 VITALS
RESPIRATION RATE: 16 BRPM | WEIGHT: 212.3 LBS | DIASTOLIC BLOOD PRESSURE: 76 MMHG | TEMPERATURE: 98 F | OXYGEN SATURATION: 99 % | HEIGHT: 72 IN | BODY MASS INDEX: 28.76 KG/M2 | SYSTOLIC BLOOD PRESSURE: 134 MMHG | HEART RATE: 78 BPM

## 2024-07-24 DIAGNOSIS — Z51.81 ENCOUNTER FOR THERAPEUTIC DRUG MONITORING: ICD-10-CM

## 2024-07-24 DIAGNOSIS — Z51.81 ENCOUNTER FOR THERAPEUTIC DRUG MONITORING: Primary | ICD-10-CM

## 2024-07-24 DIAGNOSIS — C44.229 SQUAMOUS CELL CARCINOMA OF SKIN OF LEFT EAR AND EXTERNAL AURICULAR CANAL: Primary | ICD-10-CM

## 2024-07-24 DIAGNOSIS — C44.229 SQUAMOUS CELL CARCINOMA OF SKIN OF LEFT EAR AND EXTERNAL AURICULAR CANAL: ICD-10-CM

## 2024-07-24 LAB
ALBUMIN SERPL-MCNC: 4 G/DL (ref 3.5–5.2)
ALBUMIN/GLOB SERPL: 1.3 G/DL
ALP SERPL-CCNC: 67 U/L (ref 39–117)
ALT SERPL W P-5'-P-CCNC: 12 U/L (ref 1–41)
ANION GAP SERPL CALCULATED.3IONS-SCNC: 11.3 MMOL/L (ref 5–15)
AST SERPL-CCNC: 22 U/L (ref 1–40)
BASOPHILS # BLD AUTO: 0.06 10*3/MM3 (ref 0–0.2)
BASOPHILS NFR BLD AUTO: 1.7 % (ref 0–1.5)
BILIRUB SERPL-MCNC: 0.7 MG/DL (ref 0–1.2)
BUN SERPL-MCNC: 13 MG/DL (ref 8–23)
BUN/CREAT SERPL: 8.2 (ref 7–25)
CALCIUM SPEC-SCNC: 9.5 MG/DL (ref 8.6–10.5)
CHLORIDE SERPL-SCNC: 96 MMOL/L (ref 98–107)
CO2 SERPL-SCNC: 24.7 MMOL/L (ref 22–29)
CREAT SERPL-MCNC: 1.59 MG/DL (ref 0.76–1.27)
DEPRECATED RDW RBC AUTO: 46.8 FL (ref 37–54)
EGFRCR SERPLBLD CKD-EPI 2021: 43.6 ML/MIN/1.73
EOSINOPHIL # BLD AUTO: 0.28 10*3/MM3 (ref 0–0.4)
EOSINOPHIL NFR BLD AUTO: 7.8 % (ref 0.3–6.2)
ERYTHROCYTE [DISTWIDTH] IN BLOOD BY AUTOMATED COUNT: 14.6 % (ref 12.3–15.4)
GLOBULIN UR ELPH-MCNC: 3 GM/DL
GLUCOSE SERPL-MCNC: 101 MG/DL (ref 65–99)
HCT VFR BLD AUTO: 33.7 % (ref 37.5–51)
HGB BLD-MCNC: 11.6 G/DL (ref 13–17.7)
IMM GRANULOCYTES # BLD AUTO: 0.03 10*3/MM3 (ref 0–0.05)
IMM GRANULOCYTES NFR BLD AUTO: 0.8 % (ref 0–0.5)
LYMPHOCYTES # BLD AUTO: 0.64 10*3/MM3 (ref 0.7–3.1)
LYMPHOCYTES NFR BLD AUTO: 17.9 % (ref 19.6–45.3)
MCH RBC QN AUTO: 30.1 PG (ref 26.6–33)
MCHC RBC AUTO-ENTMCNC: 34.4 G/DL (ref 31.5–35.7)
MCV RBC AUTO: 87.3 FL (ref 79–97)
MONOCYTES # BLD AUTO: 0.4 10*3/MM3 (ref 0.1–0.9)
MONOCYTES NFR BLD AUTO: 11.2 % (ref 5–12)
NEUTROPHILS NFR BLD AUTO: 2.17 10*3/MM3 (ref 1.7–7)
NEUTROPHILS NFR BLD AUTO: 60.6 % (ref 42.7–76)
NRBC BLD AUTO-RTO: 0 /100 WBC (ref 0–0.2)
PLATELET # BLD AUTO: 175 10*3/MM3 (ref 140–450)
PMV BLD AUTO: 10 FL (ref 6–12)
POTASSIUM SERPL-SCNC: 4.5 MMOL/L (ref 3.5–5.2)
PROT SERPL-MCNC: 7 G/DL (ref 6–8.5)
RBC # BLD AUTO: 3.86 10*6/MM3 (ref 4.14–5.8)
SODIUM SERPL-SCNC: 132 MMOL/L (ref 136–145)
T4 FREE SERPL-MCNC: 1.45 NG/DL (ref 0.92–1.68)
TSH SERPL DL<=0.05 MIU/L-ACNC: 2.4 UIU/ML (ref 0.27–4.2)
WBC NRBC COR # BLD AUTO: 3.58 10*3/MM3 (ref 3.4–10.8)

## 2024-07-24 PROCEDURE — 99214 OFFICE O/P EST MOD 30 MIN: CPT | Performed by: NURSE PRACTITIONER

## 2024-07-24 PROCEDURE — 85025 COMPLETE CBC W/AUTO DIFF WBC: CPT

## 2024-07-24 PROCEDURE — 1126F AMNT PAIN NOTED NONE PRSNT: CPT | Performed by: NURSE PRACTITIONER

## 2024-07-24 PROCEDURE — 84439 ASSAY OF FREE THYROXINE: CPT

## 2024-07-24 PROCEDURE — 36415 COLL VENOUS BLD VENIPUNCTURE: CPT

## 2024-07-24 PROCEDURE — 96413 CHEMO IV INFUSION 1 HR: CPT

## 2024-07-24 PROCEDURE — 25010000002 CEMIPLIMAB-RWLC 350 MG/7ML SOLUTION 7 ML VIAL: Performed by: NURSE PRACTITIONER

## 2024-07-24 PROCEDURE — 80053 COMPREHEN METABOLIC PANEL: CPT

## 2024-07-24 PROCEDURE — 84443 ASSAY THYROID STIM HORMONE: CPT

## 2024-07-24 RX ORDER — SODIUM CHLORIDE 9 MG/ML
250 INJECTION, SOLUTION INTRAVENOUS ONCE
Status: DISCONTINUED | OUTPATIENT
Start: 2024-07-24 | End: 2024-07-24 | Stop reason: HOSPADM

## 2024-07-24 RX ORDER — SODIUM CHLORIDE 9 MG/ML
250 INJECTION, SOLUTION INTRAVENOUS ONCE
Status: CANCELLED | OUTPATIENT
Start: 2024-07-25

## 2024-07-24 RX ORDER — ONDANSETRON 8 MG/1
TABLET, ORALLY DISINTEGRATING ORAL
COMMUNITY

## 2024-07-24 RX ADMIN — CEMIPLIMAB-RWLC 350 MG: 50 INJECTION INTRAVENOUS at 12:27

## 2024-08-02 ENCOUNTER — APPOINTMENT (OUTPATIENT)
Dept: CT IMAGING | Facility: HOSPITAL | Age: 79
DRG: 066 | End: 2024-08-02
Payer: MEDICARE

## 2024-08-02 ENCOUNTER — APPOINTMENT (OUTPATIENT)
Dept: GENERAL RADIOLOGY | Facility: HOSPITAL | Age: 79
DRG: 066 | End: 2024-08-02
Payer: MEDICARE

## 2024-08-02 ENCOUNTER — HOSPITAL ENCOUNTER (INPATIENT)
Facility: HOSPITAL | Age: 79
LOS: 1 days | Discharge: LEFT BEFORE DISCHARGE | DRG: 066 | End: 2024-08-04
Attending: STUDENT IN AN ORGANIZED HEALTH CARE EDUCATION/TRAINING PROGRAM | Admitting: FAMILY MEDICINE
Payer: MEDICARE

## 2024-08-02 ENCOUNTER — APPOINTMENT (OUTPATIENT)
Dept: MRI IMAGING | Facility: HOSPITAL | Age: 79
DRG: 066 | End: 2024-08-02
Payer: MEDICARE

## 2024-08-02 DIAGNOSIS — I63.9 CEREBROVASCULAR ACCIDENT (CVA), UNSPECIFIED MECHANISM: Primary | ICD-10-CM

## 2024-08-02 PROBLEM — R29.898 WEAKNESS OF LEFT UPPER EXTREMITY: Status: ACTIVE | Noted: 2024-08-02

## 2024-08-02 LAB
ALBUMIN SERPL-MCNC: 4 G/DL (ref 3.5–5.2)
ALBUMIN/GLOB SERPL: 1.5 G/DL
ALP SERPL-CCNC: 66 U/L (ref 39–117)
ALT SERPL W P-5'-P-CCNC: 13 U/L (ref 1–41)
ANION GAP SERPL CALCULATED.3IONS-SCNC: 10.5 MMOL/L (ref 5–15)
AST SERPL-CCNC: 22 U/L (ref 1–40)
BASOPHILS # BLD AUTO: 0.06 10*3/MM3 (ref 0–0.2)
BASOPHILS NFR BLD AUTO: 1.6 % (ref 0–1.5)
BILIRUB SERPL-MCNC: 0.5 MG/DL (ref 0–1.2)
BILIRUB UR QL STRIP: NEGATIVE
BUN SERPL-MCNC: 13 MG/DL (ref 8–23)
BUN/CREAT SERPL: 8.3 (ref 7–25)
CALCIUM SPEC-SCNC: 9.2 MG/DL (ref 8.6–10.5)
CHLORIDE SERPL-SCNC: 96 MMOL/L (ref 98–107)
CLARITY UR: CLEAR
CO2 SERPL-SCNC: 24.5 MMOL/L (ref 22–29)
COLOR UR: YELLOW
CREAT SERPL-MCNC: 1.56 MG/DL (ref 0.76–1.27)
CRP SERPL-MCNC: 0.61 MG/DL (ref 0–0.5)
D-LACTATE SERPL-SCNC: 1 MMOL/L (ref 0.5–2)
DEPRECATED RDW RBC AUTO: 46.3 FL (ref 37–54)
EGFRCR SERPLBLD CKD-EPI 2021: 44.6 ML/MIN/1.73
EOSINOPHIL # BLD AUTO: 0.18 10*3/MM3 (ref 0–0.4)
EOSINOPHIL NFR BLD AUTO: 4.9 % (ref 0.3–6.2)
ERYTHROCYTE [DISTWIDTH] IN BLOOD BY AUTOMATED COUNT: 14.4 % (ref 12.3–15.4)
FLUAV RNA RESP QL NAA+PROBE: NOT DETECTED
FLUBV RNA RESP QL NAA+PROBE: NOT DETECTED
GEN 5 2HR TROPONIN T REFLEX: 28 NG/L
GLOBULIN UR ELPH-MCNC: 2.7 GM/DL
GLUCOSE SERPL-MCNC: 110 MG/DL (ref 65–99)
GLUCOSE UR STRIP-MCNC: NEGATIVE MG/DL
HCT VFR BLD AUTO: 31 % (ref 37.5–51)
HGB BLD-MCNC: 10.3 G/DL (ref 13–17.7)
HGB UR QL STRIP.AUTO: NEGATIVE
HOLD SPECIMEN: NORMAL
HOLD SPECIMEN: NORMAL
IMM GRANULOCYTES # BLD AUTO: 0.03 10*3/MM3 (ref 0–0.05)
IMM GRANULOCYTES NFR BLD AUTO: 0.8 % (ref 0–0.5)
INR PPP: 1.1 (ref 0.9–1.1)
KETONES UR QL STRIP: NEGATIVE
LEUKOCYTE ESTERASE UR QL STRIP.AUTO: NEGATIVE
LYMPHOCYTES # BLD AUTO: 0.68 10*3/MM3 (ref 0.7–3.1)
LYMPHOCYTES NFR BLD AUTO: 18.3 % (ref 19.6–45.3)
MAGNESIUM SERPL-MCNC: 1.8 MG/DL (ref 1.6–2.4)
MCH RBC QN AUTO: 29.5 PG (ref 26.6–33)
MCHC RBC AUTO-ENTMCNC: 33.2 G/DL (ref 31.5–35.7)
MCV RBC AUTO: 88.8 FL (ref 79–97)
MONOCYTES # BLD AUTO: 0.35 10*3/MM3 (ref 0.1–0.9)
MONOCYTES NFR BLD AUTO: 9.4 % (ref 5–12)
NEUTROPHILS NFR BLD AUTO: 2.41 10*3/MM3 (ref 1.7–7)
NEUTROPHILS NFR BLD AUTO: 65 % (ref 42.7–76)
NITRITE UR QL STRIP: NEGATIVE
NRBC BLD AUTO-RTO: 0 /100 WBC (ref 0–0.2)
NT-PROBNP SERPL-MCNC: 106.6 PG/ML (ref 0–1800)
PH UR STRIP.AUTO: 7 [PH] (ref 5–8)
PLATELET # BLD AUTO: 180 10*3/MM3 (ref 140–450)
PMV BLD AUTO: 9.2 FL (ref 6–12)
POTASSIUM SERPL-SCNC: 5 MMOL/L (ref 3.5–5.2)
PROCALCITONIN SERPL-MCNC: 0.08 NG/ML (ref 0–0.25)
PROT SERPL-MCNC: 6.7 G/DL (ref 6–8.5)
PROT UR QL STRIP: NEGATIVE
PROTHROMBIN TIME: 14.7 SECONDS (ref 12.3–15.1)
RBC # BLD AUTO: 3.49 10*6/MM3 (ref 4.14–5.8)
SARS-COV-2 RNA RESP QL NAA+PROBE: NOT DETECTED
SODIUM SERPL-SCNC: 131 MMOL/L (ref 136–145)
SP GR UR STRIP: 1.02 (ref 1–1.03)
TROPONIN T DELTA: -3 NG/L
TROPONIN T SERPL HS-MCNC: 31 NG/L
UROBILINOGEN UR QL STRIP: NORMAL
WBC NRBC COR # BLD AUTO: 3.71 10*3/MM3 (ref 3.4–10.8)
WHOLE BLOOD HOLD COAG: NORMAL
WHOLE BLOOD HOLD SPECIMEN: NORMAL

## 2024-08-02 PROCEDURE — 80053 COMPREHEN METABOLIC PANEL: CPT | Performed by: STUDENT IN AN ORGANIZED HEALTH CARE EDUCATION/TRAINING PROGRAM

## 2024-08-02 PROCEDURE — G0378 HOSPITAL OBSERVATION PER HR: HCPCS

## 2024-08-02 PROCEDURE — 83735 ASSAY OF MAGNESIUM: CPT | Performed by: STUDENT IN AN ORGANIZED HEALTH CARE EDUCATION/TRAINING PROGRAM

## 2024-08-02 PROCEDURE — 81003 URINALYSIS AUTO W/O SCOPE: CPT | Performed by: STUDENT IN AN ORGANIZED HEALTH CARE EDUCATION/TRAINING PROGRAM

## 2024-08-02 PROCEDURE — 93005 ELECTROCARDIOGRAM TRACING: CPT | Performed by: STUDENT IN AN ORGANIZED HEALTH CARE EDUCATION/TRAINING PROGRAM

## 2024-08-02 PROCEDURE — 0 GADOBENATE DIMEGLUMINE 529 MG/ML SOLUTION: Performed by: STUDENT IN AN ORGANIZED HEALTH CARE EDUCATION/TRAINING PROGRAM

## 2024-08-02 PROCEDURE — 70498 CT ANGIOGRAPHY NECK: CPT

## 2024-08-02 PROCEDURE — 70450 CT HEAD/BRAIN W/O DYE: CPT

## 2024-08-02 PROCEDURE — A9577 INJ MULTIHANCE: HCPCS | Performed by: STUDENT IN AN ORGANIZED HEALTH CARE EDUCATION/TRAINING PROGRAM

## 2024-08-02 PROCEDURE — 83605 ASSAY OF LACTIC ACID: CPT | Performed by: STUDENT IN AN ORGANIZED HEALTH CARE EDUCATION/TRAINING PROGRAM

## 2024-08-02 PROCEDURE — 70496 CT ANGIOGRAPHY HEAD: CPT

## 2024-08-02 PROCEDURE — 25510000001 IOPAMIDOL 61 % SOLUTION: Performed by: STUDENT IN AN ORGANIZED HEALTH CARE EDUCATION/TRAINING PROGRAM

## 2024-08-02 PROCEDURE — 83880 ASSAY OF NATRIURETIC PEPTIDE: CPT | Performed by: STUDENT IN AN ORGANIZED HEALTH CARE EDUCATION/TRAINING PROGRAM

## 2024-08-02 PROCEDURE — 36415 COLL VENOUS BLD VENIPUNCTURE: CPT

## 2024-08-02 PROCEDURE — 85025 COMPLETE CBC W/AUTO DIFF WBC: CPT | Performed by: STUDENT IN AN ORGANIZED HEALTH CARE EDUCATION/TRAINING PROGRAM

## 2024-08-02 PROCEDURE — 87636 SARSCOV2 & INF A&B AMP PRB: CPT | Performed by: STUDENT IN AN ORGANIZED HEALTH CARE EDUCATION/TRAINING PROGRAM

## 2024-08-02 PROCEDURE — 99223 1ST HOSP IP/OBS HIGH 75: CPT | Performed by: FAMILY MEDICINE

## 2024-08-02 PROCEDURE — 84145 PROCALCITONIN (PCT): CPT | Performed by: STUDENT IN AN ORGANIZED HEALTH CARE EDUCATION/TRAINING PROGRAM

## 2024-08-02 PROCEDURE — 99291 CRITICAL CARE FIRST HOUR: CPT

## 2024-08-02 PROCEDURE — 84484 ASSAY OF TROPONIN QUANT: CPT | Performed by: STUDENT IN AN ORGANIZED HEALTH CARE EDUCATION/TRAINING PROGRAM

## 2024-08-02 PROCEDURE — 86140 C-REACTIVE PROTEIN: CPT | Performed by: STUDENT IN AN ORGANIZED HEALTH CARE EDUCATION/TRAINING PROGRAM

## 2024-08-02 PROCEDURE — 85610 PROTHROMBIN TIME: CPT | Performed by: STUDENT IN AN ORGANIZED HEALTH CARE EDUCATION/TRAINING PROGRAM

## 2024-08-02 PROCEDURE — 70553 MRI BRAIN STEM W/O & W/DYE: CPT

## 2024-08-02 PROCEDURE — 71045 X-RAY EXAM CHEST 1 VIEW: CPT

## 2024-08-02 RX ORDER — SODIUM CHLORIDE 0.9 % (FLUSH) 0.9 %
10 SYRINGE (ML) INJECTION AS NEEDED
Status: DISCONTINUED | OUTPATIENT
Start: 2024-08-02 | End: 2024-08-04 | Stop reason: HOSPADM

## 2024-08-02 RX ORDER — CLOPIDOGREL BISULFATE 75 MG/1
300 TABLET ORAL ONCE
Status: COMPLETED | OUTPATIENT
Start: 2024-08-02 | End: 2024-08-02

## 2024-08-02 RX ADMIN — GADOBENATE DIMEGLUMINE 15 ML: 529 INJECTION, SOLUTION INTRAVENOUS at 22:11

## 2024-08-02 RX ADMIN — CLOPIDOGREL BISULFATE 300 MG: 75 TABLET ORAL at 23:16

## 2024-08-02 RX ADMIN — IOPAMIDOL 100 ML: 612 INJECTION, SOLUTION INTRAVENOUS at 21:01

## 2024-08-03 ENCOUNTER — APPOINTMENT (OUTPATIENT)
Dept: CARDIOLOGY | Facility: HOSPITAL | Age: 79
DRG: 066 | End: 2024-08-03
Payer: MEDICARE

## 2024-08-03 LAB
ANION GAP SERPL CALCULATED.3IONS-SCNC: 9.9 MMOL/L (ref 5–15)
BUN SERPL-MCNC: 12 MG/DL (ref 8–23)
BUN/CREAT SERPL: 9.5 (ref 7–25)
CALCIUM SPEC-SCNC: 8.8 MG/DL (ref 8.6–10.5)
CHLORIDE SERPL-SCNC: 96 MMOL/L (ref 98–107)
CHOLEST SERPL-MCNC: 117 MG/DL (ref 0–200)
CO2 SERPL-SCNC: 23.1 MMOL/L (ref 22–29)
CREAT SERPL-MCNC: 1.26 MG/DL (ref 0.76–1.27)
DEPRECATED RDW RBC AUTO: 46 FL (ref 37–54)
EGFRCR SERPLBLD CKD-EPI 2021: 57.7 ML/MIN/1.73
ERYTHROCYTE [DISTWIDTH] IN BLOOD BY AUTOMATED COUNT: 14.2 % (ref 12.3–15.4)
GLUCOSE SERPL-MCNC: 108 MG/DL (ref 65–99)
HBA1C MFR BLD: 5 % (ref 4.8–5.6)
HCT VFR BLD AUTO: 28.8 % (ref 37.5–51)
HDLC SERPL-MCNC: 27 MG/DL (ref 40–60)
HGB BLD-MCNC: 9.5 G/DL (ref 13–17.7)
LDLC SERPL CALC-MCNC: 73 MG/DL (ref 0–100)
LDLC/HDLC SERPL: 2.71 {RATIO}
MCH RBC QN AUTO: 29.3 PG (ref 26.6–33)
MCHC RBC AUTO-ENTMCNC: 33 G/DL (ref 31.5–35.7)
MCV RBC AUTO: 88.9 FL (ref 79–97)
PLATELET # BLD AUTO: 158 10*3/MM3 (ref 140–450)
PMV BLD AUTO: 9.1 FL (ref 6–12)
POTASSIUM SERPL-SCNC: 4.6 MMOL/L (ref 3.5–5.2)
RBC # BLD AUTO: 3.24 10*6/MM3 (ref 4.14–5.8)
SODIUM SERPL-SCNC: 129 MMOL/L (ref 136–145)
TRIGL SERPL-MCNC: 84 MG/DL (ref 0–150)
VLDLC SERPL-MCNC: 17 MG/DL (ref 5–40)
WBC NRBC COR # BLD AUTO: 3.35 10*3/MM3 (ref 3.4–10.8)

## 2024-08-03 PROCEDURE — 97161 PT EVAL LOW COMPLEX 20 MIN: CPT

## 2024-08-03 PROCEDURE — 83036 HEMOGLOBIN GLYCOSYLATED A1C: CPT | Performed by: FAMILY MEDICINE

## 2024-08-03 PROCEDURE — 80061 LIPID PANEL: CPT | Performed by: FAMILY MEDICINE

## 2024-08-03 PROCEDURE — 25010000002 ENOXAPARIN PER 10 MG: Performed by: FAMILY MEDICINE

## 2024-08-03 PROCEDURE — 80048 BASIC METABOLIC PNL TOTAL CA: CPT | Performed by: FAMILY MEDICINE

## 2024-08-03 PROCEDURE — 82607 VITAMIN B-12: CPT | Performed by: PSYCHIATRY & NEUROLOGY

## 2024-08-03 PROCEDURE — 85027 COMPLETE CBC AUTOMATED: CPT | Performed by: FAMILY MEDICINE

## 2024-08-03 PROCEDURE — 25810000003 SODIUM CHLORIDE 0.9 % SOLUTION: Performed by: FAMILY MEDICINE

## 2024-08-03 PROCEDURE — 93306 TTE W/DOPPLER COMPLETE: CPT

## 2024-08-03 PROCEDURE — 93306 TTE W/DOPPLER COMPLETE: CPT | Performed by: INTERNAL MEDICINE

## 2024-08-03 PROCEDURE — 82746 ASSAY OF FOLIC ACID SERUM: CPT | Performed by: PSYCHIATRY & NEUROLOGY

## 2024-08-03 PROCEDURE — 99222 1ST HOSP IP/OBS MODERATE 55: CPT | Performed by: PSYCHIATRY & NEUROLOGY

## 2024-08-03 PROCEDURE — 99232 SBSQ HOSP IP/OBS MODERATE 35: CPT | Performed by: FAMILY MEDICINE

## 2024-08-03 RX ORDER — POLYETHYLENE GLYCOL 3350 17 G/17G
17 POWDER, FOR SOLUTION ORAL DAILY PRN
Status: DISCONTINUED | OUTPATIENT
Start: 2024-08-03 | End: 2024-08-04 | Stop reason: HOSPADM

## 2024-08-03 RX ORDER — BISACODYL 10 MG
10 SUPPOSITORY, RECTAL RECTAL DAILY PRN
Status: DISCONTINUED | OUTPATIENT
Start: 2024-08-03 | End: 2024-08-04 | Stop reason: HOSPADM

## 2024-08-03 RX ORDER — TAMSULOSIN HYDROCHLORIDE 0.4 MG/1
0.4 CAPSULE ORAL DAILY
Status: DISCONTINUED | OUTPATIENT
Start: 2024-08-03 | End: 2024-08-04 | Stop reason: HOSPADM

## 2024-08-03 RX ORDER — ATORVASTATIN CALCIUM 40 MG/1
40 TABLET, FILM COATED ORAL NIGHTLY
Status: DISCONTINUED | OUTPATIENT
Start: 2024-08-03 | End: 2024-08-04 | Stop reason: HOSPADM

## 2024-08-03 RX ORDER — DULOXETIN HYDROCHLORIDE 30 MG/1
30 CAPSULE, DELAYED RELEASE ORAL DAILY
Status: DISCONTINUED | OUTPATIENT
Start: 2024-08-03 | End: 2024-08-04 | Stop reason: HOSPADM

## 2024-08-03 RX ORDER — AMOXICILLIN 250 MG
2 CAPSULE ORAL 2 TIMES DAILY PRN
Status: DISCONTINUED | OUTPATIENT
Start: 2024-08-03 | End: 2024-08-04 | Stop reason: HOSPADM

## 2024-08-03 RX ORDER — ACETAMINOPHEN 650 MG/1
650 SUPPOSITORY RECTAL EVERY 4 HOURS PRN
Status: DISCONTINUED | OUTPATIENT
Start: 2024-08-03 | End: 2024-08-03

## 2024-08-03 RX ORDER — CLOPIDOGREL BISULFATE 75 MG/1
75 TABLET ORAL DAILY
Status: DISCONTINUED | OUTPATIENT
Start: 2024-08-03 | End: 2024-08-04 | Stop reason: HOSPADM

## 2024-08-03 RX ORDER — SODIUM CHLORIDE 0.9 % (FLUSH) 0.9 %
10 SYRINGE (ML) INJECTION EVERY 12 HOURS SCHEDULED
Status: DISCONTINUED | OUTPATIENT
Start: 2024-08-03 | End: 2024-08-04 | Stop reason: HOSPADM

## 2024-08-03 RX ORDER — BISACODYL 5 MG/1
5 TABLET, DELAYED RELEASE ORAL DAILY PRN
Status: DISCONTINUED | OUTPATIENT
Start: 2024-08-03 | End: 2024-08-04 | Stop reason: HOSPADM

## 2024-08-03 RX ORDER — NITROGLYCERIN 0.4 MG/1
0.4 TABLET SUBLINGUAL
Status: DISCONTINUED | OUTPATIENT
Start: 2024-08-03 | End: 2024-08-04 | Stop reason: HOSPADM

## 2024-08-03 RX ORDER — SODIUM CHLORIDE 9 MG/ML
40 INJECTION, SOLUTION INTRAVENOUS AS NEEDED
Status: DISCONTINUED | OUTPATIENT
Start: 2024-08-03 | End: 2024-08-04 | Stop reason: HOSPADM

## 2024-08-03 RX ORDER — SODIUM CHLORIDE 9 MG/ML
100 INJECTION, SOLUTION INTRAVENOUS CONTINUOUS
Status: ACTIVE | OUTPATIENT
Start: 2024-08-03 | End: 2024-08-03

## 2024-08-03 RX ORDER — ENOXAPARIN SODIUM 100 MG/ML
40 INJECTION SUBCUTANEOUS NIGHTLY
Status: DISCONTINUED | OUTPATIENT
Start: 2024-08-03 | End: 2024-08-04 | Stop reason: HOSPADM

## 2024-08-03 RX ORDER — FINASTERIDE 5 MG/1
5 TABLET, FILM COATED ORAL DAILY
Status: DISCONTINUED | OUTPATIENT
Start: 2024-08-03 | End: 2024-08-04 | Stop reason: HOSPADM

## 2024-08-03 RX ORDER — LEVOTHYROXINE SODIUM 0.07 MG/1
75 TABLET ORAL DAILY
Status: DISCONTINUED | OUTPATIENT
Start: 2024-08-03 | End: 2024-08-04 | Stop reason: HOSPADM

## 2024-08-03 RX ORDER — ACETAMINOPHEN 325 MG/1
650 TABLET ORAL EVERY 4 HOURS PRN
Status: DISCONTINUED | OUTPATIENT
Start: 2024-08-03 | End: 2024-08-04 | Stop reason: HOSPADM

## 2024-08-03 RX ORDER — SODIUM CHLORIDE 0.9 % (FLUSH) 0.9 %
10 SYRINGE (ML) INJECTION AS NEEDED
Status: DISCONTINUED | OUTPATIENT
Start: 2024-08-03 | End: 2024-08-04 | Stop reason: HOSPADM

## 2024-08-03 RX ORDER — ACETAMINOPHEN 160 MG/5ML
650 SOLUTION ORAL EVERY 4 HOURS PRN
Status: DISCONTINUED | OUTPATIENT
Start: 2024-08-03 | End: 2024-08-03

## 2024-08-03 RX ORDER — ONDANSETRON 2 MG/ML
4 INJECTION INTRAMUSCULAR; INTRAVENOUS EVERY 6 HOURS PRN
Status: DISCONTINUED | OUTPATIENT
Start: 2024-08-03 | End: 2024-08-04 | Stop reason: HOSPADM

## 2024-08-03 RX ADMIN — ATORVASTATIN CALCIUM 40 MG: 40 TABLET, FILM COATED ORAL at 21:20

## 2024-08-03 RX ADMIN — LEVOTHYROXINE SODIUM 75 MCG: 0.07 TABLET ORAL at 05:59

## 2024-08-03 RX ADMIN — Medication 10 ML: at 00:51

## 2024-08-03 RX ADMIN — Medication 10 ML: at 21:20

## 2024-08-03 RX ADMIN — CLOPIDOGREL BISULFATE 75 MG: 75 TABLET ORAL at 08:21

## 2024-08-03 RX ADMIN — TAMSULOSIN HYDROCHLORIDE 0.4 MG: 0.4 CAPSULE ORAL at 08:21

## 2024-08-03 RX ADMIN — DULOXETINE HYDROCHLORIDE 30 MG: 30 CAPSULE, DELAYED RELEASE ORAL at 08:21

## 2024-08-03 RX ADMIN — ENOXAPARIN SODIUM 40 MG: 100 INJECTION SUBCUTANEOUS at 01:27

## 2024-08-03 RX ADMIN — ATORVASTATIN CALCIUM 40 MG: 40 TABLET, FILM COATED ORAL at 01:27

## 2024-08-03 RX ADMIN — SODIUM CHLORIDE 100 ML/HR: 9 INJECTION, SOLUTION INTRAVENOUS at 00:51

## 2024-08-03 RX ADMIN — FINASTERIDE 5 MG: 5 TABLET, FILM COATED ORAL at 08:21

## 2024-08-03 RX ADMIN — ENOXAPARIN SODIUM 40 MG: 100 INJECTION SUBCUTANEOUS at 21:20

## 2024-08-03 NOTE — PAYOR COMM NOTE
"To:  [place]  From: Hortencia Latif RN  Phone: [phone]  Fax: [phone]  NPI: [id number]  TIN: [id number]  Member ID: [id number]   MRN: [id number]    Hardik Browne (79 y.o. Male)       Date of Birth   1945    Social Security Number       Address   [address] [id number]    Home Phone   [phone]    MRN   [id number]       Mandaeism   None    Marital Status                               Admission Date   8/2/24    Admission Type   Emergency    Admitting Provider   Markus Lozano DO    Attending Provider   Markus Lozano DO    [facility]       Discharge Date       Discharge Disposition       Discharge Destination                                 Attending Provider: Markus Lozano DO    Allergies: Asa [Aspirin]    Isolation: None   Infection: None   Code Status: CPR    Ht: 182.9 cm (72\")   Wt: 100 kg (220 lb 7.4 oz)    Admission Cmt: None   Principal Problem: Weakness of left upper extremity [R29.898]                   Active Insurance as of 8/2/2024       Primary Coverage       Payor Plan Insurance Group Employer/Plan Group    ANTHEM MEDICARE REPLACEMENT ANTHEM MEDICARE ADVANTAGE [id number]       Payor Plan Address Payor Plan Phone Number Payor Plan Fax Number Effective Dates    [address] [phone]  1/1/2022 - None Entered    [facility] [id number]         Subscriber Name Subscriber Birth Date Member ID       HARDIK BROWNE 1945 [id number]               Secondary Coverage       Payor Plan Insurance Group Employer/Plan Group    [id number]        Payor Plan Address Payor Plan Phone Number Payor Plan Fax Number Effective Dates    [address] 4/7/2023 - None Entered    [facility] [id number]         Subscriber Name Subscriber Birth Date Member ID       HARDIK BROWNE 1945 [id number]                     Emergency Contacts        (Rel.) Home Phone Work Phone Mobile Phone    " MAC NATARAJAN/ZITA (Son) [phone] -- [phone]                 History & Physical        Sung Alcaraz MD at 24 [id number]            [facility]   HISTORY AND PHYSICAL      Name:  Hardik Natarajan   Age:  79 y.o.  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 Medellin, anemia and arthritis.  He presented to the ER with a chief complaint of left upper extremity weakness/numbness.  Patient reports that he woke up in the morning 2 days ago with sudden altered sensation/numbness and weakness of his left upper extremity, they initially thought it was related to his arthritis however his symptoms persisted and did not improve.  Patient reports that his hand is weak and specially with his  and fingers. Patient denies any other weakness or numbness.  Patient denies any pain.  No recent fever, chills, headaches, vision changes, chest pain or shortness of breath.  No recent vomiting, abdominal pain or urinary symptoms.  No previous history of strokes.     On ER evaluation, patient was hypertensive with a blood pressure of 154/84, afebrile, room air.  His workup was significant for HS Troponin of 31-28, sodium 131, creatinine 1.56, CRP 0.61, hemoglobin 10.3 otherwise the CBC and CMP nonactionable.  Urinalysis negative for infection.  COVID and flu negative.  Chest x-ray with no acute process per my read CT head without contrast with no acute intracranial hemorrhage, Moderate Cerebral volume loss. Moderate Cerebral white matter disease, likely ischemic microvascular in nature. CTA of the neck/head  demonstrates no occlusion or high-grade stenosis. MRI showed Small acute infarct in the right periventricular region. Moderate cerebral volume loss with moderate cerebral white matter disease, likely ischemic microvascular in nature.  Case discussed with neurology on-call Dr. Hodge by ER provider, patient is not a TNKase candidate, recommended admission for further observation and stroke workup.  Received Plavix in the ER. Hospitalist consulted for admission.    Review Of Systems:    All systems were reviewed and negative except as mentioned in history of presenting illness, assessment and plan.    Past Medical History: Patient  has a past medical history of Anemia, Arthritis, and Cancer.    Past Surgical History: Patient  has a past surgical history that includes Skin cancer excision; Neck dissection (N/A); Parotidectomy (N/A); and NAIL BIOPSY (N/A).    Social History: Patient  reports that he has never smoked. His smokeless tobacco use includes chew. He reports that he does not currently use alcohol. He reports that he does not use drugs.    Family History:  Patient's family history has been reviewed and found to be noncontributory.     Allergies:      Patient has no known allergies.    Home Medications:    Prior to Admission Medications       Prescriptions Last Dose Informant Patient Reported? Taking?    acetaminophen (TYLENOL) 325 MG tablet   Yes No    Take 2 tablets by mouth Every 6 (Six) Hours As Needed.    cyanocobalamin 1000 MCG/ML injection   Yes No    Inject 0.1 mL into the appropriate muscle as directed by prescriber Every 30 (Thirty) Days.    diclofenac (VOLTAREN) 75 MG EC tablet   Yes No    DULoxetine (CYMBALTA) 30 MG capsule   Yes No    finasteride (PROSCAR) 5 MG tablet   Yes No    Hydrocortisone, Perianal, (ANUSOL-HC) 2.5 % rectal cream   Yes No    Insert 1 Application into the rectum.    hydrOXYzine (ATARAX) 25 MG tablet   No No    Take 1 tablet by mouth Every 6 (Six) Hours As Needed for Itching.  "   levothyroxine (SYNTHROID, LEVOTHROID) 75 MCG tablet   No No    TAKE ONE (1) TABLET BY MOUTH DAILY.    ondansetron (ZOFRAN) 8 MG tablet   No No    Take 1 tablet by mouth 3 (Three) Times a Day As Needed for Nausea or Vomiting.    ondansetron ODT (ZOFRAN-ODT) 8 MG disintegrating tablet   Yes No    Place 1 tablet twice a day by translingual route.    oxyCODONE (Roxicodone) 5 MG immediate release tablet   No No    Take 1 tablet by mouth Every 6 (Six) Hours As Needed for Severe Pain.    Procto-Med HC 2.5 % rectal cream   Yes No    1 Application Daily As Needed.    senna (SENOKOT) 8.6 MG tablet   Yes No    Take 1 tablet by mouth Daily As Needed.    tamsulosin (FLOMAX) 0.4 MG capsule 24 hr capsule   Yes No    Take 1 capsule by mouth.          ED Medications:    Medications   sodium chloride 0.9 % flush 10 mL (has no administration in time range)   sodium chloride 0.9 % flush 10 mL (has no administration in time range)   clopidogrel (PLAVIX) tablet 300 mg (has no administration in time range)   iopamidol (ISOVUE-300) 61 % injection 100 mL (100 mL Intravenous Given 8/2/24 2101)     Vital Signs:  Temp:  [97.5 °F (36.4 °C)] 97.5 °F (36.4 °C)  Heart Rate:  [73-77] 73  Resp:  [18] 18  BP: (129-154)/(77-84) 129/77        08/02/24 1953   Weight: 99.8 kg (220 lb)     Body mass index is 29.84 kg/m².    Physical Exam:     Most recent vital Signs: /77 (BP Location: Left arm, Patient Position: Lying)   Pulse 73   Temp 97.5 °F (36.4 °C) (Oral)   Resp 18   Ht 182.9 cm (72\")   Wt 99.8 kg (220 lb)   SpO2 99%   BMI 29.84 kg/m²     Physical Exam  Vitals and nursing note reviewed.   Constitutional:       General: He is not in acute distress.     Appearance: Normal appearance.      Comments: Patient is hard of hearing.   HENT:      Head: Normocephalic and atraumatic.      Ears:      Comments: Left ear status post skin excisional biopsy/tumor resection.     Nose: Nose normal.      Mouth/Throat:      Mouth: Mucous membranes are " moist.   Eyes:      Extraocular Movements: Extraocular movements intact.      Conjunctiva/sclera: Conjunctivae normal.      Pupils: Pupils are equal, round, and reactive to light.   Cardiovascular:      Rate and Rhythm: Normal rate and regular rhythm.      Pulses: Normal pulses.      Heart sounds: Normal heart sounds.   Pulmonary:      Effort: Pulmonary effort is normal.      Breath sounds: Normal breath sounds. No wheezing or rhonchi.   Abdominal:      General: Bowel sounds are normal. There is no distension.      Palpations: Abdomen is soft.      Tenderness: There is no abdominal tenderness.   Musculoskeletal:         General: Normal range of motion.      Cervical back: Normal range of motion and neck supple.      Right lower leg: No edema.      Left lower leg: No edema.   Skin:     General: Skin is warm and dry.      Findings: No rash.   Neurological:      General: No focal deficit present.      Mental Status: He is alert and oriented to person, place, and time. Mental status is at baseline.      Cranial Nerves: No cranial nerve deficit or facial asymmetry.      Motor: No tremor.      Comments: Left upper extremity weakness and numbness, motor strength of 2 out of 5 in the left upper extremity.  Decreased  strength of left hand. Motor strength in his other extremities is 5 out of 5.  Patient reporting paresthesias/numbness of the left upper extremity.   Psychiatric:         Mood and Affect: Mood normal.         Behavior: Behavior normal.         Thought Content: Thought content normal.         Laboratory data:    I have reviewed the labs done in the emergency room.    Results from last 7 days   Lab Units 08/02/24 2001   SODIUM mmol/L 131*   POTASSIUM mmol/L 5.0   CHLORIDE mmol/L 96*   CO2 mmol/L 24.5   BUN mg/dL 13   CREATININE mg/dL 1.56*   CALCIUM mg/dL 9.2   BILIRUBIN mg/dL 0.5   ALK PHOS U/L 66   ALT (SGPT) U/L 13   AST (SGOT) U/L 22   GLUCOSE mg/dL 110*     Results from last 7 days   Lab Units  "08/02/24 2001   WBC 10*3/mm3 3.71   HEMOGLOBIN g/dL 10.3*   HEMATOCRIT % 31.0*   PLATELETS 10*3/mm3 180     Results from last 7 days   Lab Units 08/02/24 2001   INR  1.10     Results from last 7 days   Lab Units 08/02/24 2001   HSTROP T ng/L 31*     Results from last 7 days   Lab Units 08/02/24 2001   PROBNP pg/mL 106.6                       Invalid input(s): \"USDES\", \"NITRITITE\", \"BACT\", \"EP\"    Pain Management Panel           No data to display                EKG:      Sinus rhythm, heart rate 70, nonspecific ST/T wave changes.    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 Common carotid arteries are tortuous. ICA bilaterally demonstrate atherosclerotic disease with mild bilateral luminal narrowing. Vertebral arteries are patent throughout their cervical course, without dissection. Left vertebral artery is dominant. Lung apices are clear. Multilevel degenerative disease of the cervical spine.     Impression: 1. CTA of the neck demonstrates no occlusion or high-grade stenosis of the cervical carotid or vertebral arteries. No findings of dissection. CTA head: Intracranial ICAs bilaterally demonstrate calcifications without high-grade stenosis. No aneurysm. JUAN CARLOS bilaterally demonstrate no occlusion or high-grade stenosis. MCA bilaterally demonstrate no occlusion or high-grade stenosis. PCA bilaterally demonstrate no occlusion or high-grade stenosis. Intracranial vertebral arteries are patent. Basilar artery is patent, without aneurysm or stenosis. Dural venous sinuses " are patent. Opacification of the left middle ear and left mastoid air cells. Erosion/resection of some of the mastoid air cells. Please correlate with clinical history Impression: 1. CTA of the intracranial circulation demonstrates no large vessel occlusion or high-grade stenosis. No aneurysm is identified. 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 Common carotid arteries are tortuous. ICA bilaterally demonstrate atherosclerotic disease with mild bilateral luminal narrowing. Vertebral arteries are patent throughout their cervical course, without dissection. Left vertebral artery is dominant. Lung apices are clear. Multilevel degenerative disease of the cervical spine.     Impression: 1. CTA of the neck demonstrates no occlusion or high-grade stenosis of the cervical carotid or vertebral arteries. No findings of dissection. CTA head: Intracranial ICAs bilaterally demonstrate calcifications without high-grade stenosis. No aneurysm. JUAN CARLOS bilaterally demonstrate no occlusion or high-grade stenosis. MCA bilaterally demonstrate no occlusion or high-grade stenosis. PCA bilaterally demonstrate no occlusion or high-grade stenosis. Intracranial vertebral arteries are patent. Basilar artery is patent, without aneurysm or stenosis. Dural venous sinuses are patent. Opacification of the left middle ear and left mastoid air cells. Erosion/resection of some of the mastoid air cells. Please  correlate with clinical history Impression: 1. CTA of the intracranial circulation demonstrates no large vessel occlusion or high-grade stenosis. No aneurysm is identified. Authenticated and Electronically Signed by Veda Su MD on 08/02/2024 09:23:44 PM    CT Head Without Contrast Stroke Protocol    Addendum Date: 8/2/2024    ADDENDUM REPORT ADDENDUM: This report was discussed with Archie Sue on Aug 02, 2024 21:19:00 EDT. Authenticated and Electronically Signed by Veda Su MD on 08/02/2024 09:19:47 PM    Result Date: 8/2/2024  FINAL REPORT TECHNIQUE: null CLINICAL HISTORY: Stroke, follow up COMPARISON: null FINDINGS: Exam: CT Brain without contrast Comparison: None Clinical history: Stroke Findings: No acute intracranial hemorrhage. Cerebral volume loss. No abnormal extra-axial fluid collections. No intracranial mass No midline shift. Bilateral cerebral white matter disease, likely ischemic microvascular in nature No skull fracture. Chronic right maxillary sinus disease. Fluid in the sphenoid sinuses bilaterally Opacification of the left middle ear and left mastoid air cells     Impression: 1. No acute intracranial hemorrhage. 2. Moderate Cerebral volume loss. Moderate Cerebral white matter disease, likely ischemic microvascular in nature Authenticated and Electronically Signed by Veda Su MD on 08/02/2024 09:18:20 PM     Assessment:    Acute ischemic stroke, POA  Left upper extremity numbness/weakness, POA  History of SCC of left ear status post resection/immunotherapy  Arthritis  Chronic anemia    Plan:    Patient is admitted for further management and treatment.    Ischemic stroke  Left upper extremity weakness/numbness  -MRI showed Small acute infarct in the right periventricular region.  -Neurology consulted, appreciate recommendations  -Patient had intolerance to aspirin in the past, started on Plavix  -Start atorvastatin  -Lipid panel and HA1c in a.m.  -2D echo  ordered  -PT/OT/speech consult    -Continue home meds as warranted.  -Further orders as indicated per clinical course.    Risk Assessment: High  DVT Prophylaxis: Lovenox effluxes (benefit> risk)  Code Status: Full  Diet: Cardiac after passing dysphagia screen    Advance Care Planning  ACP discussion was held with the patient during this visit. Patient does not have an advance directive, information provided.       Sung Alcaraz MD  08/02/24  21:58 EDT    Dictated utilizing Dragon dictation.    Electronically signed by Sung Alcaraz MD at 08/03/24 0112          Emergency Department Notes        Saritha Horta PCT at 08/02/24 2201          House supervisor called att to inform that a nurse had to be called in before patient would be assigned a bed.     Electronically signed by Saritha Horta PCT at 08/02/24 2201       Archie Sue MD at 08/02/24 2040        Procedure Orders    1. Critical Care [[id number]] ordered by Archie Sue MD                 Subjective:  History of Present Illness:    Patient is an 79-year-old male history of squamous cell carcinoma of the ear status post resection and chemotherapy.  Presents today with weakness to the left upper extremity.  States that he has been unable to have full  strength and hold coffee cups to his left upper extremity.  Also states weakness when lifting the arm up.  Is unable to fully extend the arm.  Denies fevers.  No chest pain or shortness of breath.  No weakness to any other extremity.  Patient does have weakness to the left eyelid but states this is at its baseline.  Denies fevers.      Nurses Notes reviewed and agree, including vitals, allergies, social history and prior medical history.     REVIEW OF SYSTEMS: All systems reviewed and not pertinent unless noted.  Review of Systems   Constitutional:  Positive for activity change. Negative for appetite change, chills, fatigue and fever.   HENT:  Negative for congestion, sinus pressure,  "sneezing and trouble swallowing.    Eyes:  Negative for discharge and itching.   Respiratory:  Negative for cough and shortness of breath.    Cardiovascular:  Negative for chest pain and palpitations.   Gastrointestinal:  Negative for abdominal distention and abdominal pain.   Endocrine: Negative for cold intolerance and heat intolerance.   Genitourinary:  Negative for decreased urine volume, dysuria and urgency.   Musculoskeletal:  Negative for gait problem, neck pain and neck stiffness.   Skin:  Negative for color change and rash.   Allergic/Immunologic: Negative for immunocompromised state.   Neurological:  Positive for weakness and numbness. Negative for facial asymmetry and headaches.   Hematological:  Negative for adenopathy.   Psychiatric/Behavioral:  Negative for self-injury and suicidal ideas.        Past Medical History:   Diagnosis Date    Anemia     Arthritis     Cancer        Allergies:    Asa [aspirin]      Past Surgical History:   Procedure Laterality Date    NAIL BIOPSY N/A     NECK DISSECTION N/A     PAROTIDECTOMY N/A     SKIN CANCER EXCISION           Social History     Socioeconomic History    Marital status:    Tobacco Use    Smoking status: Never    Smokeless tobacco: Current     Types: Chew   Vaping Use    Vaping status: Never Used   Substance and Sexual Activity    Alcohol use: Not Currently    Drug use: Never    Sexual activity: Defer         Family History   Problem Relation Age of Onset    Cancer Mother     Cancer Father        Objective  Physical Exam:  /84 (BP Location: Right arm, Patient Position: Lying)   Pulse 78   Temp 98.1 °F (36.7 °C) (Oral)   Resp 18   Ht 182.9 cm (72\")   Wt 100 kg (220 lb 7.4 oz)   SpO2 100%   BMI 29.90 kg/m²      Physical Exam  Constitutional:       General: He is not in acute distress.     Appearance: Normal appearance. He is normal weight. He is not ill-appearing.   HENT:      Head: Normocephalic and atraumatic.      Nose: Nose normal. No " congestion or rhinorrhea.      Mouth/Throat:      Mouth: Mucous membranes are moist.      Pharynx: Oropharynx is clear.   Eyes:      Extraocular Movements: Extraocular movements intact.      Conjunctiva/sclera: Conjunctivae normal.      Pupils: Pupils are equal, round, and reactive to light.   Cardiovascular:      Rate and Rhythm: Normal rate and regular rhythm.      Pulses: Normal pulses.   Pulmonary:      Effort: Pulmonary effort is normal. No respiratory distress.      Breath sounds: Normal breath sounds.   Abdominal:      General: Abdomen is flat. Bowel sounds are normal. There is no distension.      Palpations: Abdomen is soft.      Tenderness: There is no abdominal tenderness.   Musculoskeletal:         General: No swelling or tenderness. Normal range of motion.      Cervical back: Normal range of motion and neck supple. No rigidity or tenderness.   Skin:     General: Skin is warm and dry.      Capillary Refill: Capillary refill takes less than 2 seconds.   Neurological:      General: No focal deficit present.      Mental Status: He is alert and oriented to person, place, and time. Mental status is at baseline.      Cranial Nerves: No cranial nerve deficit.      Sensory: Sensory deficit present.      Motor: Weakness present.      Coordination: Coordination normal.      Comments: With notable strength difference between the  strength of the left upper extremity and right upper extremity.  Patient unable to fully lift his left upper extremity, endorsing paresthesias throughout the extremity   Psychiatric:         Mood and Affect: Mood normal.         Behavior: Behavior normal.         Thought Content: Thought content normal.         Judgment: Judgment normal.         Critical Care    Performed by: Archie Sue MD  Authorized by: Sung Alcaraz MD    Critical care provider statement:     Critical care time (minutes):  30    Critical care time was exclusive of:  Separately billable procedures and  treating other patients    Critical care was necessary to treat or prevent imminent or life-threatening deterioration of the following conditions:  CNS failure or compromise (CVA)    Critical care was time spent personally by me on the following activities:  Blood draw for specimens, development of treatment plan with patient or surrogate, discussions with consultants, discussions with primary provider, evaluation of patient's response to treatment, examination of patient, obtaining history from patient or surrogate, ordering and performing treatments and interventions, ordering and review of laboratory studies, ordering and review of radiographic studies, pulse oximetry, re-evaluation of patient's condition and review of old charts    Care discussed with: admitting provider        ED Course:    ED Course as of 08/03/24 0014   Fri Aug 02, 2024   2012 EKG interpreted by me, normal sinus rhythm with no concerning ST changes noted, rate of 70 [JE]   2118 Chest x-ray independently interpreted by me prior to radiology overread showing no acute process [JE]      ED Course User Index  [JE] Archie Sue MD       Lab Results (last 24 hours)       Procedure Component Value Units Date/Time    Lactic Acid, Plasma [[id number]]  (Normal) Collected: 08/02/24 2000    Specimen: Blood Updated: 08/02/24 2034     Lactate 1.0 mmol/L     CBC & Differential [[id number]]  (Abnormal) Collected: 08/02/24 2001    Specimen: Blood Updated: 08/02/24 2022    Narrative:      The following orders were created for panel order CBC & Differential.  Procedure                               Abnormality         Status                     ---------                               -----------         ------                     CBC Auto Differential[[id number]]        Abnormal            Final result                 Please view results for these tests on the individual orders.    Comprehensive Metabolic Panel [[id number]]  (Abnormal) Collected: 08/02/24  2001    Specimen: Blood Updated: 08/02/24 2026     Glucose 110 mg/dL      BUN 13 mg/dL      Creatinine 1.56 mg/dL      Sodium 131 mmol/L      Potassium 5.0 mmol/L      Chloride 96 mmol/L      CO2 24.5 mmol/L      Calcium 9.2 mg/dL      Total Protein 6.7 g/dL      Albumin 4.0 g/dL      ALT (SGPT) 13 U/L      AST (SGOT) 22 U/L      Alkaline Phosphatase 66 U/L      Total Bilirubin 0.5 mg/dL      Globulin 2.7 gm/dL      A/G Ratio 1.5 g/dL      BUN/Creatinine Ratio 8.3     Anion Gap 10.5 mmol/L      eGFR 44.6 mL/min/1.73     Narrative:      GFR Normal >60  Chronic Kidney Disease <60  Kidney Failure <15    The GFR formula is only valid for adults with stable renal function between ages 18 and 70.    High Sensitivity Troponin T [[id number]]  (Abnormal) Collected: 08/02/24 2001    Specimen: Blood Updated: 08/02/24 2030     HS Troponin T 31 ng/L     Narrative:      High Sensitive Troponin T Reference Range:  <14.0 ng/L- Negative Female for AMI  <22.0 ng/L- Negative Male for AMI  >=14 - Abnormal Female indicating possible myocardial injury.  >=22 - Abnormal Male indicating possible myocardial injury.   Clinicians would have to utilize clinical acumen, EKG, Troponin, and serial changes to determine if it is an Acute Myocardial Infarction or myocardial injury due to an underlying chronic condition.         BNP [[id number]]  (Normal) Collected: 08/02/24 2001    Specimen: Blood Updated: 08/02/24 2030     proBNP 106.6 pg/mL     Narrative:      This assay is used as an aid in the diagnosis of individuals suspected of having heart failure. It can be used as an aid in the diagnosis of acute decompensated heart failure (ADHF) in patients presenting with signs and symptoms of ADHF to the emergency department (ED). In addition, NT-proBNP of <300 pg/mL indicates ADHF is not likely.    Age Range Result Interpretation  NT-proBNP Concentration (pg/mL:      <50             Positive            >450                   Santizo                  "300-450                    Negative             <300    50-75           Positive            >900                  Gray                300-900                  Negative            <300      >75             Positive            >1800                  Gray                300-1800                  Negative            <300    C-reactive Protein [[id number]]  (Abnormal) Collected: 08/02/24 2001    Specimen: Blood Updated: 08/02/24 2026     C-Reactive Protein 0.61 mg/dL     Procalcitonin [[id number]]  (Normal) Collected: 08/02/24 2001    Specimen: Blood Updated: 08/02/24 2111     Procalcitonin 0.08 ng/mL     Narrative:      As a Marker for Sepsis (Non-Neonates):    1. <0.5 ng/mL represents a low risk of severe sepsis and/or septic shock.  2. >2 ng/mL represents a high risk of severe sepsis and/or septic shock.    As a Marker for Lower Respiratory Tract Infections that require antibiotic therapy:    PCT on Admission    Antibiotic Therapy       6-12 Hrs later    >0.5                Strongly Recommended  >0.25 - <0.5        Recommended   0.1 - 0.25          Discouraged              Remeasure/reassess PCT  <0.1                Strongly Discouraged     Remeasure/reassess PCT    As 28 day mortality risk marker: \"Change in Procalcitonin Result\" (>80% or <=80%) if Day 0 (or Day 1) and Day 4 values are available. Refer to [email or web address]    Change in PCT <=80%  A decrease of PCT levels below or equal to 80% defines a positive change in PCT test result representing a higher risk for 28-day all-cause mortality of patients diagnosed with severe sepsis for septic shock.    Change in PCT >80%  A decrease of PCT levels of more than 80% defines a negative change in PCT result representing a lower risk for 28-day all-cause mortality of patients diagnosed with severe sepsis or septic shock.       Magnesium [[id number]]  (Normal) Collected: 08/02/24 2001    Specimen: Blood Updated: 08/02/24 2026     Magnesium 1.8 mg/dL     " CBC Auto Differential [[id number]]  (Abnormal) Collected: 08/02/24 2001    Specimen: Blood Updated: 08/02/24 2022     WBC 3.71 10*3/mm3      RBC 3.49 10*6/mm3      Hemoglobin 10.3 g/dL      Hematocrit 31.0 %      MCV 88.8 fL      MCH 29.5 pg      MCHC 33.2 g/dL      RDW 14.4 %      RDW-SD 46.3 fl      MPV 9.2 fL      Platelets 180 10*3/mm3      Neutrophil % 65.0 %      Lymphocyte % 18.3 %      Monocyte % 9.4 %      Eosinophil % 4.9 %      Basophil % 1.6 %      Immature Grans % 0.8 %      Neutrophils, Absolute 2.41 10*3/mm3      Lymphocytes, Absolute 0.68 10*3/mm3      Monocytes, Absolute 0.35 10*3/mm3      Eosinophils, Absolute 0.18 10*3/mm3      Basophils, Absolute 0.06 10*3/mm3      Immature Grans, Absolute 0.03 10*3/mm3      nRBC 0.0 /100 WBC     Protime-INR [[id number]]  (Normal) Collected: 08/02/24 2001    Specimen: Blood Updated: 08/02/24 2130     Protime 14.7 Seconds      INR 1.10    Narrative:      Suggested INR therapeutic range for stable oral anticoagulant therapy:    Low Intensity therapy:   1.5-2.0  Moderate Intensity therapy:   2.0-3.0  High Intensity therapy:   2.5-4.0    COVID-19 and FLU A/B PCR, 1 HR TAT - Swab, Nasopharynx [[id number]]  (Normal) Collected: 08/02/24 2015    Specimen: Swab from Nasopharynx Updated: 08/02/24 2043     COVID19 Not Detected     Influenza A PCR Not Detected     Influenza B PCR Not Detected    Narrative:      Fact sheet for providers: https://www.fda.gov/media/281632/download    Fact sheet for patients: https://www.fda.gov/media/962923/download    Test performed by PCR.    Urinalysis With Culture If Indicated - Urine, Clean Catch [[id number]]  (Normal) Collected: 08/02/24 2307    Specimen: Urine, Clean Catch Updated: 08/02/24 2314     Color, UA Yellow     Appearance, UA Clear     pH, UA 7.0     Specific Gravity, UA 1.023     Glucose, UA Negative     Ketones, UA Negative     Bilirubin, UA Negative     Blood, UA Negative     Protein, UA Negative     Leuk Esterase, UA Negative      Nitrite, UA Negative     Urobilinogen, UA 0.2 E.U./dL    Narrative:      In absence of clinical symptoms, the presence of pyuria, bacteria, and/or nitrites on the urinalysis result does not correlate with infection.  Urine microscopic not indicated.    High Sensitivity Troponin T 2Hr [[id number]]  (Abnormal) Collected: 08/02/24 2315    Specimen: Blood Updated: 08/02/24 2346     HS Troponin T 28 ng/L      Troponin T Delta -3 ng/L     Narrative:      High Sensitive Troponin T Reference Range:  <14.0 ng/L- Negative Female for AMI  <22.0 ng/L- Negative Male for AMI  >=14 - Abnormal Female indicating possible myocardial injury.  >=22 - Abnormal Male indicating possible myocardial injury.   Clinicians would have to utilize clinical acumen, EKG, Troponin, and serial changes to determine if it is an Acute Myocardial Infarction or myocardial injury due to an underlying chronic condition.                  MRI Brain With & Without Contrast    Addendum Date: 8/2/2024    ADDENDUM REPORT ADDENDUM: This report was discussed with Dr. Sue on Aug 02, 2024 22:40:00 EDT. 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 COMPARISON: null FINDINGS: Exam: MRI of the brain without IV contrast Comparison: 08/02/2024 Clinical history: Concern for CVA, left arm weakness Findings: Small 1 cm acute infarct in the right periventricular region on series 7, image 19. Decreased signal on the ADC map in this location. No acute intracranial hemorrhage. No intracranial masses. Postcontrast imaging of the brain does not demonstrate any areas of abnormal parenchymal enhancement. No midline shift. No abnormal extra-axial fluid collections are seen. Cerebral volume loss. Areas of increased FLAIR signal within the cerebral white  matter bilaterally are reflective of a nonspecific demyelinating process, likely ischemic microvascular in nature.     Impression: Impression: 1. Small acute infarct in the right periventricular region 2. Moderate cerebral volume loss with moderate cerebral white matter disease, likely ischemic microvascular in nature. 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 Common carotid arteries are tortuous. ICA bilaterally demonstrate atherosclerotic disease with mild bilateral luminal narrowing. Vertebral arteries are patent throughout their cervical course, without dissection. Left vertebral artery is dominant. Lung apices are clear. Multilevel degenerative disease of the cervical spine.     Impression: Impression: 1. CTA of the neck demonstrates no occlusion or high-grade stenosis of the cervical carotid or vertebral arteries. No findings of dissection. CTA head: Intracranial ICAs bilaterally demonstrate calcifications without high-grade stenosis. No aneurysm. JUAN CARLOS bilaterally demonstrate no occlusion or high-grade stenosis. MCA bilaterally demonstrate no occlusion or high-grade stenosis. PCA bilaterally demonstrate no occlusion or high-grade stenosis. Intracranial vertebral arteries are patent. Basilar artery is patent, without aneurysm or stenosis. Dural venous sinuses are patent. Opacification of the left middle ear and left mastoid air cells. Erosion/resection of some of the mastoid air cells.  Please correlate with clinical history Impression: 1. CTA of the intracranial circulation demonstrates no large vessel occlusion or high-grade stenosis. No aneurysm is identified. 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 Common carotid arteries are tortuous. ICA bilaterally demonstrate atherosclerotic disease with mild bilateral luminal narrowing. Vertebral arteries are patent throughout their cervical course, without dissection. Left vertebral artery is dominant. Lung apices are clear. Multilevel degenerative disease of the cervical spine.     Impression: Impression: 1. CTA of the neck demonstrates no occlusion or high-grade stenosis of the cervical carotid or vertebral arteries. No findings of dissection. CTA head: Intracranial ICAs bilaterally demonstrate calcifications without high-grade stenosis. No aneurysm. JUAN CARLOS bilaterally demonstrate no occlusion or high-grade stenosis. MCA bilaterally demonstrate no occlusion or high-grade stenosis. PCA bilaterally demonstrate no occlusion or high-grade stenosis. Intracranial vertebral arteries are patent. Basilar artery is patent, without aneurysm or stenosis. Dural venous sinuses are patent. Opacification of the left middle ear and left mastoid air cells. Erosion/resection of some of the mastoid air cells. Please correlate with clinical history Impression: 1. CTA of the intracranial circulation demonstrates no large vessel  occlusion or high-grade stenosis. No aneurysm is identified. Authenticated and Electronically Signed by Veda Su MD on 08/02/2024 09:23:44 PM    CT Head Without Contrast Stroke Protocol    Addendum Date: 8/2/2024    ADDENDUM REPORT ADDENDUM: This report was discussed with Archie Sue on Aug 02, 2024 21:19:00 EDT. Authenticated and Electronically Signed by Veda Su MD on 08/02/2024 09:19:47 PM    Result Date: 8/2/2024  FINAL REPORT TECHNIQUE: null CLINICAL HISTORY: Stroke, follow up COMPARISON: null FINDINGS: Exam: CT Brain without contrast Comparison: None Clinical history: Stroke Findings: No acute intracranial hemorrhage. Cerebral volume loss. No abnormal extra-axial fluid collections. No intracranial mass No midline shift. Bilateral cerebral white matter disease, likely ischemic microvascular in nature No skull fracture. Chronic right maxillary sinus disease. Fluid in the sphenoid sinuses bilaterally Opacification of the left middle ear and left mastoid air cells     Impression: Impression: 1. No acute intracranial hemorrhage. 2. Moderate Cerebral volume loss. Moderate Cerebral white matter disease, likely ischemic microvascular in nature Authenticated and Electronically Signed by Veda Su MD on 08/02/2024 09:18:20 PM        MDM     Amount and/or Complexity of Data Reviewed  Independent visualization of images, tracings, or specimens: yes        Initial impression of presenting illness: Weakness, paresthesias    DDX: includes but is not limited to: CVA, intracranial hemorrhage, nerve impingement    Patient arrives stable with vitals interpreted by myself.     Pertinent features from physical exam: Patient with left upper extremity weakness on exam greater than the right, unable to fully lift the shoulder above his head.    Initial diagnostic plan: CBC, CMP, troponin, BNP, EKG, chest x-ray, CRP, Pro-Omar, lactic acid, magnesium, CT head, CTA head neck    Results from initial plan  were reviewed and interpreted by me revealing no concern for intracranial hemorrhage, no concern for significant obstruction of ICA    Diagnostic information from other sources: Discussed with patient's daughter at bedside reviewed past medical records    Interventions / Re-evaluation: Given load of Plavix per neurology recommendations    Results/clinical rationale were discussed with patient daughter at bedside    Consultations/Discussion of results with other physicians: Given my concern for possible CVA, discussed with neurology.  Not a TNK candidate given significantly delayed presentation.  However, they recommend MRI and admission.  Given this, discussed with Dr. Alcaraz, hospitalist, and admitted to his service for further management.  While awaiting inpatient bed, MRI results have come back and patient has had small CVA.    Disposition plan: Admit  -----        Final diagnoses:   Cerebrovascular accident (CVA), unspecified mechanism          Archie Sue MD  08/03/24 0014      Electronically signed by Archie Sue MD at 08/03/24 0014       Vital Signs (last day)       Date/Time Temp Temp src Pulse Resp BP Patient Position SpO2    08/03/24 0728 97.8 (36.6) Axillary -- 16 131/68 Lying --    08/03/24 0348 97.6 (36.4) Temporal 77 18 132/83 Lying 97    08/02/24 2331 98.1 (36.7) Oral 78 18 119/84 Lying 100    08/02/24 2129 -- -- 71 -- 160/94 -- 99    08/02/24 2102 -- -- 73 18 129/77 Lying 99    08/02/24 2016 -- -- 74 -- 142/82 -- --    08/02/24 1953 97.5 (36.4) Oral 77 18 154/84 Sitting 99          Current Facility-Administered Medications   Medication Dose Route Frequency Provider Last Rate Last Admin    acetaminophen (TYLENOL) tablet 650 mg  650 mg Oral Q4H PRN Sung Alcaraz MD        Or    acetaminophen (TYLENOL) 160 MG/5ML oral solution 650 mg  650 mg Oral Q4H PRN Sung Alcaraz MD        Or    acetaminophen (TYLENOL) suppository 650 mg  650 mg Rectal Q4H PRN Sung Alcaraz MD         acetaminophen (TYLENOL) tablet 650 mg  650 mg Oral Q4H PRN Sung Alcaraz MD        Or    acetaminophen (TYLENOL) 160 MG/5ML oral solution 650 mg  650 mg Oral Q4H PRN Sung Alcaraz MD        Or    acetaminophen (TYLENOL) suppository 650 mg  650 mg Rectal Q4H PRN Sung Alcaraz MD        atorvastatin (LIPITOR) tablet 40 mg  40 mg Oral Nightly Sung Alcaraz MD   40 mg at 08/03/24 0127    sennosides-docusate (PERICOLACE) 8.6-50 MG per tablet 2 tablet  2 tablet Oral BID PRN Sung Alcaraz MD        And    polyethylene glycol (MIRALAX) packet 17 g  17 g Oral Daily PRN Sung Alcaraz MD        And    bisacodyl (DULCOLAX) EC tablet 5 mg  5 mg Oral Daily PRN Sung Alcaraz MD        And    bisacodyl (DULCOLAX) suppository 10 mg  10 mg Rectal Daily PRN Sung Alcaraz MD        clopidogrel (PLAVIX) tablet 75 mg  75 mg Oral Daily Sung Alcaraz MD        DULoxetine (CYMBALTA) DR capsule 30 mg  30 mg Oral Daily Sung Alcaraz MD        Enoxaparin Sodium (LOVENOX) syringe 40 mg  40 mg Subcutaneous Nightly Sung Alcaraz MD   40 mg at 08/03/24 0127    finasteride (PROSCAR) tablet 5 mg  5 mg Oral Daily Sung Alcaraz MD        levothyroxine (SYNTHROID, LEVOTHROID) tablet 75 mcg  75 mcg Oral Daily Sung Alcaraz MD   75 mcg at 08/03/24 0559    melatonin tablet 5 mg  5 mg Oral Nightly PRN Sung Alcaraz MD        nitroglycerin (NITROSTAT) SL tablet 0.4 mg  0.4 mg Sublingual Q5 Min PRN Sung Alcaraz MD        ondansetron (ZOFRAN) injection 4 mg  4 mg Intravenous Q6H PRN Sung Alcaraz MD        Pharmacy to Dose enoxaparin (LOVENOX)   Does not apply Continuous PRN Sung Alcaraz MD        sodium chloride 0.9 % flush 10 mL  10 mL Intravenous PRN Sung Alcaraz MD        sodium chloride 0.9 % flush 10 mL  10 mL Intravenous PRN Sung Alcaraz MD        sodium chloride 0.9 % flush 10 mL  10 mL Intravenous Q12H Sung Alcaraz MD   10 mL at 08/03/24 0051    sodium chloride 0.9 % flush 10 mL  10 mL  Intravenous PRN Sung Alcaraz MD        sodium chloride 0.9 % infusion 40 mL  40 mL Intravenous PRN Sung Alcaraz MD        sodium chloride 0.9 % infusion  100 mL/hr Intravenous Continuous Sung Alcarza  mL/hr at 08/03/24 0612 100 mL/hr at 08/03/24 0612    tamsulosin (FLOMAX) 24 hr capsule 0.4 mg  0.4 mg Oral Daily Sung Alcaraz MD         Lab Results (last 24 hours)       Procedure Component Value Units Date/Time    Basic Metabolic Panel [[id number]]  (Abnormal) Collected: 08/03/24 0451    Specimen: Blood Updated: 08/03/24 0523     Glucose 108 mg/dL      BUN 12 mg/dL      Creatinine 1.26 mg/dL      Sodium 129 mmol/L      Potassium 4.6 mmol/L      Chloride 96 mmol/L      CO2 23.1 mmol/L      Calcium 8.8 mg/dL      BUN/Creatinine Ratio 9.5     Anion Gap 9.9 mmol/L      eGFR 57.7 mL/min/1.73     Narrative:      GFR Normal >60  Chronic Kidney Disease <60  Kidney Failure <15    The GFR formula is only valid for adults with stable renal function between ages 18 and 70.    Lipid Panel [[id number]]  (Abnormal) Collected: 08/03/24 0451    Specimen: Blood Updated: 08/03/24 0523     Total Cholesterol 117 mg/dL      Triglycerides 84 mg/dL      HDL Cholesterol 27 mg/dL      LDL Cholesterol  73 mg/dL      VLDL Cholesterol 17 mg/dL      LDL/HDL Ratio 2.71    Narrative:      Cholesterol Reference Ranges  (U.S. Department of Health and Human Services ATP III Classifications)    Desirable          <200 mg/dL  Borderline High    200-239 mg/dL  High Risk          >240 mg/dL      Triglyceride Reference Ranges  (U.S. Department of Health and Human Services ATP III Classifications)    Normal           <150 mg/dL  Borderline High  150-199 mg/dL  High             200-499 mg/dL  Very High        >500 mg/dL    HDL Reference Ranges  (U.S. Department of Health and Human Services ATP III Classifications)    Low     <40 mg/dl (major risk factor for CHD)  High    >60 mg/dl ('negative' risk factor for CHD)        LDL Reference  Ranges  (U.S. Department of Health and Human Services ATP III Classifications)    Optimal          <100 mg/dL  Near Optimal     100-129 mg/dL  Borderline High  130-159 mg/dL  High             160-189 mg/dL  Very High        >189 mg/dL    Hemoglobin A1c [[id number]]  (Normal) Collected: 08/03/24 0451    Specimen: Blood Updated: 08/03/24 0522     Hemoglobin A1C 5.00 %     Narrative:      Hemoglobin A1C Ranges:    Increased Risk for Diabetes  5.7% to 6.4%  Diabetes                     >= 6.5%  Diabetic Goal                < 7.0%    CBC (No Diff) [[id number]]  (Abnormal) Collected: 08/03/24 0451    Specimen: Blood Updated: 08/03/24 0501     WBC 3.35 10*3/mm3      RBC 3.24 10*6/mm3      Hemoglobin 9.5 g/dL      Hematocrit 28.8 %      MCV 88.9 fL      MCH 29.3 pg      MCHC 33.0 g/dL      RDW 14.2 %      RDW-SD 46.0 fl      MPV 9.1 fL      Platelets 158 10*3/mm3     High Sensitivity Troponin T 2Hr [[id number]]  (Abnormal) Collected: 08/02/24 2315    Specimen: Blood Updated: 08/02/24 2346     HS Troponin T 28 ng/L      Troponin T Delta -3 ng/L     Narrative:      High Sensitive Troponin T Reference Range:  <14.0 ng/L- Negative Female for AMI  <22.0 ng/L- Negative Male for AMI  >=14 - Abnormal Female indicating possible myocardial injury.  >=22 - Abnormal Male indicating possible myocardial injury.   Clinicians would have to utilize clinical acumen, EKG, Troponin, and serial changes to determine if it is an Acute Myocardial Infarction or myocardial injury due to an underlying chronic condition.         Urinalysis With Culture If Indicated - Urine, Clean Catch [[id number]]  (Normal) Collected: 08/02/24 2307    Specimen: Urine, Clean Catch Updated: 08/02/24 2314     Color, UA Yellow     Appearance, UA Clear     pH, UA 7.0     Specific Gravity, UA 1.023     Glucose, UA Negative     Ketones, UA Negative     Bilirubin, UA Negative     Blood, UA Negative     Protein, UA Negative     Leuk Esterase, UA Negative     Nitrite, UA Negative  "    Urobilinogen, UA 0.2 E.U./dL    Narrative:      In absence of clinical symptoms, the presence of pyuria, bacteria, and/or nitrites on the urinalysis result does not correlate with infection.  Urine microscopic not indicated.    Protime-INR [[id number]]  (Normal) Collected: 08/02/24 2001    Specimen: Blood Updated: 08/02/24 2130     Protime 14.7 Seconds      INR 1.10    Narrative:      Suggested INR therapeutic range for stable oral anticoagulant therapy:    Low Intensity therapy:   1.5-2.0  Moderate Intensity therapy:   2.0-3.0  High Intensity therapy:   2.5-4.0    Procalcitonin [[id number]]  (Normal) Collected: 08/02/24 2001    Specimen: Blood Updated: 08/02/24 2111     Procalcitonin 0.08 ng/mL     Narrative:      As a Marker for Sepsis (Non-Neonates):    1. <0.5 ng/mL represents a low risk of severe sepsis and/or septic shock.  2. >2 ng/mL represents a high risk of severe sepsis and/or septic shock.    As a Marker for Lower Respiratory Tract Infections that require antibiotic therapy:    PCT on Admission    Antibiotic Therapy       6-12 Hrs later    >0.5                Strongly Recommended  >0.25 - <0.5        Recommended   0.1 - 0.25          Discouraged              Remeasure/reassess PCT  <0.1                Strongly Discouraged     Remeasure/reassess PCT    As 28 day mortality risk marker: \"Change in Procalcitonin Result\" (>80% or <=80%) if Day 0 (or Day 1) and Day 4 values are available. Refer to http://www.[facility]-pct-calculator.com    Change in PCT <=80%  A decrease of PCT levels below or equal to 80% defines a positive change in PCT test result representing a higher risk for 28-day all-cause mortality of patients diagnosed with severe sepsis for septic shock.    Change in PCT >80%  A decrease of PCT levels of more than 80% defines a negative change in PCT result representing a lower risk for 28-day all-cause mortality of patients diagnosed with severe sepsis or septic shock.       COVID-19 and FLU A/B " PCR, 1 HR TAT - Swab, Nasopharynx [[id number]]  (Normal) Collected: 08/02/24 2015    Specimen: Swab from Nasopharynx Updated: 08/02/24 2043     COVID19 Not Detected     Influenza A PCR Not Detected     Influenza B PCR Not Detected    Narrative:      Fact sheet for providers: https://www.fda.gov/media/796677/download    Fact sheet for patients: https://www.fda.gov/media/962409/download    Test performed by PCR.    Lactic Acid, Plasma [[id number]]  (Normal) Collected: 08/02/24 2000    Specimen: Blood Updated: 08/02/24 2034     Lactate 1.0 mmol/L     High Sensitivity Troponin T [[id number]]  (Abnormal) Collected: 08/02/24 2001    Specimen: Blood Updated: 08/02/24 2030     HS Troponin T 31 ng/L     Narrative:      High Sensitive Troponin T Reference Range:  <14.0 ng/L- Negative Female for AMI  <22.0 ng/L- Negative Male for AMI  >=14 - Abnormal Female indicating possible myocardial injury.  >=22 - Abnormal Male indicating possible myocardial injury.   Clinicians would have to utilize clinical acumen, EKG, Troponin, and serial changes to determine if it is an Acute Myocardial Infarction or myocardial injury due to an underlying chronic condition.         BNP [[id number]]  (Normal) Collected: 08/02/24 2001    Specimen: Blood Updated: 08/02/24 2030     proBNP 106.6 pg/mL     Narrative:      This assay is used as an aid in the diagnosis of individuals suspected of having heart failure. It can be used as an aid in the diagnosis of acute decompensated heart failure (ADHF) in patients presenting with signs and symptoms of ADHF to the emergency department (ED). In addition, NT-proBNP of <300 pg/mL indicates ADHF is not likely.    Age Range Result Interpretation  NT-proBNP Concentration (pg/mL:      <50             Positive            >450                   Gray                 300-450                    Negative             <300    50-75           Positive            >900                  Gray                300-900                   Negative            <300      >75             Positive            >1800                  Gray                300-1800                  Negative            <300    Comprehensive Metabolic Panel [[id number]]  (Abnormal) Collected: 08/02/24 2001    Specimen: Blood Updated: 08/02/24 2026     Glucose 110 mg/dL      BUN 13 mg/dL      Creatinine 1.56 mg/dL      Sodium 131 mmol/L      Potassium 5.0 mmol/L      Chloride 96 mmol/L      CO2 24.5 mmol/L      Calcium 9.2 mg/dL      Total Protein 6.7 g/dL      Albumin 4.0 g/dL      ALT (SGPT) 13 U/L      AST (SGOT) 22 U/L      Alkaline Phosphatase 66 U/L      Total Bilirubin 0.5 mg/dL      Globulin 2.7 gm/dL      A/G Ratio 1.5 g/dL      BUN/Creatinine Ratio 8.3     Anion Gap 10.5 mmol/L      eGFR 44.6 mL/min/1.73     Narrative:      GFR Normal >60  Chronic Kidney Disease <60  Kidney Failure <15    The GFR formula is only valid for adults with stable renal function between ages 18 and 70.    Magnesium [[id number]]  (Normal) Collected: 08/02/24 2001    Specimen: Blood Updated: 08/02/24 2026     Magnesium 1.8 mg/dL     C-reactive Protein [[id number]]  (Abnormal) Collected: 08/02/24 2001    Specimen: Blood Updated: 08/02/24 2026     C-Reactive Protein 0.61 mg/dL     CBC & Differential [[id number]]  (Abnormal) Collected: 08/02/24 2001    Specimen: Blood Updated: 08/02/24 2022    Narrative:      The following orders were created for panel order CBC & Differential.  Procedure                               Abnormality         Status                     ---------                               -----------         ------                     CBC Auto Differential[[id number]]        Abnormal            Final result                 Please view results for these tests on the individual orders.    CBC Auto Differential [[id number]]  (Abnormal) Collected: 08/02/24 2001    Specimen: Blood Updated: 08/02/24 2022     WBC 3.71 10*3/mm3      RBC 3.49 10*6/mm3      Hemoglobin 10.3 g/dL      Hematocrit 31.0 %       MCV 88.8 fL      MCH 29.5 pg      MCHC 33.2 g/dL      RDW 14.4 %      RDW-SD 46.3 fl      MPV 9.2 fL      Platelets 180 10*3/mm3      Neutrophil % 65.0 %      Lymphocyte % 18.3 %      Monocyte % 9.4 %      Eosinophil % 4.9 %      Basophil % 1.6 %      Immature Grans % 0.8 %      Neutrophils, Absolute 2.41 10*3/mm3      Lymphocytes, Absolute 0.68 10*3/mm3      Monocytes, Absolute 0.35 10*3/mm3      Eosinophils, Absolute 0.18 10*3/mm3      Basophils, Absolute 0.06 10*3/mm3      Immature Grans, Absolute 0.03 10*3/mm3      nRBC 0.0 /100 WBC     [place] Draw [[id number]] Collected: 08/02/24 2001    Specimen: Blood Updated: 08/02/24 2015    Narrative:      The following orders were created for panel order [place] Draw.  Procedure                               Abnormality         Status                     ---------                               -----------         ------                     Green Top (Gel)[[id number]]                                  Final result               Lavender Top[[id number]]                                     Final result               Gold Top - SST[[id number]]                                   Final result               Light Blue Top[[id number]]                                   Final result                 Please view results for these tests on the individual orders.    Green Top (Gel) [[id number]] Collected: 08/02/24 2001    Specimen: Blood Updated: 08/02/24 2015     Extra Tube Hold for add-ons.     Comment: Auto resulted.       Gold Top - SST [[id number]] Collected: 08/02/24 2001    Specimen: Blood Updated: 08/02/24 2015     Extra Tube Hold for add-ons.     Comment: Auto resulted.       Lavender Top [[id number]] Collected: 08/02/24 2001    Specimen: Blood Updated: 08/02/24 2015     Extra Tube hold for add-on     Comment: Auto resulted       Light Blue Top [[id number]] Collected: 08/02/24 2001    Specimen: Blood Updated: 08/02/24 2015     Extra Tube Hold for add-ons.     Comment: Auto resulted              Imaging Results (Last 24 Hours)       Procedure Component Value Units Date/Time    XR Chest 1 View [[id number]] Collected: 08/03/24 0741     Updated: 08/03/24 0745    Narrative:      PROCEDURE: XR CHEST 1 VW-     HISTORY: Eval pneumonia     COMPARISON: None.     FINDINGS: The heart is normal in size. The lungs are clear. The  mediastinum is unremarkable. There is no pneumothorax.  There are no  acute osseous abnormalities. Prominent aortic knob correlating with a  mildly dilated proximal descending thoracic aorta as seen on CTA of the  neck performed the same day. Apical lordotic positioning noted.        Impression:      No acute cardiopulmonary process.              This report was signed and finalized on 8/3/2024 7:43 AM by Camille Rodriguez MD.       MRI Brain With & Without Contrast [[id number]] Collected: 08/02/24 2238     Updated: 08/02/24 2241    Addenda:        ADDENDUM REPORT    ADDENDUM:  This report was discussed with Dr. Sue on Aug 02, 2024 22:40:00 EDT.    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    COMPARISON:  null    FINDINGS:  Exam: MRI of the brain without IV contrast    Comparison: 08/02/2024    Clinical history: Concern for CVA, left arm weakness    Findings:    Small 1 cm acute infarct in the right periventricular region on series 7, image 19. Decreased signal on the ADC map in this location.    No acute intracranial hemorrhage.    No intracranial masses.    Postcontrast imaging of the brain does not demonstrate any areas of abnormal parenchymal enhancement.    No midline shift.    No abnormal extra-axial fluid collections are seen.    Cerebral volume loss.    Areas of increased FLAIR signal within  the cerebral white matter bilaterally are reflective of a nonspecific demyelinating process, likely ischemic microvascular in nature.      Impression:      Impression:    1. Small acute infarct in the right periventricular region    2. Moderate cerebral volume loss with moderate cerebral white matter disease, likely ischemic microvascular in nature.    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 Common carotid arteries are tortuous.    ICA bilaterally demonstrate atherosclerotic disease with mild bilateral luminal narrowing.    Vertebral arteries are patent throughout their cervical course, without dissection. Left vertebral artery is dominant.    Lung apices are clear.    Multilevel degenerative disease of the cervical spine.      Impression:      Impression:    1. CTA of the neck demonstrates no occlusion or high-grade stenosis of the cervical carotid or vertebral arteries. No findings of dissection.    CTA head:    Intracranial ICAs bilaterally demonstrate calcifications without high-grade stenosis. No aneurysm.    JUAN CARLOS bilaterally demonstrate no occlusion or high-grade stenosis.    MCA bilaterally demonstrate no occlusion or high-grade stenosis.    PCA bilaterally demonstrate no occlusion or high-grade stenosis.    Intracranial vertebral arteries are patent.    Basilar artery is patent, without  aneurysm or stenosis.    Dural venous sinuses are patent.    Opacification of the left middle ear and left mastoid air cells. Erosion/resection of some of the mastoid air cells. Please correlate with clinical history    Impression:    1. CTA of the intracranial circulation demonstrates no large vessel occlusion or high-grade stenosis. No aneurysm is identified.    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 Common carotid arteries are tortuous.    ICA bilaterally demonstrate atherosclerotic disease with mild bilateral luminal narrowing.    Vertebral arteries are patent throughout their cervical course, without dissection. Left vertebral artery is dominant.    Lung apices are clear.    Multilevel degenerative disease of the cervical spine.      Impression:      Impression:    1. CTA of the neck demonstrates no occlusion or high-grade stenosis of the cervical carotid or vertebral arteries. No findings of dissection.    CTA head:    Intracranial ICAs bilaterally demonstrate calcifications without high-grade stenosis. No aneurysm.    JUAN CARLOS bilaterally demonstrate no occlusion or high-grade stenosis.    MCA bilaterally demonstrate no occlusion or high-grade stenosis.    PCA bilaterally demonstrate no occlusion or high-grade stenosis.    Intracranial vertebral arteries are  patent.    Basilar artery is patent, without aneurysm or stenosis.    Dural venous sinuses are patent.    Opacification of the left middle ear and left mastoid air cells. Erosion/resection of some of the mastoid air cells. Please correlate with clinical history    Impression:    1. CTA of the intracranial circulation demonstrates no large vessel occlusion or high-grade stenosis. No aneurysm is identified.    Authenticated and Electronically Signed by Veda Su MD  on 08/02/2024 09:23:44 PM    CT Head Without Contrast Stroke Protocol [[id number]] Collected: 08/02/24 2118     Updated: 08/02/24 2121    Addenda:        ADDENDUM REPORT    ADDENDUM:  This report was discussed with Archie Sue on Aug 02, 2024   21:19:00 EDT.    Authenticated and Electronically Signed by Veda Su MD  on 08/02/2024 09:19:47 PM  Signed: 08/02/24 2119 by Veda Su MD    Narrative:      FINAL REPORT    TECHNIQUE:  null    CLINICAL HISTORY:  Stroke, follow up    COMPARISON:  null    FINDINGS:  Exam: CT Brain without contrast    Comparison: None    Clinical history: Stroke    Findings:    No acute intracranial hemorrhage.    Cerebral volume loss.    No abnormal extra-axial fluid collections.    No intracranial mass    No midline shift.    Bilateral cerebral white matter disease, likely ischemic microvascular in nature    No skull fracture.    Chronic right maxillary sinus disease.    Fluid in the sphenoid sinuses bilaterally    Opacification of the left middle ear and left mastoid air cells      Impression:      Impression:    1. No acute intracranial hemorrhage.    2. Moderate Cerebral volume loss. Moderate Cerebral white matter disease, likely ischemic microvascular in nature    Authenticated and Electronically Signed by Veda Su MD  on 08/02/2024 09:18:20 PM          Orders (last 24 hrs)        Start     Ordered    08/03/24 0900  DULoxetine (CYMBALTA) DR capsule 30 mg  Daily         08/03/24 0035     "08/03/24 0900  finasteride (PROSCAR) tablet 5 mg  Daily         08/03/24 0035    08/03/24 0900  tamsulosin (FLOMAX) 24 hr capsule 0.4 mg  Daily         08/03/24 0035    08/03/24 0900  clopidogrel (PLAVIX) tablet 75 mg  Daily         08/03/24 0035    08/03/24 0800  Inpatient Case Management  Consult  Once        Provider:  (Not yet assigned)    08/03/24 0006    08/03/24 0800  Oral Care  2 Times Daily       08/03/24 0035    08/03/24 0746  Inpatient Admission  Once         08/03/24 0745    08/03/24 0600  levothyroxine (SYNTHROID, LEVOTHROID) tablet 75 mcg  Daily         08/03/24 0035    08/03/24 0600  Incentive Spirometry  Every 4 Hours While Awake       08/03/24 0035    08/03/24 0600  Basic Metabolic Panel  Morning Draw         08/03/24 0035    08/03/24 0600  CBC (No Diff)  Morning Draw         08/03/24 0035    08/03/24 0600  Hemoglobin A1c  Morning Draw         08/03/24 0035    08/03/24 0600  Lipid Panel  Morning Draw         08/03/24 0035    08/03/24 0400  Vital Signs  Every 4 Hours       08/03/24 0035    08/03/24 0400  Neuro Checks  Every 4 Hours       08/03/24 0035    08/03/24 0200  atorvastatin (LIPITOR) tablet 40 mg  Nightly         08/03/24 0110    08/03/24 0145  Enoxaparin Sodium (LOVENOX) syringe 40 mg  Nightly         08/03/24 0046    08/03/24 0130  sodium chloride 0.9 % flush 10 mL  Every 12 Hours Scheduled         08/03/24 0035    08/03/24 0130  sodium chloride 0.9 % infusion  Continuous         08/03/24 0035    08/03/24 0112  SLP Consult: Eval & Treat RN Dysphagia Screen Failed  Once         08/03/24 0111    08/03/24 0034  melatonin tablet 5 mg  Nightly PRN         08/03/24 0035    08/03/24 0034  bisacodyl (DULCOLAX) EC tablet 5 mg  Daily PRN        Placed in \"And\" Linked Group    08/03/24 0035    08/03/24 0034  bisacodyl (DULCOLAX) suppository 10 mg  Daily PRN        Placed in \"And\" Linked Group    08/03/24 0035    08/03/24 0034  sennosides-docusate (PERICOLACE) 8.6-50 MG per tablet 2 " "tablet  2 Times Daily PRN        Placed in \"And\" Linked Group    08/03/24 0035    08/03/24 0034  polyethylene glycol (MIRALAX) packet 17 g  Daily PRN        Placed in \"And\" Linked Group    08/03/24 0035    08/03/24 0034  acetaminophen (TYLENOL) tablet 650 mg  Every 4 Hours PRN        Placed in \"Or\" Linked Group    08/03/24 0035    08/03/24 0034  acetaminophen (TYLENOL) 160 MG/5ML oral solution 650 mg  Every 4 Hours PRN        Placed in \"Or\" Linked Group    08/03/24 0035    08/03/24 0034  acetaminophen (TYLENOL) suppository 650 mg  Every 4 Hours PRN        Placed in \"Or\" Linked Group    08/03/24 0035    08/03/24 0034  OT Consult: Eval & Treat Post-Stroke  Once         08/03/24 0035    08/03/24 0034  PT Consult: Eval & Treat Post Stroke  Once         08/03/24 0035    08/03/24 0034  Adult Transthoracic Echo Complete w/ Color, Spectral and Contrast if necessary per protocol  Once         08/03/24 0035    08/03/24 0033  Continuous Cardiac Monitoring  Continuous        Comments: Follow Standing Orders As Outlined in Process Instructions (Open Order Report to View Full Instructions)    08/03/24 0035    08/03/24 0033  Maintain IV Access  Continuous         08/03/24 0035    08/03/24 0033  Telemetry - Place Orders & Notify Provider of Results When Patient Experiences Acute Chest Pain, Dysrhythmia or Respiratory Distress  Continuous        Comments: Open Order Report to View Parameters Requiring Provider Notification    08/03/24 0035    08/03/24 0033  May Be Off Telemetry for Tests  Continuous         08/03/24 0035    08/03/24 0033  Ambulate Patient  Every Shift       08/03/24 0035    08/03/24 0033  Swallow Precautions  Continuous         08/03/24 0035    08/03/24 0033  Fall Precautions  Continuous         08/03/24 0035    08/03/24 0033  Notify Provider (With Default Parameters)  Continuous        Comments: Open Order Report to View Parameters Requiring Provider Notification    08/03/24 0035    08/03/24 0033  Nursing Dysphagia " "Screening (Complete Prior to Giving Anything By Mouth)  Once         08/03/24 0035    08/03/24 0033  Diet: Cardiac; Healthy Heart (2-3 Na+); Fluid Consistency: Thin (IDDSI 0)  Diet Effective Now         08/03/24 0035    08/03/24 0033  Inpatient Case Management  Consult  Once        Provider:  (Not yet assigned)    08/03/24 0035    08/03/24 0032  nitroglycerin (NITROSTAT) SL tablet 0.4 mg  Every 5 Minutes PRN         08/03/24 0035    08/03/24 0032  Intake & Output  Every Shift       08/03/24 0035    08/03/24 0032  Weigh patient  Once         08/03/24 0035    08/03/24 0032  Fall Precautions  Continuous         08/03/24 0035    08/03/24 0032  Insert Peripheral IV  Once         08/03/24 0035    08/03/24 0032  Saline Lock & Maintain IV Access  Continuous,   Status:  Canceled         08/03/24 0035    08/03/24 0032  Code Status and Medical Interventions: CPR (Attempt to Resuscitate); Full Support  Continuous         08/03/24 0035    08/03/24 0031  Pharmacy to Dose enoxaparin (LOVENOX)  Continuous PRN         08/03/24 0035    08/03/24 0031  ondansetron (ZOFRAN) injection 4 mg  Every 6 Hours PRN         08/03/24 0035    08/03/24 0031  sodium chloride 0.9 % flush 10 mL  As Needed         08/03/24 0035    08/03/24 0031  sodium chloride 0.9 % infusion 40 mL  As Needed         08/03/24 0035    08/03/24 0031  acetaminophen (TYLENOL) tablet 650 mg  Every 4 Hours PRN        Placed in \"Or\" Linked Group    08/03/24 0035    08/03/24 0031  acetaminophen (TYLENOL) 160 MG/5ML oral solution 650 mg  Every 4 Hours PRN        Placed in \"Or\" Linked Group    08/03/24 0035    08/03/24 0031  acetaminophen (TYLENOL) suppository 650 mg  Every 4 Hours PRN        Placed in \"Or\" Linked Group    08/03/24 0035    08/03/24 0013  Critical Care  Once        Comments: This order was created via procedure documentation    08/03/24 0012    08/03/24 0000  Neuro Checks  Every 4 Hours       08/02/24 2321    08/02/24 2321  Inpatient Neurology " Consult  Once        Specialty:  Neurology  Provider:  Cdoy Hodge MD    08/02/24 2321 08/02/24 2227  gadobenate dimeglumine (MULTIHANCE) injection 15 mL  Once in Imaging         08/02/24 2211 08/02/24 2208  clopidogrel (PLAVIX) tablet 300 mg  Once         08/02/24 2152 08/02/24 2201  High Sensitivity Troponin T 2Hr  PROCEDURE ONCE         08/02/24 2030 08/02/24 2159  Initiate Observation Status  Once         08/02/24 2158 08/02/24 2139  MRI Brain With & Without Contrast  1 Time Imaging         08/02/24 2129 08/02/24 2130  MRI Angiogram Head With & Without Contrast  1 Time Imaging,   Status:  Canceled         08/02/24 2129 08/02/24 2113  iopamidol (ISOVUE-300) 61 % injection 100 mL  Once in Imaging         08/02/24 2057 08/02/24 2040  Undress and Gown  Once         08/02/24 2040 08/02/24 2040  Cardiac Monitoring  Continuous,   Status:  Canceled        Comments: Follow Standing Orders As Outlined in Process Instructions (Open Order Report to View Full Instructions)    08/02/24 2040 08/02/24 2040  Continuous Pulse Oximetry  Continuous         08/02/24 2040 08/02/24 2040  Vital Signs  Every 30 Minutes,   Status:  Canceled         08/02/24 2040 08/02/24 2040  CT Head Without Contrast Stroke Protocol  1 Time Imaging         08/02/24 2040 08/02/24 2040  POC Glucose Once  Once        Comments: Complete no more than 45 minutes prior to patient eating      08/02/24 2040 08/02/24 2040  Protime-INR  Once         08/02/24 2040 08/02/24 2040  Insert Peripheral IV  Once        Comments: 20 G Minimum - Right AC Preferred    08/02/24 2040 08/02/24 2040  [place] Draw  Once,   Status:  Canceled         08/02/24 2040 08/02/24 2040  NPO Diet NPO Type: Strict NPO  Diet Effective Now,   Status:  Canceled         08/02/24 2040 08/02/24 2040  Nursing Dysphagia Screening (Complete Prior to Giving anything PO)  Once         08/02/24 2040 08/02/24 2040  RN to Place Order  SLP Consult (IF swallow screen failed) - Eval & Treat Choosing Reason of RN Dysphagia Screen Failed  Once         08/02/24 2040 08/02/24 2040  CT Angiogram Head  1 Time Imaging         08/02/24 2040 08/02/24 2040  CT Angiogram Head w AI Analysis of LVO  1 Time Imaging,   Status:  Canceled         08/02/24 2040 08/02/24 2040  CT Angiogram Neck  1 Time Imaging         08/02/24 2040 08/02/24 2040  Green Top (Gel)  PROCEDURE ONCE,   Status:  Canceled         08/02/24 2040 08/02/24 2040  Lavender Top  PROCEDURE ONCE,   Status:  Canceled         08/02/24 2040 08/02/24 2040  Gold Top - SST  PROCEDURE ONCE,   Status:  Canceled         08/02/24 2040 08/02/24 2040  Light Blue Top  PROCEDURE ONCE,   Status:  Canceled         08/02/24 2040 08/02/24 2039  sodium chloride 0.9 % flush 10 mL  As Needed         08/02/24 2040 08/02/24 2015  CBC Auto Differential  Once         08/02/24 2014 08/02/24 2014  XR Chest 1 View  1 Time Imaging         08/02/24 2013 08/02/24 2013  COVID-19 and FLU A/B PCR, 1 HR TAT - Swab, Nasopharynx  Once         08/02/24 2013 08/02/24 2013  CBC & Differential  Once         08/02/24 2013 08/02/24 2013  Comprehensive Metabolic Panel  Once         08/02/24 2013 08/02/24 2013  Urinalysis With Culture If Indicated - Urine, Clean Catch  Once         08/02/24 2013 08/02/24 2013  High Sensitivity Troponin T  Once         08/02/24 2013 08/02/24 2013  BNP  Once         08/02/24 2013 08/02/24 2013  C-reactive Protein  Once         08/02/24 2013 08/02/24 2013  Lactic Acid, Plasma  Once         08/02/24 2013 08/02/24 2013  Procalcitonin  Once         08/02/24 2013 08/02/24 2013  Magnesium  Once         08/02/24 2013 08/02/24 2002  Insert peripheral IV  Once         08/02/24 2001 08/02/24 2002  [place] Draw  Once         08/02/24 2001 08/02/24 2002  ECG 12 Lead Stroke Evaluation  Once         08/02/24 2001 08/02/24 2002  Green Top (Gel)  PROCEDURE  ONCE         08/02/24 2001 08/02/24 2002  Lavender Top  PROCEDURE ONCE         08/02/24 2001 08/02/24 2002  Gold Top - SST  PROCEDURE ONCE         08/02/24 2001 08/02/24 2002  Light Blue Top  PROCEDURE ONCE         08/02/24 2001 08/02/24 2001  sodium chloride 0.9 % flush 10 mL  As Needed         08/02/24 2001    Unscheduled  Oxygen Therapy- Nasal Cannula; Titrate 1-6 LPM Per SpO2; 90 - 95%  Continuous PRN       08/02/24 2040    Unscheduled  Up With Assistance  As Needed       08/03/24 0035                  Physician Progress Notes (last 24 hours)  Notes from 08/02/24 0751 through 08/03/24 0751   No notes of this type exist for this encounter.       Consult Notes (last 24 hours)  Notes from 08/02/24 0751 through 08/03/24 0751   No notes of this type exist for this encounter.

## 2024-08-03 NOTE — CASE MANAGEMENT/SOCIAL WORK
Discharge Planning Assessment  [facility]     Patient Name: Hardik Natarajan  MRN: [id number]  Today's Date: 8/3/2024    Admit Date: 8/2/2024    Plan: Home with Home Health   Discharge Needs Assessment       Row Name 08/03/24 1516       Living Environment    People in Home spouse    Current Living Arrangements home    In the past 12 months has the electric, gas, oil, or water company threatened to shut off services in your home? No    Primary Care Provided by self    Provides Primary Care For no one    Family Caregiver if Needed spouse;child(austyn), adult    Quality of Family Relationships involved    Able to Return to Prior Arrangements yes       Resource/Environmental Concerns    Resource/Environmental Concerns none    Transportation Concerns none       Transportation Needs    In the past 12 months, has lack of transportation kept you from medical appointments or from getting medications? no    In the past 12 months, has lack of transportation kept you from meetings, work, or from getting things needed for daily living? No       Food Insecurity    Within the past 12 months, you worried that your food would run out before you got the money to buy more. Never true    Within the past 12 months, the food you bought just didn't last and you didn't have money to get more. Never true       Transition Planning    Patient/Family Anticipates Transition to home with help/services    Transportation Anticipated family or friend will provide       Discharge Needs Assessment    Readmission Within the Last 30 Days no previous admission in last 30 days    Equipment Currently Used at Home none    Concerns to be Addressed discharge planning    Anticipated Changes Related to Illness inability to care for self    Provided Post Acute Provider List? N/A    Provided Post Acute Provider Quality & Resource List? 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 At this  time plan is to return home                  Continued Care and Services - Admitted Since 8/2/2024    No active coordination exists for this encounter.          Demographic Summary       Row Name 08/03/24 1514       General Information    Admission Type inpatient    Arrived From emergency department    Referral Source admission list    Preferred Language English                   Functional Status       Row Name 08/03/24 1515       Functional Status    Usual Activity Tolerance fair       Physical Activity    On average, how many days per week do you engage in moderate to strenuous exercise (like a brisk walk)? 0 days    On average, how many minutes do you engage in exercise at this level? 0 min    Number of minutes of exercise per week 0       Functional Status, IADL    Medications completely dependent    Meal Preparation completely dependent    Housekeeping completely dependent    Laundry completely dependent    Shopping completely dependent       Mental Status    General Appearance WDL WDL                   Psychosocial    No documentation.                  Abuse/Neglect    No documentation.                  Legal    No documentation.                  Substance Abuse    No documentation.                  Patient Forms    No documentation.                     Tricia Westbrook RN

## 2024-08-03 NOTE — PLAN OF CARE
Goal Outcome Evaluation:  Plan of Care Reviewed With: patient           Outcome Evaluation: PT evaluation completed this date with pt presenting supine in bed on room air (O2 sat 97%), no c/o pain and O x 4. Pt very [place] and it was difficult at times to communicate with pt. Visual and tactile cues used to assist with understanding directions. Pt came to sit with CGA and tactile cues. Pt c/o dizziness immediately when sitting. Pt was able to sit on EOB in midline with SBA. His dizziness improved but did not completely resolve at anytime during evaluation. Pt came to stand with CGA with FWW and pivoted to chair with FWW and min assist. Pt had difficulty in using LUE to grasp walker. Pt came to stand with hemiwalker and CGA and amb with min assist 35 ft with tactive and visual cues to assist with sequencing. Pt was fatigued post ambulation and dizziness did continue. Pt presents with deficits in balance, strength, and endurance. He is expected to improve his functional mobility with continued PT services prior to d/c.      Anticipated Discharge Disposition (PT): inpatient rehabilitation facility

## 2024-08-03 NOTE — H&P
[facility]   HISTORY AND PHYSICAL      Name:  Hardik Natarajan   Age:  79 y.o.  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 Medellin, anemia and arthritis.  He presented to the ER with a chief complaint of left upper extremity weakness/numbness.  Patient reports that he woke up in the morning 2 days ago with sudden altered sensation/numbness and weakness of his left upper extremity, they initially thought it was related to his arthritis however his symptoms persisted and did not improve.  Patient reports that his hand is weak and specially with his  and fingers. Patient denies any other weakness or numbness.  Patient denies any pain.  No recent fever, chills, headaches, vision changes, chest pain or shortness of breath.  No recent vomiting, abdominal pain or urinary symptoms.  No previous history of strokes.     On ER evaluation, patient was hypertensive with a blood pressure of 154/84, afebrile, room air.  His workup was significant for HS Troponin of 31-28, sodium 131, creatinine 1.56, CRP 0.61, hemoglobin 10.3 otherwise the CBC and CMP nonactionable.  Urinalysis negative for infection.  COVID and flu negative.  Chest x-ray with no acute process per my read CT head without contrast with no acute intracranial hemorrhage, Moderate Cerebral volume loss. Moderate Cerebral white matter disease, likely ischemic microvascular in nature. CTA of the neck/head demonstrates no occlusion or high-grade stenosis. MRI showed Small acute infarct in the right periventricular region. Moderate cerebral volume loss  with moderate cerebral white matter disease, likely ischemic microvascular in nature.  Case discussed with neurology on-call Dr. Hodge by ER provider, patient is not a TNKase candidate, recommended admission for further observation and stroke workup.  Received Plavix in the ER. Hospitalist consulted for admission.    Review Of Systems:    All systems were reviewed and negative except as mentioned in history of presenting illness, assessment and plan.    Past Medical History: Patient  has a past medical history of Anemia, Arthritis, and Cancer.    Past Surgical History: Patient  has a past surgical history that includes Skin cancer excision; Neck dissection (N/A); Parotidectomy (N/A); and NAIL BIOPSY (N/A).    Social History: Patient  reports that he has never smoked. His smokeless tobacco use includes chew. He reports that he does not currently use alcohol. He reports that he does not use drugs.    Family History:  Patient's family history has been reviewed and found to be noncontributory.     Allergies:      Patient has no known allergies.    Home Medications:    Prior to Admission Medications       Prescriptions Last Dose Informant Patient Reported? Taking?    acetaminophen (TYLENOL) 325 MG tablet   Yes No    Take 2 tablets by mouth Every 6 (Six) Hours As Needed.    cyanocobalamin 1000 MCG/ML injection   Yes No    Inject 0.1 mL into the appropriate muscle as directed by prescriber Every 30 (Thirty) Days.    diclofenac (VOLTAREN) 75 MG EC tablet   Yes No    DULoxetine (CYMBALTA) 30 MG capsule   Yes No    finasteride (PROSCAR) 5 MG tablet   Yes No    Hydrocortisone, Perianal, (ANUSOL-HC) 2.5 % rectal cream   Yes No    Insert 1 Application into the rectum.    hydrOXYzine (ATARAX) 25 MG tablet   No No    Take 1 tablet by mouth Every 6 (Six) Hours As Needed for Itching.    levothyroxine (SYNTHROID, LEVOTHROID) 75 MCG tablet   No No    TAKE ONE (1) TABLET BY MOUTH DAILY.    ondansetron (ZOFRAN) 8 MG tablet   No No  "   Take 1 tablet by mouth 3 (Three) Times a Day As Needed for Nausea or Vomiting.    ondansetron ODT (ZOFRAN-ODT) 8 MG disintegrating tablet   Yes No    Place 1 tablet twice a day by translingual route.    oxyCODONE (Roxicodone) 5 MG immediate release tablet   No No    Take 1 tablet by mouth Every 6 (Six) Hours As Needed for Severe Pain.    Procto-Med HC 2.5 % rectal cream   Yes No    1 Application Daily As Needed.    senna (SENOKOT) 8.6 MG tablet   Yes No    Take 1 tablet by mouth Daily As Needed.    tamsulosin (FLOMAX) 0.4 MG capsule 24 hr capsule   Yes No    Take 1 capsule by mouth.          ED Medications:    Medications   sodium chloride 0.9 % flush 10 mL (has no administration in time range)   sodium chloride 0.9 % flush 10 mL (has no administration in time range)   clopidogrel (PLAVIX) tablet 300 mg (has no administration in time range)   iopamidol (ISOVUE-300) 61 % injection 100 mL (100 mL Intravenous Given 8/2/24 2101)     Vital Signs:  Temp:  [97.5 °F (36.4 °C)] 97.5 °F (36.4 °C)  Heart Rate:  [73-77] 73  Resp:  [18] 18  BP: (129-154)/(77-84) 129/77        08/02/24 1953   Weight: 99.8 kg (220 lb)     Body mass index is 29.84 kg/m².    Physical Exam:     Most recent vital Signs: /77 (BP Location: Left arm, Patient Position: Lying)   Pulse 73   Temp 97.5 °F (36.4 °C) (Oral)   Resp 18   Ht 182.9 cm (72\")   Wt 99.8 kg (220 lb)   SpO2 99%   BMI 29.84 kg/m²     Physical Exam  Vitals and nursing note reviewed.   Constitutional:       General: He is not in acute distress.     Appearance: Normal appearance.      Comments: Patient is hard of hearing.   HENT:      Head: Normocephalic and atraumatic.      Ears:      Comments: Left ear status post skin excisional biopsy/tumor resection.     Nose: Nose normal.      Mouth/Throat:      Mouth: Mucous membranes are moist.   Eyes:      Extraocular Movements: Extraocular movements intact.      Conjunctiva/sclera: Conjunctivae normal.      Pupils: Pupils are " equal, round, and reactive to light.   Cardiovascular:      Rate and Rhythm: Normal rate and regular rhythm.      Pulses: Normal pulses.      Heart sounds: Normal heart sounds.   Pulmonary:      Effort: Pulmonary effort is normal.      Breath sounds: Normal breath sounds. No wheezing or rhonchi.   Abdominal:      General: Bowel sounds are normal. There is no distension.      Palpations: Abdomen is soft.      Tenderness: There is no abdominal tenderness.   Musculoskeletal:         General: Normal range of motion.      Cervical back: Normal range of motion and neck supple.      Right lower leg: No edema.      Left lower leg: No edema.   Skin:     General: Skin is warm and dry.      Findings: No rash.   Neurological:      General: No focal deficit present.      Mental Status: He is alert and oriented to person, place, and time. Mental status is at baseline.      Cranial Nerves: No cranial nerve deficit or facial asymmetry.      Motor: No tremor.      Comments: Left upper extremity weakness and numbness, motor strength of 2 out of 5 in the left upper extremity.  Decreased  strength of left hand. Motor strength in his other extremities is 5 out of 5.  Patient reporting paresthesias/numbness of the left upper extremity.   Psychiatric:         Mood and Affect: Mood normal.         Behavior: Behavior normal.         Thought Content: Thought content normal.         Laboratory data:    I have reviewed the labs done in the emergency room.    Results from last 7 days   Lab Units 08/02/24 2001   SODIUM mmol/L 131*   POTASSIUM mmol/L 5.0   CHLORIDE mmol/L 96*   CO2 mmol/L 24.5   BUN mg/dL 13   CREATININE mg/dL 1.56*   CALCIUM mg/dL 9.2   BILIRUBIN mg/dL 0.5   ALK PHOS U/L 66   ALT (SGPT) U/L 13   AST (SGOT) U/L 22   GLUCOSE mg/dL 110*     Results from last 7 days   Lab Units 08/02/24 2001   WBC 10*3/mm3 3.71   HEMOGLOBIN g/dL 10.3*   HEMATOCRIT % 31.0*   PLATELETS 10*3/mm3 180     Results from last 7 days   Lab Units  "08/02/24 2001   INR  1.10     Results from last 7 days   Lab Units 08/02/24 2001   HSTROP T ng/L 31*     Results from last 7 days   Lab Units 08/02/24 2001   PROBNP pg/mL 106.6                       Invalid input(s): \"USDES\", \"NITRITITE\", \"BACT\", \"EP\"    Pain Management Panel           No data to display                EKG:      Sinus rhythm, heart rate 70, nonspecific ST/T wave changes.    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 Common carotid arteries are tortuous. ICA bilaterally demonstrate atherosclerotic disease with mild bilateral luminal narrowing. Vertebral arteries are patent throughout their cervical course, without dissection. Left vertebral artery is dominant. Lung apices are clear. Multilevel degenerative disease of the cervical spine.     Impression: 1. CTA of the neck demonstrates no occlusion or high-grade stenosis of the cervical carotid or vertebral arteries. No findings of dissection. CTA head: Intracranial ICAs bilaterally demonstrate calcifications without high-grade stenosis. No aneurysm. JUAN CARLOS bilaterally demonstrate no occlusion or high-grade stenosis. MCA bilaterally demonstrate no occlusion or high-grade stenosis. PCA bilaterally demonstrate no occlusion or high-grade stenosis. Intracranial vertebral arteries are patent. Basilar artery is patent, without aneurysm or stenosis. Dural venous sinuses are patent. Opacification of the left middle ear and left mastoid air cells. Erosion/resection of some of the mastoid air cells. Please correlate " with clinical history Impression: 1. CTA of the intracranial circulation demonstrates no large vessel occlusion or high-grade stenosis. No aneurysm is identified. 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 Common carotid arteries are tortuous. ICA bilaterally demonstrate atherosclerotic disease with mild bilateral luminal narrowing. Vertebral arteries are patent throughout their cervical course, without dissection. Left vertebral artery is dominant. Lung apices are clear. Multilevel degenerative disease of the cervical spine.     Impression: 1. CTA of the neck demonstrates no occlusion or high-grade stenosis of the cervical carotid or vertebral arteries. No findings of dissection. CTA head: Intracranial ICAs bilaterally demonstrate calcifications without high-grade stenosis. No aneurysm. JUAN CARLOS bilaterally demonstrate no occlusion or high-grade stenosis. MCA bilaterally demonstrate no occlusion or high-grade stenosis. PCA bilaterally demonstrate no occlusion or high-grade stenosis. Intracranial vertebral arteries are patent. Basilar artery is patent, without aneurysm or stenosis. Dural venous sinuses are patent. Opacification of the left middle ear and left mastoid air cells. Erosion/resection of some of the mastoid air cells. Please correlate with clinical history Impression: 1. CTA of the intracranial circulation demonstrates no large vessel occlusion or high-grade stenosis.  No aneurysm is identified. Authenticated and Electronically Signed by Veda Su MD on 08/02/2024 09:23:44 PM    CT Head Without Contrast Stroke Protocol    Addendum Date: 8/2/2024    ADDENDUM REPORT ADDENDUM: This report was discussed with Archie Sue on Aug 02, 2024 21:19:00 EDT. Authenticated and Electronically Signed by Veda Su MD on 08/02/2024 09:19:47 PM    Result Date: 8/2/2024  FINAL REPORT TECHNIQUE: null CLINICAL HISTORY: Stroke, follow up COMPARISON: null FINDINGS: Exam: CT Brain without contrast Comparison: None Clinical history: Stroke Findings: No acute intracranial hemorrhage. Cerebral volume loss. No abnormal extra-axial fluid collections. No intracranial mass No midline shift. Bilateral cerebral white matter disease, likely ischemic microvascular in nature No skull fracture. Chronic right maxillary sinus disease. Fluid in the sphenoid sinuses bilaterally Opacification of the left middle ear and left mastoid air cells     Impression: 1. No acute intracranial hemorrhage. 2. Moderate Cerebral volume loss. Moderate Cerebral white matter disease, likely ischemic microvascular in nature Authenticated and Electronically Signed by Veda Su MD on 08/02/2024 09:18:20 PM     Assessment:    Acute ischemic stroke, POA  Left upper extremity numbness/weakness, POA  History of SCC of left ear status post resection/immunotherapy  Arthritis  Chronic anemia    Plan:    Patient is admitted for further management and treatment.    Ischemic stroke  Left upper extremity weakness/numbness  -MRI showed Small acute infarct in the right periventricular region.  -Neurology consulted, appreciate recommendations  -Patient had intolerance to aspirin in the past, started on Plavix  -Start atorvastatin  -Lipid panel and HA1c in a.m.  -2D echo ordered  -PT/OT/speech consult    -Continue home meds as warranted.  -Further orders as indicated per clinical course.    Risk Assessment: High  DVT Prophylaxis:  Lovenox effluxes (benefit> risk)  Code Status: Full  Diet: Cardiac after passing dysphagia screen    Advance Care Planning   ACP discussion was held with the patient during this visit. Patient does not have an advance directive, information provided.       Sung Alcaraz MD  08/02/24  21:58 EDT    Dictated utilizing Dragon dictation.

## 2024-08-03 NOTE — THERAPY EVALUATION
Patient Name: Hardik Natarajan  : 1945    MRN: [id number]                              Today's Date: 8/3/2024       Admit Date: 2024    Visit Dx:     ICD-10-CM ICD-9-CM   1. 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    Anemia, unspecified    Nicotine dependence, chewing tobacco, uncomplicated    Neoplasm related pain (acute) (chronic)    Unspecified mastoiditis, left ear    Cellulitis of left external ear    Phlebitis and thrombophlebitis of unspecified site    Gangrene, not elsewhere classified    Acute osteomyelitis    Benign prostatic hyperplasia without lower urinary tract symptoms    Pain    Palliative care by specialist    Encounter for therapeutic drug monitoring    Weakness of left upper extremity     Past Medical History:   Diagnosis Date    Anemia     Arthritis     Cancer      Past Surgical History:   Procedure Laterality Date    NAIL BIOPSY N/A     NECK DISSECTION N/A     PAROTIDECTOMY N/A     SKIN CANCER EXCISION        General Information       Row Name 24 1445          Physical Therapy Time and Intention    Document Type evaluation  -TW     Mode of Treatment physical therapy  -TW       Row Name 24 1445          General Information    Patient Profile Reviewed yes  -TW     Prior Level of Function independent:;all household mobility  pt did not use any assistive device for ambulation prior to admission.  -TW     Existing Precautions/Restrictions fall  -TW     Barriers to Rehab hearing deficit  -TW       Row Name 24 1446          Living Environment    People in Home spouse  -TW       Row Name 24 1445          Home Main Entrance    Number of Stairs, Main Entrance none  -TW       Row Name 24 1445          Stairs Within Home, Primary    Number of Stairs, Within Home, Primary none  -TW       Row Name 24 1440           Cognition    Orientation Status (Cognition) unable/difficult to assess;oriented to;person;place;situation;time  pt is very [place] and needed questions repeated to ensure understanding and visual cues to assist with understanding of directions.  -       Row Name 08/03/24 1447          Safety Issues, Functional Mobility    Safety Issues Affecting Function (Mobility) insight into deficits/self-awareness;positioning of assistive device;problem-solving;safety precautions follow-through/compliance;sequencing abilities;ability to follow commands  -TW     Impairments Affecting Function (Mobility) balance;coordination;endurance/activity tolerance;grasp;motor planning;sensation/sensory awareness;strength  -TW               User Key  (r) = Recorded By, (t) = Taken By, (c) = Cosigned By      Initials Name Provider Type    TW Valerie Robertson, PT Physical Therapist                   Mobility       Row Name 08/03/24 1446          Bed Mobility    Bed Mobility supine-sit;rolling right  -TW     Rolling Right [place] (Bed Mobility) nonverbal cues (demo/gesture)  -TW     Supine-Sit [place] (Bed Mobility) nonverbal cues (demo/gesture);verbal cues;contact guard  -TW     Comment, (Bed Mobility) pt had immediate c/o dizziness when coming to sit on EOB. Pt was able to sit in midline with SBA. Post 4 minutes the dizziness improved but did not resolve.  -       Row Name 08/03/24 3805          Bed-Chair Transfer    Bed-Chair [place] (Transfers) minimum assist (75% patient effort);verbal cues;nonverbal cues (demo/gesture)  -     Assistive Device (Bed-Chair Transfers) walker, front-wheeled;walker, rosemarie  -TW     Comment, (Bed-Chair Transfer) First transfer pt used FWW but limited use of L hand to grasp walker handle observed and a hemiwalker used for second transfer.  -       Row Name 08/03/24 1443          Sit-Stand Transfer    Sit-Stand [place] (Transfers) contact guard;verbal cues;nonverbal cues (demo/gesture)  -TW      Assistive Device (Sit-Stand Transfers) walker, front-wheeled;walker, rosemarie  -TW       Row Name 08/03/24 1445          Gait/Stairs (Locomotion)    [place] Level (Gait) minimum assist (75% patient effort)  -TW     Assistive Device (Gait) walker, rosemarie  -TW     Patient was able to Ambulate yes  -TW     Distance in Feet (Gait) 35  -TW     Deviations/Abnormal Patterns (Gait) other (see comments)  -TW     Comment, (Gait/Stairs) Inconsistent sequencing with hemiwalker. Pt also had decreased wt shift onto the LLE. Pt's heelstrike decreased bilaterally. Pt fatigued during gait and his dizziness continued throughout all mobility.  -TW               User Key  (r) = Recorded By, (t) = Taken By, (c) = Cosigned By      Initials Name Provider Type    TW Valerie Robertson, RAD[facility] Physical Therapist                   Obj/Interventions       Row Name 08/03/24 1445          Range of Motion Comprehensive    Comment, General Range of Motion BLE grossly WFLs  -TW       Row Name 08/03/24 1445          Strength Comprehensive (MMT)    General Manual Muscle Testing (MMT) Assessment upper extremity strength deficits identified  -TW     Comment, General Manual Muscle Testing (MMT) Assessment BLE strength was equal when MMT'd with BLE grossly 3+/5 to 4+/5. Pt had decreased L sided grasp and shoulder and elbow movement against gravity observed.  -TW       Row Name 08/03/24 1445          Motor Skills    Motor Skills coordination  -TW     Coordination fine motor deficit;left;upper extremity  -TW       Row Name 08/03/24 1445          Balance    Balance Assessment sitting static balance;sit to stand dynamic balance;sitting dynamic balance;standing dynamic balance;standing static balance  -TW     Static Sitting Balance standby assist  -TW     Dynamic Sitting Balance standby assist  -TW     Position, Sitting Balance unsupported;sitting edge of bed  -TW     Sit to Stand Dynamic Balance contact guard  -TW     Static Standing Balance contact guard  -TW      Dynamic Standing Balance minimal assist;non-verbal cues (demo/gesture);verbal cues  -TW     Position/Device Used, Standing Balance supported;walker, rosemarie  -TW       Row Name 08/03/24 1445          Sensory Assessment (Somatosensory)    Sensory Assessment (Somatosensory) left UE  -TW     Left UE Sensory Assessment general sensation;impaired  -TW               User Key  (r) = Recorded By, (t) = Taken By, (c) = Cosigned By      Initials Name Provider Type    TW Marcelo, Valerie, PT Physical Therapist                   Goals/Plan       Row Name 08/03/24 1445          Bed Mobility Goal 1 (PT)    Activity/Assistive Device (Bed Mobility Goal 1, PT) bed mobility activities, all  -TW     [place] Level/Cues Needed (Bed Mobility Goal 1, PT) independent  -TW     Time Frame (Bed Mobility Goal 1, PT) short term goal (STG);3 days  -TW     Strategies/Barriers (Bed Mobility Goal 1, PT) from flat bed  -TW     Progress/Outcomes (Bed Mobility Goal 1, PT) goal ongoing  -TW       Row Name 08/03/24 1445          Transfer Goal 1 (PT)    Activity/Assistive Device (Transfer Goal 1, PT) sit-to-stand/stand-to-sit;bed-to-chair/chair-to-bed;toilet;walker, rosemarie;cane, quad  -TW     [place] Level/Cues Needed (Transfer Goal 1, PT) standby assist  -TW     Time Frame (Transfer Goal 1, PT) long term goal (LTG);10 days  -TW     Progress/Outcome (Transfer Goal 1, PT) goal ongoing  -TW       Row Name 08/03/24 1444          Gait Training Goal 1 (PT)    Activity/Assistive Device (Gait Training Goal 1, PT) gait (walking locomotion);assistive device use;decrease fall risk;diminish gait deviation;increase endurance/gait distance;improve balance and speed  -TW     [place] Level (Gait Training Goal 1, PT) contact guard required  -TW     Distance (Gait Training Goal 1, PT) 100 ft  -TW     Time Frame (Gait Training Goal 1, PT) long term goal (LTG);10 days  -TW     Progress/Outcome (Gait Training Goal 1, PT) goal ongoing  -TW       Row Name 08/03/24 3770           Patient Education Goal (PT)    Activity (Patient Education Goal, PT) BLE ther ex 1 x 10  -TW     [place]/Cues/Accuracy (Memory Goal 2, PT) demonstrates adequately  -TW     Time Frame (Patient Education Goal, PT) long term goal (LTG);10 days  -TW     Progress/Outcome (Patient Education Goal, PT) goal ongoing  -TW       Row Name 08/03/24 1446          Therapy Assessment/Plan (PT)    Planned Therapy Interventions (PT) balance training;bed mobility training;gait training;transfer training;strengthening;patient/family education  -TW               User Key  (r) = Recorded By, (t) = Taken By, (c) = Cosigned By      Initials Name Provider Type    TW Valerie Robertson, PT Physical Therapist                   Clinical Impression       Row Name 08/03/24 4130          Pain    Pretreatment Pain Rating 0/10 - no pain  -TW     Posttreatment Pain Rating 0/10 - no pain  -TW     Pre/Posttreatment Pain Comment pt had no c/o pain but did have c/o dizziness during sitting on EOB and in standing.  -TW       Row Name 08/03/24 1860          Plan of Care Review    Plan of Care Reviewed With patient  -TW     Outcome Evaluation PT evaluation completed this date with pt presenting supine in bed on room air (O2 sat 97%), no c/o pain and O x 4. Pt very [place] and it was difficult at times to communicate with pt. Visual and tactile cues used to assist with understanding directions. Pt came to sit with CGA and tactile cues. Pt c/o dizziness immediately when sitting. Pt was able to sit on EOB in midline with SBA. His dizziness improved but did not completely resolve at anytime during evaluation. Pt came to stand with CGA with FWW and pivoted to chair with FWW and min assist. Pt had difficulty in using LUE to grasp walker. Pt came to stand with hemiwalker and CGA and amb with min assist 35 ft with tactive and visual cues to assist with sequencing. Pt was fatigued post ambulation and dizziness did continue. Pt presents with deficits in  balance, strength, and endurance. He is expected to improve his functional mobility with continued PT services prior to d/c.  -TW       Row Name 08/03/24 1445          Therapy Assessment/Plan (PT)    Patient/Family Therapy Goals Statement (PT) pt did verbalize that he may need STR and an assistive device to be safe for ambulation.  -TW     Rehab Potential (PT) good, to achieve stated therapy goals  -TW     Criteria for Skilled Interventions Met (PT) yes;meets criteria  -TW     Therapy Frequency (PT) 5 times/wk  -TW     Predicted Duration of Therapy Intervention (PT) 10 days  -TW       Row Name 08/03/24 1445          Vital Signs    Pretreatment Heart Rate (beats/min) 77  -TW     Posttreatment Heart Rate (beats/min) 69  -TW     Pre SpO2 (%) 97  -TW     O2 Delivery Pre Treatment room air  -TW     Post SpO2 (%) 98  -TW     O2 Delivery Post Treatment room air  -TW     Pre Patient Position Supine  -TW     Intra Patient Position Standing  -TW     Post Patient Position Sitting  -TW       Row Name 08/03/24 1445          Positioning and Restraints    Pre-Treatment Position in bed  -TW     Post Treatment Position chair  -TW     In Chair reclined;call light within reach;encouraged to call for assist;exit alarm on;notified nsg;legs elevated  -TW               User Key  (r) = Recorded By, (t) = Taken By, (c) = Cosigned By      Initials Name Provider Type    TW Valerie Robertson, PT Physical Therapist                   Outcome Measures       Row Name 08/03/24 1445 08/03/24 0746       How much help from another person do you currently need...    Turning from your back to your side while in flat bed without using bedrails? 3  -TW 3  -TS    Moving from lying on back to sitting on the side of a flat bed without bedrails? 3  -TW 2  -TS    Moving to and from a bed to a chair (including a wheelchair)? 3  -TW 3  -TS    Standing up from a chair using your arms (e.g., wheelchair, bedside chair)? 3  -TW 3  -TS    Climbing 3-5 steps with a  railing? 1  -TW 2  -TS    To walk in hospital room? 3  -TW 3  -TS    AM-PAC 6 Clicks Score (PT) 16  -TW 16  -TS    Highest Level of Mobility Goal 5 --> Static standing  -TW 5 --> Static standing  -TS      Row Name 08/03/24 1445          Functional Assessment    Outcome Measure Options AM-PAC 6 Clicks Basic Mobility (PT)  -               User Key  (r) = Recorded By, (t) = Taken By, (c) = Cosigned By      Initials Name Provider Type    TW Valerie Robertson PT Physical Therapist    TS Mary Dunham, RN Registered Nurse                                 Physical Therapy Education       Title: PT OT SLP Therapies (In Progress)       Topic: Physical Therapy (In Progress)       Point: Mobility training (Done)       Learning Progress Summary             Patient Acceptance, E, VU by TW at 8/3/2024 1445    Comment: pt education on purpose of PT evaluation and on his inpt PT POC                         Point: Home exercise program (Not Started)       Learner Progress:  Not documented in this visit.              Point: Body mechanics (Not Started)       Learner Progress:  Not documented in this visit.              Point: Precautions (Not Started)       Learner Progress:  Not documented in this visit.                              User Key       Initials Effective Dates Name Provider Type Discipline    TW 06/16/21 -  Valerie Robertson PT Physical Therapist PT                  PT Recommendation and Plan  Planned Therapy Interventions (PT): balance training, bed mobility training, gait training, transfer training, strengthening, patient/family education  Plan of Care Reviewed With: patient  Outcome Evaluation: PT evaluation completed this date with pt presenting supine in bed on room air (O2 sat 97%), no c/o pain and O x 4. Pt very [place] and it was difficult at times to communicate with pt. Visual and tactile cues used to assist with understanding directions. Pt came to sit with CGA and tactile cues. Pt c/o dizziness immediately  when sitting. Pt was able to sit on EOB in midline with SBA. His dizziness improved but did not completely resolve at anytime during evaluation. Pt came to stand with CGA with FWW and pivoted to chair with FWW and min assist. Pt had difficulty in using LUE to grasp walker. Pt came to stand with hemiwalker and CGA and amb with min assist 35 ft with tactive and visual cues to assist with sequencing. Pt was fatigued post ambulation and dizziness did continue. Pt presents with deficits in balance, strength, and endurance. He is expected to improve his functional mobility with continued PT services prior to d/c.     Time Calculation:   PT Evaluation Complexity  History, PT Evaluation Complexity: 3 or more personal factors and/or comorbidities  Examination of Body Systems (PT Eval Complexity): total of 3 or more elements  Clinical Presentation (PT Evaluation Complexity): evolving  Clinical Decision Making (PT Evaluation Complexity): low complexity  Overall Complexity (PT Evaluation Complexity): low complexity     PT Charges       Row Name 08/03/24 1445             Time Calculation    Stop Time 1445  -TW      PT Received On 08/03/24 -TW      PT Goal Re-Cert Due Date 08/13/24 -TW                User Key  (r) = Recorded By, (t) = Taken By, (c) = Cosigned By      Initials Name Provider Type    TW Valerie Robertson PT Physical Therapist                  Therapy Charges for Today       Code Description Service Date Service Provider Modifiers Qty    [id number]  PT EVAL LOW COMPLEXITY 3 8/3/2024 Valerie Robertson PT GP 1            PT G-Codes  Outcome Measure Options: AM-PAC 6 Clicks Basic Mobility (PT)  AM-PAC 6 Clicks Score (PT): 16  PT Discharge Summary  Anticipated Discharge Disposition (PT): inpatient rehabilitation facility    Valerie Robertson PT  8/3/2024

## 2024-08-03 NOTE — PLAN OF CARE
Goal Outcome Evaluation:  Plan of Care Reviewed With: patient        Progress: no change  Outcome Evaluation: New admit, VSS, continues to c/o LUE weakness and numbness, no change since admit.

## 2024-08-03 NOTE — CONSULTS
DOS: 8/3/2024  NAME: Hardik Natarajan   : 1945  PCP: Provider, No Known  CC: New onset of weakness of the left upper extremity which has been ongoing for the last 3 days  Referring MD: Archie Sue MD, Sung Alcaraz MD, Markus Guerra DO    Neurological Problem and Interval History:  79 y.o. 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 Medellin, osteoarthritis and anemia complained of weakness of the left upper extremity for the last 3 days.  There was also associated tingling and numbness and as it did not improve he decided to come in last evening to the emergency room.  He was beyond the interval for acute, lytic therapy or any acute thrombectomy and so it was decided to bring him in for observation.  The MRI of the brain was showing an acute stroke in the right periventricular area.  When I saw him through the telemedicine device I noticed that he is awake and alert and able to give me a good history.  There was no speech impediment noticed.  He had no weakness in the right upper and lower extremities but in the left upper extremity there was drift noticeable and a mild drift noticeable in the left lower extremity.  He says he has been having difficulty in getting up or standing up independently.  Denies any headaches at this point.  Denies any speech impediment or any kind of visual impairment at this point.  He has passed his bedside swallow and has been taking his home medications as well as food without any issues.    Past Medical/Surgical Hx:  Past Medical History:   Diagnosis Date    Anemia     Arthritis     Cancer      Past Surgical History:   Procedure Laterality Date    NAIL BIOPSY N/A     NECK DISSECTION N/A     PAROTIDECTOMY N/A     SKIN CANCER EXCISION         Review of Systems:    Constitutional: Pleasant gentleman currently using the urinal when I came to see him  virtually.  Cardiovascular: No chest pain or palpitations noted.  Respiratory: No shortness of breath noted.  Gastrointestinal: No nausea and vomiting noted.  Genitourinary: No bladder incontinence.  Musculoskeletal: Has weakness of the left upper extremity.  Dermatological: No skin breakdown noted.  Neurological: Left upper extremity weakness as discussed above.  Psychiatric: No major anxiety or depression noted.  Ophthalmological: No visual changes noted.          Medications On Admission  Medications Prior to Admission   Medication Sig Dispense Refill Last Dose    DULoxetine (CYMBALTA) 30 MG capsule Take 1 capsule by mouth Daily.   8/2/2024    finasteride (PROSCAR) 5 MG tablet Take 1 tablet by mouth Daily.   8/2/2024    Hydrocortisone, Perianal, (ANUSOL-HC) 2.5 % rectal cream Insert 1 Application into the rectum.   Past Week    levothyroxine (SYNTHROID, LEVOTHROID) 75 MCG tablet TAKE ONE (1) TABLET BY MOUTH DAILY. 30 tablet 3 8/2/2024    Procto-Med HC 2.5 % rectal cream 1 Application Daily As Needed.   Past Week    senna (SENOKOT) 8.6 MG tablet Take 1 tablet by mouth Daily As Needed.   Past Week    tamsulosin (FLOMAX) 0.4 MG capsule 24 hr capsule Take 1 capsule by mouth.   8/2/2024    acetaminophen (TYLENOL) 325 MG tablet Take 2 tablets by mouth Every 6 (Six) Hours As Needed.   Unknown    cyanocobalamin 1000 MCG/ML injection Inject 0.1 mL into the appropriate muscle as directed by prescriber Every 30 (Thirty) Days.       diclofenac (VOLTAREN) 75 MG EC tablet    Unknown    hydrOXYzine (ATARAX) 25 MG tablet Take 1 tablet by mouth Every 6 (Six) Hours As Needed for Itching. 60 tablet 2 Unknown    ondansetron (ZOFRAN) 8 MG tablet Take 1 tablet by mouth 3 (Three) Times a Day As Needed for Nausea or Vomiting. 30 tablet 5 Unknown    ondansetron ODT (ZOFRAN-ODT) 8 MG disintegrating tablet Place 1 tablet twice a day by translingual route.   Unknown    oxyCODONE (Roxicodone) 5 MG immediate release tablet Take 1 tablet by  mouth Every 6 (Six) Hours As Needed for Severe Pain. (Patient not taking: Reported on 8/3/2024) 20 tablet 0 Not Taking       Prior to Admission medications    Medication Sig Start Date End Date Taking? Authorizing Provider   DULoxetine (CYMBALTA) 30 MG capsule Take 1 capsule by mouth Daily. 9/11/23  Yes Radha Santana MD   finasteride (PROSCAR) 5 MG tablet Take 1 tablet by mouth Daily. 9/11/23  Yes Radha Santana MD   Hydrocortisone, Perianal, (ANUSOL-HC) 2.5 % rectal cream Insert 1 Application into the rectum. 5/9/23  Yes Radha Santana MD   levothyroxine (SYNTHROID, LEVOTHROID) 75 MCG tablet TAKE ONE (1) TABLET BY MOUTH DAILY. 7/8/24  Yes Benitez Medellin MD   Procto-Med HC 2.5 % rectal cream 1 Application Daily As Needed. 5/9/23  Yes Radha Santana MD   senna (SENOKOT) 8.6 MG tablet Take 1 tablet by mouth Daily As Needed.   Yes Radha Santana MD   tamsulosin (FLOMAX) 0.4 MG capsule 24 hr capsule Take 1 capsule by mouth. 5/9/23  Yes Radha Santana MD   acetaminophen (TYLENOL) 325 MG tablet Take 2 tablets by mouth Every 6 (Six) Hours As Needed.    Radha Santana MD   cyanocobalamin 1000 MCG/ML injection Inject 0.1 mL into the appropriate muscle as directed by prescriber Every 30 (Thirty) Days. 5/17/23   Radha Santana MD   diclofenac (VOLTAREN) 75 MG EC tablet  5/24/23   Radha Santana MD   hydrOXYzine (ATARAX) 25 MG tablet Take 1 tablet by mouth Every 6 (Six) Hours As Needed for Itching. 8/16/23   Yady Guajardo APRN   ondansetron (ZOFRAN) 8 MG tablet Take 1 tablet by mouth 3 (Three) Times a Day As Needed for Nausea or Vomiting. 5/16/23   Yady Guajardo APRN   ondansetron ODT (ZOFRAN-ODT) 8 MG disintegrating tablet Place 1 tablet twice a day by translingual route.    Radha Santana MD   oxyCODONE (Roxicodone) 5 MG immediate release tablet Take 1 tablet by mouth Every 6 (Six) Hours As Needed for Severe Pain.  Patient not taking: Reported on  8/3/2024 6/8/23   Keyur Lemos,         Allergies:  Allergies   Allergen Reactions    Asa [Aspirin] Unknown - Low Severity     Nose bleed       Social Hx:  Social History     Socioeconomic History    Marital status:    Tobacco Use    Smoking status: Never    Smokeless tobacco: Current     Types: Chew   Vaping Use    Vaping status: Never Used   Substance and Sexual Activity    Alcohol use: Not Currently    Drug use: Never    Sexual activity: Defer       Family Hx:  Family History   Problem Relation Age of Onset    Cancer Mother     Cancer Father        Review of Imaging (Interpretation of images not reports): The portable chest x-ray shows the following:      FINDINGS: The heart is normal in size. The lungs are clear. The  mediastinum is unremarkable. There is no pneumothorax.  There are no  acute osseous abnormalities. Prominent aortic knob correlating with a  mildly dilated proximal descending thoracic aorta as seen on CTA of the  neck performed the same day. Apical lordotic positioning noted.      IMPRESSION:  No acute cardiopulmonary process.  CT Head Without Contrast Stroke Protocol    Addendum Date: 8/2/2024    ADDENDUM REPORT ADDENDUM: This report was discussed with Archie Sue on Aug 02, 2024 21:19:00 EDT. Authenticated and Electronically Signed by Veda Su MD on 08/02/2024 09:19:47 PM    Result Date: 8/2/2024  FINAL REPORT TECHNIQUE: null CLINICAL HISTORY: Stroke, follow up COMPARISON: null FINDINGS: Exam: CT Brain without contrast Comparison: None Clinical history: Stroke Findings: No acute intracranial hemorrhage. Cerebral volume loss. No abnormal extra-axial fluid collections. No intracranial mass No midline shift. Bilateral cerebral white matter disease, likely ischemic microvascular in nature No skull fracture. Chronic right maxillary sinus disease. Fluid in the sphenoid sinuses bilaterally Opacification of the left middle ear and left mastoid air cells     Impression:  Impression: 1. No acute intracranial hemorrhage. 2. Moderate Cerebral volume loss. 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 Common carotid arteries are tortuous. ICA bilaterally demonstrate atherosclerotic disease with mild bilateral luminal narrowing. Vertebral arteries are patent throughout their cervical course, without dissection. Left vertebral artery is dominant. Lung apices are clear. Multilevel degenerative disease of the cervical spine.     Impression: Impression: 1. CTA of the neck demonstrates no occlusion or high-grade stenosis of the cervical carotid or vertebral arteries. No findings of dissection. CTA head: Intracranial ICAs bilaterally demonstrate calcifications without high-grade stenosis. No aneurysm. JUAN CARLOS bilaterally demonstrate no occlusion or high-grade stenosis. MCA bilaterally demonstrate no occlusion or high-grade stenosis. PCA bilaterally demonstrate no occlusion or high-grade stenosis. Intracranial vertebral arteries are patent. Basilar artery is patent, without aneurysm or stenosis. Dural venous sinuses are patent. Opacification of the left middle ear and left mastoid air cells. Erosion/resection of some of the mastoid air cells. Please correlate with clinical history Impression: 1. CTA of the intracranial circulation demonstrates no large vessel occlusion or  high-grade stenosis. No aneurysm is identified. Authenticated and Electronically Signed by Veda Su MD on 08/02/2024 09:23:44 PM     MRI Brain With & Without Contrast    Addendum Date: 8/2/2024    ADDENDUM REPORT ADDENDUM: This report was discussed with Dr. Sue on Aug 02, 2024 22:40:00 EDT. 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 COMPARISON: null FINDINGS: Exam: MRI of the brain without IV contrast Comparison: 08/02/2024 Clinical history: Concern for CVA, left arm weakness Findings: Small 1 cm acute infarct in the right periventricular region on series 7, image 19. Decreased signal on the ADC map in this location. No acute intracranial hemorrhage. No intracranial masses. Postcontrast imaging of the brain does not demonstrate any areas of abnormal parenchymal enhancement. No midline shift. No abnormal extra-axial fluid collections are seen. Cerebral volume loss. Areas of increased FLAIR signal within the cerebral white matter bilaterally are reflective of a nonspecific demyelinating process, likely ischemic microvascular in nature.     Impression: Impression: 1. Small acute infarct in the right periventricular region 2. Moderate cerebral volume loss with moderate cerebral white matter disease, likely ischemic microvascular in nature. Authenticated and Electronically Signed by Veda Su MD on 08/02/2024 10:38:39 PM      Additional Tests Performed: An echocardiogram has been completed, the results of which are currently pending.              Laboratory Results:   Lab Results   Component Value Date    GLUCOSE 108 (H) 08/03/2024    CALCIUM 8.8 08/03/2024     (L) 08/03/2024    K 4.6 08/03/2024    CO2 23.1 08/03/2024    CL 96 (L) 08/03/2024    BUN 12 08/03/2024     "CREATININE 1.26 08/03/2024    BCR 9.5 08/03/2024    ANIONGAP 9.9 08/03/2024     Lab Results   Component Value Date    WBC 3.35 (L) 08/03/2024    HGB 9.5 (L) 08/03/2024    HCT 28.8 (L) 08/03/2024    MCV 88.9 08/03/2024     08/03/2024     Lab Results   Component Value Date    CHOL 117 08/03/2024     Lab Results   Component Value Date    HDL 27 (L) 08/03/2024     Lab Results   Component Value Date    LDL 73 08/03/2024     Lab Results   Component Value Date    TRIG 84 08/03/2024     Lab Results   Component Value Date    HGBA1C 5.00 08/03/2024     Lab Results   Component Value Date    INR 1.10 08/02/2024    INR 1.3 (H) 04/07/2023    PROTIME 14.7 08/02/2024     Lab Results   Component Value Date    JWPVZBGE10 164 (L) 04/17/2023     Lab Results   Component Value Date    FOLATE 6.8 04/17/2023     TSH          6/12/2024    11:43 7/3/2024    09:40 7/24/2024    10:33   TSH   TSH 5.340  12.130  2.400  C      Details         C Corrected result                  Physical Examination:  /84 (BP Location: Right arm, Patient Position: Lying)   Pulse 77   Temp 98.2 °F (36.8 °C) (Oral)   Resp 16   Ht 182.9 cm (72.01\")   Wt 100 kg (220 lb 7.4 oz)   SpO2 97%   BMI 29.89 kg/m²   General Appearance:   Well developed, well nourished, well groomed, alert, and cooperative.  HEENT: Normocephalic.    Neck and Spine: Normal range of motion except for the left upper extremity..  Normal alignment. No mass or tenderness. No bruits.    Extremities:    No edema or deformities. Normal joint ROM.   Skin:    No rashes or birth marks.    Neurological examination:  Higher Integrative  Function: Oriented to time, place and person. Normal registration, recall, attention span and concentration. Normal language including comprehension, spontaneous speech, repetition, reading, writing, naming and vocabulary. No neglect with normal visual-spatial function and construction. Normal fund of knowledge and higher integrative function.  CN " II:  Pupils are equal, round, and reactive to light. Normal visual acuity and visual fields.    CN III IV VI: Extraocular movements are full without nystagmus.   CN V:  Normal facial sensation and strength of muscles of mastication.  CN VII:  Facial movements are symmetric. No weakness.  CN VIII: Auditory acuity is normal.  CN IX & X: Symmetric palatal movement.  CN XI:  Sternocleidomastoid and trapezius are normal.  No weakness.  CN XII:  The tongue is midline.  No atrophy or fasciculations.  Motor:  Decreased range of motion of the left upper extremity and the left lower extremity.  Sensation: Normal to light touch, pinprick, vibration, temperature, and proprioception in arms and legs. Normal graphesthesia and no extinction on DSS.  Station and Gait: Could not be tested at this time because of the weakness.    Coordination:  Finger to nose test shows no dysmetria except for the left upper extremity because of the weakness.  Heel to shin normal.    NIHSS:    Baseline  0-->Alert: keenly responsive  0-->Answers both questions correctly  0-->Performs both tasks correctly  0=normal  0=No visual loss  0=Normal symmetric movement  1-->Drift: limb holds 90 (or 45) degrees, but drifts down before full 10 seconds: does not hit bed or other support  0-->No drift: limb holds 90 (or 45) degrees for full 10 secs  1-->Drift: limb holds 90 (or 45) degrees, but drifts down before full 10 seconds: does not hit bed or other support  0-->No drift: limb holds 90 (or 45) degrees for full 10 secs  0=Absent  1=Mild to moderate sensory loss; patient feels pinprick is less sharp or is dull on the affected side; there is a loss of superficial pain with pinprick but patient is aware He is being touched  0=No aphasia, normal  0=Normal  0=No abnormality    Total score: 3    Diagnoses / Discussion:  79 y.o. who presents with Sx of acute stroke involving the left upper and lower extremities and sensory issues that occurred about 3 days ago and  so not a candidate for any acute thrombolytic therapy or any acute thrombectomy.    Plan:  Aspirin 81 mg  Plavix 75 mg  Hydrate  Neuro checks  Check lipid panel, Hgb A1C, TSH, sed rate, vitamin B12, folic acid levels  Lipitor 80 mg  Non-pharmacological DVT prophylaxis  TTE with bubble study to look for PFO has been completed, results of which are currently pending.  Telemetry Unit admission/observation to look for cardiac arrhythmias  PT/OT/ST  Stroke Education  Blood pressure control : Keep the systolic blood pressure between 1 20-1 40 and diastolic between 70-80 as 3 days is already passed.  Goal LDL <70-recommend high dose statins-   Serum glucose < 140  Body mass index: 29.9 kg/m² which puts him in the border region for obesity.  Obstructive sleep apnea screening     Discussed signs and symptoms of stroke and when to call 911. Instructed to follow a low fat diet including the Mediterranean diet. Instructed to take all medications daily as prescribed. Encouraged 30 minutes of exercise 3-4 times a week as tolerated. Stay well hydrated. Discussed potential side effects of new medications and signs and symptoms to report.  Reviewed stroke risk factors and stroke prevention plan. Patient verbalizes understanding and agrees with plan.     I have discussed the above with the patient and his care team and he will be followed up tomorrow by our inpatient teleneurology service with me..  Time spent with patient: 70 minutes in face-to-face evaluation and management of the patient using the dedicated telemedicine device without any interruption with the help of the rounding nurse with the patient located at the [facility] and myself at a remote location.    Electronically signed by Esperanza Zaragoza MD, 08/03/24, 1:48 PM EDT.    Dictated using Dragon dictation.

## 2024-08-03 NOTE — PROGRESS NOTES
"Pharmacy Consult - Enoxaparin Dosing    Hardik Natarajan is a 79 y.o. male who has been consulted to dose enoxaparin for VTE prophylaxis.     Allergies    Asa [aspirin]    Relevant clinical data and objective history reviewed:     [Ht: 182.9 cm (72\"); Wt: 100 kg (220 lb 7.4 oz)]  Body mass index is 29.9 kg/m².    Estimated Creatinine Clearance: 57.3 mL/min (by C-G formula based on SCr of 1.26 mg/dL).    Results from last 7 days   Lab Units 08/03/24  0451 08/02/24 2001   HEMOGLOBIN g/dL 9.5* 10.3*   HEMATOCRIT % 28.8* 31.0*   PLATELETS 10*3/mm3 158 180   CREATININE mg/dL 1.26 1.56*       Asessment/Plan    Initiate Enoxaparin 40 mg SQ every 24 hours  Pharmacy will monitor Mr. Natarajan's renal function and clinical status and adjust the enoxaparin dose and/or frequency as needed.    Thank you,  Rabia Bernal RP,PharmD  8/3/2024  06:11 EDT      "

## 2024-08-03 NOTE — ED PROVIDER NOTES
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  Presents today with weakness to the left upper extremity.  States that he has been unable to have full  strength and hold coffee cups to his left upper extremity.  Also states weakness when lifting the arm up.  Is unable to fully extend the arm.  Denies fevers.  No chest pain or shortness of breath.  No weakness to any other extremity.  Patient does have weakness to the left eyelid but states this is at its baseline.  Denies fevers.      Nurses Notes reviewed and agree, including vitals, allergies, social history and prior medical history.     REVIEW OF SYSTEMS: All systems reviewed and not pertinent unless noted.  Review of Systems   Constitutional:  Positive for activity change. Negative for appetite change, chills, fatigue and fever.   HENT:  Negative for congestion, sinus pressure, sneezing and trouble swallowing.    Eyes:  Negative for discharge and itching.   Respiratory:  Negative for cough and shortness of breath.    Cardiovascular:  Negative for chest pain and palpitations.   Gastrointestinal:  Negative for abdominal distention and abdominal pain.   Endocrine: Negative for cold intolerance and heat intolerance.   Genitourinary:  Negative for decreased urine volume, dysuria and urgency.   Musculoskeletal:  Negative for gait problem, neck pain and neck stiffness.   Skin:  Negative for color change and rash.   Allergic/Immunologic: Negative for immunocompromised state.   Neurological:  Positive for weakness and numbness. Negative for facial asymmetry and headaches.   Hematological:  Negative for adenopathy.   Psychiatric/Behavioral:  Negative for self-injury and suicidal ideas.        Past Medical History:   Diagnosis Date    Anemia     Arthritis     Cancer        Allergies:    Asa [aspirin]      Past Surgical History:   Procedure Laterality Date  "   NAIL BIOPSY N/A     NECK DISSECTION N/A     PAROTIDECTOMY N/A     SKIN CANCER EXCISION           Social History     Socioeconomic History    Marital status:    Tobacco Use    Smoking status: Never    Smokeless tobacco: Current     Types: Chew   Vaping Use    Vaping status: Never Used   Substance and Sexual Activity    Alcohol use: Not Currently    Drug use: Never    Sexual activity: Defer         Family History   Problem Relation Age of Onset    Cancer Mother     Cancer Father        Objective  Physical Exam:  /84 (BP Location: Right arm, Patient Position: Lying)   Pulse 78   Temp 98.1 °F (36.7 °C) (Oral)   Resp 18   Ht 182.9 cm (72\")   Wt 100 kg (220 lb 7.4 oz)   SpO2 100%   BMI 29.90 kg/m²      Physical Exam  Constitutional:       General: He is not in acute distress.     Appearance: Normal appearance. He is normal weight. He is not ill-appearing.   HENT:      Head: Normocephalic and atraumatic.      Nose: Nose normal. No congestion or rhinorrhea.      Mouth/Throat:      Mouth: Mucous membranes are moist.      Pharynx: Oropharynx is clear.   Eyes:      Extraocular Movements: Extraocular movements intact.      Conjunctiva/sclera: Conjunctivae normal.      Pupils: Pupils are equal, round, and reactive to light.   Cardiovascular:      Rate and Rhythm: Normal rate and regular rhythm.      Pulses: Normal pulses.   Pulmonary:      Effort: Pulmonary effort is normal. No respiratory distress.      Breath sounds: Normal breath sounds.   Abdominal:      General: Abdomen is flat. Bowel sounds are normal. There is no distension.      Palpations: Abdomen is soft.      Tenderness: There is no abdominal tenderness.   Musculoskeletal:         General: No swelling or tenderness. Normal range of motion.      Cervical back: Normal range of motion and neck supple. No rigidity or tenderness.   Skin:     General: Skin is warm and dry.      Capillary Refill: Capillary refill takes less than 2 seconds. "   Neurological:      General: No focal deficit present.      Mental Status: He is alert and oriented to person, place, and time. Mental status is at baseline.      Cranial Nerves: No cranial nerve deficit.      Sensory: Sensory deficit present.      Motor: Weakness present.      Coordination: Coordination normal.      Comments: With notable strength difference between the  strength of the left upper extremity and right upper extremity.  Patient unable to fully lift his left upper extremity, endorsing paresthesias throughout the extremity   Psychiatric:         Mood and Affect: Mood normal.         Behavior: Behavior normal.         Thought Content: Thought content normal.         Judgment: Judgment normal.         Critical Care    Performed by: Archie Sue MD  Authorized by: Sung Alcaraz MD    Critical care provider statement:     Critical care time (minutes):  30    Critical care time was exclusive of:  Separately billable procedures and treating other patients    Critical care was necessary to treat or prevent imminent or life-threatening deterioration of the following conditions:  CNS failure or compromise (CVA)    Critical care was time spent personally by me on the following activities:  Blood draw for specimens, development of treatment plan with patient or surrogate, discussions with consultants, discussions with primary provider, evaluation of patient's response to treatment, examination of patient, obtaining history from patient or surrogate, ordering and performing treatments and interventions, ordering and review of laboratory studies, ordering and review of radiographic studies, pulse oximetry, re-evaluation of patient's condition and review of old charts    Care discussed with: admitting provider        ED Course:    ED Course as of 08/03/24 0014   Fri Aug 02, 2024   2012 EKG interpreted by me, normal sinus rhythm with no concerning ST changes noted, rate of 70 [JE]   2118 Chest x-ray  independently interpreted by me prior to radiology overread showing no acute process [JE]      ED Course User Index  [JE] Archie Sue MD       Lab Results (last 24 hours)       Procedure Component Value Units Date/Time    Lactic Acid, Plasma [[id number]]  (Normal) Collected: 08/02/24 2000    Specimen: Blood Updated: 08/02/24 2034     Lactate 1.0 mmol/L     CBC & Differential [[id number]]  (Abnormal) Collected: 08/02/24 2001    Specimen: Blood Updated: 08/02/24 2022    Narrative:      The following orders were created for panel order CBC & Differential.  Procedure                               Abnormality         Status                     ---------                               -----------         ------                     CBC Auto Differential[[id number]]        Abnormal            Final result                 Please view results for these tests on the individual orders.    Comprehensive Metabolic Panel [[id number]]  (Abnormal) Collected: 08/02/24 2001    Specimen: Blood Updated: 08/02/24 2026     Glucose 110 mg/dL      BUN 13 mg/dL      Creatinine 1.56 mg/dL      Sodium 131 mmol/L      Potassium 5.0 mmol/L      Chloride 96 mmol/L      CO2 24.5 mmol/L      Calcium 9.2 mg/dL      Total Protein 6.7 g/dL      Albumin 4.0 g/dL      ALT (SGPT) 13 U/L      AST (SGOT) 22 U/L      Alkaline Phosphatase 66 U/L      Total Bilirubin 0.5 mg/dL      Globulin 2.7 gm/dL      A/G Ratio 1.5 g/dL      BUN/Creatinine Ratio 8.3     Anion Gap 10.5 mmol/L      eGFR 44.6 mL/min/1.73     Narrative:      GFR Normal >60  Chronic Kidney Disease <60  Kidney Failure <15    The GFR formula is only valid for adults with stable renal function between ages 18 and 70.    High Sensitivity Troponin T [[id number]]  (Abnormal) Collected: 08/02/24 2001    Specimen: Blood Updated: 08/02/24 2030     HS Troponin T 31 ng/L     Narrative:      High Sensitive Troponin T Reference Range:  <14.0 ng/L- Negative Female for AMI  <22.0 ng/L- Negative Male for  AMI  >=14 - Abnormal Female indicating possible myocardial injury.  >=22 - Abnormal Male indicating possible myocardial injury.   Clinicians would have to utilize clinical acumen, EKG, Troponin, and serial changes to determine if it is an Acute Myocardial Infarction or myocardial injury due to an underlying chronic condition.         BNP [[id number]]  (Normal) Collected: 08/02/24 2001    Specimen: Blood Updated: 08/02/24 2030     proBNP 106.6 pg/mL     Narrative:      This assay is used as an aid in the diagnosis of individuals suspected of having heart failure. It can be used as an aid in the diagnosis of acute decompensated heart failure (ADHF) in patients presenting with signs and symptoms of ADHF to the emergency department (ED). In addition, NT-proBNP of <300 pg/mL indicates ADHF is not likely.    Age Range Result Interpretation  NT-proBNP Concentration (pg/mL:      <50             Positive            >450                   Gray                 300-450                    Negative             <300    50-75           Positive            >900                  Gray                300-900                  Negative            <300      >75             Positive            >1800                  Gray                300-1800                  Negative            <300    C-reactive Protein [[id number]]  (Abnormal) Collected: 08/02/24 2001    Specimen: Blood Updated: 08/02/24 2026     C-Reactive Protein 0.61 mg/dL     Procalcitonin [[id number]]  (Normal) Collected: 08/02/24 2001    Specimen: Blood Updated: 08/02/24 2111     Procalcitonin 0.08 ng/mL     Narrative:      As a Marker for Sepsis (Non-Neonates):    1. <0.5 ng/mL represents a low risk of severe sepsis and/or septic shock.  2. >2 ng/mL represents a high risk of severe sepsis and/or septic shock.    As a Marker for Lower Respiratory Tract Infections that require antibiotic therapy:    PCT on Admission    Antibiotic Therapy       6-12 Hrs later    >0.5                 "Strongly Recommended  >0.25 - <0.5        Recommended   0.1 - 0.25          Discouraged              Remeasure/reassess PCT  <0.1                Strongly Discouraged     Remeasure/reassess PCT    As 28 day mortality risk marker: \"Change in Procalcitonin Result\" (>80% or <=80%) if Day 0 (or Day 1) and Day 4 values are available. Refer to [email or web address]://[email or web address]    Change in PCT <=80%  A decrease of PCT levels below or equal to 80% defines a positive change in PCT test result representing a higher risk for 28-day all-cause mortality of patients diagnosed with severe sepsis for septic shock.    Change in PCT >80%  A decrease of PCT levels of more than 80% defines a negative change in PCT result representing a lower risk for 28-day all-cause mortality of patients diagnosed with severe sepsis or septic shock.       Magnesium [[id number]]  (Normal) Collected: 08/02/24 2001    Specimen: Blood Updated: 08/02/24 2026     Magnesium 1.8 mg/dL     CBC Auto Differential [[id number]]  (Abnormal) Collected: 08/02/24 2001    Specimen: Blood Updated: 08/02/24 2022     WBC 3.71 10*3/mm3      RBC 3.49 10*6/mm3      Hemoglobin 10.3 g/dL      Hematocrit 31.0 %      MCV 88.8 fL      MCH 29.5 pg      MCHC 33.2 g/dL      RDW 14.4 %      RDW-SD 46.3 fl      MPV 9.2 fL      Platelets 180 10*3/mm3      Neutrophil % 65.0 %      Lymphocyte % 18.3 %      Monocyte % 9.4 %      Eosinophil % 4.9 %      Basophil % 1.6 %      Immature Grans % 0.8 %      Neutrophils, Absolute 2.41 10*3/mm3      Lymphocytes, Absolute 0.68 10*3/mm3      Monocytes, Absolute 0.35 10*3/mm3      Eosinophils, Absolute 0.18 10*3/mm3      Basophils, Absolute 0.06 10*3/mm3      Immature Grans, Absolute 0.03 10*3/mm3      nRBC 0.0 /100 WBC     Protime-INR [[id number]]  (Normal) Collected: 08/02/24 2001    Specimen: Blood Updated: 08/02/24 2130     Protime 14.7 Seconds      INR 1.10    Narrative:      Suggested INR therapeutic range for stable oral anticoagulant " therapy:    Low Intensity therapy:   1.5-2.0  Moderate Intensity therapy:   2.0-3.0  High Intensity therapy:   2.5-4.0    COVID-19 and FLU A/B PCR, 1 HR TAT - Swab, Nasopharynx [[id number]]  (Normal) Collected: 08/02/24 2015    Specimen: Swab from Nasopharynx Updated: 08/02/24 2043     COVID19 Not Detected     Influenza A PCR Not Detected     Influenza B PCR Not Detected    Narrative:      Fact sheet for providers: https://www.fda.gov/media/772091/download    Fact sheet for patients: https://www.fda.gov/media/632830/download    Test performed by PCR.    Urinalysis With Culture If Indicated - Urine, Clean Catch [[id number]]  (Normal) Collected: 08/02/24 2307    Specimen: Urine, Clean Catch Updated: 08/02/24 2314     Color, UA Yellow     Appearance, UA Clear     pH, UA 7.0     Specific Gravity, UA 1.023     Glucose, UA Negative     Ketones, UA Negative     Bilirubin, UA Negative     Blood, UA Negative     Protein, UA Negative     Leuk Esterase, UA Negative     Nitrite, UA Negative     Urobilinogen, UA 0.2 E.U./dL    Narrative:      In absence of clinical symptoms, the presence of pyuria, bacteria, and/or nitrites on the urinalysis result does not correlate with infection.  Urine microscopic not indicated.    High Sensitivity Troponin T 2Hr [[id number]]  (Abnormal) Collected: 08/02/24 2315    Specimen: Blood Updated: 08/02/24 2346     HS Troponin T 28 ng/L      Troponin T Delta -3 ng/L     Narrative:      High Sensitive Troponin T Reference Range:  <14.0 ng/L- Negative Female for AMI  <22.0 ng/L- Negative Male for AMI  >=14 - Abnormal Female indicating possible myocardial injury.  >=22 - Abnormal Male indicating possible myocardial injury.   Clinicians would have to utilize clinical acumen, EKG, Troponin, and serial changes to determine if it is an Acute Myocardial Infarction or myocardial injury due to an underlying chronic condition.                  MRI Brain With & Without Contrast    Addendum Date: 8/2/2024     ADDENDUM REPORT ADDENDUM: This report was discussed with Dr. Sue on Aug 02, 2024 22:40:00 EDT. 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 COMPARISON: null FINDINGS: Exam: MRI of the brain without IV contrast Comparison: 08/02/2024 Clinical history: Concern for CVA, left arm weakness Findings: Small 1 cm acute infarct in the right periventricular region on series 7, image 19. Decreased signal on the ADC map in this location. No acute intracranial hemorrhage. No intracranial masses. Postcontrast imaging of the brain does not demonstrate any areas of abnormal parenchymal enhancement. No midline shift. No abnormal extra-axial fluid collections are seen. Cerebral volume loss. Areas of increased FLAIR signal within the cerebral white matter bilaterally are reflective of a nonspecific demyelinating process, likely ischemic microvascular in nature.     Impression: Impression: 1. Small acute infarct in the right periventricular region 2. Moderate cerebral volume loss with moderate cerebral white matter disease, likely ischemic microvascular in nature. 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 Common carotid arteries are tortuous. ICA bilaterally demonstrate  atherosclerotic disease with mild bilateral luminal narrowing. Vertebral arteries are patent throughout their cervical course, without dissection. Left vertebral artery is dominant. Lung apices are clear. Multilevel degenerative disease of the cervical spine.     Impression: Impression: 1. CTA of the neck demonstrates no occlusion or high-grade stenosis of the cervical carotid or vertebral arteries. No findings of dissection. CTA head: Intracranial ICAs bilaterally demonstrate calcifications without high-grade stenosis. No aneurysm. JUAN CARLOS bilaterally demonstrate no occlusion or high-grade stenosis. MCA bilaterally demonstrate no occlusion or high-grade stenosis. PCA bilaterally demonstrate no occlusion or high-grade stenosis. Intracranial vertebral arteries are patent. Basilar artery is patent, without aneurysm or stenosis. Dural venous sinuses are patent. Opacification of the left middle ear and left mastoid air cells. Erosion/resection of some of the mastoid air cells. Please correlate with clinical history Impression: 1. CTA of the intracranial circulation demonstrates no large vessel occlusion or high-grade stenosis. No aneurysm is identified. 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 Common carotid arteries are tortuous. ICA bilaterally demonstrate atherosclerotic disease with mild bilateral luminal narrowing. Vertebral arteries are patent throughout their cervical  course, without dissection. Left vertebral artery is dominant. Lung apices are clear. Multilevel degenerative disease of the cervical spine.     Impression: Impression: 1. CTA of the neck demonstrates no occlusion or high-grade stenosis of the cervical carotid or vertebral arteries. No findings of dissection. CTA head: Intracranial ICAs bilaterally demonstrate calcifications without high-grade stenosis. No aneurysm. JUAN CARLOS bilaterally demonstrate no occlusion or high-grade stenosis. MCA bilaterally demonstrate no occlusion or high-grade stenosis. PCA bilaterally demonstrate no occlusion or high-grade stenosis. Intracranial vertebral arteries are patent. Basilar artery is patent, without aneurysm or stenosis. Dural venous sinuses are patent. Opacification of the left middle ear and left mastoid air cells. Erosion/resection of some of the mastoid air cells. Please correlate with clinical history Impression: 1. CTA of the intracranial circulation demonstrates no large vessel occlusion or high-grade stenosis. No aneurysm is identified. Authenticated and Electronically Signed by Veda Su MD on 08/02/2024 09:23:44 PM    CT Head Without Contrast Stroke Protocol    Addendum Date: 8/2/2024    ADDENDUM REPORT ADDENDUM: This report was discussed with Archie Sue on Aug 02, 2024 21:19:00 EDT. Authenticated and Electronically Signed by Veda Su MD on 08/02/2024 09:19:47 PM    Result Date: 8/2/2024  FINAL REPORT TECHNIQUE: null CLINICAL HISTORY: Stroke, follow up COMPARISON: null FINDINGS: Exam: CT Brain without contrast Comparison: None Clinical history: Stroke Findings: No acute intracranial hemorrhage. Cerebral volume loss. No abnormal extra-axial fluid collections. No intracranial mass No midline shift. Bilateral cerebral white matter disease, likely ischemic microvascular in nature No skull fracture. Chronic right maxillary sinus disease. Fluid in the sphenoid sinuses bilaterally Opacification of the  left middle ear and left mastoid air cells     Impression: Impression: 1. No acute intracranial hemorrhage. 2. Moderate Cerebral volume loss. Moderate Cerebral white matter disease, likely ischemic microvascular in nature Authenticated and Electronically Signed by Veda Su MD on 08/02/2024 09:18:20 PM        [facility]     Amount and/or Complexity of Data Reviewed  Independent visualization of images, tracings, or specimens: yes        Initial impression of presenting illness: Weakness, paresthesias    DDX: includes but is not limited to: CVA, intracranial hemorrhage, nerve impingement    Patient arrives stable with vitals interpreted by myself.     Pertinent features from physical exam: Patient with left upper extremity weakness on exam greater than the right, unable to fully lift the shoulder above his head.    Initial diagnostic plan: CBC, CMP, troponin, BNP, EKG, chest x-ray, CRP, Pro-Omar, lactic acid, magnesium, CT head, CTA head neck    Results from initial plan were reviewed and interpreted by me revealing no concern for intracranial hemorrhage, no concern for significant obstruction of ICA    Diagnostic information from other sources: Discussed with patient's daughter at bedside reviewed past medical records    Interventions / Re-evaluation: Given load of Plavix per neurology recommendations    Results/clinical rationale were discussed with patient daughter at bedside    Consultations/Discussion of results with other physicians: Given my concern for possible CVA, discussed with neurology.  Not a TNK candidate given significantly delayed presentation.  However, they recommend MRI and admission.  Given this, discussed with Dr. Alcaraz, hospitalist, and admitted to his service for further management.  While awaiting inpatient bed, MRI results have come back and patient has had small CVA.    Disposition plan: Admit  -----        Final diagnoses:   Cerebrovascular accident (CVA), unspecified mechanism           Vikram, Archie Diane MD  08/03/24 0014

## 2024-08-03 NOTE — PROGRESS NOTES
[facility]    Name:  Hardik Natarajan   Age:  79 y.o.  Sex:  male  :  1945  MRN:  [id number]   Visit Number:  [id number]  Admission Date:  2024  Date Of Service:  24  Primary Care Physician:  Provider, No Known     LOS: 0 days :    Chief Complaint:      Left upper extremity weakness and numbness    Subjective:    Patient seen and examined.  Patient is very hard of hearing.  No acute complaints reported.    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 Medellin, anemia and arthritis.  He presented to the ER with a chief complaint of left upper extremity weakness/numbness.  Patient reports that he woke up in the morning 2 days ago with sudden altered sensation/numbness and weakness of his left upper extremity, they initially thought it was related to his arthritis however his symptoms persisted and did not improve.  Patient reports that his hand is weak and specially with his  and fingers. Patient denies any other weakness or numbness.  Patient denies any pain.  No recent fever, chills, headaches, vision changes, chest pain or shortness of breath.  No recent vomiting, abdominal pain or urinary symptoms.  No previous history of strokes.      On ER evaluation, patient was hypertensive with a blood pressure of 154/84, afebrile, room air.  His workup was significant for HS Troponin of 31-28, sodium 131, creatinine 1.56, CRP 0.61, hemoglobin 10.3 otherwise the CBC and CMP nonactionable.  Urinalysis negative for infection.  COVID and flu negative.  Chest x-ray with no acute process per my read CT head without contrast with no acute intracranial hemorrhage, Moderate Cerebral volume loss. Moderate Cerebral white matter disease, likely ischemic microvascular in nature. CTA of the neck/head demonstrates no occlusion or  Detail Level: Detailed high-grade stenosis. MRI showed Small acute infarct in the right periventricular region. Moderate cerebral volume loss with moderate cerebral white matter disease, likely ischemic microvascular in nature.  Case discussed with neurology on-call Dr. Hodge by ER provider, patient is not a TNKase candidate, recommended admission for further observation and stroke workup.  Received Plavix in the ER. Hospitalist consulted for admission.    Review of Systems:     All systems were reviewed and negative except as mentioned in subjective, assessment and plan.    Vital Signs:    Temp:  [97.5 °F (36.4 °C)-98.2 °F (36.8 °C)] 98.2 °F (36.8 °C)  Heart Rate:  [71-78] 77  Resp:  [16-18] 16  BP: (119-160)/(68-94) 141/84    Intake and output:    I/O last 3 completed shifts:  In: 665 [P.O.:180; I.V.:485]  Out: 400 [Urine:400]  I/O this shift:  In: 360 [P.O.:360]  Out: 200 [Urine:200]    Physical Examination:    General Appearance:  Alert and cooperative.  Chronically ill-appearing   Head:  Atraumatic and normocephalic.   Eyes: Conjunctivae and sclerae normal, no icterus. No pallor.   Throat: No oral lesions, no thrush, oral mucosa moist.   Neck: Supple, trachea midline, no thyromegaly.   Lungs:   Breath sounds heard bilaterally equally.  No wheezing or crackles. No Pleural rub or bronchial breathing.   Heart:  Normal S1 and S2, no murmur, no gallop, no rub. No JVD.   Abdomen:   Normal bowel sounds, no masses, no organomegaly. Soft, nontender, nondistended, no rebound tenderness.   Extremities: Supple, no edema, no cyanosis, no clubbing.   Skin: No bleeding or rash.   Neurologic: Alert and oriented x 3. No facial asymmetry. Moves all four limbs. No tremors.  Left upper pronator drift noted.     Laboratory results:    Results from last 7 days   Lab Units 08/03/24  0451 08/02/24 2001   SODIUM mmol/L 129* 131*   POTASSIUM mmol/L 4.6 5.0   CHLORIDE mmol/L 96* 96*   CO2 mmol/L 23.1 24.5   BUN mg/dL 12 13   CREATININE mg/dL 1.26 1.56*  "  CALCIUM mg/dL 8.8 9.2   BILIRUBIN mg/dL  --  0.5   ALK PHOS U/L  --  66   ALT (SGPT) U/L  --  13   AST (SGOT) U/L  --  22   GLUCOSE mg/dL 108* 110*     Results from last 7 days   Lab Units 08/03/24  0451 08/02/24 2001   WBC 10*3/mm3 3.35* 3.71   HEMOGLOBIN g/dL 9.5* 10.3*   HEMATOCRIT % 28.8* 31.0*   PLATELETS 10*3/mm3 158 180     Results from last 7 days   Lab Units 08/02/24 2001   INR  1.10     Results from last 7 days   Lab Units 08/02/24  2315 08/02/24 2001   HSTROP T ng/L 28* 31*         No results for input(s): \"PHART\", \"DUO1HSM\", \"PO2ART\", \"QSZ6JVM\", \"BASEEXCESS\" in the last 8760 hours.   I have reviewed the patient's laboratory results.    Radiology results:    XR Chest 1 View    Result Date: 8/3/2024  PROCEDURE: XR CHEST 1 VW-  HISTORY: Eval pneumonia  COMPARISON: None.  FINDINGS: The heart is normal in size. The lungs are clear. The mediastinum is unremarkable. There is no pneumothorax.  There are no acute osseous abnormalities. Prominent aortic knob correlating with a mildly dilated proximal descending thoracic aorta as seen on CTA of the neck performed the same day. Apical lordotic positioning noted.      Impression: No acute cardiopulmonary process.     This report was signed and finalized on 8/3/2024 7:43 AM by Camille Rodriguez MD.      MRI Brain With & Without Contrast    Addendum Date: 8/2/2024    ADDENDUM REPORT ADDENDUM: This report was discussed with Dr. Sue on Aug 02, 2024 22:40:00 EDT. 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 COMPARISON: null FINDINGS: Exam: MRI of the brain without IV contrast Comparison: 08/02/2024 Clinical history: Concern for CVA, left arm weakness Findings: Small 1 cm acute infarct in the right periventricular region on series 7, image " 19. Decreased signal on the ADC map in this location. No acute intracranial hemorrhage. No intracranial masses. Postcontrast imaging of the brain does not demonstrate any areas of abnormal parenchymal enhancement. No midline shift. No abnormal extra-axial fluid collections are seen. Cerebral volume loss. Areas of increased FLAIR signal within the cerebral white matter bilaterally are reflective of a nonspecific demyelinating process, likely ischemic microvascular in nature.     Impression: Impression: 1. Small acute infarct in the right periventricular region 2. Moderate cerebral volume loss with moderate cerebral white matter disease, likely ischemic microvascular in nature. 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 Common carotid arteries are tortuous. ICA bilaterally demonstrate atherosclerotic disease with mild bilateral luminal narrowing. Vertebral arteries are patent throughout their cervical course, without dissection. Left vertebral artery is dominant. Lung apices are clear. Multilevel degenerative disease of the cervical spine.     Impression: Impression: 1. CTA of the neck demonstrates no occlusion or high-grade stenosis of the cervical carotid or vertebral arteries. No findings of dissection. CTA head: Intracranial ICAs bilaterally demonstrate calcifications without high-grade stenosis. No aneurysm. JUAN CARLOS bilaterally demonstrate no occlusion or high-grade stenosis. MCA bilaterally  demonstrate no occlusion or high-grade stenosis. PCA bilaterally demonstrate no occlusion or high-grade stenosis. Intracranial vertebral arteries are patent. Basilar artery is patent, without aneurysm or stenosis. Dural venous sinuses are patent. Opacification of the left middle ear and left mastoid air cells. Erosion/resection of some of the mastoid air cells. Please correlate with clinical history Impression: 1. CTA of the intracranial circulation demonstrates no large vessel occlusion or high-grade stenosis. No aneurysm is identified. 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 Common carotid arteries are tortuous. ICA bilaterally demonstrate atherosclerotic disease with mild bilateral luminal narrowing. Vertebral arteries are patent throughout their cervical course, without dissection. Left vertebral artery is dominant. Lung apices are clear. Multilevel degenerative disease of the cervical spine.     Impression: Impression: 1. CTA of the neck demonstrates no occlusion or high-grade stenosis of the cervical carotid or vertebral arteries. No findings of dissection. CTA head: Intracranial ICAs bilaterally demonstrate calcifications without high-grade stenosis. No aneurysm. JUAN CARLOS bilaterally demonstrate no occlusion or high-grade stenosis. MCA bilaterally demonstrate no occlusion or high-grade stenosis. PCA bilaterally demonstrate no occlusion or high-grade stenosis.  Intracranial vertebral arteries are patent. Basilar artery is patent, without aneurysm or stenosis. Dural venous sinuses are patent. Opacification of the left middle ear and left mastoid air cells. Erosion/resection of some of the mastoid air cells. Please correlate with clinical history Impression: 1. CTA of the intracranial circulation demonstrates no large vessel occlusion or high-grade stenosis. No aneurysm is identified. Authenticated and Electronically Signed by Veda Su MD on 08/02/2024 09:23:44 PM    CT Head Without Contrast Stroke Protocol    Addendum Date: 8/2/2024    ADDENDUM REPORT ADDENDUM: This report was discussed with Archie Sue on Aug 02, 2024 21:19:00 EDT. Authenticated and Electronically Signed by Veda Su MD on 08/02/2024 09:19:47 PM    Result Date: 8/2/2024  FINAL REPORT TECHNIQUE: null CLINICAL HISTORY: Stroke, follow up COMPARISON: null FINDINGS: Exam: CT Brain without contrast Comparison: None Clinical history: Stroke Findings: No acute intracranial hemorrhage. Cerebral volume loss. No abnormal extra-axial fluid collections. No intracranial mass No midline shift. Bilateral cerebral white matter disease, likely ischemic microvascular in nature No skull fracture. Chronic right maxillary sinus disease. Fluid in the sphenoid sinuses bilaterally Opacification of the left middle ear and left mastoid air cells     Impression: Impression: 1. No acute intracranial hemorrhage. 2. Moderate Cerebral volume loss. Moderate Cerebral white matter disease, likely ischemic microvascular in nature Authenticated and Electronically Signed by Veda Su MD on 08/02/2024 09:18:20 PM   I have reviewed the patient's radiology reports.    Medication Review:     I have reviewed the patient's active and prn medications.     Problem List:      Weakness of left upper extremity      Assessment:    Acute ischemic stroke, POA  Left upper extremity numbness/weakness, POA  History of SCC of left  ear status post resection/immunotherapy  Arthritis  Chronic anemia    Plan:    Ischemic stroke  Left upper extremity weakness/numbness  -MRI showed Small acute infarct in the right periventricular region.  -Neurology has seen and evaluated, appreciate recommendations.  -Echo still pending, will await official read.  -Patient had intolerance to aspirin in the past, started on Plavix  -Start atorvastatin  -PT/OT/speech consult    DVT Prophylaxis: SCDs  Code Status: Full code  Diet: Consistent carb  Discharge Plan: Home tomorrow    Markus Lozano DO  08/03/24  15:32 EDT    Dictated utilizing Dragon dictation.

## 2024-08-03 NOTE — ED NOTES
House supervisor called att to inform that a nurse had to be called in before patient would be assigned a bed.

## 2024-08-04 VITALS
OXYGEN SATURATION: 97 % | HEART RATE: 77 BPM | RESPIRATION RATE: 18 BRPM | SYSTOLIC BLOOD PRESSURE: 123 MMHG | DIASTOLIC BLOOD PRESSURE: 81 MMHG | BODY MASS INDEX: 28.64 KG/M2 | TEMPERATURE: 98.5 F | WEIGHT: 211.42 LBS | HEIGHT: 72 IN

## 2024-08-04 LAB
FOLATE SERPL-MCNC: 8.81 NG/ML (ref 4.78–24.2)
VIT B12 BLD-MCNC: 303 PG/ML (ref 211–946)

## 2024-08-04 PROCEDURE — 97116 GAIT TRAINING THERAPY: CPT

## 2024-08-04 PROCEDURE — 99232 SBSQ HOSP IP/OBS MODERATE 35: CPT | Performed by: FAMILY MEDICINE

## 2024-08-04 PROCEDURE — 97530 THERAPEUTIC ACTIVITIES: CPT

## 2024-08-04 PROCEDURE — 99232 SBSQ HOSP IP/OBS MODERATE 35: CPT | Performed by: PSYCHIATRY & NEUROLOGY

## 2024-08-04 PROCEDURE — 97110 THERAPEUTIC EXERCISES: CPT

## 2024-08-04 RX ADMIN — DULOXETINE HYDROCHLORIDE 30 MG: 30 CAPSULE, DELAYED RELEASE ORAL at 08:35

## 2024-08-04 RX ADMIN — LEVOTHYROXINE SODIUM 75 MCG: 0.07 TABLET ORAL at 05:07

## 2024-08-04 RX ADMIN — Medication 10 ML: at 08:35

## 2024-08-04 RX ADMIN — FINASTERIDE 5 MG: 5 TABLET, FILM COATED ORAL at 08:35

## 2024-08-04 RX ADMIN — CLOPIDOGREL BISULFATE 75 MG: 75 TABLET ORAL at 08:35

## 2024-08-04 RX ADMIN — TAMSULOSIN HYDROCHLORIDE 0.4 MG: 0.4 CAPSULE ORAL at 08:35

## 2024-08-04 NOTE — PROGRESS NOTES
"DOS: 2024  NAME: Hardik Natarajan   : 1945  PCP: Provider, No Known  Chief Complaint   Patient presents with    Numbness       Chief complaint: He does not want to be examined send he is not feeling good.  Subjective: Denies any headaches or any aches and pains anywhere in the joints or body.  Says he slept well.    Objective:  Vital signs: /91 (BP Location: Left arm, Patient Position: Lying)   Pulse 77   Temp 97.9 °F (36.6 °C) (Oral)   Resp 18   Ht 182.9 cm (72.01\")   Wt 95.9 kg (211 lb 6.7 oz)   SpO2 97%   BMI 28.67 kg/m²    Gen: NAD, vitals reviewed  MS: Normal.  CN: Cranials 2-12: No focal deficits.  Motor: Has weakness of the left upper and lower extremities with a drift.  Sensory: Decreased sensation in the left upper and lower extremities compared to the right  Coordination: Normal finger-to-nose coordination and normal heel-to-shin testing on the right.  Delayed on the left because of the weakness  Gait: Did not want to get out of bed and participate with therapy.    ROS:  General: Pleasant gentleman currently feeling not so good and wants to stay in bed.  Did not eat breakfast today.  Neurological: The NIH stroke scale is 3.    Laboratory results:  Lab Results   Component Value Date    GLUCOSE 108 (H) 2024    CALCIUM 8.8 2024     (L) 2024    K 4.6 2024    CO2 23.1 2024    CL 96 (L) 2024    BUN 12 2024    CREATININE 1.26 2024    BCR 9.5 2024    ANIONGAP 9.9 2024     Lab Results   Component Value Date    WBC 3.35 (L) 2024    HGB 9.5 (L) 2024    HCT 28.8 (L) 2024    MCV 88.9 2024     2024     Lab Results   Component Value Date    LDL 73 2024         Lab 24  0451   HEMOGLOBIN A1C 5.00        Review of labs: Low hemoglobin of 9.5 and low hematocrit of 28.8.  The white cell count is low at 3350.  The vitamin B12 level in 2023 was low as 164.     CMP:        Lab " 08/03/24  0451 08/02/24 2001   SODIUM 129* 131*   POTASSIUM 4.6 5.0   CHLORIDE 96* 96*   CO2 23.1 24.5   ANION GAP 9.9 10.5   BUN 12 13   CREATININE 1.26 1.56*   EGFR 57.7* 44.6*   GLUCOSE 108* 110*   CALCIUM 8.8 9.2   MAGNESIUM  --  1.8   TOTAL PROTEIN  --  6.7   ALBUMIN  --  4.0   GLOBULIN  --  2.7   ALT (SGPT)  --  13   AST (SGOT)  --  22   BILIRUBIN  --  0.5   ALK PHOS  --  66        TSH          6/12/2024    11:43 7/3/2024    09:40 7/24/2024    10:33   TSH   TSH 5.340  12.130  2.400  C      Details         C Corrected result                Lipid Panel          8/3/2024    04:51   Lipid Panel   Total Cholesterol 117    Triglycerides 84    HDL Cholesterol 27    VLDL Cholesterol 17    LDL Cholesterol  73    LDL/HDL Ratio 2.71         Lab Results   Component Value Date    QOHHBRAV28 164 (L) 04/17/2023       Lab Results   Component Value Date    FOLATE 6.8 04/17/2023       Lab Results   Component Value Date    HGBA1C 5.00 08/03/2024           Review and interpretation of imaging: The portable chest x-ray shows the following:        FINDINGS: The heart is normal in size. The lungs are clear. The  mediastinum is unremarkable. There is no pneumothorax.  There are no  acute osseous abnormalities. Prominent aortic knob correlating with a  mildly dilated proximal descending thoracic aorta as seen on CTA of the  neck performed the same day. Apical lordotic positioning noted.      IMPRESSION:  No acute cardiopulmonary process.           CT Head Without Contrast Stroke Protocol    Addendum Date: 8/2/2024    ADDENDUM REPORT ADDENDUM: This report was discussed with Archie Sue on Aug 02, 2024 21:19:00 EDT. Authenticated and Electronically Signed by Veda Su MD on 08/02/2024 09:19:47 PM    Result Date: 8/2/2024  FINAL REPORT TECHNIQUE: null CLINICAL HISTORY: Stroke, follow up COMPARISON: null FINDINGS: Exam: CT Brain without contrast Comparison: None Clinical history: Stroke Findings: No acute intracranial  hemorrhage. Cerebral volume loss. No abnormal extra-axial fluid collections. No intracranial mass No midline shift. Bilateral cerebral white matter disease, likely ischemic microvascular in nature No skull fracture. Chronic right maxillary sinus disease. Fluid in the sphenoid sinuses bilaterally Opacification of the left middle ear and left mastoid air cells     Impression: Impression: 1. No acute intracranial hemorrhage. 2. Moderate Cerebral volume loss. Moderate Cerebral white matter disease, likely ischemic microvascular in nature Authenticated and Electronically Signed by Veda Su MD on 08/02/2024 09:18:20 PM     CT Angiogram Head    Result Date: 8/2/2024  FINAL REPORT TECHNIQUE: null CLINICAL HISTORY: Neuro deficit, acute, stroke suspected COMPARISON: null FINDINGS: Exam: CTA brain with contrast, CTA neck with contrast Procedure: MIP reconstructions were performed for the CTA Comparison: 08/02/2024 Clinical history: Neuro deficit acute stroke suspected Findings: CTA neck: Aortic arch branching demonstrates aberrant right subclavian artery CCA bilaterally do not demonstrate high grade stenosis or occlusion. Common carotid arteries are tortuous. ICA bilaterally demonstrate atherosclerotic disease with mild bilateral luminal narrowing. Vertebral arteries are patent throughout their cervical course, without dissection. Left vertebral artery is dominant. Lung apices are clear. Multilevel degenerative disease of the cervical spine.     Impression: Impression: 1. CTA of the neck demonstrates no occlusion or high-grade stenosis of the cervical carotid or vertebral arteries. No findings of dissection. CTA head: Intracranial ICAs bilaterally demonstrate calcifications without high-grade stenosis. No aneurysm. JUAN CARLOS bilaterally demonstrate no occlusion or high-grade stenosis. MCA bilaterally demonstrate no occlusion or high-grade stenosis. PCA bilaterally demonstrate no occlusion or high-grade stenosis. Intracranial  vertebral arteries are patent. Basilar artery is patent, without aneurysm or stenosis. Dural venous sinuses are patent. Opacification of the left middle ear and left mastoid air cells. Erosion/resection of some of the mastoid air cells. Please correlate with clinical history Impression: 1. CTA of the intracranial circulation demonstrates no large vessel occlusion or high-grade stenosis. No aneurysm is identified. Authenticated and Electronically Signed by Veda Su MD on 08/02/2024 09:23:44 PM     MRI Brain With & Without Contrast    Addendum Date: 8/2/2024    ADDENDUM REPORT ADDENDUM: This report was discussed with Dr. Sue on Aug 02, 2024 22:40:00 EDT. 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 COMPARISON: null FINDINGS: Exam: MRI of the brain without IV contrast Comparison: 08/02/2024 Clinical history: Concern for CVA, left arm weakness Findings: Small 1 cm acute infarct in the right periventricular region on series 7, image 19. Decreased signal on the ADC map in this location. No acute intracranial hemorrhage. No intracranial masses. Postcontrast imaging of the brain does not demonstrate any areas of abnormal parenchymal enhancement. No midline shift. No abnormal extra-axial fluid collections are seen. Cerebral volume loss. Areas of increased FLAIR signal within the cerebral white matter bilaterally are reflective of a nonspecific demyelinating process, likely ischemic microvascular in nature.     Impression: Impression: 1. Small acute infarct in the right periventricular region 2. Moderate cerebral volume loss with moderate cerebral white matter disease, likely ischemic microvascular in nature. Authenticated and Electronically Signed by Veda Su MD on 08/02/2024  10:38:39 PM      Workup to date: The echocardiogram has not been interpreted as of yet.         Diagnoses: Patient with acute lacunar infarct in the right periventricular region resulting in NIH stroke scale of 3.      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    Plan:  1.  Requested vitamin B12 level as well as folate acid level which are currently pending.  2.  The echocardiogram report is currently pending.  3.  Patient not willing to get out of bed and participate with therapy.  4.  Will see how he does tomorrow and try to get him out of bed and see if he will participate for inpatient rehab potential.    Discussed with the patient and the rounding nurse and he will be followed up by me through the inpatient teleneurology service.    Spent a total of 30 minutes in face-to-face evaluation and management of the patient using the dedicated telemedicine device without any interruption with the help of the rounding nurse with the patient located at the [facility] and myself at a remote location.    Electronically signed by Esperanza Zaragoza MD, 08/04/24, 9:51 AM EDT.

## 2024-08-04 NOTE — PLAN OF CARE
Goal Outcome Evaluation: VSS. NIHSS 4. No neuro changes this shift. Patient resting with no complaints at this time.

## 2024-08-04 NOTE — CASE MANAGEMENT/SOCIAL WORK
Continued Stay Note  [facility]     Patient Name: Hardik Natarajan  MRN: [id number]  Today's Date: 8/4/2024    Admit Date: 8/2/2024    Plan: Home with Home Health   Discharge Plan       Row Name 08/04/24 1300       Plan    Plan Comments  Spoke  to pt and family regarding the [employer] Unable to obtain one from the DME today they are going to purchase one                   Discharge Codes    No documentation.                       Tricia Westbrook RN

## 2024-08-04 NOTE — THERAPY TREATMENT NOTE
Patient Name: Hardik Natarajan  : 1945    MRN: [id number]                              Today's Date: 2024       Admit Date: 2024    Visit Dx:     ICD-10-CM ICD-9-CM   1. 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    Anemia, unspecified    Nicotine dependence, chewing tobacco, uncomplicated    Neoplasm related pain (acute) (chronic)    Unspecified mastoiditis, left ear    Cellulitis of left external ear    Phlebitis and thrombophlebitis of unspecified site    Gangrene, not elsewhere classified    Acute osteomyelitis    Benign prostatic hyperplasia without lower urinary tract symptoms    Pain    Palliative care by specialist    Encounter for therapeutic drug monitoring    Weakness of left upper extremity     Past Medical History:   Diagnosis Date    Anemia     Arthritis     Cancer      Past Surgical History:   Procedure Laterality Date    NAIL BIOPSY N/A     NECK DISSECTION N/A     PAROTIDECTOMY N/A     SKIN CANCER EXCISION        General Information       Row Name 24 1538          Physical Therapy Time and Intention    Document Type therapy note (daily note)  -CC     Mode of Treatment physical therapy  -CC       Row Name 24 1538          General Information    Patient Profile Reviewed yes  -CC     Existing Precautions/Restrictions fall  -CC       Row Name 24 1538          Safety Issues, Functional Mobility    Safety Issues Affecting Function (Mobility) insight into deficits/self-awareness;positioning of assistive device;problem-solving;safety precautions follow-through/compliance  -CC     Impairments Affecting Function (Mobility) balance;coordination;endurance/activity tolerance;grasp;motor planning;sensation/sensory awareness;strength  -CC               User Key  (r) = Recorded By, (t) = Taken By, (c) = Cosigned By      Initials Name Provider  Type    CC Karis Henderson PTA Physical Therapist Assistant                   Mobility       Row Name 08/04/24 1541          Bed Mobility    Bed Mobility supine-sit;sit-supine  -CC     Supine-Sit Phelps (Bed Mobility) contact guard;standby assist;verbal cues  -CC     Sit-Supine Phelps (Bed Mobility) standby assist;verbal cues  -CC     Assistive Device (Bed Mobility) bed rails;head of bed elevated  -       Row Name 08/04/24 1541          Sit-Stand Transfer    Sit-Stand Phelps (Transfers) contact guard;standby assist;verbal cues  -CC     Assistive Device (Sit-Stand Transfers) cane, quad tip  -       Row Name 08/04/24 1541          Gait/Stairs (Locomotion)    Phelps Level (Gait) contact guard;verbal cues  -CC     Assistive Device (Gait) cane, quad  -CC     Patient was able to Ambulate yes  -CC     Distance in Feet (Gait) 60  -CC     Deviations/Abnormal Patterns (Gait) sonia decreased;left sided deviations;weight shifting decreased  -CC               User Key  (r) = Recorded By, (t) = Taken By, (c) = Cosigned By      Initials Name Provider Type     Karis Henderson PTA Physical Therapist Assistant                   Obj/Interventions    No documentation.                  Goals/Plan    No documentation.                  Clinical Impression       Row Name 08/04/24 1545          Pain    Pretreatment Pain Rating 0/10 - no pain  -     Posttreatment Pain Rating 0/10 - no pain  -     Additional Documentation Pain Scale: Numbers Pre/Post-Treatment (Group)  -       Row Name 08/04/24 1546          Plan of Care Review    Progress improving  -     Outcome Evaluation Pt agreeable to physical therapy. Performed supine to sit with SBA and VC, scooting to EOB with SBA/S, sitting EOB x15 mins without LOB noted, sit <->stand with CGA with VC for hand placement and with NBQC, amb 62 feet with with NBQC and shoes donned with SBA/CGA and occ VC for upright posture. Performed L LE, L UE ex with VC  for tech LE ex AP, LAQ, hip abd, hip flex and L UE shld flex, horz abd, bicep curls, supination/pronation, finger flex/ext, wrist flex/ext, ER/INR 1x10 reps. Edu pt and family self ROM ex shld flex with R UE and table top flex/ext, abd/add. Con't with PT POC and progress as tolerated  -       Row Name 08/04/24 1545          Positioning and Restraints    Pre-Treatment Position in bed  -CC     Post Treatment Position bed  -CC     In Bed notified nsg;supine;sitting;call light within reach;encouraged to call for assist;exit alarm on;with family/caregiver  -CC               User Key  (r) = Recorded By, (t) = Taken By, (c) = Cosigned By      Initials Name Provider Type    Karis Chester PTA Physical Therapist Assistant                   Outcome Measures       Row Name 08/04/24 1559 08/04/24 0731       How much help from another person do you currently need...    Turning from your back to your side while in flat bed without using bedrails? 3  -CC 3  -TS    Moving from lying on back to sitting on the side of a flat bed without bedrails? 3  -CC 3  -TS    Moving to and from a bed to a chair (including a wheelchair)? 3  -CC 3  -TS    Standing up from a chair using your arms (e.g., wheelchair, bedside chair)? 3  -CC 3  -TS    Climbing 3-5 steps with a railing? 3  -CC 1  -TS    To walk in hospital room? 3  -CC 3  -TS    AM-PAC 6 Clicks Score (PT) 18  -CC 16  -TS    Highest Level of Mobility Goal 6 --> Walk 10 steps or more  -CC 5 --> Static standing  -TS      Row Name 08/04/24 1559          Functional Assessment    Outcome Measure Options AM-PAC 6 Clicks Basic Mobility (PT)  -CC               User Key  (r) = Recorded By, (t) = Taken By, (c) = Cosigned By      Initials Name Provider Type    Karis Chester PTA Physical Therapist Assistant    Mary Prasad, RN Registered Nurse                                 Physical Therapy Education       Title: PT OT SLP Therapies (In Progress)       Topic: Physical  Therapy (In Progress)       Point: Mobility training (Done)       Learning Progress Summary             Patient Acceptance, E, VU by  at 8/3/2024 1445    Comment: pt education on purpose of PT evaluation and on his inpt PT POC                         Point: Home exercise program (Done)       Learning Progress Summary             Patient Acceptance, E,TB, VU by  at 8/4/2024 1559                         Point: Body mechanics (Not Started)       Learner Progress:  Not documented in this visit.              Point: Precautions (Not Started)       Learner Progress:  Not documented in this visit.                              User Key       Initials Effective Dates Name Provider Type Discipline     06/16/21 -  Karis Henderson, PTA Physical Therapist Assistant PT    TW 06/16/21 -  Valerie Robertson, RADHA Physical Therapist PT                  PT Recommendation and Plan     Progress: improving  Outcome Evaluation: Pt agreeable to physical therapy. Performed supine to sit with SBA and VC, scooting to EOB with SBA/S, sitting EOB x15 mins without LOB noted, sit <->stand with CGA with VC for hand placement and with NBQC, amb 62 feet with with NBQC and shoes donned with SBA/CGA and occ VC for upright posture. Performed L LE, L UE ex with VC for tech LE ex AP, LAQ, hip abd, hip flex and L UE shld flex, horz abd, bicep curls, supination/pronation, finger flex/ext, wrist flex/ext, ER/INR 1x10 reps. Edu pt and family self ROM ex shld flex with R UE and table top flex/ext, abd/add. Con't with PT POC and progress as tolerated     Time Calculation:         PT Charges       Row Name 08/04/24 1600             Time Calculation    PT Received On 08/04/24  -      PT Goal Re-Cert Due Date 08/13/24  -         Time Calculation- PT    Total Timed Code Minutes- PT 54 minute(s)  -         Timed Charges    26735 - PT Therapeutic Exercise Minutes 30  -CC      70354 - Gait Training Minutes  15  -CC      90463 - PT Therapeutic Activity Minutes  9  -CC         Total Minutes    Timed Charges Total Minutes 54  -CC       Total Minutes 54  -CC                User Key  (r) = Recorded By, (t) = Taken By, (c) = Cosigned By      Initials Name Provider Type    CC Karis Henderson PTA Physical Therapist Assistant                  Therapy Charges for Today       Code Description Service Date Service Provider Modifiers Qty    44012907620 HC PT THER PROC EA 15 MIN 8/4/2024 Karis Henderson, VAL GP 2    74484246989 HC GAIT TRAINING EA 15 MIN 8/4/2024 Karis Henderson, VAL GP 1    48405020524 HC PT THERAPEUTIC ACT EA 15 MIN 8/4/2024 Karis Henderson, VAL GP 1            PT G-Codes  Outcome Measure Options: AM-PAC 6 Clicks Basic Mobility (PT)  AM-PAC 6 Clicks Score (PT): 18       Karis Henderson PTA  8/4/2024

## 2024-08-04 NOTE — PROGRESS NOTES
"    [facility]    Name:  Hardik Natarajan   Age:  79 y.o.  Sex:  male  :  1945  MRN:  [id number]   Visit Number:  [id number]  Admission Date:  2024  Date Of Service:  24  Primary Care Physician:  Provider, No Known     LOS: 1 day :    Chief Complaint:      Left upper extremity weakness and numbness    Subjective:    Patient seen and examined.  Patient reports \"feeling not good\".  Patient reports similar feelings have been ongoing for greater than 1 week duration.  Patient states he feels no different from yesterday, which is no different than 1 week ago.  Patient has been reluctantly involved with physical therapy, patient will need quad cane at discharge.  She will be going home with home health tomorrow.    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 Medellin, anemia and arthritis.  He presented to the ER with a chief complaint of left upper extremity weakness/numbness.  Patient reports that he woke up in the morning 2 days ago with sudden altered sensation/numbness and weakness of his left upper extremity, they initially thought it was related to his arthritis however his symptoms persisted and did not improve.  Patient reports that his hand is weak and specially with his  and fingers. Patient denies any other weakness or numbness.  Patient denies any pain.  No recent fever, chills, headaches, vision changes, chest pain or shortness of breath.  No recent vomiting, abdominal pain or urinary symptoms.  No previous history of strokes.      On ER evaluation, patient was hypertensive with a blood pressure of 154/84, afebrile, room air.  His workup was significant for HS Troponin of 31-28, sodium 131, creatinine 1.56, CRP 0.61, hemoglobin 10.3 otherwise the CBC and CMP nonactionable.  Urinalysis negative for " infection.  COVID and flu negative.  Chest x-ray with no acute process per my read CT head without contrast with no acute intracranial hemorrhage, Moderate Cerebral volume loss. Moderate Cerebral white matter disease, likely ischemic microvascular in nature. CTA of the neck/head demonstrates no occlusion or high-grade stenosis. MRI showed Small acute infarct in the right periventricular region. Moderate cerebral volume loss with moderate cerebral white matter disease, likely ischemic microvascular in nature.  Case discussed with neurology on-call Dr. Hodge by ER provider, patient is not a TNKase candidate, recommended admission for further observation and stroke workup.  Received Plavix in the ER. Hospitalist consulted for admission.    Review of Systems:     All systems were reviewed and negative except as mentioned in subjective, assessment and plan.    Vital Signs:    Temp:  [97.4 °F (36.3 °C)-98.5 °F (36.9 °C)] 98.5 °F (36.9 °C)  Heart Rate:  [77] 77  Resp:  [18] 18  BP: (119-153)/(66-91) 123/81    Intake and output:    I/O last 3 completed shifts:  In: 1265 [P.O.:780; I.V.:485]  Out: 800 [Urine:800]  No intake/output data recorded.    Physical Examination:    General Appearance:  Alert and cooperative.  Chronically ill-appearing   Head:  Atraumatic and normocephalic.   Eyes: Conjunctivae and sclerae normal, no icterus. No pallor.   Throat: No oral lesions, no thrush, oral mucosa moist.   Neck: Supple, trachea midline, no thyromegaly.   Lungs:   Breath sounds heard bilaterally equally.  No wheezing or crackles. No Pleural rub or bronchial breathing.   Heart:  Normal S1 and S2, no murmur, no gallop, no rub. No JVD.   Abdomen:   Normal bowel sounds, no masses, no organomegaly. Soft, nontender, nondistended, no rebound tenderness.   Extremities: Supple, no edema, no cyanosis, no clubbing.   Skin: No bleeding or rash.   Neurologic: Alert and oriented x 3. No facial asymmetry. Moves all four limbs. No tremors.   "    Laboratory results:    Results from last 7 days   Lab Units 08/03/24  0451 08/02/24 2001   SODIUM mmol/L 129* 131*   POTASSIUM mmol/L 4.6 5.0   CHLORIDE mmol/L 96* 96*   CO2 mmol/L 23.1 24.5   BUN mg/dL 12 13   CREATININE mg/dL 1.26 1.56*   CALCIUM mg/dL 8.8 9.2   BILIRUBIN mg/dL  --  0.5   ALK PHOS U/L  --  66   ALT (SGPT) U/L  --  13   AST (SGOT) U/L  --  22   GLUCOSE mg/dL 108* 110*     Results from last 7 days   Lab Units 08/03/24  0451 08/02/24 2001   WBC 10*3/mm3 3.35* 3.71   HEMOGLOBIN g/dL 9.5* 10.3*   HEMATOCRIT % 28.8* 31.0*   PLATELETS 10*3/mm3 158 180     Results from last 7 days   Lab Units 08/02/24 2001   INR  1.10     Results from last 7 days   Lab Units 08/02/24  2315 08/02/24 2001   HSTROP T ng/L 28* 31*         No results for input(s): \"PHART\", \"DFF3FJH\", \"PO2ART\", \"DCP4WLR\", \"BASEEXCESS\" in the last 8760 hours.   I have reviewed the patient's laboratory results.    Radiology results:    XR Chest 1 View    Result Date: 8/3/2024  PROCEDURE: XR CHEST 1 VW-  HISTORY: Eval pneumonia  COMPARISON: None.  FINDINGS: The heart is normal in size. The lungs are clear. The mediastinum is unremarkable. There is no pneumothorax.  There are no acute osseous abnormalities. Prominent aortic knob correlating with a mildly dilated proximal descending thoracic aorta as seen on CTA of the neck performed the same day. Apical lordotic positioning noted.      Impression: No acute cardiopulmonary process.     This report was signed and finalized on 8/3/2024 7:43 AM by Camille Rodriguez MD.      MRI Brain With & Without Contrast    Addendum Date: 8/2/2024    ADDENDUM REPORT ADDENDUM: This report was discussed with Dr. Sue on Aug 02, 2024 22:40:00 EDT. Authenticated and Electronically Signed by Veda Su MD on 08/02/2024 10:40:25 PM    Result Date: 8/2/2024  FINAL REPORT TECHNIQUE: null CLINICAL HISTORY: Concern for CVA  left arm weakness x 2 days hx of squamous cell carcinoma of the left ear, no longer in any " treatment. COMPARISON: null FINDINGS: Exam: MRI of the brain without IV contrast Comparison: 08/02/2024 Clinical history: Concern for CVA, left arm weakness Findings: Small 1 cm acute infarct in the right periventricular region on series 7, image 19. Decreased signal on the ADC map in this location. No acute intracranial hemorrhage. No intracranial masses. Postcontrast imaging of the brain does not demonstrate any areas of abnormal parenchymal enhancement. No midline shift. No abnormal extra-axial fluid collections are seen. Cerebral volume loss. Areas of increased FLAIR signal within the cerebral white matter bilaterally are reflective of a nonspecific demyelinating process, likely ischemic microvascular in nature.     Impression: Impression: 1. Small acute infarct in the right periventricular region 2. Moderate cerebral volume loss with moderate cerebral white matter disease, likely ischemic microvascular in nature. 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 Common carotid arteries are tortuous. ICA bilaterally demonstrate atherosclerotic disease with mild bilateral luminal narrowing. Vertebral arteries are patent throughout their cervical course, without dissection. Left vertebral artery is dominant. Lung apices are clear. Multilevel degenerative disease of the cervical spine.     Impression: Impression: 1. CTA of the neck demonstrates no occlusion or high-grade stenosis of the cervical  carotid or vertebral arteries. No findings of dissection. CTA head: Intracranial ICAs bilaterally demonstrate calcifications without high-grade stenosis. No aneurysm. JUAN CARLOS bilaterally demonstrate no occlusion or high-grade stenosis. MCA bilaterally demonstrate no occlusion or high-grade stenosis. PCA bilaterally demonstrate no occlusion or high-grade stenosis. Intracranial vertebral arteries are patent. Basilar artery is patent, without aneurysm or stenosis. Dural venous sinuses are patent. Opacification of the left middle ear and left mastoid air cells. Erosion/resection of some of the mastoid air cells. Please correlate with clinical history Impression: 1. CTA of the intracranial circulation demonstrates no large vessel occlusion or high-grade stenosis. No aneurysm is identified. 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 Common carotid arteries are tortuous. ICA bilaterally demonstrate atherosclerotic disease with mild bilateral luminal narrowing. Vertebral arteries are patent throughout their cervical course, without dissection. Left vertebral artery is dominant. Lung apices are clear. Multilevel degenerative disease of the cervical spine.     Impression: Impression: 1. CTA of the neck demonstrates no occlusion or high-grade stenosis of the cervical carotid or vertebral arteries. No findings of dissection. CTA head: Intracranial ICAs bilaterally demonstrate  calcifications without high-grade stenosis. No aneurysm. JUAN CARLOS bilaterally demonstrate no occlusion or high-grade stenosis. MCA bilaterally demonstrate no occlusion or high-grade stenosis. PCA bilaterally demonstrate no occlusion or high-grade stenosis. Intracranial vertebral arteries are patent. Basilar artery is patent, without aneurysm or stenosis. Dural venous sinuses are patent. Opacification of the left middle ear and left mastoid air cells. Erosion/resection of some of the mastoid air cells. Please correlate with clinical history Impression: 1. CTA of the intracranial circulation demonstrates no large vessel occlusion or high-grade stenosis. No aneurysm is identified. Authenticated and Electronically Signed by Veda Su MD on 08/02/2024 09:23:44 PM    CT Head Without Contrast Stroke Protocol    Addendum Date: 8/2/2024    ADDENDUM REPORT ADDENDUM: This report was discussed with Archie Sue on Aug 02, 2024 21:19:00 EDT. Authenticated and Electronically Signed by Veda Su MD on 08/02/2024 09:19:47 PM    Result Date: 8/2/2024  FINAL REPORT TECHNIQUE: null CLINICAL HISTORY: Stroke, follow up COMPARISON: null FINDINGS: Exam: CT Brain without contrast Comparison: None Clinical history: Stroke Findings: No acute intracranial hemorrhage. Cerebral volume loss. No abnormal extra-axial fluid collections. No intracranial mass No midline shift. Bilateral cerebral white matter disease, likely ischemic microvascular in nature No skull fracture. Chronic right maxillary sinus disease. Fluid in the sphenoid sinuses bilaterally Opacification of the left middle ear and left mastoid air cells     Impression: Impression: 1. No acute intracranial hemorrhage. 2. Moderate Cerebral volume loss. Moderate Cerebral white matter disease, likely ischemic microvascular in nature Authenticated and Electronically Signed by Veda Su MD on 08/02/2024 09:18:20 PM   I have reviewed the patient's radiology  reports.    Medication Review:     I have reviewed the patient's active and prn medications.     Problem List:      Weakness of left upper extremity      Assessment:    Acute ischemic stroke, POA  Left upper extremity numbness/weakness, POA  History of SCC of left ear status post resection/immunotherapy  Arthritis  Chronic anemia    Plan:    Ischemic stroke  Left upper extremity weakness/numbness  -MRI showed Small acute infarct in the right periventricular region.  -Neurology has seen and evaluated, appreciate recommendations.  -Echo still pending, will await official read.  -Patient had intolerance to aspirin in the past, started on Plavix  -Start atorvastatin  -PT/OT/speech consult    DVT Prophylaxis: SCDs  Code Status: Full code  Diet: Consistent carb  Discharge Plan: Discharge tomorrow to home with home health    Markus Lozano DO  08/04/24  13:45 EDT    Dictated utilizing Dragon dictation.

## 2024-08-04 NOTE — PLAN OF CARE
Goal Outcome Evaluation:           Progress: improving  Outcome Evaluation: Pt agreeable to physical therapy. Performed supine to sit with SBA and VC, scooting to EOB with SBA/S, sitting EOB x15 mins without LOB noted, sit <->stand with CGA with VC for hand placement and with NBQC, amb 62 feet with with NBQC and shoes donned with SBA/CGA and occ VC for upright posture. Performed L LE, L UE ex with VC for tech LE ex AP, LAQ, hip abd, hip flex and L UE shld flex, horz abd, bicep curls, supination/pronation, finger flex/ext, wrist flex/ext, ER/INR 1x10 reps. Edu pt and family self ROM ex shld flex with R UE and table top flex/ext, abd/add. Con't with PT POC and progress as tolerated

## 2024-08-05 LAB
BH CV ECHO MEAS - AO MAX PG: 6.6 MMHG
BH CV ECHO MEAS - AO MEAN PG: 4 MMHG
BH CV ECHO MEAS - AO ROOT DIAM: 3.5 CM
BH CV ECHO MEAS - AO V2 MAX: 128 CM/SEC
BH CV ECHO MEAS - AO V2 VTI: 28.2 CM
BH CV ECHO MEAS - AVA(I,D): 2.48 CM2
BH CV ECHO MEAS - EDV(CUBED): 73 ML
BH CV ECHO MEAS - EDV(MOD-SP2): 64.2 ML
BH CV ECHO MEAS - EDV(MOD-SP4): 93.4 ML
BH CV ECHO MEAS - EF(MOD-BP): 60.4 %
BH CV ECHO MEAS - EF(MOD-SP2): 65.3 %
BH CV ECHO MEAS - EF(MOD-SP4): 56.6 %
BH CV ECHO MEAS - ESV(CUBED): 23.6 ML
BH CV ECHO MEAS - ESV(MOD-SP2): 22.3 ML
BH CV ECHO MEAS - ESV(MOD-SP4): 40.5 ML
BH CV ECHO MEAS - FS: 31.3 %
BH CV ECHO MEAS - IVS/LVPW: 0.83 CM
BH CV ECHO MEAS - IVSD: 1.01 CM
BH CV ECHO MEAS - LA DIMENSION: 3.6 CM
BH CV ECHO MEAS - LAT PEAK E' VEL: 9.8 CM/SEC
BH CV ECHO MEAS - LV DIASTOLIC VOL/BSA (35-75): 42.1 CM2
BH CV ECHO MEAS - LV MASS(C)D: 157.9 GRAMS
BH CV ECHO MEAS - LV MAX PG: 3.5 MMHG
BH CV ECHO MEAS - LV MEAN PG: 2 MMHG
BH CV ECHO MEAS - LV SYSTOLIC VOL/BSA (12-30): 18.3 CM2
BH CV ECHO MEAS - LV V1 MAX: 94.1 CM/SEC
BH CV ECHO MEAS - LV V1 VTI: 20.4 CM
BH CV ECHO MEAS - LVIDD: 4.2 CM
BH CV ECHO MEAS - LVIDS: 2.9 CM
BH CV ECHO MEAS - LVOT AREA: 3.4 CM2
BH CV ECHO MEAS - LVOT DIAM: 2.09 CM
BH CV ECHO MEAS - LVPWD: 1.21 CM
BH CV ECHO MEAS - MED PEAK E' VEL: 8.5 CM/SEC
BH CV ECHO MEAS - MV A MAX VEL: 102 CM/SEC
BH CV ECHO MEAS - MV DEC TIME: 0.25 SEC
BH CV ECHO MEAS - MV E MAX VEL: 81.3 CM/SEC
BH CV ECHO MEAS - MV E/A: 0.8
BH CV ECHO MEAS - MV MAX PG: 5 MMHG
BH CV ECHO MEAS - MV MEAN PG: 3 MMHG
BH CV ECHO MEAS - MV V2 VTI: 40 CM
BH CV ECHO MEAS - MVA(VTI): 1.75 CM2
BH CV ECHO MEAS - PA ACC TIME: 0.13 SEC
BH CV ECHO MEAS - PA V2 MAX: 83.8 CM/SEC
BH CV ECHO MEAS - PULM A REVS DUR: 0.13 SEC
BH CV ECHO MEAS - PULM A REVS VEL: 29.6 CM/SEC
BH CV ECHO MEAS - PULM DIAS VEL: 38 CM/SEC
BH CV ECHO MEAS - PULM S/D: 1.3
BH CV ECHO MEAS - PULM SYS VEL: 49.3 CM/SEC
BH CV ECHO MEAS - RAP SYSTOLE: 3 MMHG
BH CV ECHO MEAS - RV MAX PG: 1.99 MMHG
BH CV ECHO MEAS - RV V1 MAX: 70.6 CM/SEC
BH CV ECHO MEAS - RV V1 VTI: 10.6 CM
BH CV ECHO MEAS - RVSP: 23.1 MMHG
BH CV ECHO MEAS - SV(LVOT): 70 ML
BH CV ECHO MEAS - SV(MOD-SP2): 41.9 ML
BH CV ECHO MEAS - SV(MOD-SP4): 52.9 ML
BH CV ECHO MEAS - SVI(LVOT): 31.5 ML/M2
BH CV ECHO MEAS - SVI(MOD-SP2): 18.9 ML/M2
BH CV ECHO MEAS - SVI(MOD-SP4): 23.8 ML/M2
BH CV ECHO MEAS - TAPSE (>1.6): 2.29 CM
BH CV ECHO MEAS - TR MAX PG: 20.1 MMHG
BH CV ECHO MEAS - TR MAX VEL: 222 CM/SEC
BH CV ECHO MEASUREMENTS AVERAGE E/E' RATIO: 8.89
BH CV ECHO SHUNT ASSESSMENT PERFORMED (HIDDEN SCRIPTING): 1
BH CV XLRA - RV BASE: 3.6 CM
BH CV XLRA - RV LENGTH: 7.8 CM
BH CV XLRA - RV MID: 2.9 CM
BH CV XLRA - TDI S': 10.9 CM/SEC
LEFT ATRIUM VOLUME INDEX: 20.4 ML/M2

## 2024-08-05 NOTE — PAYOR COMM NOTE
"To:  [place]  From: Hortencia Latif RN  Phone: [phone]  Fax: [phone]  NPI: [id number]  TIN: [id number]  Member ID: [id number]   MRN: [id number]    Hardik Browne (79 y.o. Male)       Date of Birth   1945    Social Security Number       Address   [address] [id number]    Home Phone   [phone]    MRN   [id number]       Rastafari   None    Marital Status                               Admission Date   8/2/24    Admission Type   Emergency    Admitting Provider   Markus Lozano DO    Attending Provider       Department, Room/Bed   [facility] 3, 311/1       Discharge Date   8/4/2024    Discharge Disposition   Left Against Medical Advice    Discharge Destination                                 Attending Provider: (none)   Allergies: Asa [Aspirin]    Isolation: None   Infection: None   Code Status: Prior    Ht: 182.9 cm (72.01\")   Wt: 95.9 kg (211 lb 6.7 oz)    Admission Cmt: None   Principal Problem: Weakness of left upper extremity [R29.898]                   Active Insurance as of 8/2/2024       Primary Coverage       Payor Plan Insurance Group Employer/Plan Group    ANTHEM MEDICARE REPLACEMENT ANTHEM MEDICARE ADVANTAGE [id number]       Payor Plan Address Payor Plan Phone Number Payor Plan Fax Number Effective Dates    [address] [phone]  1/1/2022 - None Entered    [place] [address]         Subscriber Name Subscriber Birth Date Member ID       HARDIK BROWNE 1945 [id number]               Secondary Coverage       Payor Plan Insurance Group Employer/Plan Group    [id number]        Payor Plan Address Payor Plan Phone Number Payor Plan Fax Number Effective Dates    [address]   4/7/2023 - None Entered    [facility] [address]         Subscriber Name Subscriber Birth Date Member ID       HARDIK BROWNE 1945 [id number]                     Emergency Contacts        (Rel.) Home Phone Work Phone " "Mobile Phone    MAC NATARAJAN/ZITA (Son) [phone] -- [phone]          Markus Lozano DO   Physician  Hospitalist     Significant Note      Signed     Date of Service: 24 1537  Creation Time: 24 0758     Signed         I was notified by nursing staff that the patient and his family decided to take the patient and leave AGAINST MEDICAL ADVICE yesterday.  I had explained to the patient earlier in the day that the workup was incomplete, awaiting echo read, setting up a safe discharge plan including home health as well as in the medical devices necessary.  Despite these issues, patient proceeded to leave the hospital AGAINST MEDICAL ADVICE.                   Physician Progress Notes (most recent note)        Markus Lozano DO at 24 1345              [facility]    PROGRESS NOTE    Name:  Hardik Natarajan   Age:  79 y.o.  Sex:  male  :  1945  MRN:  [id number]   Visit Number:  [id number]  Admission Date:  2024  Date Of Service:  24  Primary Care Physician:  Provider, No Known     LOS: 1 day :    Chief Complaint:      Left upper extremity weakness and numbness    Subjective:    Patient seen and examined.  Patient reports \"feeling not good\".  Patient reports similar feelings have been ongoing for greater than 1 week duration.  Patient states he feels no different from yesterday, which is no different than 1 week ago.  Patient has been reluctantly involved with physical therapy, patient will need quad cane at discharge.  She will be going home with home health tomorrow.    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 Medellin, anemia and arthritis.  He presented to the ER with a chief complaint of left upper extremity weakness/numbness.  Patient reports that he woke up in the morning 2 days ago with sudden altered " sensation/numbness and weakness of his left upper extremity, they initially thought it was related to his arthritis however his symptoms persisted and did not improve.  Patient reports that his hand is weak and specially with his  and fingers. Patient denies any other weakness or numbness.  Patient denies any pain.  No recent fever, chills, headaches, vision changes, chest pain or shortness of breath.  No recent vomiting, abdominal pain or urinary symptoms.  No previous history of strokes.      On ER evaluation, patient was hypertensive with a blood pressure of 154/84, afebrile, room air.  His workup was significant for HS Troponin of 31-28, sodium 131, creatinine 1.56, CRP 0.61, hemoglobin 10.3 otherwise the CBC and CMP nonactionable.  Urinalysis negative for infection.  COVID and flu negative.  Chest x-ray with no acute process per my read CT head without contrast with no acute intracranial hemorrhage, Moderate Cerebral volume loss. Moderate Cerebral white matter disease, likely ischemic microvascular in nature. CTA of the neck/head demonstrates no occlusion or high-grade stenosis. MRI showed Small acute infarct in the right periventricular region. Moderate cerebral volume loss with moderate cerebral white matter disease, likely ischemic microvascular in nature.  Case discussed with neurology on-call Dr. Hodge by ER provider, patient is not a TNKase candidate, recommended admission for further observation and stroke workup.  Received Plavix in the ER. Hospitalist consulted for admission.    Review of Systems:     All systems were reviewed and negative except as mentioned in subjective, assessment and plan.    Vital Signs:    Temp:  [97.4 °F (36.3 °C)-98.5 °F (36.9 °C)] 98.5 °F (36.9 °C)  Heart Rate:  [77] 77  Resp:  [18] 18  BP: (119-153)/(66-91) 123/81    Intake and output:    I/O last 3 completed shifts:  In: 1265 [P.O.:780; I.V.:485]  Out: 800 [Urine:800]  No intake/output data recorded.    Physical  "Examination:    General Appearance:  Alert and cooperative.  Chronically ill-appearing   Head:  Atraumatic and normocephalic.   Eyes: Conjunctivae and sclerae normal, no icterus. No pallor.   Throat: No oral lesions, no thrush, oral mucosa moist.   Neck: Supple, trachea midline, no thyromegaly.   Lungs:   Breath sounds heard bilaterally equally.  No wheezing or crackles. No Pleural rub or bronchial breathing.   Heart:  Normal S1 and S2, no murmur, no gallop, no rub. No JVD.   Abdomen:   Normal bowel sounds, no masses, no organomegaly. Soft, nontender, nondistended, no rebound tenderness.   Extremities: Supple, no edema, no cyanosis, no clubbing.   Skin: No bleeding or rash.   Neurologic: Alert and oriented x 3. No facial asymmetry. Moves all four limbs. No tremors.      Laboratory results:    Results from last 7 days   Lab Units 08/03/24  0451 08/02/24 2001   SODIUM mmol/L 129* 131*   POTASSIUM mmol/L 4.6 5.0   CHLORIDE mmol/L 96* 96*   CO2 mmol/L 23.1 24.5   BUN mg/dL 12 13   CREATININE mg/dL 1.26 1.56*   CALCIUM mg/dL 8.8 9.2   BILIRUBIN mg/dL  --  0.5   ALK PHOS U/L  --  66   ALT (SGPT) U/L  --  13   AST (SGOT) U/L  --  22   GLUCOSE mg/dL 108* 110*     Results from last 7 days   Lab Units 08/03/24  0451 08/02/24 2001   WBC 10*3/mm3 3.35* 3.71   HEMOGLOBIN g/dL 9.5* 10.3*   HEMATOCRIT % 28.8* 31.0*   PLATELETS 10*3/mm3 158 180     Results from last 7 days   Lab Units 08/02/24 2001   INR  1.10     Results from last 7 days   Lab Units 08/02/24  2315 08/02/24 2001   HSTROP T ng/L 28* 31*         No results for input(s): \"PHART\", \"RSA6JLA\", \"PO2ART\", \"BQX0DRI\", \"BASEEXCESS\" in the last 8760 hours.   I have reviewed the patient's laboratory results.    Radiology results:    XR Chest 1 View    Result Date: 8/3/2024  PROCEDURE: XR CHEST 1 VW-  HISTORY: Eval pneumonia  COMPARISON: None.  FINDINGS: The heart is normal in size. The lungs are clear. The mediastinum is unremarkable. There is no pneumothorax.  There are no " acute osseous abnormalities. Prominent aortic knob correlating with a mildly dilated proximal descending thoracic aorta as seen on CTA of the neck performed the same day. Apical lordotic positioning noted.      Impression: No acute cardiopulmonary process.     This report was signed and finalized on 8/3/2024 7:43 AM by Camille Rodriguez MD.      MRI Brain With & Without Contrast    Addendum Date: 8/2/2024    ADDENDUM REPORT ADDENDUM: This report was discussed with Dr. Sue on Aug 02, 2024 22:40:00 EDT. 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 COMPARISON: null FINDINGS: Exam: MRI of the brain without IV contrast Comparison: 08/02/2024 Clinical history: Concern for CVA, left arm weakness Findings: Small 1 cm acute infarct in the right periventricular region on series 7, image 19. Decreased signal on the ADC map in this location. No acute intracranial hemorrhage. No intracranial masses. Postcontrast imaging of the brain does not demonstrate any areas of abnormal parenchymal enhancement. No midline shift. No abnormal extra-axial fluid collections are seen. Cerebral volume loss. Areas of increased FLAIR signal within the cerebral white matter bilaterally are reflective of a nonspecific demyelinating process, likely ischemic microvascular in nature.     Impression: Impression: 1. Small acute infarct in the right periventricular region 2. Moderate cerebral volume loss with moderate cerebral white matter disease, likely ischemic microvascular in nature. 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 Common carotid arteries are tortuous. ICA bilaterally demonstrate atherosclerotic disease with mild bilateral luminal narrowing. Vertebral arteries are patent throughout their cervical course, without dissection. Left vertebral artery is dominant. Lung apices are clear. Multilevel degenerative disease of the cervical spine.     Impression: Impression: 1. CTA of the neck demonstrates no occlusion or high-grade stenosis of the cervical carotid or vertebral arteries. No findings of dissection. CTA head: Intracranial ICAs bilaterally demonstrate calcifications without high-grade stenosis. No aneurysm. JUAN CARLOS bilaterally demonstrate no occlusion or high-grade stenosis. MCA bilaterally demonstrate no occlusion or high-grade stenosis. PCA bilaterally demonstrate no occlusion or high-grade stenosis. Intracranial vertebral arteries are patent. Basilar artery is patent, without aneurysm or stenosis. Dural venous sinuses are patent. Opacification of the left middle ear and left mastoid air cells. Erosion/resection of some of the mastoid air cells. Please correlate with clinical history Impression: 1. CTA of the intracranial circulation demonstrates no large vessel occlusion or high-grade stenosis. No aneurysm is identified. 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 Common carotid arteries are tortuous. ICA bilaterally demonstrate atherosclerotic disease with mild bilateral luminal narrowing. Vertebral arteries are patent throughout their cervical course, without dissection. Left vertebral artery is dominant. Lung apices are clear. Multilevel degenerative disease of the cervical spine.     Impression: Impression: 1. CTA of the neck demonstrates no occlusion or high-grade stenosis of the cervical carotid or vertebral arteries. No findings of dissection. CTA head: Intracranial ICAs bilaterally demonstrate calcifications without high-grade stenosis. No aneurysm. JUAN CARLOS bilaterally demonstrate no occlusion or high-grade stenosis. MCA bilaterally demonstrate no occlusion or high-grade stenosis. PCA bilaterally demonstrate no occlusion or high-grade stenosis. Intracranial vertebral arteries are patent. Basilar artery is patent, without aneurysm or stenosis. Dural venous sinuses are patent. Opacification of the left middle ear and left mastoid air cells. Erosion/resection of some of the mastoid air cells. Please correlate with clinical history Impression: 1. CTA of the intracranial circulation demonstrates no large vessel occlusion or high-grade stenosis. No aneurysm is identified. Authenticated and Electronically Signed by Veda Su MD on 08/02/2024 09:23:44 PM    CT Head Without Contrast Stroke Protocol    Addendum Date: 8/2/2024    ADDENDUM REPORT ADDENDUM: This report was discussed with Archie Sue on Aug 02, 2024 21:19:00 EDT. Authenticated and Electronically Signed by Veda Su MD on 08/02/2024 09:19:47 PM    Result Date: 8/2/2024  FINAL REPORT TECHNIQUE: null CLINICAL HISTORY: Stroke, follow up COMPARISON: null FINDINGS: Exam: CT Brain without  contrast Comparison: None Clinical history: Stroke Findings: No acute intracranial hemorrhage. Cerebral volume loss. No abnormal extra-axial fluid collections. No intracranial mass No midline shift. Bilateral cerebral white matter disease, likely ischemic microvascular in nature No skull fracture. Chronic right maxillary sinus disease. Fluid in the sphenoid sinuses bilaterally Opacification of the left middle ear and left mastoid air cells     Impression: Impression: 1. No acute intracranial hemorrhage. 2. Moderate Cerebral volume loss. Moderate Cerebral white matter disease, likely ischemic microvascular in nature Authenticated and Electronically Signed by Veda Su MD on 08/02/2024 09:18:20 PM   I have reviewed the patient's radiology reports.    Medication Review:     I have reviewed the patient's active and prn medications.     Problem List:      Weakness of left upper extremity      Assessment:    Acute ischemic stroke, POA  Left upper extremity numbness/weakness, POA  History of SCC of left ear status post resection/immunotherapy  Arthritis  Chronic anemia    Plan:    Ischemic stroke  Left upper extremity weakness/numbness  -MRI showed Small acute infarct in the right periventricular region.  -Neurology has seen and evaluated, appreciate recommendations.  -Echo still pending, will await official read.  -Patient had intolerance to aspirin in the past, started on Plavix  -Start atorvastatin  -PT/OT/speech consult    DVT Prophylaxis: SCDs  Code Status: Full code  Diet: Consistent carb  Discharge Plan: Discharge tomorrow to home with home health    Markus Lozano DO  08/04/24  13:45 EDT    Dictated utilizing Dragon dictation.       Electronically signed by Markus Lozano DO at 08/04/24 2033

## 2024-08-14 ENCOUNTER — OFFICE VISIT (OUTPATIENT)
Dept: ONCOLOGY | Facility: CLINIC | Age: 79
End: 2024-08-14
Payer: MEDICARE

## 2024-08-14 ENCOUNTER — INFUSION (OUTPATIENT)
Dept: ONCOLOGY | Facility: HOSPITAL | Age: 79
End: 2024-08-14
Payer: MEDICARE

## 2024-08-14 VITALS
HEART RATE: 94 BPM | HEIGHT: 72 IN | OXYGEN SATURATION: 99 % | BODY MASS INDEX: 28.19 KG/M2 | SYSTOLIC BLOOD PRESSURE: 140 MMHG | WEIGHT: 208.1 LBS | RESPIRATION RATE: 18 BRPM | DIASTOLIC BLOOD PRESSURE: 83 MMHG | TEMPERATURE: 97.7 F

## 2024-08-14 DIAGNOSIS — Z51.81 ENCOUNTER FOR THERAPEUTIC DRUG MONITORING: Primary | ICD-10-CM

## 2024-08-14 DIAGNOSIS — C44.229 SQUAMOUS CELL CARCINOMA OF SKIN OF LEFT EAR AND EXTERNAL AURICULAR CANAL: ICD-10-CM

## 2024-08-14 DIAGNOSIS — C44.229 SQUAMOUS CELL CARCINOMA OF SKIN OF LEFT EAR AND EXTERNAL AURICULAR CANAL: Primary | ICD-10-CM

## 2024-08-14 DIAGNOSIS — Z51.81 ENCOUNTER FOR THERAPEUTIC DRUG MONITORING: ICD-10-CM

## 2024-08-14 LAB
ALBUMIN SERPL-MCNC: 4 G/DL (ref 3.5–5.2)
ALBUMIN/GLOB SERPL: 1.3 G/DL
ALP SERPL-CCNC: 65 U/L (ref 39–117)
ALT SERPL W P-5'-P-CCNC: 14 U/L (ref 1–41)
ANION GAP SERPL CALCULATED.3IONS-SCNC: 9.8 MMOL/L (ref 5–15)
AST SERPL-CCNC: 22 U/L (ref 1–40)
BASOPHILS # BLD AUTO: 0.07 10*3/MM3 (ref 0–0.2)
BASOPHILS NFR BLD AUTO: 1.5 % (ref 0–1.5)
BILIRUB SERPL-MCNC: 0.7 MG/DL (ref 0–1.2)
BUN SERPL-MCNC: 15 MG/DL (ref 8–23)
BUN/CREAT SERPL: 10.6 (ref 7–25)
CALCIUM SPEC-SCNC: 9.3 MG/DL (ref 8.6–10.5)
CHLORIDE SERPL-SCNC: 96 MMOL/L (ref 98–107)
CO2 SERPL-SCNC: 25.2 MMOL/L (ref 22–29)
CREAT SERPL-MCNC: 1.41 MG/DL (ref 0.76–1.27)
DEPRECATED RDW RBC AUTO: 44.6 FL (ref 37–54)
EGFRCR SERPLBLD CKD-EPI 2021: 50.4 ML/MIN/1.73
EOSINOPHIL # BLD AUTO: 0.14 10*3/MM3 (ref 0–0.4)
EOSINOPHIL NFR BLD AUTO: 3 % (ref 0.3–6.2)
ERYTHROCYTE [DISTWIDTH] IN BLOOD BY AUTOMATED COUNT: 13.8 % (ref 12.3–15.4)
GLOBULIN UR ELPH-MCNC: 3 GM/DL
GLUCOSE SERPL-MCNC: 105 MG/DL (ref 65–99)
HCT VFR BLD AUTO: 31.7 % (ref 37.5–51)
HGB BLD-MCNC: 10.9 G/DL (ref 13–17.7)
IMM GRANULOCYTES # BLD AUTO: 0.04 10*3/MM3 (ref 0–0.05)
IMM GRANULOCYTES NFR BLD AUTO: 0.8 % (ref 0–0.5)
LYMPHOCYTES # BLD AUTO: 0.84 10*3/MM3 (ref 0.7–3.1)
LYMPHOCYTES NFR BLD AUTO: 17.8 % (ref 19.6–45.3)
MCH RBC QN AUTO: 30.4 PG (ref 26.6–33)
MCHC RBC AUTO-ENTMCNC: 34.4 G/DL (ref 31.5–35.7)
MCV RBC AUTO: 88.3 FL (ref 79–97)
MONOCYTES # BLD AUTO: 0.43 10*3/MM3 (ref 0.1–0.9)
MONOCYTES NFR BLD AUTO: 9.1 % (ref 5–12)
NEUTROPHILS NFR BLD AUTO: 3.21 10*3/MM3 (ref 1.7–7)
NEUTROPHILS NFR BLD AUTO: 67.8 % (ref 42.7–76)
NRBC BLD AUTO-RTO: 0 /100 WBC (ref 0–0.2)
PLATELET # BLD AUTO: 232 10*3/MM3 (ref 140–450)
PMV BLD AUTO: 9.3 FL (ref 6–12)
POTASSIUM SERPL-SCNC: 3.9 MMOL/L (ref 3.5–5.2)
PROT SERPL-MCNC: 7 G/DL (ref 6–8.5)
RBC # BLD AUTO: 3.59 10*6/MM3 (ref 4.14–5.8)
SODIUM SERPL-SCNC: 131 MMOL/L (ref 136–145)
T4 FREE SERPL-MCNC: 1.32 NG/DL (ref 0.92–1.68)
TSH SERPL DL<=0.05 MIU/L-ACNC: 2.52 UIU/ML (ref 0.27–4.2)
WBC NRBC COR # BLD AUTO: 4.73 10*3/MM3 (ref 3.4–10.8)

## 2024-08-14 PROCEDURE — 25010000002 CEMIPLIMAB-RWLC 350 MG/7ML SOLUTION 7 ML VIAL: Performed by: NURSE PRACTITIONER

## 2024-08-14 PROCEDURE — 96413 CHEMO IV INFUSION 1 HR: CPT

## 2024-08-14 PROCEDURE — 85025 COMPLETE CBC W/AUTO DIFF WBC: CPT

## 2024-08-14 PROCEDURE — 1126F AMNT PAIN NOTED NONE PRSNT: CPT | Performed by: NURSE PRACTITIONER

## 2024-08-14 PROCEDURE — 99214 OFFICE O/P EST MOD 30 MIN: CPT | Performed by: NURSE PRACTITIONER

## 2024-08-14 PROCEDURE — 84443 ASSAY THYROID STIM HORMONE: CPT

## 2024-08-14 PROCEDURE — 36415 COLL VENOUS BLD VENIPUNCTURE: CPT

## 2024-08-14 PROCEDURE — 80053 COMPREHEN METABOLIC PANEL: CPT

## 2024-08-14 PROCEDURE — 84439 ASSAY OF FREE THYROXINE: CPT

## 2024-08-14 RX ORDER — SODIUM CHLORIDE 9 MG/ML
250 INJECTION, SOLUTION INTRAVENOUS ONCE
Status: DISCONTINUED | OUTPATIENT
Start: 2024-08-14 | End: 2024-08-14 | Stop reason: HOSPADM

## 2024-08-14 RX ORDER — CLOPIDOGREL BISULFATE 75 MG/1
1 TABLET ORAL DAILY
COMMUNITY
Start: 2024-08-05

## 2024-08-14 RX ORDER — SODIUM CHLORIDE 9 MG/ML
250 INJECTION, SOLUTION INTRAVENOUS ONCE
Status: CANCELLED | OUTPATIENT
Start: 2024-08-15

## 2024-08-14 RX ADMIN — CEMIPLIMAB-RWLC 350 MG: 50 INJECTION INTRAVENOUS at 11:58

## 2024-08-14 NOTE — PROGRESS NOTES
DATE OF VISIT: 8/14/2024    REASON FOR VISIT: Followup for locally advanced squamous cell carcinoma of the left ear     PROBLEM LIST:  1. 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  Presented with gradually growing mass over the left ear for the last 3 years.  B.  Patient did not seek any medical attention until April 2023.  C.  CT neck and chest done at  April 2023 confirmed locally advanced lesion eroding into the left mastoid with left cervical lymphadenopathy but no evidence of chest metastases.  D.  Biopsy done April 2023 confirmed squamous cell carcinoma.  E.  Status post palliative radiation completed April 28, 2023  F.  Started immunotherapy with Libtayo 350 mg IV every 3 weeks May 22, 2023.  Status post 20 cycles.  2.  Cancer-related pain  3.  Mild depression  4.  BPH  5. Ischemic stroke with left upper extremity weakness and numbness    HISTORY OF PRESENT ILLNESS: The patient is a very pleasant 79 y.o. male with past medical history significant for locally advanced left ear squamous cell carcinoma diagnosed April 2023.  The patient was started on Libtayo May 16, 2023. The patient is here today for scheduled follow-up visit with treatment cycle #21.    SUBJECTIVE: Peter is here today by himself.  His son is in the waiting room.  Overall he said he is doing okay.  Unfortunately he was diagnosed with stroke August fourth 2024.  He is slowly recovering with improved strength in his  about 30% improved he says.  He also has some weakness in his left leg.  He continues to work with physical therapy at home.  Continues to have some trouble hearing out of his ear but it has healed well.  He is anxious to be done with treatment.      Past History:  Medical History: has a past medical history of Anemia, Arthritis, and Cancer.   Surgical History: has a past surgical history that includes Skin cancer excision; Neck dissection (N/A); Parotidectomy (N/A); and NAIL BIOPSY (N/A).  "  Family History: family history includes Cancer in his father and mother.   Social History: reports that he has never smoked. His smokeless tobacco use includes chew. He reports that he does not currently use alcohol. He reports that he does not use drugs.    (Not in a hospital admission)     Allergies: Asa [aspirin]     Review of Systems   Constitutional:  Positive for fatigue.   HENT:  Positive for hearing loss.    Respiratory: Negative.     Gastrointestinal: Negative.    Skin: Negative.    Neurological:  Positive for weakness.   Psychiatric/Behavioral: Negative.         PHYSICAL EXAMINATION:   /83   Pulse 94   Temp 97.7 °F (36.5 °C)   Resp 18   Ht 182.9 cm (72.01\")   Wt 94.4 kg (208 lb 1.6 oz)   SpO2 99%   BMI 28.22 kg/m²    Pain Score    08/14/24 1031   PainSc: 0-No pain                                      ECOG Performance Status: 1 - Symptomatic but completely ambulatory      General Appearance:      alert, cooperative, no apparent distress and appears stated age   Lungs:   Clear to auscultation bilaterally; respirations regular, even, and unlabored bilaterally   Heart:  Regular rate and rhythm, no murmurs appreciated   Abdomen:   Soft, non-tender, and non-distended                 Admission on 08/02/2024, Discharged on 08/04/2024   Component Date Value Ref Range Status    Extra Tube 08/02/2024 Hold for add-ons.   Final    Auto resulted.    Extra Tube 08/02/2024 hold for add-on   Final    Auto resulted    Extra Tube 08/02/2024 Hold for add-ons.   Final    Auto resulted.    Extra Tube 08/02/2024 Hold for add-ons.   Final    Auto resulted    COVID19 08/02/2024 Not Detected  Not Detected - Ref. Range Final    Influenza A PCR 08/02/2024 Not Detected  Not Detected Final    Influenza B PCR 08/02/2024 Not Detected  Not Detected Final    Glucose 08/02/2024 110 (H)  65 - 99 mg/dL Final    BUN 08/02/2024 13  8 - 23 mg/dL Final    Creatinine 08/02/2024 1.56 (H)  0.76 - 1.27 mg/dL Final    Sodium 08/02/2024 " 131 (L)  136 - 145 mmol/L Final    Potassium 08/02/2024 5.0  3.5 - 5.2 mmol/L Final    Chloride 08/02/2024 96 (L)  98 - 107 mmol/L Final    CO2 08/02/2024 24.5  22.0 - 29.0 mmol/L Final    Calcium 08/02/2024 9.2  8.6 - 10.5 mg/dL Final    Total Protein 08/02/2024 6.7  6.0 - 8.5 g/dL Final    Albumin 08/02/2024 4.0  3.5 - 5.2 g/dL Final    ALT (SGPT) 08/02/2024 13  1 - 41 U/L Final    AST (SGOT) 08/02/2024 22  1 - 40 U/L Final    Alkaline Phosphatase 08/02/2024 66  39 - 117 U/L Final    Total Bilirubin 08/02/2024 0.5  0.0 - 1.2 mg/dL Final    Globulin 08/02/2024 2.7  gm/dL Final    A/G Ratio 08/02/2024 1.5  g/dL Final    BUN/Creatinine Ratio 08/02/2024 8.3  7.0 - 25.0 Final    Anion Gap 08/02/2024 10.5  5.0 - 15.0 mmol/L Final    eGFR 08/02/2024 44.6 (L)  >60.0 mL/min/1.73 Final    Color, UA 08/02/2024 Yellow  Yellow, Straw Final    Appearance, UA 08/02/2024 Clear  Clear Final    pH, UA 08/02/2024 7.0  5.0 - 8.0 Final    Specific Gravity, UA 08/02/2024 1.023  1.005 - 1.030 Final    Glucose, UA 08/02/2024 Negative  Negative Final    Ketones, UA 08/02/2024 Negative  Negative Final    Bilirubin, UA 08/02/2024 Negative  Negative Final    Blood, UA 08/02/2024 Negative  Negative Final    Protein, UA 08/02/2024 Negative  Negative Final    Leuk Esterase, UA 08/02/2024 Negative  Negative Final    Nitrite, UA 08/02/2024 Negative  Negative Final    Urobilinogen, UA 08/02/2024 0.2 E.U./dL  0.2 - 1.0 E.U./dL Final    HS Troponin T 08/02/2024 31 (H)  <22 ng/L Final    proBNP 08/02/2024 106.6  0.0 - 1,800.0 pg/mL Final    C-Reactive Protein 08/02/2024 0.61 (H)  0.00 - 0.50 mg/dL Final    Lactate 08/02/2024 1.0  0.5 - 2.0 mmol/L Final    Procalcitonin 08/02/2024 0.08  0.00 - 0.25 ng/mL Final    Magnesium 08/02/2024 1.8  1.6 - 2.4 mg/dL Final    WBC 08/02/2024 3.71  3.40 - 10.80 10*3/mm3 Final    RBC 08/02/2024 3.49 (L)  4.14 - 5.80 10*6/mm3 Final    Hemoglobin 08/02/2024 10.3 (L)  13.0 - 17.7 g/dL Final    Hematocrit 08/02/2024 31.0  (L)  37.5 - 51.0 % Final    MCV 08/02/2024 88.8  79.0 - 97.0 fL Final    MCH 08/02/2024 29.5  26.6 - 33.0 pg Final    MCHC 08/02/2024 33.2  31.5 - 35.7 g/dL Final    RDW 08/02/2024 14.4  12.3 - 15.4 % Final    RDW-SD 08/02/2024 46.3  37.0 - 54.0 fl Final    MPV 08/02/2024 9.2  6.0 - 12.0 fL Final    Platelets 08/02/2024 180  140 - 450 10*3/mm3 Final    Neutrophil % 08/02/2024 65.0  42.7 - 76.0 % Final    Lymphocyte % 08/02/2024 18.3 (L)  19.6 - 45.3 % Final    Monocyte % 08/02/2024 9.4  5.0 - 12.0 % Final    Eosinophil % 08/02/2024 4.9  0.3 - 6.2 % Final    Basophil % 08/02/2024 1.6 (H)  0.0 - 1.5 % Final    Immature Grans % 08/02/2024 0.8 (H)  0.0 - 0.5 % Final    Neutrophils, Absolute 08/02/2024 2.41  1.70 - 7.00 10*3/mm3 Final    Lymphocytes, Absolute 08/02/2024 0.68 (L)  0.70 - 3.10 10*3/mm3 Final    Monocytes, Absolute 08/02/2024 0.35  0.10 - 0.90 10*3/mm3 Final    Eosinophils, Absolute 08/02/2024 0.18  0.00 - 0.40 10*3/mm3 Final    Basophils, Absolute 08/02/2024 0.06  0.00 - 0.20 10*3/mm3 Final    Immature Grans, Absolute 08/02/2024 0.03  0.00 - 0.05 10*3/mm3 Final    nRBC 08/02/2024 0.0  0.0 - 0.2 /100 WBC Final    HS Troponin T 08/02/2024 28 (H)  <22 ng/L Final    Troponin T Delta 08/02/2024 -3  >=-4 - <+4 ng/L Final    Protime 08/02/2024 14.7  12.3 - 15.1 Seconds Final    INR 08/02/2024 1.10  0.90 - 1.10 Final    Glucose 08/03/2024 108 (H)  65 - 99 mg/dL Final    BUN 08/03/2024 12  8 - 23 mg/dL Final    Creatinine 08/03/2024 1.26  0.76 - 1.27 mg/dL Final    Sodium 08/03/2024 129 (L)  136 - 145 mmol/L Final    Potassium 08/03/2024 4.6  3.5 - 5.2 mmol/L Final    Chloride 08/03/2024 96 (L)  98 - 107 mmol/L Final    CO2 08/03/2024 23.1  22.0 - 29.0 mmol/L Final    Calcium 08/03/2024 8.8  8.6 - 10.5 mg/dL Final    BUN/Creatinine Ratio 08/03/2024 9.5  7.0 - 25.0 Final    Anion Gap 08/03/2024 9.9  5.0 - 15.0 mmol/L Final    eGFR 08/03/2024 57.7 (L)  >60.0 mL/min/1.73 Final    WBC 08/03/2024 3.35 (L)  3.40 - 10.80  10*3/mm3 Final    RBC 08/03/2024 3.24 (L)  4.14 - 5.80 10*6/mm3 Final    Hemoglobin 08/03/2024 9.5 (L)  13.0 - 17.7 g/dL Final    Hematocrit 08/03/2024 28.8 (L)  37.5 - 51.0 % Final    MCV 08/03/2024 88.9  79.0 - 97.0 fL Final    MCH 08/03/2024 29.3  26.6 - 33.0 pg Final    MCHC 08/03/2024 33.0  31.5 - 35.7 g/dL Final    RDW 08/03/2024 14.2  12.3 - 15.4 % Final    RDW-SD 08/03/2024 46.0  37.0 - 54.0 fl Final    MPV 08/03/2024 9.1  6.0 - 12.0 fL Final    Platelets 08/03/2024 158  140 - 450 10*3/mm3 Final    EF(MOD-bp) 08/03/2024 60.4  % Final    LVIDd 08/03/2024 4.2  cm Final    LVIDs 08/03/2024 2.9  cm Final    IVSd 08/03/2024 1.01  cm Final    LVPWd 08/03/2024 1.21  cm Final    FS 08/03/2024 31.3  % Final    IVS/LVPW 08/03/2024 0.83  cm Final    ESV(cubed) 08/03/2024 23.6  ml Final    LV Sys Vol (BSA corrected) 08/03/2024 18.3  cm2 Final    EDV(cubed) 08/03/2024 73.0  ml Final    LV Rain Vol (BSA corrected) 08/03/2024 42.1  cm2 Final    LV mass(C)d 08/03/2024 157.9  grams Final    LVOT area 08/03/2024 3.4  cm2 Final    LVOT diam 08/03/2024 2.09  cm Final    EDV(MOD-sp2) 08/03/2024 64.2  ml Final    EDV(MOD-sp4) 08/03/2024 93.4  ml Final    ESV(MOD-sp2) 08/03/2024 22.3  ml Final    ESV(MOD-sp4) 08/03/2024 40.5  ml Final    SV(MOD-sp2) 08/03/2024 41.9  ml Final    SV(MOD-sp4) 08/03/2024 52.9  ml Final    SVi(MOD-SP2) 08/03/2024 18.9  ml/m2 Final    SVi(MOD-SP4) 08/03/2024 23.8  ml/m2 Final    SVi (LVOT) 08/03/2024 31.5  ml/m2 Final    EF(MOD-sp2) 08/03/2024 65.3  % Final    EF(MOD-sp4) 08/03/2024 56.6  % Final    MV E max aric 08/03/2024 81.3  cm/sec Final    MV A max aric 08/03/2024 102.0  cm/sec Final    MV dec time 08/03/2024 0.25  sec Final    MV E/A 08/03/2024 0.80   Final    Pulm A Revs Dur 08/03/2024 0.13  sec Final    LA ESV Index (BP) 08/03/2024 20.4  ml/m2 Final    Med Peak E' Aric 08/03/2024 8.5  cm/sec Final    Lat Peak E' Aric 08/03/2024 9.8  cm/sec Final    TR max aric 08/03/2024 222.0  cm/sec Final    Avg  E/e' ratio 08/03/2024 8.89   Final    SV(LVOT) 08/03/2024 70.0  ml Final    RV Base 08/03/2024 3.6  cm Final    RV Mid 08/03/2024 2.9  cm Final    RV Length 08/03/2024 7.8  cm Final    TAPSE (>1.6) 08/03/2024 2.29  cm Final    RV S' 08/03/2024 10.9  cm/sec Final    LA dimension (2D)  08/03/2024 3.6  cm Final    Pulm Sys Aric 08/03/2024 49.3  cm/sec Final    Pulm Rain Aric 08/03/2024 38.0  cm/sec Final    Pulm S/D 08/03/2024 1.30   Final    Pulm A Revs Aric 08/03/2024 29.6  cm/sec Final    LV V1 max 08/03/2024 94.1  cm/sec Final    LV V1 max PG 08/03/2024 3.5  mmHg Final    LV V1 mean PG 08/03/2024 2.00  mmHg Final    LV V1 VTI 08/03/2024 20.4  cm Final    Ao pk aric 08/03/2024 128.0  cm/sec Final    Ao max PG 08/03/2024 6.6  mmHg Final    Ao mean PG 08/03/2024 4.0  mmHg Final    Ao V2 VTI 08/03/2024 28.2  cm Final    HARRIETT(I,D) 08/03/2024 2.48  cm2 Final    MV max PG 08/03/2024 5.0  mmHg Final    MV mean PG 08/03/2024 3.0  mmHg Final    MV V2 VTI 08/03/2024 40.0  cm Final    MVA(VTI) 08/03/2024 1.75  cm2 Final    TR max PG 08/03/2024 20.1  mmHg Final    RVSP(TR) 08/03/2024 23.1  mmHg Final    RAP systole 08/03/2024 3.0  mmHg Final    RV V1 max PG 08/03/2024 1.99  mmHg Final    RV V1 max 08/03/2024 70.6  cm/sec Final    RV V1 VTI 08/03/2024 10.6  cm Final    PA V2 max 08/03/2024 83.8  cm/sec Final    PA acc time 08/03/2024 0.13  sec Final    Ao root diam 08/03/2024 3.5  cm Final    BH CV ECHO SHUNT ASSESSMENT PERFOR* 08/03/2024 1   Final    Hemoglobin A1C 08/03/2024 5.00  4.80 - 5.60 % Final    Total Cholesterol 08/03/2024 117  0 - 200 mg/dL Final    Triglycerides 08/03/2024 84  0 - 150 mg/dL Final    HDL Cholesterol 08/03/2024 27 (L)  40 - 60 mg/dL Final    LDL Cholesterol  08/03/2024 73  0 - 100 mg/dL Final    VLDL Cholesterol 08/03/2024 17  5 - 40 mg/dL Final    LDL/HDL Ratio 08/03/2024 2.71   Final    Vitamin B-12 08/03/2024 303  211 - 946 pg/mL Final    Folate 08/03/2024 8.81  4.78 - 24.20 ng/mL Final        Adult  Transthoracic Echo Complete w/ Color, Spectral and Contrast if necessary per protocol    Addendum Date: 8/5/2024 Addendum:     Left ventricular systolic function is normal. Calculated left ventricular EF = 60.4% Left ventricular ejection fraction appears to be 56 - 60%. Left ventricular diastolic function was normal.   The right ventricular cavity is borderline dilated with normal systolic function.   Mild tricuspid valve regurgitation is present. Estimated right ventricular systolic pressure from tricuspid regurgitation is normal (<35 mmHg).   Saline test results are negative for right to left atrial level shunt.     Result Date: 8/5/2024  Narrative:   Left ventricular systolic function is normal. Calculated left ventricular EF = 60.4% Left ventricular ejection fraction appears to be 56 - 60%. Left ventricular diastolic function was normal.   The right ventricular cavity is borderline dilated with normal systolic function.   Mild tricuspid valve regurgitation is present. Estimated right ventricular systolic pressure from tricuspid regurgitation is normal (<35 mmHg).   Saline test results are negative for right to left atrial level shunt.     XR Chest 1 View    Result Date: 8/3/2024  Narrative: PROCEDURE: XR CHEST 1 VW-  HISTORY: Eval pneumonia  COMPARISON: None.  FINDINGS: The heart is normal in size. The lungs are clear. The mediastinum is unremarkable. There is no pneumothorax.  There are no acute osseous abnormalities. Prominent aortic knob correlating with a mildly dilated proximal descending thoracic aorta as seen on CTA of the neck performed the same day. Apical lordotic positioning noted.      Impression: No acute cardiopulmonary process.     This report was signed and finalized on 8/3/2024 7:43 AM by Camille Rodriguez MD.      MRI Brain With & Without Contrast    Addendum Date: 8/2/2024 Addendum:   ADDENDUM REPORT ADDENDUM: This report was discussed with Dr. Sue on Aug 02, 2024 22:40:00 EDT. Authenticated and  Electronically Signed by Veda Su MD on 08/02/2024 10:40:25 PM    Result Date: 8/2/2024  Narrative: FINAL REPORT TECHNIQUE: null CLINICAL HISTORY: Concern for CVA  left arm weakness x 2 days hx of squamous cell carcinoma of the left ear, no longer in any treatment. COMPARISON: null FINDINGS: Exam: MRI of the brain without IV contrast Comparison: 08/02/2024 Clinical history: Concern for CVA, left arm weakness Findings: Small 1 cm acute infarct in the right periventricular region on series 7, image 19. Decreased signal on the ADC map in this location. No acute intracranial hemorrhage. No intracranial masses. Postcontrast imaging of the brain does not demonstrate any areas of abnormal parenchymal enhancement. No midline shift. No abnormal extra-axial fluid collections are seen. Cerebral volume loss. Areas of increased FLAIR signal within the cerebral white matter bilaterally are reflective of a nonspecific demyelinating process, likely ischemic microvascular in nature.     Impression: Impression: 1. Small acute infarct in the right periventricular region 2. Moderate cerebral volume loss with moderate cerebral white matter disease, likely ischemic microvascular in nature. Authenticated and Electronically Signed by Veda Su MD on 08/02/2024 10:38:39 PM    CT Angiogram Neck    Result Date: 8/2/2024  Narrative: FINAL REPORT TECHNIQUE: null CLINICAL HISTORY: Stroke, follow up COMPARISON: null FINDINGS: Exam: CTA brain with contrast, CTA neck with contrast Procedure: MIP reconstructions were performed for the CTA Comparison: 08/02/2024 Clinical history: Neuro deficit acute stroke suspected Findings: CTA neck: Aortic arch branching demonstrates aberrant right subclavian artery CCA bilaterally do not demonstrate high grade stenosis or occlusion. Common carotid arteries are tortuous. ICA bilaterally demonstrate atherosclerotic disease with mild bilateral luminal narrowing. Vertebral arteries are patent  throughout their cervical course, without dissection. Left vertebral artery is dominant. Lung apices are clear. Multilevel degenerative disease of the cervical spine.     Impression: Impression: 1. CTA of the neck demonstrates no occlusion or high-grade stenosis of the cervical carotid or vertebral arteries. No findings of dissection. CTA head: Intracranial ICAs bilaterally demonstrate calcifications without high-grade stenosis. No aneurysm. JUAN CARLOS bilaterally demonstrate no occlusion or high-grade stenosis. MCA bilaterally demonstrate no occlusion or high-grade stenosis. PCA bilaterally demonstrate no occlusion or high-grade stenosis. Intracranial vertebral arteries are patent. Basilar artery is patent, without aneurysm or stenosis. Dural venous sinuses are patent. Opacification of the left middle ear and left mastoid air cells. Erosion/resection of some of the mastoid air cells. Please correlate with clinical history Impression: 1. CTA of the intracranial circulation demonstrates no large vessel occlusion or high-grade stenosis. No aneurysm is identified. Authenticated and Electronically Signed by Veda Su MD on 08/02/2024 09:27:41 PM    CT Angiogram Head    Result Date: 8/2/2024  Narrative: FINAL REPORT TECHNIQUE: null CLINICAL HISTORY: Neuro deficit, acute, stroke suspected COMPARISON: null FINDINGS: Exam: CTA brain with contrast, CTA neck with contrast Procedure: MIP reconstructions were performed for the CTA Comparison: 08/02/2024 Clinical history: Neuro deficit acute stroke suspected Findings: CTA neck: Aortic arch branching demonstrates aberrant right subclavian artery CCA bilaterally do not demonstrate high grade stenosis or occlusion. Common carotid arteries are tortuous. ICA bilaterally demonstrate atherosclerotic disease with mild bilateral luminal narrowing. Vertebral arteries are patent throughout their cervical course, without dissection. Left vertebral artery is dominant. Lung apices are clear.  Multilevel degenerative disease of the cervical spine.     Impression: Impression: 1. CTA of the neck demonstrates no occlusion or high-grade stenosis of the cervical carotid or vertebral arteries. No findings of dissection. CTA head: Intracranial ICAs bilaterally demonstrate calcifications without high-grade stenosis. No aneurysm. JUAN CARLOS bilaterally demonstrate no occlusion or high-grade stenosis. MCA bilaterally demonstrate no occlusion or high-grade stenosis. PCA bilaterally demonstrate no occlusion or high-grade stenosis. Intracranial vertebral arteries are patent. Basilar artery is patent, without aneurysm or stenosis. Dural venous sinuses are patent. Opacification of the left middle ear and left mastoid air cells. Erosion/resection of some of the mastoid air cells. Please correlate with clinical history Impression: 1. CTA of the intracranial circulation demonstrates no large vessel occlusion or high-grade stenosis. No aneurysm is identified. Authenticated and Electronically Signed by Veda Su MD on 08/02/2024 09:23:44 PM    CT Head Without Contrast Stroke Protocol    Addendum Date: 8/2/2024 Addendum:   ADDENDUM REPORT ADDENDUM: This report was discussed with Archie Sue on Aug 02, 2024 21:19:00 EDT. Authenticated and Electronically Signed by Veda Su MD on 08/02/2024 09:19:47 PM    Result Date: 8/2/2024  Narrative: FINAL REPORT TECHNIQUE: null CLINICAL HISTORY: Stroke, follow up COMPARISON: null FINDINGS: Exam: CT Brain without contrast Comparison: None Clinical history: Stroke Findings: No acute intracranial hemorrhage. Cerebral volume loss. No abnormal extra-axial fluid collections. No intracranial mass No midline shift. Bilateral cerebral white matter disease, likely ischemic microvascular in nature No skull fracture. Chronic right maxillary sinus disease. Fluid in the sphenoid sinuses bilaterally Opacification of the left middle ear and left mastoid air cells     Impression:  Impression: 1. No acute intracranial hemorrhage. 2. Moderate Cerebral volume loss. Moderate Cerebral white matter disease, likely ischemic microvascular in nature Authenticated and Electronically Signed by Veda Su MD on 08/02/2024 09:18:20 PM   05/23/23 05/01/2024      ASSESSMENT: The patient is a very pleasant 79 y.o. male  with locally advanced left ear squamous cell carcinoma      PLAN:    1.  Locally advanced squamous cell carcinoma of the left ear T4 N1 M0 stage IV:  A.  I will proceed with treatment as scheduled today Libtayo IV every 3 weeks cycle #22.  B.  The patient will follow-up with us in 3 weeks for cycle #23.  Will plan to stop treatment after cycle #24.  C.  I will continue to monitor the patient blood work including blood counts kidney function liver function and electrolytes.  D.  I will continue to hold off on doing imaging.  We will follow patient clinically for response.  Clinically, patient continues to have response.  E.  I  reviewed with the patient the goal of treatment is palliative given his locally advanced disease with ipsilateral lymph node metastases.  F. We reviewed potential side effects of immunotherapy including but not limited to immune mediated reactions with thyroiditis, pneumonitis, hepatitis, colitis, rash, and electrolyte abnormalities, fatigue, multiorgan failure, and possibly death.  G.  I did go over the blood work results with the patient from August 3, 2024.  He has worsening anemia with hemoglobin of 9.5, slightly low blood cell at 3.35, and normal platelets.  We will repeat labs today.      2. Cancer-related pain:  A.  He will continue oxycodone as needed.     3.  Wound care:  A.  He is no longer receiving wound care as his wound has completely healed.  He is keeping it clean and dry  B.  We will continue wound care as needed.       4.  Depression:  A.  He will continue Cymbalta daily    5.  Treatment related itching  A. Stable.  I will continue  hydroxyzine.  B.  I asked the patient to make sure he is keeping his skin hydrated with non perfumed lotions as well.    6. Treatment related hypothyroidism  A. Continue Synthroid 75 mcg.   B. I will follow-up on thyroid function from today.    7.  Ischemic stroke with left upper extremity weakness and numbness  A.  He has scheduled follow-up with neurology as well as PCP.    FOLLOW UP: 3 weeks with cycle #23.    Yady Guajardo, APRN  8/14/2024

## 2024-09-04 ENCOUNTER — OFFICE VISIT (OUTPATIENT)
Dept: ONCOLOGY | Facility: CLINIC | Age: 79
End: 2024-09-04
Payer: MEDICARE

## 2024-09-04 ENCOUNTER — HOSPITAL ENCOUNTER (OUTPATIENT)
Facility: HOSPITAL | Age: 79
Discharge: HOME | End: 2024-09-04
Admitting: INTERNAL MEDICINE
Payer: MEDICARE

## 2024-09-04 VITALS
HEART RATE: 120 BPM | OXYGEN SATURATION: 99 % | BODY MASS INDEX: 28.17 KG/M2 | TEMPERATURE: 97.5 F | DIASTOLIC BLOOD PRESSURE: 71 MMHG | SYSTOLIC BLOOD PRESSURE: 145 MMHG | RESPIRATION RATE: 18 BRPM | HEIGHT: 72 IN | WEIGHT: 208 LBS

## 2024-09-04 DIAGNOSIS — C44.229 SQUAMOUS CELL CARCINOMA OF SKIN OF LEFT EAR AND EXTERNAL AURICULAR CANAL: Primary | ICD-10-CM

## 2024-09-04 DIAGNOSIS — Z51.81 ENCOUNTER FOR THERAPEUTIC DRUG MONITORING: ICD-10-CM

## 2024-09-04 LAB
ALBUMIN SERPL-MCNC: 4.3 G/DL (ref 3.5–5.2)
ALBUMIN/GLOB SERPL: 1.5 G/DL
ALP SERPL-CCNC: 57 U/L (ref 39–117)
ALT SERPL W P-5'-P-CCNC: 12 U/L (ref 1–41)
ANION GAP SERPL CALCULATED.3IONS-SCNC: 14.2 MMOL/L (ref 5–15)
AST SERPL-CCNC: 20 U/L (ref 1–40)
BASOPHILS # BLD AUTO: 0.06 10*3/MM3 (ref 0–0.2)
BASOPHILS NFR BLD AUTO: 1.8 % (ref 0–1.5)
BILIRUB SERPL-MCNC: 0.8 MG/DL (ref 0–1.2)
BUN SERPL-MCNC: 12 MG/DL (ref 8–23)
BUN/CREAT SERPL: 7.7 (ref 7–25)
CALCIUM SPEC-SCNC: 9.7 MG/DL (ref 8.6–10.5)
CHLORIDE SERPL-SCNC: 99 MMOL/L (ref 98–107)
CO2 SERPL-SCNC: 23.8 MMOL/L (ref 22–29)
CREAT SERPL-MCNC: 1.55 MG/DL (ref 0.76–1.27)
DEPRECATED RDW RBC AUTO: 47.7 FL (ref 37–54)
EGFRCR SERPLBLD CKD-EPI 2021: 45 ML/MIN/1.73
EOSINOPHIL # BLD AUTO: 0.27 10*3/MM3 (ref 0–0.4)
EOSINOPHIL NFR BLD AUTO: 8.3 % (ref 0.3–6.2)
ERYTHROCYTE [DISTWIDTH] IN BLOOD BY AUTOMATED COUNT: 14.2 % (ref 12.3–15.4)
GLOBULIN UR ELPH-MCNC: 2.9 GM/DL
GLUCOSE SERPL-MCNC: 93 MG/DL (ref 65–99)
HCT VFR BLD AUTO: 35.7 % (ref 37.5–51)
HGB BLD-MCNC: 11.7 G/DL (ref 13–17.7)
IMM GRANULOCYTES # BLD AUTO: 0.03 10*3/MM3 (ref 0–0.05)
IMM GRANULOCYTES NFR BLD AUTO: 0.9 % (ref 0–0.5)
LYMPHOCYTES # BLD AUTO: 0.77 10*3/MM3 (ref 0.7–3.1)
LYMPHOCYTES NFR BLD AUTO: 23.5 % (ref 19.6–45.3)
MCH RBC QN AUTO: 30 PG (ref 26.6–33)
MCHC RBC AUTO-ENTMCNC: 32.8 G/DL (ref 31.5–35.7)
MCV RBC AUTO: 91.5 FL (ref 79–97)
MONOCYTES # BLD AUTO: 0.31 10*3/MM3 (ref 0.1–0.9)
MONOCYTES NFR BLD AUTO: 9.5 % (ref 5–12)
NEUTROPHILS NFR BLD AUTO: 1.83 10*3/MM3 (ref 1.7–7)
NEUTROPHILS NFR BLD AUTO: 56 % (ref 42.7–76)
NRBC BLD AUTO-RTO: 0 /100 WBC (ref 0–0.2)
PLATELET # BLD AUTO: 161 10*3/MM3 (ref 140–450)
PMV BLD AUTO: 9.3 FL (ref 6–12)
POTASSIUM SERPL-SCNC: 4.1 MMOL/L (ref 3.5–5.2)
PROT SERPL-MCNC: 7.2 G/DL (ref 6–8.5)
RBC # BLD AUTO: 3.9 10*6/MM3 (ref 4.14–5.8)
SODIUM SERPL-SCNC: 137 MMOL/L (ref 136–145)
T4 FREE SERPL-MCNC: 0.85 NG/DL (ref 0.92–1.68)
TSH SERPL DL<=0.05 MIU/L-ACNC: 12.06 UIU/ML (ref 0.27–4.2)
WBC NRBC COR # BLD AUTO: 3.27 10*3/MM3 (ref 3.4–10.8)

## 2024-09-04 PROCEDURE — 99214 OFFICE O/P EST MOD 30 MIN: CPT | Performed by: INTERNAL MEDICINE

## 2024-09-04 PROCEDURE — 80053 COMPREHEN METABOLIC PANEL: CPT | Performed by: INTERNAL MEDICINE

## 2024-09-04 PROCEDURE — 85025 COMPLETE CBC W/AUTO DIFF WBC: CPT | Performed by: INTERNAL MEDICINE

## 2024-09-04 PROCEDURE — 96413 CHEMO IV INFUSION 1 HR: CPT

## 2024-09-04 PROCEDURE — 1126F AMNT PAIN NOTED NONE PRSNT: CPT | Performed by: INTERNAL MEDICINE

## 2024-09-04 PROCEDURE — 84439 ASSAY OF FREE THYROXINE: CPT | Performed by: INTERNAL MEDICINE

## 2024-09-04 PROCEDURE — 84443 ASSAY THYROID STIM HORMONE: CPT | Performed by: INTERNAL MEDICINE

## 2024-09-04 PROCEDURE — 25010000002 CEMIPLIMAB-RWLC 350 MG/7ML SOLUTION 7 ML VIAL: Performed by: INTERNAL MEDICINE

## 2024-09-04 RX ORDER — SODIUM CHLORIDE 9 MG/ML
250 INJECTION, SOLUTION INTRAVENOUS ONCE
Status: DISCONTINUED | OUTPATIENT
Start: 2024-09-04 | End: 2024-09-05 | Stop reason: HOSPADM

## 2024-09-04 RX ORDER — SODIUM CHLORIDE 9 MG/ML
250 INJECTION, SOLUTION INTRAVENOUS ONCE
Status: CANCELLED | OUTPATIENT
Start: 2024-09-05

## 2024-09-04 RX ADMIN — CEMIPLIMAB-RWLC 350 MG: 50 INJECTION INTRAVENOUS at 12:10

## 2024-09-04 NOTE — PROGRESS NOTES
DATE OF VISIT: 9/4/2024    REASON FOR VISIT: Followup for locally advanced squamous cell carcinoma of the left ear     PROBLEM LIST:  1. 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  Presented with gradually growing mass over the left ear for the last 3 years.  B.  Patient did not seek any medical attention until April 2023.  C.  CT neck and chest done at  April 2023 confirmed locally advanced lesion eroding into the left mastoid with left cervical lymphadenopathy but no evidence of chest metastases.  D.  Biopsy done April 2023 confirmed squamous cell carcinoma.  E.  Status post palliative radiation completed April 28, 2023  F.  Started immunotherapy with Libtayo 350 mg IV every 3 weeks May 22, 2023.  Status post 22 cycles.  2.  Cancer-related pain  3.  Mild depression  4.  BPH  5. Ischemic stroke with left upper extremity weakness and numbness    HISTORY OF PRESENT ILLNESS: The patient is a very pleasant 79 y.o. male with past medical history significant for locally advanced left ear squamous cell carcinoma diagnosed April 2023.  The patient was started on Libtayo May 16, 2023.  The patient is here today for scheduled follow-up visit with treatment cycle #23.    SUBJECTIVE: Peter is here today by himself.  All in all is doing fairly well.  Denies any fever chills night sweats.  He would like to have a break from treatment.    Past History:  Medical History: has a past medical history of Anemia, Arthritis, and Cancer.   Surgical History: has a past surgical history that includes Skin cancer excision; Neck dissection (N/A); Parotidectomy (N/A); and NAIL BIOPSY (N/A).   Family History: family history includes Cancer in his father and mother.   Social History: reports that he has never smoked. His smokeless tobacco use includes chew. He reports that he does not currently use alcohol. He reports that he does not use drugs.    (Not in a hospital admission)     Allergies: Asa [aspirin]      Review of Systems   Constitutional:  Positive for fatigue.   Respiratory: Negative.     Gastrointestinal: Negative.    Psychiatric/Behavioral:  The patient is nervous/anxious.        PHYSICAL EXAMINATION:   There were no vitals taken for this visit.   There were no vitals filed for this visit.                                     ECOG Performance Status: 1 - Symptomatic but completely ambulatory      General Appearance:      alert, cooperative, no apparent distress and appears stated age   Lungs:   Clear to auscultation bilaterally; respirations regular, even, and unlabored bilaterally   Heart:  Regular rate and rhythm, no murmurs appreciated   Abdomen:   Soft, non-tender, and non-distended                 No visits with results within 2 Week(s) from this visit.   Latest known visit with results is:   Infusion on 08/14/2024   Component Date Value Ref Range Status    Glucose 08/14/2024 105 (H)  65 - 99 mg/dL Final    BUN 08/14/2024 15  8 - 23 mg/dL Final    Creatinine 08/14/2024 1.41 (H)  0.76 - 1.27 mg/dL Final    Sodium 08/14/2024 131 (L)  136 - 145 mmol/L Final    Potassium 08/14/2024 3.9  3.5 - 5.2 mmol/L Final    Chloride 08/14/2024 96 (L)  98 - 107 mmol/L Final    CO2 08/14/2024 25.2  22.0 - 29.0 mmol/L Final    Calcium 08/14/2024 9.3  8.6 - 10.5 mg/dL Final    Total Protein 08/14/2024 7.0  6.0 - 8.5 g/dL Final    Albumin 08/14/2024 4.0  3.5 - 5.2 g/dL Final    ALT (SGPT) 08/14/2024 14  1 - 41 U/L Final    AST (SGOT) 08/14/2024 22  1 - 40 U/L Final    Alkaline Phosphatase 08/14/2024 65  39 - 117 U/L Final    Total Bilirubin 08/14/2024 0.7  0.0 - 1.2 mg/dL Final    Globulin 08/14/2024 3.0  gm/dL Final    A/G Ratio 08/14/2024 1.3  g/dL Final    BUN/Creatinine Ratio 08/14/2024 10.6  7.0 - 25.0 Final    Anion Gap 08/14/2024 9.8  5.0 - 15.0 mmol/L Final    eGFR 08/14/2024 50.4 (L)  >60.0 mL/min/1.73 Final    WBC 08/14/2024 4.73  3.40 - 10.80 10*3/mm3 Final    RBC 08/14/2024 3.59 (L)  4.14 - 5.80 10*6/mm3 Final     Hemoglobin 08/14/2024 10.9 (L)  13.0 - 17.7 g/dL Final    Hematocrit 08/14/2024 31.7 (L)  37.5 - 51.0 % Final    MCV 08/14/2024 88.3  79.0 - 97.0 fL Final    MCH 08/14/2024 30.4  26.6 - 33.0 pg Final    MCHC 08/14/2024 34.4  31.5 - 35.7 g/dL Final    RDW 08/14/2024 13.8  12.3 - 15.4 % Final    RDW-SD 08/14/2024 44.6  37.0 - 54.0 fl Final    MPV 08/14/2024 9.3  6.0 - 12.0 fL Final    Platelets 08/14/2024 232  140 - 450 10*3/mm3 Final    Neutrophil % 08/14/2024 67.8  42.7 - 76.0 % Final    Lymphocyte % 08/14/2024 17.8 (L)  19.6 - 45.3 % Final    Monocyte % 08/14/2024 9.1  5.0 - 12.0 % Final    Eosinophil % 08/14/2024 3.0  0.3 - 6.2 % Final    Basophil % 08/14/2024 1.5  0.0 - 1.5 % Final    Immature Grans % 08/14/2024 0.8 (H)  0.0 - 0.5 % Final    Neutrophils, Absolute 08/14/2024 3.21  1.70 - 7.00 10*3/mm3 Final    Lymphocytes, Absolute 08/14/2024 0.84  0.70 - 3.10 10*3/mm3 Final    Monocytes, Absolute 08/14/2024 0.43  0.10 - 0.90 10*3/mm3 Final    Eosinophils, Absolute 08/14/2024 0.14  0.00 - 0.40 10*3/mm3 Final    Basophils, Absolute 08/14/2024 0.07  0.00 - 0.20 10*3/mm3 Final    Immature Grans, Absolute 08/14/2024 0.04  0.00 - 0.05 10*3/mm3 Final    nRBC 08/14/2024 0.0  0.0 - 0.2 /100 WBC Final    TSH 08/14/2024 2.520  0.270 - 4.200 uIU/mL Final    Free T4 08/14/2024 1.32  0.92 - 1.68 ng/dL Final        No results found.  05/23/23 05/01/2024      ASSESSMENT: The patient is a very pleasant 79 y.o. male  with locally advanced left ear squamous cell carcinoma      PLAN:    1.  Locally advanced squamous cell carcinoma of the left ear T4 N1 M0 stage IV:  A.  I will proceed with treatment as scheduled today Libtayo IV every 3 weeks cycle #23.  B.  The patient will follow-up with us in 3 weeks for cycle #24.  This should be his last cycle based on patient's own wishes.  C.  I will continue to monitor the patient blood work including blood counts kidney function liver function and electrolytes.  D.  I will continue to  hold off on doing imaging.  We will follow patient clinically for response.  Clinically, patient continues to have response.  E.  I  reviewed with the patient the goal of treatment is palliative given his locally advanced disease with ipsilateral lymph node metastases.  F. We reviewed potential side effects of immunotherapy including but not limited to immune mediated reactions with thyroiditis, pneumonitis, hepatitis, colitis, rash, and electrolyte abnormalities, fatigue, multiorgan failure, and possibly death.  G.  I discussed with the patient about the results from August 14, 2024 hemoglobin down to 10.9 white cells normal 4.7.  CMP is essentially normal.  I will follow-up on labs from today.    2. Cancer-related pain:  A.  He will continue oxycodone as needed.     3.  Wound care:  A.  He is no longer receiving wound care as his wound has completely healed.  He is keeping it clean and dry  B.  We will continue wound care as needed.     4.  Depression:  A.  He will continue Cymbalta daily    5.  Treatment related itching  A. Stable.  I will continue hydroxyzine.  B.  I asked the patient to make sure he is keeping his skin hydrated with non perfumed lotions as well.    6. Treatment related hypothyroidism  A. Continue Synthroid 75 mcg.   B. I will follow-up on thyroid function from today.    7.  Ischemic stroke with left upper extremity weakness and numbness  A.  He has scheduled follow-up with neurology as well as PCP.    FOLLOW UP: 3 weeks with cycle #24.    Benitez Medellin MD  9/4/2024

## 2024-09-25 ENCOUNTER — OFFICE VISIT (OUTPATIENT)
Dept: ONCOLOGY | Facility: CLINIC | Age: 79
End: 2024-09-25
Payer: MEDICARE

## 2024-09-25 ENCOUNTER — HOSPITAL ENCOUNTER (OUTPATIENT)
Facility: HOSPITAL | Age: 79
Discharge: HOME | End: 2024-09-25
Admitting: NURSE PRACTITIONER
Payer: MEDICARE

## 2024-09-25 VITALS
WEIGHT: 201.6 LBS | OXYGEN SATURATION: 99 % | HEART RATE: 107 BPM | HEIGHT: 72 IN | BODY MASS INDEX: 27.3 KG/M2 | RESPIRATION RATE: 18 BRPM | DIASTOLIC BLOOD PRESSURE: 75 MMHG | SYSTOLIC BLOOD PRESSURE: 138 MMHG | TEMPERATURE: 97.5 F

## 2024-09-25 DIAGNOSIS — C44.229 SQUAMOUS CELL CARCINOMA OF SKIN OF LEFT EAR AND EXTERNAL AURICULAR CANAL: ICD-10-CM

## 2024-09-25 DIAGNOSIS — C44.229 SQUAMOUS CELL CARCINOMA OF SKIN OF LEFT EAR AND EXTERNAL AURICULAR CANAL: Primary | ICD-10-CM

## 2024-09-25 DIAGNOSIS — Z51.81 ENCOUNTER FOR THERAPEUTIC DRUG MONITORING: Primary | ICD-10-CM

## 2024-09-25 DIAGNOSIS — Z79.899 OTHER LONG TERM (CURRENT) DRUG THERAPY: ICD-10-CM

## 2024-09-25 LAB
ALBUMIN SERPL-MCNC: 4.1 G/DL (ref 3.5–5.2)
ALBUMIN/GLOB SERPL: 1.6 G/DL
ALP SERPL-CCNC: 60 U/L (ref 39–117)
ALT SERPL W P-5'-P-CCNC: 12 U/L (ref 1–41)
ANION GAP SERPL CALCULATED.3IONS-SCNC: 10.5 MMOL/L (ref 5–15)
AST SERPL-CCNC: 19 U/L (ref 1–40)
BASOPHILS # BLD AUTO: 0.05 10*3/MM3 (ref 0–0.2)
BASOPHILS NFR BLD AUTO: 1.5 % (ref 0–1.5)
BILIRUB SERPL-MCNC: 0.9 MG/DL (ref 0–1.2)
BUN SERPL-MCNC: 9 MG/DL (ref 8–23)
BUN/CREAT SERPL: 6.7 (ref 7–25)
CALCIUM SPEC-SCNC: 9.4 MG/DL (ref 8.6–10.5)
CHLORIDE SERPL-SCNC: 100 MMOL/L (ref 98–107)
CO2 SERPL-SCNC: 25.5 MMOL/L (ref 22–29)
CREAT SERPL-MCNC: 1.35 MG/DL (ref 0.76–1.27)
DEPRECATED RDW RBC AUTO: 46.9 FL (ref 37–54)
EGFRCR SERPLBLD CKD-EPI 2021: 53.1 ML/MIN/1.73
EOSINOPHIL # BLD AUTO: 0.26 10*3/MM3 (ref 0–0.4)
EOSINOPHIL NFR BLD AUTO: 7.6 % (ref 0.3–6.2)
ERYTHROCYTE [DISTWIDTH] IN BLOOD BY AUTOMATED COUNT: 14.1 % (ref 12.3–15.4)
FERRITIN SERPL-MCNC: 57.08 NG/ML (ref 30–400)
GLOBULIN UR ELPH-MCNC: 2.6 GM/DL
GLUCOSE SERPL-MCNC: 92 MG/DL (ref 65–99)
HCT VFR BLD AUTO: 35.3 % (ref 37.5–51)
HGB BLD-MCNC: 11.7 G/DL (ref 13–17.7)
IMM GRANULOCYTES # BLD AUTO: 0.02 10*3/MM3 (ref 0–0.05)
IMM GRANULOCYTES NFR BLD AUTO: 0.6 % (ref 0–0.5)
LYMPHOCYTES # BLD AUTO: 1.02 10*3/MM3 (ref 0.7–3.1)
LYMPHOCYTES NFR BLD AUTO: 29.8 % (ref 19.6–45.3)
MCH RBC QN AUTO: 30.2 PG (ref 26.6–33)
MCHC RBC AUTO-ENTMCNC: 33.1 G/DL (ref 31.5–35.7)
MCV RBC AUTO: 91.2 FL (ref 79–97)
MONOCYTES # BLD AUTO: 0.34 10*3/MM3 (ref 0.1–0.9)
MONOCYTES NFR BLD AUTO: 9.9 % (ref 5–12)
NEUTROPHILS NFR BLD AUTO: 1.73 10*3/MM3 (ref 1.7–7)
NEUTROPHILS NFR BLD AUTO: 50.6 % (ref 42.7–76)
NRBC BLD AUTO-RTO: 0 /100 WBC (ref 0–0.2)
PLATELET # BLD AUTO: 180 10*3/MM3 (ref 140–450)
PMV BLD AUTO: 9.1 FL (ref 6–12)
POTASSIUM SERPL-SCNC: 4.3 MMOL/L (ref 3.5–5.2)
PROT SERPL-MCNC: 6.7 G/DL (ref 6–8.5)
RBC # BLD AUTO: 3.87 10*6/MM3 (ref 4.14–5.8)
SODIUM SERPL-SCNC: 136 MMOL/L (ref 136–145)
T4 FREE SERPL-MCNC: 1.15 NG/DL (ref 0.92–1.68)
TSH SERPL DL<=0.05 MIU/L-ACNC: 5.67 UIU/ML (ref 0.27–4.2)
WBC NRBC COR # BLD AUTO: 3.42 10*3/MM3 (ref 3.4–10.8)

## 2024-09-25 PROCEDURE — 80053 COMPREHEN METABOLIC PANEL: CPT | Performed by: NURSE PRACTITIONER

## 2024-09-25 PROCEDURE — 96413 CHEMO IV INFUSION 1 HR: CPT

## 2024-09-25 PROCEDURE — 84439 ASSAY OF FREE THYROXINE: CPT

## 2024-09-25 PROCEDURE — 84443 ASSAY THYROID STIM HORMONE: CPT

## 2024-09-25 PROCEDURE — 1159F MED LIST DOCD IN RCRD: CPT | Performed by: NURSE PRACTITIONER

## 2024-09-25 PROCEDURE — 82728 ASSAY OF FERRITIN: CPT | Performed by: NURSE PRACTITIONER

## 2024-09-25 PROCEDURE — 85025 COMPLETE CBC W/AUTO DIFF WBC: CPT | Performed by: NURSE PRACTITIONER

## 2024-09-25 PROCEDURE — 1126F AMNT PAIN NOTED NONE PRSNT: CPT | Performed by: NURSE PRACTITIONER

## 2024-09-25 PROCEDURE — 1160F RVW MEDS BY RX/DR IN RCRD: CPT | Performed by: NURSE PRACTITIONER

## 2024-09-25 PROCEDURE — 36415 COLL VENOUS BLD VENIPUNCTURE: CPT | Performed by: NURSE PRACTITIONER

## 2024-09-25 PROCEDURE — 25010000002 CEMIPLIMAB-RWLC 350 MG/7ML SOLUTION 7 ML VIAL: Performed by: NURSE PRACTITIONER

## 2024-09-25 PROCEDURE — 99214 OFFICE O/P EST MOD 30 MIN: CPT | Performed by: NURSE PRACTITIONER

## 2024-09-25 RX ORDER — SODIUM CHLORIDE 9 MG/ML
250 INJECTION, SOLUTION INTRAVENOUS ONCE
Status: CANCELLED | OUTPATIENT
Start: 2024-09-26

## 2024-09-25 RX ORDER — SODIUM CHLORIDE 9 MG/ML
250 INJECTION, SOLUTION INTRAVENOUS ONCE
Status: DISCONTINUED | OUTPATIENT
Start: 2024-09-25 | End: 2024-09-26 | Stop reason: HOSPADM

## 2024-09-25 RX ADMIN — CEMIPLIMAB-RWLC 350 MG: 50 INJECTION INTRAVENOUS at 11:15

## 2024-12-23 RX ORDER — LEVOTHYROXINE SODIUM 75 UG/1
TABLET ORAL
Qty: 30 TABLET | Refills: 3 | Status: SHIPPED | OUTPATIENT
Start: 2024-12-23

## 2025-01-01 ENCOUNTER — HOSPITAL ENCOUNTER (INPATIENT)
Facility: HOSPITAL | Age: 80
LOS: 12 days | Discharge: NURSING, REHAB OR LONG-TERM CARE | End: 2025-01-15
Attending: STUDENT IN AN ORGANIZED HEALTH CARE EDUCATION/TRAINING PROGRAM | Admitting: STUDENT IN AN ORGANIZED HEALTH CARE EDUCATION/TRAINING PROGRAM
Payer: MEDICARE

## 2025-01-01 ENCOUNTER — APPOINTMENT (OUTPATIENT)
Dept: CT IMAGING | Facility: HOSPITAL | Age: 80
End: 2025-01-01
Payer: MEDICARE

## 2025-01-01 ENCOUNTER — APPOINTMENT (OUTPATIENT)
Dept: MRI IMAGING | Facility: HOSPITAL | Age: 80
End: 2025-01-01
Payer: MEDICARE

## 2025-01-01 ENCOUNTER — APPOINTMENT (OUTPATIENT)
Dept: GENERAL RADIOLOGY | Facility: HOSPITAL | Age: 80
End: 2025-01-01
Payer: MEDICARE

## 2025-01-01 DIAGNOSIS — G89.3 CANCER RELATED PAIN: ICD-10-CM

## 2025-01-01 DIAGNOSIS — R41.82 ALTERED MENTAL STATUS, UNSPECIFIED ALTERED MENTAL STATUS TYPE: Primary | ICD-10-CM

## 2025-01-01 DIAGNOSIS — C44.229: ICD-10-CM

## 2025-01-01 DIAGNOSIS — C44.229 SQUAMOUS CELL CARCINOMA, EAR, LEFT: ICD-10-CM

## 2025-01-01 DIAGNOSIS — N21.0 BLADDER STONE: ICD-10-CM

## 2025-01-01 LAB
A-A DO2: 23.8 MMHG
ALBUMIN SERPL-MCNC: 3.8 G/DL (ref 3.5–5.2)
ALBUMIN/GLOB SERPL: 1.5 G/DL
ALP SERPL-CCNC: 51 U/L (ref 39–117)
ALT SERPL W P-5'-P-CCNC: 13 U/L (ref 1–41)
ANION GAP SERPL CALCULATED.3IONS-SCNC: 11.6 MMOL/L (ref 5–15)
ARTERIAL PATENCY WRIST A: POSITIVE
AST SERPL-CCNC: 23 U/L (ref 1–40)
ATMOSPHERIC PRESS: 738 MMHG
BASE EXCESS BLDA CALC-SCNC: 0.4 MMOL/L (ref 0–2)
BASOPHILS # BLD AUTO: 0.03 10*3/MM3 (ref 0–0.2)
BASOPHILS NFR BLD AUTO: 0.6 % (ref 0–1.5)
BDY SITE: ABNORMAL
BILIRUB SERPL-MCNC: 1.1 MG/DL (ref 0–1.2)
BILIRUB UR QL STRIP: NEGATIVE
BUN SERPL-MCNC: 9 MG/DL (ref 8–23)
BUN/CREAT SERPL: 6.7 (ref 7–25)
CALCIUM SPEC-SCNC: 8.9 MG/DL (ref 8.6–10.5)
CHLORIDE SERPL-SCNC: 101 MMOL/L (ref 98–107)
CK SERPL-CCNC: 216 U/L (ref 20–200)
CLARITY UR: ABNORMAL
CO2 SERPL-SCNC: 23.4 MMOL/L (ref 22–29)
COHGB MFR BLD: 1.3 % (ref 0–2)
COLOR UR: YELLOW
CREAT SERPL-MCNC: 1.34 MG/DL (ref 0.76–1.27)
CRP SERPL-MCNC: 0.9 MG/DL (ref 0–0.5)
D-LACTATE SERPL-SCNC: 0.9 MMOL/L (ref 0.5–2)
DEPRECATED RDW RBC AUTO: 45.3 FL (ref 37–54)
EGFRCR SERPLBLD CKD-EPI 2021: 53.6 ML/MIN/1.73
EOSINOPHIL # BLD AUTO: 0.06 10*3/MM3 (ref 0–0.4)
EOSINOPHIL NFR BLD AUTO: 1.2 % (ref 0.3–6.2)
ERYTHROCYTE [DISTWIDTH] IN BLOOD BY AUTOMATED COUNT: 13.4 % (ref 12.3–15.4)
FLUAV RNA RESP QL NAA+PROBE: NOT DETECTED
FLUBV RNA RESP QL NAA+PROBE: NOT DETECTED
GEN 5 1HR TROPONIN T REFLEX: 23 NG/L
GLOBULIN UR ELPH-MCNC: 2.5 GM/DL
GLUCOSE SERPL-MCNC: 99 MG/DL (ref 65–99)
GLUCOSE UR STRIP-MCNC: NEGATIVE MG/DL
HCO3 BLDA-SCNC: 24 MMOL/L (ref 22–28)
HCT VFR BLD AUTO: 34 % (ref 37.5–51)
HCT VFR BLD CALC: 34.5 %
HGB BLD-MCNC: 11.6 G/DL (ref 13–17.7)
HGB UR QL STRIP.AUTO: NEGATIVE
IMM GRANULOCYTES # BLD AUTO: 0.04 10*3/MM3 (ref 0–0.05)
IMM GRANULOCYTES NFR BLD AUTO: 0.8 % (ref 0–0.5)
KETONES UR QL STRIP: NEGATIVE
LEUKOCYTE ESTERASE UR QL STRIP.AUTO: NEGATIVE
LYMPHOCYTES # BLD AUTO: 0.22 10*3/MM3 (ref 0.7–3.1)
LYMPHOCYTES NFR BLD AUTO: 4.5 % (ref 19.6–45.3)
Lab: ABNORMAL
MAGNESIUM SERPL-MCNC: 1.8 MG/DL (ref 1.6–2.4)
MCH RBC QN AUTO: 31.4 PG (ref 26.6–33)
MCHC RBC AUTO-ENTMCNC: 34.1 G/DL (ref 31.5–35.7)
MCV RBC AUTO: 91.9 FL (ref 79–97)
METHGB BLD QL: 0.6 % (ref 0–1.5)
MODALITY: ABNORMAL
MONOCYTES # BLD AUTO: 0.3 10*3/MM3 (ref 0.1–0.9)
MONOCYTES NFR BLD AUTO: 6.2 % (ref 5–12)
NEUTROPHILS NFR BLD AUTO: 4.19 10*3/MM3 (ref 1.7–7)
NEUTROPHILS NFR BLD AUTO: 86.7 % (ref 42.7–76)
NITRITE UR QL STRIP: NEGATIVE
NRBC BLD AUTO-RTO: 0 /100 WBC (ref 0–0.2)
OXYHGB MFR BLDV: 95.9 % (ref 94–99)
PCO2 BLDA: 34.2 MM HG (ref 35–45)
PCO2 TEMP ADJ BLD: ABNORMAL MM[HG]
PH BLDA: 7.46 PH UNITS (ref 7.3–7.5)
PH UR STRIP.AUTO: 7 [PH] (ref 5–8)
PH, TEMP CORRECTED: ABNORMAL
PLATELET # BLD AUTO: 141 10*3/MM3 (ref 140–450)
PMV BLD AUTO: 9.8 FL (ref 6–12)
PO2 BLDA: 82.6 MM HG (ref 75–100)
PO2 TEMP ADJ BLD: ABNORMAL MM[HG]
POTASSIUM SERPL-SCNC: 4.2 MMOL/L (ref 3.5–5.2)
PROCALCITONIN SERPL-MCNC: 0.16 NG/ML (ref 0–0.25)
PROT SERPL-MCNC: 6.3 G/DL (ref 6–8.5)
PROT UR QL STRIP: NEGATIVE
RBC # BLD AUTO: 3.7 10*6/MM3 (ref 4.14–5.8)
RSV RNA RESP QL NAA+PROBE: NOT DETECTED
SAO2 % BLDCOA: 97.7 % (ref 94–100)
SARS-COV-2 RNA RESP QL NAA+PROBE: NOT DETECTED
SODIUM SERPL-SCNC: 136 MMOL/L (ref 136–145)
SP GR UR STRIP: 1.01 (ref 1–1.03)
TROPONIN T % DELTA: -4 %
TROPONIN T NUMERIC DELTA: -1 NG/L
TROPONIN T SERPL HS-MCNC: 24 NG/L
TSH SERPL DL<=0.05 MIU/L-ACNC: 2.7 UIU/ML (ref 0.27–4.2)
UROBILINOGEN UR QL STRIP: ABNORMAL
VENTILATOR MODE: ABNORMAL
WBC NRBC COR # BLD AUTO: 4.84 10*3/MM3 (ref 3.4–10.8)

## 2025-01-01 PROCEDURE — 72170 X-RAY EXAM OF PELVIS: CPT

## 2025-01-01 PROCEDURE — 83735 ASSAY OF MAGNESIUM: CPT | Performed by: PHYSICIAN ASSISTANT

## 2025-01-01 PROCEDURE — 87040 BLOOD CULTURE FOR BACTERIA: CPT | Performed by: PHYSICIAN ASSISTANT

## 2025-01-01 PROCEDURE — 84145 PROCALCITONIN (PCT): CPT | Performed by: PHYSICIAN ASSISTANT

## 2025-01-01 PROCEDURE — 82805 BLOOD GASES W/O2 SATURATION: CPT

## 2025-01-01 PROCEDURE — 36600 WITHDRAWAL OF ARTERIAL BLOOD: CPT

## 2025-01-01 PROCEDURE — 70551 MRI BRAIN STEM W/O DYE: CPT

## 2025-01-01 PROCEDURE — 25510000001 IOPAMIDOL 61 % SOLUTION: Performed by: STUDENT IN AN ORGANIZED HEALTH CARE EDUCATION/TRAINING PROGRAM

## 2025-01-01 PROCEDURE — 80053 COMPREHEN METABOLIC PANEL: CPT | Performed by: PHYSICIAN ASSISTANT

## 2025-01-01 PROCEDURE — 87637 SARSCOV2&INF A&B&RSV AMP PRB: CPT | Performed by: PHYSICIAN ASSISTANT

## 2025-01-01 PROCEDURE — 99285 EMERGENCY DEPT VISIT HI MDM: CPT | Performed by: STUDENT IN AN ORGANIZED HEALTH CARE EDUCATION/TRAINING PROGRAM

## 2025-01-01 PROCEDURE — 93005 ELECTROCARDIOGRAM TRACING: CPT | Performed by: PHYSICIAN ASSISTANT

## 2025-01-01 PROCEDURE — 83605 ASSAY OF LACTIC ACID: CPT | Performed by: PHYSICIAN ASSISTANT

## 2025-01-01 PROCEDURE — 82550 ASSAY OF CK (CPK): CPT | Performed by: PHYSICIAN ASSISTANT

## 2025-01-01 PROCEDURE — G0378 HOSPITAL OBSERVATION PER HR: HCPCS

## 2025-01-01 PROCEDURE — 84443 ASSAY THYROID STIM HORMONE: CPT | Performed by: PHYSICIAN ASSISTANT

## 2025-01-01 PROCEDURE — 84484 ASSAY OF TROPONIN QUANT: CPT | Performed by: PHYSICIAN ASSISTANT

## 2025-01-01 PROCEDURE — 70450 CT HEAD/BRAIN W/O DYE: CPT

## 2025-01-01 PROCEDURE — 82746 ASSAY OF FOLIC ACID SERUM: CPT | Performed by: PHYSICIAN ASSISTANT

## 2025-01-01 PROCEDURE — 86140 C-REACTIVE PROTEIN: CPT | Performed by: PHYSICIAN ASSISTANT

## 2025-01-01 PROCEDURE — 83050 HGB METHEMOGLOBIN QUAN: CPT

## 2025-01-01 PROCEDURE — 82375 ASSAY CARBOXYHB QUANT: CPT

## 2025-01-01 PROCEDURE — 85025 COMPLETE CBC W/AUTO DIFF WBC: CPT | Performed by: PHYSICIAN ASSISTANT

## 2025-01-01 PROCEDURE — 25810000003 SODIUM CHLORIDE 0.9 % SOLUTION: Performed by: PHYSICIAN ASSISTANT

## 2025-01-01 PROCEDURE — 36415 COLL VENOUS BLD VENIPUNCTURE: CPT

## 2025-01-01 PROCEDURE — 25010000002 HEPARIN (PORCINE) PER 1000 UNITS: Performed by: STUDENT IN AN ORGANIZED HEALTH CARE EDUCATION/TRAINING PROGRAM

## 2025-01-01 PROCEDURE — 72125 CT NECK SPINE W/O DYE: CPT

## 2025-01-01 PROCEDURE — 82607 VITAMIN B-12: CPT | Performed by: PHYSICIAN ASSISTANT

## 2025-01-01 PROCEDURE — 99223 1ST HOSP IP/OBS HIGH 75: CPT | Performed by: STUDENT IN AN ORGANIZED HEALTH CARE EDUCATION/TRAINING PROGRAM

## 2025-01-01 PROCEDURE — 74177 CT ABD & PELVIS W/CONTRAST: CPT

## 2025-01-01 PROCEDURE — 71045 X-RAY EXAM CHEST 1 VIEW: CPT

## 2025-01-01 PROCEDURE — 81003 URINALYSIS AUTO W/O SCOPE: CPT | Performed by: PHYSICIAN ASSISTANT

## 2025-01-01 RX ORDER — DULOXETIN HYDROCHLORIDE 30 MG/1
30 CAPSULE, DELAYED RELEASE ORAL DAILY
Status: DISCONTINUED | OUTPATIENT
Start: 2025-01-01 | End: 2025-01-15 | Stop reason: HOSPADM

## 2025-01-01 RX ORDER — ONDANSETRON 2 MG/ML
4 INJECTION INTRAMUSCULAR; INTRAVENOUS EVERY 6 HOURS PRN
Status: DISCONTINUED | OUTPATIENT
Start: 2025-01-01 | End: 2025-01-15 | Stop reason: HOSPADM

## 2025-01-01 RX ORDER — SODIUM CHLORIDE 9 MG/ML
40 INJECTION, SOLUTION INTRAVENOUS AS NEEDED
Status: DISCONTINUED | OUTPATIENT
Start: 2025-01-01 | End: 2025-01-15 | Stop reason: HOSPADM

## 2025-01-01 RX ORDER — SODIUM CHLORIDE 0.9 % (FLUSH) 0.9 %
10 SYRINGE (ML) INJECTION AS NEEDED
Status: DISCONTINUED | OUTPATIENT
Start: 2025-01-01 | End: 2025-01-15 | Stop reason: HOSPADM

## 2025-01-01 RX ORDER — OXYCODONE HYDROCHLORIDE 5 MG/1
5 TABLET ORAL EVERY 6 HOURS PRN
Status: DISCONTINUED | OUTPATIENT
Start: 2025-01-01 | End: 2025-01-15 | Stop reason: HOSPADM

## 2025-01-01 RX ORDER — NITROGLYCERIN 0.4 MG/1
0.4 TABLET SUBLINGUAL
Status: DISCONTINUED | OUTPATIENT
Start: 2025-01-01 | End: 2025-01-15 | Stop reason: HOSPADM

## 2025-01-01 RX ORDER — CLOPIDOGREL BISULFATE 75 MG/1
75 TABLET ORAL DAILY
Status: DISCONTINUED | OUTPATIENT
Start: 2025-01-01 | End: 2025-01-15 | Stop reason: HOSPADM

## 2025-01-01 RX ORDER — HEPARIN SODIUM 5000 [USP'U]/ML
5000 INJECTION, SOLUTION INTRAVENOUS; SUBCUTANEOUS EVERY 12 HOURS SCHEDULED
Status: DISCONTINUED | OUTPATIENT
Start: 2025-01-01 | End: 2025-01-15 | Stop reason: HOSPADM

## 2025-01-01 RX ORDER — LORAZEPAM 2 MG/ML
1 INJECTION INTRAMUSCULAR ONCE AS NEEDED
Status: COMPLETED | OUTPATIENT
Start: 2025-01-01 | End: 2025-01-04

## 2025-01-01 RX ORDER — SENNOSIDES A AND B 8.6 MG/1
1 TABLET, FILM COATED ORAL NIGHTLY
Status: DISCONTINUED | OUTPATIENT
Start: 2025-01-01 | End: 2025-01-15 | Stop reason: HOSPADM

## 2025-01-01 RX ORDER — LEVOTHYROXINE SODIUM 75 UG/1
75 TABLET ORAL
Status: DISCONTINUED | OUTPATIENT
Start: 2025-01-02 | End: 2025-01-15 | Stop reason: HOSPADM

## 2025-01-01 RX ORDER — IOPAMIDOL 612 MG/ML
100 INJECTION, SOLUTION INTRAVASCULAR
Status: COMPLETED | OUTPATIENT
Start: 2025-01-01 | End: 2025-01-01

## 2025-01-01 RX ORDER — TAMSULOSIN HYDROCHLORIDE 0.4 MG/1
0.4 CAPSULE ORAL DAILY
Status: DISCONTINUED | OUTPATIENT
Start: 2025-01-01 | End: 2025-01-15 | Stop reason: HOSPADM

## 2025-01-01 RX ORDER — SODIUM CHLORIDE 0.9 % (FLUSH) 0.9 %
10 SYRINGE (ML) INJECTION EVERY 12 HOURS SCHEDULED
Status: DISCONTINUED | OUTPATIENT
Start: 2025-01-01 | End: 2025-01-11

## 2025-01-01 RX ADMIN — HEPARIN SODIUM 5000 UNITS: 5000 INJECTION INTRAVENOUS; SUBCUTANEOUS at 20:20

## 2025-01-01 RX ADMIN — Medication 10 ML: at 21:59

## 2025-01-01 RX ADMIN — OXYCODONE 5 MG: 5 TABLET ORAL at 20:19

## 2025-01-01 RX ADMIN — IOPAMIDOL 100 ML: 612 INJECTION, SOLUTION INTRAVENOUS at 14:27

## 2025-01-01 RX ADMIN — SENNOSIDES 1 TABLET: 8.6 TABLET, FILM COATED ORAL at 20:20

## 2025-01-01 RX ADMIN — SODIUM CHLORIDE 1000 ML: 9 INJECTION, SOLUTION INTRAVENOUS at 17:22

## 2025-01-01 RX ADMIN — TAMSULOSIN HYDROCHLORIDE 0.4 MG: 0.4 CAPSULE ORAL at 20:20

## 2025-01-01 RX ADMIN — DULOXETINE HYDROCHLORIDE 30 MG: 30 CAPSULE, DELAYED RELEASE ORAL at 20:20

## 2025-01-01 RX ADMIN — SODIUM CHLORIDE 1000 ML: 9 INJECTION, SOLUTION INTRAVENOUS at 15:57

## 2025-01-01 RX ADMIN — CLOPIDOGREL BISULFATE 75 MG: 75 TABLET ORAL at 20:20

## 2025-01-01 NOTE — ED PROVIDER NOTES
Subjective:  Chief Complaint:  Fall, AMS    History of Present Illness:  Patient is an 79-year-old male with history of anemia, arthritis, cancer, recent small periventricular ischemic stroke in August 2020 for which she was admitted here for.  Patient presents today with altered mental status.  Per daughter-in-law at bedside last known normal was last night.  States that her  spoke with him last night said he seemed normal.  States that this morning he had a fall.  Wife was going to get him some water and turned around and he was on the floor.  Patient is on Plavix but does not appear to be on any other anticoagulants.  Patient denying any complaints currently but daughter-in-law states that he did not recognize his son.  Patient is alert and oriented to person but not place and time.  Daughter-in-law states that this is probably his baseline.  Patient denying any pain or complaints currently.  Does not appear to have any focal neurologic deficits.  Daughter-in-law is worried about a UTI she states his urine was strong smelling and he did urinate on himself on the way here.      Nurses Notes reviewed and agree, including vitals, allergies, social history and prior medical history.     REVIEW OF SYSTEMS: All systems reviewed and not pertinent unless noted.  Review of Systems   Genitourinary:         Dark foul-smelling urine   Psychiatric/Behavioral:  Positive for confusion.    All other systems reviewed and are negative.      Past Medical History:   Diagnosis Date    Anemia     Arthritis     Cancer        Allergies:    Asa [aspirin]      Past Surgical History:   Procedure Laterality Date    NAIL BIOPSY N/A     NECK DISSECTION N/A     PAROTIDECTOMY N/A     SKIN CANCER EXCISION           Social History     Socioeconomic History    Marital status:    Tobacco Use    Smoking status: Never    Smokeless tobacco: Current     Types: Chew   Vaping Use    Vaping status: Never Used   Substance and Sexual Activity  "   Alcohol use: Not Currently    Drug use: Never    Sexual activity: Defer         Family History   Problem Relation Age of Onset    Cancer Mother     Cancer Father        Objective  Physical Exam:  /58   Pulse 89   Temp 98 °F (36.7 °C) (Oral)   Resp 18   Ht 185.4 cm (73\")   Wt 102 kg (225 lb)   SpO2 98%   BMI 29.69 kg/m²      Physical Exam  Vitals and nursing note reviewed.   Constitutional:       General: He is not in acute distress.     Appearance: He is not toxic-appearing.   HENT:      Head: Normocephalic and atraumatic.   Eyes:      Extraocular Movements: Extraocular movements intact.      Pupils: Pupils are equal, round, and reactive to light.   Cardiovascular:      Rate and Rhythm: Tachycardia present.   Pulmonary:      Effort: Pulmonary effort is normal. No respiratory distress.   Abdominal:      General: There is no distension.      Palpations: Abdomen is soft.      Tenderness: There is no abdominal tenderness.   Musculoskeletal:         General: Normal range of motion.      Cervical back: Normal range of motion and neck supple.   Skin:     General: Skin is warm and dry.      Comments: Left ear noted to have ulceration secondary to skin cancer; no signs of acute infection, daughter-in-law states that it appears at baseline   Neurological:      Mental Status: He is alert.      GCS: GCS eye subscore is 4. GCS verbal subscore is 4. GCS motor subscore is 6.   Psychiatric:         Mood and Affect: Mood normal.         Behavior: Behavior normal.         Procedures    ED Course:         Lab Results (last 24 hours)       Procedure Component Value Units Date/Time    CBC & Differential [[id number]]  (Abnormal) Collected: 01/01/25 1221    Specimen: Blood Updated: 01/01/25 1230    Narrative:      The following orders were created for panel order CBC & Differential.  Procedure                               Abnormality         Status                     ---------                               -----------     "     ------                     CBC Auto Differential[[id number]]        Abnormal            Final result                 Please view results for these tests on the individual orders.    Comprehensive Metabolic Panel [[id number]]  (Abnormal) Collected: 01/01/25 1221    Specimen: Blood Updated: 01/01/25 1246     Glucose 99 mg/dL      BUN 9 mg/dL      Creatinine 1.34 mg/dL      Sodium 136 mmol/L      Potassium 4.2 mmol/L      Chloride 101 mmol/L      CO2 23.4 mmol/L      Calcium 8.9 mg/dL      Total Protein 6.3 g/dL      Albumin 3.8 g/dL      ALT (SGPT) 13 U/L      AST (SGOT) 23 U/L      Alkaline Phosphatase 51 U/L      Total Bilirubin 1.1 mg/dL      Globulin 2.5 gm/dL      A/G Ratio 1.5 g/dL      BUN/Creatinine Ratio 6.7     Anion Gap 11.6 mmol/L      eGFR 53.6 mL/min/1.73     Narrative:      GFR Categories in Chronic Kidney Disease (CKD)      GFR Category          GFR (mL/min/1.73)    Interpretation  G1                     90 or greater         Normal or high (1)  G2                      60-89                Mild decrease (1)  G3a                   45-59                Mild to moderate decrease  G3b                   30-44                Moderate to severe decrease  G4                    15-29                Severe decrease  G5                    14 or less           Kidney failure          (1)In the absence of evidence of kidney disease, neither GFR category G1 or G2 fulfill the criteria for CKD.    eGFR calculation 2021 CKD-EPI creatinine equation, which does not include race as a factor    Magnesium [[id number]]  (Normal) Collected: 01/01/25 1221    Specimen: Blood Updated: 01/01/25 1246     Magnesium 1.8 mg/dL     High Sensitivity Troponin T [[id number]]  (Abnormal) Collected: 01/01/25 1221    Specimen: Blood Updated: 01/01/25 1257     HS Troponin T 24 ng/L     Narrative:      High Sensitive Troponin T Reference Range:  <14.0 ng/L- Negative Female for AMI  <22.0 ng/L- Negative Male for AMI  >=14 - Abnormal Female  indicating possible myocardial injury.  >=22 - Abnormal Male indicating possible myocardial injury.   Clinicians would have to utilize clinical acumen, EKG, Troponin, and serial changes to determine if it is an Acute Myocardial Infarction or myocardial injury due to an underlying chronic condition.         CBC Auto Differential [[id number]]  (Abnormal) Collected: 01/01/25 1221    Specimen: Blood Updated: 01/01/25 1230     WBC 4.84 10*3/mm3      RBC 3.70 10*6/mm3      Hemoglobin 11.6 g/dL      Hematocrit 34.0 %      MCV 91.9 fL      MCH 31.4 pg      MCHC 34.1 g/dL      RDW 13.4 %      RDW-SD 45.3 fl      MPV 9.8 fL      Platelets 141 10*3/mm3      Neutrophil % 86.7 %      Lymphocyte % 4.5 %      Monocyte % 6.2 %      Eosinophil % 1.2 %      Basophil % 0.6 %      Immature Grans % 0.8 %      Neutrophils, Absolute 4.19 10*3/mm3      Lymphocytes, Absolute 0.22 10*3/mm3      Monocytes, Absolute 0.30 10*3/mm3      Eosinophils, Absolute 0.06 10*3/mm3      Basophils, Absolute 0.03 10*3/mm3      Immature Grans, Absolute 0.04 10*3/mm3      nRBC 0.0 /100 WBC     TSH [[id number]]  (Normal) Collected: 01/01/25 1221    Specimen: Blood Updated: 01/01/25 1257     TSH 2.700 uIU/mL     CK [[id number]]  (Abnormal) Collected: 01/01/25 1221    Specimen: Blood Updated: 01/01/25 1246     Creatine Kinase 216 U/L     C-reactive Protein [[id number]]  (Abnormal) Collected: 01/01/25 1221    Specimen: Blood Updated: 01/01/25 1338     C-Reactive Protein 0.90 mg/dL     COVID-19, FLU A/B, RSV PCR 1 HR TAT - Swab, Nasopharynx [[id number]]  (Normal) Collected: 01/01/25 1248    Specimen: Swab from Nasopharynx Updated: 01/01/25 1336     COVID19 Not Detected     Influenza A PCR Not Detected     Influenza B PCR Not Detected     RSV, PCR Not Detected    Narrative:      Fact sheet for providers: https://www.fda.gov/media/744277/download    Fact sheet for patients: https://www.fda.gov/media/958335/download    Test performed by PCR.    High Sensitivity Troponin T  1Hr [[id number]]  (Abnormal) Collected: 01/01/25 1406    Specimen: Blood Updated: 01/01/25 1435     HS Troponin T 23 ng/L      Troponin T Numeric Delta -1 ng/L      Troponin T % Delta -4 %     Narrative:      High Sensitive Troponin T Reference Range:  <14.0 ng/L- Negative Female for AMI  <22.0 ng/L- Negative Male for AMI  >=14 - Abnormal Female indicating possible myocardial injury.  >=22 - Abnormal Male indicating possible myocardial injury.   Clinicians would have to utilize clinical acumen, EKG, Troponin, and serial changes to determine if it is an Acute Myocardial Infarction or myocardial injury due to an underlying chronic condition.         Urinalysis With Microscopic If Indicated (No Culture) - Urine, Clean Catch [[id number]]  (Abnormal) Collected: 01/01/25 1418    Specimen: Urine, Clean Catch Updated: 01/01/25 1427     Color, UA Yellow     Appearance, UA Cloudy     pH, UA 7.0     Specific Gravity, UA 1.009     Glucose, UA Negative     Ketones, UA Negative     Bilirubin, UA Negative     Blood, UA Negative     Protein, UA Negative     Leuk Esterase, UA Negative     Nitrite, UA Negative     Urobilinogen, UA 1.0 E.U./dL    Narrative:      Urine microscopic not indicated.    Blood Gas, Arterial With Co-Ox [[id number]]  (Abnormal) Collected: 01/01/25 1555    Specimen: Arterial Blood Updated: 01/01/25 1555     Site Left Radial     Leon's Test Positive     pH, Arterial 7.455 pH units      Comment: 83 Value above reference range        pCO2, Arterial 34.2 mm Hg      Comment: 84 Value below reference range        pO2, Arterial 82.6 mm Hg      Comment: 84 Value below reference range        HCO3, Arterial 24.0 mmol/L      Base Excess, Arterial 0.4 mmol/L      O2 Saturation, Arterial 97.7 %      Hematocrit, Blood Gas 34.5 %      Comment: 84 Value below reference range        Oxyhemoglobin 95.9 %      Methemoglobin 0.60 %      Carboxyhemoglobin 1.3 %      A-a DO2 23.8 mmHg      Barometric Pressure for Blood Gas 738 mmHg   "    Modality N/A     Ventilator Mode NA     Collected by CB     Comment: Meter: [id number]     :  [id number]        pH, Temp Corrected --     pCO2, Temperature Corrected --     pO2, Temperature Corrected --    Procalcitonin [[id number]]  (Normal) Collected: 01/01/25 1557    Specimen: Blood Updated: 01/01/25 1628     Procalcitonin 0.16 ng/mL     Narrative:      As a Marker for Sepsis (Non-Neonates):    1. <0.5 ng/mL represents a low risk of severe sepsis and/or septic shock.  2. >2 ng/mL represents a high risk of severe sepsis and/or septic shock.    As a Marker for Lower Respiratory Tract Infections that require antibiotic therapy:    PCT on Admission    Antibiotic Therapy       6-12 Hrs later    >0.5                Strongly Recommended  >0.25 - <0.5        Recommended   0.1 - 0.25          Discouraged              Remeasure/reassess PCT  <0.1                Strongly Discouraged     Remeasure/reassess PCT    As 28 day mortality risk marker: \"Change in Procalcitonin Result\" (>80% or <=80%) if Day 0 (or Day 1) and Day 4 values are available. Refer to http://www.[facility]-pct-calculator.com    Change in PCT <=80%  A decrease of PCT levels below or equal to 80% defines a positive change in PCT test result representing a higher risk for 28-day all-cause mortality of patients diagnosed with severe sepsis for septic shock.    Change in PCT >80%  A decrease of PCT levels of more than 80% defines a negative change in PCT result representing a lower risk for 28-day all-cause mortality of patients diagnosed with severe sepsis or septic shock.       Lactic Acid, Plasma [[id number]]  (Normal) Collected: 01/01/25 1557    Specimen: Blood Updated: 01/01/25 1615     Lactate 0.9 mmol/L     Blood Culture - Blood, Hand, Right [[id number]] Collected: 01/01/25 1620    Specimen: Blood from Hand, Right Updated: 01/01/25 1640    Blood Culture - Blood, Hand, Right [[id number]] Collected: 01/01/25 1627    Specimen: Blood from Hand, Right " Updated: 01/01/25 1639    Vitamin B12 [[id number]] Collected: 01/01/25 1640    Specimen: Blood Updated: 01/01/25 1640    Folate [[id number]] Collected: 01/01/25 1640    Specimen: Blood Updated: 01/01/25 1640             CT Abdomen Pelvis With Contrast    Result Date: 1/1/2025  CT ABDOMEN AND PELVIS WITH CONTRAST  INDICATION: Possible bladder stone on x-ray.  TECHNIQUE: Thin section axial images were obtained from the lung bases through the pubic symphysis after oral and intravenous contrast. Coronal reconstruction images were obtained from the axial data.  COMPARISON: None.  FINDINGS:  Lower chest:  Lung bases are clear.  Liver:  Homogeneous.  No focal lesion.  Gallbladder/biliary system:  Gallbladder is present.   No intrahepatic or extrahepatic biliary dilatation.  Spleen:  Unremarkable.  Adrenal glands:  Unremarkable. No nodules.  Pancreas: Evaluation limited by motion artifact. No convincing mass or evidence of pancreatitis.  Kidneys/ureters/bladder:  No hydronephrosis, solid mass or perinephric stranding. A large calcification in the urinary bladder is consistent with a bladder stone. This measures 23 mm. There is also irregularity of the bladder wall which could be related to chronic cystitis. Bladder diverticula are not excluded near the dome.  GI tract: No evidence of small bowel obstruction or focal small bowel wall thickening. Normal appendix. No acute colon abnormality.  Pelvic organs: Mild enlargement of the prostate.  Lymph nodes/mesentery/retroperitoneum: There is abnormal attenuation in the root of the mesentery with small lymph nodes. 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  Abdominal wall: Abdominal wall intact.  Vascular: No acute vascular abnormality.  Free fluid: No free fluid.  Bones: No acute osseous abnormality.      Impression: 1. Large stone in the urinary bladder. 2. Wall thickening of the urinary bladder with probable diverticula. This is likely related to  chronic cystitis and possibly mild chronic outlet obstruction. 3. 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      CTDI: 9.79 mGy DLP:552.61 mGy.cm       This report was signed and finalized on 1/1/2025 3:15 PM by Carmen Le MD.      CT Cervical Spine Without Contrast    Result Date: 1/1/2025  PROCEDURE: CT CERVICAL SPINE WO CONTRAST-  INDICATION: fall this morning  TECHNIQUE: Thin section axial images were obtained through the cervical spine without contrast.  Coronal and sagittal reconstruction images were obtained from the axial data.  COMPARISON: None.  FINDINGS: Motion artifact limits exam quality. There is no convincing acute fracture. No facet lock. The craniocervical junction appears intact.   There is multilevel degenerative disc disease.   No acute paraspinal abnormality.      Impression: No acute fracture within the limitations of the exam. There is motion artifact.  Multilevel degenerative disc disease.  Please see CT head report regarding findings of the left mastoid air cells where there is mastoiditis and bone destruction.        CTDI: 16.93 mGy DLP:837.44 mGy.cm     This study was performed with techniques to keep radiation doses as low as reasonably achievable (ALARA). Individualized dose reduction techniques using automated exposure control or adjustment of mA and/or kV according to the patient size were employed.   This report was signed and finalized on 1/1/2025 1:22 PM by Carmen Le MD.      CT Head Without Contrast    Result Date: 1/1/2025  PROCEDURE: CT HEAD WO CONTRAST-  INDICATION: AMS  TECHNIQUE: Multiple axial CT images were performed from the foramen magnum to the vertex without contrast.   Coronal reconstruction images were obtained from the axial data.  COMPARISON: 8/2/2024.  FINDINGS: There is no mass effect or midline shift. There is stable atrophy with ventricular enlargement. There are stable bilateral periventricular  hypodensities. There is no intracranial hemorrhage. The posterior fossa is without acute abnormality. There is mucoperiosteal thickening of the right maxillary sinus. There is opacification of the left mastoid air cells with bone destruction. There is soft tissue density in the left middle ear cavity. This was present on the prior exam. No skull fracture.      Impression: 1. No intracranial hemorrhage or evidence of acute large cortical infarct. 2. Stable atrophy and chronic findings. 3. Left mastoiditis and left otitis media with bone destruction of the left mastoid air cells. 4. Right maxillary sinusitis.         CTDI: 16.93 mGy DLP:837.44 mGy.cm    This study was performed with techniques to keep radiation doses as low as reasonably achievable (ALARA). Individualized dose reduction techniques using automated exposure control or adjustment of mA and/or kV according to the patient size were employed.   This report was signed and finalized on 1/1/2025 1:19 PM by Carmen Le MD.      XR Pelvis 1 or 2 View    Result Date: 1/1/2025  PROCEDURE: XR PELVIS 1 OR 2 VW-  HISTORY: fall, AMS  COMPARISON: None.  FINDINGS: An AP view of the pelvis was obtained.  There is no acute osseous abnormality of the pelvis. There is degenerative joint disease of the hips bilaterally. There is a round radiodensity projecting over the midline pelvis. This could be a bladder stone or artifact in or on the patient's clothing. Soft tissues are otherwise without acute abnormality.      Impression: No acute osseous abnormality of the pelvis.  Round radiodensity projecting over the midline pelvis as above.        This report was signed and finalized on 1/1/2025 1:15 PM by Carmen Le MD.      XR Chest 1 View    Result Date: 1/1/2025  PROCEDURE: XR CHEST 1 VW-  INDICATION:  fall, AMS  FINDINGS:  A portable view of the chest was obtained.  Comparison is made to a prior exam dated 8/2/2024.   Heart size is normal. Lung volumes are very low.  Right perihilar and bibasilar opacities may be atelectasis. Pneumonia is not excluded. No significant pleural effusion or pneumothorax.      Impression: Exam limited by low lung volumes. Probable bilateral atelectasis.   This report was signed and finalized on 1/1/2025 1:14 PM by Carmen Le MD.          MDM  Patient evaluated in the ER for confusion and fall that occurred this morning.  Evidently patient was normal last night when his son spoke to him.  Patient is tachycardic but otherwise hemodynamically stable, afebrile, nontoxic-appearing on exam.  Patient has no focal neurologic deficits, equal strength bilaterally, alert and oriented to person which daughter-in-law feels is his baseline.  He was noted to have a stroke in August, about 4 months ago.  Differential diagnosis includes but is not limited to UTI, intracranial hemorrhage, cervical fracture, electrolyte abnormality, uremia, among others.  Initial plan includes CBC, CMP, magnesium, troponin, TSH, CK, urinalysis, COVID/flu swab, EKG, CT of head and C-spine and x-ray of chest and pelvis.    Imaging is largely unremarkable other than a round radiodensity projecting over the midline pelvis which could be a bladder stone.  There is difficulty getting a urine sample.  Tried to talk about this with patient and he said he could not hear me.  Tried speaking louder.  Daughter-in-law is unsure if he really cannot hear or if he is just being stubborn.  She is going to get his son to try and speak with him about giving a urine sample.  She states no one has power of  over the patient.  Discussed with her that we cannot force him to have a straight cath if he does not want one.  Troponin appears to be baseline.  Labs largely unremarkable with normal white blood cell count, creatinine of 1.34, CK only slightly elevated at 216.  COVID, flu, RSV negative.  Second troponin and urinalysis pending.  Daughter-in-law is getting the patient's son to speak with him  to try to get him to provide a urine sample or allow a straight cath.  Will also order a CT scan of abdomen and pelvis for further evaluation of radiodensity on x-ray.  CT head did mention mastoiditis/bone destruction and a soft tissue density in the ear canal that was there previously on imaging.  Patient has known skin cancer that has eroded into the left side of his head/ear.  Daughter-in-law states that they are already aware of the bony destruction from the cancer.  The area does not look acutely infected and actually looks the same as it did at last oncology appointment based on picture in the chart.  Patient is afebrile and normal white blood cell count and only very slightly elevated CRP.    CT abdomen pelvis shows a bladder stone but no other acute findings.  Patient did have a drop in blood pressure into the 90s over 60s.  Ordered 1 L of IV fluids and added a procalcitonin, lactate, blood cultures given that chest x-ray did not rule out pneumonia.  Family reports that patient is still not acting like himself, sleeping a lot, less responsive, and confused.  Spoke with hospitalist, Dr. Kerley, who recommended speaking with neurology.  Patient did have a stroke in August, 3 to 4 months ago.    Spoke with Dr. Villagran with neurology who recommended an MRI and folate and vitamin B12 level.  Recommended getting an EEG.  States that he will add the patient to the list to evaluate in the hospital.  Spoke with Dr. Kerley who has accepted the patient for admission for further evaluation and management.    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

## 2025-01-01 NOTE — H&P
[facility]   HISTORY AND PHYSICAL      Name:  Hardik Natarajan   Age:  79 y.o.  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  Presented to [facility] ED on 2025 with concern for altered mental status.  Last known normal was night prior to presentation.  Also reported having a fall, unclear if it was syncope.  Wife found him on the floor.  On Plavix, no other anticoagulation.  He denied any fevers or chills, chest pain, shortness of air, any other specific concern.  Denied any slurred speech, difficulty moving upper or lower extremities, and any in symmetry.    ED summary: Afebrile, vital signs stable on room air.  ABG pH 7.44, pCO2 34, pO2 82, bicarb 24.  EKG sinus tachycardia rate 107, nonspecific ST-T wave changes.  Opponent 23, ACS not suspected.  Creatinine 1.34, lactate elevated, procalcitonin not elevated.  UDS negative.  Blood cultures collected.  COVID/flu/RSV negative.  CT abdomen/pelvis large stone in urinary bladder, wall thickening probable diverticula, abnormal attenuation of the root of the mesentery most suggestive of mesenteric panniculitis.  CT head no intracranial hemorrhage or evidence of acute large cortical infarct, stable atrophy and chronic findings, left mastoiditis and left otitis media with bone destruction of the left mastoid air cells, right maxillary sinusitis.  CT cervical spine no acute fracture, multilevel degenerative disc disease.    Review Of Systems:    All systems were reviewed and  negative except as mentioned in history of presenting illness, assessment and plan.    Past Medical History: Patient  has a past medical history of Anemia, Arthritis, and Cancer.    Past Surgical History: Patient  has a past surgical history that includes Skin cancer excision; Neck dissection (N/A); Parotidectomy (N/A); and NAIL BIOPSY (N/A).    Social History: Patient  reports that he has never smoked. His smokeless tobacco use includes chew. He reports that he does not currently use alcohol. He reports that he does not use drugs.    Family History:  Patient's family history has been reviewed and found to be noncontributory.     Allergies:      Asa [aspirin]    Home Medications:    Prior to Admission Medications       Prescriptions Last Dose Informant Patient Reported? Taking?    acetaminophen (TYLENOL) 325 MG tablet   Yes No    Take 2 tablets by mouth Every 6 (Six) Hours As Needed.    clopidogrel (PLAVIX) 75 MG tablet   Yes No    Take 1 tablet by mouth Daily.    cyanocobalamin 1000 MCG/ML injection   Yes No    Inject 0.1 mL into the appropriate muscle as directed by prescriber Every 30 (Thirty) Days.    diclofenac (VOLTAREN) 75 MG EC tablet   Yes No    DULoxetine (CYMBALTA) 30 MG capsule   Yes No    Take 1 capsule by mouth Daily.    finasteride (PROSCAR) 5 MG tablet   Yes No    Take 1 tablet by mouth Daily.    Hydrocortisone, Perianal, (ANUSOL-HC) 2.5 % rectal cream   Yes No    Insert 1 Application into the rectum.    hydrOXYzine (ATARAX) 25 MG tablet   No No    Take 1 tablet by mouth Every 6 (Six) Hours As Needed for Itching.    levothyroxine (SYNTHROID, LEVOTHROID) 75 MCG tablet   No No    TAKE ONE (1) TABLET BY MOUTH EVERY DAY    ondansetron (ZOFRAN) 8 MG tablet   No No    Take 1 tablet by mouth 3 (Three) Times a Day As Needed for Nausea or Vomiting.    ondansetron ODT (ZOFRAN-ODT) 8 MG disintegrating tablet   Yes No    Place 1 tablet twice a day by translingual route.    oxyCODONE (Roxicodone) 5 MG immediate  "release tablet   No No    Take 1 tablet by mouth Every 6 (Six) Hours As Needed for Severe Pain.    Procto-Med HC 2.5 % rectal cream   Yes No    1 Application Daily As Needed.    senna (SENOKOT) 8.6 MG tablet   Yes No    Take 1 tablet by mouth Daily As Needed.    tamsulosin (FLOMAX) 0.4 MG capsule 24 hr capsule   Yes No    Take 1 capsule by mouth.          ED Medications:    Medications   sodium chloride 0.9 % bolus 1,000 mL (1,000 mL Intravenous New Bag 1/1/25 1722)   iopamidol (ISOVUE-300) 61 % injection 100 mL (100 mL Intravenous Given 1/1/25 1427)   sodium chloride 0.9 % bolus 1,000 mL (1,000 mL Intravenous New Bag 1/1/25 1557)     Vital Signs:  Temp:  [98 °F (36.7 °C)] 98 °F (36.7 °C)  Heart Rate:  [] 89  Resp:  [18] 18  BP: ()/(55-99) 94/68        01/01/25  1149   Weight: 102 kg (225 lb)     Body mass index is 29.69 kg/m².    Physical Exam:     Most recent vital Signs: BP 94/68   Pulse 89   Temp 98 °F (36.7 °C) (Oral)   Resp 18   Ht 185.4 cm (73\")   Wt 102 kg (225 lb)   SpO2 96%   BMI 29.69 kg/m²     Physical Exam  Constitutional:       General: He is not in acute distress.     Appearance: He is ill-appearing. He is not toxic-appearing.   HENT:      Mouth/Throat:      Mouth: Mucous membranes are moist.   Eyes:      Extraocular Movements: Extraocular movements intact.   Cardiovascular:      Rate and Rhythm: Normal rate and regular rhythm.      Pulses: Normal pulses.      Heart sounds: Normal heart sounds.   Pulmonary:      Effort: Pulmonary effort is normal.      Breath sounds: Normal breath sounds.   Abdominal:      Palpations: Abdomen is soft.      Tenderness: There is no abdominal tenderness.   Musculoskeletal:      Right lower leg: No edema.      Left lower leg: No edema.   Skin:     General: Skin is warm.   Neurological:      General: No focal deficit present.      Mental Status: He is alert.      Comments: Oriented to self   Psychiatric:      Comments: Agitated         Laboratory " data:    I have reviewed the labs done in the emergency room.    Results from last 7 days   Lab Units 01/01/25  1221   SODIUM mmol/L 136   POTASSIUM mmol/L 4.2   CHLORIDE mmol/L 101   CO2 mmol/L 23.4   BUN mg/dL 9   CREATININE mg/dL 1.34*   CALCIUM mg/dL 8.9   BILIRUBIN mg/dL 1.1   ALK PHOS U/L 51   ALT (SGPT) U/L 13   AST (SGOT) U/L 23   GLUCOSE mg/dL 99     Results from last 7 days   Lab Units 01/01/25  1221   WBC 10*3/mm3 4.84   HEMOGLOBIN g/dL 11.6*   HEMATOCRIT % 34.0*   PLATELETS 10*3/mm3 141         Results from last 7 days   Lab Units 01/01/25  1406 01/01/25  1221   CK TOTAL U/L  --  216*   HSTROP T ng/L 23* 24*                 Results from last 7 days   Lab Units 01/01/25  1555   PH, ARTERIAL pH units 7.455   PO2 ART mm Hg 82.6   PCO2, ARTERIAL mm Hg 34.2*   HCO3 ART mmol/L 24.0     Results from last 7 days   Lab Units 01/01/25  1418   COLOR UA  Yellow   GLUCOSE UA  Negative   KETONES UA  Negative   BLOOD UA  Negative   LEUKOCYTES UA  Negative   PH, URINE  7.0   BILIRUBIN UA  Negative   UROBILINOGEN UA  1.0 E.U./dL       Pain Management Panel           No data to display                    Radiology:    CT Abdomen Pelvis With Contrast    Result Date: 1/1/2025  CT ABDOMEN AND PELVIS WITH CONTRAST  INDICATION: Possible bladder stone on x-ray.  TECHNIQUE: Thin section axial images were obtained from the lung bases through the pubic symphysis after oral and intravenous contrast. Coronal reconstruction images were obtained from the axial data.  COMPARISON: None.  FINDINGS:  Lower chest:  Lung bases are clear.  Liver:  Homogeneous.  No focal lesion.  Gallbladder/biliary system:  Gallbladder is present.   No intrahepatic or extrahepatic biliary dilatation.  Spleen:  Unremarkable.  Adrenal glands:  Unremarkable. No nodules.  Pancreas: Evaluation limited by motion artifact. No convincing mass or evidence of pancreatitis.  Kidneys/ureters/bladder:  No hydronephrosis, solid mass or perinephric stranding. A large  calcification in the urinary bladder is consistent with a bladder stone. This measures 23 mm. There is also irregularity of the bladder wall which could be related to chronic cystitis. Bladder diverticula are not excluded near the dome.  GI tract: No evidence of small bowel obstruction or focal small bowel wall thickening. Normal appendix. No acute colon abnormality.  Pelvic organs: Mild enlargement of the prostate.  Lymph nodes/mesentery/retroperitoneum: There is abnormal attenuation in the root of the mesentery with small lymph nodes. 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  Abdominal wall: Abdominal wall intact.  Vascular: No acute vascular abnormality.  Free fluid: No free fluid.  Bones: No acute osseous abnormality.      1. Large stone in the urinary bladder. 2. Wall thickening of the urinary bladder with probable diverticula. This is likely related to chronic cystitis and possibly mild chronic outlet obstruction. 3. 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      CTDI: 9.79 mGy DLP:552.61 mGy.cm       This report was signed and finalized on 1/1/2025 3:15 PM by Carmen Le MD.      CT Cervical Spine Without Contrast    Result Date: 1/1/2025  PROCEDURE: CT CERVICAL SPINE WO CONTRAST-  INDICATION: fall this morning  TECHNIQUE: Thin section axial images were obtained through the cervical spine without contrast.  Coronal and sagittal reconstruction images were obtained from the axial data.  COMPARISON: None.  FINDINGS: Motion artifact limits exam quality. There is no convincing acute fracture. No facet lock. The craniocervical junction appears intact.   There is multilevel degenerative disc disease.   No acute paraspinal abnormality.      No acute fracture within the limitations of the exam. There is motion artifact.  Multilevel degenerative disc disease.  Please see CT head report regarding findings of the left mastoid air cells  where there is mastoiditis and bone destruction.        CTDI: 16.93 mGy DLP:837.44 mGy.cm     This study was performed with techniques to keep radiation doses as low as reasonably achievable (ALARA). Individualized dose reduction techniques using automated exposure control or adjustment of mA and/or kV according to the patient size were employed.   This report was signed and finalized on 1/1/2025 1:22 PM by Carmen Le MD.      CT Head Without Contrast    Result Date: 1/1/2025  PROCEDURE: CT HEAD WO CONTRAST-  INDICATION: AMS  TECHNIQUE: Multiple axial CT images were performed from the foramen magnum to the vertex without contrast.   Coronal reconstruction images were obtained from the axial data.  COMPARISON: 8/2/2024.  FINDINGS: There is no mass effect or midline shift. There is stable atrophy with ventricular enlargement. There are stable bilateral periventricular hypodensities. There is no intracranial hemorrhage. The posterior fossa is without acute abnormality. There is mucoperiosteal thickening of the right maxillary sinus. There is opacification of the left mastoid air cells with bone destruction. There is soft tissue density in the left middle ear cavity. This was present on the prior exam. No skull fracture.      1. No intracranial hemorrhage or evidence of acute large cortical infarct. 2. Stable atrophy and chronic findings. 3. Left mastoiditis and left otitis media with bone destruction of the left mastoid air cells. 4. Right maxillary sinusitis.         CTDI: 16.93 mGy DLP:837.44 mGy.cm    This study was performed with techniques to keep radiation doses as low as reasonably achievable (ALARA). Individualized dose reduction techniques using automated exposure control or adjustment of mA and/or kV according to the patient size were employed.   This report was signed and finalized on 1/1/2025 1:19 PM by Carmen Le MD.      XR Pelvis 1 or 2 View    Result Date: 1/1/2025  PROCEDURE: XR PELVIS 1 OR 2  VW-  HISTORY: fall, AMS  COMPARISON: None.  FINDINGS: An AP view of the pelvis was obtained.  There is no acute osseous abnormality of the pelvis. There is degenerative joint disease of the hips bilaterally. There is a round radiodensity projecting over the midline pelvis. This could be a bladder stone or artifact in or on the patient's clothing. Soft tissues are otherwise without acute abnormality.      No acute osseous abnormality of the pelvis.  Round radiodensity projecting over the midline pelvis as above.        This report was signed and finalized on 1/1/2025 1:15 PM by Carmen Le MD.      XR Chest 1 View    Result Date: 1/1/2025  PROCEDURE: XR CHEST 1 VW-  INDICATION:  fall, AMS  FINDINGS:  A portable view of the chest was obtained.  Comparison is made to a prior exam dated 8/2/2024.   Heart size is normal. Lung volumes are very low. Right perihilar and bibasilar opacities may be atelectasis. Pneumonia is not excluded. No significant pleural effusion or pneumothorax.      Exam limited by low lung volumes. Probable bilateral atelectasis.   This report was signed and finalized on 1/1/2025 1:14 PM by Carmen Le MD.       Assessment/Plan:    Observation general floor admission 1/1/2025 with altered mental status in the setting of known invasive stage IV left ear squamous cell carcinoma with bony mets.    Altered mental status  Left mastoiditis, left otitis media with bone destruction of left mastoid air cells  Neurology consultation, recreations appreciated.  MRI brain.  EEG.   No seizure-like activity reported or witnessed, no syncope.  Differential could include stroke, brain mets.  CT showed stable chronic findings; left mastoiditis and left otitis media with bone destruction of the left mastoid air cells.  Fall precautions.    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  Continue home medications.    Risk Assessment: High  DVT Prophylaxis: Heparin  Code Status: Full code  Diet: Cardiac    Advance Care Planning   ACP discussion was held with the patient during this visit. Patient does not have an advance directive, information provided.           Brian Joseph Kerley, DO  01/01/25  17:27 EST    Dictated utilizing Dragon dictation.

## 2025-01-01 NOTE — Clinical Note
Level of Care: Telemetry [5]   Diagnosis: AMS (altered mental status) [[id number]]   Admitting Physician: KERLEY, BRIAN JOSEPH [[id number]]   Attending Physician: KERLEY, BRIAN JOSEPH [[id number]]

## 2025-01-02 ENCOUNTER — APPOINTMENT (OUTPATIENT)
Dept: GENERAL RADIOLOGY | Facility: HOSPITAL | Age: 80
End: 2025-01-02
Payer: MEDICARE

## 2025-01-02 LAB
ANION GAP SERPL CALCULATED.3IONS-SCNC: 8.6 MMOL/L (ref 5–15)
BASOPHILS # BLD AUTO: 0.02 10*3/MM3 (ref 0–0.2)
BASOPHILS NFR BLD AUTO: 0.6 % (ref 0–1.5)
BUN SERPL-MCNC: 11 MG/DL (ref 8–23)
BUN/CREAT SERPL: 8.5 (ref 7–25)
CALCIUM SPEC-SCNC: 8.4 MG/DL (ref 8.6–10.5)
CHLORIDE SERPL-SCNC: 105 MMOL/L (ref 98–107)
CO2 SERPL-SCNC: 22.4 MMOL/L (ref 22–29)
CREAT SERPL-MCNC: 1.3 MG/DL (ref 0.76–1.27)
DEPRECATED RDW RBC AUTO: 45.9 FL (ref 37–54)
EGFRCR SERPLBLD CKD-EPI 2021: 55.5 ML/MIN/1.73
EOSINOPHIL # BLD AUTO: 0.09 10*3/MM3 (ref 0–0.4)
EOSINOPHIL NFR BLD AUTO: 2.8 % (ref 0.3–6.2)
ERYTHROCYTE [DISTWIDTH] IN BLOOD BY AUTOMATED COUNT: 13.6 % (ref 12.3–15.4)
FOLATE SERPL-MCNC: 6.35 NG/ML (ref 4.78–24.2)
GLUCOSE SERPL-MCNC: 88 MG/DL (ref 65–99)
HCT VFR BLD AUTO: 29.3 % (ref 37.5–51)
HGB BLD-MCNC: 9.8 G/DL (ref 13–17.7)
IMM GRANULOCYTES # BLD AUTO: 0.02 10*3/MM3 (ref 0–0.05)
IMM GRANULOCYTES NFR BLD AUTO: 0.6 % (ref 0–0.5)
LYMPHOCYTES # BLD AUTO: 0.21 10*3/MM3 (ref 0.7–3.1)
LYMPHOCYTES NFR BLD AUTO: 6.5 % (ref 19.6–45.3)
MCH RBC QN AUTO: 31.4 PG (ref 26.6–33)
MCHC RBC AUTO-ENTMCNC: 33.4 G/DL (ref 31.5–35.7)
MCV RBC AUTO: 93.9 FL (ref 79–97)
MONOCYTES # BLD AUTO: 0.27 10*3/MM3 (ref 0.1–0.9)
MONOCYTES NFR BLD AUTO: 8.4 % (ref 5–12)
NEUTROPHILS NFR BLD AUTO: 2.62 10*3/MM3 (ref 1.7–7)
NEUTROPHILS NFR BLD AUTO: 81.1 % (ref 42.7–76)
NRBC BLD AUTO-RTO: 0 /100 WBC (ref 0–0.2)
PLATELET # BLD AUTO: 123 10*3/MM3 (ref 140–450)
PMV BLD AUTO: 10.2 FL (ref 6–12)
POTASSIUM SERPL-SCNC: 4.3 MMOL/L (ref 3.5–5.2)
RBC # BLD AUTO: 3.12 10*6/MM3 (ref 4.14–5.8)
SODIUM SERPL-SCNC: 136 MMOL/L (ref 136–145)
VIT B12 BLD-MCNC: 169 PG/ML (ref 211–946)
WBC NRBC COR # BLD AUTO: 3.23 10*3/MM3 (ref 3.4–10.8)

## 2025-01-02 PROCEDURE — 85025 COMPLETE CBC W/AUTO DIFF WBC: CPT | Performed by: STUDENT IN AN ORGANIZED HEALTH CARE EDUCATION/TRAINING PROGRAM

## 2025-01-02 PROCEDURE — 99222 1ST HOSP IP/OBS MODERATE 55: CPT | Performed by: PSYCHIATRY & NEUROLOGY

## 2025-01-02 PROCEDURE — 97162 PT EVAL MOD COMPLEX 30 MIN: CPT

## 2025-01-02 PROCEDURE — G0378 HOSPITAL OBSERVATION PER HR: HCPCS

## 2025-01-02 PROCEDURE — 97166 OT EVAL MOD COMPLEX 45 MIN: CPT

## 2025-01-02 PROCEDURE — 25010000002 HEPARIN (PORCINE) PER 1000 UNITS: Performed by: STUDENT IN AN ORGANIZED HEALTH CARE EDUCATION/TRAINING PROGRAM

## 2025-01-02 PROCEDURE — 99232 SBSQ HOSP IP/OBS MODERATE 35: CPT | Performed by: STUDENT IN AN ORGANIZED HEALTH CARE EDUCATION/TRAINING PROGRAM

## 2025-01-02 PROCEDURE — 80048 BASIC METABOLIC PNL TOTAL CA: CPT | Performed by: STUDENT IN AN ORGANIZED HEALTH CARE EDUCATION/TRAINING PROGRAM

## 2025-01-02 RX ORDER — AMOXICILLIN AND CLAVULANATE POTASSIUM 500; 125 MG/1; MG/1
1 TABLET, FILM COATED ORAL 2 TIMES DAILY
Status: DISCONTINUED | OUTPATIENT
Start: 2025-01-02 | End: 2025-01-02 | Stop reason: DRUGHIGH

## 2025-01-02 RX ORDER — ZIPRASIDONE MESYLATE 20 MG/ML
10 INJECTION, POWDER, LYOPHILIZED, FOR SOLUTION INTRAMUSCULAR EVERY 4 HOURS PRN
Status: DISCONTINUED | OUTPATIENT
Start: 2025-01-02 | End: 2025-01-04

## 2025-01-02 RX ORDER — QUETIAPINE FUMARATE 25 MG/1
25 TABLET, FILM COATED ORAL DAILY
Status: DISCONTINUED | OUTPATIENT
Start: 2025-01-02 | End: 2025-01-04

## 2025-01-02 RX ADMIN — QUETIAPINE FUMARATE 25 MG: 25 TABLET ORAL at 14:23

## 2025-01-02 RX ADMIN — LEVOTHYROXINE SODIUM 75 MCG: 0.07 TABLET ORAL at 05:53

## 2025-01-02 RX ADMIN — CLOPIDOGREL BISULFATE 75 MG: 75 TABLET ORAL at 09:53

## 2025-01-02 RX ADMIN — AMOXICILLIN AND CLAVULANATE POTASSIUM 1 TABLET: 875; 125 TABLET, FILM COATED ORAL at 09:53

## 2025-01-02 RX ADMIN — DULOXETINE HYDROCHLORIDE 30 MG: 30 CAPSULE, DELAYED RELEASE ORAL at 09:53

## 2025-01-02 RX ADMIN — Medication 10 ML: at 21:37

## 2025-01-02 RX ADMIN — OXYCODONE 5 MG: 5 TABLET ORAL at 02:27

## 2025-01-02 RX ADMIN — Medication 10 ML: at 10:13

## 2025-01-02 RX ADMIN — TAMSULOSIN HYDROCHLORIDE 0.4 MG: 0.4 CAPSULE ORAL at 09:53

## 2025-01-02 NOTE — PLAN OF CARE
Goal Outcome Evaluation:  Plan of Care Reviewed With: patient        Progress: no change  Outcome Evaluation: Pt participated in PT eval this date. Pt agreeable to PT eval, oriented to name. Pt transferred to EOB with mod-maxA x2. Pt at EOB able to maintain balance with minimal support. Pt stood with HHA and max-modA x2. But after standing Pt began writhing with difficulty following commands. RN and PCT notified and assisted with rolling Pt to get clean linens on bed. Pt comfortable lying on his right side after tx. Pt is expected to benefit from skilled PT tx during this inpatient stay and would benefit from STR at d/c.    Anticipated Discharge Disposition (PT): home with assist, home with home health

## 2025-01-02 NOTE — PLAN OF CARE
Goal Outcome Evaluation:  Plan of Care Reviewed With: patient        Progress: no change  Outcome Evaluation: VS stable. Unable to get EEG or Xray due to patient being combative with staff. Very agitated throughout shift. Refuses to take all PO medicine. No significant events, continuing to monitor

## 2025-01-02 NOTE — PROGRESS NOTES
[facility]    Name:  Hardik Natarajan   Age:  79 y.o.  Sex:  male  :  1945  MRN:  [id number]   Visit Number:  [id number]  Admission Date:  2025  Date Of Service:  25  Primary Care Physician:  Won Cuellar MD     LOS: 0 days :    Chief Complaint:      Follow-up; altered mental status    Subjective:    Denies any concerning pain.  Says breathing feels all right.    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  Presented to [facility] ED on 2025 with concern for altered mental status.  Last known normal was night prior to presentation.  Also reported having a fall, unclear if it was syncope.  Wife found him on the floor.  On Plavix, no other anticoagulation.  He denied any fevers or chills, chest pain, shortness of air, any other specific concern.  Denied any slurred speech, difficulty moving upper or lower extremities, and any in symmetry.     ED summary: Afebrile, vital signs stable on room air.  ABG pH 7.44, pCO2 34, pO2 82, bicarb 24.  EKG sinus tachycardia rate 107, nonspecific ST-T wave changes.  Opponent 23, ACS not suspected.  Creatinine 1.34, lactate elevated, procalcitonin not elevated.  UDS negative.  Blood cultures collected.  COVID/flu/RSV negative.  CT abdomen/pelvis large stone in urinary bladder, wall thickening probable diverticula, abnormal attenuation of the root of the mesentery most suggestive of mesenteric panniculitis.  CT head no intracranial hemorrhage or evidence of acute large cortical infarct, stable atrophy and chronic findings, left mastoiditis and left otitis media with bone destruction of the left mastoid air cells, right maxillary sinusitis.  CT cervical spine no acute fracture,  multilevel degenerative disc disease.    Review of Systems:     All systems were reviewed and negative except as mentioned in subjective, assessment and plan.    Vital Signs:    Temp:  [98.4 °F (36.9 °C)-98.8 °F (37.1 °C)] 98.7 °F (37.1 °C)  Heart Rate:  [] 81  Resp:  [16-20] 18  BP: ()/(55-85) 122/74    Intake and output:    I/O last 3 completed shifts:  In: -   Out: 200 [Urine:200]  No intake/output data recorded.    Physical Examination:    General Appearance:  Alert and cooperative.    Head:  Superior ear and surrounding superior auricular area and left side of head scarring with good healing, no erythema, no swelling   Eyes: Conjunctivae and sclerae normal, no icterus. No pallor.   Throat: No oral lesions, no thrush, oral mucosa moist.   Neck: Supple, trachea midline, no thyromegaly.   Lungs:   Breath sounds heard bilaterally equally.  No wheezing or crackles. No Pleural rub or bronchial breathing.   Heart:  Normal S1 and S2, no murmur, no gallop, no rub. No JVD.   Abdomen:   Normal bowel sounds, no masses, no organomegaly. Soft, nontender, nondistended, no rebound tenderness.   Extremities: Supple, no edema, no cyanosis, no clubbing.   Skin: Warm   Neurologic: Alert and oriented to self. No facial asymmetry. Moves all four limbs. No tremors.      Laboratory results:    Results from last 7 days   Lab Units 01/02/25  0543 01/01/25  1221   SODIUM mmol/L 136 136   POTASSIUM mmol/L 4.3 4.2   CHLORIDE mmol/L 105 101   CO2 mmol/L 22.4 23.4   BUN mg/dL 11 9   CREATININE mg/dL 1.30* 1.34*   CALCIUM mg/dL 8.4* 8.9   BILIRUBIN mg/dL  --  1.1   ALK PHOS U/L  --  51   ALT (SGPT) U/L  --  13   AST (SGOT) U/L  --  23   GLUCOSE mg/dL 88 99     Results from last 7 days   Lab Units 01/02/25  0543 01/01/25  1221   WBC 10*3/mm3 3.23* 4.84   HEMOGLOBIN g/dL 9.8* 11.6*   HEMATOCRIT % 29.3* 34.0*   PLATELETS 10*3/mm3 123* 141         Results from last 7 days   Lab Units 01/01/25  1406 01/01/25  1221   CK TOTAL U/L  --   216*   HSTROP T ng/L 23* 24*         Recent Labs     01/01/25  1555   PHART 7.455   [id number] 34.2*   PO2ART 82.6   VNY2GJM 24.0   BASEEXCESS 0.4      I have reviewed the patient's laboratory results.    Radiology results:    MRI Brain Without Contrast    Result Date: 1/1/2025  FINAL REPORT TECHNIQUE: null CLINICAL HISTORY: AMS SEVERE HX OF PREVIOUS STROKE, FALL TODAY, HX OF PREVIOUS SKIN CA OF LEFT EAR. COMPARISON: null FINDINGS: MR of the brain without contrast Comparison: MR/SR - MRI BRAIN W WO CONTRAST - 8/2/24 21:53 EDT CT - CT ANGIOGRAM NECK W WO CONTRAST - 8/2/24 20:54 EDT CT - CT ANGIOGRAM HEAD W CONTRAST - 8/2/24 20:54 EDT CT/SR - CT HEAD WO CONTRAST STROKE PROTOCOL - 8/2/24 20:54 EDT MR/SR - MRI BRAIN W WO CONTRAST - 5/10/23 18:47 EDT Findings: No acute infarction, hemorrhage, mass-effect or herniation. No hydrocephalus. Increased signal intensity is seen in the deep and periventricular white matter on the T2/FLAIR sequences, which most likely represents the sequela of severe chronic small vessel ischemic disease. Chronic lacunar infarctions in the right corona radiata/centrum semiovale and left basal ganglia. No extra-axial fluid collection or mass. Unremarkable sella. Intact flow voids. Normal orbits. Mucosal thickening in the paranasal sinuses may indicate sinusitis. Fluid signal in right mastoid air cells could be secondary to an effusion or mastoiditis. There is osseous destruction at the left mastoid process, which was also present on the prior studies and could be related to the previously seen skin cancer and/or post treatment changes. There is fluid signal in left mastoid air cells which could be secondary to an effusion or mastoiditis.     Impression: Impression: No acute infarction. No posttraumatic findings. Sinusitis could be considered. Bilateral mastoid air cell effusions versus mastoiditis. Osseous destruction of the left mastoid process could be secondary to the previously seen skin cancer  and/or posttreatment changes. Correlate clinically for signs/symptoms of infection to exclude osteomyelitis. Authenticated and Electronically Signed by Jaqueline Painter MD on 01/01/2025 06:45:00 PM    CT Abdomen Pelvis With Contrast    Result Date: 1/1/2025  CT ABDOMEN AND PELVIS WITH CONTRAST  INDICATION: Possible bladder stone on x-ray.  TECHNIQUE: Thin section axial images were obtained from the lung bases through the pubic symphysis after oral and intravenous contrast. Coronal reconstruction images were obtained from the axial data.  COMPARISON: None.  FINDINGS:  Lower chest:  Lung bases are clear.  Liver:  Homogeneous.  No focal lesion.  Gallbladder/biliary system:  Gallbladder is present.   No intrahepatic or extrahepatic biliary dilatation.  Spleen:  Unremarkable.  Adrenal glands:  Unremarkable. No nodules.  Pancreas: Evaluation limited by motion artifact. No convincing mass or evidence of pancreatitis.  Kidneys/ureters/bladder:  No hydronephrosis, solid mass or perinephric stranding. A large calcification in the urinary bladder is consistent with a bladder stone. This measures 23 mm. There is also irregularity of the bladder wall which could be related to chronic cystitis. Bladder diverticula are not excluded near the dome.  GI tract: No evidence of small bowel obstruction or focal small bowel wall thickening. Normal appendix. No acute colon abnormality.  Pelvic organs: Mild enlargement of the prostate.  Lymph nodes/mesentery/retroperitoneum: There is abnormal attenuation in the root of the mesentery with small lymph nodes. 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  Abdominal wall: Abdominal wall intact.  Vascular: No acute vascular abnormality.  Free fluid: No free fluid.  Bones: No acute osseous abnormality.      Impression: 1. Large stone in the urinary bladder. 2. Wall thickening of the urinary bladder with probable diverticula. This is likely related to chronic cystitis and  possibly mild chronic outlet obstruction. 3. 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      CTDI: 9.79 mGy DLP:552.61 mGy.cm       This report was signed and finalized on 1/1/2025 3:15 PM by Carmen Le MD.      CT Cervical Spine Without Contrast    Result Date: 1/1/2025  PROCEDURE: CT CERVICAL SPINE WO CONTRAST-  INDICATION: fall this morning  TECHNIQUE: Thin section axial images were obtained through the cervical spine without contrast.  Coronal and sagittal reconstruction images were obtained from the axial data.  COMPARISON: None.  FINDINGS: Motion artifact limits exam quality. There is no convincing acute fracture. No facet lock. The craniocervical junction appears intact.   There is multilevel degenerative disc disease.   No acute paraspinal abnormality.      Impression: No acute fracture within the limitations of the exam. There is motion artifact.  Multilevel degenerative disc disease.  Please see CT head report regarding findings of the left mastoid air cells where there is mastoiditis and bone destruction.        CTDI: 16.93 mGy DLP:837.44 mGy.cm     This study was performed with techniques to keep radiation doses as low as reasonably achievable (ALARA). Individualized dose reduction techniques using automated exposure control or adjustment of mA and/or kV according to the patient size were employed.   This report was signed and finalized on 1/1/2025 1:22 PM by Carmen Le MD.      CT Head Without Contrast    Result Date: 1/1/2025  PROCEDURE: CT HEAD WO CONTRAST-  INDICATION: AMS  TECHNIQUE: Multiple axial CT images were performed from the foramen magnum to the vertex without contrast.   Coronal reconstruction images were obtained from the axial data.  COMPARISON: 8/2/2024.  FINDINGS: There is no mass effect or midline shift. There is stable atrophy with ventricular enlargement. There are stable bilateral periventricular hypodensities. There is no  intracranial hemorrhage. The posterior fossa is without acute abnormality. There is mucoperiosteal thickening of the right maxillary sinus. There is opacification of the left mastoid air cells with bone destruction. There is soft tissue density in the left middle ear cavity. This was present on the prior exam. No skull fracture.      Impression: 1. No intracranial hemorrhage or evidence of acute large cortical infarct. 2. Stable atrophy and chronic findings. 3. Left mastoiditis and left otitis media with bone destruction of the left mastoid air cells. 4. Right maxillary sinusitis.         CTDI: 16.93 mGy DLP:837.44 mGy.cm    This study was performed with techniques to keep radiation doses as low as reasonably achievable (ALARA). Individualized dose reduction techniques using automated exposure control or adjustment of mA and/or kV according to the patient size were employed.   This report was signed and finalized on 1/1/2025 1:19 PM by Carmen Le MD.      XR Pelvis 1 or 2 View    Result Date: 1/1/2025  PROCEDURE: XR PELVIS 1 OR 2 VW-  HISTORY: fall, AMS  COMPARISON: None.  FINDINGS: An AP view of the pelvis was obtained.  There is no acute osseous abnormality of the pelvis. There is degenerative joint disease of the hips bilaterally. There is a round radiodensity projecting over the midline pelvis. This could be a bladder stone or artifact in or on the patient's clothing. Soft tissues are otherwise without acute abnormality.      Impression: No acute osseous abnormality of the pelvis.  Round radiodensity projecting over the midline pelvis as above.        This report was signed and finalized on 1/1/2025 1:15 PM by Camren Le MD.      XR Chest 1 View    Result Date: 1/1/2025  PROCEDURE: XR CHEST 1 VW-  INDICATION:  fall, AMS  FINDINGS:  A portable view of the chest was obtained.  Comparison is made to a prior exam dated 8/2/2024.   Heart size is normal. Lung volumes are very low. Right perihilar and bibasilar  opacities may be atelectasis. Pneumonia is not excluded. No significant pleural effusion or pneumothorax.      Impression: Exam limited by low lung volumes. Probable bilateral atelectasis.   This report was signed and finalized on 1/1/2025 1:14 PM by Carmen Le MD.     I have reviewed the patient's radiology reports.    Medication Review:     I have reviewed the patient's active and prn medications.     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     Altered mental status  Neurology consultation, recreations appreciated. EEG.  Family has deferred on lumbar puncture.  No seizure-like activity reported or witnessed, no syncope.  Differential could include vascular dementia.    Fall precautions.  MRI brain no sign of new stroke, no metastasis.  Chronic osseous destruction observed.  Do not suspect osteomyelitis, no sepsis.  CT showed stable chronic findings; left mastoiditis and left otitis media with bone destruction of the left mastoid air cells.  Left mastoiditis, left otitis media with bone destruction of left mastoid air cells  Augmentin.  CKD  Recommend follow-up outpatient.     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  Continue home medications.     Risk Assessment: High  DVT Prophylaxis: Heparin  Code Status: Full code  Diet: Cardiac  Discharge Plan: At least 1-2 more days depending on clinical status    Brian Joseph Kerley,   01/02/25  13:44 EST    Dictated utilizing Dragon dictation.    I have reviewed the copied text and it is accurate as of 1/2/2025

## 2025-01-02 NOTE — THERAPY EVALUATION
Patient Name: Hardik Natarajan  : 1945    MRN: [id number]                              Today's Date: 2025       Admit Date: 2025    Visit Dx:     ICD-10-CM ICD-9-CM   1. Altered mental status, unspecified altered mental status type  R41.82 780.97   2. Bladder stone  N21.0 594.1   3. 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    Anemia, unspecified    Nicotine dependence, chewing tobacco, uncomplicated    Neoplasm related pain (acute) (chronic)    Unspecified mastoiditis, left ear    Cellulitis of left external ear    Phlebitis and thrombophlebitis of unspecified site    Gangrene, not elsewhere classified    Acute osteomyelitis    Benign prostatic hyperplasia without lower urinary tract symptoms    Pain    Palliative care by specialist    Encounter for therapeutic drug monitoring    Weakness of left upper extremity    AMS (altered mental status)     Past Medical History:   Diagnosis Date    Anemia     Arthritis     Cancer      Past Surgical History:   Procedure Laterality Date    NAIL BIOPSY N/A     NECK DISSECTION N/A     PAROTIDECTOMY N/A     SKIN CANCER EXCISION        General Information       Row Name 25 1304          OT Time and Intention    Subjective Information no complaints  -SD     Document Type evaluation  -SD     Mode of Treatment occupational therapy  -SD     Patient Effort fair  -SD     Symptoms Noted During/After Treatment increased pain  -SD       Row Name 25 1308          General Information    Patient Profile Reviewed yes  -SD     Prior Level of Function independent:;all household mobility;ADL's  -SD     Existing Precautions/Restrictions fall  -SD     Barriers to Rehab medically complex;previous functional deficit;cognitive status;hearing deficit  -SD       Row Name 25 1306          Living Environment     People in Home spouse  -SD       Row Name 01/02/25 1304          Home Main Entrance    Number of Stairs, Main Entrance none  -SD       Row Name 01/02/25 1304          Stairs Within Home, Primary    Number of Stairs, Within Home, Primary none  -SD       Row Name 01/02/25 1304          Cognition    Orientation Status (Cognition) oriented to;person;verbal cues/prompts needed for orientation  -SD       Row Name 01/02/25 1304          Safety Issues/Impairments Affecting Functional Mobility    Safety Issues Affecting Function (Mobility) safety precautions follow-through/compliance;safety precaution awareness;sequencing abilities;insight into deficits/self-awareness;impulsivity;awareness of need for assistance;ability to follow commands;judgment  -SD     Impairments Affecting Function (Mobility) balance;cognition;endurance/activity tolerance;motor control;motor planning;pain;strength;range of motion (ROM);postural/trunk control  -SD               User Key  (r) = Recorded By, (t) = Taken By, (c) = Cosigned By      Initials Name Provider Type    SD Jaqueline Dasilva OT Occupational Therapist                     Mobility/ADL's       Row Name 01/02/25 1306          Bed Mobility    Bed Mobility supine-sit;sit-supine  -SD     Supine-Sit [place] (Bed Mobility) maximum assist (25% patient effort);2 person assist  -SD     Sit-Supine [place] (Bed Mobility) maximum assist (25% patient effort);2 person assist  -SD     Bed Mobility, Safety Issues decreased use of arms for pushing/pulling;decreased use of legs for bridging/pushing;impaired trunk control for bed mobility;cognitive deficits limit understanding  -SD     Assistive Device (Bed Mobility) bed rails;head of bed elevated;repositioning sheet  -SD       Row Name 01/02/25 1306          Transfers    Transfers sit-stand transfer  -SD       Row Name 01/02/25 1306          Sit-Stand Transfer    Sit-Stand [place] (Transfers) maximum assist (25% patient effort);moderate  assist (50% patient effort);2 person assist  -SD     Comment, (Sit-Stand Transfer) HHA, gait belt  -[facility] Name 01/02/25 1306          Functional Mobility    Functional Mobility- Ind. Level not appropriate to assess  -[facility] Name 01/02/25 1306          Activities of Daily Living    BADL Assessment/Intervention bathing;upper body dressing;lower body dressing;grooming;feeding;toileting  -SD       Row Name 01/02/25 1306          Bathing Assessment/Intervention    [place] Level (Bathing) maximum assist (25% patient effort);dependent (less than 25% patient effort)  -SD       Row Name 01/02/25 1306          Upper Body Dressing Assessment/Training    [place] Level (Upper Body Dressing) maximum assist (25% patient effort)  -SD       Row Name 01/02/25 1306          Lower Body Dressing Assessment/Training    [place] Level (Lower Body Dressing) dependent (less than 25% patient effort)  -SD       Row Name 01/02/25 1306          Grooming Assessment/Training    [place] Level (Grooming) maximum assist (25% patient effort)  -SD       Row Name 01/02/25 1306          Self-Feeding Assessment/Training    [place] Level (Feeding) maximum assist (25% patient effort)  -SD       Row Name 01/02/25 1306          Toileting Assessment/Training    [place] Level (Toileting) dependent (less than 25% patient effort)  -SD               User Key  (r) = Recorded By, (t) = Taken By, (c) = Cosigned By      Initials Name Provider Type    Jaqueline Sheppard OT Occupational Therapist                   Obj/Interventions       [facility] Name 01/02/25 1308          Range of Motion Comprehensive    General Range of Motion bilateral upper extremity ROM WFL  -SD       Row Name 01/02/25 1308          Strength Comprehensive (MMT)    General Manual Muscle Testing (MMT) Assessment upper extremity strength deficits identified  -SD     Comment, General Manual Muscle Testing (MMT) Assessment unable to follow commands for MMT  -SD                User Key  (r) = Recorded By, (t) = Taken By, (c) = Cosigned By      Initials Name Provider Type    SD Jaqueline Dasilva, OT Occupational Therapist                   Goals/Plan       Row Name 01/02/25 1320          Transfer Goal 1 (OT)    Activity/Assistive Device (Transfer Goal 1, OT) sit-to-stand/stand-to-sit;walker, rolling  -SD     [place] Level/Cues Needed (Transfer Goal 1, OT) minimum assist (75% or more patient effort)  -SD     Time Frame (Transfer Goal 1, OT) long term goal (LTG)  -SD     Progress/Outcome (Transfer Goal 1, OT) goal ongoing  -SD       Row Name 01/02/25 1320          Dressing Goal 1 (OT)    Activity/Device (Dressing Goal 1, OT) lower body dressing  -SD     [place]/Cues Needed (Dressing Goal 1, OT) moderate assist (50-74% patient effort)  -SD     Time Frame (Dressing Goal 1, OT) 2 weeks  -SD     Progress/Outcome (Dressing Goal 1, OT) goal ongoing  -SD       Row Name 01/02/25 1320          Toileting Goal 1 (OT)    Activity/Device (Toileting Goal 1, OT) toileting skills, all;commode  -SD     [place] Level/Cues Needed (Toileting Goal 1, OT) moderate assist (50-74% patient effort)  -SD     Time Frame (Toileting Goal 1, OT) 2 weeks  -SD     Progress/Outcome (Toileting Goal 1, OT) goal ongoing  -SD       Row Name 01/02/25 1320          Strength Goal 1 (OT)    Strength Goal 1 (OT) Patient to perform UB ther ex as tolerated  -SD     Time Frame (Strength Goal 1, OT) long term goal (LTG)  -SD     Progress/Outcome (Strength Goal 1, OT) goal ongoing  -SD       Row Name 01/02/25 1320          Therapy Assessment/Plan (OT)    Planned Therapy Interventions (OT) activity tolerance training;adaptive equipment training;BADL retraining;patient/caregiver education/training;ROM/therapeutic exercise;strengthening exercise;transfer/mobility retraining  -SD               User Key  (r) = Recorded By, (t) = Taken By, (c) = Cosigned By      Initials Name Provider Type    SD Jaqueline Dasilva, OT  Occupational Therapist                   Clinical Impression       Row Name 01/02/25 1303          Pain Assessment    Pretreatment Pain Rating 0/10 - no pain  -SD     Posttreatment Pain Rating 0/10 - no pain  -SD       Row Name 01/02/25 1303          Plan of Care Review    Plan of Care Reviewed With patient;family  -SD     Progress no change  -SD     Outcome Evaluation OT eval completed. Patient is supine in bed, gown is partially removed and patient appears to be sleeping. Son and DIL are present at bedside and provide history. Patient lives with his wife in a one level on a basement; does not access basement. Patient walks with a RW, is normally ind with ADLs. Patient wakes to name and is oriented to person only with cues, able to follow one step commands with increased cues for initiation. Patient moved to EOB max A x 2, requires max verbal and physical cues for sequencing. Patient able to sit EOB with min A. Patient's brief noted to be wet. Patient performed sit to stand with mod A x 2 with gait belt and HHA, stood briefly and unable to get brief changed. Attempted second sit to stand using RW with patient unable to come to stand, c/o increased pain of LUE and L flank area. Became increasingly agitated. Patient returned to supine max A x 2, by this time not following any commands in order to perform bed mobility and get brief changed. PCT present to assist. Patient placed in left sidelying, bed alarm on and notified RN. Patient to continue with OT services to address deficits in strength, endurance, balance, mobility and ADL performance. Would benefit from STR for more prolonged opportunity for skilled therapy services.  -SD       Row Name 01/02/25 1304          Therapy Assessment/Plan (OT)    Patient/Family Therapy Goal Statement (OT) improve mental status  -SD     Rehab Potential (OT) good  -SD     Criteria for Skilled Therapeutic Interventions Met (OT) skilled treatment is necessary  -SD     Therapy Frequency  (OT) 3 times/wk  5 times if indicated  -SD       Row Name 01/02/25 1308          Therapy Plan Review/Discharge Plan (OT)    Anticipated Discharge Disposition (OT) inpatient rehabilitation facility  -SD       Row Name 01/02/25 1308          Vital Signs    O2 Delivery Pre Treatment room air  -SD     O2 Delivery Intra Treatment room air  -SD     O2 Delivery Post Treatment room air  -SD       Row Name 01/02/25 1308          Positioning and Restraints    Pre-Treatment Position in bed  -SD     Post Treatment Position bed  -SD     In Bed side lying right;call light within reach;encouraged to call for assist;exit alarm on;notified nsg  -SD               User Key  (r) = Recorded By, (t) = Taken By, (c) = Cosigned By      Initials Name Provider Type    Jaqueline Sheppard OT Occupational Therapist                   Outcome Measures       Row Name 01/02/25 1321          How much help from another is currently needed...    Putting on and taking off regular lower body clothing? 1  -SD     Bathing (including washing, rinsing, and drying) 1  -SD     Toileting (which includes using toilet bed pan or urinal) 1  -SD     Putting on and taking off regular upper body clothing 2  -SD     Taking care of personal grooming (such as brushing teeth) 2  -SD     Eating meals 2  -SD     AM-PAC 6 Clicks Score (OT) 9  -SD       Row Name 01/02/25 1303 01/02/25 0800       How much help from another person do you currently need...    Turning from your back to your side while in flat bed without using bedrails? 3  -MS 3  -KK    Moving from lying on back to sitting on the side of a flat bed without bedrails? 3  -MS 3  -KK    Moving to and from a bed to a chair (including a wheelchair)? 3  -MS 3  -KK    Standing up from a chair using your arms (e.g., wheelchair, bedside chair)? 3  -MS 3  -KK    Climbing 3-5 steps with a railing? 3  -MS 3  -KK    To walk in hospital room? 3  -MS 3  -KK    AM-PAC 6 Clicks Score (PT) 18  -MS 18  -KK      Row Name 01/02/25  1321          Functional Assessment    Outcome Measure Options AM-PAC 6 Clicks Daily Activity (OT)  -SD               User Key  (r) = Recorded By, (t) = Taken By, (c) = Cosigned By      Initials Name Provider Type    SD Jaqueline Dasilva, OT Occupational Therapist    Shubham Banda, PT Physical Therapist    Marcie Mckeon, RN Registered Nurse                    Occupational Therapy Education       Title: PT OT SLP Therapies (In Progress)       Topic: Occupational Therapy (In Progress)       Point: ADL training (Done)       Description:   Instruct learner(s) on proper safety adaptation and remediation techniques during self care or transfers.   Instruct in proper use of assistive devices.                  Learning Progress Summary            Patient Acceptance, E,TB, VU by SD at 1/2/2025 1322    Comment: OT POC                      Point: Home exercise program (Not Started)       Description:   Instruct learner(s) on appropriate technique for monitoring, assisting and/or progressing therapeutic exercises/activities.                  Learner Progress:  Not documented in this visit.              Point: Precautions (Not Started)       Description:   Instruct learner(s) on prescribed precautions during self-care and functional transfers.                  Learner Progress:  Not documented in this visit.              Point: Body mechanics (Not Started)       Description:   Instruct learner(s) on proper positioning and spine alignment during self-care, functional mobility activities and/or exercises.                  Learner Progress:  Not documented in this visit.                              User Key       Initials Effective Dates Name Provider Type Discipline    SD 06/16/21 -  Jaqueline Dasilva OT Occupational Therapist OT                  OT Recommendation and Plan  Planned Therapy Interventions (OT): activity tolerance training, adaptive equipment training, BADL retraining, patient/caregiver education/training,  ROM/therapeutic exercise, strengthening exercise, transfer/mobility retraining  Therapy Frequency (OT): 3 times/wk (5 times if indicated)  Plan of Care Review  Plan of Care Reviewed With: patient, family  Progress: no change  Outcome Evaluation: OT eval completed. Patient is supine in bed, gown is partially removed and patient appears to be sleeping. Son and DIL are present at bedside and provide history. Patient lives with his wife in a one level on a basement; does not access basement. Patient walks with a RW, is normally ind with ADLs. Patient wakes to name and is oriented to person only with cues, able to follow one step commands with increased cues for initiation. Patient moved to EOB max A x 2, requires max verbal and physical cues for sequencing. Patient able to sit EOB with min A. Patient's brief noted to be wet. Patient performed sit to stand with mod A x 2 with gait belt and HHA, stood briefly and unable to get brief changed. Attempted second sit to stand using RW with patient unable to come to stand, c/o increased pain of LUE and L flank area. Became increasingly agitated. Patient returned to supine max A x 2, by this time not following any commands in order to perform bed mobility and get brief changed. PCT present to assist. Patient placed in left sidelying, bed alarm on and notified RN. Patient to continue with OT services to address deficits in strength, endurance, balance, mobility and ADL performance. Would benefit from STR for more prolonged opportunity for skilled therapy services.     Time Calculation:   Evaluation Complexity (OT)  Review Occupational Profile/Medical/Therapy History Complexity: expanded/moderate complexity  Assessment, Occupational Performance/Identification of Deficit Complexity: 3-5 performance deficits  Clinical Decision Making Complexity (OT): detailed assessment/moderate complexity  Overall Complexity of Evaluation (OT): moderate complexity     Time Calculation- OT       Row  Name 01/02/25 1323             Time Calculation- OT    OT Start Time 1026  -SD      OT Received On 01/02/25  -SD      OT Goal Re-Cert Due Date 01/14/25  -SD         Untimed Charges    OT Eval/Re-eval Minutes 60  -SD         Total Minutes    Untimed Charges Total Minutes 60  -SD       Total Minutes 60  -SD                User Key  (r) = Recorded By, (t) = Taken By, (c) = Cosigned By      Initials Name Provider Type    SD Jaqueline Dasilva, OT Occupational Therapist                  Therapy Charges for Today       Code Description Service Date Service Provider Modifiers Qty    [id number]  OT EVAL MOD COMPLEXITY 4 1/2/2025 Jaqueline Dasilva OT GO 1                 Jaqueline Dasilva OT  1/2/2025

## 2025-01-02 NOTE — CASE MANAGEMENT/SOCIAL WORK
Discharge Planning Assessment  [facility]     Patient Name: Hardik Natarajan  MRN: [id number]  Today's Date: 1/2/2025    Admit Date: 1/1/2025    Plan: Pending   Discharge Needs Assessment       Row Name 01/02/25 1334       Living Environment    People in Home spouse    Current Living Arrangements home    Potentially Unsafe Housing Conditions none    In the past 12 months has the electric, gas, oil, or water company threatened to shut off services in your home? No    Primary Care Provided by self    Provides Primary Care For no one    Able to Return to Prior Arrangements yes       Resource/Environmental Concerns    Resource/Environmental Concerns none    Transportation Concerns none       Transportation Needs    In the past 12 months, has lack of transportation kept you from medical appointments or from getting medications? no    In the past 12 months, has lack of transportation kept you from meetings, work, or from getting things needed for daily living? No       Food Insecurity    Within the past 12 months, you worried that your food would run out before you got the money to buy more. Never true    Within the past 12 months, the food you bought just didn't last and you didn't have money to get more. Never true       Transition Planning    Patient/Family Anticipates Transition to home with family    Patient/Family Anticipated Services at Transition none    Transportation Anticipated family or friend will provide       Discharge Needs Assessment    Readmission Within the Last 30 Days no previous admission in last 30 days    Equipment Currently Used at Home walker, rolling    Concerns to be Addressed no discharge needs identified    Anticipated Changes Related to Illness none    Equipment Needed After Discharge none                   Discharge Plan       Row Name 01/02/25 4489       Plan    Plan Pending    Patient/Family in Agreement with Plan yes    Plan Comments Met with patient at bedside who is sleeping at time of  visit. Per documentation patient is disoriented x 4. Called patient's son to complete dcp.Verified patient's address, phone number, contacts, physician and pharmacy. Patient has adequate transportation. Reports no financial or food insecurity. Patient lives with his spouse. He has a walker. He uses [facility] pharmacy in [place], agreeable to meds to bed. Patient does not drive, his son and DIL provide transportation. He has 4 steps into the home. Family reports he has been to [facility] in the past at [facility] and has used home health through [facility]. Disposition pending hospital course and recommendations.                  Continued Care and Services - Admitted Since 1/1/2025    No active coordination exists for this encounter.          Demographic Summary       Row Name 01/02/25 1334       General Information    Admission Type observation    Arrived From emergency department    Required Notices Provided Observation Status Notice    Referral Source admission list    Reason for Consult discharge planning    Preferred Language English       Contact Information    Permission Granted to Share Info With                    Functional Status       Row Name 01/02/25 1334       Functional Status    Usual Activity Tolerance moderate    Current Activity Tolerance moderate       Physical Activity    On average, how many days per week do you engage in moderate to strenuous exercise (like a brisk walk)? 0 days    On average, how many minutes do you engage in exercise at this level? 0 min    Number of minutes of exercise per week 0       Assessment of Health Literacy    How often do you have someone help you read hospital materials? Occasionally    How often do you have problems learning about your medical condition because of difficulty understanding written information? Occasionally    How often do you have a problem understanding what is told to you about your medical condition? Occasionally    How confident are you  filling out medical forms by yourself? Quite a bit    Health Literacy Good       Functional Status, IADL    Medications assistive equipment    Meal Preparation assistive equipment    Housekeeping assistive equipment    Laundry assistive equipment    Shopping assistive equipment                   Psychosocial       Row Name 01/02/25 2690       Values/Beliefs    Spiritual, Cultural Beliefs, Moravian Practices, Values that Affect Care no       Mental Health    Little interest or pleasure in doing things Not at all    Feeling down, depressed, or hopeless Not at all       Stress    Do you feel stress - tense, restless, nervous, or anxious, or unable to sleep at night because your mind is troubled all the time - these days? Not at all       Coping/Stress    Major Change/Loss/Stressor illness    Patient Personal Strengths able to adapt;resilient    Techniques to [place] with Loss/Stress/Change diversional activities    Reaction to Health Status accepting    Understanding of Condition and Treatment adequate understanding of medical condition;adequate understanding of treatment       Developmental Stage (Eriksson's Stages of Development)    Developmental Stage Stage 8 (65 years-death/Late Adulthood) Integrity vs. Despair                   Abuse/Neglect    No documentation.                  Legal    No documentation.                  Substance Abuse    No documentation.                  Patient Forms    No documentation.                     Manolo Broussard RN

## 2025-01-02 NOTE — THERAPY EVALUATION
Patient Name: Hardik Natarajan  : 1945    MRN: [id number]                              Today's Date: 2025       Admit Date: 2025    Visit Dx:     ICD-10-CM ICD-9-CM   1. Altered mental status, unspecified altered mental status type  R41.82 780.97   2. Bladder stone  N21.0 594.1   3. 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    Anemia, unspecified    Nicotine dependence, chewing tobacco, uncomplicated    Neoplasm related pain (acute) (chronic)    Unspecified mastoiditis, left ear    Cellulitis of left external ear    Phlebitis and thrombophlebitis of unspecified site    Gangrene, not elsewhere classified    Acute osteomyelitis    Benign prostatic hyperplasia without lower urinary tract symptoms    Pain    Palliative care by specialist    Encounter for therapeutic drug monitoring    Weakness of left upper extremity    AMS (altered mental status)     Past Medical History:   Diagnosis Date    Anemia     Arthritis     Cancer      Past Surgical History:   Procedure Laterality Date    NAIL BIOPSY N/A     NECK DISSECTION N/A     PAROTIDECTOMY N/A     SKIN CANCER EXCISION        General Information       Row Name 25 1229          Physical Therapy Time and Intention    Document Type evaluation  -MS     Mode of Treatment physical therapy  -MS       Row Name 25 1229          General Information    Patient Profile Reviewed yes  -MS     Prior Level of Function all household mobility;community mobility  -MS     Existing Precautions/Restrictions fall  -MS     Barriers to Rehab medically complex;previous functional deficit;cognitive status  -MS       Row Name 25 1229          Living Environment    People in Home spouse  -MS       Row Name 25 1229          Home Main Entrance    Number of Stairs, Main Entrance none  -MS       Row  Name 01/02/25 1229          Cognition    Orientation Status (Cognition) oriented x 4  -MS       Row Name 01/02/25 1229          Safety Issues/Impairments Affecting Functional Mobility    Safety Issues Affecting Function (Mobility) insight into deficits/self-awareness;safety precautions follow-through/compliance;positioning of assistive device;awareness of need for assistance;sequencing abilities;safety precaution awareness  -MS     Impairments Affecting Function (Mobility) endurance/activity tolerance;cognition;strength  -MS     Cognitive Impairments, Mobility Safety/Performance awareness, need for assistance;safety precaution awareness;safety precaution follow-through;insight into deficits/self-awareness  -MS               User Key  (r) = Recorded By, (t) = Taken By, (c) = Cosigned By      Initials Name Provider Type    Shubham Banda PT Physical Therapist                   Mobility       Row Name 01/02/25 1248          Bed Mobility    Bed Mobility supine-sit  -MS     Supine-Sit [place] (Bed Mobility) maximum assist (25% patient effort);2 person assist  -MS     Assistive Device (Bed Mobility) bed rails;head of bed elevated  -MS       Row Name 01/02/25 1248          Sit-Stand Transfer    Sit-Stand [place] (Transfers) maximum assist (25% patient effort);moderate assist (50% patient effort);2 person assist  -MS     Assistive Device (Sit-Stand Transfers) other (see comments)  HHA  -MS       Row Name 01/02/25 1248          Gait/Stairs (Locomotion)    Patient was able to Ambulate no, other medical factors prevent ambulation  -MS               User Key  (r) = Recorded By, (t) = Taken By, (c) = Cosigned By      Initials Name Provider Type    Shubham Banda, RAD[facility] Physical Therapist                   Obj/Interventions       Row Name 01/02/25 1249          Range of Motion Comprehensive    General Range of Motion bilateral lower extremity ROM WFL  -MS       Row Name 01/02/25 1249          Strength  Comprehensive (MMT)    General Manual Muscle Testing (MMT) Assessment lower extremity strength deficits identified  -MS     Comment, General Manual Muscle Testing (MMT) Assessment B LE 3-/5  -MS       Row Name 01/02/25 1249          Sensory Assessment (Somatosensory)    Sensory Assessment (Somatosensory) sensation intact  -MS               User Key  (r) = Recorded By, (t) = Taken By, (c) = Cosigned By      Initials Name Provider Type    MS Shubham Tierney, PT Physical Therapist                   Goals/Plan       Row Name 01/02/25 1302          Bed Mobility Goal 1 (PT)    Activity/Assistive Device (Bed Mobility Goal 1, PT) bed mobility activities, all  -MS     [place] Level/Cues Needed (Bed Mobility Goal 1, PT) modified independence  -MS     Time Frame (Bed Mobility Goal 1, PT) long term goal (LTG);2 weeks  -MS       Row Name 01/02/25 1302          Transfer Goal 1 (PT)    Activity/Assistive Device (Transfer Goal 1, PT) transfers, all;sit-to-stand/stand-to-sit  -MS     [place] Level/Cues Needed (Transfer Goal 1, PT) modified independence  -MS     Time Frame (Transfer Goal 1, PT) long term goal (LTG);2 weeks  -MS       Row Name 01/02/25 1302          Gait Training Goal 1 (PT)    Activity/Assistive Device (Gait Training Goal 1, PT) gait (walking locomotion);assistive device use  -MS     [place] Level (Gait Training Goal 1, PT) standby assist  -MS     Distance (Gait Training Goal 1, PT) 50ft  -MS     Time Frame (Gait Training Goal 1, PT) short term goal (STG);10 days  -MS       Row Name 01/02/25 1302          Patient Education Goal (PT)    Activity (Patient Education Goal, PT) ther exer x15 reps ea  -MS     [place]/Cues/Accuracy (Memory Goal 2, PT) demonstrates adequately  -MS     Time Frame (Patient Education Goal, PT) short term goal (STG);10 days  -MS       Row Name 01/02/25 1302          Therapy Assessment/Plan (PT)    Planned Therapy Interventions (PT) balance training;bed mobility training;gait  training;home exercise program;neuromuscular re-education;ROM (range of motion);stair training;strengthening;patient/family education;transfer training  -MS               User Key  (r) = Recorded By, (t) = Taken By, (c) = Cosigned By      Initials Name Provider Type    MS Shubham Tierney, PT Physical Therapist                   Clinical Impression       Row Name 01/02/25 1252          Pain    Pretreatment Pain Rating 0/10 - no pain  -MS     Posttreatment Pain Rating 0/10 - no pain  -MS       Row Name 01/02/25 1252          Plan of Care Review    Plan of Care Reviewed With patient  -MS     Progress no change  -MS     Outcome Evaluation Pt participated in PT eval this date. Pt agreeable to PT eval, oriented to name. Pt transferred to EOB with mod-maxA x2. Pt at EOB able to maintain balance with minimal support. Pt stood with HHA and max-modA x2. But after standing Pt began writhing with difficulty following commands. RN and PCT notified and assisted with rolling Pt to get clean linens on bed. Pt comfortable lying on his right side after tx. Pt is expected to benefit from skilled PT tx during this inpatient stay and would benefit from STR at d/c.  -MS       Row Name 01/02/25 1252          Therapy Assessment/Plan (PT)    Rehab Potential (PT) good  -MS     Criteria for Skilled Interventions Met (PT) yes;meets criteria  -MS     Therapy Frequency (PT) 5 times/wk  -MS       Row Name 01/02/25 1252          Vital Signs    O2 Delivery Pre Treatment room air  -MS     O2 Delivery Intra Treatment room air  -MS     O2 Delivery Post Treatment room air  -MS     Pre Patient Position Supine  -MS     Intra Patient Position Standing  -MS     Post Patient Position Supine  -MS       Row Name 01/02/25 1252          Positioning and Restraints    Pre-Treatment Position in bed  -MS     Post Treatment Position bed  -MS     In Bed side lying right;call light within reach;encouraged to call for assist  -MS               User Key  (r) = Recorded  By, (t) = Taken By, (c) = Cosigned By      Initials Name Provider Type    MS Shubham Tierney, PT Physical Therapist                   Outcome Measures       Row Name 01/02/25 1303 01/02/25 0800       How much help from another person do you currently need...    Turning from your back to your side while in flat bed without using bedrails? 3  -MS 3  -KK    Moving from lying on back to sitting on the side of a flat bed without bedrails? 3  -MS 3  -KK    Moving to and from a bed to a chair (including a wheelchair)? 3  -MS 3  -KK    Standing up from a chair using your arms (e.g., wheelchair, bedside chair)? 3  -MS 3  -KK    Climbing 3-5 steps with a railing? 3  -MS 3  -KK    To walk in hospital room? 3  -MS 3  -KK    AM-PAC 6 Clicks Score (PT) 18  -MS 18  -KK              User Key  (r) = Recorded By, (t) = Taken By, (c) = Cosigned By      Initials Name Provider Type    MS Shubham Tierney, RADHA Physical Therapist    Marcie Mckeon, RN Registered Nurse                                 Physical Therapy Education       Title: PT OT SLP Therapies (In Progress)       Topic: Physical Therapy (In Progress)       Point: Mobility training (Done)       Learning Progress Summary            Patient Acceptance, E, VU by MS at 1/2/2025 1303    Comment: importance of mobility                      Point: Home exercise program (Done)       Learning Progress Summary            Patient Acceptance, E, VU by MS at 1/2/2025 1303    Comment: importance of mobility                      Point: Body mechanics (Not Started)       Learner Progress:  Not documented in this visit.              Point: Precautions (Not Started)       Learner Progress:  Not documented in this visit.                              User Key       Initials Effective Dates Name Provider Type Discipline    MS 08/22/23 -  Shubham Tierney PT Physical Therapist PT                  PT Recommendation and Plan  Planned Therapy Interventions (PT): balance training, bed mobility  training, gait training, home exercise program, neuromuscular re-education, ROM (range of motion), stair training, strengthening, patient/family education, transfer training  Progress: no change  Outcome Evaluation: Pt participated in PT eval this date. Pt agreeable to PT eval, oriented to name. Pt transferred to EOB with mod-maxA x2. Pt at EOB able to maintain balance with minimal support. Pt stood with HHA and max-modA x2. But after standing Pt began writhing with difficulty following commands. RN and PCT notified and assisted with rolling Pt to get clean linens on bed. Pt comfortable lying on his right side after tx. Pt is expected to benefit from skilled PT tx during this inpatient stay and would benefit from STR at d/c.     Time Calculation:   PT Evaluation Complexity  History, PT Evaluation Complexity: 1-2 personal factors and/or comorbidities  Examination of Body Systems (PT Eval Complexity): total of 3 or more elements  Clinical Presentation (PT Evaluation Complexity): evolving  Clinical Decision Making (PT Evaluation Complexity): moderate complexity  Overall Complexity (PT Evaluation Complexity): moderate complexity     PT Charges       Row Name 01/02/25 1304             Time Calculation    Start Time 1026  -MS      PT Received On 01/02/25  -MS      PT Goal Re-Cert Due Date 01/12/25  -MS         Untimed Charges    PT Eval/Re-eval Minutes 56  -MS         Total Minutes    Untimed Charges Total Minutes 56  -MS       Total Minutes 56  -MS                User Key  (r) = Recorded By, (t) = Taken By, (c) = Cosigned By      Initials Name Provider Type    MS Shubham Tierney, PT Physical Therapist                  Therapy Charges for Today       Code Description Service Date Service Provider Modifiers Qty    [id number]  PT EVAL MOD COMPLEXITY 4 1/2/2025 Shubham Tierney, PT GP 1            PT G-Codes  AM-PAC 6 Clicks Score (PT): 18  PT Discharge Summary  Anticipated Discharge Disposition (PT): home with assist,  home with home health    Shubham Tierney, PT  1/2/2025

## 2025-01-02 NOTE — PLAN OF CARE
Goal Outcome Evaluation:  Plan of Care Reviewed With: patient, family        Progress: no change  Outcome Evaluation: OT eval completed. Patient is supine in bed, gown is partially removed and patient appears to be sleeping. Son and DIL are present at bedside and provide history. Patient lives with his wife in a one level on a basement; does not access basement. Patient walks with a RW, is normally ind with ADLs. Patient wakes to name and is oriented to person only with cues, able to follow one step commands with increased cues for initiation. Patient moved to EOB max A x 2, requires max verbal and physical cues for sequencing. Patient able to sit EOB with min A. Patient's brief noted to be wet. Patient performed sit to stand with mod A x 2 with gait belt and HHA, stood briefly and unable to get brief changed. Attempted second sit to stand using RW with patient unable to come to stand, c/o increased pain of LUE and L flank area. Became increasingly agitated. Patient returned to supine max A x 2, by this time not following any commands in order to perform bed mobility and get brief changed. PCT present to assist. Patient placed in left sidelying, bed alarm on and notified RN. Patient to continue with OT services to address deficits in strength, endurance, balance, mobility and ADL performance. Would benefit from STR for more prolonged opportunity for skilled therapy services.    Anticipated Discharge Disposition (OT): inpatient rehabilitation facility

## 2025-01-02 NOTE — CONSULTS
DOS: 2025  NAME: Hardik Natarajan   : 1945  PCP: Won Cuellar MD  CC: Altered mentation over last 24 hours  Referring MD: Sanchez Garcia DO    Neurological Problem and Interval History:  79 y.o. 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  This time he was admitted by Dr. Garcia after he was found altered over the last 24 hours and he was only oriented to himself.  Today when we asked the patient his name with the help of the nurse at the bedside he said he did not know and did not know his date of birth but when the nurses called him by his first name he responded well to it.  It seems he is very sleepy and does not want to be disturbed or examined.  With a lot of difficulty he was only able to follow some simple commands like picking up his arms and moving his legs and showing me his tongue.  However when I asked the nurse to count the fingers he declined to do so.  He denies any headaches.  However he has a lot of aches and pains in his joints.    Past Medical/Surgical Hx:  Past Medical History:   Diagnosis Date    Anemia     Arthritis     Cancer      Past Surgical History:   Procedure Laterality Date    NAIL BIOPSY N/A     NECK DISSECTION N/A     PAROTIDECTOMY N/A     SKIN CANCER EXCISION         Review of Systems:    Constitutional: Patient very sleepy and does not want to be aroused.  Cardiovascular: Denies any chest pain or palpitations.  Respiratory: Denies any shortness of breath  Gastrointestinal: Denies any nausea and vomiting.  Genitourinary: Has adult diapers in place  Musculoskeletal: Moves all extremities but also has a lot of aches and pains in the joints  Dermatological: No skin breakdown noted but has some bruises in the left  forearm.  Neurological: He says he does not know his name or his date of birth and he does not want to cooperate much with examination although he does follow one-step commands.  Psychiatric: Seems to be delirious.  Ophthalmological: No visual changes noted.          Medications On Admission  Medications Prior to Admission   Medication Sig Dispense Refill Last Dose/Taking    acetaminophen (TYLENOL) 325 MG tablet Take 2 tablets by mouth Every 6 (Six) Hours As Needed.   12/31/2024    clopidogrel (PLAVIX) 75 MG tablet Take 1 tablet by mouth Daily.   1/1/2025    cyanocobalamin 1000 MCG/ML injection Inject 0.1 mL into the appropriate muscle as directed by prescriber Every 30 (Thirty) Days.   Past Month    DULoxetine (CYMBALTA) 30 MG capsule Take 1 capsule by mouth Daily.   12/31/2024    finasteride (PROSCAR) 5 MG tablet Take 1 tablet by mouth Daily.   12/31/2024    levothyroxine (SYNTHROID, LEVOTHROID) 75 MCG tablet TAKE ONE (1) TABLET BY MOUTH EVERY DAY 30 tablet 3 12/31/2024    ondansetron (ZOFRAN) 8 MG tablet Take 1 tablet by mouth 3 (Three) Times a Day As Needed for Nausea or Vomiting. 30 tablet 5 Past Week    oxyCODONE (Roxicodone) 5 MG immediate release tablet Take 1 tablet by mouth Every 6 (Six) Hours As Needed for Severe Pain. 20 tablet 0 Past Month    Procto-Med HC 2.5 % rectal cream 1 Application Daily As Needed.   Past Week    senna (SENOKOT) 8.6 MG tablet Take 1 tablet by mouth Daily As Needed.   Past Week    tamsulosin (FLOMAX) 0.4 MG capsule 24 hr capsule Take 1 capsule by mouth.   12/31/2024    diclofenac (VOLTAREN) 75 MG EC tablet        Hydrocortisone, Perianal, (ANUSOL-HC) 2.5 % rectal cream Insert 1 Application into the rectum.   More than a month    hydrOXYzine (ATARAX) 25 MG tablet Take 1 tablet by mouth Every 6 (Six) Hours As Needed for Itching. (Patient not taking: Reported on 1/1/2025) 60 tablet 2 Not Taking    ondansetron ODT (ZOFRAN-ODT) 8 MG disintegrating tablet Place 1 tablet twice a day by  translingual route.          Prior to Admission medications    Medication Sig Start Date End Date Taking? Authorizing Provider   acetaminophen (TYLENOL) 325 MG tablet Take 2 tablets by mouth Every 6 (Six) Hours As Needed.   Yes Radha Santana MD   clopidogrel (PLAVIX) 75 MG tablet Take 1 tablet by mouth Daily. 8/5/24  Yes Radha Santana MD   cyanocobalamin 1000 MCG/ML injection Inject 0.1 mL into the appropriate muscle as directed by prescriber Every 30 (Thirty) Days. 5/17/23  Yes Radha Santana MD   DULoxetine (CYMBALTA) 30 MG capsule Take 1 capsule by mouth Daily. 9/11/23  Yes Radha Santana MD   finasteride (PROSCAR) 5 MG tablet Take 1 tablet by mouth Daily. 9/11/23  Yes Radha Santana MD   levothyroxine (SYNTHROID, LEVOTHROID) 75 MCG tablet TAKE ONE (1) TABLET BY MOUTH EVERY DAY 12/23/24  Yes Benitez Medellin MD   ondansetron (ZOFRAN) 8 MG tablet Take 1 tablet by mouth 3 (Three) Times a Day As Needed for Nausea or Vomiting. 5/16/23  Yes Yady Guajardo APRN   oxyCODONE (Roxicodone) 5 MG immediate release tablet Take 1 tablet by mouth Every 6 (Six) Hours As Needed for Severe Pain. 6/8/23  Yes Keyur Lemos DO   Procto-Med HC 2.5 % rectal cream 1 Application Daily As Needed. 5/9/23  Yes Radha Santana MD   senna (SENOKOT) 8.6 MG tablet Take 1 tablet by mouth Daily As Needed.   Yes Radha Santana MD   tamsulosin (FLOMAX) 0.4 MG capsule 24 hr capsule Take 1 capsule by mouth. 5/9/23  Yes Radha Santana MD   diclofenac (VOLTAREN) 75 MG EC tablet  5/24/23   Radha Santana MD   Hydrocortisone, Perianal, (ANUSOL-HC) 2.5 % rectal cream Insert 1 Application into the rectum. 5/9/23   Radha Santana MD   hydrOXYzine (ATARAX) 25 MG tablet Take 1 tablet by mouth Every 6 (Six) Hours As Needed for Itching.  Patient not taking: Reported on 1/1/2025 8/16/23   Yady Guajardo APRN   ondansetron ODT (ZOFRAN-ODT) 8 MG disintegrating tablet Place 1 tablet twice a  day by translingual route.    Provider, Historical, MD        Allergies:  Allergies   Allergen Reactions    Asa [Aspirin] Unknown - Low Severity     Nose bleed       Social Hx:  Social History     Socioeconomic History    Marital status:    Tobacco Use    Smoking status: Never    Smokeless tobacco: Current     Types: Chew   Vaping Use    Vaping status: Never Used   Substance and Sexual Activity    Alcohol use: Not Currently    Drug use: Never    Sexual activity: Defer       Family Hx:  Family History   Problem Relation Age of Onset    Cancer Mother     Cancer Father        Review of Imaging (Interpretation of images not reports): MRI of the brain with and without contrast on August 2, 2024 was interpreted as follows:    FINDINGS:  Exam: MRI of the brain without IV contrast     Comparison: 08/02/2024     Clinical history: Concern for CVA, left arm weakness     Findings:     Small 1 cm acute infarct in the right periventricular region on series 7, image 19. Decreased signal on the ADC map in this location.     No acute intracranial hemorrhage.     No intracranial masses.     Postcontrast imaging of the brain does not demonstrate any areas of abnormal parenchymal enhancement.     No midline shift.     No abnormal extra-axial fluid collections are seen.     Cerebral volume loss.     Areas of increased FLAIR signal within the cerebral white matter bilaterally are reflective of a nonspecific demyelinating process, likely ischemic microvascular in nature.     IMPRESSION:  Impression:     1. Small acute infarct in the right periventricular region     2. Moderate cerebral volume loss with moderate cerebral white matter disease, likely ischemic microvascular in nature.    CT Head Without Contrast    Result Date: 1/1/2025  PROCEDURE: CT HEAD WO CONTRAST-  INDICATION: AMS  TECHNIQUE: Multiple axial CT images were performed from the foramen magnum to the vertex without contrast.   Coronal reconstruction images were  obtained from the axial data.  COMPARISON: 8/2/2024.  FINDINGS: There is no mass effect or midline shift. There is stable atrophy with ventricular enlargement. There are stable bilateral periventricular hypodensities. There is no intracranial hemorrhage. The posterior fossa is without acute abnormality. There is mucoperiosteal thickening of the right maxillary sinus. There is opacification of the left mastoid air cells with bone destruction. There is soft tissue density in the left middle ear cavity. This was present on the prior exam. No skull fracture.      Impression: 1. No intracranial hemorrhage or evidence of acute large cortical infarct. 2. Stable atrophy and chronic findings. 3. Left mastoiditis and left otitis media with bone destruction of the left mastoid air cells. 4. Right maxillary sinusitis.         CTDI: 16.93 mGy DLP:837.44 mGy.cm    This study was performed with techniques to keep radiation doses as low as reasonably achievable (ALARA). Individualized dose reduction techniques using automated exposure control or adjustment of mA and/or kV according to the patient size were employed.   This report was signed and finalized on 1/1/2025 1:19 PM by Carmen Le MD.             MRI Brain Without Contrast    Result Date: 1/1/2025  FINAL REPORT TECHNIQUE: null CLINICAL HISTORY: AMS SEVERE HX OF PREVIOUS STROKE, FALL TODAY, HX OF PREVIOUS SKIN CA OF LEFT EAR. COMPARISON: null FINDINGS: MR of the brain without contrast Comparison: MR/SR - MRI BRAIN W WO CONTRAST - 8/2/24 21:53 EDT CT - CT ANGIOGRAM NECK W WO CONTRAST - 8/2/24 20:54 EDT CT - CT ANGIOGRAM HEAD W CONTRAST - 8/2/24 20:54 EDT CT/SR - CT HEAD WO CONTRAST STROKE PROTOCOL - 8/2/24 20:54 EDT MR/SR - MRI BRAIN W WO CONTRAST - 5/10/23 18:47 EDT Findings: No acute infarction, hemorrhage, mass-effect or herniation. No hydrocephalus. Increased signal intensity is seen in the deep and periventricular white matter on the T2/FLAIR sequences, which most  likely represents the sequela of severe chronic small vessel ischemic disease. Chronic lacunar infarctions in the right corona radiata/centrum semiovale and left basal ganglia. No extra-axial fluid collection or mass. Unremarkable sella. Intact flow voids. Normal orbits. Mucosal thickening in the paranasal sinuses may indicate sinusitis. Fluid signal in right mastoid air cells could be secondary to an effusion or mastoiditis. There is osseous destruction at the left mastoid process, which was also present on the prior studies and could be related to the previously seen skin cancer and/or post treatment changes. There is fluid signal in left mastoid air cells which could be secondary to an effusion or mastoiditis.     Impression: Impression: No acute infarction. No posttraumatic findings. Sinusitis could be considered. Bilateral mastoid air cell effusions versus mastoiditis. Osseous destruction of the left mastoid process could be secondary to the previously seen skin cancer and/or posttreatment changes. Correlate clinically for signs/symptoms of infection to exclude osteomyelitis. Authenticated and Electronically Signed by Jaqueline Painter MD on 01/01/2025 06:45:00 PM      Additional Tests Performed: Urine analysis is as follows:       Latest Reference Range & Units 01/01/25 14:18   Color, UA Yellow, Straw  Yellow   Appearance, UA Clear  Cloudy !   Specific Gravity, UA 1.005 - 1.030  1.009   pH, UA 5.0 - 8.0  7.0   Glucose Negative  Negative   Ketones, UA Negative  Negative   Blood, UA Negative  Negative   Nitrite, UA Negative  Negative   Leukocytes, UA Negative  Negative   Protein, UA Negative  Negative   Bilirubin, UA Negative  Negative   Urobilinogen, UA 0.2 - 1.0 E.U./dL  1.0 E.U./dL   !: Data is abnormal  Results for orders placed during the hospital encounter of 08/02/24    Adult Transthoracic Echo Complete w/ Color, Spectral and Contrast if necessary per protocol    Interpretation Summary    Left  "ventricular systolic function is normal. Calculated left ventricular EF = 60.4% Left ventricular ejection fraction appears to be 56 - 60%. Left ventricular diastolic function was normal.    The right ventricular cavity is borderline dilated with normal systolic function.    Mild tricuspid valve regurgitation is present. Estimated right ventricular systolic pressure from tricuspid regurgitation is normal (<35 mmHg).    Saline test results are negative for right to left atrial level shunt.            Laboratory Results:   Lab Results   Component Value Date    GLUCOSE 88 01/02/2025    CALCIUM 8.4 (L) 01/02/2025     01/02/2025    K 4.3 01/02/2025    CO2 22.4 01/02/2025     01/02/2025    BUN 11 01/02/2025    CREATININE 1.30 (H) 01/02/2025    BCR 8.5 01/02/2025    ANIONGAP 8.6 01/02/2025     Lab Results   Component Value Date    WBC 3.23 (L) 01/02/2025    HGB 9.8 (L) 01/02/2025    HCT 29.3 (L) 01/02/2025    MCV 93.9 01/02/2025     (L) 01/02/2025     Lab Results   Component Value Date    CHOL 117 08/03/2024     Lab Results   Component Value Date    HDL 27 (L) 08/03/2024     Lab Results   Component Value Date    LDL 73 08/03/2024     Lab Results   Component Value Date    TRIG 84 08/03/2024     Lab Results   Component Value Date    HGBA1C 5.00 08/03/2024     Lab Results   Component Value Date    INR 1.10 08/02/2024    INR 1.3 (H) 04/07/2023    PROTIME 14.7 08/02/2024     Lab Results   Component Value Date    WVEJIRKI64 303 08/03/2024     Lab Results   Component Value Date    FOLATE 8.81 08/03/2024     TSH          9/4/2024    10:16 9/25/2024    09:33 1/1/2025    12:21   TSH   TSH 12.060  5.670  2.700           Physical Examination:  /85 (BP Location: Left arm, Patient Position: Lying)   Pulse 89   Temp 98.8 °F (37.1 °C) (Axillary)   Resp 18   Ht 185.4 cm (73\")   Wt 96.3 kg (212 lb 4.9 oz)   SpO2 95%   BMI 28.01 kg/m²   General Appearance:   Well developed, well nourished, well groomed, alert, " and sleepy.  HEENT: Normocephalic.    Neck and Spine: Normal range of motion.  Normal alignment. No mass or tenderness. No bruits.    Extremities:    No edema or deformities. Normal joint ROM.   Skin:    No rashes or birth marks.    Neurological examination:  Higher Integrative  Function: When asked his name and date of birth he says he does not know.  He did not cooperate with counting the fingers.  However he did follow simple commands.  He showed me his tongue and moved all his extremities..  CN II:  Pupils are equal, round, and reactive to light. Normal visual acuity and visual fields.    CN III IV VI: Extraocular movements are full without nystagmus.   CN V:  Normal facial sensation and strength of muscles of mastication.  CN VII:  Facial movements are symmetric. No weakness.  CN VIII: Auditory acuity is normal.  CN IX & X: Symmetric palatal movement.  CN XI:  Sternocleidomastoid and trapezius are normal.  No weakness.  CN XII:  The tongue is midline.  No atrophy or fasciculations.  Motor:  Normal muscle strength, bulk and tone in upper and lower extremities.  No fasciculations, rigidity, spasticity, or abnormal movements.  Sensation: Normal to light touch, pinprick, vibration, temperature, and proprioception in arms and legs. Normal graphesthesia and no extinction on DSS.  Station and Gait: Could not be tested at this time.  Coordination:  Would not cooperate for testing..      Diagnoses / Discussion:  79 y.o. who presents with Sx of acute metabolic encephalopathy versus delirium.    Plan:  EEG at the bedside with photic stimulation has been requested.  Not to use any narcotics or sedatives.  If over the next 24 to 48 hours his condition does not improve he will need CSF evaluation under fluoroscopy..     I have discussed the above with the care team and he will be followed up by our inpatient teleneurology service..  Time spent with patient: 70 minutes in face-to-face evaluation and management of the patient  using the dedicated telemedicine device without any interruption with the help of the rounding nurse with the patient located at the [facility] and myself at a remote location.    Electronically signed by Esperanza Zaragoza MD, 01/02/25, 9:01 AM EST.    Dictated using Dragon dictation.

## 2025-01-02 NOTE — PAYOR COMM NOTE
"TO:BC  FROM:ED PERSAUD RN PHONE [phone]  OBSERVATION NOTIFICATION  TAX ID [id number] [facility] [id number]    Katey Browne (79 y.o. Male)       Date of Birth   1945    Social Security Number       Address   [address] [address]    Home Phone   [phone]    MRN   [id number]       Scientology   None    Marital Status                               Admission Date   1/1/25    Admission Type   Emergency    Admitting Provider   Kerley, Brian Joseph, DO    Attending Provider   Kerley, Brian Joseph, DO    [facility]       Discharge Date       Discharge Disposition       Discharge Destination                                 Attending Provider: Kerley, Brian Joseph, DO    Allergies: Asa [Aspirin]    Isolation: None   Infection: None   Code Status: CPR    Ht: 185.4 cm (73\")   Wt: 96.3 kg (212 lb 4.9 oz)    Admission Cmt: None   Principal Problem: AMS (altered mental status) [R41.82]                   Active Insurance as of 1/1/2025       Primary Coverage       Payor Plan Insurance Group Employer/Plan Group    ANTHEM MEDICARE REPLACEMENT ANTHEM MED ADV SNP [id number]       Payor Plan Address Payor Plan Phone Number Payor Plan Fax Number Effective Dates    [address] [phone]  1/1/2024 - None Entered    [facility] [id number]         Subscriber Name Subscriber Birth Date Member ID       KATEY BROWEN 1945 [id number]               Secondary Coverage       Payor Plan Insurance Group Employer/Plan Group    [id number]        Payor Plan Address Payor Plan Phone Number Payor Plan Fax Number Effective Dates    [address]   4/7/2023 - None Entered    [facility] [id number]         Subscriber Name Subscriber Birth Date Member ID       KATEY BROWNE 1945 [id number]                     Emergency Contacts        (Rel.) Home Phone Work Phone Mobile Phone    MAC BROWNE/ZITA " (Son) [phone] -- [phone]                 History & Physical        Kerley, Brian Joseph, DO at 25 1727            [facility]   HISTORY AND PHYSICAL      Name:  Hardik Natarajan   Age:  79 y.o.  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  Presented to [facility] ED on 2025 with concern for altered mental status.  Last known normal was night prior to presentation.  Also reported having a fall, unclear if it was syncope.  Wife found him on the floor.  On Plavix, no other anticoagulation.  He denied any fevers or chills, chest pain, shortness of air, any other specific concern.  Denied any slurred speech, difficulty moving upper or lower extremities, and any in symmetry.    ED summary: Afebrile, vital signs stable on room air.  ABG pH 7.44, pCO2 34, pO2 82, bicarb 24.  EKG sinus tachycardia rate 107, nonspecific ST-T wave changes.  Opponent 23, ACS not suspected.  Creatinine 1.34, lactate elevated, procalcitonin not elevated.  UDS negative.  Blood cultures collected.  COVID/flu/RSV negative.  CT abdomen/pelvis large stone in urinary bladder, wall thickening probable diverticula, abnormal attenuation of the root of the mesentery most suggestive of mesenteric panniculitis.  CT head no intracranial hemorrhage or evidence of acute large cortical infarct, stable atrophy and chronic findings, left mastoiditis and left otitis media with bone destruction of the left mastoid air cells, right maxillary sinusitis.  CT  cervical spine no acute fracture, multilevel degenerative disc disease.    Review Of Systems:    All systems were reviewed and negative except as mentioned in history of presenting illness, assessment and plan.    Past Medical History: Patient  has a past medical history of Anemia, Arthritis, and Cancer.    Past Surgical History: Patient  has a past surgical history that includes Skin cancer excision; Neck dissection (N/A); Parotidectomy (N/A); and NAIL BIOPSY (N/A).    Social History: Patient  reports that he has never smoked. His smokeless tobacco use includes chew. He reports that he does not currently use alcohol. He reports that he does not use drugs.    Family History:  Patient's family history has been reviewed and found to be noncontributory.     Allergies:      Asa [aspirin]    Home Medications:    Prior to Admission Medications       Prescriptions Last Dose Informant Patient Reported? Taking?    acetaminophen (TYLENOL) 325 MG tablet   Yes No    Take 2 tablets by mouth Every 6 (Six) Hours As Needed.    clopidogrel (PLAVIX) 75 MG tablet   Yes No    Take 1 tablet by mouth Daily.    cyanocobalamin 1000 MCG/ML injection   Yes No    Inject 0.1 mL into the appropriate muscle as directed by prescriber Every 30 (Thirty) Days.    diclofenac (VOLTAREN) 75 MG EC tablet   Yes No    DULoxetine (CYMBALTA) 30 MG capsule   Yes No    Take 1 capsule by mouth Daily.    finasteride (PROSCAR) 5 MG tablet   Yes No    Take 1 tablet by mouth Daily.    Hydrocortisone, Perianal, (ANUSOL-HC) 2.5 % rectal cream   Yes No    Insert 1 Application into the rectum.    hydrOXYzine (ATARAX) 25 MG tablet   No No    Take 1 tablet by mouth Every 6 (Six) Hours As Needed for Itching.    levothyroxine (SYNTHROID, LEVOTHROID) 75 MCG tablet   No No    TAKE ONE (1) TABLET BY MOUTH EVERY DAY    ondansetron (ZOFRAN) 8 MG tablet   No No    Take 1 tablet by mouth 3 (Three) Times a Day As Needed for Nausea or Vomiting.    ondansetron ODT (ZOFRAN-ODT) 8 MG  "disintegrating tablet   Yes No    Place 1 tablet twice a day by translingual route.    oxyCODONE (Roxicodone) 5 MG immediate release tablet   No No    Take 1 tablet by mouth Every 6 (Six) Hours As Needed for Severe Pain.    Procto-Med HC 2.5 % rectal cream   Yes No    1 Application Daily As Needed.    senna (SENOKOT) 8.6 MG tablet   Yes No    Take 1 tablet by mouth Daily As Needed.    tamsulosin (FLOMAX) 0.4 MG capsule 24 hr capsule   Yes No    Take 1 capsule by mouth.          ED Medications:    Medications   sodium chloride 0.9 % bolus 1,000 mL (1,000 mL Intravenous New Bag 1/1/25 1722)   iopamidol (ISOVUE-300) 61 % injection 100 mL (100 mL Intravenous Given 1/1/25 1427)   sodium chloride 0.9 % bolus 1,000 mL (1,000 mL Intravenous New Bag 1/1/25 1557)     Vital Signs:  Temp:  [98 °F (36.7 °C)] 98 °F (36.7 °C)  Heart Rate:  [] 89  Resp:  [18] 18  BP: ()/(55-99) 94/68        01/01/25  1149   Weight: 102 kg (225 lb)     Body mass index is 29.69 kg/m².    Physical Exam:     Most recent vital Signs: BP 94/68   Pulse 89   Temp 98 °F (36.7 °C) (Oral)   Resp 18   Ht 185.4 cm (73\")   Wt 102 kg (225 lb)   SpO2 96%   BMI 29.69 kg/m²     Physical Exam  Constitutional:       General: He is not in acute distress.     Appearance: He is ill-appearing. He is not toxic-appearing.   HENT:      Mouth/Throat:      Mouth: Mucous membranes are moist.   Eyes:      Extraocular Movements: Extraocular movements intact.   Cardiovascular:      Rate and Rhythm: Normal rate and regular rhythm.      Pulses: Normal pulses.      Heart sounds: Normal heart sounds.   Pulmonary:      Effort: Pulmonary effort is normal.      Breath sounds: Normal breath sounds.   Abdominal:      Palpations: Abdomen is soft.      Tenderness: There is no abdominal tenderness.   Musculoskeletal:      Right lower leg: No edema.      Left lower leg: No edema.   Skin:     General: Skin is warm.   Neurological:      General: No focal deficit present.      " Mental Status: He is alert.      Comments: Oriented to self   Psychiatric:      Comments: Agitated         Laboratory data:    I have reviewed the labs done in the emergency room.    Results from last 7 days   Lab Units 01/01/25  1221   SODIUM mmol/L 136   POTASSIUM mmol/L 4.2   CHLORIDE mmol/L 101   CO2 mmol/L 23.4   BUN mg/dL 9   CREATININE mg/dL 1.34*   CALCIUM mg/dL 8.9   BILIRUBIN mg/dL 1.1   ALK PHOS U/L 51   ALT (SGPT) U/L 13   AST (SGOT) U/L 23   GLUCOSE mg/dL 99     Results from last 7 days   Lab Units 01/01/25  1221   WBC 10*3/mm3 4.84   HEMOGLOBIN g/dL 11.6*   HEMATOCRIT % 34.0*   PLATELETS 10*3/mm3 141         Results from last 7 days   Lab Units 01/01/25  1406 01/01/25  1221   CK TOTAL U/L  --  216*   HSTROP T ng/L 23* 24*                 Results from last 7 days   Lab Units 01/01/25  1555   PH, ARTERIAL pH units 7.455   PO2 ART mm Hg 82.6   PCO2, ARTERIAL mm Hg 34.2*   HCO3 ART mmol/L 24.0     Results from last 7 days   Lab Units 01/01/25  1418   COLOR UA  Yellow   GLUCOSE UA  Negative   KETONES UA  Negative   BLOOD UA  Negative   LEUKOCYTES UA  Negative   PH, URINE  7.0   BILIRUBIN UA  Negative   UROBILINOGEN UA  1.0 E.U./dL       Pain Management Panel           No data to display                    Radiology:    CT Abdomen Pelvis With Contrast    Result Date: 1/1/2025  CT ABDOMEN AND PELVIS WITH CONTRAST  INDICATION: Possible bladder stone on x-ray.  TECHNIQUE: Thin section axial images were obtained from the lung bases through the pubic symphysis after oral and intravenous contrast. Coronal reconstruction images were obtained from the axial data.  COMPARISON: None.  FINDINGS:  Lower chest:  Lung bases are clear.  Liver:  Homogeneous.  No focal lesion.  Gallbladder/biliary system:  Gallbladder is present.   No intrahepatic or extrahepatic biliary dilatation.  Spleen:  Unremarkable.  Adrenal glands:  Unremarkable. No nodules.  Pancreas: Evaluation limited by motion artifact. No convincing mass or  evidence of pancreatitis.  Kidneys/ureters/bladder:  No hydronephrosis, solid mass or perinephric stranding. A large calcification in the urinary bladder is consistent with a bladder stone. This measures 23 mm. There is also irregularity of the bladder wall which could be related to chronic cystitis. Bladder diverticula are not excluded near the dome.  GI tract: No evidence of small bowel obstruction or focal small bowel wall thickening. Normal appendix. No acute colon abnormality.  Pelvic organs: Mild enlargement of the prostate.  Lymph nodes/mesentery/retroperitoneum: There is abnormal attenuation in the root of the mesentery with small lymph nodes. 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  Abdominal wall: Abdominal wall intact.  Vascular: No acute vascular abnormality.  Free fluid: No free fluid.  Bones: No acute osseous abnormality.      1. Large stone in the urinary bladder. 2. Wall thickening of the urinary bladder with probable diverticula. This is likely related to chronic cystitis and possibly mild chronic outlet obstruction. 3. 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      CTDI: 9.79 mGy DLP:552.61 mGy.cm       This report was signed and finalized on 1/1/2025 3:15 PM by Carmen Le MD.      CT Cervical Spine Without Contrast    Result Date: 1/1/2025  PROCEDURE: CT CERVICAL SPINE WO CONTRAST-  INDICATION: fall this morning  TECHNIQUE: Thin section axial images were obtained through the cervical spine without contrast.  Coronal and sagittal reconstruction images were obtained from the axial data.  COMPARISON: None.  FINDINGS: Motion artifact limits exam quality. There is no convincing acute fracture. No facet lock. The craniocervical junction appears intact.   There is multilevel degenerative disc disease.   No acute paraspinal abnormality.      No acute fracture within the limitations of the exam. There is motion artifact.   Multilevel degenerative disc disease.  Please see CT head report regarding findings of the left mastoid air cells where there is mastoiditis and bone destruction.        CTDI: 16.93 mGy DLP:837.44 mGy.cm     This study was performed with techniques to keep radiation doses as low as reasonably achievable (ALARA). Individualized dose reduction techniques using automated exposure control or adjustment of mA and/or kV according to the patient size were employed.   This report was signed and finalized on 1/1/2025 1:22 PM by Carmen Le MD.      CT Head Without Contrast    Result Date: 1/1/2025  PROCEDURE: CT HEAD WO CONTRAST-  INDICATION: AMS  TECHNIQUE: Multiple axial CT images were performed from the foramen magnum to the vertex without contrast.   Coronal reconstruction images were obtained from the axial data.  COMPARISON: 8/2/2024.  FINDINGS: There is no mass effect or midline shift. There is stable atrophy with ventricular enlargement. There are stable bilateral periventricular hypodensities. There is no intracranial hemorrhage. The posterior fossa is without acute abnormality. There is mucoperiosteal thickening of the right maxillary sinus. There is opacification of the left mastoid air cells with bone destruction. There is soft tissue density in the left middle ear cavity. This was present on the prior exam. No skull fracture.      1. No intracranial hemorrhage or evidence of acute large cortical infarct. 2. Stable atrophy and chronic findings. 3. Left mastoiditis and left otitis media with bone destruction of the left mastoid air cells. 4. Right maxillary sinusitis.         CTDI: 16.93 mGy DLP:837.44 mGy.cm    This study was performed with techniques to keep radiation doses as low as reasonably achievable (ALARA). Individualized dose reduction techniques using automated exposure control or adjustment of mA and/or kV according to the patient size were employed.   This report was signed and finalized on  1/1/2025 1:19 PM by Carmen Le MD.      XR Pelvis 1 or 2 View    Result Date: 1/1/2025  PROCEDURE: XR PELVIS 1 OR 2 VW-  HISTORY: fall, AMS  COMPARISON: None.  FINDINGS: An AP view of the pelvis was obtained.  There is no acute osseous abnormality of the pelvis. There is degenerative joint disease of the hips bilaterally. There is a round radiodensity projecting over the midline pelvis. This could be a bladder stone or artifact in or on the patient's clothing. Soft tissues are otherwise without acute abnormality.      No acute osseous abnormality of the pelvis.  Round radiodensity projecting over the midline pelvis as above.        This report was signed and finalized on 1/1/2025 1:15 PM by Carmen Le MD.      XR Chest 1 View    Result Date: 1/1/2025  PROCEDURE: XR CHEST 1 VW-  INDICATION:  fall, AMS  FINDINGS:  A portable view of the chest was obtained.  Comparison is made to a prior exam dated 8/2/2024.   Heart size is normal. Lung volumes are very low. Right perihilar and bibasilar opacities may be atelectasis. Pneumonia is not excluded. No significant pleural effusion or pneumothorax.      Exam limited by low lung volumes. Probable bilateral atelectasis.   This report was signed and finalized on 1/1/2025 1:14 PM by Carmen Le MD.       Assessment/Plan:    Observation general floor admission 1/1/2025 with altered mental status in the setting of known invasive stage IV left ear squamous cell carcinoma with bony mets.    Altered mental status  Left mastoiditis, left otitis media with bone destruction of left mastoid air cells  Neurology consultation, recreations appreciated.  MRI brain.  EEG.   No seizure-like activity reported or witnessed, no syncope.  Differential could include stroke, brain mets.  CT showed stable chronic findings; left mastoiditis and left otitis media with bone destruction of the left mastoid air cells.  Fall precautions.    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  Continue home medications.    Risk Assessment: High  DVT Prophylaxis: Heparin  Code Status: Full code  Diet: Cardiac    Advance Care Planning  ACP discussion was held with the patient during this visit. Patient does not have an advance directive, information provided.           Brian Joseph Kerley, DO  01/01/25  17:27 EST    Dictated utilizing Dragon dictation.    Electronically signed by Kerley, Brian Joseph, DO at 01/01/25 2609          Emergency Department Notes        Donna James PA-C at 01/01/25 1208          Subjective:  Chief Complaint:  Fall, AMS    History of Present Illness:  Patient is an 79-year-old male with history of anemia, arthritis, cancer, recent small periventricular ischemic stroke in August 2020 for which she was admitted here for.  Patient presents today with altered mental status.  Per daughter-in-law at bedside last known normal was last night.  States that her  spoke with him last night said he seemed normal.  States that this morning he had a fall.  Wife was going to get him some water and turned around and he was on the floor.  Patient is on Plavix but does not appear to be on any other anticoagulants.  Patient denying any complaints currently but daughter-in-law states that he did not recognize his son.  Patient is alert and oriented to person but not place and time.  Daughter-in-law states that this is probably his baseline.  Patient denying any pain or complaints currently.  Does not appear to have any focal neurologic deficits.  Daughter-in-law is worried about a UTI she states his urine was strong smelling and he did urinate on himself on the way here.      Nurses Notes reviewed and agree, including vitals, allergies, social history and prior medical history.     REVIEW OF SYSTEMS: All systems reviewed and not  "pertinent unless noted.  Review of Systems   Genitourinary:         Dark foul-smelling urine   Psychiatric/Behavioral:  Positive for confusion.    All other systems reviewed and are negative.      Past Medical History:   Diagnosis Date    Anemia     Arthritis     Cancer        Allergies:    Asa [aspirin]      Past Surgical History:   Procedure Laterality Date    NAIL BIOPSY N/A     NECK DISSECTION N/A     PAROTIDECTOMY N/A     SKIN CANCER EXCISION           Social History     Socioeconomic History    Marital status:    Tobacco Use    Smoking status: Never    Smokeless tobacco: Current     Types: Chew   Vaping Use    Vaping status: Never Used   Substance and Sexual Activity    Alcohol use: Not Currently    Drug use: Never    Sexual activity: Defer         Family History   Problem Relation Age of Onset    Cancer Mother     Cancer Father        Objective  Physical Exam:  /58   Pulse 89   Temp 98 °F (36.7 °C) (Oral)   Resp 18   Ht 185.4 cm (73\")   Wt 102 kg (225 lb)   SpO2 98%   BMI 29.69 kg/m²      Physical Exam  Vitals and nursing note reviewed.   Constitutional:       General: He is not in acute distress.     Appearance: He is not toxic-appearing.   HENT:      Head: Normocephalic and atraumatic.   Eyes:      Extraocular Movements: Extraocular movements intact.      Pupils: Pupils are equal, round, and reactive to light.   Cardiovascular:      Rate and Rhythm: Tachycardia present.   Pulmonary:      Effort: Pulmonary effort is normal. No respiratory distress.   Abdominal:      General: There is no distension.      Palpations: Abdomen is soft.      Tenderness: There is no abdominal tenderness.   Musculoskeletal:         General: Normal range of motion.      Cervical back: Normal range of motion and neck supple.   Skin:     General: Skin is warm and dry.      Comments: Left ear noted to have ulceration secondary to skin cancer; no signs of acute infection, daughter-in-law states that it appears at " baseline   Neurological:      Mental Status: He is alert.      GCS: GCS eye subscore is 4. GCS verbal subscore is 4. GCS motor subscore is 6.   Psychiatric:         Mood and Affect: Mood normal.         Behavior: Behavior normal.         Procedures    ED Course:         Lab Results (last 24 hours)       Procedure Component Value Units Date/Time    CBC & Differential [[id number]]  (Abnormal) Collected: 01/01/25 1221    Specimen: Blood Updated: 01/01/25 1230    Narrative:      The following orders were created for panel order CBC & Differential.  Procedure                               Abnormality         Status                     ---------                               -----------         ------                     CBC Auto Differential[[id number]]        Abnormal            Final result                 Please view results for these tests on the individual orders.    Comprehensive Metabolic Panel [[id number]]  (Abnormal) Collected: 01/01/25 1221    Specimen: Blood Updated: 01/01/25 1246     Glucose 99 mg/dL      BUN 9 mg/dL      Creatinine 1.34 mg/dL      Sodium 136 mmol/L      Potassium 4.2 mmol/L      Chloride 101 mmol/L      CO2 23.4 mmol/L      Calcium 8.9 mg/dL      Total Protein 6.3 g/dL      Albumin 3.8 g/dL      ALT (SGPT) 13 U/L      AST (SGOT) 23 U/L      Alkaline Phosphatase 51 U/L      Total Bilirubin 1.1 mg/dL      Globulin 2.5 gm/dL      A/G Ratio 1.5 g/dL      BUN/Creatinine Ratio 6.7     Anion Gap 11.6 mmol/L      eGFR 53.6 mL/min/1.73     Narrative:      GFR Categories in Chronic Kidney Disease (CKD)      GFR Category          GFR (mL/min/1.73)    Interpretation  G1                     90 or greater         Normal or high (1)  G2                      60-89                Mild decrease (1)  G3a                   45-59                Mild to moderate decrease  G3b                   30-44                Moderate to severe decrease  G4                    15-29                Severe decrease  G5                     14 or less           Kidney failure          (1)In the absence of evidence of kidney disease, neither GFR category G1 or G2 fulfill the criteria for CKD.    eGFR calculation 2021 CKD-EPI creatinine equation, which does not include race as a factor    Magnesium [[id number]]  (Normal) Collected: 01/01/25 1221    Specimen: Blood Updated: 01/01/25 1246     Magnesium 1.8 mg/dL     High Sensitivity Troponin T [[id number]]  (Abnormal) Collected: 01/01/25 1221    Specimen: Blood Updated: 01/01/25 1257     HS Troponin T 24 ng/L     Narrative:      High Sensitive Troponin T Reference Range:  <14.0 ng/L- Negative Female for AMI  <22.0 ng/L- Negative Male for AMI  >=14 - Abnormal Female indicating possible myocardial injury.  >=22 - Abnormal Male indicating possible myocardial injury.   Clinicians would have to utilize clinical acumen, EKG, Troponin, and serial changes to determine if it is an Acute Myocardial Infarction or myocardial injury due to an underlying chronic condition.         CBC Auto Differential [[id number]]  (Abnormal) Collected: 01/01/25 1221    Specimen: Blood Updated: 01/01/25 1230     WBC 4.84 10*3/mm3      RBC 3.70 10*6/mm3      Hemoglobin 11.6 g/dL      Hematocrit 34.0 %      MCV 91.9 fL      MCH 31.4 pg      MCHC 34.1 g/dL      RDW 13.4 %      RDW-SD 45.3 fl      MPV 9.8 fL      Platelets 141 10*3/mm3      Neutrophil % 86.7 %      Lymphocyte % 4.5 %      Monocyte % 6.2 %      Eosinophil % 1.2 %      Basophil % 0.6 %      Immature Grans % 0.8 %      Neutrophils, Absolute 4.19 10*3/mm3      Lymphocytes, Absolute 0.22 10*3/mm3      Monocytes, Absolute 0.30 10*3/mm3      Eosinophils, Absolute 0.06 10*3/mm3      Basophils, Absolute 0.03 10*3/mm3      Immature Grans, Absolute 0.04 10*3/mm3      nRBC 0.0 /100 WBC     TSH [[id number]]  (Normal) Collected: 01/01/25 1221    Specimen: Blood Updated: 01/01/25 1257     TSH 2.700 uIU/mL     CK [[id number]]  (Abnormal) Collected: 01/01/25 1221    Specimen: Blood  Updated: 01/01/25 1246     Creatine Kinase 216 U/L     C-reactive Protein [[id number]]  (Abnormal) Collected: 01/01/25 1221    Specimen: Blood Updated: 01/01/25 1338     C-Reactive Protein 0.90 mg/dL     COVID-19, FLU A/B, RSV PCR 1 HR TAT - Swab, Nasopharynx [[id number]]  (Normal) Collected: 01/01/25 1248    Specimen: Swab from Nasopharynx Updated: 01/01/25 1336     COVID19 Not Detected     Influenza A PCR Not Detected     Influenza B PCR Not Detected     RSV, PCR Not Detected    Narrative:      Fact sheet for providers: https://www.fda.gov/media/140060/download    Fact sheet for patients: https://www.fda.gov/media/819207/download    Test performed by PCR.    High Sensitivity Troponin T 1Hr [[id number]]  (Abnormal) Collected: 01/01/25 1406    Specimen: Blood Updated: 01/01/25 1435     HS Troponin T 23 ng/L      Troponin T Numeric Delta -1 ng/L      Troponin T % Delta -4 %     Narrative:      High Sensitive Troponin T Reference Range:  <14.0 ng/L- Negative Female for AMI  <22.0 ng/L- Negative Male for AMI  >=14 - Abnormal Female indicating possible myocardial injury.  >=22 - Abnormal Male indicating possible myocardial injury.   Clinicians would have to utilize clinical acumen, EKG, Troponin, and serial changes to determine if it is an Acute Myocardial Infarction or myocardial injury due to an underlying chronic condition.         Urinalysis With Microscopic If Indicated (No Culture) - Urine, Clean Catch [[id number]]  (Abnormal) Collected: 01/01/25 1418    Specimen: Urine, Clean Catch Updated: 01/01/25 1427     Color, UA Yellow     Appearance, UA Cloudy     pH, UA 7.0     Specific Gravity, UA 1.009     Glucose, UA Negative     Ketones, UA Negative     Bilirubin, UA Negative     Blood, UA Negative     Protein, UA Negative     Leuk Esterase, UA Negative     Nitrite, UA Negative     Urobilinogen, UA 1.0 E.U./dL    Narrative:      Urine microscopic not indicated.    Blood Gas, Arterial With Co-Ox [[id number]]  (Abnormal)  "Collected: 01/01/25 1555    Specimen: Arterial Blood Updated: 01/01/25 1555     Site Left Radial     Leon's Test Positive     pH, Arterial 7.455 pH units      Comment: 83 Value above reference range        pCO2, Arterial 34.2 mm Hg      Comment: 84 Value below reference range        pO2, Arterial 82.6 mm Hg      Comment: 84 Value below reference range        HCO3, Arterial 24.0 mmol/L      Base Excess, Arterial 0.4 mmol/L      O2 Saturation, Arterial 97.7 %      Hematocrit, Blood Gas 34.5 %      Comment: 84 Value below reference range        Oxyhemoglobin 95.9 %      Methemoglobin 0.60 %      Carboxyhemoglobin 1.3 %      A-a DO2 23.8 mmHg      Barometric Pressure for Blood Gas 738 mmHg      Modality N/A     Ventilator Mode NA     Collected by CB     Comment: Meter: [id number]     :  [id number]        pH, Temp Corrected --     pCO2, Temperature Corrected --     pO2, Temperature Corrected --    Procalcitonin [[id number]]  (Normal) Collected: 01/01/25 1557    Specimen: Blood Updated: 01/01/25 1628     Procalcitonin 0.16 ng/mL     Narrative:      As a Marker for Sepsis (Non-Neonates):    1. <0.5 ng/mL represents a low risk of severe sepsis and/or septic shock.  2. >2 ng/mL represents a high risk of severe sepsis and/or septic shock.    As a Marker for Lower Respiratory Tract Infections that require antibiotic therapy:    PCT on Admission    Antibiotic Therapy       6-12 Hrs later    >0.5                Strongly Recommended  >0.25 - <0.5        Recommended   0.1 - 0.25          Discouraged              Remeasure/reassess PCT  <0.1                Strongly Discouraged     Remeasure/reassess PCT    As 28 day mortality risk marker: \"Change in Procalcitonin Result\" (>80% or <=80%) if Day 0 (or Day 1) and Day 4 values are available. Refer to http://www.[employer]-pct-calculator.com    Change in PCT <=80%  A decrease of PCT levels below or equal to 80% defines a positive change in PCT test result representing a higher " risk for 28-day all-cause mortality of patients diagnosed with severe sepsis for septic shock.    Change in PCT >80%  A decrease of PCT levels of more than 80% defines a negative change in PCT result representing a lower risk for 28-day all-cause mortality of patients diagnosed with severe sepsis or septic shock.       Lactic Acid, Plasma [[id number]]  (Normal) Collected: 01/01/25 1557    Specimen: Blood Updated: 01/01/25 1615     Lactate 0.9 mmol/L     Blood Culture - Blood, Hand, Right [[id number]] Collected: 01/01/25 1620    Specimen: Blood from Hand, Right Updated: 01/01/25 1640    Blood Culture - Blood, Hand, Right [[id number]] Collected: 01/01/25 1627    Specimen: Blood from Hand, Right Updated: 01/01/25 1639    Vitamin B12 [[id number]] Collected: 01/01/25 1640    Specimen: Blood Updated: 01/01/25 1640    Folate [[id number]] Collected: 01/01/25 1640    Specimen: Blood Updated: 01/01/25 1640             CT Abdomen Pelvis With Contrast    Result Date: 1/1/2025  CT ABDOMEN AND PELVIS WITH CONTRAST  INDICATION: Possible bladder stone on x-ray.  TECHNIQUE: Thin section axial images were obtained from the lung bases through the pubic symphysis after oral and intravenous contrast. Coronal reconstruction images were obtained from the axial data.  COMPARISON: None.  FINDINGS:  Lower chest:  Lung bases are clear.  Liver:  Homogeneous.  No focal lesion.  Gallbladder/biliary system:  Gallbladder is present.   No intrahepatic or extrahepatic biliary dilatation.  Spleen:  Unremarkable.  Adrenal glands:  Unremarkable. No nodules.  Pancreas: Evaluation limited by motion artifact. No convincing mass or evidence of pancreatitis.  Kidneys/ureters/bladder:  No hydronephrosis, solid mass or perinephric stranding. A large calcification in the urinary bladder is consistent with a bladder stone. This measures 23 mm. There is also irregularity of the bladder wall which could be related to chronic cystitis. Bladder diverticula are not  excluded near the dome.  GI tract: No evidence of small bowel obstruction or focal small bowel wall thickening. Normal appendix. No acute colon abnormality.  Pelvic organs: Mild enlargement of the prostate.  Lymph nodes/mesentery/retroperitoneum: There is abnormal attenuation in the root of the mesentery with small lymph nodes. 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  Abdominal wall: Abdominal wall intact.  Vascular: No acute vascular abnormality.  Free fluid: No free fluid.  Bones: No acute osseous abnormality.      Impression: 1. Large stone in the urinary bladder. 2. Wall thickening of the urinary bladder with probable diverticula. This is likely related to chronic cystitis and possibly mild chronic outlet obstruction. 3. 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      CTDI: 9.79 mGy DLP:552.61 mGy.cm       This report was signed and finalized on 1/1/2025 3:15 PM by Carmen Le MD.      CT Cervical Spine Without Contrast    Result Date: 1/1/2025  PROCEDURE: CT CERVICAL SPINE WO CONTRAST-  INDICATION: fall this morning  TECHNIQUE: Thin section axial images were obtained through the cervical spine without contrast.  Coronal and sagittal reconstruction images were obtained from the axial data.  COMPARISON: None.  FINDINGS: Motion artifact limits exam quality. There is no convincing acute fracture. No facet lock. The craniocervical junction appears intact.   There is multilevel degenerative disc disease.   No acute paraspinal abnormality.      Impression: No acute fracture within the limitations of the exam. There is motion artifact.  Multilevel degenerative disc disease.  Please see CT head report regarding findings of the left mastoid air cells where there is mastoiditis and bone destruction.        CTDI: 16.93 mGy DLP:837.44 mGy.cm     This study was performed with techniques to keep radiation doses as low as reasonably achievable  (ALARA). Individualized dose reduction techniques using automated exposure control or adjustment of mA and/or kV according to the patient size were employed.   This report was signed and finalized on 1/1/2025 1:22 PM by Carmen Le MD.      CT Head Without Contrast    Result Date: 1/1/2025  PROCEDURE: CT HEAD WO CONTRAST-  INDICATION: AMS  TECHNIQUE: Multiple axial CT images were performed from the foramen magnum to the vertex without contrast.   Coronal reconstruction images were obtained from the axial data.  COMPARISON: 8/2/2024.  FINDINGS: There is no mass effect or midline shift. There is stable atrophy with ventricular enlargement. There are stable bilateral periventricular hypodensities. There is no intracranial hemorrhage. The posterior fossa is without acute abnormality. There is mucoperiosteal thickening of the right maxillary sinus. There is opacification of the left mastoid air cells with bone destruction. There is soft tissue density in the left middle ear cavity. This was present on the prior exam. No skull fracture.      Impression: 1. No intracranial hemorrhage or evidence of acute large cortical infarct. 2. Stable atrophy and chronic findings. 3. Left mastoiditis and left otitis media with bone destruction of the left mastoid air cells. 4. Right maxillary sinusitis.         CTDI: 16.93 mGy DLP:837.44 mGy.cm    This study was performed with techniques to keep radiation doses as low as reasonably achievable (ALARA). Individualized dose reduction techniques using automated exposure control or adjustment of mA and/or kV according to the patient size were employed.   This report was signed and finalized on 1/1/2025 1:19 PM by Carmen Le MD.      XR Pelvis 1 or 2 View    Result Date: 1/1/2025  PROCEDURE: XR PELVIS 1 OR 2 VW-  HISTORY: fall, AMS  COMPARISON: None.  FINDINGS: An AP view of the pelvis was obtained.  There is no acute osseous abnormality of the pelvis. There is degenerative joint  disease of the hips bilaterally. There is a round radiodensity projecting over the midline pelvis. This could be a bladder stone or artifact in or on the patient's clothing. Soft tissues are otherwise without acute abnormality.      Impression: No acute osseous abnormality of the pelvis.  Round radiodensity projecting over the midline pelvis as above.        This report was signed and finalized on 1/1/2025 1:15 PM by Carmen Le MD.      XR Chest 1 View    Result Date: 1/1/2025  PROCEDURE: XR CHEST 1 VW-  INDICATION:  fall, AMS  FINDINGS:  A portable view of the chest was obtained.  Comparison is made to a prior exam dated 8/2/2024.   Heart size is normal. Lung volumes are very low. Right perihilar and bibasilar opacities may be atelectasis. Pneumonia is not excluded. No significant pleural effusion or pneumothorax.      Impression: Exam limited by low lung volumes. Probable bilateral atelectasis.   This report was signed and finalized on 1/1/2025 1:14 PM by Carmen Le MD.          MDM  Patient evaluated in the ER for confusion and fall that occurred this morning.  Evidently patient was normal last night when his son spoke to him.  Patient is tachycardic but otherwise hemodynamically stable, afebrile, nontoxic-appearing on exam.  Patient has no focal neurologic deficits, equal strength bilaterally, alert and oriented to person which daughter-in-law feels is his baseline.  He was noted to have a stroke in August, about 4 months ago.  Differential diagnosis includes but is not limited to UTI, intracranial hemorrhage, cervical fracture, electrolyte abnormality, uremia, among others.  Initial plan includes CBC, CMP, magnesium, troponin, TSH, CK, urinalysis, COVID/flu swab, EKG, CT of head and C-spine and x-ray of chest and pelvis.    Imaging is largely unremarkable other than a round radiodensity projecting over the midline pelvis which could be a bladder stone.  There is difficulty getting a urine sample.  Tried  to talk about this with patient and he said he could not hear me.  Tried speaking louder.  Daughter-in-law is unsure if he really cannot hear or if he is just being stubborn.  She is going to get his son to try and speak with him about giving a urine sample.  She states no one has power of  over the patient.  Discussed with her that we cannot force him to have a straight cath if he does not want one.  Troponin appears to be baseline.  Labs largely unremarkable with normal white blood cell count, creatinine of 1.34, CK only slightly elevated at 216.  COVID, flu, RSV negative.  Second troponin and urinalysis pending.  Daughter-in-law is getting the patient's son to speak with him to try to get him to provide a urine sample or allow a straight cath.  Will also order a CT scan of abdomen and pelvis for further evaluation of radiodensity on x-ray.  CT head did mention mastoiditis/bone destruction and a soft tissue density in the ear canal that was there previously on imaging.  Patient has known skin cancer that has eroded into the left side of his head/ear.  Daughter-in-law states that they are already aware of the bony destruction from the cancer.  The area does not look acutely infected and actually looks the same as it did at last oncology appointment based on picture in the chart.  Patient is afebrile and normal white blood cell count and only very slightly elevated CRP.    CT abdomen pelvis shows a bladder stone but no other acute findings.  Patient did have a drop in blood pressure into the 90s over 60s.  Ordered 1 L of IV fluids and added a procalcitonin, lactate, blood cultures given that chest x-ray did not rule out pneumonia.  Family reports that patient is still not acting like himself, sleeping a lot, less responsive, and confused.  Spoke with hospitalist, Dr. Kerley, who recommended speaking with neurology.  Patient did have a stroke in August, 3 to 4 months ago.    Spoke with Dr. Villagran with  neurology who recommended an MRI and folate and vitamin B12 level.  Recommended getting an EEG.  States that he will add the patient to the list to evaluate in the hospital.  Spoke with Dr. Kerley who has accepted the patient for admission for further evaluation and management.    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, left          Donna James PA-C  01/01/25 1741      Electronically signed by Donna James PA-C at 01/01/25 1741       Vital Signs (last day)       Date/Time Temp Temp src Pulse Resp BP Patient Position SpO2    01/02/25 0705 98.8 (37.1) Axillary 89 18 126/85 Lying --    01/02/25 0352 98.7 (37.1) Oral 108 20 121/59 Lying 95    01/02/25 0011 98.5 (36.9) Oral -- 20 102/72 Lying 99    01/01/25 2010 98.4 (36.9) Oral -- 16 95/63 Lying --    01/01/25 1859 98.6 (37) Oral 83 16 111/64 Lying --    01/01/25 1730 -- -- -- -- 101/58 -- 98    01/01/25 1700 -- -- -- -- 99/62 -- 90    01/01/25 1630 -- -- -- -- 94/68 -- 96    01/01/25 1555 -- -- -- -- -- -- 98    01/01/25 1534 -- -- 89 -- 98/65 -- --    01/01/25 1500 -- -- 88 -- 89/55 -- 93    01/01/25 1430 -- -- 94 -- 102/57 -- 97    01/01/25 1400 -- -- 98 -- 125/75 -- 98    01/01/25 1330 -- -- 94 -- 104/62 -- --    01/01/25 1300 -- -- 96 -- 105/66 -- 95    01/01/25 1149 98 (36.7) Oral 119 18 120/99 Sitting 98          Current Facility-Administered Medications   Medication Dose Route Frequency Provider Last Rate Last Admin    amoxicillin-clavulanate (AUGMENTIN) 875-125 MG per tablet 1 tablet  1 tablet Oral Q12H Kerley, Brian Joseph, DO   1 tablet at 01/02/25 0953    Calcium Replacement - Follow Nurse / BPA Driven Protocol   Not Applicable PRN Kerley, Brian Joseph, DO        clopidogrel (PLAVIX) tablet 75 mg  75 mg Oral Daily Kerley, Brian Joseph, DO   75 mg at 01/02/25 0953    DULoxetine (CYMBALTA) DR capsule 30 mg  30 mg Oral Daily  Kerley, Brian Joseph, DO   30 mg at 01/02/25 0953    heparin (porcine) 5000 UNIT/ML injection 5,000 Units  5,000 Units Subcutaneous Q12H Kerley, Brian Joseph, DO   5,000 Units at 01/01/25 2020    levothyroxine (SYNTHROID, LEVOTHROID) tablet 75 mcg  75 mcg Oral Q AM Kerley, Brian Joseph, DO   75 mcg at 01/02/25 0553    LORazepam (ATIVAN) injection 1 mg  1 mg Intravenous Once PRN Kerley, Brian Joseph, DO        Magnesium Standard Dose Replacement - Follow Nurse / BPA Driven Protocol   Not Applicable PRN Kerley, Brian Joseph, DO        nitroglycerin (NITROSTAT) SL tablet 0.4 mg  0.4 mg Sublingual Q5 Min PRN Kerley, Brian Joseph, DO        ondansetron (ZOFRAN) injection 4 mg  4 mg Intravenous Q6H PRN Kerley, Brian Joseph, DO        oxyCODONE (ROXICODONE) immediate release tablet 5 mg  5 mg Oral Q6H PRN Kerley, Brian Joseph, DO   5 mg at 01/02/25 0227    Phosphorus Replacement - Follow Nurse / BPA Driven Protocol   Not Applicable PRN Kerley, Brian Joseph, DO        Potassium Replacement - Follow Nurse / BPA Driven Protocol   Not Applicable PRN Kerley, Brian Joseph, DO        senna (SENOKOT) tablet 1 tablet  1 tablet Oral Nightly Kerley, Brian Joseph, DO   1 tablet at 01/01/25 2020    sodium chloride 0.9 % flush 10 mL  10 mL Intravenous Q12H Kerley, Brian Joseph, DO   10 mL at 01/01/25 2159    sodium chloride 0.9 % flush 10 mL  10 mL Intravenous PRN Kerley, Brian Joseph, DO        sodium chloride 0.9 % infusion 40 mL  40 mL Intravenous PRN Kerley, Brian Joseph, DO        tamsulosin (FLOMAX) 24 hr capsule 0.4 mg  0.4 mg Oral Daily Kerley, Brian Joseph, DO   0.4 mg at 01/02/25 0953     Lab Results (last 24 hours)       Procedure Component Value Units Date/Time    CBC Auto Differential [[id number]]  (Abnormal) Collected: 01/02/25 0543    Specimen: Blood Updated: 01/02/25 0627     WBC 3.23 10*3/mm3      RBC 3.12 10*6/mm3      Hemoglobin 9.8 g/dL      Hematocrit 29.3 %      MCV 93.9 fL      MCH 31.4 pg      MCHC 33.4 g/dL       RDW 13.6 %      RDW-SD 45.9 fl      MPV 10.2 fL      Platelets 123 10*3/mm3      Neutrophil % 81.1 %      Lymphocyte % 6.5 %      Monocyte % 8.4 %      Eosinophil % 2.8 %      Basophil % 0.6 %      Immature Grans % 0.6 %      Neutrophils, Absolute 2.62 10*3/mm3      Lymphocytes, Absolute 0.21 10*3/mm3      Monocytes, Absolute 0.27 10*3/mm3      Eosinophils, Absolute 0.09 10*3/mm3      Basophils, Absolute 0.02 10*3/mm3      Immature Grans, Absolute 0.02 10*3/mm3      nRBC 0.0 /100 WBC     Basic Metabolic Panel [[id number]]  (Abnormal) Collected: 01/02/25 0543    Specimen: Blood Updated: 01/02/25 0624     Glucose 88 mg/dL      BUN 11 mg/dL      Creatinine 1.30 mg/dL      Sodium 136 mmol/L      Potassium 4.3 mmol/L      Chloride 105 mmol/L      CO2 22.4 mmol/L      Calcium 8.4 mg/dL      BUN/Creatinine Ratio 8.5     Anion Gap 8.6 mmol/L      eGFR 55.5 mL/min/1.73     Narrative:      GFR Categories in Chronic Kidney Disease (CKD)      GFR Category          GFR (mL/min/1.73)    Interpretation  G1                     90 or greater         Normal or high (1)  G2                      60-89                Mild decrease (1)  G3a                   45-59                Mild to moderate decrease  G3b                   30-44                Moderate to severe decrease  G4                    15-29                Severe decrease  G5                    14 or less           Kidney failure          (1)In the absence of evidence of kidney disease, neither GFR category G1 or G2 fulfill the criteria for CKD.    eGFR calculation 2021 CKD-EPI creatinine equation, which does not include race as a factor    Blood Culture - Blood, Hand, Right [[id number]] Collected: 01/01/25 1620    Specimen: Blood from Hand, Right Updated: 01/01/25 1640    Vitamin B12 [[id number]] Collected: 01/01/25 1640    Specimen: Blood Updated: 01/01/25 1640    Folate [[id number]] Collected: 01/01/25 1640    Specimen: Blood Updated: 01/01/25 1640    Blood Culture - Blood,  "Hand, Right [[id number]] Collected: 01/01/25 1627    Specimen: Blood from Hand, Right Updated: 01/01/25 1639    Procalcitonin [[id number]]  (Normal) Collected: 01/01/25 1557    Specimen: Blood Updated: 01/01/25 1628     Procalcitonin 0.16 ng/mL     Narrative:      As a Marker for Sepsis (Non-Neonates):    1. <0.5 ng/mL represents a low risk of severe sepsis and/or septic shock.  2. >2 ng/mL represents a high risk of severe sepsis and/or septic shock.    As a Marker for Lower Respiratory Tract Infections that require antibiotic therapy:    PCT on Admission    Antibiotic Therapy       6-12 Hrs later    >0.5                Strongly Recommended  >0.25 - <0.5        Recommended   0.1 - 0.25          Discouraged              Remeasure/reassess PCT  <0.1                Strongly Discouraged     Remeasure/reassess PCT    As 28 day mortality risk marker: \"Change in Procalcitonin Result\" (>80% or <=80%) if Day 0 (or Day 1) and Day 4 values are available. Refer to http://www.[employer] [facility] [place]-pct-calculator.com    Change in PCT <=80%  A decrease of PCT levels below or equal to 80% defines a positive change in PCT test result representing a higher risk for 28-day all-cause mortality of patients diagnosed with severe sepsis for septic shock.    Change in PCT >80%  A decrease of PCT levels of more than 80% defines a negative change in PCT result representing a lower risk for 28-day all-cause mortality of patients diagnosed with severe sepsis or septic shock.       Lactic Acid, Plasma [[id number]]  (Normal) Collected: 01/01/25 1557    Specimen: Blood Updated: 01/01/25 1615     Lactate 0.9 mmol/L     Blood Gas, Arterial With Co-Ox [[id number]]  (Abnormal) Collected: 01/01/25 1555    Specimen: Arterial Blood Updated: 01/01/25 1555     Site Left Radial     Leon's Test Positive     pH, Arterial 7.455 pH units      Comment: 83 Value above reference range        pCO2, Arterial 34.2 mm Hg      Comment: 84 Value below reference range        pO2, " Arterial 82.6 mm Hg      Comment: 84 Value below reference range        HCO3, Arterial 24.0 mmol/L      Base Excess, Arterial 0.4 mmol/L      O2 Saturation, Arterial 97.7 %      Hematocrit, Blood Gas 34.5 %      Comment: 84 Value below reference range        Oxyhemoglobin 95.9 %      Methemoglobin 0.60 %      Carboxyhemoglobin 1.3 %      A-a DO2 23.8 mmHg      Barometric Pressure for Blood Gas 738 mmHg      Modality N/A     Ventilator Mode NA     Collected by CB     Comment: Meter: [id number]     :  [id number]        pH, Temp Corrected --     pCO2, Temperature Corrected --     pO2, Temperature Corrected --    High Sensitivity Troponin T 1Hr [[id number]]  (Abnormal) Collected: 01/01/25 1406    Specimen: Blood Updated: 01/01/25 1435     HS Troponin T 23 ng/L      Troponin T Numeric Delta -1 ng/L      Troponin T % Delta -4 %     Narrative:      High Sensitive Troponin T Reference Range:  <14.0 ng/L- Negative Female for AMI  <22.0 ng/L- Negative Male for AMI  >=14 - Abnormal Female indicating possible myocardial injury.  >=22 - Abnormal Male indicating possible myocardial injury.   Clinicians would have to utilize clinical acumen, EKG, Troponin, and serial changes to determine if it is an Acute Myocardial Infarction or myocardial injury due to an underlying chronic condition.         Urinalysis With Microscopic If Indicated (No Culture) - Urine, Clean Catch [[id number]]  (Abnormal) Collected: 01/01/25 1418    Specimen: Urine, Clean Catch Updated: 01/01/25 1427     Color, UA Yellow     Appearance, UA Cloudy     pH, UA 7.0     Specific Gravity, UA 1.009     Glucose, UA Negative     Ketones, UA Negative     Bilirubin, UA Negative     Blood, UA Negative     Protein, UA Negative     Leuk Esterase, UA Negative     Nitrite, UA Negative     Urobilinogen, UA 1.0 E.U./dL    Narrative:      Urine microscopic not indicated.    C-reactive Protein [[id number]]  (Abnormal) Collected: 01/01/25 1221    Specimen: Blood Updated:  01/01/25 1338     C-Reactive Protein 0.90 mg/dL     COVID-19, FLU A/B, RSV PCR 1 HR TAT - Swab, Nasopharynx [[id number]]  (Normal) Collected: 01/01/25 1248    Specimen: Swab from Nasopharynx Updated: 01/01/25 1336     COVID19 Not Detected     Influenza A PCR Not Detected     Influenza B PCR Not Detected     RSV, PCR Not Detected    Narrative:      Fact sheet for providers: https://www.fda.gov/media/742619/download    Fact sheet for patients: https://www.fda.gov/media/945060/download    Test performed by PCR.    High Sensitivity Troponin T [[id number]]  (Abnormal) Collected: 01/01/25 1221    Specimen: Blood Updated: 01/01/25 1257     HS Troponin T 24 ng/L     Narrative:      High Sensitive Troponin T Reference Range:  <14.0 ng/L- Negative Female for AMI  <22.0 ng/L- Negative Male for AMI  >=14 - Abnormal Female indicating possible myocardial injury.  >=22 - Abnormal Male indicating possible myocardial injury.   Clinicians would have to utilize clinical acumen, EKG, Troponin, and serial changes to determine if it is an Acute Myocardial Infarction or myocardial injury due to an underlying chronic condition.         TSH [[id number]]  (Normal) Collected: 01/01/25 1221    Specimen: Blood Updated: 01/01/25 1257     TSH 2.700 uIU/mL     Comprehensive Metabolic Panel [[id number]]  (Abnormal) Collected: 01/01/25 1221    Specimen: Blood Updated: 01/01/25 1246     Glucose 99 mg/dL      BUN 9 mg/dL      Creatinine 1.34 mg/dL      Sodium 136 mmol/L      Potassium 4.2 mmol/L      Chloride 101 mmol/L      CO2 23.4 mmol/L      Calcium 8.9 mg/dL      Total Protein 6.3 g/dL      Albumin 3.8 g/dL      ALT (SGPT) 13 U/L      AST (SGOT) 23 U/L      Alkaline Phosphatase 51 U/L      Total Bilirubin 1.1 mg/dL      Globulin 2.5 gm/dL      A/G Ratio 1.5 g/dL      BUN/Creatinine Ratio 6.7     Anion Gap 11.6 mmol/L      eGFR 53.6 mL/min/1.73     Narrative:      GFR Categories in Chronic Kidney Disease (CKD)      GFR Category          GFR  (mL/min/1.73)    Interpretation  G1                     90 or greater         Normal or high (1)  G2                      60-89                Mild decrease (1)  G3a                   45-59                Mild to moderate decrease  G3b                   30-44                Moderate to severe decrease  G4                    15-29                Severe decrease  G5                    14 or less           Kidney failure          (1)In the absence of evidence of kidney disease, neither GFR category G1 or G2 fulfill the criteria for CKD.    eGFR calculation 2021 CKD-EPI creatinine equation, which does not include race as a factor    Magnesium [[id number]]  (Normal) Collected: 01/01/25 1221    Specimen: Blood Updated: 01/01/25 1246     Magnesium 1.8 mg/dL     CK [[id number]]  (Abnormal) Collected: 01/01/25 1221    Specimen: Blood Updated: 01/01/25 1246     Creatine Kinase 216 U/L     CBC & Differential [[id number]]  (Abnormal) Collected: 01/01/25 1221    Specimen: Blood Updated: 01/01/25 1230    Narrative:      The following orders were created for panel order CBC & Differential.  Procedure                               Abnormality         Status                     ---------                               -----------         ------                     CBC Auto Differential[[id number]]        Abnormal            Final result                 Please view results for these tests on the individual orders.    CBC Auto Differential [[id number]]  (Abnormal) Collected: 01/01/25 1221    Specimen: Blood Updated: 01/01/25 1230     WBC 4.84 10*3/mm3      RBC 3.70 10*6/mm3      Hemoglobin 11.6 g/dL      Hematocrit 34.0 %      MCV 91.9 fL      MCH 31.4 pg      MCHC 34.1 g/dL      RDW 13.4 %      RDW-SD 45.3 fl      MPV 9.8 fL      Platelets 141 10*3/mm3      Neutrophil % 86.7 %      Lymphocyte % 4.5 %      Monocyte % 6.2 %      Eosinophil % 1.2 %      Basophil % 0.6 %      Immature Grans % 0.8 %      Neutrophils, Absolute 4.19 10*3/mm3       Lymphocytes, Absolute 0.22 10*3/mm3      Monocytes, Absolute 0.30 10*3/mm3      Eosinophils, Absolute 0.06 10*3/mm3      Basophils, Absolute 0.03 10*3/mm3      Immature Grans, Absolute 0.04 10*3/mm3      nRBC 0.0 /100 WBC           Imaging Results (Last 24 Hours)       Procedure Component Value Units Date/Time    MRI Brain Without Contrast [[id number]] Collected: 01/01/25 1845     Updated: 01/01/25 1847    Narrative:      FINAL REPORT    TECHNIQUE:  null    CLINICAL HISTORY:  AMS SEVERE HX OF PREVIOUS STROKE, FALL TODAY, HX OF PREVIOUS  SKIN CA OF LEFT EAR.    COMPARISON:  null    FINDINGS:  MR of the brain without contrast    Comparison: MR/SR - MRI BRAIN W WO CONTRAST - 8/2/24 21:53 EDT    CT - CT ANGIOGRAM NECK W WO CONTRAST - 8/2/24 20:54 EDT    CT - CT ANGIOGRAM HEAD W CONTRAST - 8/2/24 20:54 EDT    CT/SR - CT HEAD WO CONTRAST STROKE PROTOCOL - 8/2/24 20:54 EDT    MR/SR - MRI BRAIN W WO CONTRAST - 5/10/23 18:47 EDT    Findings:    No acute infarction, hemorrhage, mass-effect or herniation.    No hydrocephalus.    Increased signal intensity is seen in the deep and periventricular white matter on the T2/FLAIR sequences, which most likely represents the sequela of severe chronic small vessel ischemic disease. Chronic lacunar infarctions in the right corona   radiata/centrum semiovale and left basal ganglia.    No extra-axial fluid collection or mass.    Unremarkable sella.    Intact flow voids.    Normal orbits.    Mucosal thickening in the paranasal sinuses may indicate sinusitis. Fluid signal in right mastoid air cells could be secondary to an effusion or mastoiditis. There is osseous destruction at the left mastoid process, which was also present on the prior   studies and could be related to the previously seen skin cancer and/or post treatment changes. There is fluid signal in left mastoid air cells which could be secondary to an effusion or mastoiditis.      Impression:      Impression:    No acute  infarction. No posttraumatic findings.    Sinusitis could be considered.    Bilateral mastoid air cell effusions versus mastoiditis. Osseous destruction of the left mastoid process could be secondary to the previously seen skin cancer and/or posttreatment changes. Correlate clinically for signs/symptoms of infection to exclude   osteomyelitis.    Authenticated and Electronically Signed by Jaqueline Painter MD  on 01/01/2025 06:45:00 PM    CT Abdomen Pelvis With Contrast [[id number]] Collected: 01/01/25 1507     Updated: 01/01/25 1517    Narrative:      CT ABDOMEN AND PELVIS WITH CONTRAST     INDICATION: Possible bladder stone on x-ray.     TECHNIQUE: Thin section axial images were obtained from the lung bases  through the pubic symphysis after oral and intravenous contrast. Coronal  reconstruction images were obtained from the axial data.     COMPARISON: None.     FINDINGS:     Lower chest:  Lung bases are clear.     Liver:  Homogeneous.  No focal lesion.     Gallbladder/biliary system:  Gallbladder is present.   No intrahepatic  or extrahepatic biliary dilatation.     Spleen:  Unremarkable.     Adrenal glands:  Unremarkable. No nodules.     Pancreas: Evaluation limited by motion artifact. No convincing mass or  evidence of pancreatitis.     Kidneys/ureters/bladder:  No hydronephrosis, solid mass or perinephric  stranding. A large calcification in the urinary bladder is consistent  with a bladder stone. This measures 23 mm. There is also irregularity of  the bladder wall which could be related to chronic cystitis. Bladder  diverticula are not excluded near the dome.     GI tract: No evidence of small bowel obstruction or focal small bowel  wall thickening. Normal appendix. No acute colon abnormality.     Pelvic organs: Mild enlargement of the prostate.     Lymph nodes/mesentery/retroperitoneum: There is abnormal attenuation in  the root of the mesentery with small lymph nodes. 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     Abdominal wall: Abdominal wall intact.      Vascular: No acute vascular abnormality.     Free fluid: No free fluid.     Bones: No acute osseous abnormality.       Impression:      1. Large stone in the urinary bladder.  2. Wall thickening of the urinary bladder with probable diverticula.  This is likely related to chronic cystitis and possibly mild chronic  outlet obstruction.  3. Abnormal attenuation at the root of the mesentery most suggestive of  mesenteric panniculitis.                 CTDI: 9.79 mGy  DLP:552.61 mGy.cm                    This report was signed and finalized on 1/1/2025 3:15 PM by Carmen Le MD.       CT Cervical Spine Without Contrast [[id number]] Collected: 01/01/25 1319     Updated: 01/01/25 1324    Narrative:      PROCEDURE: CT CERVICAL SPINE WO CONTRAST-     INDICATION: fall this morning     TECHNIQUE: Thin section axial images were obtained through the cervical  spine without contrast.  Coronal and sagittal reconstruction images were  obtained from the axial data.     COMPARISON: None.     FINDINGS: Motion artifact limits exam quality. There is no convincing  acute fracture. No facet lock. The craniocervical junction appears  intact.   There is multilevel degenerative disc disease.   No acute  paraspinal abnormality.       Impression:      No acute fracture within the limitations of the exam. There  is motion artifact.     Multilevel degenerative disc disease.     Please see CT head report regarding findings of the left mastoid air  cells where there is mastoiditis and bone destruction.                       CTDI: 16.93 mGy  DLP:837.44 mGy.cm              This study was performed with techniques to keep radiation doses as low  as reasonably achievable (ALARA). Individualized dose reduction  techniques using automated exposure control or adjustment of mA and/or  kV according to the patient size were employed.        This report was signed and  finalized on 1/1/2025 1:22 PM by Carmen Le MD.       CT Head Without Contrast [[id number]] Collected: 01/01/25 1315     Updated: 01/01/25 1321    Narrative:      PROCEDURE: CT HEAD WO CONTRAST-     INDICATION: AMS     TECHNIQUE: Multiple axial CT images were performed from the foramen  magnum to the vertex without contrast.   Coronal reconstruction images  were obtained from the axial data.     COMPARISON: 8/2/2024.     FINDINGS: There is no mass effect or midline shift. There is stable  atrophy with ventricular enlargement. There are stable bilateral  periventricular hypodensities. There is no intracranial hemorrhage. The  posterior fossa is without acute abnormality. There is mucoperiosteal  thickening of the right maxillary sinus. There is opacification of the  left mastoid air cells with bone destruction. There is soft tissue  density in the left middle ear cavity. This was present on the prior  exam. No skull fracture.       Impression:      1. No intracranial hemorrhage or evidence of acute large cortical  infarct.  2. Stable atrophy and chronic findings.  3. Left mastoiditis and left otitis media with bone destruction of the  left mastoid air cells.  4. Right maxillary sinusitis.                          CTDI: 16.93 mGy  DLP:837.44 mGy.cm           This study was performed with techniques to keep radiation doses as low  as reasonably achievable (ALARA). Individualized dose reduction  techniques using automated exposure control or adjustment of mA and/or  kV according to the patient size were employed.        This report was signed and finalized on 1/1/2025 1:19 PM by Carmen Le MD.       XR Pelvis 1 or 2 View [[id number]] Collected: 01/01/25 1314     Updated: 01/01/25 1317    Narrative:      PROCEDURE: XR PELVIS 1 OR 2 VW-     HISTORY: fall, AMS     COMPARISON: None.     FINDINGS: An AP view of the pelvis was obtained.  There is no acute  osseous abnormality of the pelvis. There is degenerative joint  disease  of the hips bilaterally. There is a round radiodensity projecting over  the midline pelvis. This could be a bladder stone or artifact in or on  the patient's clothing. Soft tissues are otherwise without acute  abnormality.        Impression:      No acute osseous abnormality of the pelvis.     Round radiodensity projecting over the midline pelvis as above.                       This report was signed and finalized on 1/1/2025 1:15 PM by Carmen Le MD.       XR Chest 1 View [[id number]] Collected: 01/01/25 1313     Updated: 01/01/25 1316    Narrative:      PROCEDURE: XR CHEST 1 VW-     INDICATION:  fall, AMS     FINDINGS:  A portable view of the chest was obtained.  Comparison is  made to a prior exam dated 8/2/2024.   Heart size is normal. Lung  volumes are very low. Right perihilar and bibasilar opacities may be  atelectasis. Pneumonia is not excluded. No significant pleural effusion  or pneumothorax.       Impression:      Exam limited by low lung volumes. Probable bilateral  atelectasis.        This report was signed and finalized on 1/1/2025 1:14 PM by Carmen Le MD.             Physician Progress Notes (last 24 hours)  Notes from 01/01/25 1003 through 01/02/25 1003   No notes of this type exist for this encounter.

## 2025-01-03 ENCOUNTER — APPOINTMENT (OUTPATIENT)
Dept: GENERAL RADIOLOGY | Facility: HOSPITAL | Age: 80
End: 2025-01-03
Payer: MEDICARE

## 2025-01-03 PROBLEM — R41.82 ALTERED MENTAL STATUS: Status: ACTIVE | Noted: 2025-01-03

## 2025-01-03 LAB
ANION GAP SERPL CALCULATED.3IONS-SCNC: 10.8 MMOL/L (ref 5–15)
BASOPHILS # BLD AUTO: 0.03 10*3/MM3 (ref 0–0.2)
BASOPHILS NFR BLD AUTO: 1 % (ref 0–1.5)
BUN SERPL-MCNC: 17 MG/DL (ref 8–23)
BUN/CREAT SERPL: 13 (ref 7–25)
CALCIUM SPEC-SCNC: 8.5 MG/DL (ref 8.6–10.5)
CHLORIDE SERPL-SCNC: 102 MMOL/L (ref 98–107)
CO2 SERPL-SCNC: 22.2 MMOL/L (ref 22–29)
CREAT SERPL-MCNC: 1.31 MG/DL (ref 0.76–1.27)
DEPRECATED RDW RBC AUTO: 45.9 FL (ref 37–54)
EGFRCR SERPLBLD CKD-EPI 2021: 55 ML/MIN/1.73
EOSINOPHIL # BLD AUTO: 0.13 10*3/MM3 (ref 0–0.4)
EOSINOPHIL NFR BLD AUTO: 4.4 % (ref 0.3–6.2)
ERYTHROCYTE [DISTWIDTH] IN BLOOD BY AUTOMATED COUNT: 13.2 % (ref 12.3–15.4)
GLUCOSE SERPL-MCNC: 79 MG/DL (ref 65–99)
HCT VFR BLD AUTO: 28.5 % (ref 37.5–51)
HGB BLD-MCNC: 9.4 G/DL (ref 13–17.7)
IMM GRANULOCYTES # BLD AUTO: 0.03 10*3/MM3 (ref 0–0.05)
IMM GRANULOCYTES NFR BLD AUTO: 1 % (ref 0–0.5)
LYMPHOCYTES # BLD AUTO: 0.45 10*3/MM3 (ref 0.7–3.1)
LYMPHOCYTES NFR BLD AUTO: 15.3 % (ref 19.6–45.3)
MCH RBC QN AUTO: 31 PG (ref 26.6–33)
MCHC RBC AUTO-ENTMCNC: 33 G/DL (ref 31.5–35.7)
MCV RBC AUTO: 94.1 FL (ref 79–97)
MONOCYTES # BLD AUTO: 0.4 10*3/MM3 (ref 0.1–0.9)
MONOCYTES NFR BLD AUTO: 13.6 % (ref 5–12)
NEUTROPHILS NFR BLD AUTO: 1.91 10*3/MM3 (ref 1.7–7)
NEUTROPHILS NFR BLD AUTO: 64.7 % (ref 42.7–76)
NRBC BLD AUTO-RTO: 0 /100 WBC (ref 0–0.2)
PLATELET # BLD AUTO: 118 10*3/MM3 (ref 140–450)
PMV BLD AUTO: 9.9 FL (ref 6–12)
POTASSIUM SERPL-SCNC: 4.5 MMOL/L (ref 3.5–5.2)
RBC # BLD AUTO: 3.03 10*6/MM3 (ref 4.14–5.8)
SODIUM SERPL-SCNC: 135 MMOL/L (ref 136–145)
WBC NRBC COR # BLD AUTO: 2.95 10*3/MM3 (ref 3.4–10.8)

## 2025-01-03 PROCEDURE — 73030 X-RAY EXAM OF SHOULDER: CPT

## 2025-01-03 PROCEDURE — 25010000002 HEPARIN (PORCINE) PER 1000 UNITS: Performed by: STUDENT IN AN ORGANIZED HEALTH CARE EDUCATION/TRAINING PROGRAM

## 2025-01-03 PROCEDURE — 80048 BASIC METABOLIC PNL TOTAL CA: CPT | Performed by: STUDENT IN AN ORGANIZED HEALTH CARE EDUCATION/TRAINING PROGRAM

## 2025-01-03 PROCEDURE — 25010000002 CYANOCOBALAMIN PER 1000 MCG: Performed by: PSYCHIATRY & NEUROLOGY

## 2025-01-03 PROCEDURE — 99232 SBSQ HOSP IP/OBS MODERATE 35: CPT | Performed by: STUDENT IN AN ORGANIZED HEALTH CARE EDUCATION/TRAINING PROGRAM

## 2025-01-03 PROCEDURE — 99232 SBSQ HOSP IP/OBS MODERATE 35: CPT | Performed by: PSYCHIATRY & NEUROLOGY

## 2025-01-03 PROCEDURE — 85025 COMPLETE CBC W/AUTO DIFF WBC: CPT | Performed by: STUDENT IN AN ORGANIZED HEALTH CARE EDUCATION/TRAINING PROGRAM

## 2025-01-03 RX ORDER — CYANOCOBALAMIN 1000 UG/ML
1000 INJECTION, SOLUTION INTRAMUSCULAR; SUBCUTANEOUS DAILY
Status: DISPENSED | OUTPATIENT
Start: 2025-01-03 | End: 2025-01-10

## 2025-01-03 RX ADMIN — AMOXICILLIN AND CLAVULANATE POTASSIUM 1 TABLET: 875; 125 TABLET, FILM COATED ORAL at 22:18

## 2025-01-03 RX ADMIN — HEPARIN SODIUM 5000 UNITS: 5000 INJECTION INTRAVENOUS; SUBCUTANEOUS at 22:18

## 2025-01-03 RX ADMIN — Medication 10 ML: at 09:00

## 2025-01-03 RX ADMIN — SENNOSIDES 1 TABLET: 8.6 TABLET, FILM COATED ORAL at 22:18

## 2025-01-03 RX ADMIN — CYANOCOBALAMIN 1000 MCG: 1000 INJECTION, SOLUTION INTRAMUSCULAR at 13:36

## 2025-01-03 NOTE — PROGRESS NOTES
[facility]    [facility]    Name:  Hardik Natarajan   Age:  79 y.o.  Sex:  male  :  1945  MRN:  [id number]   Visit Number:  [id number]  Admission Date:  2025  Date Of Service:  25  Primary Care Physician:  Won Cuellar MD     LOS: 0 days :    Chief Complaint:      Follow-up; altered mental status    Subjective:    Says he feels sore all over, states his left shoulder feels tight.  Otherwise feels all right.    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  Presented to [facility] on 2025 with concern for altered mental status.  Last known normal was night prior to presentation.  Also reported having a fall, unclear if it was syncope.  Wife found him on the floor.  On Plavix, no other anticoagulation.  He denied any fevers or chills, chest pain, shortness of air, any other specific concern.  Denied any slurred speech, difficulty moving upper or lower extremities, and any in symmetry.     ED summary: Afebrile, vital signs stable on room air.  ABG pH 7.44, pCO2 34, pO2 82, bicarb 24.  EKG sinus tachycardia rate 107, nonspecific ST-T wave changes.  Opponent 23, ACS not suspected.  Creatinine 1.34, lactate elevated, procalcitonin not elevated.  UDS negative.  Blood cultures collected.  COVID/flu/RSV negative.  CT abdomen/pelvis large stone in urinary bladder, wall thickening probable diverticula, abnormal attenuation of the root of the mesentery most suggestive of mesenteric panniculitis.  CT head no intracranial hemorrhage or evidence of acute large cortical infarct, stable atrophy and chronic findings, left mastoiditis and left otitis media with bone destruction of the left mastoid air cells, right maxillary sinusitis.  CT  cervical spine no acute fracture, multilevel degenerative disc disease.    Review of Systems:     All systems were reviewed and negative except as mentioned in subjective, assessment and plan.    Vital Signs:    Temp:  [97.5 °F (36.4 °C)-98.8 °F (37.1 °C)] 98.5 °F (36.9 °C)  Heart Rate:  [73-86] 81  Resp:  [18] 18  BP: (117-135)/(66-93) 117/66    Intake and output:    I/O last 3 completed shifts:  In: 0   Out: 800 [Urine:800]  I/O this shift:  In: 240 [P.O.:240]  Out: -     Physical Examination:    General Appearance:  Alert and cooperative.    Head:  Superior ear and surrounding superior auricular area and left side of head scarring with good healing, no erythema, no swelling   Eyes: Conjunctivae and sclerae normal, no icterus. No pallor.   Throat: No oral lesions, no thrush, oral mucosa moist.   Neck: Supple, trachea midline, no thyromegaly.   Lungs:   Breath sounds heard bilaterally equally.  No wheezing or crackles. No Pleural rub or bronchial breathing.   Heart:  Normal S1 and S2, no murmur, no gallop, no rub. No JVD.   Abdomen:   Normal bowel sounds, no masses, no organomegaly. Soft, nontender, nondistended, no rebound tenderness.   Extremities: Supple, no edema, no cyanosis, no clubbing.   Skin: Warm   Neurologic: Alert and oriented to self. No facial asymmetry. Moves all four limbs. No tremors.      Laboratory results:    Results from last 7 days   Lab Units 01/03/25  0646 01/02/25  0543 01/01/25  1221   SODIUM mmol/L 135* 136 136   POTASSIUM mmol/L 4.5 4.3 4.2   CHLORIDE mmol/L 102 105 101   CO2 mmol/L 22.2 22.4 23.4   BUN mg/dL 17 11 9   CREATININE mg/dL 1.31* 1.30* 1.34*   CALCIUM mg/dL 8.5* 8.4* 8.9   BILIRUBIN mg/dL  --   --  1.1   ALK PHOS U/L  --   --  51   ALT (SGPT) U/L  --   --  13   AST (SGOT) U/L  --   --  23   GLUCOSE mg/dL 79 88 99     Results from last 7 days   Lab Units 01/03/25  0646 01/02/25  0543 01/01/25  1221   WBC 10*3/mm3 2.95* 3.23* 4.84   HEMOGLOBIN g/dL 9.4* 9.8* 11.6*   HEMATOCRIT  % 28.5* 29.3* 34.0*   PLATELETS 10*3/mm3 118* 123* 141         Results from last 7 days   Lab Units 01/01/25  1406 01/01/25  1221   CK TOTAL U/L  --  216*   HSTROP T ng/L 23* 24*     Results from last 7 days   Lab Units 01/01/25  1627 01/01/25  1620   BLOODCX  No growth at 24 hours No growth at 24 hours     Recent Labs     01/01/25  1555   PHART 7.455   YTE5NDV 34.2*   PO2ART 82.6   CMJ2ZGO 24.0   BASEEXCESS 0.4      I have reviewed the patient's laboratory results.    Radiology results:    XR Shoulder 2+ View Left    Result Date: 1/3/2025   PROCEDURE: XR SHOULDER 2+ VW LEFT-  HISTORY: left shoulder pain; R41.82-Altered mental status, unspecified; N21.0-Calculus in bladder; C44.229-Squamous cell carcinoma of skin of left ear and external auricular canal, no trauma.  COMPARISON: None.  FINDINGS:  A three view exam demonstrates no acute fracture or dislocation. The joint spaces demonstrate decreased subacromial distance suggesting chronic rotator cuff tear. Diffuse osteopenia identified. Mild narrowing of the left AC joint noted. No soft tissue abnormality is seen. No bony destructive lesion seen.       Impression: No acute bony abnormality.  Degenerative change as described.      This report was signed and finalized on 1/3/2025 8:17 AM by Camille Rodriguez MD.      MRI Brain Without Contrast    Result Date: 1/1/2025  FINAL REPORT TECHNIQUE: null CLINICAL HISTORY: AMS SEVERE HX OF PREVIOUS STROKE, FALL TODAY, HX OF PREVIOUS SKIN CA OF LEFT EAR. COMPARISON: null FINDINGS: MR of the brain without contrast Comparison: MR/SR - MRI BRAIN W WO CONTRAST - 8/2/24 21:53 EDT CT - CT ANGIOGRAM NECK W WO CONTRAST - 8/2/24 20:54 EDT CT - CT ANGIOGRAM HEAD W CONTRAST - 8/2/24 20:54 EDT CT/SR - CT HEAD WO CONTRAST STROKE PROTOCOL - 8/2/24 20:54 EDT MR/SR - MRI BRAIN W WO CONTRAST - 5/10/23 18:47 EDT Findings: No acute infarction, hemorrhage, mass-effect or herniation. No hydrocephalus. Increased signal intensity is seen in the deep and  periventricular white matter on the T2/FLAIR sequences, which most likely represents the sequela of severe chronic small vessel ischemic disease. Chronic lacunar infarctions in the right corona radiata/centrum semiovale and left basal ganglia. No extra-axial fluid collection or mass. Unremarkable sella. Intact flow voids. Normal orbits. Mucosal thickening in the paranasal sinuses may indicate sinusitis. Fluid signal in right mastoid air cells could be secondary to an effusion or mastoiditis. There is osseous destruction at the left mastoid process, which was also present on the prior studies and could be related to the previously seen skin cancer and/or post treatment changes. There is fluid signal in left mastoid air cells which could be secondary to an effusion or mastoiditis.     Impression: Impression: No acute infarction. No posttraumatic findings. Sinusitis could be considered. Bilateral mastoid air cell effusions versus mastoiditis. Osseous destruction of the left mastoid process could be secondary to the previously seen skin cancer and/or posttreatment changes. Correlate clinically for signs/symptoms of infection to exclude osteomyelitis. Authenticated and Electronically Signed by Jaqueline Painter MD on 01/01/2025 06:45:00 PM    CT Abdomen Pelvis With Contrast    Result Date: 1/1/2025  CT ABDOMEN AND PELVIS WITH CONTRAST  INDICATION: Possible bladder stone on x-ray.  TECHNIQUE: Thin section axial images were obtained from the lung bases through the pubic symphysis after oral and intravenous contrast. Coronal reconstruction images were obtained from the axial data.  COMPARISON: None.  FINDINGS:  Lower chest:  Lung bases are clear.  Liver:  Homogeneous.  No focal lesion.  Gallbladder/biliary system:  Gallbladder is present.   No intrahepatic or extrahepatic biliary dilatation.  Spleen:  Unremarkable.  Adrenal glands:  Unremarkable. No nodules.  Pancreas: Evaluation limited by motion artifact. No convincing  mass or evidence of pancreatitis.  Kidneys/ureters/bladder:  No hydronephrosis, solid mass or perinephric stranding. A large calcification in the urinary bladder is consistent with a bladder stone. This measures 23 mm. There is also irregularity of the bladder wall which could be related to chronic cystitis. Bladder diverticula are not excluded near the dome.  GI tract: No evidence of small bowel obstruction or focal small bowel wall thickening. Normal appendix. No acute colon abnormality.  Pelvic organs: Mild enlargement of the prostate.  Lymph nodes/mesentery/retroperitoneum: There is abnormal attenuation in the root of the mesentery with small lymph nodes. 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  Abdominal wall: Abdominal wall intact.  Vascular: No acute vascular abnormality.  Free fluid: No free fluid.  Bones: No acute osseous abnormality.      Impression: 1. Large stone in the urinary bladder. 2. Wall thickening of the urinary bladder with probable diverticula. This is likely related to chronic cystitis and possibly mild chronic outlet obstruction. 3. 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      CTDI: 9.79 mGy DLP:552.61 mGy.cm       This report was signed and finalized on 1/1/2025 3:15 PM by Carmen Le MD.      CT Cervical Spine Without Contrast    Result Date: 1/1/2025  PROCEDURE: CT CERVICAL SPINE WO CONTRAST-  INDICATION: fall this morning  TECHNIQUE: Thin section axial images were obtained through the cervical spine without contrast.  Coronal and sagittal reconstruction images were obtained from the axial data.  COMPARISON: None.  FINDINGS: Motion artifact limits exam quality. There is no convincing acute fracture. No facet lock. The craniocervical junction appears intact.   There is multilevel degenerative disc disease.   No acute paraspinal abnormality.      Impression: No acute fracture within the limitations of the  exam. There is motion artifact.  Multilevel degenerative disc disease.  Please see CT head report regarding findings of the left mastoid air cells where there is mastoiditis and bone destruction.        CTDI: 16.93 mGy DLP:837.44 mGy.cm     This study was performed with techniques to keep radiation doses as low as reasonably achievable (ALARA). Individualized dose reduction techniques using automated exposure control or adjustment of mA and/or kV according to the patient size were employed.   This report was signed and finalized on 1/1/2025 1:22 PM by Carmen Le MD.      CT Head Without Contrast    Result Date: 1/1/2025  PROCEDURE: CT HEAD WO CONTRAST-  INDICATION: AMS  TECHNIQUE: Multiple axial CT images were performed from the foramen magnum to the vertex without contrast.   Coronal reconstruction images were obtained from the axial data.  COMPARISON: 8/2/2024.  FINDINGS: There is no mass effect or midline shift. There is stable atrophy with ventricular enlargement. There are stable bilateral periventricular hypodensities. There is no intracranial hemorrhage. The posterior fossa is without acute abnormality. There is mucoperiosteal thickening of the right maxillary sinus. There is opacification of the left mastoid air cells with bone destruction. There is soft tissue density in the left middle ear cavity. This was present on the prior exam. No skull fracture.      Impression: 1. No intracranial hemorrhage or evidence of acute large cortical infarct. 2. Stable atrophy and chronic findings. 3. Left mastoiditis and left otitis media with bone destruction of the left mastoid air cells. 4. Right maxillary sinusitis.         CTDI: 16.93 mGy DLP:837.44 mGy.cm    This study was performed with techniques to keep radiation doses as low as reasonably achievable (ALARA). Individualized dose reduction techniques using automated exposure control or adjustment of mA and/or kV according to the patient size were employed.    This report was signed and finalized on 1/1/2025 1:19 PM by Carmen Le MD.      XR Chest 1 View    Result Date: 1/1/2025  PROCEDURE: XR CHEST 1 VW-  INDICATION:  fall, AMS  FINDINGS:  A portable view of the chest was obtained.  Comparison is made to a prior exam dated 8/2/2024.   Heart size is normal. Lung volumes are very low. Right perihilar and bibasilar opacities may be atelectasis. Pneumonia is not excluded. No significant pleural effusion or pneumothorax.      Impression: Exam limited by low lung volumes. Probable bilateral atelectasis.   This report was signed and finalized on 1/1/2025 1:14 PM by Carmen Le MD.     I have reviewed the patient's radiology reports.    Medication Review:     I have reviewed the patient's active and prn medications.     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  Mental status slowly improving, not as combative as of 1/3.     Altered mental status  Neurology consultation, recreations appreciated. EEG.  Family has deferred on lumbar puncture.  No seizure-like activity reported or witnessed, no syncope.  Differential could include vascular dementia.    Fall precautions.  MRI brain no sign of new stroke, no metastasis.  Chronic osseous destruction observed.  Do not suspect osteomyelitis, no sepsis.  CT showed stable chronic findings; left mastoiditis and left otitis media with bone destruction of the left mastoid air cells.  Left mastoiditis, left otitis media with bone destruction of left mastoid air cells  Augmentin.  CKD  Recommend follow-up outpatient.     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  Continue home  medications.     Risk Assessment: High  DVT Prophylaxis: Heparin  Code Status: Full code  Diet: Cardiac  Discharge Plan: At least 1-2 more days depending on clinical status.    Updated family Yobany and Leonora during course.    Brian Joseph Kerley, DO  01/03/25  12:38 EST    Dictated utilizing Dragon dictation.    I have reviewed the copied text and it is accurate as of 1/3/2025

## 2025-01-03 NOTE — THERAPY TREATMENT NOTE
"Pt declined secondary to \"not feeling well and sore all over.\" OT to check back at later date.  "

## 2025-01-03 NOTE — CASE MANAGEMENT/SOCIAL WORK
DCP; Pending course of recovery. No family at the bedside. Pt is confused. Pt is not medically stable for discharge at this time. CM continues to follow.

## 2025-01-03 NOTE — PAYOR COMM NOTE
"TO:BC  FROM:ED PERSAUD, RN PHONE [phone]  CLINICALS REF# [id number]    Katey Browne (79 y.o. Male)       Date of Birth   1945    Social Security Number       Address   [address] [facility] [place] [address]    Home Phone   [phone]    MRN   [id number]       Restorationism   None    Marital Status                               Admission Date   1/1/25    Admission Type   Emergency    Admitting Provider   Kerley, Brian Joseph, DO    Attending Provider   Kerley, Brian Joseph, DO    Department, Room/Bed   [facility] TELEMETRY 3, 313/1       Discharge Date       Discharge Disposition       Discharge Destination                                 Attending Provider: Kerley, Brian Joseph, DO    Allergies: Asa [Aspirin]    Isolation: None   Infection: None   Code Status: CPR    Ht: 185.4 cm (73\")   Wt: 96.3 kg (212 lb 4.9 oz)    Admission Cmt: None   Principal Problem: AMS (altered mental status) [R41.82]                   Active Insurance as of 1/1/2025       Primary Coverage       Payor Plan Insurance Group Employer/Plan Group    ANTHEM MEDICARE REPLACEMENT ANTHEM MED ADV SNP [id number]       Payor Plan Address Payor Plan Phone Number Payor Plan Fax Number Effective Dates    [phone]  1/1/2024 - None Entered    [facility] [address]         Subscriber Name Subscriber Birth Date Member ID       KATEY BROWNE 1945 [id number]               Secondary Coverage       Payor Plan Insurance Group Employer/Plan Group    [id number]        Payor Plan Address Payor Plan Phone Number Payor Plan Fax Number Effective Dates    [address]   4/7/2023 - None Entered    [facility] [id number]         Subscriber Name Subscriber Birth Date Member ID       KATEY BROWNE 1945 [id number]                     Emergency Contacts        (Rel.) Home Phone Work Phone Mobile Phone    BROWNEMAC/ZITA (Son) [phone] -- " [phone]                 History & Physical        Kerley, Brian Joseph, DO at 25 1727            [facility]   HISTORY AND PHYSICAL      Name:  Hardik Natarajan   Age:  79 y.o.  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  Presented to [facility] ED on 2025 with concern for altered mental status.  Last known normal was night prior to presentation.  Also reported having a fall, unclear if it was syncope.  Wife found him on the floor.  On Plavix, no other anticoagulation.  He denied any fevers or chills, chest pain, shortness of air, any other specific concern.  Denied any slurred speech, difficulty moving upper or lower extremities, and any in symmetry.    ED summary: Afebrile, vital signs stable on room air.  ABG pH 7.44, pCO2 34, pO2 82, bicarb 24.  EKG sinus tachycardia rate 107, nonspecific ST-T wave changes.  Opponent 23, ACS not suspected.  Creatinine 1.34, lactate elevated, procalcitonin not elevated.  UDS negative.  Blood cultures collected.  COVID/flu/RSV negative.  CT abdomen/pelvis large stone in urinary bladder, wall thickening probable diverticula, abnormal attenuation of the root of the mesentery most suggestive of mesenteric panniculitis.  CT head no intracranial hemorrhage or evidence of acute large cortical infarct, stable atrophy and chronic findings, left mastoiditis and left otitis media with bone destruction of the left mastoid air cells, right maxillary sinusitis.  CT cervical spine no  acute fracture, multilevel degenerative disc disease.    Review Of Systems:    All systems were reviewed and negative except as mentioned in history of presenting illness, assessment and plan.    Past Medical History: Patient  has a past medical history of Anemia, Arthritis, and Cancer.    Past Surgical History: Patient  has a past surgical history that includes Skin cancer excision; Neck dissection (N/A); Parotidectomy (N/A); and NAIL BIOPSY (N/A).    Social History: Patient  reports that he has never smoked. His smokeless tobacco use includes chew. He reports that he does not currently use alcohol. He reports that he does not use drugs.    Family History:  Patient's family history has been reviewed and found to be noncontributory.     Allergies:      Asa [aspirin]    Home Medications:    Prior to Admission Medications       Prescriptions Last Dose Informant Patient Reported? Taking?    acetaminophen (TYLENOL) 325 MG tablet   Yes No    Take 2 tablets by mouth Every 6 (Six) Hours As Needed.    clopidogrel (PLAVIX) 75 MG tablet   Yes No    Take 1 tablet by mouth Daily.    cyanocobalamin 1000 MCG/ML injection   Yes No    Inject 0.1 mL into the appropriate muscle as directed by prescriber Every 30 (Thirty) Days.    diclofenac (VOLTAREN) 75 MG EC tablet   Yes No    DULoxetine (CYMBALTA) 30 MG capsule   Yes No    Take 1 capsule by mouth Daily.    finasteride (PROSCAR) 5 MG tablet   Yes No    Take 1 tablet by mouth Daily.    Hydrocortisone, Perianal, (ANUSOL-HC) 2.5 % rectal cream   Yes No    Insert 1 Application into the rectum.    hydrOXYzine (ATARAX) 25 MG tablet   No No    Take 1 tablet by mouth Every 6 (Six) Hours As Needed for Itching.    levothyroxine (SYNTHROID, LEVOTHROID) 75 MCG tablet   No No    TAKE ONE (1) TABLET BY MOUTH EVERY DAY    ondansetron (ZOFRAN) 8 MG tablet   No No    Take 1 tablet by mouth 3 (Three) Times a Day As Needed for Nausea or Vomiting.    ondansetron ODT (ZOFRAN-ODT) 8 MG disintegrating  "tablet   Yes No    Place 1 tablet twice a day by translingual route.    oxyCODONE (Roxicodone) 5 MG immediate release tablet   No No    Take 1 tablet by mouth Every 6 (Six) Hours As Needed for Severe Pain.    Procto-Med HC 2.5 % rectal cream   Yes No    1 Application Daily As Needed.    senna (SENOKOT) 8.6 MG tablet   Yes No    Take 1 tablet by mouth Daily As Needed.    tamsulosin (FLOMAX) 0.4 MG capsule 24 hr capsule   Yes No    Take 1 capsule by mouth.          ED Medications:    Medications   sodium chloride 0.9 % bolus 1,000 mL (1,000 mL Intravenous New Bag 1/1/25 1722)   iopamidol (ISOVUE-300) 61 % injection 100 mL (100 mL Intravenous Given 1/1/25 1427)   sodium chloride 0.9 % bolus 1,000 mL (1,000 mL Intravenous New Bag 1/1/25 1557)     Vital Signs:  Temp:  [98 °F (36.7 °C)] 98 °F (36.7 °C)  Heart Rate:  [] 89  Resp:  [18] 18  BP: ()/(55-99) 94/68        01/01/25  1149   Weight: 102 kg (225 lb)     Body mass index is 29.69 kg/m².    Physical Exam:     Most recent vital Signs: BP 94/68   Pulse 89   Temp 98 °F (36.7 °C) (Oral)   Resp 18   Ht 185.4 cm (73\")   Wt 102 kg (225 lb)   SpO2 96%   BMI 29.69 kg/m²     Physical Exam  Constitutional:       General: He is not in acute distress.     Appearance: He is ill-appearing. He is not toxic-appearing.   HENT:      Mouth/Throat:      Mouth: Mucous membranes are moist.   Eyes:      Extraocular Movements: Extraocular movements intact.   Cardiovascular:      Rate and Rhythm: Normal rate and regular rhythm.      Pulses: Normal pulses.      Heart sounds: Normal heart sounds.   Pulmonary:      Effort: Pulmonary effort is normal.      Breath sounds: Normal breath sounds.   Abdominal:      Palpations: Abdomen is soft.      Tenderness: There is no abdominal tenderness.   Musculoskeletal:      Right lower leg: No edema.      Left lower leg: No edema.   Skin:     General: Skin is warm.   Neurological:      General: No focal deficit present.      Mental Status: " He is alert.      Comments: Oriented to self   Psychiatric:      Comments: Agitated         Laboratory data:    I have reviewed the labs done in the emergency room.    Results from last 7 days   Lab Units 01/01/25  1221   SODIUM mmol/L 136   POTASSIUM mmol/L 4.2   CHLORIDE mmol/L 101   CO2 mmol/L 23.4   BUN mg/dL 9   CREATININE mg/dL 1.34*   CALCIUM mg/dL 8.9   BILIRUBIN mg/dL 1.1   ALK PHOS U/L 51   ALT (SGPT) U/L 13   AST (SGOT) U/L 23   GLUCOSE mg/dL 99     Results from last 7 days   Lab Units 01/01/25  1221   WBC 10*3/mm3 4.84   HEMOGLOBIN g/dL 11.6*   HEMATOCRIT % 34.0*   PLATELETS 10*3/mm3 141         Results from last 7 days   Lab Units 01/01/25  1406 01/01/25  1221   CK TOTAL U/L  --  216*   HSTROP T ng/L 23* 24*                 Results from last 7 days   Lab Units 01/01/25  1555   PH, ARTERIAL pH units 7.455   PO2 ART mm Hg 82.6   PCO2, ARTERIAL mm Hg 34.2*   HCO3 ART mmol/L 24.0     Results from last 7 days   Lab Units 01/01/25  1418   COLOR UA  Yellow   GLUCOSE UA  Negative   KETONES UA  Negative   BLOOD UA  Negative   LEUKOCYTES UA  Negative   PH, URINE  7.0   BILIRUBIN UA  Negative   UROBILINOGEN UA  1.0 E.U./dL       Pain Management Panel           No data to display                    Radiology:    CT Abdomen Pelvis With Contrast    Result Date: 1/1/2025  CT ABDOMEN AND PELVIS WITH CONTRAST  INDICATION: Possible bladder stone on x-ray.  TECHNIQUE: Thin section axial images were obtained from the lung bases through the pubic symphysis after oral and intravenous contrast. Coronal reconstruction images were obtained from the axial data.  COMPARISON: None.  FINDINGS:  Lower chest:  Lung bases are clear.  Liver:  Homogeneous.  No focal lesion.  Gallbladder/biliary system:  Gallbladder is present.   No intrahepatic or extrahepatic biliary dilatation.  Spleen:  Unremarkable.  Adrenal glands:  Unremarkable. No nodules.  Pancreas: Evaluation limited by motion artifact. No convincing mass or evidence of  pancreatitis.  Kidneys/ureters/bladder:  No hydronephrosis, solid mass or perinephric stranding. A large calcification in the urinary bladder is consistent with a bladder stone. This measures 23 mm. There is also irregularity of the bladder wall which could be related to chronic cystitis. Bladder diverticula are not excluded near the dome.  GI tract: No evidence of small bowel obstruction or focal small bowel wall thickening. Normal appendix. No acute colon abnormality.  Pelvic organs: Mild enlargement of the prostate.  Lymph nodes/mesentery/retroperitoneum: There is abnormal attenuation in the root of the mesentery with small lymph nodes. 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  Abdominal wall: Abdominal wall intact.  Vascular: No acute vascular abnormality.  Free fluid: No free fluid.  Bones: No acute osseous abnormality.      1. Large stone in the urinary bladder. 2. Wall thickening of the urinary bladder with probable diverticula. This is likely related to chronic cystitis and possibly mild chronic outlet obstruction. 3. 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      CTDI: 9.79 mGy DLP:552.61 mGy.cm       This report was signed and finalized on 1/1/2025 3:15 PM by Carmen Le MD.      CT Cervical Spine Without Contrast    Result Date: 1/1/2025  PROCEDURE: CT CERVICAL SPINE WO CONTRAST-  INDICATION: fall this morning  TECHNIQUE: Thin section axial images were obtained through the cervical spine without contrast.  Coronal and sagittal reconstruction images were obtained from the axial data.  COMPARISON: None.  FINDINGS: Motion artifact limits exam quality. There is no convincing acute fracture. No facet lock. The craniocervical junction appears intact.   There is multilevel degenerative disc disease.   No acute paraspinal abnormality.      No acute fracture within the limitations of the exam. There is motion artifact.  Multilevel  degenerative disc disease.  Please see CT head report regarding findings of the left mastoid air cells where there is mastoiditis and bone destruction.        CTDI: 16.93 mGy DLP:837.44 mGy.cm     This study was performed with techniques to keep radiation doses as low as reasonably achievable (ALARA). Individualized dose reduction techniques using automated exposure control or adjustment of mA and/or kV according to the patient size were employed.   This report was signed and finalized on 1/1/2025 1:22 PM by Carmen Le MD.      CT Head Without Contrast    Result Date: 1/1/2025  PROCEDURE: CT HEAD WO CONTRAST-  INDICATION: AMS  TECHNIQUE: Multiple axial CT images were performed from the foramen magnum to the vertex without contrast.   Coronal reconstruction images were obtained from the axial data.  COMPARISON: 8/2/2024.  FINDINGS: There is no mass effect or midline shift. There is stable atrophy with ventricular enlargement. There are stable bilateral periventricular hypodensities. There is no intracranial hemorrhage. The posterior fossa is without acute abnormality. There is mucoperiosteal thickening of the right maxillary sinus. There is opacification of the left mastoid air cells with bone destruction. There is soft tissue density in the left middle ear cavity. This was present on the prior exam. No skull fracture.      1. No intracranial hemorrhage or evidence of acute large cortical infarct. 2. Stable atrophy and chronic findings. 3. Left mastoiditis and left otitis media with bone destruction of the left mastoid air cells. 4. Right maxillary sinusitis.         CTDI: 16.93 mGy DLP:837.44 mGy.cm    This study was performed with techniques to keep radiation doses as low as reasonably achievable (ALARA). Individualized dose reduction techniques using automated exposure control or adjustment of mA and/or kV according to the patient size were employed.   This report was signed and finalized on 1/1/2025 1:19 PM  by Carmen Le MD.      XR Pelvis 1 or 2 View    Result Date: 1/1/2025  PROCEDURE: XR PELVIS 1 OR 2 VW-  HISTORY: fall, AMS  COMPARISON: None.  FINDINGS: An AP view of the pelvis was obtained.  There is no acute osseous abnormality of the pelvis. There is degenerative joint disease of the hips bilaterally. There is a round radiodensity projecting over the midline pelvis. This could be a bladder stone or artifact in or on the patient's clothing. Soft tissues are otherwise without acute abnormality.      No acute osseous abnormality of the pelvis.  Round radiodensity projecting over the midline pelvis as above.        This report was signed and finalized on 1/1/2025 1:15 PM by Carmen Le MD.      XR Chest 1 View    Result Date: 1/1/2025  PROCEDURE: XR CHEST 1 VW-  INDICATION:  fall, AMS  FINDINGS:  A portable view of the chest was obtained.  Comparison is made to a prior exam dated 8/2/2024.   Heart size is normal. Lung volumes are very low. Right perihilar and bibasilar opacities may be atelectasis. Pneumonia is not excluded. No significant pleural effusion or pneumothorax.      Exam limited by low lung volumes. Probable bilateral atelectasis.   This report was signed and finalized on 1/1/2025 1:14 PM by Carmen Le MD.       Assessment/Plan:    Observation general floor admission 1/1/2025 with altered mental status in the setting of known invasive stage IV left ear squamous cell carcinoma with bony mets.    Altered mental status  Left mastoiditis, left otitis media with bone destruction of left mastoid air cells  Neurology consultation, recreations appreciated.  MRI brain.  EEG.   No seizure-like activity reported or witnessed, no syncope.  Differential could include stroke, brain mets.  CT showed stable chronic findings; left mastoiditis and left otitis media with bone destruction of the left mastoid air cells.  Fall precautions.    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  Continue home medications.    Risk Assessment: High  DVT Prophylaxis: Heparin  Code Status: Full code  Diet: Cardiac    Advance Care Planning  ACP discussion was held with the patient during this visit. Patient does not have an advance directive, information provided.           Brian Joseph Kerley, DO  01/01/25  17:27 EST    Dictated utilizing Dragon dictation.    Electronically signed by Kerley, Brian Joseph, DO at 01/01/25 3395          Emergency Department Notes        Donna James PA-C at 01/01/25 1208          Subjective:  Chief Complaint:  Fall, AMS    History of Present Illness:  Patient is an 79-year-old male with history of anemia, arthritis, cancer, recent small periventricular ischemic stroke in August 2020 for which she was admitted here for.  Patient presents today with altered mental status.  Per daughter-in-law at bedside last known normal was last night.  States that her  spoke with him last night said he seemed normal.  States that this morning he had a fall.  Wife was going to get him some water and turned around and he was on the floor.  Patient is on Plavix but does not appear to be on any other anticoagulants.  Patient denying any complaints currently but daughter-in-law states that he did not recognize his son.  Patient is alert and oriented to person but not place and time.  Daughter-in-law states that this is probably his baseline.  Patient denying any pain or complaints currently.  Does not appear to have any focal neurologic deficits.  Daughter-in-law is worried about a UTI she states his urine was strong smelling and he did urinate on himself on the way here.      Nurses Notes reviewed and agree, including vitals, allergies, social history and prior medical history.     REVIEW OF SYSTEMS: All systems reviewed and not pertinent unless  "noted.  Review of Systems   Genitourinary:         Dark foul-smelling urine   Psychiatric/Behavioral:  Positive for confusion.    All other systems reviewed and are negative.      Past Medical History:   Diagnosis Date    Anemia     Arthritis     Cancer        Allergies:    Asa [aspirin]      Past Surgical History:   Procedure Laterality Date    NAIL BIOPSY N/A     NECK DISSECTION N/A     PAROTIDECTOMY N/A     SKIN CANCER EXCISION           Social History     Socioeconomic History    Marital status:    Tobacco Use    Smoking status: Never    Smokeless tobacco: Current     Types: Chew   Vaping Use    Vaping status: Never Used   Substance and Sexual Activity    Alcohol use: Not Currently    Drug use: Never    Sexual activity: Defer         Family History   Problem Relation Age of Onset    Cancer Mother     Cancer Father        Objective  Physical Exam:  /58   Pulse 89   Temp 98 °F (36.7 °C) (Oral)   Resp 18   Ht 185.4 cm (73\")   Wt 102 kg (225 lb)   SpO2 98%   BMI 29.69 kg/m²      Physical Exam  Vitals and nursing note reviewed.   Constitutional:       General: He is not in acute distress.     Appearance: He is not toxic-appearing.   HENT:      Head: Normocephalic and atraumatic.   Eyes:      Extraocular Movements: Extraocular movements intact.      Pupils: Pupils are equal, round, and reactive to light.   Cardiovascular:      Rate and Rhythm: Tachycardia present.   Pulmonary:      Effort: Pulmonary effort is normal. No respiratory distress.   Abdominal:      General: There is no distension.      Palpations: Abdomen is soft.      Tenderness: There is no abdominal tenderness.   Musculoskeletal:         General: Normal range of motion.      Cervical back: Normal range of motion and neck supple.   Skin:     General: Skin is warm and dry.      Comments: Left ear noted to have ulceration secondary to skin cancer; no signs of acute infection, daughter-in-law states that it appears at baseline "   Neurological:      Mental Status: He is alert.      GCS: GCS eye subscore is 4. GCS verbal subscore is 4. GCS motor subscore is 6.   Psychiatric:         Mood and Affect: Mood normal.         Behavior: Behavior normal.         Procedures    ED Course:         Lab Results (last 24 hours)       Procedure Component Value Units Date/Time    CBC & Differential [[id number]]  (Abnormal) Collected: 01/01/25 1221    Specimen: Blood Updated: 01/01/25 1230    Narrative:      The following orders were created for panel order CBC & Differential.  Procedure                               Abnormality         Status                     ---------                               -----------         ------                     CBC Auto Differential[[id number]]        Abnormal            Final result                 Please view results for these tests on the individual orders.    Comprehensive Metabolic Panel [[id number]]  (Abnormal) Collected: 01/01/25 1221    Specimen: Blood Updated: 01/01/25 1246     Glucose 99 mg/dL      BUN 9 mg/dL      Creatinine 1.34 mg/dL      Sodium 136 mmol/L      Potassium 4.2 mmol/L      Chloride 101 mmol/L      CO2 23.4 mmol/L      Calcium 8.9 mg/dL      Total Protein 6.3 g/dL      Albumin 3.8 g/dL      ALT (SGPT) 13 U/L      AST (SGOT) 23 U/L      Alkaline Phosphatase 51 U/L      Total Bilirubin 1.1 mg/dL      Globulin 2.5 gm/dL      A/G Ratio 1.5 g/dL      BUN/Creatinine Ratio 6.7     Anion Gap 11.6 mmol/L      eGFR 53.6 mL/min/1.73     Narrative:      GFR Categories in Chronic Kidney Disease (CKD)      GFR Category          GFR (mL/min/1.73)    Interpretation  G1                     90 or greater         Normal or high (1)  G2                      60-89                Mild decrease (1)  G3a                   45-59                Mild to moderate decrease  G3b                   30-44                Moderate to severe decrease  G4                    15-29                Severe decrease  G5                     14 or less           Kidney failure          (1)In the absence of evidence of kidney disease, neither GFR category G1 or G2 fulfill the criteria for CKD.    eGFR calculation 2021 CKD-EPI creatinine equation, which does not include race as a factor    Magnesium [[id number]]  (Normal) Collected: 01/01/25 1221    Specimen: Blood Updated: 01/01/25 1246     Magnesium 1.8 mg/dL     High Sensitivity Troponin T [[id number]]  (Abnormal) Collected: 01/01/25 1221    Specimen: Blood Updated: 01/01/25 1257     HS Troponin T 24 ng/L     Narrative:      High Sensitive Troponin T Reference Range:  <14.0 ng/L- Negative Female for AMI  <22.0 ng/L- Negative Male for AMI  >=14 - Abnormal Female indicating possible myocardial injury.  >=22 - Abnormal Male indicating possible myocardial injury.   Clinicians would have to utilize clinical acumen, EKG, Troponin, and serial changes to determine if it is an Acute Myocardial Infarction or myocardial injury due to an underlying chronic condition.         CBC Auto Differential [[id number]]  (Abnormal) Collected: 01/01/25 1221    Specimen: Blood Updated: 01/01/25 1230     WBC 4.84 10*3/mm3      RBC 3.70 10*6/mm3      Hemoglobin 11.6 g/dL      Hematocrit 34.0 %      MCV 91.9 fL      MCH 31.4 pg      MCHC 34.1 g/dL      RDW 13.4 %      RDW-SD 45.3 fl      MPV 9.8 fL      Platelets 141 10*3/mm3      Neutrophil % 86.7 %      Lymphocyte % 4.5 %      Monocyte % 6.2 %      Eosinophil % 1.2 %      Basophil % 0.6 %      Immature Grans % 0.8 %      Neutrophils, Absolute 4.19 10*3/mm3      Lymphocytes, Absolute 0.22 10*3/mm3      Monocytes, Absolute 0.30 10*3/mm3      Eosinophils, Absolute 0.06 10*3/mm3      Basophils, Absolute 0.03 10*3/mm3      Immature Grans, Absolute 0.04 10*3/mm3      nRBC 0.0 /100 WBC     TSH [[id number]]  (Normal) Collected: 01/01/25 1221    Specimen: Blood Updated: 01/01/25 1257     TSH 2.700 uIU/mL     CK [[id number]]  (Abnormal) Collected: 01/01/25 1221    Specimen: Blood Updated:  01/01/25 1246     Creatine Kinase 216 U/L     C-reactive Protein [[id number]]  (Abnormal) Collected: 01/01/25 1221    Specimen: Blood Updated: 01/01/25 1338     C-Reactive Protein 0.90 mg/dL     COVID-19, FLU A/B, RSV PCR 1 HR TAT - Swab, Nasopharynx [[id number]]  (Normal) Collected: 01/01/25 1248    Specimen: Swab from Nasopharynx Updated: 01/01/25 1336     COVID19 Not Detected     Influenza A PCR Not Detected     Influenza B PCR Not Detected     RSV, PCR Not Detected    Narrative:      Fact sheet for providers: https://www.fda.gov/media/258227/download    Fact sheet for patients: https://www.fda.gov/media/195465/download    Test performed by PCR.    High Sensitivity Troponin T 1Hr [[id number]]  (Abnormal) Collected: 01/01/25 1406    Specimen: Blood Updated: 01/01/25 1435     HS Troponin T 23 ng/L      Troponin T Numeric Delta -1 ng/L      Troponin T % Delta -4 %     Narrative:      High Sensitive Troponin T Reference Range:  <14.0 ng/L- Negative Female for AMI  <22.0 ng/L- Negative Male for AMI  >=14 - Abnormal Female indicating possible myocardial injury.  >=22 - Abnormal Male indicating possible myocardial injury.   Clinicians would have to utilize clinical acumen, EKG, Troponin, and serial changes to determine if it is an Acute Myocardial Infarction or myocardial injury due to an underlying chronic condition.         Urinalysis With Microscopic If Indicated (No Culture) - Urine, Clean Catch [[id number]]  (Abnormal) Collected: 01/01/25 1418    Specimen: Urine, Clean Catch Updated: 01/01/25 1427     Color, UA Yellow     Appearance, UA Cloudy     pH, UA 7.0     Specific Gravity, UA 1.009     Glucose, UA Negative     Ketones, UA Negative     Bilirubin, UA Negative     Blood, UA Negative     Protein, UA Negative     Leuk Esterase, UA Negative     Nitrite, UA Negative     Urobilinogen, UA 1.0 E.U./dL    Narrative:      Urine microscopic not indicated.    Blood Gas, Arterial With Co-Ox [[id number]]  (Abnormal) Collected:  "01/01/25 1555    Specimen: Arterial Blood Updated: 01/01/25 1555     Site Left Radial     Leon's Test Positive     pH, Arterial 7.455 pH units      Comment: 83 Value above reference range        pCO2, Arterial 34.2 mm Hg      Comment: 84 Value below reference range        pO2, Arterial 82.6 mm Hg      Comment: 84 Value below reference range        HCO3, Arterial 24.0 mmol/L      Base Excess, Arterial 0.4 mmol/L      O2 Saturation, Arterial 97.7 %      Hematocrit, Blood Gas 34.5 %      Comment: 84 Value below reference range        Oxyhemoglobin 95.9 %      Methemoglobin 0.60 %      Carboxyhemoglobin 1.3 %      A-a DO2 23.8 mmHg      Barometric Pressure for Blood Gas 738 mmHg      Modality N/A     Ventilator Mode NA     Collected by CB     Comment: Meter: [id number]     :  [id number]        pH, Temp Corrected --     pCO2, Temperature Corrected --     pO2, Temperature Corrected --    Procalcitonin [[id number]]  (Normal) Collected: 01/01/25 1557    Specimen: Blood Updated: 01/01/25 1628     Procalcitonin 0.16 ng/mL     Narrative:      As a Marker for Sepsis (Non-Neonates):    1. <0.5 ng/mL represents a low risk of severe sepsis and/or septic shock.  2. >2 ng/mL represents a high risk of severe sepsis and/or septic shock.    As a Marker for Lower Respiratory Tract Infections that require antibiotic therapy:    PCT on Admission    Antibiotic Therapy       6-12 Hrs later    >0.5                Strongly Recommended  >0.25 - <0.5        Recommended   0.1 - 0.25          Discouraged              Remeasure/reassess PCT  <0.1                Strongly Discouraged     Remeasure/reassess PCT    As 28 day mortality risk marker: \"Change in Procalcitonin Result\" (>80% or <=80%) if Day 0 (or Day 1) and Day 4 values are available. Refer to http://www.dloHaitis-pct-calculator.com    Change in PCT <=80%  A decrease of PCT levels below or equal to 80% defines a positive change in PCT test result representing a higher risk for " 28-day all-cause mortality of patients diagnosed with severe sepsis for septic shock.    Change in PCT >80%  A decrease of PCT levels of more than 80% defines a negative change in PCT result representing a lower risk for 28-day all-cause mortality of patients diagnosed with severe sepsis or septic shock.       Lactic Acid, Plasma [[id number]]  (Normal) Collected: 01/01/25 1557    Specimen: Blood Updated: 01/01/25 1615     Lactate 0.9 mmol/L     Blood Culture - Blood, Hand, Right [[id number]] Collected: 01/01/25 1620    Specimen: Blood from Hand, Right Updated: 01/01/25 1640    Blood Culture - Blood, Hand, Right [[id number]] Collected: 01/01/25 1627    Specimen: Blood from Hand, Right Updated: 01/01/25 1639    Vitamin B12 [[id number]] Collected: 01/01/25 1640    Specimen: Blood Updated: 01/01/25 1640    Folate [[id number]] Collected: 01/01/25 1640    Specimen: Blood Updated: 01/01/25 1640             CT Abdomen Pelvis With Contrast    Result Date: 1/1/2025  CT ABDOMEN AND PELVIS WITH CONTRAST  INDICATION: Possible bladder stone on x-ray.  TECHNIQUE: Thin section axial images were obtained from the lung bases through the pubic symphysis after oral and intravenous contrast. Coronal reconstruction images were obtained from the axial data.  COMPARISON: None.  FINDINGS:  Lower chest:  Lung bases are clear.  Liver:  Homogeneous.  No focal lesion.  Gallbladder/biliary system:  Gallbladder is present.   No intrahepatic or extrahepatic biliary dilatation.  Spleen:  Unremarkable.  Adrenal glands:  Unremarkable. No nodules.  Pancreas: Evaluation limited by motion artifact. No convincing mass or evidence of pancreatitis.  Kidneys/ureters/bladder:  No hydronephrosis, solid mass or perinephric stranding. A large calcification in the urinary bladder is consistent with a bladder stone. This measures 23 mm. There is also irregularity of the bladder wall which could be related to chronic cystitis. Bladder diverticula are not excluded near  the dome.  GI tract: No evidence of small bowel obstruction or focal small bowel wall thickening. Normal appendix. No acute colon abnormality.  Pelvic organs: Mild enlargement of the prostate.  Lymph nodes/mesentery/retroperitoneum: There is abnormal attenuation in the root of the mesentery with small lymph nodes. 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  Abdominal wall: Abdominal wall intact.  Vascular: No acute vascular abnormality.  Free fluid: No free fluid.  Bones: No acute osseous abnormality.      Impression: 1. Large stone in the urinary bladder. 2. Wall thickening of the urinary bladder with probable diverticula. This is likely related to chronic cystitis and possibly mild chronic outlet obstruction. 3. 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      CTDI: 9.79 mGy DLP:552.61 mGy.cm       This report was signed and finalized on 1/1/2025 3:15 PM by Carmen Le MD.      CT Cervical Spine Without Contrast    Result Date: 1/1/2025  PROCEDURE: CT CERVICAL SPINE WO CONTRAST-  INDICATION: fall this morning  TECHNIQUE: Thin section axial images were obtained through the cervical spine without contrast.  Coronal and sagittal reconstruction images were obtained from the axial data.  COMPARISON: None.  FINDINGS: Motion artifact limits exam quality. There is no convincing acute fracture. No facet lock. The craniocervical junction appears intact.   There is multilevel degenerative disc disease.   No acute paraspinal abnormality.      Impression: No acute fracture within the limitations of the exam. There is motion artifact.  Multilevel degenerative disc disease.  Please see CT head report regarding findings of the left mastoid air cells where there is mastoiditis and bone destruction.        CTDI: 16.93 mGy DLP:837.44 mGy.cm     This study was performed with techniques to keep radiation doses as low as reasonably achievable (ALARA).  Individualized dose reduction techniques using automated exposure control or adjustment of mA and/or kV according to the patient size were employed.   This report was signed and finalized on 1/1/2025 1:22 PM by Carmen Le MD.      CT Head Without Contrast    Result Date: 1/1/2025  PROCEDURE: CT HEAD WO CONTRAST-  INDICATION: AMS  TECHNIQUE: Multiple axial CT images were performed from the foramen magnum to the vertex without contrast.   Coronal reconstruction images were obtained from the axial data.  COMPARISON: 8/2/2024.  FINDINGS: There is no mass effect or midline shift. There is stable atrophy with ventricular enlargement. There are stable bilateral periventricular hypodensities. There is no intracranial hemorrhage. The posterior fossa is without acute abnormality. There is mucoperiosteal thickening of the right maxillary sinus. There is opacification of the left mastoid air cells with bone destruction. There is soft tissue density in the left middle ear cavity. This was present on the prior exam. No skull fracture.      Impression: 1. No intracranial hemorrhage or evidence of acute large cortical infarct. 2. Stable atrophy and chronic findings. 3. Left mastoiditis and left otitis media with bone destruction of the left mastoid air cells. 4. Right maxillary sinusitis.         CTDI: 16.93 mGy DLP:837.44 mGy.cm    This study was performed with techniques to keep radiation doses as low as reasonably achievable (ALARA). Individualized dose reduction techniques using automated exposure control or adjustment of mA and/or kV according to the patient size were employed.   This report was signed and finalized on 1/1/2025 1:19 PM by Carmen Le MD.      XR Pelvis 1 or 2 View    Result Date: 1/1/2025  PROCEDURE: XR PELVIS 1 OR 2 VW-  HISTORY: fall, AMS  COMPARISON: None.  FINDINGS: An AP view of the pelvis was obtained.  There is no acute osseous abnormality of the pelvis. There is degenerative joint disease of the  hips bilaterally. There is a round radiodensity projecting over the midline pelvis. This could be a bladder stone or artifact in or on the patient's clothing. Soft tissues are otherwise without acute abnormality.      Impression: No acute osseous abnormality of the pelvis.  Round radiodensity projecting over the midline pelvis as above.        This report was signed and finalized on 1/1/2025 1:15 PM by Carmen Le MD.      XR Chest 1 View    Result Date: 1/1/2025  PROCEDURE: XR CHEST 1 VW-  INDICATION:  fall, AMS  FINDINGS:  A portable view of the chest was obtained.  Comparison is made to a prior exam dated 8/2/2024.   Heart size is normal. Lung volumes are very low. Right perihilar and bibasilar opacities may be atelectasis. Pneumonia is not excluded. No significant pleural effusion or pneumothorax.      Impression: Exam limited by low lung volumes. Probable bilateral atelectasis.   This report was signed and finalized on 1/1/2025 1:14 PM by Carmen Le MD.          MDM  Patient evaluated in the ER for confusion and fall that occurred this morning.  Evidently patient was normal last night when his son spoke to him.  Patient is tachycardic but otherwise hemodynamically stable, afebrile, nontoxic-appearing on exam.  Patient has no focal neurologic deficits, equal strength bilaterally, alert and oriented to person which daughter-in-law feels is his baseline.  He was noted to have a stroke in August, about 4 months ago.  Differential diagnosis includes but is not limited to UTI, intracranial hemorrhage, cervical fracture, electrolyte abnormality, uremia, among others.  Initial plan includes CBC, CMP, magnesium, troponin, TSH, CK, urinalysis, COVID/flu swab, EKG, CT of head and C-spine and x-ray of chest and pelvis.    Imaging is largely unremarkable other than a round radiodensity projecting over the midline pelvis which could be a bladder stone.  There is difficulty getting a urine sample.  Tried to talk about  this with patient and he said he could not hear me.  Tried speaking louder.  Daughter-in-law is unsure if he really cannot hear or if he is just being stubborn.  She is going to get his son to try and speak with him about giving a urine sample.  She states no one has power of  over the patient.  Discussed with her that we cannot force him to have a straight cath if he does not want one.  Troponin appears to be baseline.  Labs largely unremarkable with normal white blood cell count, creatinine of 1.34, CK only slightly elevated at 216.  COVID, flu, RSV negative.  Second troponin and urinalysis pending.  Daughter-in-law is getting the patient's son to speak with him to try to get him to provide a urine sample or allow a straight cath.  Will also order a CT scan of abdomen and pelvis for further evaluation of radiodensity on x-ray.  CT head did mention mastoiditis/bone destruction and a soft tissue density in the ear canal that was there previously on imaging.  Patient has known skin cancer that has eroded into the left side of his head/ear.  Daughter-in-law states that they are already aware of the bony destruction from the cancer.  The area does not look acutely infected and actually looks the same as it did at last oncology appointment based on picture in the chart.  Patient is afebrile and normal white blood cell count and only very slightly elevated CRP.    CT abdomen pelvis shows a bladder stone but no other acute findings.  Patient did have a drop in blood pressure into the 90s over 60s.  Ordered 1 L of IV fluids and added a procalcitonin, lactate, blood cultures given that chest x-ray did not rule out pneumonia.  Family reports that patient is still not acting like himself, sleeping a lot, less responsive, and confused.  Spoke with hospitalist, Dr. Kerley, who recommended speaking with neurology.  Patient did have a stroke in August, 3 to 4 months ago.    Spoke with Dr. Villagran with neurology who  recommended an MRI and folate and vitamin B12 level.  Recommended getting an EEG.  States that he will add the patient to the list to evaluate in the hospital.  Spoke with Dr. Kerley who has accepted the patient for admission for further evaluation and management.    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, left          Donna James PA-C  01/01/25 1741      Electronically signed by Donna James PA-C at 01/01/25 1741       Vital Signs (last day)       Date/Time Temp Temp src Pulse Resp BP Patient Position SpO2    01/03/25 1110 98.5 (36.9) Axillary 81 18 117/66 Lying --    01/03/25 0717 97.5 (36.4) Axillary 84 18 120/93 Lying --    01/03/25 0529 97.5 (36.4) Oral 80 18 129/74 Lying 96    01/02/25 2346 97.9 (36.6) Oral 85 18 131/73 Lying 98    01/02/25 1953 98.6 (37) Oral 73 18 135/76 Lying 95    01/02/25 1532 98.8 (37.1) Axillary 86 18 131/79 Lying --    01/02/25 1144 98.7 (37.1) Axillary 81 18 122/74 Lying --    01/02/25 0705 98.8 (37.1) Axillary 89 18 126/85 Lying --    01/02/25 0352 98.7 (37.1) Oral 108 20 121/59 Lying 95    01/02/25 0011 98.5 (36.9) Oral -- 20 102/72 Lying 99          Current Facility-Administered Medications   Medication Dose Route Frequency Provider Last Rate Last Admin    amoxicillin-clavulanate (AUGMENTIN) 875-125 MG per tablet 1 tablet  1 tablet Oral Q12H Kerley, Brian Joseph, DO   1 tablet at 01/02/25 0953    Calcium Replacement - Follow Nurse / BPA Driven Protocol   Not Applicable PRN Kerley, Brian Joseph, DO        clopidogrel (PLAVIX) tablet 75 mg  75 mg Oral Daily Kerley, Brian Joseph, DO   75 mg at 01/02/25 0953    cyanocobalamin injection 1,000 mcg  1,000 mcg Intramuscular Daily Esperanza Zaragoza MD        DULoxetine (CYMBALTA) DR capsule 30 mg  30 mg Oral Daily Kerley, Brian Joseph,    30 mg at 01/02/25 0994    heparin (porcine) 5000 UNIT/ML  injection 5,000 Units  5,000 Units Subcutaneous Q12H Kerley, Brian Joseph, DO   5,000 Units at 01/01/25 2020    levothyroxine (SYNTHROID, LEVOTHROID) tablet 75 mcg  75 mcg Oral Q AM Kerley, Brian Joseph, DO   75 mcg at 01/02/25 0553    LORazepam (ATIVAN) injection 1 mg  1 mg Intravenous Once PRN Kerley, Brian Joseph, DO        Magnesium Standard Dose Replacement - Follow Nurse / BPA Driven Protocol   Not Applicable PRN Kerley, Brian Joseph, DO        nitroglycerin (NITROSTAT) SL tablet 0.4 mg  0.4 mg Sublingual Q5 Min PRN Kerley, Brian Joseph, DO        ondansetron (ZOFRAN) injection 4 mg  4 mg Intravenous Q6H PRN Kerley, Brian Joseph, DO        oxyCODONE (ROXICODONE) immediate release tablet 5 mg  5 mg Oral Q6H PRN Kerley, Brian Joseph, DO   5 mg at 01/02/25 0227    Phosphorus Replacement - Follow Nurse / BPA Driven Protocol   Not Applicable PRN Kerley, Brian Joseph, DO        Potassium Replacement - Follow Nurse / BPA Driven Protocol   Not Applicable PRN Kerley, Brian Joseph, DO        QUEtiapine (SEROquel) tablet 25 mg  25 mg Oral Daily Kerley, Brian Joseph, DO   25 mg at 01/02/25 1423    senna (SENOKOT) tablet 1 tablet  1 tablet Oral Nightly Kerley, Brian Joseph, DO   1 tablet at 01/01/25 2020    sodium chloride 0.9 % flush 10 mL  10 mL Intravenous Q12H Kerley, Brian Joseph, DO   10 mL at 01/02/25 2137    sodium chloride 0.9 % flush 10 mL  10 mL Intravenous PRN Kerley, Brian Joseph, DO        sodium chloride 0.9 % infusion 40 mL  40 mL Intravenous PRN Kerley, Brian Joseph, DO        tamsulosin (FLOMAX) 24 hr capsule 0.4 mg  0.4 mg Oral Daily Kerley, Brian Joseph, DO   0.4 mg at 01/02/25 0953    ziprasidone (GEODON) injection 10 mg  10 mg Intramuscular Q4H PRN Kerley, Brian Joseph, DO         Lab Results (last 24 hours)       Procedure Component Value Units Date/Time    Basic Metabolic Panel [[id number]]  (Abnormal) Collected: 01/03/25 0646    Specimen: Blood Updated: 01/03/25 0730     Glucose 79 mg/dL      BUN 17  mg/dL      Creatinine 1.31 mg/dL      Sodium 135 mmol/L      Potassium 4.5 mmol/L      Chloride 102 mmol/L      CO2 22.2 mmol/L      Calcium 8.5 mg/dL      BUN/Creatinine Ratio 13.0     Anion Gap 10.8 mmol/L      eGFR 55.0 mL/min/1.73     Narrative:      GFR Categories in Chronic Kidney Disease (CKD)      GFR Category          GFR (mL/min/1.73)    Interpretation  G1                     90 or greater         Normal or high (1)  G2                      60-89                Mild decrease (1)  G3a                   45-59                Mild to moderate decrease  G3b                   30-44                Moderate to severe decrease  G4                    15-29                Severe decrease  G5                    14 or less           Kidney failure          (1)In the absence of evidence of kidney disease, neither GFR category G1 or G2 fulfill the criteria for CKD.    eGFR calculation 2021 CKD-EPI creatinine equation, which does not include race as a factor    CBC Auto Differential [[id number]]  (Abnormal) Collected: 01/03/25 0646    Specimen: Blood Updated: 01/03/25 0708     WBC 2.95 10*3/mm3      RBC 3.03 10*6/mm3      Hemoglobin 9.4 g/dL      Hematocrit 28.5 %      MCV 94.1 fL      MCH 31.0 pg      MCHC 33.0 g/dL      RDW 13.2 %      RDW-SD 45.9 fl      MPV 9.9 fL      Platelets 118 10*3/mm3      Neutrophil % 64.7 %      Lymphocyte % 15.3 %      Monocyte % 13.6 %      Eosinophil % 4.4 %      Basophil % 1.0 %      Immature Grans % 1.0 %      Neutrophils, Absolute 1.91 10*3/mm3      Lymphocytes, Absolute 0.45 10*3/mm3      Monocytes, Absolute 0.40 10*3/mm3      Eosinophils, Absolute 0.13 10*3/mm3      Basophils, Absolute 0.03 10*3/mm3      Immature Grans, Absolute 0.03 10*3/mm3      nRBC 0.0 /100 WBC     Blood Culture - Blood, Hand, Right [[id number]]  (Normal) Collected: 01/01/25 1620    Specimen: Blood from Hand, Right Updated: 01/02/25 1645     Blood Culture No growth at 24 hours    Blood Culture - Blood, Hand, Right  [[id number]]  (Normal) Collected: 25 1627    Specimen: Blood from Hand, Right Updated: 25 1645     Blood Culture No growth at 24 hours          Imaging Results (Last 24 Hours)       Procedure Component Value Units Date/Time    XR Shoulder 2+ View Left [[id number]] Collected: 25     Updated: 25    Narrative:         PROCEDURE: XR SHOULDER 2+ VW LEFT-     HISTORY: left shoulder pain; R41.82-Altered mental status, unspecified;  N21.0-Calculus in bladder; C44.229-Squamous cell carcinoma of skin of  left ear and external auricular canal, no trauma.     COMPARISON: None.     FINDINGS:  A three view exam demonstrates no acute fracture or  dislocation. The joint spaces demonstrate decreased subacromial distance  suggesting chronic rotator cuff tear. Diffuse osteopenia identified.  Mild narrowing of the left AC joint noted. No soft tissue abnormality is  seen. No bony destructive lesion seen.          Impression:      No acute bony abnormality.     Degenerative change as described.                 This report was signed and finalized on 1/3/2025 8:17 AM by Camille Rodriguez MD.                Physician Progress Notes (last 24 hours)        Kerley, Brian Joseph, DO at 25 1238              [facility]    [facility]    Name:  Hardik Natarajan   Age:  79 y.o.  Sex:  male  :  1945  MRN:  [id number]   Visit Number:  [id number]  Admission Date:  2025  Date Of Service:  25  Primary Care Physician:  Won Cuellar MD     LOS: 0 days :    Chief Complaint:      Follow-up; altered mental status    Subjective:    Says he feels sore all over, states his left shoulder feels tight.  Otherwise feels all right.    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  Presented to [facility] ED on 1/1/2025 with concern for altered mental status.  Last known normal was night prior to presentation.  Also reported having a fall, unclear if it was syncope.  Wife found him on the floor.  On Plavix, no other anticoagulation.  He denied any fevers or chills, chest pain, shortness of air, any other specific concern.  Denied any slurred speech, difficulty moving upper or lower extremities, and any in symmetry.     ED summary: Afebrile, vital signs stable on room air.  ABG pH 7.44, pCO2 34, pO2 82, bicarb 24.  EKG sinus tachycardia rate 107, nonspecific ST-T wave changes.  Opponent 23, ACS not suspected.  Creatinine 1.34, lactate elevated, procalcitonin not elevated.  UDS negative.  Blood cultures collected.  COVID/flu/RSV negative.  CT abdomen/pelvis large stone in urinary bladder, wall thickening probable diverticula, abnormal attenuation of the root of the mesentery most suggestive of mesenteric panniculitis.  CT head no intracranial hemorrhage or evidence of acute large cortical infarct, stable atrophy and chronic findings, left mastoiditis and left otitis media with bone destruction of the left mastoid air cells, right maxillary sinusitis.  CT cervical spine no acute fracture, multilevel degenerative disc disease.    Review of Systems:     All systems were reviewed and negative except as mentioned in subjective, assessment and plan.    Vital Signs:    Temp:  [97.5 °F (36.4 °C)-98.8 °F (37.1 °C)] 98.5 °F (36.9 °C)  Heart Rate:  [73-86] 81  Resp:  [18] 18  BP: (117-135)/(66-93) 117/66    Intake and output:    I/O last 3 completed shifts:  In: 0   Out: 800 [Urine:800]  I/O this shift:  In: 240 [P.O.:240]  Out: -     Physical Examination:    General Appearance:  Alert and cooperative.    Head:  Superior ear and surrounding superior auricular area and left side of head scarring with good healing, no erythema, no swelling   Eyes: Conjunctivae and sclerae normal,  no icterus. No pallor.   Throat: No oral lesions, no thrush, oral mucosa moist.   Neck: Supple, trachea midline, no thyromegaly.   Lungs:   Breath sounds heard bilaterally equally.  No wheezing or crackles. No Pleural rub or bronchial breathing.   Heart:  Normal S1 and S2, no murmur, no gallop, no rub. No JVD.   Abdomen:   Normal bowel sounds, no masses, no organomegaly. Soft, nontender, nondistended, no rebound tenderness.   Extremities: Supple, no edema, no cyanosis, no clubbing.   Skin: Warm   Neurologic: Alert and oriented to self. No facial asymmetry. Moves all four limbs. No tremors.      Laboratory results:    Results from last 7 days   Lab Units 01/03/25  0646 01/02/25  0543 01/01/25  1221   SODIUM mmol/L 135* 136 136   POTASSIUM mmol/L 4.5 4.3 4.2   CHLORIDE mmol/L 102 105 101   CO2 mmol/L 22.2 22.4 23.4   BUN mg/dL 17 11 9   CREATININE mg/dL 1.31* 1.30* 1.34*   CALCIUM mg/dL 8.5* 8.4* 8.9   BILIRUBIN mg/dL  --   --  1.1   ALK PHOS U/L  --   --  51   ALT (SGPT) U/L  --   --  13   AST (SGOT) U/L  --   --  23   GLUCOSE mg/dL 79 88 99     Results from last 7 days   Lab Units 01/03/25  0646 01/02/25  0543 01/01/25  1221   WBC 10*3/mm3 2.95* 3.23* 4.84   HEMOGLOBIN g/dL 9.4* 9.8* 11.6*   HEMATOCRIT % 28.5* 29.3* 34.0*   PLATELETS 10*3/mm3 118* 123* 141         Results from last 7 days   Lab Units 01/01/25  1406 01/01/25  1221   CK TOTAL U/L  --  216*   HSTROP T ng/L 23* 24*     Results from last 7 days   Lab Units 01/01/25  1627 01/01/25  1620   BLOODCX  No growth at 24 hours No growth at 24 hours     Recent Labs     01/01/25  1555   PHART 7.455   [id number] 34.2*   PO2ART 82.6   HBG4FUB 24.0   BASEEXCESS 0.4      I have reviewed the patient's laboratory results.    Radiology results:    XR Shoulder 2+ View Left    Result Date: 1/3/2025   PROCEDURE: XR SHOULDER 2+ VW LEFT-  HISTORY: left shoulder pain; R41.82-Altered mental status, unspecified; N21.0-Calculus in bladder; C44.229-Squamous cell carcinoma of skin of  left ear and external auricular canal, no trauma.  COMPARISON: None.  FINDINGS:  A three view exam demonstrates no acute fracture or dislocation. The joint spaces demonstrate decreased subacromial distance suggesting chronic rotator cuff tear. Diffuse osteopenia identified. Mild narrowing of the left AC joint noted. No soft tissue abnormality is seen. No bony destructive lesion seen.       Impression: No acute bony abnormality.  Degenerative change as described.      This report was signed and finalized on 1/3/2025 8:17 AM by Camille Rodriguez MD.      MRI Brain Without Contrast    Result Date: 1/1/2025  FINAL REPORT TECHNIQUE: null CLINICAL HISTORY: AMS SEVERE HX OF PREVIOUS STROKE, FALL TODAY, HX OF PREVIOUS SKIN CA OF LEFT EAR. COMPARISON: null FINDINGS: MR of the brain without contrast Comparison: MR/SR - MRI BRAIN W WO CONTRAST - 8/2/24 21:53 EDT CT - CT ANGIOGRAM NECK W WO CONTRAST - 8/2/24 20:54 EDT CT - CT ANGIOGRAM HEAD W CONTRAST - 8/2/24 20:54 EDT CT/SR - CT HEAD WO CONTRAST STROKE PROTOCOL - 8/2/24 20:54 EDT MR/SR - MRI BRAIN W WO CONTRAST - 5/10/23 18:47 EDT Findings: No acute infarction, hemorrhage, mass-effect or herniation. No hydrocephalus. Increased signal intensity is seen in the deep and periventricular white matter on the T2/FLAIR sequences, which most likely represents the sequela of severe chronic small vessel ischemic disease. Chronic lacunar infarctions in the right corona radiata/centrum semiovale and left basal ganglia. No extra-axial fluid collection or mass. Unremarkable sella. Intact flow voids. Normal orbits. Mucosal thickening in the paranasal sinuses may indicate sinusitis. Fluid signal in right mastoid air cells could be secondary to an effusion or mastoiditis. There is osseous destruction at the left mastoid process, which was also present on the prior studies and could be related to the previously seen skin cancer and/or post treatment changes. There is fluid signal in left mastoid air  cells which could be secondary to an effusion or mastoiditis.     Impression: Impression: No acute infarction. No posttraumatic findings. Sinusitis could be considered. Bilateral mastoid air cell effusions versus mastoiditis. Osseous destruction of the left mastoid process could be secondary to the previously seen skin cancer and/or posttreatment changes. Correlate clinically for signs/symptoms of infection to exclude osteomyelitis. Authenticated and Electronically Signed by Jaqueline Painter MD on 01/01/2025 06:45:00 PM    CT Abdomen Pelvis With Contrast    Result Date: 1/1/2025  CT ABDOMEN AND PELVIS WITH CONTRAST  INDICATION: Possible bladder stone on x-ray.  TECHNIQUE: Thin section axial images were obtained from the lung bases through the pubic symphysis after oral and intravenous contrast. Coronal reconstruction images were obtained from the axial data.  COMPARISON: None.  FINDINGS:  Lower chest:  Lung bases are clear.  Liver:  Homogeneous.  No focal lesion.  Gallbladder/biliary system:  Gallbladder is present.   No intrahepatic or extrahepatic biliary dilatation.  Spleen:  Unremarkable.  Adrenal glands:  Unremarkable. No nodules.  Pancreas: Evaluation limited by motion artifact. No convincing mass or evidence of pancreatitis.  Kidneys/ureters/bladder:  No hydronephrosis, solid mass or perinephric stranding. A large calcification in the urinary bladder is consistent with a bladder stone. This measures 23 mm. There is also irregularity of the bladder wall which could be related to chronic cystitis. Bladder diverticula are not excluded near the dome.  GI tract: No evidence of small bowel obstruction or focal small bowel wall thickening. Normal appendix. No acute colon abnormality.  Pelvic organs: Mild enlargement of the prostate.  Lymph nodes/mesentery/retroperitoneum: There is abnormal attenuation in the root of the mesentery with small lymph nodes. 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  Abdominal wall: Abdominal wall intact.  Vascular: No acute vascular abnormality.  Free fluid: No free fluid.  Bones: No acute osseous abnormality.      Impression: 1. Large stone in the urinary bladder. 2. Wall thickening of the urinary bladder with probable diverticula. This is likely related to chronic cystitis and possibly mild chronic outlet obstruction. 3. 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      CTDI: 9.79 mGy DLP:552.61 mGy.cm       This report was signed and finalized on 1/1/2025 3:15 PM by Carmen Le MD.      CT Cervical Spine Without Contrast    Result Date: 1/1/2025  PROCEDURE: CT CERVICAL SPINE WO CONTRAST-  INDICATION: fall this morning  TECHNIQUE: Thin section axial images were obtained through the cervical spine without contrast.  Coronal and sagittal reconstruction images were obtained from the axial data.  COMPARISON: None.  FINDINGS: Motion artifact limits exam quality. There is no convincing acute fracture. No facet lock. The craniocervical junction appears intact.   There is multilevel degenerative disc disease.   No acute paraspinal abnormality.      Impression: No acute fracture within the limitations of the exam. There is motion artifact.  Multilevel degenerative disc disease.  Please see CT head report regarding findings of the left mastoid air cells where there is mastoiditis and bone destruction.        CTDI: 16.93 mGy DLP:837.44 mGy.cm     This study was performed with techniques to keep radiation doses as low as reasonably achievable (ALARA). Individualized dose reduction techniques using automated exposure control or adjustment of mA and/or kV according to the patient size were employed.   This report was signed and finalized on 1/1/2025 1:22 PM by Carmen Le MD.      CT Head Without Contrast    Result Date: 1/1/2025  PROCEDURE: CT HEAD WO CONTRAST-  INDICATION: AMS  TECHNIQUE: Multiple axial CT images were  performed from the foramen magnum to the vertex without contrast.   Coronal reconstruction images were obtained from the axial data.  COMPARISON: 8/2/2024.  FINDINGS: There is no mass effect or midline shift. There is stable atrophy with ventricular enlargement. There are stable bilateral periventricular hypodensities. There is no intracranial hemorrhage. The posterior fossa is without acute abnormality. There is mucoperiosteal thickening of the right maxillary sinus. There is opacification of the left mastoid air cells with bone destruction. There is soft tissue density in the left middle ear cavity. This was present on the prior exam. No skull fracture.      Impression: 1. No intracranial hemorrhage or evidence of acute large cortical infarct. 2. Stable atrophy and chronic findings. 3. Left mastoiditis and left otitis media with bone destruction of the left mastoid air cells. 4. Right maxillary sinusitis.         CTDI: 16.93 mGy DLP:837.44 mGy.cm    This study was performed with techniques to keep radiation doses as low as reasonably achievable (ALARA). Individualized dose reduction techniques using automated exposure control or adjustment of mA and/or kV according to the patient size were employed.   This report was signed and finalized on 1/1/2025 1:19 PM by Carmen Le MD.      XR Chest 1 View    Result Date: 1/1/2025  PROCEDURE: XR CHEST 1 VW-  INDICATION:  fall, AMS  FINDINGS:  A portable view of the chest was obtained.  Comparison is made to a prior exam dated 8/2/2024.   Heart size is normal. Lung volumes are very low. Right perihilar and bibasilar opacities may be atelectasis. Pneumonia is not excluded. No significant pleural effusion or pneumothorax.      Impression: Exam limited by low lung volumes. Probable bilateral atelectasis.   This report was signed and finalized on 1/1/2025 1:14 PM by Carmen Le MD.     I have reviewed the patient's radiology reports.    Medication Review:     I have  reviewed the patient's active and prn medications.     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  Mental status slowly improving, not as combative as of 1/3.     Altered mental status  Neurology consultation, recreations appreciated. EEG.  Family has deferred on lumbar puncture.  No seizure-like activity reported or witnessed, no syncope.  Differential could include vascular dementia.    Fall precautions.  MRI brain no sign of new stroke, no metastasis.  Chronic osseous destruction observed.  Do not suspect osteomyelitis, no sepsis.  CT showed stable chronic findings; left mastoiditis and left otitis media with bone destruction of the left mastoid air cells.  Left mastoiditis, left otitis media with bone destruction of left mastoid air cells  Augmentin.  CKD  Recommend follow-up outpatient.     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  Continue home medications.     Risk Assessment: High  DVT Prophylaxis: Heparin  Code Status: Full code  Diet: Cardiac  Discharge Plan: At least 1-2 more days depending on clinical status.    Updated family Yobany and Leonora during course.    Brian Joseph Kerley, DO  25  12:38 EST    Dictated utilizing Dragon dictation.    I have reviewed the copied text and it is accurate as of 1/3/2025       Electronically signed by Kerley, Brian Joseph, DO at 25 1240       Esperanza Zaragoza MD at 25 1012          DOS: 1/3/2025  NAME: Hardik Natarajan   : 1945  PCP: Won Cuellar MD  Chief Complaint   Patient presents with    Altered Mental Status    Fall       Chief complaint: More awake and alert  "today.  Subjective: Was belligerent but followed commands when requested.  Thought he was at [place] in [facility] where he [facility].    Objective:  Vital signs: /93 (BP Location: Right arm, Patient Position: Lying)   Pulse 84   Temp 97.5 °F (36.4 °C) (Axillary)   Resp 18   Ht 185.4 cm (73\")   Wt 96.3 kg (212 lb 4.9 oz)   SpO2 96%   BMI 28.01 kg/m²    Gen: NAD, vitals reviewed  MS: Awake and alert and oriented to himself alone.  He does not recognize where he is currently and he does not know the month of the year.  Was belligerent but follows simple commands.  CN: Cranials 2-12: No focal deficits noted.  Motor: Moving all extremities well  Sensory: No sensory loss noted.  Coordination: Difficult to make him comprehend to perform finger-to-nose or heel-to-shin testing.  Gait: Could not be tested at this time.    ROS:  General: Belligerent gentleman currently following simple commands.  Neurological: He is only oriented to self but he can follow only one-step commands.  Moving all extremities.    Laboratory results:  Lab Results   Component Value Date    GLUCOSE 79 01/03/2025    CALCIUM 8.5 (L) 01/03/2025     (L) 01/03/2025    K 4.5 01/03/2025    CO2 22.2 01/03/2025     01/03/2025    BUN 17 01/03/2025    CREATININE 1.31 (H) 01/03/2025    BCR 13.0 01/03/2025    ANIONGAP 10.8 01/03/2025     Lab Results   Component Value Date    WBC 2.95 (L) 01/03/2025    HGB 9.4 (L) 01/03/2025    HCT 28.5 (L) 01/03/2025    MCV 94.1 01/03/2025     (L) 01/03/2025     Lab Results   Component Value Date    LDL 73 08/03/2024            Review of labs: Hemoglobin and hematocrit are low at 9.4 and 28.5.  The platelet count is low at 118,000.  Low vitamin B12 level at 169 noted.     CMP:        Lab 01/03/25  0646 01/02/25  0543 01/01/25  1221   SODIUM 135* 136 136   POTASSIUM 4.5 4.3 4.2   CHLORIDE 102 105 101   CO2 22.2 22.4 23.4   ANION GAP 10.8 8.6 11.6   BUN 17 11 9   CREATININE 1.31* 1.30* 1.34*   EGFR " 55.0* 55.5* 53.6*   GLUCOSE 79 88 99   CALCIUM 8.5* 8.4* 8.9   MAGNESIUM  --   --  1.8   TOTAL PROTEIN  --   --  6.3   ALBUMIN  --   --  3.8   GLOBULIN  --   --  2.5   ALT (SGPT)  --   --  13   AST (SGOT)  --   --  23   BILIRUBIN  --   --  1.1   ALK PHOS  --   --  51        TSH          9/4/2024    10:16 9/25/2024    09:33 1/1/2025    12:21   TSH   TSH 12.060  5.670  2.700         Lipid Panel          8/3/2024    04:51   Lipid Panel   Total Cholesterol 117    Triglycerides 84    HDL Cholesterol 27    VLDL Cholesterol 17    LDL Cholesterol  73    LDL/HDL Ratio 2.71         Lab Results   Component Value Date    [id number] 169 (L) 01/01/2025       Lab Results   Component Value Date    FOLATE 6.35 01/01/2025       Lab Results   Component Value Date    HGBA1C 5.00 08/03/2024           Review and interpretation of imaging:  MRI of the brain with and without contrast on August 2, 2024 was interpreted as follows:     FINDINGS:  Exam: MRI of the brain without IV contrast     Comparison: 08/02/2024     Clinical history: Concern for CVA, left arm weakness     Findings:     Small 1 cm acute infarct in the right periventricular region on series 7, image 19. Decreased signal on the ADC map in this location.     No acute intracranial hemorrhage.     No intracranial masses.     Postcontrast imaging of the brain does not demonstrate any areas of abnormal parenchymal enhancement.     No midline shift.     No abnormal extra-axial fluid collections are seen.     Cerebral volume loss.     Areas of increased FLAIR signal within the cerebral white matter bilaterally are reflective of a nonspecific demyelinating process, likely ischemic microvascular in nature.     IMPRESSION:  Impression:     1. Small acute infarct in the right periventricular region     2. Moderate cerebral volume loss with moderate cerebral white matter disease, likely ischemic microvascular in nature.    CT Head Without Contrast    Result Date: 1/1/2025  PROCEDURE: CT  HEAD WO CONTRAST-  INDICATION: AMS  TECHNIQUE: Multiple axial CT images were performed from the foramen magnum to the vertex without contrast.   Coronal reconstruction images were obtained from the axial data.  COMPARISON: 8/2/2024.  FINDINGS: There is no mass effect or midline shift. There is stable atrophy with ventricular enlargement. There are stable bilateral periventricular hypodensities. There is no intracranial hemorrhage. The posterior fossa is without acute abnormality. There is mucoperiosteal thickening of the right maxillary sinus. There is opacification of the left mastoid air cells with bone destruction. There is soft tissue density in the left middle ear cavity. This was present on the prior exam. No skull fracture.      Impression: 1. No intracranial hemorrhage or evidence of acute large cortical infarct. 2. Stable atrophy and chronic findings. 3. Left mastoiditis and left otitis media with bone destruction of the left mastoid air cells. 4. Right maxillary sinusitis.         CTDI: 16.93 mGy DLP:837.44 mGy.cm    This study was performed with techniques to keep radiation doses as low as reasonably achievable (ALARA). Individualized dose reduction techniques using automated exposure control or adjustment of mA and/or kV according to the patient size were employed.   This report was signed and finalized on 1/1/2025 1:19 PM by Carmen Le MD.            MRI Brain Without Contrast    Result Date: 1/1/2025  FINAL REPORT TECHNIQUE: null CLINICAL HISTORY: AMS SEVERE HX OF PREVIOUS STROKE, FALL TODAY, HX OF PREVIOUS SKIN CA OF LEFT EAR. COMPARISON: null FINDINGS: MR of the brain without contrast Comparison: MR/SR - MRI BRAIN W WO CONTRAST - 8/2/24 21:53 EDT CT - CT ANGIOGRAM NECK W WO CONTRAST - 8/2/24 20:54 EDT CT - CT ANGIOGRAM HEAD W CONTRAST - 8/2/24 20:54 EDT CT/SR - CT HEAD WO CONTRAST STROKE PROTOCOL - 8/2/24 20:54 EDT MR/SR - MRI BRAIN W WO CONTRAST - 5/10/23 18:47 EDT Findings: No acute infarction,  hemorrhage, mass-effect or herniation. No hydrocephalus. Increased signal intensity is seen in the deep and periventricular white matter on the T2/FLAIR sequences, which most likely represents the sequela of severe chronic small vessel ischemic disease. Chronic lacunar infarctions in the right corona radiata/centrum semiovale and left basal ganglia. No extra-axial fluid collection or mass. Unremarkable sella. Intact flow voids. Normal orbits. Mucosal thickening in the paranasal sinuses may indicate sinusitis. Fluid signal in right mastoid air cells could be secondary to an effusion or mastoiditis. There is osseous destruction at the left mastoid process, which was also present on the prior studies and could be related to the previously seen skin cancer and/or post treatment changes. There is fluid signal in left mastoid air cells which could be secondary to an effusion or mastoiditis.     Impression: Impression: No acute infarction. No posttraumatic findings. Sinusitis could be considered. Bilateral mastoid air cell effusions versus mastoiditis. Osseous destruction of the left mastoid process could be secondary to the previously seen skin cancer and/or posttreatment changes. Correlate clinically for signs/symptoms of infection to exclude osteomyelitis. Authenticated and Electronically Signed by Jaqueline Painter MD on 01/01/2025 06:45:00 PM      Workup to date:Additional Tests Performed: Urine analysis is as follows:          Latest Reference Range & Units 01/01/25 14:18   Color, UA Yellow, Straw  Yellow   Appearance, UA Clear  Cloudy !   Specific Gravity, UA 1.005 - 1.030  1.009   pH, UA 5.0 - 8.0  7.0   Glucose Negative  Negative   Ketones, UA Negative  Negative   Blood, UA Negative  Negative   Nitrite, UA Negative  Negative   Leukocytes, UA Negative  Negative   Protein, UA Negative  Negative   Bilirubin, UA Negative  Negative   Urobilinogen, UA 0.2 - 1.0 E.U./dL  1.0 E.U./dL         Results for orders placed  during the hospital encounter of 24    Adult Transthoracic Echo Complete w/ Color, Spectral and Contrast if necessary per protocol    Interpretation Summary    Left ventricular systolic function is normal. Calculated left ventricular EF = 60.4% Left ventricular ejection fraction appears to be 56 - 60%. Left ventricular diastolic function was normal.    The right ventricular cavity is borderline dilated with normal systolic function.    Mild tricuspid valve regurgitation is present. Estimated right ventricular systolic pressure from tricuspid regurgitation is normal (<35 mmHg).    Saline test results are negative for right to left atrial level shunt.              Diagnoses: Patient with severe vitamin B12 deficiency presenting with confusion      Comment: Could be tabetic encephalopathy.    Plan:  1.  Cyanocobalamin 1000 mcg intramuscular daily for the next 5 days.  2.  Try to have physical therapy and Occupational Therapy work with him.  3.  If he gets combative to use ziprasidone 20 mg intramuscular every 4 hours as needed so long the QTc interval is less than 500 ms.  4.  Encourage oral hydration and nutrition.    Discussed with Dr. Sanchez Garcia and he will be followed up next week by Dr Christin Herndon, the [facility] teleneurologist coming Monday    Spent a total of 30 minutes in face-to-face evaluation and management of the patient using the dedicated telemedicine device without any interruption with the help of Canelo the [facility] nurse with the patient located at the [facility] and myself at a remote location.    Electronically signed by Esperanza Zaragoza MD, 25, 10:12 AM EST.        Electronically signed by Esperanza Zaragoza MD at 25 1019       Kerley, Brian Joseph, DO at 25 1344              [facility] HOSPITALIST    PROGRESS NOTE    Name:  Hardik Natarajan   Age:  79 y.o.  Sex:  male  :  1945  MRN:  [id number]   Visit Number:  [id number]  Admission  Date:  1/1/2025  Date Of Service:  01/02/25  Primary Care Physician:  Won Cuellar MD     LOS: 0 days :    Chief Complaint:      Follow-up; altered mental status    Subjective:    Denies any concerning pain.  Says breathing feels all right.    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  Presented to [facility] ED on 1/1/2025 with concern for altered mental status.  Last known normal was night prior to presentation.  Also reported having a fall, unclear if it was syncope.  Wife found him on the floor.  On Plavix, no other anticoagulation.  He denied any fevers or chills, chest pain, shortness of air, any other specific concern.  Denied any slurred speech, difficulty moving upper or lower extremities, and any in symmetry.     ED summary: Afebrile, vital signs stable on room air.  ABG pH 7.44, pCO2 34, pO2 82, bicarb 24.  EKG sinus tachycardia rate 107, nonspecific ST-T wave changes.  Opponent 23, ACS not suspected.  Creatinine 1.34, lactate elevated, procalcitonin not elevated.  UDS negative.  Blood cultures collected.  COVID/flu/RSV negative.  CT abdomen/pelvis large stone in urinary bladder, wall thickening probable diverticula, abnormal attenuation of the root of the mesentery most suggestive of mesenteric panniculitis.  CT head no intracranial hemorrhage or evidence of acute large cortical infarct, stable atrophy and chronic findings, left mastoiditis and left otitis media with bone destruction of the left mastoid air cells, right maxillary sinusitis.  CT cervical spine no acute fracture, multilevel degenerative disc disease.    Review of Systems:     All systems were reviewed and negative except as mentioned in subjective, assessment and plan.    Vital Signs:    Temp:  [98.4  °F (36.9 °C)-98.8 °F (37.1 °C)] 98.7 °F (37.1 °C)  Heart Rate:  [] 81  Resp:  [16-20] 18  BP: ()/(55-85) 122/74    Intake and output:    I/O last 3 completed shifts:  In: -   Out: 200 [Urine:200]  No intake/output data recorded.    Physical Examination:    General Appearance:  Alert and cooperative.    Head:  Superior ear and surrounding superior auricular area and left side of head scarring with good healing, no erythema, no swelling   Eyes: Conjunctivae and sclerae normal, no icterus. No pallor.   Throat: No oral lesions, no thrush, oral mucosa moist.   Neck: Supple, trachea midline, no thyromegaly.   Lungs:   Breath sounds heard bilaterally equally.  No wheezing or crackles. No Pleural rub or bronchial breathing.   Heart:  Normal S1 and S2, no murmur, no gallop, no rub. No JVD.   Abdomen:   Normal bowel sounds, no masses, no organomegaly. Soft, nontender, nondistended, no rebound tenderness.   Extremities: Supple, no edema, no cyanosis, no clubbing.   Skin: Warm   Neurologic: Alert and oriented to self. No facial asymmetry. Moves all four limbs. No tremors.      Laboratory results:    Results from last 7 days   Lab Units 01/02/25  0543 01/01/25  1221   SODIUM mmol/L 136 136   POTASSIUM mmol/L 4.3 4.2   CHLORIDE mmol/L 105 101   CO2 mmol/L 22.4 23.4   BUN mg/dL 11 9   CREATININE mg/dL 1.30* 1.34*   CALCIUM mg/dL 8.4* 8.9   BILIRUBIN mg/dL  --  1.1   ALK PHOS U/L  --  51   ALT (SGPT) U/L  --  13   AST (SGOT) U/L  --  23   GLUCOSE mg/dL 88 99     Results from last 7 days   Lab Units 01/02/25  0543 01/01/25  1221   WBC 10*3/mm3 3.23* 4.84   HEMOGLOBIN g/dL 9.8* 11.6*   HEMATOCRIT % 29.3* 34.0*   PLATELETS 10*3/mm3 123* 141         Results from last 7 days   Lab Units 01/01/25  1406 01/01/25  1221   CK TOTAL U/L  --  216*   HSTROP T ng/L 23* 24*         Recent Labs     01/01/25  1555   PHART 7.455   [id number] 34.2*   PO2ART 82.6   HIP5KDH 24.0   BASEEXCESS 0.4      I have reviewed the patient's laboratory  results.    Radiology results:    MRI Brain Without Contrast    Result Date: 1/1/2025  FINAL REPORT TECHNIQUE: null CLINICAL HISTORY: AMS SEVERE HX OF PREVIOUS STROKE, FALL TODAY, HX OF PREVIOUS SKIN CA OF LEFT EAR. COMPARISON: null FINDINGS: MR of the brain without contrast Comparison: MR/SR - MRI BRAIN W WO CONTRAST - 8/2/24 21:53 EDT CT - CT ANGIOGRAM NECK W WO CONTRAST - 8/2/24 20:54 EDT CT - CT ANGIOGRAM HEAD W CONTRAST - 8/2/24 20:54 EDT CT/SR - CT HEAD WO CONTRAST STROKE PROTOCOL - 8/2/24 20:54 EDT MR/SR - MRI BRAIN W WO CONTRAST - 5/10/23 18:47 EDT Findings: No acute infarction, hemorrhage, mass-effect or herniation. No hydrocephalus. Increased signal intensity is seen in the deep and periventricular white matter on the T2/FLAIR sequences, which most likely represents the sequela of severe chronic small vessel ischemic disease. Chronic lacunar infarctions in the right corona radiata/centrum semiovale and left basal ganglia. No extra-axial fluid collection or mass. Unremarkable sella. Intact flow voids. Normal orbits. Mucosal thickening in the paranasal sinuses may indicate sinusitis. Fluid signal in right mastoid air cells could be secondary to an effusion or mastoiditis. There is osseous destruction at the left mastoid process, which was also present on the prior studies and could be related to the previously seen skin cancer and/or post treatment changes. There is fluid signal in left mastoid air cells which could be secondary to an effusion or mastoiditis.     Impression: Impression: No acute infarction. No posttraumatic findings. Sinusitis could be considered. Bilateral mastoid air cell effusions versus mastoiditis. Osseous destruction of the left mastoid process could be secondary to the previously seen skin cancer and/or posttreatment changes. Correlate clinically for signs/symptoms of infection to exclude osteomyelitis. Authenticated and Electronically Signed by Jaqueline Painter MD on 01/01/2025  06:45:00 PM    CT Abdomen Pelvis With Contrast    Result Date: 1/1/2025  CT ABDOMEN AND PELVIS WITH CONTRAST  INDICATION: Possible bladder stone on x-ray.  TECHNIQUE: Thin section axial images were obtained from the lung bases through the pubic symphysis after oral and intravenous contrast. Coronal reconstruction images were obtained from the axial data.  COMPARISON: None.  FINDINGS:  Lower chest:  Lung bases are clear.  Liver:  Homogeneous.  No focal lesion.  Gallbladder/biliary system:  Gallbladder is present.   No intrahepatic or extrahepatic biliary dilatation.  Spleen:  Unremarkable.  Adrenal glands:  Unremarkable. No nodules.  Pancreas: Evaluation limited by motion artifact. No convincing mass or evidence of pancreatitis.  Kidneys/ureters/bladder:  No hydronephrosis, solid mass or perinephric stranding. A large calcification in the urinary bladder is consistent with a bladder stone. This measures 23 mm. There is also irregularity of the bladder wall which could be related to chronic cystitis. Bladder diverticula are not excluded near the dome.  GI tract: No evidence of small bowel obstruction or focal small bowel wall thickening. Normal appendix. No acute colon abnormality.  Pelvic organs: Mild enlargement of the prostate.  Lymph nodes/mesentery/retroperitoneum: There is abnormal attenuation in the root of the mesentery with small lymph nodes. 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  Abdominal wall: Abdominal wall intact.  Vascular: No acute vascular abnormality.  Free fluid: No free fluid.  Bones: No acute osseous abnormality.      Impression: 1. Large stone in the urinary bladder. 2. Wall thickening of the urinary bladder with probable diverticula. This is likely related to chronic cystitis and possibly mild chronic outlet obstruction. 3. 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      CTDI: 9.79 mGy DLP:552.61 mGy.cm       This  report was signed and finalized on 1/1/2025 3:15 PM by Carmen Le MD.      CT Cervical Spine Without Contrast    Result Date: 1/1/2025  PROCEDURE: CT CERVICAL SPINE WO CONTRAST-  INDICATION: fall this morning  TECHNIQUE: Thin section axial images were obtained through the cervical spine without contrast.  Coronal and sagittal reconstruction images were obtained from the axial data.  COMPARISON: None.  FINDINGS: Motion artifact limits exam quality. There is no convincing acute fracture. No facet lock. The craniocervical junction appears intact.   There is multilevel degenerative disc disease.   No acute paraspinal abnormality.      Impression: No acute fracture within the limitations of the exam. There is motion artifact.  Multilevel degenerative disc disease.  Please see CT head report regarding findings of the left mastoid air cells where there is mastoiditis and bone destruction.        CTDI: 16.93 mGy DLP:837.44 mGy.cm     This study was performed with techniques to keep radiation doses as low as reasonably achievable (ALARA). Individualized dose reduction techniques using automated exposure control or adjustment of mA and/or kV according to the patient size were employed.   This report was signed and finalized on 1/1/2025 1:22 PM by Carmen Le MD.      CT Head Without Contrast    Result Date: 1/1/2025  PROCEDURE: CT HEAD WO CONTRAST-  INDICATION: AMS  TECHNIQUE: Multiple axial CT images were performed from the foramen magnum to the vertex without contrast.   Coronal reconstruction images were obtained from the axial data.  COMPARISON: 8/2/2024.  FINDINGS: There is no mass effect or midline shift. There is stable atrophy with ventricular enlargement. There are stable bilateral periventricular hypodensities. There is no intracranial hemorrhage. The posterior fossa is without acute abnormality. There is mucoperiosteal thickening of the right maxillary sinus. There is opacification of the left mastoid air  cells with bone destruction. There is soft tissue density in the left middle ear cavity. This was present on the prior exam. No skull fracture.      Impression: 1. No intracranial hemorrhage or evidence of acute large cortical infarct. 2. Stable atrophy and chronic findings. 3. Left mastoiditis and left otitis media with bone destruction of the left mastoid air cells. 4. Right maxillary sinusitis.         CTDI: 16.93 mGy DLP:837.44 mGy.cm    This study was performed with techniques to keep radiation doses as low as reasonably achievable (ALARA). Individualized dose reduction techniques using automated exposure control or adjustment of mA and/or kV according to the patient size were employed.   This report was signed and finalized on 1/1/2025 1:19 PM by Carmen Le MD.      XR Pelvis 1 or 2 View    Result Date: 1/1/2025  PROCEDURE: XR PELVIS 1 OR 2 VW-  HISTORY: fall, AMS  COMPARISON: None.  FINDINGS: An AP view of the pelvis was obtained.  There is no acute osseous abnormality of the pelvis. There is degenerative joint disease of the hips bilaterally. There is a round radiodensity projecting over the midline pelvis. This could be a bladder stone or artifact in or on the patient's clothing. Soft tissues are otherwise without acute abnormality.      Impression: No acute osseous abnormality of the pelvis.  Round radiodensity projecting over the midline pelvis as above.        This report was signed and finalized on 1/1/2025 1:15 PM by Carmen Le MD.      XR Chest 1 View    Result Date: 1/1/2025  PROCEDURE: XR CHEST 1 VW-  INDICATION:  fall, AMS  FINDINGS:  A portable view of the chest was obtained.  Comparison is made to a prior exam dated 8/2/2024.   Heart size is normal. Lung volumes are very low. Right perihilar and bibasilar opacities may be atelectasis. Pneumonia is not excluded. No significant pleural effusion or pneumothorax.      Impression: Exam limited by low lung volumes. Probable bilateral  atelectasis.   This report was signed and finalized on 1/1/2025 1:14 PM by Carmen Le MD.     I have reviewed the patient's radiology reports.    Medication Review:     I have reviewed the patient's active and prn medications.     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     Altered mental status  Neurology consultation, recreations appreciated. EEG.  Family has deferred on lumbar puncture.  No seizure-like activity reported or witnessed, no syncope.  Differential could include vascular dementia.    Fall precautions.  MRI brain no sign of new stroke, no metastasis.  Chronic osseous destruction observed.  Do not suspect osteomyelitis, no sepsis.  CT showed stable chronic findings; left mastoiditis and left otitis media with bone destruction of the left mastoid air cells.  Left mastoiditis, left otitis media with bone destruction of left mastoid air cells  Augmentin.  CKD  Recommend follow-up outpatient.     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  Continue home medications.     Risk Assessment: High  DVT Prophylaxis: Heparin  Code Status: Full code  Diet: Cardiac  Discharge Plan: At least 1-2 more days depending on clinical status    Brian Joseph Kerley, DO  01/02/25  13:44 EST    Dictated utilizing Dragon dictation.    I have reviewed the copied text and it is accurate as of 1/2/2025       Electronically signed by Kerley, Brian Joseph, DO at 01/02/25 [id number]

## 2025-01-03 NOTE — PLAN OF CARE
Problem: Adult Inpatient Plan of Care  Goal: Plan of Care Review  Outcome: Progressing  Flowsheets (Taken 1/3/2025 7447)  Progress: improving  Plan of Care Reviewed With: patient  Goal: Patient-Specific Goal (Individualized)  Outcome: Progressing  Goal: Absence of Hospital-Acquired Illness or Injury  Outcome: Progressing  Goal: Optimal Comfort and Wellbeing  Outcome: Progressing  Goal: Readiness for Transition of Care  Outcome: Progressing     Problem: Skin Injury Risk Increased  Goal: Skin Health and Integrity  Outcome: Progressing     Problem: Fall Injury Risk  Goal: Absence of Fall and Fall-Related Injury  Outcome: Progressing     Problem: Confusion Acute  Goal: Optimal Cognitive Function  Outcome: Progressing   Goal Outcome Evaluation:  Plan of Care Reviewed With: patient        Progress: improving

## 2025-01-03 NOTE — PROGRESS NOTES
"DOS: 1/3/2025  NAME: Hardik Natarajan   : 1945  PCP: Won Cuellar MD  Chief Complaint   Patient presents with    Altered Mental Status    Fall       Chief complaint: More awake and alert today.  Subjective: Was belligerent but followed commands when requested.  Thought he was at [place] in [facility] where he lives.    Objective:  Vital signs: /93 (BP Location: Right arm, Patient Position: Lying)   Pulse 84   Temp 97.5 °F (36.4 °C) (Axillary)   Resp 18   Ht 185.4 cm (73\")   Wt 96.3 kg (212 lb 4.9 oz)   SpO2 96%   BMI 28.01 kg/m²    Gen: NAD, vitals reviewed  MS: Awake and alert and oriented to himself alone.  He does not recognize where he is currently and he does not know the month of the year.  Was belligerent but follows simple commands.  CN: Cranials 2-12: No focal deficits noted.  Motor: Moving all extremities well  Sensory: No sensory loss noted.  Coordination: Difficult to make him comprehend to perform finger-to-nose or heel-to-shin testing.  Gait: Could not be tested at this time.    ROS:  General: Belligerent gentleman currently following simple commands.  Neurological: He is only oriented to self but he can follow only one-step commands.  Moving all extremities.    Laboratory results:  Lab Results   Component Value Date    GLUCOSE 79 2025    CALCIUM 8.5 (L) 2025     (L) 2025    K 4.5 2025    CO2 22.2 2025     2025    BUN 17 2025    CREATININE 1.31 (H) 2025    BCR 13.0 2025    ANIONGAP 10.8 2025     Lab Results   Component Value Date    WBC 2.95 (L) 2025    HGB 9.4 (L) 2025    HCT 28.5 (L) 2025    MCV 94.1 2025     (L) 2025     Lab Results   Component Value Date    LDL 73 2024            Review of labs: Hemoglobin and hematocrit are low at 9.4 and 28.5.  The platelet count is low at 118,000.  Low vitamin B12 level at 169 noted.     CMP:        Lab 25  0646 " 01/02/25  0543 01/01/25  1221   SODIUM 135* 136 136   POTASSIUM 4.5 4.3 4.2   CHLORIDE 102 105 101   CO2 22.2 22.4 23.4   ANION GAP 10.8 8.6 11.6   BUN 17 11 9   CREATININE 1.31* 1.30* 1.34*   EGFR 55.0* 55.5* 53.6*   GLUCOSE 79 88 99   CALCIUM 8.5* 8.4* 8.9   MAGNESIUM  --   --  1.8   TOTAL PROTEIN  --   --  6.3   ALBUMIN  --   --  3.8   GLOBULIN  --   --  2.5   ALT (SGPT)  --   --  13   AST (SGOT)  --   --  23   BILIRUBIN  --   --  1.1   ALK PHOS  --   --  51        TSH          9/4/2024    10:16 9/25/2024    09:33 1/1/2025    12:21   TSH   TSH 12.060  5.670  2.700         Lipid Panel          8/3/2024    04:51   Lipid Panel   Total Cholesterol 117    Triglycerides 84    HDL Cholesterol 27    VLDL Cholesterol 17    LDL Cholesterol  73    LDL/HDL Ratio 2.71         Lab Results   Component Value Date    [id number] 169 (L) 01/01/2025       Lab Results   Component Value Date    FOLATE 6.35 01/01/2025       Lab Results   Component Value Date    HGBA1C 5.00 08/03/2024           Review and interpretation of imaging:  MRI of the brain with and without contrast on August 2, 2024 was interpreted as follows:     FINDINGS:  Exam: MRI of the brain without IV contrast     Comparison: 08/02/2024     Clinical history: Concern for CVA, left arm weakness     Findings:     Small 1 cm acute infarct in the right periventricular region on series 7, image 19. Decreased signal on the ADC map in this location.     No acute intracranial hemorrhage.     No intracranial masses.     Postcontrast imaging of the brain does not demonstrate any areas of abnormal parenchymal enhancement.     No midline shift.     No abnormal extra-axial fluid collections are seen.     Cerebral volume loss.     Areas of increased FLAIR signal within the cerebral white matter bilaterally are reflective of a nonspecific demyelinating process, likely ischemic microvascular in nature.     IMPRESSION:  Impression:     1. Small acute infarct in the right periventricular  region     2. Moderate cerebral volume loss with moderate cerebral white matter disease, likely ischemic microvascular in nature.    CT Head Without Contrast    Result Date: 1/1/2025  PROCEDURE: CT HEAD WO CONTRAST-  INDICATION: AMS  TECHNIQUE: Multiple axial CT images were performed from the foramen magnum to the vertex without contrast.   Coronal reconstruction images were obtained from the axial data.  COMPARISON: 8/2/2024.  FINDINGS: There is no mass effect or midline shift. There is stable atrophy with ventricular enlargement. There are stable bilateral periventricular hypodensities. There is no intracranial hemorrhage. The posterior fossa is without acute abnormality. There is mucoperiosteal thickening of the right maxillary sinus. There is opacification of the left mastoid air cells with bone destruction. There is soft tissue density in the left middle ear cavity. This was present on the prior exam. No skull fracture.      Impression: 1. No intracranial hemorrhage or evidence of acute large cortical infarct. 2. Stable atrophy and chronic findings. 3. Left mastoiditis and left otitis media with bone destruction of the left mastoid air cells. 4. Right maxillary sinusitis.         CTDI: 16.93 mGy DLP:837.44 mGy.cm    This study was performed with techniques to keep radiation doses as low as reasonably achievable (ALARA). Individualized dose reduction techniques using automated exposure control or adjustment of mA and/or kV according to the patient size were employed.   This report was signed and finalized on 1/1/2025 1:19 PM by Carmen Le MD.            MRI Brain Without Contrast    Result Date: 1/1/2025  FINAL REPORT TECHNIQUE: null CLINICAL HISTORY: AMS SEVERE HX OF PREVIOUS STROKE, FALL TODAY, HX OF PREVIOUS SKIN CA OF LEFT EAR. COMPARISON: null FINDINGS: MR of the brain without contrast Comparison: MR/SR - MRI BRAIN W WO CONTRAST - 8/2/24 21:53 EDT CT - CT ANGIOGRAM NECK W WO CONTRAST - 8/2/24 20:54 EDT  CT - CT ANGIOGRAM HEAD W CONTRAST - 8/2/24 20:54 EDT CT/SR - CT HEAD WO CONTRAST STROKE PROTOCOL - 8/2/24 20:54 EDT MR/SR - MRI BRAIN W WO CONTRAST - 5/10/23 18:47 EDT Findings: No acute infarction, hemorrhage, mass-effect or herniation. No hydrocephalus. Increased signal intensity is seen in the deep and periventricular white matter on the T2/FLAIR sequences, which most likely represents the sequela of severe chronic small vessel ischemic disease. Chronic lacunar infarctions in the right corona radiata/centrum semiovale and left basal ganglia. No extra-axial fluid collection or mass. Unremarkable sella. Intact flow voids. Normal orbits. Mucosal thickening in the paranasal sinuses may indicate sinusitis. Fluid signal in right mastoid air cells could be secondary to an effusion or mastoiditis. There is osseous destruction at the left mastoid process, which was also present on the prior studies and could be related to the previously seen skin cancer and/or post treatment changes. There is fluid signal in left mastoid air cells which could be secondary to an effusion or mastoiditis.     Impression: Impression: No acute infarction. No posttraumatic findings. Sinusitis could be considered. Bilateral mastoid air cell effusions versus mastoiditis. Osseous destruction of the left mastoid process could be secondary to the previously seen skin cancer and/or posttreatment changes. Correlate clinically for signs/symptoms of infection to exclude osteomyelitis. Authenticated and Electronically Signed by Jaqueline Painter MD on 01/01/2025 06:45:00 PM      Workup to date:Additional Tests Performed: Urine analysis is as follows:          Latest Reference Range & Units 01/01/25 14:18   Color, UA Yellow, Straw  Yellow   Appearance, UA Clear  Cloudy !   Specific Gravity, UA 1.005 - 1.030  1.009   pH, UA 5.0 - 8.0  7.0   Glucose Negative  Negative   Ketones, UA Negative  Negative   Blood, UA Negative  Negative   Nitrite, UA Negative   Negative   Leukocytes, UA Negative  Negative   Protein, UA Negative  Negative   Bilirubin, UA Negative  Negative   Urobilinogen, UA 0.2 - 1.0 E.U./dL  1.0 E.U./dL         Results for orders placed during the hospital encounter of 08/02/24    Adult Transthoracic Echo Complete w/ Color, Spectral and Contrast if necessary per protocol    Interpretation Summary    Left ventricular systolic function is normal. Calculated left ventricular EF = 60.4% Left ventricular ejection fraction appears to be 56 - 60%. Left ventricular diastolic function was normal.    The right ventricular cavity is borderline dilated with normal systolic function.    Mild tricuspid valve regurgitation is present. Estimated right ventricular systolic pressure from tricuspid regurgitation is normal (<35 mmHg).    Saline test results are negative for right to left atrial level shunt.              Diagnoses: Patient with severe vitamin B12 deficiency presenting with confusion      Comment: Could be tabetic encephalopathy.    Plan:  1.  Cyanocobalamin 1000 mcg intramuscular daily for the next 5 days.  2.  Try to have physical therapy and Occupational Therapy work with him.  3.  If he gets combative to use ziprasidone 20 mg intramuscular every 4 hours as needed so long the QTc interval is less than 500 ms.  4.  Encourage oral hydration and nutrition.    Discussed with Dr. Sanchez Garcia and he will be followed up next week by Dr Christin Herndon, the rounding teleneurologist coming Monday    Spent a total of 30 minutes in face-to-face evaluation and management of the patient using the dedicated telemedicine device without any interruption with the help of Canelo rodriguez rounding nurse with the patient located at the [facility] and myself at a remote location.    Electronically signed by Esperanza Zaragoza MD, 01/03/25, 10:12 AM EST.

## 2025-01-03 NOTE — PAYOR COMM NOTE
"TO:MK  FROM:ED PERSAUD, RN PHONE [phone]  CLINICALS REF# [id number]    Katey Browne (79 y.o. Male)       Date of Birth   1945    Social Security Number       Address   [address] [place] [id number]    Home Phone   [phone]    MRN   [id number]       Taoist   None    Marital Status                               Admission Date   1/1/25    Admission Type   Emergency    Admitting Provider   Kerley, Brian Joseph, DO    Attending Provider   Kerley, Brian Joseph, DO    [facility]       Discharge Date       Discharge Disposition       Discharge Destination                                 Attending Provider: Kerley, Brian Joseph, DO    Allergies: Asa [Aspirin]    Isolation: None   Infection: None   Code Status: CPR    Ht: 185.4 cm (73\")   Wt: 96.3 kg (212 lb 4.9 oz)    Admission Cmt: None   Principal Problem: AMS (altered mental status) [R41.82]                   Active Insurance as of 1/1/2025       Primary Coverage       Payor Plan Insurance Group Employer/Plan Group    ANTHEM MEDICARE REPLACEMENT ANTHEM MED ADV SNP [id number]       Payor Plan Address Payor Plan Phone Number Payor Plan Fax Number Effective Dates    [phone]  1/1/2024 - None Entered    [facility] [id number]         Subscriber Name Subscriber Birth Date Member ID       KATEY BROWNE 1945 [id number]               Secondary Coverage       Payor Plan Insurance Group Employer/Plan Group    [id number]        Payor Plan Address Payor Plan Phone Number Payor Plan Fax Number Effective Dates    [phone]   4/7/2023 - None Entered    [facility] [id number]         Subscriber Name Subscriber Birth Date Member ID       KATEY BROWNE 1945 [id number]                     Emergency Contacts        (Rel.) Home Phone Work Phone Mobile Phone    MAC BROWNE/ZITA (Son) [phone] -- [phone] "                 History & Physical        Kerley, Brian Joseph, DO at 25 1727            [facility]   HISTORY AND PHYSICAL      Name:  Hardik Natarajan   Age:  79 y.o.  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  Presented to [facility] ED on 2025 with concern for altered mental status.  Last known normal was night prior to presentation.  Also reported having a fall, unclear if it was syncope.  Wife found him on the floor.  On Plavix, no other anticoagulation.  He denied any fevers or chills, chest pain, shortness of air, any other specific concern.  Denied any slurred speech, difficulty moving upper or lower extremities, and any in symmetry.    ED summary: Afebrile, vital signs stable on room air.  ABG pH 7.44, pCO2 34, pO2 82, bicarb 24.  EKG sinus tachycardia rate 107, nonspecific ST-T wave changes.  Opponent 23, ACS not suspected.  Creatinine 1.34, lactate elevated, procalcitonin not elevated.  UDS negative.  Blood cultures collected.  COVID/flu/RSV negative.  CT abdomen/pelvis large stone in urinary bladder, wall thickening probable diverticula, abnormal attenuation of the root of the mesentery most suggestive of mesenteric panniculitis.  CT head no intracranial hemorrhage or evidence of acute large cortical infarct, stable atrophy and chronic findings, left mastoiditis and left otitis media with bone destruction of the left mastoid air cells, right maxillary sinusitis.  CT cervical spine no acute fracture,  multilevel degenerative disc disease.    Review Of Systems:    All systems were reviewed and negative except as mentioned in history of presenting illness, assessment and plan.    Past Medical History: Patient  has a past medical history of Anemia, Arthritis, and Cancer.    Past Surgical History: Patient  has a past surgical history that includes Skin cancer excision; Neck dissection (N/A); Parotidectomy (N/A); and NAIL BIOPSY (N/A).    Social History: Patient  reports that he has never smoked. His smokeless tobacco use includes chew. He reports that he does not currently use alcohol. He reports that he does not use drugs.    Family History:  Patient's family history has been reviewed and found to be noncontributory.     Allergies:      Asa [aspirin]    Home Medications:    Prior to Admission Medications       Prescriptions Last Dose Informant Patient Reported? Taking?    acetaminophen (TYLENOL) 325 MG tablet   Yes No    Take 2 tablets by mouth Every 6 (Six) Hours As Needed.    clopidogrel (PLAVIX) 75 MG tablet   Yes No    Take 1 tablet by mouth Daily.    cyanocobalamin 1000 MCG/ML injection   Yes No    Inject 0.1 mL into the appropriate muscle as directed by prescriber Every 30 (Thirty) Days.    diclofenac (VOLTAREN) 75 MG EC tablet   Yes No    DULoxetine (CYMBALTA) 30 MG capsule   Yes No    Take 1 capsule by mouth Daily.    finasteride (PROSCAR) 5 MG tablet   Yes No    Take 1 tablet by mouth Daily.    Hydrocortisone, Perianal, (ANUSOL-HC) 2.5 % rectal cream   Yes No    Insert 1 Application into the rectum.    hydrOXYzine (ATARAX) 25 MG tablet   No No    Take 1 tablet by mouth Every 6 (Six) Hours As Needed for Itching.    levothyroxine (SYNTHROID, LEVOTHROID) 75 MCG tablet   No No    TAKE ONE (1) TABLET BY MOUTH EVERY DAY    ondansetron (ZOFRAN) 8 MG tablet   No No    Take 1 tablet by mouth 3 (Three) Times a Day As Needed for Nausea or Vomiting.    ondansetron ODT (ZOFRAN-ODT) 8 MG disintegrating tablet   Yes No     "Place 1 tablet twice a day by translingual route.    oxyCODONE (Roxicodone) 5 MG immediate release tablet   No No    Take 1 tablet by mouth Every 6 (Six) Hours As Needed for Severe Pain.    Procto-Med HC 2.5 % rectal cream   Yes No    1 Application Daily As Needed.    senna (SENOKOT) 8.6 MG tablet   Yes No    Take 1 tablet by mouth Daily As Needed.    tamsulosin (FLOMAX) 0.4 MG capsule 24 hr capsule   Yes No    Take 1 capsule by mouth.          ED Medications:    Medications   sodium chloride 0.9 % bolus 1,000 mL (1,000 mL Intravenous New Bag 1/1/25 1722)   iopamidol (ISOVUE-300) 61 % injection 100 mL (100 mL Intravenous Given 1/1/25 1427)   sodium chloride 0.9 % bolus 1,000 mL (1,000 mL Intravenous New Bag 1/1/25 1557)     Vital Signs:  Temp:  [98 °F (36.7 °C)] 98 °F (36.7 °C)  Heart Rate:  [] 89  Resp:  [18] 18  BP: ()/(55-99) 94/68        01/01/25  1149   Weight: 102 kg (225 lb)     Body mass index is 29.69 kg/m².    Physical Exam:     Most recent vital Signs: BP 94/68   Pulse 89   Temp 98 °F (36.7 °C) (Oral)   Resp 18   Ht 185.4 cm (73\")   Wt 102 kg (225 lb)   SpO2 96%   BMI 29.69 kg/m²     Physical Exam  Constitutional:       General: He is not in acute distress.     Appearance: He is ill-appearing. He is not toxic-appearing.   HENT:      Mouth/Throat:      Mouth: Mucous membranes are moist.   Eyes:      Extraocular Movements: Extraocular movements intact.   Cardiovascular:      Rate and Rhythm: Normal rate and regular rhythm.      Pulses: Normal pulses.      Heart sounds: Normal heart sounds.   Pulmonary:      Effort: Pulmonary effort is normal.      Breath sounds: Normal breath sounds.   Abdominal:      Palpations: Abdomen is soft.      Tenderness: There is no abdominal tenderness.   Musculoskeletal:      Right lower leg: No edema.      Left lower leg: No edema.   Skin:     General: Skin is warm.   Neurological:      General: No focal deficit present.      Mental Status: He is alert.      " Comments: Oriented to self   Psychiatric:      Comments: Agitated         Laboratory data:    I have reviewed the labs done in the emergency room.    Results from last 7 days   Lab Units 01/01/25  1221   SODIUM mmol/L 136   POTASSIUM mmol/L 4.2   CHLORIDE mmol/L 101   CO2 mmol/L 23.4   BUN mg/dL 9   CREATININE mg/dL 1.34*   CALCIUM mg/dL 8.9   BILIRUBIN mg/dL 1.1   ALK PHOS U/L 51   ALT (SGPT) U/L 13   AST (SGOT) U/L 23   GLUCOSE mg/dL 99     Results from last 7 days   Lab Units 01/01/25  1221   WBC 10*3/mm3 4.84   HEMOGLOBIN g/dL 11.6*   HEMATOCRIT % 34.0*   PLATELETS 10*3/mm3 141         Results from last 7 days   Lab Units 01/01/25  1406 01/01/25  1221   CK TOTAL U/L  --  216*   HSTROP T ng/L 23* 24*                 Results from last 7 days   Lab Units 01/01/25  1555   PH, ARTERIAL pH units 7.455   PO2 ART mm Hg 82.6   PCO2, ARTERIAL mm Hg 34.2*   HCO3 ART mmol/L 24.0     Results from last 7 days   Lab Units 01/01/25  1418   COLOR UA  Yellow   GLUCOSE UA  Negative   KETONES UA  Negative   BLOOD UA  Negative   LEUKOCYTES UA  Negative   PH, URINE  7.0   BILIRUBIN UA  Negative   UROBILINOGEN UA  1.0 E.U./dL       Pain Management Panel           No data to display                    Radiology:    CT Abdomen Pelvis With Contrast    Result Date: 1/1/2025  CT ABDOMEN AND PELVIS WITH CONTRAST  INDICATION: Possible bladder stone on x-ray.  TECHNIQUE: Thin section axial images were obtained from the lung bases through the pubic symphysis after oral and intravenous contrast. Coronal reconstruction images were obtained from the axial data.  COMPARISON: None.  FINDINGS:  Lower chest:  Lung bases are clear.  Liver:  Homogeneous.  No focal lesion.  Gallbladder/biliary system:  Gallbladder is present.   No intrahepatic or extrahepatic biliary dilatation.  Spleen:  Unremarkable.  Adrenal glands:  Unremarkable. No nodules.  Pancreas: Evaluation limited by motion artifact. No convincing mass or evidence of pancreatitis.   Kidneys/ureters/bladder:  No hydronephrosis, solid mass or perinephric stranding. A large calcification in the urinary bladder is consistent with a bladder stone. This measures 23 mm. There is also irregularity of the bladder wall which could be related to chronic cystitis. Bladder diverticula are not excluded near the dome.  GI tract: No evidence of small bowel obstruction or focal small bowel wall thickening. Normal appendix. No acute colon abnormality.  Pelvic organs: Mild enlargement of the prostate.  Lymph nodes/mesentery/retroperitoneum: There is abnormal attenuation in the root of the mesentery with small lymph nodes. 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  Abdominal wall: Abdominal wall intact.  Vascular: No acute vascular abnormality.  Free fluid: No free fluid.  Bones: No acute osseous abnormality.      1. Large stone in the urinary bladder. 2. Wall thickening of the urinary bladder with probable diverticula. This is likely related to chronic cystitis and possibly mild chronic outlet obstruction. 3. 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      CTDI: 9.79 mGy DLP:552.61 mGy.cm       This report was signed and finalized on 1/1/2025 3:15 PM by Carmen Le MD.      CT Cervical Spine Without Contrast    Result Date: 1/1/2025  PROCEDURE: CT CERVICAL SPINE WO CONTRAST-  INDICATION: fall this morning  TECHNIQUE: Thin section axial images were obtained through the cervical spine without contrast.  Coronal and sagittal reconstruction images were obtained from the axial data.  COMPARISON: None.  FINDINGS: Motion artifact limits exam quality. There is no convincing acute fracture. No facet lock. The craniocervical junction appears intact.   There is multilevel degenerative disc disease.   No acute paraspinal abnormality.      No acute fracture within the limitations of the exam. There is motion artifact.  Multilevel degenerative  disc disease.  Please see CT head report regarding findings of the left mastoid air cells where there is mastoiditis and bone destruction.        CTDI: 16.93 mGy DLP:837.44 mGy.cm     This study was performed with techniques to keep radiation doses as low as reasonably achievable (ALARA). Individualized dose reduction techniques using automated exposure control or adjustment of mA and/or kV according to the patient size were employed.   This report was signed and finalized on 1/1/2025 1:22 PM by Carmen Le MD.      CT Head Without Contrast    Result Date: 1/1/2025  PROCEDURE: CT HEAD WO CONTRAST-  INDICATION: AMS  TECHNIQUE: Multiple axial CT images were performed from the foramen magnum to the vertex without contrast.   Coronal reconstruction images were obtained from the axial data.  COMPARISON: 8/2/2024.  FINDINGS: There is no mass effect or midline shift. There is stable atrophy with ventricular enlargement. There are stable bilateral periventricular hypodensities. There is no intracranial hemorrhage. The posterior fossa is without acute abnormality. There is mucoperiosteal thickening of the right maxillary sinus. There is opacification of the left mastoid air cells with bone destruction. There is soft tissue density in the left middle ear cavity. This was present on the prior exam. No skull fracture.      1. No intracranial hemorrhage or evidence of acute large cortical infarct. 2. Stable atrophy and chronic findings. 3. Left mastoiditis and left otitis media with bone destruction of the left mastoid air cells. 4. Right maxillary sinusitis.         CTDI: 16.93 mGy DLP:837.44 mGy.cm    This study was performed with techniques to keep radiation doses as low as reasonably achievable (ALARA). Individualized dose reduction techniques using automated exposure control or adjustment of mA and/or kV according to the patient size were employed.   This report was signed and finalized on 1/1/2025 1:19 PM by Carmen  MD Rey.      XR Pelvis 1 or 2 View    Result Date: 1/1/2025  PROCEDURE: XR PELVIS 1 OR 2 VW-  HISTORY: fall, AMS  COMPARISON: None.  FINDINGS: An AP view of the pelvis was obtained.  There is no acute osseous abnormality of the pelvis. There is degenerative joint disease of the hips bilaterally. There is a round radiodensity projecting over the midline pelvis. This could be a bladder stone or artifact in or on the patient's clothing. Soft tissues are otherwise without acute abnormality.      No acute osseous abnormality of the pelvis.  Round radiodensity projecting over the midline pelvis as above.        This report was signed and finalized on 1/1/2025 1:15 PM by Carmen Le MD.      XR Chest 1 View    Result Date: 1/1/2025  PROCEDURE: XR CHEST 1 VW-  INDICATION:  fall, AMS  FINDINGS:  A portable view of the chest was obtained.  Comparison is made to a prior exam dated 8/2/2024.   Heart size is normal. Lung volumes are very low. Right perihilar and bibasilar opacities may be atelectasis. Pneumonia is not excluded. No significant pleural effusion or pneumothorax.      Exam limited by low lung volumes. Probable bilateral atelectasis.   This report was signed and finalized on 1/1/2025 1:14 PM by Carmen Le MD.       Assessment/Plan:    Observation general floor admission 1/1/2025 with altered mental status in the setting of known invasive stage IV left ear squamous cell carcinoma with bony mets.    Altered mental status  Left mastoiditis, left otitis media with bone destruction of left mastoid air cells  Neurology consultation, recreations appreciated.  MRI brain.  EEG.   No seizure-like activity reported or witnessed, no syncope.  Differential could include stroke, brain mets.  CT showed stable chronic findings; left mastoiditis and left otitis media with bone destruction of the left mastoid air cells.  Fall precautions.    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  Continue home medications.    Risk Assessment: High  DVT Prophylaxis: Heparin  Code Status: Full code  Diet: Cardiac    Advance Care Planning  ACP discussion was held with the patient during this visit. Patient does not have an advance directive, information provided.           Brian Joseph Kerley, DO  01/01/25  17:27 EST    Dictated utilizing Dragon dictation.    Electronically signed by Kerley, Brian Joseph, DO at 01/01/25 3796          Emergency Department Notes        Donna James PA-C at 01/01/25 1208          Subjective:  Chief Complaint:  Fall, AMS    History of Present Illness:  Patient is an 79-year-old male with history of anemia, arthritis, cancer, recent small periventricular ischemic stroke in August 2020 for which she was admitted here for.  Patient presents today with altered mental status.  Per daughter-in-law at bedside last known normal was last night.  States that her  spoke with him last night said he seemed normal.  States that this morning he had a fall.  Wife was going to get him some water and turned around and he was on the floor.  Patient is on Plavix but does not appear to be on any other anticoagulants.  Patient denying any complaints currently but daughter-in-law states that he did not recognize his son.  Patient is alert and oriented to person but not place and time.  Daughter-in-law states that this is probably his baseline.  Patient denying any pain or complaints currently.  Does not appear to have any focal neurologic deficits.  Daughter-in-law is worried about a UTI she states his urine was strong smelling and he did urinate on himself on the way here.      Nurses Notes reviewed and agree, including vitals, allergies, social history and prior medical history.     REVIEW OF SYSTEMS: All systems reviewed and not pertinent unless noted.  Review of  "Systems   Genitourinary:         Dark foul-smelling urine   Psychiatric/Behavioral:  Positive for confusion.    All other systems reviewed and are negative.      Past Medical History:   Diagnosis Date    Anemia     Arthritis     Cancer        Allergies:    Asa [aspirin]      Past Surgical History:   Procedure Laterality Date    NAIL BIOPSY N/A     NECK DISSECTION N/A     PAROTIDECTOMY N/A     SKIN CANCER EXCISION           Social History     Socioeconomic History    Marital status:    Tobacco Use    Smoking status: Never    Smokeless tobacco: Current     Types: Chew   Vaping Use    Vaping status: Never Used   Substance and Sexual Activity    Alcohol use: Not Currently    Drug use: Never    Sexual activity: Defer         Family History   Problem Relation Age of Onset    Cancer Mother     Cancer Father        Objective  Physical Exam:  /58   Pulse 89   Temp 98 °F (36.7 °C) (Oral)   Resp 18   Ht 185.4 cm (73\")   Wt 102 kg (225 lb)   SpO2 98%   BMI 29.69 kg/m²      Physical Exam  Vitals and nursing note reviewed.   Constitutional:       General: He is not in acute distress.     Appearance: He is not toxic-appearing.   HENT:      Head: Normocephalic and atraumatic.   Eyes:      Extraocular Movements: Extraocular movements intact.      Pupils: Pupils are equal, round, and reactive to light.   Cardiovascular:      Rate and Rhythm: Tachycardia present.   Pulmonary:      Effort: Pulmonary effort is normal. No respiratory distress.   Abdominal:      General: There is no distension.      Palpations: Abdomen is soft.      Tenderness: There is no abdominal tenderness.   Musculoskeletal:         General: Normal range of motion.      Cervical back: Normal range of motion and neck supple.   Skin:     General: Skin is warm and dry.      Comments: Left ear noted to have ulceration secondary to skin cancer; no signs of acute infection, daughter-in-law states that it appears at baseline   Neurological:      Mental " Status: He is alert.      GCS: GCS eye subscore is 4. GCS verbal subscore is 4. GCS motor subscore is 6.   Psychiatric:         Mood and Affect: Mood normal.         Behavior: Behavior normal.         Procedures    ED Course:         Lab Results (last 24 hours)       Procedure Component Value Units Date/Time    CBC & Differential [[id number]]  (Abnormal) Collected: 01/01/25 1221    Specimen: Blood Updated: 01/01/25 1230    Narrative:      The following orders were created for panel order CBC & Differential.  Procedure                               Abnormality         Status                     ---------                               -----------         ------                     CBC Auto Differential[[id number]]        Abnormal            Final result                 Please view results for these tests on the individual orders.    Comprehensive Metabolic Panel [[id number]]  (Abnormal) Collected: 01/01/25 1221    Specimen: Blood Updated: 01/01/25 1246     Glucose 99 mg/dL      BUN 9 mg/dL      Creatinine 1.34 mg/dL      Sodium 136 mmol/L      Potassium 4.2 mmol/L      Chloride 101 mmol/L      CO2 23.4 mmol/L      Calcium 8.9 mg/dL      Total Protein 6.3 g/dL      Albumin 3.8 g/dL      ALT (SGPT) 13 U/L      AST (SGOT) 23 U/L      Alkaline Phosphatase 51 U/L      Total Bilirubin 1.1 mg/dL      Globulin 2.5 gm/dL      A/G Ratio 1.5 g/dL      BUN/Creatinine Ratio 6.7     Anion Gap 11.6 mmol/L      eGFR 53.6 mL/min/1.73     Narrative:      GFR Categories in Chronic Kidney Disease (CKD)      GFR Category          GFR (mL/min/1.73)    Interpretation  G1                     90 or greater         Normal or high (1)  G2                      60-89                Mild decrease (1)  G3a                   45-59                Mild to moderate decrease  G3b                   30-44                Moderate to severe decrease  G4                    15-29                Severe decrease  G5                    14 or less           Kidney  failure          (1)In the absence of evidence of kidney disease, neither GFR category G1 or G2 fulfill the criteria for CKD.    eGFR calculation 2021 CKD-EPI creatinine equation, which does not include race as a factor    Magnesium [[id number]]  (Normal) Collected: 01/01/25 1221    Specimen: Blood Updated: 01/01/25 1246     Magnesium 1.8 mg/dL     High Sensitivity Troponin T [[id number]]  (Abnormal) Collected: 01/01/25 1221    Specimen: Blood Updated: 01/01/25 1257     HS Troponin T 24 ng/L     Narrative:      High Sensitive Troponin T Reference Range:  <14.0 ng/L- Negative Female for AMI  <22.0 ng/L- Negative Male for AMI  >=14 - Abnormal Female indicating possible myocardial injury.  >=22 - Abnormal Male indicating possible myocardial injury.   Clinicians would have to utilize clinical acumen, EKG, Troponin, and serial changes to determine if it is an Acute Myocardial Infarction or myocardial injury due to an underlying chronic condition.         CBC Auto Differential [[id number]]  (Abnormal) Collected: 01/01/25 1221    Specimen: Blood Updated: 01/01/25 1230     WBC 4.84 10*3/mm3      RBC 3.70 10*6/mm3      Hemoglobin 11.6 g/dL      Hematocrit 34.0 %      MCV 91.9 fL      MCH 31.4 pg      MCHC 34.1 g/dL      RDW 13.4 %      RDW-SD 45.3 fl      MPV 9.8 fL      Platelets 141 10*3/mm3      Neutrophil % 86.7 %      Lymphocyte % 4.5 %      Monocyte % 6.2 %      Eosinophil % 1.2 %      Basophil % 0.6 %      Immature Grans % 0.8 %      Neutrophils, Absolute 4.19 10*3/mm3      Lymphocytes, Absolute 0.22 10*3/mm3      Monocytes, Absolute 0.30 10*3/mm3      Eosinophils, Absolute 0.06 10*3/mm3      Basophils, Absolute 0.03 10*3/mm3      Immature Grans, Absolute 0.04 10*3/mm3      nRBC 0.0 /100 WBC     TSH [[id number]]  (Normal) Collected: 01/01/25 1221    Specimen: Blood Updated: 01/01/25 1257     TSH 2.700 uIU/mL     CK [[id number]]  (Abnormal) Collected: 01/01/25 1221    Specimen: Blood Updated: 01/01/25 1246     Creatine  Kinase 216 U/L     C-reactive Protein [[id number]]  (Abnormal) Collected: 01/01/25 1221    Specimen: Blood Updated: 01/01/25 1338     C-Reactive Protein 0.90 mg/dL     COVID-19, FLU A/B, RSV PCR 1 HR TAT - Swab, Nasopharynx [[id number]]  (Normal) Collected: 01/01/25 1248    Specimen: Swab from Nasopharynx Updated: 01/01/25 1336     COVID19 Not Detected     Influenza A PCR Not Detected     Influenza B PCR Not Detected     RSV, PCR Not Detected    Narrative:      Fact sheet for providers: https://www.fda.gov/media/214399/download    Fact sheet for patients: https://www.fda.gov/media/523862/download    Test performed by PCR.    High Sensitivity Troponin T 1Hr [[id number]]  (Abnormal) Collected: 01/01/25 1406    Specimen: Blood Updated: 01/01/25 1435     HS Troponin T 23 ng/L      Troponin T Numeric Delta -1 ng/L      Troponin T % Delta -4 %     Narrative:      High Sensitive Troponin T Reference Range:  <14.0 ng/L- Negative Female for AMI  <22.0 ng/L- Negative Male for AMI  >=14 - Abnormal Female indicating possible myocardial injury.  >=22 - Abnormal Male indicating possible myocardial injury.   Clinicians would have to utilize clinical acumen, EKG, Troponin, and serial changes to determine if it is an Acute Myocardial Infarction or myocardial injury due to an underlying chronic condition.         Urinalysis With Microscopic If Indicated (No Culture) - Urine, Clean Catch [[id number]]  (Abnormal) Collected: 01/01/25 1418    Specimen: Urine, Clean Catch Updated: 01/01/25 1427     Color, UA Yellow     Appearance, UA Cloudy     pH, UA 7.0     Specific Gravity, UA 1.009     Glucose, UA Negative     Ketones, UA Negative     Bilirubin, UA Negative     Blood, UA Negative     Protein, UA Negative     Leuk Esterase, UA Negative     Nitrite, UA Negative     Urobilinogen, UA 1.0 E.U./dL    Narrative:      Urine microscopic not indicated.    Blood Gas, Arterial With Co-Ox [[id number]]  (Abnormal) Collected: 01/01/25 1555    Specimen:  "Arterial Blood Updated: 01/01/25 1555     Site Left Radial     Leon's Test Positive     pH, Arterial 7.455 pH units      Comment: 83 Value above reference range        pCO2, Arterial 34.2 mm Hg      Comment: 84 Value below reference range        pO2, Arterial 82.6 mm Hg      Comment: 84 Value below reference range        HCO3, Arterial 24.0 mmol/L      Base Excess, Arterial 0.4 mmol/L      O2 Saturation, Arterial 97.7 %      Hematocrit, Blood Gas 34.5 %      Comment: 84 Value below reference range        Oxyhemoglobin 95.9 %      Methemoglobin 0.60 %      Carboxyhemoglobin 1.3 %      A-a DO2 23.8 mmHg      Barometric Pressure for Blood Gas 738 mmHg      Modality N/A     Ventilator Mode NA     Collected by      Comment: Meter: [id number]     :  [id number]        pH, Temp Corrected --     pCO2, Temperature Corrected --     pO2, Temperature Corrected --    Procalcitonin [[id number]]  (Normal) Collected: 01/01/25 1557    Specimen: Blood Updated: 01/01/25 1628     Procalcitonin 0.16 ng/mL     Narrative:      As a Marker for Sepsis (Non-Neonates):    1. <0.5 ng/mL represents a low risk of severe sepsis and/or septic shock.  2. >2 ng/mL represents a high risk of severe sepsis and/or septic shock.    As a Marker for Lower Respiratory Tract Infections that require antibiotic therapy:    PCT on Admission    Antibiotic Therapy       6-12 Hrs later    >0.5                Strongly Recommended  >0.25 - <0.5        Recommended   0.1 - 0.25          Discouraged              Remeasure/reassess PCT  <0.1                Strongly Discouraged     Remeasure/reassess PCT    As 28 day mortality risk marker: \"Change in Procalcitonin Result\" (>80% or <=80%) if Day 0 (or Day 1) and Day 4 values are available. Refer to http://www.[employer]-pct-calculator.com    Change in PCT <=80%  A decrease of PCT levels below or equal to 80% defines a positive change in PCT test result representing a higher risk for 28-day all-cause mortality of " patients diagnosed with severe sepsis for septic shock.    Change in PCT >80%  A decrease of PCT levels of more than 80% defines a negative change in PCT result representing a lower risk for 28-day all-cause mortality of patients diagnosed with severe sepsis or septic shock.       Lactic Acid, Plasma [[id number]]  (Normal) Collected: 01/01/25 1557    Specimen: Blood Updated: 01/01/25 1615     Lactate 0.9 mmol/L     Blood Culture - Blood, Hand, Right [[id number]] Collected: 01/01/25 1620    Specimen: Blood from Hand, Right Updated: 01/01/25 1640    Blood Culture - Blood, Hand, Right [[id number]] Collected: 01/01/25 1627    Specimen: Blood from Hand, Right Updated: 01/01/25 1639    Vitamin B12 [[id number]] Collected: 01/01/25 1640    Specimen: Blood Updated: 01/01/25 1640    Folate [[id number]] Collected: 01/01/25 1640    Specimen: Blood Updated: 01/01/25 1640             CT Abdomen Pelvis With Contrast    Result Date: 1/1/2025  CT ABDOMEN AND PELVIS WITH CONTRAST  INDICATION: Possible bladder stone on x-ray.  TECHNIQUE: Thin section axial images were obtained from the lung bases through the pubic symphysis after oral and intravenous contrast. Coronal reconstruction images were obtained from the axial data.  COMPARISON: None.  FINDINGS:  Lower chest:  Lung bases are clear.  Liver:  Homogeneous.  No focal lesion.  Gallbladder/biliary system:  Gallbladder is present.   No intrahepatic or extrahepatic biliary dilatation.  Spleen:  Unremarkable.  Adrenal glands:  Unremarkable. No nodules.  Pancreas: Evaluation limited by motion artifact. No convincing mass or evidence of pancreatitis.  Kidneys/ureters/bladder:  No hydronephrosis, solid mass or perinephric stranding. A large calcification in the urinary bladder is consistent with a bladder stone. This measures 23 mm. There is also irregularity of the bladder wall which could be related to chronic cystitis. Bladder diverticula are not excluded near the dome.  GI tract: No  evidence of small bowel obstruction or focal small bowel wall thickening. Normal appendix. No acute colon abnormality.  Pelvic organs: Mild enlargement of the prostate.  Lymph nodes/mesentery/retroperitoneum: There is abnormal attenuation in the root of the mesentery with small lymph nodes. 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  Abdominal wall: Abdominal wall intact.  Vascular: No acute vascular abnormality.  Free fluid: No free fluid.  Bones: No acute osseous abnormality.      Impression: 1. Large stone in the urinary bladder. 2. Wall thickening of the urinary bladder with probable diverticula. This is likely related to chronic cystitis and possibly mild chronic outlet obstruction. 3. 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      CTDI: 9.79 mGy DLP:552.61 mGy.cm       This report was signed and finalized on 1/1/2025 3:15 PM by Carmen Le MD.      CT Cervical Spine Without Contrast    Result Date: 1/1/2025  PROCEDURE: CT CERVICAL SPINE WO CONTRAST-  INDICATION: fall this morning  TECHNIQUE: Thin section axial images were obtained through the cervical spine without contrast.  Coronal and sagittal reconstruction images were obtained from the axial data.  COMPARISON: None.  FINDINGS: Motion artifact limits exam quality. There is no convincing acute fracture. No facet lock. The craniocervical junction appears intact.   There is multilevel degenerative disc disease.   No acute paraspinal abnormality.      Impression: No acute fracture within the limitations of the exam. There is motion artifact.  Multilevel degenerative disc disease.  Please see CT head report regarding findings of the left mastoid air cells where there is mastoiditis and bone destruction.        CTDI: 16.93 mGy DLP:837.44 mGy.cm     This study was performed with techniques to keep radiation doses as low as reasonably achievable (ALARA). Individualized dose reduction  techniques using automated exposure control or adjustment of mA and/or kV according to the patient size were employed.   This report was signed and finalized on 1/1/2025 1:22 PM by Carmen Le MD.      CT Head Without Contrast    Result Date: 1/1/2025  PROCEDURE: CT HEAD WO CONTRAST-  INDICATION: AMS  TECHNIQUE: Multiple axial CT images were performed from the foramen magnum to the vertex without contrast.   Coronal reconstruction images were obtained from the axial data.  COMPARISON: 8/2/2024.  FINDINGS: There is no mass effect or midline shift. There is stable atrophy with ventricular enlargement. There are stable bilateral periventricular hypodensities. There is no intracranial hemorrhage. The posterior fossa is without acute abnormality. There is mucoperiosteal thickening of the right maxillary sinus. There is opacification of the left mastoid air cells with bone destruction. There is soft tissue density in the left middle ear cavity. This was present on the prior exam. No skull fracture.      Impression: 1. No intracranial hemorrhage or evidence of acute large cortical infarct. 2. Stable atrophy and chronic findings. 3. Left mastoiditis and left otitis media with bone destruction of the left mastoid air cells. 4. Right maxillary sinusitis.         CTDI: 16.93 mGy DLP:837.44 mGy.cm    This study was performed with techniques to keep radiation doses as low as reasonably achievable (ALARA). Individualized dose reduction techniques using automated exposure control or adjustment of mA and/or kV according to the patient size were employed.   This report was signed and finalized on 1/1/2025 1:19 PM by Carmen Le MD.      XR Pelvis 1 or 2 View    Result Date: 1/1/2025  PROCEDURE: XR PELVIS 1 OR 2 VW-  HISTORY: fall, AMS  COMPARISON: None.  FINDINGS: An AP view of the pelvis was obtained.  There is no acute osseous abnormality of the pelvis. There is degenerative joint disease of the hips bilaterally. There is a  round radiodensity projecting over the midline pelvis. This could be a bladder stone or artifact in or on the patient's clothing. Soft tissues are otherwise without acute abnormality.      Impression: No acute osseous abnormality of the pelvis.  Round radiodensity projecting over the midline pelvis as above.        This report was signed and finalized on 1/1/2025 1:15 PM by Carmen Le MD.      XR Chest 1 View    Result Date: 1/1/2025  PROCEDURE: XR CHEST 1 VW-  INDICATION:  fall, AMS  FINDINGS:  A portable view of the chest was obtained.  Comparison is made to a prior exam dated 8/2/2024.   Heart size is normal. Lung volumes are very low. Right perihilar and bibasilar opacities may be atelectasis. Pneumonia is not excluded. No significant pleural effusion or pneumothorax.      Impression: Exam limited by low lung volumes. Probable bilateral atelectasis.   This report was signed and finalized on 1/1/2025 1:14 PM by Carmen Le MD.          MDM  Patient evaluated in the ER for confusion and fall that occurred this morning.  Evidently patient was normal last night when his son spoke to him.  Patient is tachycardic but otherwise hemodynamically stable, afebrile, nontoxic-appearing on exam.  Patient has no focal neurologic deficits, equal strength bilaterally, alert and oriented to person which daughter-in-law feels is his baseline.  He was noted to have a stroke in August, about 4 months ago.  Differential diagnosis includes but is not limited to UTI, intracranial hemorrhage, cervical fracture, electrolyte abnormality, uremia, among others.  Initial plan includes CBC, CMP, magnesium, troponin, TSH, CK, urinalysis, COVID/flu swab, EKG, CT of head and C-spine and x-ray of chest and pelvis.    Imaging is largely unremarkable other than a round radiodensity projecting over the midline pelvis which could be a bladder stone.  There is difficulty getting a urine sample.  Tried to talk about this with patient and he said  he could not hear me.  Tried speaking louder.  Daughter-in-law is unsure if he really cannot hear or if he is just being stubborn.  She is going to get his son to try and speak with him about giving a urine sample.  She states no one has power of  over the patient.  Discussed with her that we cannot force him to have a straight cath if he does not want one.  Troponin appears to be baseline.  Labs largely unremarkable with normal white blood cell count, creatinine of 1.34, CK only slightly elevated at 216.  COVID, flu, RSV negative.  Second troponin and urinalysis pending.  Daughter-in-law is getting the patient's son to speak with him to try to get him to provide a urine sample or allow a straight cath.  Will also order a CT scan of abdomen and pelvis for further evaluation of radiodensity on x-ray.  CT head did mention mastoiditis/bone destruction and a soft tissue density in the ear canal that was there previously on imaging.  Patient has known skin cancer that has eroded into the left side of his head/ear.  Daughter-in-law states that they are already aware of the bony destruction from the cancer.  The area does not look acutely infected and actually looks the same as it did at last oncology appointment based on picture in the chart.  Patient is afebrile and normal white blood cell count and only very slightly elevated CRP.    CT abdomen pelvis shows a bladder stone but no other acute findings.  Patient did have a drop in blood pressure into the 90s over 60s.  Ordered 1 L of IV fluids and added a procalcitonin, lactate, blood cultures given that chest x-ray did not rule out pneumonia.  Family reports that patient is still not acting like himself, sleeping a lot, less responsive, and confused.  Spoke with hospitalist, Dr. Kerley, who recommended speaking with neurology.  Patient did have a stroke in August, 3 to 4 months ago.    Spoke with Dr. Villagran with neurology who recommended an MRI and folate and  vitamin B12 level.  Recommended getting an EEG.  States that he will add the patient to the list to evaluate in the hospital.  Spoke with Dr. Kerley who has accepted the patient for admission for further evaluation and management.    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, left          Donna James PA-C  01/01/25 1741      Electronically signed by Donna James PA-C at 01/01/25 1741       Vital Signs (last day)       Date/Time Temp Temp src Pulse Resp BP Patient Position SpO2    01/03/25 1110 98.5 (36.9) Axillary 81 18 117/66 Lying --    01/03/25 0717 97.5 (36.4) Axillary 84 18 120/93 Lying --    01/03/25 0529 97.5 (36.4) Oral 80 18 129/74 Lying 96    01/02/25 2346 97.9 (36.6) Oral 85 18 131/73 Lying 98    01/02/25 1953 98.6 (37) Oral 73 18 135/76 Lying 95    01/02/25 1532 98.8 (37.1) Axillary 86 18 131/79 Lying --    01/02/25 1144 98.7 (37.1) Axillary 81 18 122/74 Lying --    01/02/25 0705 98.8 (37.1) Axillary 89 18 126/85 Lying --    01/02/25 0352 98.7 (37.1) Oral 108 20 121/59 Lying 95    01/02/25 0011 98.5 (36.9) Oral -- 20 102/72 Lying 99          Current Facility-Administered Medications   Medication Dose Route Frequency Provider Last Rate Last Admin    amoxicillin-clavulanate (AUGMENTIN) 875-125 MG per tablet 1 tablet  1 tablet Oral Q12H Kerley, Brian Joseph, DO   1 tablet at 01/02/25 0953    Calcium Replacement - Follow Nurse / BPA Driven Protocol   Not Applicable PRN Kerley, Brian Joseph, DO        clopidogrel (PLAVIX) tablet 75 mg  75 mg Oral Daily Kerley, Brian Joseph, DO   75 mg at 01/02/25 0953    cyanocobalamin injection 1,000 mcg  1,000 mcg Intramuscular Daily Esperanza Zaragoza MD        DULoxetine (CYMBALTA) DR capsule 30 mg  30 mg Oral Daily Kerley, Brian Joseph,    30 mg at 01/02/25 0953    heparin (porcine) 5000 UNIT/ML injection 5,000 Units  5,000 Units  Subcutaneous Q12H Kerley, Brian Joseph, DO   5,000 Units at 01/01/25 2020    levothyroxine (SYNTHROID, LEVOTHROID) tablet 75 mcg  75 mcg Oral Q AM Kerley, Brian Joseph, DO   75 mcg at 01/02/25 0553    LORazepam (ATIVAN) injection 1 mg  1 mg Intravenous Once PRN Kerley, Brian Joseph, DO        Magnesium Standard Dose Replacement - Follow Nurse / BPA Driven Protocol   Not Applicable PRN Kerley, Brian Joseph, DO        nitroglycerin (NITROSTAT) SL tablet 0.4 mg  0.4 mg Sublingual Q5 Min PRN Kerley, Brian Joseph, DO        ondansetron (ZOFRAN) injection 4 mg  4 mg Intravenous Q6H PRN Kerley, Brian Joseph, DO        oxyCODONE (ROXICODONE) immediate release tablet 5 mg  5 mg Oral Q6H PRN Kerley, Brian Joseph, DO   5 mg at 01/02/25 0227    Phosphorus Replacement - Follow Nurse / BPA Driven Protocol   Not Applicable PRN Kerley, Brian Joseph, DO        Potassium Replacement - Follow Nurse / BPA Driven Protocol   Not Applicable PRN Kerley, Brian Joseph, DO        QUEtiapine (SEROquel) tablet 25 mg  25 mg Oral Daily Kerley, Brian Joseph, DO   25 mg at 01/02/25 1423    senna (SENOKOT) tablet 1 tablet  1 tablet Oral Nightly Kerley, Brian Joseph, DO   1 tablet at 01/01/25 2020    sodium chloride 0.9 % flush 10 mL  10 mL Intravenous Q12H Kerley, Brian Joseph, DO   10 mL at 01/02/25 2137    sodium chloride 0.9 % flush 10 mL  10 mL Intravenous PRN Kerley, Brian Joseph, DO        sodium chloride 0.9 % infusion 40 mL  40 mL Intravenous PRN Kerley, Brian Joseph, DO        tamsulosin (FLOMAX) 24 hr capsule 0.4 mg  0.4 mg Oral Daily Kerley, Brian Joseph, DO   0.4 mg at 01/02/25 0953    ziprasidone (GEODON) injection 10 mg  10 mg Intramuscular Q4H PRN Kerley, Brian Joseph, DO         Lab Results (last 24 hours)       Procedure Component Value Units Date/Time    Basic Metabolic Panel [[id number]]  (Abnormal) Collected: 01/03/25 0646    Specimen: Blood Updated: 01/03/25 0730     Glucose 79 mg/dL      BUN 17 mg/dL      Creatinine 1.31 mg/dL       Sodium 135 mmol/L      Potassium 4.5 mmol/L      Chloride 102 mmol/L      CO2 22.2 mmol/L      Calcium 8.5 mg/dL      BUN/Creatinine Ratio 13.0     Anion Gap 10.8 mmol/L      eGFR 55.0 mL/min/1.73     Narrative:      GFR Categories in Chronic Kidney Disease (CKD)      GFR Category          GFR (mL/min/1.73)    Interpretation  G1                     90 or greater         Normal or high (1)  G2                      60-89                Mild decrease (1)  G3a                   45-59                Mild to moderate decrease  G3b                   30-44                Moderate to severe decrease  G4                    15-29                Severe decrease  G5                    14 or less           Kidney failure          (1)In the absence of evidence of kidney disease, neither GFR category G1 or G2 fulfill the criteria for CKD.    eGFR calculation 2021 CKD-EPI creatinine equation, which does not include race as a factor    CBC Auto Differential [[id number]]  (Abnormal) Collected: 01/03/25 0646    Specimen: Blood Updated: 01/03/25 0708     WBC 2.95 10*3/mm3      RBC 3.03 10*6/mm3      Hemoglobin 9.4 g/dL      Hematocrit 28.5 %      MCV 94.1 fL      MCH 31.0 pg      MCHC 33.0 g/dL      RDW 13.2 %      RDW-SD 45.9 fl      MPV 9.9 fL      Platelets 118 10*3/mm3      Neutrophil % 64.7 %      Lymphocyte % 15.3 %      Monocyte % 13.6 %      Eosinophil % 4.4 %      Basophil % 1.0 %      Immature Grans % 1.0 %      Neutrophils, Absolute 1.91 10*3/mm3      Lymphocytes, Absolute 0.45 10*3/mm3      Monocytes, Absolute 0.40 10*3/mm3      Eosinophils, Absolute 0.13 10*3/mm3      Basophils, Absolute 0.03 10*3/mm3      Immature Grans, Absolute 0.03 10*3/mm3      nRBC 0.0 /100 WBC     Blood Culture - Blood, Hand, Right [[id number]]  (Normal) Collected: 01/01/25 1620    Specimen: Blood from Hand, Right Updated: 01/02/25 1645     Blood Culture No growth at 24 hours    Blood Culture - Blood, Hand, Right [[id number]]  (Normal) Collected:  25 1627    Specimen: Blood from Hand, Right Updated: 25 1645     Blood Culture No growth at 24 hours          Imaging Results (Last 24 Hours)       Procedure Component Value Units Date/Time    XR Shoulder 2+ View Left [[id number]] Collected: 25     Updated: 25    Narrative:         PROCEDURE: XR SHOULDER 2+ VW LEFT-     HISTORY: left shoulder pain; R41.82-Altered mental status, unspecified;  N21.0-Calculus in bladder; C44.229-Squamous cell carcinoma of skin of  left ear and external auricular canal, no trauma.     COMPARISON: None.     FINDINGS:  A three view exam demonstrates no acute fracture or  dislocation. The joint spaces demonstrate decreased subacromial distance  suggesting chronic rotator cuff tear. Diffuse osteopenia identified.  Mild narrowing of the left AC joint noted. No soft tissue abnormality is  seen. No bony destructive lesion seen.          Impression:      No acute bony abnormality.     Degenerative change as described.                 This report was signed and finalized on 1/3/2025 8:17 AM by Camille Rodriguez MD.                Physician Progress Notes (last 48 hours)        Kerley, Brian Joseph, DO at 25 1238              [facility]    [facility]ESS [facility]    Name:  Hardik Natarajan   Age:  79 y.o.  Sex:  male  :  1945  MRN:  [id number]   Visit Number:  [id number]  Admission Date:  2025  Date Of Service:  25  Primary Care Physician:  Won Cuellar MD     LOS: 0 days :    Chief Complaint:      Follow-up; altered mental status    Subjective:    Says he feels sore all over, states his left shoulder feels tight.  Otherwise feels all right.    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  Presented to [facility] ED on 1/1/2025 with concern for altered mental status.  Last known normal was night prior to presentation.  Also reported having a fall, unclear if it was syncope.  Wife found him on the floor.  On Plavix, no other anticoagulation.  He denied any fevers or chills, chest pain, shortness of air, any other specific concern.  Denied any slurred speech, difficulty moving upper or lower extremities, and any in symmetry.     ED summary: Afebrile, vital signs stable on room air.  ABG pH 7.44, pCO2 34, pO2 82, bicarb 24.  EKG sinus tachycardia rate 107, nonspecific ST-T wave changes.  Opponent 23, ACS not suspected.  Creatinine 1.34, lactate elevated, procalcitonin not elevated.  UDS negative.  Blood cultures collected.  COVID/flu/RSV negative.  CT abdomen/pelvis large stone in urinary bladder, wall thickening probable diverticula, abnormal attenuation of the root of the mesentery most suggestive of mesenteric panniculitis.  CT head no intracranial hemorrhage or evidence of acute large cortical infarct, stable atrophy and chronic findings, left mastoiditis and left otitis media with bone destruction of the left mastoid air cells, right maxillary sinusitis.  CT cervical spine no acute fracture, multilevel degenerative disc disease.    Review of Systems:     All systems were reviewed and negative except as mentioned in subjective, assessment and plan.    Vital Signs:    Temp:  [97.5 °F (36.4 °C)-98.8 °F (37.1 °C)] 98.5 °F (36.9 °C)  Heart Rate:  [73-86] 81  Resp:  [18] 18  BP: (117-135)/(66-93) 117/66    Intake and output:    I/O last 3 completed shifts:  In: 0   Out: 800 [Urine:800]  I/O this shift:  In: 240 [P.O.:240]  Out: -     Physical Examination:    General Appearance:  Alert and cooperative.    Head:  Superior ear and surrounding superior auricular area and left side of head scarring with good healing, no erythema, no swelling   Eyes: Conjunctivae and sclerae normal, no icterus. No  pallor.   Throat: No oral lesions, no thrush, oral mucosa moist.   Neck: Supple, trachea midline, no thyromegaly.   Lungs:   Breath sounds heard bilaterally equally.  No wheezing or crackles. No Pleural rub or bronchial breathing.   Heart:  Normal S1 and S2, no murmur, no gallop, no rub. No JVD.   Abdomen:   Normal bowel sounds, no masses, no organomegaly. Soft, nontender, nondistended, no rebound tenderness.   Extremities: Supple, no edema, no cyanosis, no clubbing.   Skin: Warm   Neurologic: Alert and oriented to self. No facial asymmetry. Moves all four limbs. No tremors.      Laboratory results:    Results from last 7 days   Lab Units 01/03/25  0646 01/02/25  0543 01/01/25  1221   SODIUM mmol/L 135* 136 136   POTASSIUM mmol/L 4.5 4.3 4.2   CHLORIDE mmol/L 102 105 101   CO2 mmol/L 22.2 22.4 23.4   BUN mg/dL 17 11 9   CREATININE mg/dL 1.31* 1.30* 1.34*   CALCIUM mg/dL 8.5* 8.4* 8.9   BILIRUBIN mg/dL  --   --  1.1   ALK PHOS U/L  --   --  51   ALT (SGPT) U/L  --   --  13   AST (SGOT) U/L  --   --  23   GLUCOSE mg/dL 79 88 99     Results from last 7 days   Lab Units 01/03/25  0646 01/02/25  0543 01/01/25  1221   WBC 10*3/mm3 2.95* 3.23* 4.84   HEMOGLOBIN g/dL 9.4* 9.8* 11.6*   HEMATOCRIT % 28.5* 29.3* 34.0*   PLATELETS 10*3/mm3 118* 123* 141         Results from last 7 days   Lab Units 01/01/25  1406 01/01/25  1221   CK TOTAL U/L  --  216*   HSTROP T ng/L 23* 24*     Results from last 7 days   Lab Units 01/01/25  1627 01/01/25  1620   BLOODCX  No growth at 24 hours No growth at 24 hours     Recent Labs     01/01/25  1555   PHART 7.455   PPQ5GVT 34.2*   PO2ART 82.6   [id number] 24.0   BASEEXCESS 0.4      I have reviewed the patient's laboratory results.    Radiology results:    XR Shoulder 2+ View Left    Result Date: 1/3/2025   PROCEDURE: XR SHOULDER 2+ VW LEFT-  HISTORY: left shoulder pain; R41.82-Altered mental status, unspecified; N21.0-Calculus in bladder; C44.229-Squamous cell carcinoma of skin of left ear and  external auricular canal, no trauma.  COMPARISON: None.  FINDINGS:  A three view exam demonstrates no acute fracture or dislocation. The joint spaces demonstrate decreased subacromial distance suggesting chronic rotator cuff tear. Diffuse osteopenia identified. Mild narrowing of the left AC joint noted. No soft tissue abnormality is seen. No bony destructive lesion seen.       Impression: No acute bony abnormality.  Degenerative change as described.      This report was signed and finalized on 1/3/2025 8:17 AM by Camille Rodriguez MD.      MRI Brain Without Contrast    Result Date: 1/1/2025  FINAL REPORT TECHNIQUE: null CLINICAL HISTORY: AMS SEVERE HX OF PREVIOUS STROKE, FALL TODAY, HX OF PREVIOUS SKIN CA OF LEFT EAR. COMPARISON: null FINDINGS: MR of the brain without contrast Comparison: MR/SR - MRI BRAIN W WO CONTRAST - 8/2/24 21:53 EDT CT - CT ANGIOGRAM NECK W WO CONTRAST - 8/2/24 20:54 EDT CT - CT ANGIOGRAM HEAD W CONTRAST - 8/2/24 20:54 EDT CT/SR - CT HEAD WO CONTRAST STROKE PROTOCOL - 8/2/24 20:54 EDT MR/SR - MRI BRAIN W WO CONTRAST - 5/10/23 18:47 EDT Findings: No acute infarction, hemorrhage, mass-effect or herniation. No hydrocephalus. Increased signal intensity is seen in the deep and periventricular white matter on the T2/FLAIR sequences, which most likely represents the sequela of severe chronic small vessel ischemic disease. Chronic lacunar infarctions in the right corona radiata/centrum semiovale and left basal ganglia. No extra-axial fluid collection or mass. Unremarkable sella. Intact flow voids. Normal orbits. Mucosal thickening in the paranasal sinuses may indicate sinusitis. Fluid signal in right mastoid air cells could be secondary to an effusion or mastoiditis. There is osseous destruction at the left mastoid process, which was also present on the prior studies and could be related to the previously seen skin cancer and/or post treatment changes. There is fluid signal in left mastoid air cells which  could be secondary to an effusion or mastoiditis.     Impression: Impression: No acute infarction. No posttraumatic findings. Sinusitis could be considered. Bilateral mastoid air cell effusions versus mastoiditis. Osseous destruction of the left mastoid process could be secondary to the previously seen skin cancer and/or posttreatment changes. Correlate clinically for signs/symptoms of infection to exclude osteomyelitis. Authenticated and Electronically Signed by Jaqueline Painter MD on 01/01/2025 06:45:00 PM    CT Abdomen Pelvis With Contrast    Result Date: 1/1/2025  CT ABDOMEN AND PELVIS WITH CONTRAST  INDICATION: Possible bladder stone on x-ray.  TECHNIQUE: Thin section axial images were obtained from the lung bases through the pubic symphysis after oral and intravenous contrast. Coronal reconstruction images were obtained from the axial data.  COMPARISON: None.  FINDINGS:  Lower chest:  Lung bases are clear.  Liver:  Homogeneous.  No focal lesion.  Gallbladder/biliary system:  Gallbladder is present.   No intrahepatic or extrahepatic biliary dilatation.  Spleen:  Unremarkable.  Adrenal glands:  Unremarkable. No nodules.  Pancreas: Evaluation limited by motion artifact. No convincing mass or evidence of pancreatitis.  Kidneys/ureters/bladder:  No hydronephrosis, solid mass or perinephric stranding. A large calcification in the urinary bladder is consistent with a bladder stone. This measures 23 mm. There is also irregularity of the bladder wall which could be related to chronic cystitis. Bladder diverticula are not excluded near the dome.  GI tract: No evidence of small bowel obstruction or focal small bowel wall thickening. Normal appendix. No acute colon abnormality.  Pelvic organs: Mild enlargement of the prostate.  Lymph nodes/mesentery/retroperitoneum: There is abnormal attenuation in the root of the mesentery with small lymph nodes. 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   Abdominal wall: Abdominal wall intact.  Vascular: No acute vascular abnormality.  Free fluid: No free fluid.  Bones: No acute osseous abnormality.      Impression: 1. Large stone in the urinary bladder. 2. Wall thickening of the urinary bladder with probable diverticula. This is likely related to chronic cystitis and possibly mild chronic outlet obstruction. 3. 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      CTDI: 9.79 mGy DLP:552.61 mGy.cm       This report was signed and finalized on 1/1/2025 3:15 PM by Carmen Le MD.      CT Cervical Spine Without Contrast    Result Date: 1/1/2025  PROCEDURE: CT CERVICAL SPINE WO CONTRAST-  INDICATION: fall this morning  TECHNIQUE: Thin section axial images were obtained through the cervical spine without contrast.  Coronal and sagittal reconstruction images were obtained from the axial data.  COMPARISON: None.  FINDINGS: Motion artifact limits exam quality. There is no convincing acute fracture. No facet lock. The craniocervical junction appears intact.   There is multilevel degenerative disc disease.   No acute paraspinal abnormality.      Impression: No acute fracture within the limitations of the exam. There is motion artifact.  Multilevel degenerative disc disease.  Please see CT head report regarding findings of the left mastoid air cells where there is mastoiditis and bone destruction.        CTDI: 16.93 mGy DLP:837.44 mGy.cm     This study was performed with techniques to keep radiation doses as low as reasonably achievable (ALARA). Individualized dose reduction techniques using automated exposure control or adjustment of mA and/or kV according to the patient size were employed.   This report was signed and finalized on 1/1/2025 1:22 PM by Carmen Le MD.      CT Head Without Contrast    Result Date: 1/1/2025  PROCEDURE: CT HEAD WO CONTRAST-  INDICATION: AMS  TECHNIQUE: Multiple axial CT images were performed from  the foramen magnum to the vertex without contrast.   Coronal reconstruction images were obtained from the axial data.  COMPARISON: 8/2/2024.  FINDINGS: There is no mass effect or midline shift. There is stable atrophy with ventricular enlargement. There are stable bilateral periventricular hypodensities. There is no intracranial hemorrhage. The posterior fossa is without acute abnormality. There is mucoperiosteal thickening of the right maxillary sinus. There is opacification of the left mastoid air cells with bone destruction. There is soft tissue density in the left middle ear cavity. This was present on the prior exam. No skull fracture.      Impression: 1. No intracranial hemorrhage or evidence of acute large cortical infarct. 2. Stable atrophy and chronic findings. 3. Left mastoiditis and left otitis media with bone destruction of the left mastoid air cells. 4. Right maxillary sinusitis.         CTDI: 16.93 mGy DLP:837.44 mGy.cm    This study was performed with techniques to keep radiation doses as low as reasonably achievable (ALARA). Individualized dose reduction techniques using automated exposure control or adjustment of mA and/or kV according to the patient size were employed.   This report was signed and finalized on 1/1/2025 1:19 PM by Carmen Le MD.      XR Chest 1 View    Result Date: 1/1/2025  PROCEDURE: XR CHEST 1 VW-  INDICATION:  fall, AMS  FINDINGS:  A portable view of the chest was obtained.  Comparison is made to a prior exam dated 8/2/2024.   Heart size is normal. Lung volumes are very low. Right perihilar and bibasilar opacities may be atelectasis. Pneumonia is not excluded. No significant pleural effusion or pneumothorax.      Impression: Exam limited by low lung volumes. Probable bilateral atelectasis.   This report was signed and finalized on 1/1/2025 1:14 PM by Carmen Le MD.     I have reviewed the patient's radiology reports.    Medication Review:     I have reviewed the  patient's active and prn medications.     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  Mental status slowly improving, not as combative as of 1/3.     Altered mental status  Neurology consultation, recreations appreciated. EEG.  Family has deferred on lumbar puncture.  No seizure-like activity reported or witnessed, no syncope.  Differential could include vascular dementia.    Fall precautions.  MRI brain no sign of new stroke, no metastasis.  Chronic osseous destruction observed.  Do not suspect osteomyelitis, no sepsis.  CT showed stable chronic findings; left mastoiditis and left otitis media with bone destruction of the left mastoid air cells.  Left mastoiditis, left otitis media with bone destruction of left mastoid air cells  Augmentin.  CKD  Recommend follow-up outpatient.     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  Continue home medications.     Risk Assessment: High  DVT Prophylaxis: Heparin  Code Status: Full code  Diet: Cardiac  Discharge Plan: At least 1-2 more days depending on clinical status.    Updated family Yobany and Leonora during course.    Brian Joseph Kerley, DO  25  12:38 EST    Dictated utilizing Dragon dictation.    I have reviewed the copied text and it is accurate as of 1/3/2025       Electronically signed by Kerley, Brian Joseph, DO at 25 1240       Esperanza Zaragoza MD at 25 1012          DOS: 1/3/2025  NAME: Hardik Natarajan   : 1945  PCP: Won Cuellar MD  Chief Complaint   Patient presents with    Altered Mental Status    Fall       Chief complaint: More awake and alert today.  Subjective: Was  "belligerent but followed commands when requested.  Thought he was at [place] in [facility] where he lives.    Objective:  Vital signs: /93 (BP Location: Right arm, Patient Position: Lying)   Pulse 84   Temp 97.5 °F (36.4 °C) (Axillary)   Resp 18   Ht 185.4 cm (73\")   Wt 96.3 kg (212 lb 4.9 oz)   SpO2 96%   BMI 28.01 kg/m²    Gen: NAD, vitals reviewed  MS: Awake and alert and oriented to himself alone.  He does not recognize where he is currently and he does not know the month of the year.  Was belligerent but follows simple commands.  CN: Cranials 2-12: No focal deficits noted.  Motor: Moving all extremities well  Sensory: No sensory loss noted.  Coordination: Difficult to make him comprehend to perform finger-to-nose or heel-to-shin testing.  Gait: Could not be tested at this time.    ROS:  General: Belligerent gentleman currently following simple commands.  Neurological: He is only oriented to self but he can follow only one-step commands.  Moving all extremities.    Laboratory results:  Lab Results   Component Value Date    GLUCOSE 79 01/03/2025    CALCIUM 8.5 (L) 01/03/2025     (L) 01/03/2025    K 4.5 01/03/2025    CO2 22.2 01/03/2025     01/03/2025    BUN 17 01/03/2025    CREATININE 1.31 (H) 01/03/2025    BCR 13.0 01/03/2025    ANIONGAP 10.8 01/03/2025     Lab Results   Component Value Date    WBC 2.95 (L) 01/03/2025    HGB 9.4 (L) 01/03/2025    HCT 28.5 (L) 01/03/2025    MCV 94.1 01/03/2025     (L) 01/03/2025     Lab Results   Component Value Date    LDL 73 08/03/2024            Review of labs: Hemoglobin and hematocrit are low at 9.4 and 28.5.  The platelet count is low at 118,000.  Low vitamin B12 level at 169 noted.     CMP:        Lab 01/03/25  0646 01/02/25  0543 01/01/25  1221   SODIUM 135* 136 136   POTASSIUM 4.5 4.3 4.2   CHLORIDE 102 105 101   CO2 22.2 22.4 23.4   ANION GAP 10.8 8.6 11.6   BUN 17 11 9   CREATININE 1.31* 1.30* 1.34*   EGFR 55.0* 55.5* 53.6* "   GLUCOSE 79 88 99   CALCIUM 8.5* 8.4* 8.9   MAGNESIUM  --   --  1.8   TOTAL PROTEIN  --   --  6.3   ALBUMIN  --   --  3.8   GLOBULIN  --   --  2.5   ALT (SGPT)  --   --  13   AST (SGOT)  --   --  23   BILIRUBIN  --   --  1.1   ALK PHOS  --   --  51        TSH          9/4/2024    10:16 9/25/2024    09:33 1/1/2025    12:21   TSH   TSH 12.060  5.670  2.700         Lipid Panel          8/3/2024    04:51   Lipid Panel   Total Cholesterol 117    Triglycerides 84    HDL Cholesterol 27    VLDL Cholesterol 17    LDL Cholesterol  73    LDL/HDL Ratio 2.71         Lab Results   Component Value Date    LNMIBMZX69 169 (L) 01/01/2025       Lab Results   Component Value Date    FOLATE 6.35 01/01/2025       Lab Results   Component Value Date    HGBA1C 5.00 08/03/2024           Review and interpretation of imaging:  MRI of the brain with and without contrast on August 2, 2024 was interpreted as follows:     FINDINGS:  Exam: MRI of the brain without IV contrast     Comparison: 08/02/2024     Clinical history: Concern for CVA, left arm weakness     Findings:     Small 1 cm acute infarct in the right periventricular region on series 7, image 19. Decreased signal on the ADC map in this location.     No acute intracranial hemorrhage.     No intracranial masses.     Postcontrast imaging of the brain does not demonstrate any areas of abnormal parenchymal enhancement.     No midline shift.     No abnormal extra-axial fluid collections are seen.     Cerebral volume loss.     Areas of increased FLAIR signal within the cerebral white matter bilaterally are reflective of a nonspecific demyelinating process, likely ischemic microvascular in nature.     IMPRESSION:  Impression:     1. Small acute infarct in the right periventricular region     2. Moderate cerebral volume loss with moderate cerebral white matter disease, likely ischemic microvascular in nature.    CT Head Without Contrast    Result Date: 1/1/2025  PROCEDURE: CT HEAD WO CONTRAST-   INDICATION: AMS  TECHNIQUE: Multiple axial CT images were performed from the foramen magnum to the vertex without contrast.   Coronal reconstruction images were obtained from the axial data.  COMPARISON: 8/2/2024.  FINDINGS: There is no mass effect or midline shift. There is stable atrophy with ventricular enlargement. There are stable bilateral periventricular hypodensities. There is no intracranial hemorrhage. The posterior fossa is without acute abnormality. There is mucoperiosteal thickening of the right maxillary sinus. There is opacification of the left mastoid air cells with bone destruction. There is soft tissue density in the left middle ear cavity. This was present on the prior exam. No skull fracture.      Impression: 1. No intracranial hemorrhage or evidence of acute large cortical infarct. 2. Stable atrophy and chronic findings. 3. Left mastoiditis and left otitis media with bone destruction of the left mastoid air cells. 4. Right maxillary sinusitis.         CTDI: 16.93 mGy DLP:837.44 mGy.cm    This study was performed with techniques to keep radiation doses as low as reasonably achievable (ALARA). Individualized dose reduction techniques using automated exposure control or adjustment of mA and/or kV according to the patient size were employed.   This report was signed and finalized on 1/1/2025 1:19 PM by Carmen Le MD.            MRI Brain Without Contrast    Result Date: 1/1/2025  FINAL REPORT TECHNIQUE: null CLINICAL HISTORY: AMS SEVERE HX OF PREVIOUS STROKE, FALL TODAY, HX OF PREVIOUS SKIN CA OF LEFT EAR. COMPARISON: null FINDINGS: MR of the brain without contrast Comparison: MR/SR - MRI BRAIN W WO CONTRAST - 8/2/24 21:53 EDT CT - CT ANGIOGRAM NECK W WO CONTRAST - 8/2/24 20:54 EDT CT - CT ANGIOGRAM HEAD W CONTRAST - 8/2/24 20:54 EDT CT/SR - CT HEAD WO CONTRAST STROKE PROTOCOL - 8/2/24 20:54 EDT MR/SR - MRI BRAIN W WO CONTRAST - 5/10/23 18:47 EDT Findings: No acute infarction, hemorrhage,  mass-effect or herniation. No hydrocephalus. Increased signal intensity is seen in the deep and periventricular white matter on the T2/FLAIR sequences, which most likely represents the sequela of severe chronic small vessel ischemic disease. Chronic lacunar infarctions in the right corona radiata/centrum semiovale and left basal ganglia. No extra-axial fluid collection or mass. Unremarkable sella. Intact flow voids. Normal orbits. Mucosal thickening in the paranasal sinuses may indicate sinusitis. Fluid signal in right mastoid air cells could be secondary to an effusion or mastoiditis. There is osseous destruction at the left mastoid process, which was also present on the prior studies and could be related to the previously seen skin cancer and/or post treatment changes. There is fluid signal in left mastoid air cells which could be secondary to an effusion or mastoiditis.     Impression: Impression: No acute infarction. No posttraumatic findings. Sinusitis could be considered. Bilateral mastoid air cell effusions versus mastoiditis. Osseous destruction of the left mastoid process could be secondary to the previously seen skin cancer and/or posttreatment changes. Correlate clinically for signs/symptoms of infection to exclude osteomyelitis. Authenticated and Electronically Signed by Jaqueline Painter MD on 01/01/2025 06:45:00 PM      Workup to date:Additional Tests Performed: Urine analysis is as follows:          Latest Reference Range & Units 01/01/25 14:18   Color, UA Yellow, Straw  Yellow   Appearance, UA Clear  Cloudy !   Specific Gravity, UA 1.005 - 1.030  1.009   pH, UA 5.0 - 8.0  7.0   Glucose Negative  Negative   Ketones, UA Negative  Negative   Blood, UA Negative  Negative   Nitrite, UA Negative  Negative   Leukocytes, UA Negative  Negative   Protein, UA Negative  Negative   Bilirubin, UA Negative  Negative   Urobilinogen, UA 0.2 - 1.0 E.U./dL  1.0 E.U./dL         Results for orders placed during the  hospital encounter of 24    Adult Transthoracic Echo Complete w/ Color, Spectral and Contrast if necessary per protocol    Interpretation Summary    Left ventricular systolic function is normal. Calculated left ventricular EF = 60.4% Left ventricular ejection fraction appears to be 56 - 60%. Left ventricular diastolic function was normal.    The right ventricular cavity is borderline dilated with normal systolic function.    Mild tricuspid valve regurgitation is present. Estimated right ventricular systolic pressure from tricuspid regurgitation is normal (<35 mmHg).    Saline test results are negative for right to left atrial level shunt.              Diagnoses: Patient with severe vitamin B12 deficiency presenting with confusion      Comment: Could be tabetic encephalopathy.    Plan:  1.  Cyanocobalamin 1000 mcg intramuscular daily for the next 5 days.  2.  Try to have physical therapy and Occupational Therapy work with him.  3.  If he gets combative to use ziprasidone 20 mg intramuscular every 4 hours as needed so long the QTc interval is less than 500 ms.  4.  Encourage oral hydration and nutrition.    Discussed with Dr. Sanchez Garcia and he will be followed up next week by Dr Christin Herndon, the rounding teleneurologist coming Monday    Spent a total of 30 minutes in face-to-face evaluation and management of the patient using the dedicated telemedicine device without any interruption with the help of Canelo the rounding nurse with the patient located at the [facility] and myself at a remote location.    Electronically signed by Esperanza Zaragoza MD, 25, 10:12 AM EST.        Electronically signed by Esperanza Zaragoza MD at 25 1019       Kerley, Brian Joseph, DO at 25 1344              [facility] HOSPITALIST    PROGRESS NOTE    Name:  Hardik Natarajan   Age:  79 y.o.  Sex:  male  :  1945  MRN:  [id number]   Visit Number:  [id number]  Admission Date:   1/1/2025  Date Of Service:  01/02/25  Primary Care Physician:  Won Cuellar MD     LOS: 0 days :    Chief Complaint:      Follow-up; altered mental status    Subjective:    Denies any concerning pain.  Says breathing feels all right.    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  Presented to [facility] ED on 1/1/2025 with concern for altered mental status.  Last known normal was night prior to presentation.  Also reported having a fall, unclear if it was syncope.  Wife found him on the floor.  On Plavix, no other anticoagulation.  He denied any fevers or chills, chest pain, shortness of air, any other specific concern.  Denied any slurred speech, difficulty moving upper or lower extremities, and any in symmetry.     ED summary: Afebrile, vital signs stable on room air.  ABG pH 7.44, pCO2 34, pO2 82, bicarb 24.  EKG sinus tachycardia rate 107, nonspecific ST-T wave changes.  Opponent 23, ACS not suspected.  Creatinine 1.34, lactate elevated, procalcitonin not elevated.  UDS negative.  Blood cultures collected.  COVID/flu/RSV negative.  CT abdomen/pelvis large stone in urinary bladder, wall thickening probable diverticula, abnormal attenuation of the root of the mesentery most suggestive of mesenteric panniculitis.  CT head no intracranial hemorrhage or evidence of acute large cortical infarct, stable atrophy and chronic findings, left mastoiditis and left otitis media with bone destruction of the left mastoid air cells, right maxillary sinusitis.  CT cervical spine no acute fracture, multilevel degenerative disc disease.    Review of Systems:     All systems were reviewed and negative except as mentioned in subjective, assessment and plan.    Vital Signs:    Temp:  [98.4 °F  (36.9 °C)-98.8 °F (37.1 °C)] 98.7 °F (37.1 °C)  Heart Rate:  [] 81  Resp:  [16-20] 18  BP: ()/(55-85) 122/74    Intake and output:    I/O last 3 completed shifts:  In: -   Out: 200 [Urine:200]  No intake/output data recorded.    Physical Examination:    General Appearance:  Alert and cooperative.    Head:  Superior ear and surrounding superior auricular area and left side of head scarring with good healing, no erythema, no swelling   Eyes: Conjunctivae and sclerae normal, no icterus. No pallor.   Throat: No oral lesions, no thrush, oral mucosa moist.   Neck: Supple, trachea midline, no thyromegaly.   Lungs:   Breath sounds heard bilaterally equally.  No wheezing or crackles. No Pleural rub or bronchial breathing.   Heart:  Normal S1 and S2, no murmur, no gallop, no rub. No JVD.   Abdomen:   Normal bowel sounds, no masses, no organomegaly. Soft, nontender, nondistended, no rebound tenderness.   Extremities: Supple, no edema, no cyanosis, no clubbing.   Skin: Warm   Neurologic: Alert and oriented to self. No facial asymmetry. Moves all four limbs. No tremors.      Laboratory results:    Results from last 7 days   Lab Units 01/02/25  0543 01/01/25  1221   SODIUM mmol/L 136 136   POTASSIUM mmol/L 4.3 4.2   CHLORIDE mmol/L 105 101   CO2 mmol/L 22.4 23.4   BUN mg/dL 11 9   CREATININE mg/dL 1.30* 1.34*   CALCIUM mg/dL 8.4* 8.9   BILIRUBIN mg/dL  --  1.1   ALK PHOS U/L  --  51   ALT (SGPT) U/L  --  13   AST (SGOT) U/L  --  23   GLUCOSE mg/dL 88 99     Results from last 7 days   Lab Units 01/02/25  0543 01/01/25  1221   WBC 10*3/mm3 3.23* 4.84   HEMOGLOBIN g/dL 9.8* 11.6*   HEMATOCRIT % 29.3* 34.0*   PLATELETS 10*3/mm3 123* 141         Results from last 7 days   Lab Units 01/01/25  1406 01/01/25  1221   CK TOTAL U/L  --  216*   HSTROP T ng/L 23* 24*         Recent Labs     01/01/25  1555   PHART 7.455   [id number] 34.2*   PO2ART 82.6   PLO6NVA 24.0   BASEEXCESS 0.4      I have reviewed the patient's laboratory  results.    Radiology results:    MRI Brain Without Contrast    Result Date: 1/1/2025  FINAL REPORT TECHNIQUE: null CLINICAL HISTORY: AMS SEVERE HX OF PREVIOUS STROKE, FALL TODAY, HX OF PREVIOUS SKIN CA OF LEFT EAR. COMPARISON: null FINDINGS: MR of the brain without contrast Comparison: MR/SR - MRI BRAIN W WO CONTRAST - 8/2/24 21:53 EDT CT - CT ANGIOGRAM NECK W WO CONTRAST - 8/2/24 20:54 EDT CT - CT ANGIOGRAM HEAD W CONTRAST - 8/2/24 20:54 EDT CT/SR - CT HEAD WO CONTRAST STROKE PROTOCOL - 8/2/24 20:54 EDT MR/SR - MRI BRAIN W WO CONTRAST - 5/10/23 18:47 EDT Findings: No acute infarction, hemorrhage, mass-effect or herniation. No hydrocephalus. Increased signal intensity is seen in the deep and periventricular white matter on the T2/FLAIR sequences, which most likely represents the sequela of severe chronic small vessel ischemic disease. Chronic lacunar infarctions in the right corona radiata/centrum semiovale and left basal ganglia. No extra-axial fluid collection or mass. Unremarkable sella. Intact flow voids. Normal orbits. Mucosal thickening in the paranasal sinuses may indicate sinusitis. Fluid signal in right mastoid air cells could be secondary to an effusion or mastoiditis. There is osseous destruction at the left mastoid process, which was also present on the prior studies and could be related to the previously seen skin cancer and/or post treatment changes. There is fluid signal in left mastoid air cells which could be secondary to an effusion or mastoiditis.     Impression: Impression: No acute infarction. No posttraumatic findings. Sinusitis could be considered. Bilateral mastoid air cell effusions versus mastoiditis. Osseous destruction of the left mastoid process could be secondary to the previously seen skin cancer and/or posttreatment changes. Correlate clinically for signs/symptoms of infection to exclude osteomyelitis. Authenticated and Electronically Signed by Jaqueline Painter MD on 01/01/2025  06:45:00 PM    CT Abdomen Pelvis With Contrast    Result Date: 1/1/2025  CT ABDOMEN AND PELVIS WITH CONTRAST  INDICATION: Possible bladder stone on x-ray.  TECHNIQUE: Thin section axial images were obtained from the lung bases through the pubic symphysis after oral and intravenous contrast. Coronal reconstruction images were obtained from the axial data.  COMPARISON: None.  FINDINGS:  Lower chest:  Lung bases are clear.  Liver:  Homogeneous.  No focal lesion.  Gallbladder/biliary system:  Gallbladder is present.   No intrahepatic or extrahepatic biliary dilatation.  Spleen:  Unremarkable.  Adrenal glands:  Unremarkable. No nodules.  Pancreas: Evaluation limited by motion artifact. No convincing mass or evidence of pancreatitis.  Kidneys/ureters/bladder:  No hydronephrosis, solid mass or perinephric stranding. A large calcification in the urinary bladder is consistent with a bladder stone. This measures 23 mm. There is also irregularity of the bladder wall which could be related to chronic cystitis. Bladder diverticula are not excluded near the dome.  GI tract: No evidence of small bowel obstruction or focal small bowel wall thickening. Normal appendix. No acute colon abnormality.  Pelvic organs: Mild enlargement of the prostate.  Lymph nodes/mesentery/retroperitoneum: There is abnormal attenuation in the root of the mesentery with small lymph nodes. 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  Abdominal wall: Abdominal wall intact.  Vascular: No acute vascular abnormality.  Free fluid: No free fluid.  Bones: No acute osseous abnormality.      Impression: 1. Large stone in the urinary bladder. 2. Wall thickening of the urinary bladder with probable diverticula. This is likely related to chronic cystitis and possibly mild chronic outlet obstruction. 3. 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      CTDI: 9.79 mGy DLP:552.61 mGy.cm       This  report was signed and finalized on 1/1/2025 3:15 PM by Carmen Le MD.      CT Cervical Spine Without Contrast    Result Date: 1/1/2025  PROCEDURE: CT CERVICAL SPINE WO CONTRAST-  INDICATION: fall this morning  TECHNIQUE: Thin section axial images were obtained through the cervical spine without contrast.  Coronal and sagittal reconstruction images were obtained from the axial data.  COMPARISON: None.  FINDINGS: Motion artifact limits exam quality. There is no convincing acute fracture. No facet lock. The craniocervical junction appears intact.   There is multilevel degenerative disc disease.   No acute paraspinal abnormality.      Impression: No acute fracture within the limitations of the exam. There is motion artifact.  Multilevel degenerative disc disease.  Please see CT head report regarding findings of the left mastoid air cells where there is mastoiditis and bone destruction.        CTDI: 16.93 mGy DLP:837.44 mGy.cm     This study was performed with techniques to keep radiation doses as low as reasonably achievable (ALARA). Individualized dose reduction techniques using automated exposure control or adjustment of mA and/or kV according to the patient size were employed.   This report was signed and finalized on 1/1/2025 1:22 PM by Carmen Le MD.      CT Head Without Contrast    Result Date: 1/1/2025  PROCEDURE: CT HEAD WO CONTRAST-  INDICATION: AMS  TECHNIQUE: Multiple axial CT images were performed from the foramen magnum to the vertex without contrast.   Coronal reconstruction images were obtained from the axial data.  COMPARISON: 8/2/2024.  FINDINGS: There is no mass effect or midline shift. There is stable atrophy with ventricular enlargement. There are stable bilateral periventricular hypodensities. There is no intracranial hemorrhage. The posterior fossa is without acute abnormality. There is mucoperiosteal thickening of the right maxillary sinus. There is opacification of the left mastoid air  cells with bone destruction. There is soft tissue density in the left middle ear cavity. This was present on the prior exam. No skull fracture.      Impression: 1. No intracranial hemorrhage or evidence of acute large cortical infarct. 2. Stable atrophy and chronic findings. 3. Left mastoiditis and left otitis media with bone destruction of the left mastoid air cells. 4. Right maxillary sinusitis.         CTDI: 16.93 mGy DLP:837.44 mGy.cm    This study was performed with techniques to keep radiation doses as low as reasonably achievable (ALARA). Individualized dose reduction techniques using automated exposure control or adjustment of mA and/or kV according to the patient size were employed.   This report was signed and finalized on 1/1/2025 1:19 PM by Carmen Le MD.      XR Pelvis 1 or 2 View    Result Date: 1/1/2025  PROCEDURE: XR PELVIS 1 OR 2 VW-  HISTORY: fall, AMS  COMPARISON: None.  FINDINGS: An AP view of the pelvis was obtained.  There is no acute osseous abnormality of the pelvis. There is degenerative joint disease of the hips bilaterally. There is a round radiodensity projecting over the midline pelvis. This could be a bladder stone or artifact in or on the patient's clothing. Soft tissues are otherwise without acute abnormality.      Impression: No acute osseous abnormality of the pelvis.  Round radiodensity projecting over the midline pelvis as above.        This report was signed and finalized on 1/1/2025 1:15 PM by Carmen Le MD.      XR Chest 1 View    Result Date: 1/1/2025  PROCEDURE: XR CHEST 1 VW-  INDICATION:  fall, AMS  FINDINGS:  A portable view of the chest was obtained.  Comparison is made to a prior exam dated 8/2/2024.   Heart size is normal. Lung volumes are very low. Right perihilar and bibasilar opacities may be atelectasis. Pneumonia is not excluded. No significant pleural effusion or pneumothorax.      Impression: Exam limited by low lung volumes. Probable bilateral  atelectasis.   This report was signed and finalized on 1/1/2025 1:14 PM by Carmen Le MD.     I have reviewed the patient's radiology reports.    Medication Review:     I have reviewed the patient's active and prn medications.     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     Altered mental status  Neurology consultation, recreations appreciated. EEG.  Family has deferred on lumbar puncture.  No seizure-like activity reported or witnessed, no syncope.  Differential could include vascular dementia.    Fall precautions.  MRI brain no sign of new stroke, no metastasis.  Chronic osseous destruction observed.  Do not suspect osteomyelitis, no sepsis.  CT showed stable chronic findings; left mastoiditis and left otitis media with bone destruction of the left mastoid air cells.  Left mastoiditis, left otitis media with bone destruction of left mastoid air cells  Augmentin.  CKD  Recommend follow-up outpatient.     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  Continue home medications.     Risk Assessment: High  DVT Prophylaxis: Heparin  Code Status: Full code  Diet: Cardiac  Discharge Plan: At least 1-2 more days depending on clinical status    Brian Joseph Kerley, DO  01/02/25  13:44 EST    Dictated utilizing Dragon dictation.    I have reviewed the copied text and it is accurate as of 1/2/2025       Electronically signed by Kerley, Brian Joseph, DO at 01/02/25 1351          Consult Notes (last 48 hours)        Esperanza Zaragoza MD at 01/02/25 0901        Consult Orders    1. Inpatient Neurology Consult Other (see comments) [[id number]] ordered by Kerley, Brian  DO Archie at 25 0811                 DOS: 2025  NAME: Hardik Natarajan   : 1945  PCP: Won Cuellar MD  CC: Altered mentation over last 24 hours  Referring MD: Sanchez Garcia DO    Neurological Problem and Interval History:  79 y.o. 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  This time he was admitted by Dr. Garcia after he was found altered over the last 24 hours and he was only oriented to himself.  Today when we asked the patient his name with the help of the nurse at the bedside he said he did not know and did not know his date of birth but when the nurses called him by his first name he responded well to it.  It seems he is very sleepy and does not want to be disturbed or examined.  With a lot of difficulty he was only able to follow some simple commands like picking up his arms and moving his legs and showing me his tongue.  However when I asked the nurse to count the fingers he declined to do so.  He denies any headaches.  However he has a lot of aches and pains in his joints.    Past Medical/Surgical Hx:  Past Medical History:   Diagnosis Date    Anemia     Arthritis     Cancer      Past Surgical History:   Procedure Laterality Date    NAIL BIOPSY N/A     NECK DISSECTION N/A     PAROTIDECTOMY N/A     SKIN CANCER EXCISION         Review of Systems:    Constitutional: Patient very sleepy and does not want to be aroused.  Cardiovascular: Denies any chest pain or palpitations.  Respiratory: Denies any shortness of breath  Gastrointestinal: Denies any nausea and vomiting.  Genitourinary: Has adult diapers in place  Musculoskeletal: Moves all extremities but also has a lot of aches and pains in the joints  Dermatological: No skin breakdown noted but has  some bruises in the left forearm.  Neurological: He says he does not know his name or his date of birth and he does not want to cooperate much with examination although he does follow one-step commands.  Psychiatric: Seems to be delirious.  Ophthalmological: No visual changes noted.          Medications On Admission  Medications Prior to Admission   Medication Sig Dispense Refill Last Dose/Taking    acetaminophen (TYLENOL) 325 MG tablet Take 2 tablets by mouth Every 6 (Six) Hours As Needed.   12/31/2024    clopidogrel (PLAVIX) 75 MG tablet Take 1 tablet by mouth Daily.   1/1/2025    cyanocobalamin 1000 MCG/ML injection Inject 0.1 mL into the appropriate muscle as directed by prescriber Every 30 (Thirty) Days.   Past Month    DULoxetine (CYMBALTA) 30 MG capsule Take 1 capsule by mouth Daily.   12/31/2024    finasteride (PROSCAR) 5 MG tablet Take 1 tablet by mouth Daily.   12/31/2024    levothyroxine (SYNTHROID, LEVOTHROID) 75 MCG tablet TAKE ONE (1) TABLET BY MOUTH EVERY DAY 30 tablet 3 12/31/2024    ondansetron (ZOFRAN) 8 MG tablet Take 1 tablet by mouth 3 (Three) Times a Day As Needed for Nausea or Vomiting. 30 tablet 5 Past Week    oxyCODONE (Roxicodone) 5 MG immediate release tablet Take 1 tablet by mouth Every 6 (Six) Hours As Needed for Severe Pain. 20 tablet 0 Past Month    Procto-Med HC 2.5 % rectal cream 1 Application Daily As Needed.   Past Week    senna (SENOKOT) 8.6 MG tablet Take 1 tablet by mouth Daily As Needed.   Past Week    tamsulosin (FLOMAX) 0.4 MG capsule 24 hr capsule Take 1 capsule by mouth.   12/31/2024    diclofenac (VOLTAREN) 75 MG EC tablet        Hydrocortisone, Perianal, (ANUSOL-HC) 2.5 % rectal cream Insert 1 Application into the rectum.   More than a month    hydrOXYzine (ATARAX) 25 MG tablet Take 1 tablet by mouth Every 6 (Six) Hours As Needed for Itching. (Patient not taking: Reported on 1/1/2025) 60 tablet 2 Not Taking    ondansetron ODT (ZOFRAN-ODT) 8 MG disintegrating tablet Place  1 tablet twice a day by translingual route.          Prior to Admission medications    Medication Sig Start Date End Date Taking? Authorizing Provider   acetaminophen (TYLENOL) 325 MG tablet Take 2 tablets by mouth Every 6 (Six) Hours As Needed.   Yes Radha Santana MD   clopidogrel (PLAVIX) 75 MG tablet Take 1 tablet by mouth Daily. 8/5/24  Yes Radha Santana MD   cyanocobalamin 1000 MCG/ML injection Inject 0.1 mL into the appropriate muscle as directed by prescriber Every 30 (Thirty) Days. 5/17/23  Yes Radha Santana MD   DULoxetine (CYMBALTA) 30 MG capsule Take 1 capsule by mouth Daily. 9/11/23  Yes Radha Santana MD   finasteride (PROSCAR) 5 MG tablet Take 1 tablet by mouth Daily. 9/11/23  Yes Radha Santana MD   levothyroxine (SYNTHROID, LEVOTHROID) 75 MCG tablet TAKE ONE (1) TABLET BY MOUTH EVERY DAY 12/23/24  Yes Benitez Medellin MD   ondansetron (ZOFRAN) 8 MG tablet Take 1 tablet by mouth 3 (Three) Times a Day As Needed for Nausea or Vomiting. 5/16/23  Yes Yady Guajardo APRN   oxyCODONE (Roxicodone) 5 MG immediate release tablet Take 1 tablet by mouth Every 6 (Six) Hours As Needed for Severe Pain. 6/8/23  Yes Keyur Lemos DO   Procto-Med HC 2.5 % rectal cream 1 Application Daily As Needed. 5/9/23  Yes Radha Santana MD   senna (SENOKOT) 8.6 MG tablet Take 1 tablet by mouth Daily As Needed.   Yes ProviderRadha MD   tamsulosin (FLOMAX) 0.4 MG capsule 24 hr capsule Take 1 capsule by mouth. 5/9/23  Yes Radha Santana MD   diclofenac (VOLTAREN) 75 MG EC tablet  5/24/23   Radha Santana MD   Hydrocortisone, Perianal, (ANUSOL-HC) 2.5 % rectal cream Insert 1 Application into the rectum. 5/9/23   Radha Santana MD   hydrOXYzine (ATARAX) 25 MG tablet Take 1 tablet by mouth Every 6 (Six) Hours As Needed for Itching.  Patient not taking: Reported on 1/1/2025 8/16/23   Law, Yady R, APRN   ondansetron ODT (ZOFRAN-ODT) 8 MG disintegrating tablet  Place 1 tablet twice a day by translingual route.    Provider, Radha, MD        Allergies:  Allergies   Allergen Reactions    Asa [Aspirin] Unknown - Low Severity     Nose bleed       Social Hx:  Social History     Socioeconomic History    Marital status:    Tobacco Use    Smoking status: Never    Smokeless tobacco: Current     Types: Chew   Vaping Use    Vaping status: Never Used   Substance and Sexual Activity    Alcohol use: Not Currently    Drug use: Never    Sexual activity: Defer       Family Hx:  Family History   Problem Relation Age of Onset    Cancer Mother     Cancer Father        Review of Imaging (Interpretation of images not reports): MRI of the brain with and without contrast on August 2, 2024 was interpreted as follows:    FINDINGS:  Exam: MRI of the brain without IV contrast     Comparison: 08/02/2024     Clinical history: Concern for CVA, left arm weakness     Findings:     Small 1 cm acute infarct in the right periventricular region on series 7, image 19. Decreased signal on the ADC map in this location.     No acute intracranial hemorrhage.     No intracranial masses.     Postcontrast imaging of the brain does not demonstrate any areas of abnormal parenchymal enhancement.     No midline shift.     No abnormal extra-axial fluid collections are seen.     Cerebral volume loss.     Areas of increased FLAIR signal within the cerebral white matter bilaterally are reflective of a nonspecific demyelinating process, likely ischemic microvascular in nature.     IMPRESSION:  Impression:     1. Small acute infarct in the right periventricular region     2. Moderate cerebral volume loss with moderate cerebral white matter disease, likely ischemic microvascular in nature.    CT Head Without Contrast    Result Date: 1/1/2025  PROCEDURE: CT HEAD WO CONTRAST-  INDICATION: AMS  TECHNIQUE: Multiple axial CT images were performed from the foramen magnum to the vertex without contrast.   Coronal  reconstruction images were obtained from the axial data.  COMPARISON: 8/2/2024.  FINDINGS: There is no mass effect or midline shift. There is stable atrophy with ventricular enlargement. There are stable bilateral periventricular hypodensities. There is no intracranial hemorrhage. The posterior fossa is without acute abnormality. There is mucoperiosteal thickening of the right maxillary sinus. There is opacification of the left mastoid air cells with bone destruction. There is soft tissue density in the left middle ear cavity. This was present on the prior exam. No skull fracture.      Impression: 1. No intracranial hemorrhage or evidence of acute large cortical infarct. 2. Stable atrophy and chronic findings. 3. Left mastoiditis and left otitis media with bone destruction of the left mastoid air cells. 4. Right maxillary sinusitis.         CTDI: 16.93 mGy DLP:837.44 mGy.cm    This study was performed with techniques to keep radiation doses as low as reasonably achievable (ALARA). Individualized dose reduction techniques using automated exposure control or adjustment of mA and/or kV according to the patient size were employed.   This report was signed and finalized on 1/1/2025 1:19 PM by Carmen Le MD.             MRI Brain Without Contrast    Result Date: 1/1/2025  FINAL REPORT TECHNIQUE: null CLINICAL HISTORY: AMS SEVERE HX OF PREVIOUS STROKE, FALL TODAY, HX OF PREVIOUS SKIN CA OF LEFT EAR. COMPARISON: null FINDINGS: MR of the brain without contrast Comparison: MR/SR - MRI BRAIN W WO CONTRAST - 8/2/24 21:53 EDT CT - CT ANGIOGRAM NECK W WO CONTRAST - 8/2/24 20:54 EDT CT - CT ANGIOGRAM HEAD W CONTRAST - 8/2/24 20:54 EDT CT/SR - CT HEAD WO CONTRAST STROKE PROTOCOL - 8/2/24 20:54 EDT MR/SR - MRI BRAIN W WO CONTRAST - 5/10/23 18:47 EDT Findings: No acute infarction, hemorrhage, mass-effect or herniation. No hydrocephalus. Increased signal intensity is seen in the deep and periventricular white matter on the  T2/FLAIR sequences, which most likely represents the sequela of severe chronic small vessel ischemic disease. Chronic lacunar infarctions in the right corona radiata/centrum semiovale and left basal ganglia. No extra-axial fluid collection or mass. Unremarkable sella. Intact flow voids. Normal orbits. Mucosal thickening in the paranasal sinuses may indicate sinusitis. Fluid signal in right mastoid air cells could be secondary to an effusion or mastoiditis. There is osseous destruction at the left mastoid process, which was also present on the prior studies and could be related to the previously seen skin cancer and/or post treatment changes. There is fluid signal in left mastoid air cells which could be secondary to an effusion or mastoiditis.     Impression: Impression: No acute infarction. No posttraumatic findings. Sinusitis could be considered. Bilateral mastoid air cell effusions versus mastoiditis. Osseous destruction of the left mastoid process could be secondary to the previously seen skin cancer and/or posttreatment changes. Correlate clinically for signs/symptoms of infection to exclude osteomyelitis. Authenticated and Electronically Signed by Jaqueline Painter MD on 01/01/2025 06:45:00 PM      Additional Tests Performed: Urine analysis is as follows:       Latest Reference Range & Units 01/01/25 14:18   Color, UA Yellow, Straw  Yellow   Appearance, UA Clear  Cloudy !   Specific Gravity, UA 1.005 - 1.030  1.009   pH, UA 5.0 - 8.0  7.0   Glucose Negative  Negative   Ketones, UA Negative  Negative   Blood, UA Negative  Negative   Nitrite, UA Negative  Negative   Leukocytes, UA Negative  Negative   Protein, UA Negative  Negative   Bilirubin, UA Negative  Negative   Urobilinogen, UA 0.2 - 1.0 E.U./dL  1.0 E.U./dL   !: Data is abnormal  Results for orders placed during the hospital encounter of 08/02/24    Adult Transthoracic Echo Complete w/ Color, Spectral and Contrast if necessary per  "protocol    Interpretation Summary    Left ventricular systolic function is normal. Calculated left ventricular EF = 60.4% Left ventricular ejection fraction appears to be 56 - 60%. Left ventricular diastolic function was normal.    The right ventricular cavity is borderline dilated with normal systolic function.    Mild tricuspid valve regurgitation is present. Estimated right ventricular systolic pressure from tricuspid regurgitation is normal (<35 mmHg).    Saline test results are negative for right to left atrial level shunt.            Laboratory Results:   Lab Results   Component Value Date    GLUCOSE 88 01/02/2025    CALCIUM 8.4 (L) 01/02/2025     01/02/2025    K 4.3 01/02/2025    CO2 22.4 01/02/2025     01/02/2025    BUN 11 01/02/2025    CREATININE 1.30 (H) 01/02/2025    BCR 8.5 01/02/2025    ANIONGAP 8.6 01/02/2025     Lab Results   Component Value Date    WBC 3.23 (L) 01/02/2025    HGB 9.8 (L) 01/02/2025    HCT 29.3 (L) 01/02/2025    MCV 93.9 01/02/2025     (L) 01/02/2025     Lab Results   Component Value Date    CHOL 117 08/03/2024     Lab Results   Component Value Date    HDL 27 (L) 08/03/2024     Lab Results   Component Value Date    LDL 73 08/03/2024     Lab Results   Component Value Date    TRIG 84 08/03/2024     Lab Results   Component Value Date    HGBA1C 5.00 08/03/2024     Lab Results   Component Value Date    INR 1.10 08/02/2024    INR 1.3 (H) 04/07/2023    PROTIME 14.7 08/02/2024     Lab Results   Component Value Date    ATUIAUAH00 303 08/03/2024     Lab Results   Component Value Date    FOLATE 8.81 08/03/2024     TSH          9/4/2024    10:16 9/25/2024    09:33 1/1/2025    12:21   TSH   TSH 12.060  5.670  2.700           Physical Examination:  /85 (BP Location: Left arm, Patient Position: Lying)   Pulse 89   Temp 98.8 °F (37.1 °C) (Axillary)   Resp 18   Ht 185.4 cm (73\")   Wt 96.3 kg (212 lb 4.9 oz)   SpO2 95%   BMI 28.01 kg/m²   General Appearance:   Well " developed, well nourished, well groomed, alert, and sleepy.  HEENT: Normocephalic.    Neck and Spine: Normal range of motion.  Normal alignment. No mass or tenderness. No bruits.    Extremities:    No edema or deformities. Normal joint ROM.   Skin:    No rashes or birth marks.    Neurological examination:  Higher Integrative  Function: When asked his name and date of birth he says he does not know.  He did not cooperate with counting the fingers.  However he did follow simple commands.  He showed me his tongue and moved all his extremities..  CN II:  Pupils are equal, round, and reactive to light. Normal visual acuity and visual fields.    CN III IV VI: Extraocular movements are full without nystagmus.   CN V:  Normal facial sensation and strength of muscles of mastication.  CN VII:  Facial movements are symmetric. No weakness.  CN VIII: Auditory acuity is normal.  CN IX & X: Symmetric palatal movement.  CN XI:  Sternocleidomastoid and trapezius are normal.  No weakness.  CN XII:  The tongue is midline.  No atrophy or fasciculations.  Motor:  Normal muscle strength, bulk and tone in upper and lower extremities.  No fasciculations, rigidity, spasticity, or abnormal movements.  Sensation: Normal to light touch, pinprick, vibration, temperature, and proprioception in arms and legs. Normal graphesthesia and no extinction on DSS.  Station and Gait: Could not be tested at this time.  Coordination:  Would not cooperate for testing..      Diagnoses / Discussion:  79 y.o. who presents with Sx of acute metabolic encephalopathy versus delirium.    Plan:  EEG at the bedside with photic stimulation has been requested.  Not to use any narcotics or sedatives.  If over the next 24 to 48 hours his condition does not improve he will need CSF evaluation under fluoroscopy..     I have discussed the above with the care team and he will be followed up by our inpatient teleneurology service..  Time spent with patient: 70 minutes in  face-to-face evaluation and management of the patient using the dedicated telemedicine device without any interruption with the help of the rounding nurse with the patient located at the [facility] and myself at a remote location.    Electronically signed by Esperanza Zaragoza MD, 01/02/25, 9:01 AM EST.    Dictated using Dragon dictation.                Electronically signed by Esperanza Zaragoza MD at 01/02/25 0912

## 2025-01-03 NOTE — PLAN OF CARE
Goal Outcome Evaluation:  Plan of Care Reviewed With: patient           Outcome Evaluation: PT RESTING IN BED ALERT AND ORIENTED TO SELF.  PT IS COMBATIVE TO STAFF WHEN TRYING TO CHANGE HIM.  PT IS ON ROOM AIR AND NO TELEMETRY.  WILL CONT TO MONITOR FOR CHANGES.

## 2025-01-04 LAB
ANION GAP SERPL CALCULATED.3IONS-SCNC: 10 MMOL/L (ref 5–15)
BUN SERPL-MCNC: 18 MG/DL (ref 8–23)
BUN/CREAT SERPL: 14.2 (ref 7–25)
CALCIUM SPEC-SCNC: 8.7 MG/DL (ref 8.6–10.5)
CHLORIDE SERPL-SCNC: 100 MMOL/L (ref 98–107)
CO2 SERPL-SCNC: 23 MMOL/L (ref 22–29)
CREAT SERPL-MCNC: 1.27 MG/DL (ref 0.76–1.27)
EGFRCR SERPLBLD CKD-EPI 2021: 57.1 ML/MIN/1.73
GLUCOSE SERPL-MCNC: 95 MG/DL (ref 65–99)
POTASSIUM SERPL-SCNC: 4 MMOL/L (ref 3.5–5.2)
SODIUM SERPL-SCNC: 133 MMOL/L (ref 136–145)

## 2025-01-04 PROCEDURE — 25010000002 HEPARIN (PORCINE) PER 1000 UNITS: Performed by: STUDENT IN AN ORGANIZED HEALTH CARE EDUCATION/TRAINING PROGRAM

## 2025-01-04 PROCEDURE — 25010000002 LORAZEPAM PER 2 MG: Performed by: STUDENT IN AN ORGANIZED HEALTH CARE EDUCATION/TRAINING PROGRAM

## 2025-01-04 PROCEDURE — 99232 SBSQ HOSP IP/OBS MODERATE 35: CPT | Performed by: PSYCHIATRY & NEUROLOGY

## 2025-01-04 PROCEDURE — 99232 SBSQ HOSP IP/OBS MODERATE 35: CPT | Performed by: STUDENT IN AN ORGANIZED HEALTH CARE EDUCATION/TRAINING PROGRAM

## 2025-01-04 PROCEDURE — 80048 BASIC METABOLIC PNL TOTAL CA: CPT | Performed by: STUDENT IN AN ORGANIZED HEALTH CARE EDUCATION/TRAINING PROGRAM

## 2025-01-04 RX ORDER — ZIPRASIDONE MESYLATE 20 MG/ML
20 INJECTION, POWDER, LYOPHILIZED, FOR SOLUTION INTRAMUSCULAR EVERY 4 HOURS PRN
Status: DISCONTINUED | OUTPATIENT
Start: 2025-01-04 | End: 2025-01-10

## 2025-01-04 RX ADMIN — SENNOSIDES 1 TABLET: 8.6 TABLET, FILM COATED ORAL at 20:31

## 2025-01-04 RX ADMIN — Medication 10 ML: at 20:32

## 2025-01-04 RX ADMIN — HEPARIN SODIUM 5000 UNITS: 5000 INJECTION INTRAVENOUS; SUBCUTANEOUS at 20:31

## 2025-01-04 RX ADMIN — AMOXICILLIN AND CLAVULANATE POTASSIUM 1 TABLET: 875; 125 TABLET, FILM COATED ORAL at 20:31

## 2025-01-04 RX ADMIN — Medication 10 ML: at 09:40

## 2025-01-04 RX ADMIN — OXYCODONE 5 MG: 5 TABLET ORAL at 20:32

## 2025-01-04 RX ADMIN — LEVOTHYROXINE SODIUM 75 MCG: 0.07 TABLET ORAL at 07:11

## 2025-01-04 RX ADMIN — QUETIAPINE FUMARATE 25 MG: 25 TABLET ORAL at 08:48

## 2025-01-04 RX ADMIN — OXYCODONE 5 MG: 5 TABLET ORAL at 08:49

## 2025-01-04 RX ADMIN — LORAZEPAM 1 MG: 2 INJECTION INTRAMUSCULAR; INTRAVENOUS at 08:48

## 2025-01-04 NOTE — PROGRESS NOTES
"DOS: 2025  NAME: Hardik Natarajan   : 1945  PCP: Won Cuellar MD  Chief Complaint   Patient presents with    Altered Mental Status    Fall       Chief complaint: Patient is sleepy because of the effects of the Seroquel which he got this morning.  Subjective: As per the nursing staff he gets extremely belligerent and violent and tries to hurt the nursing staff whenever they try to do things with him like ripping off his IV and pulling off his cardiac monitor leads.  He was able to tell me his full name and only told me the month of his birth which is [birth year].  Then he would not answer any questions and would not cooperate anymore.    Objective:  Vital signs: /79 (BP Location: Right arm, Patient Position: Lying)   Pulse 78   Temp 98.3 °F (36.8 °C) (Axillary)   Resp 18   Ht 185.4 cm (73\")   Wt 96.3 kg (212 lb 4.9 oz)   SpO2 93%   BMI 28.01 kg/m²    Gen: NAD, vitals reviewed  MS: Difficult to assess and he is currently oriented to his name only and he knows the month of his birth.  CN: Cranials 2-12: No focal deficits.  Motor: Moves all his extremities well.  Sensory: Gets very violent when anybody tries to do any kind of sensory exam on him.  Coordination: Could not be tested.  Gait: Not weightbearing at this time.    ROS:  General: A lot of behavioral issues are very prominent in this gentleman and he might be exhibiting progressive dementia with psychosis.  Neurological: Except for memory issues and behavioral issues no other deficits noted.    Laboratory results:  Lab Results   Component Value Date    GLUCOSE 95 2025    CALCIUM 8.7 2025     (L) 2025    K 4.0 2025    CO2 23.0 2025     2025    BUN 18 2025    CREATININE 1.27 2025    BCR 14.2 2025    ANIONGAP 10.0 2025     Lab Results   Component Value Date    WBC 2.95 (L) 2025    HGB 9.4 (L) 2025    HCT 28.5 (L) 2025    MCV 94.1 2025     (L) " 01/03/2025     Lab Results   Component Value Date    LDL 73 08/03/2024            Review of labs: Hemoglobin is low at 9.4 and hematocrit is low at 28.5 and a platelet count is low at 118,000 with white cell count being low at 2950.  The EGFR is 57.  Vitamin B12 deficiency has been noted.     CMP:        Lab 01/04/25  0937 01/03/25  0646 01/02/25  0543 01/01/25  1221   SODIUM 133* 135* 136 136   POTASSIUM 4.0 4.5 4.3 4.2   CHLORIDE 100 102 105 101   CO2 23.0 22.2 22.4 23.4   ANION GAP 10.0 10.8 8.6 11.6   BUN 18 17 11 9   CREATININE 1.27 1.31* 1.30* 1.34*   EGFR 57.1* 55.0* 55.5* 53.6*   GLUCOSE 95 79 88 99   CALCIUM 8.7 8.5* 8.4* 8.9   MAGNESIUM  --   --   --  1.8   TOTAL PROTEIN  --   --   --  6.3   ALBUMIN  --   --   --  3.8   GLOBULIN  --   --   --  2.5   ALT (SGPT)  --   --   --  13   AST (SGOT)  --   --   --  23   BILIRUBIN  --   --   --  1.1   ALK PHOS  --   --   --  51        TSH          9/4/2024    10:16 9/25/2024    09:33 1/1/2025    12:21   TSH   TSH 12.060  5.670  2.700         Lipid Panel          8/3/2024    04:51   Lipid Panel   Total Cholesterol 117    Triglycerides 84    HDL Cholesterol 27    VLDL Cholesterol 17    LDL Cholesterol  73    LDL/HDL Ratio 2.71         Lab Results   Component Value Date    GJKDOBCT12 169 (L) 01/01/2025       Lab Results   Component Value Date    FOLATE 6.35 01/01/2025       Lab Results   Component Value Date    HGBA1C 5.00 08/03/2024           Review and interpretation of imaging:   MRI of the brain without IV contrast     Comparison: 08/02/2024     Clinical history: Concern for CVA, left arm weakness     Findings:     Small 1 cm acute infarct in the right periventricular region on series 7, image 19. Decreased signal on the ADC map in this location.     No acute intracranial hemorrhage.     No intracranial masses.     Postcontrast imaging of the brain does not demonstrate any areas of abnormal parenchymal enhancement.     No midline shift.     No abnormal extra-axial  fluid collections are seen.     Cerebral volume loss.     Areas of increased FLAIR signal within the cerebral white matter bilaterally are reflective of a nonspecific demyelinating process, likely ischemic microvascular in nature.     IMPRESSION:  Impression:     1. Small acute infarct in the right periventricular region     2. Moderate cerebral volume loss with moderate cerebral white matter disease, likely ischemic microvascular in nature.  CT Head Without Contrast    Result Date: 1/1/2025  PROCEDURE: CT HEAD WO CONTRAST-  INDICATION: AMS  TECHNIQUE: Multiple axial CT images were performed from the foramen magnum to the vertex without contrast.   Coronal reconstruction images were obtained from the axial data.  COMPARISON: 8/2/2024.  FINDINGS: There is no mass effect or midline shift. There is stable atrophy with ventricular enlargement. There are stable bilateral periventricular hypodensities. There is no intracranial hemorrhage. The posterior fossa is without acute abnormality. There is mucoperiosteal thickening of the right maxillary sinus. There is opacification of the left mastoid air cells with bone destruction. There is soft tissue density in the left middle ear cavity. This was present on the prior exam. No skull fracture.      Impression: 1. No intracranial hemorrhage or evidence of acute large cortical infarct. 2. Stable atrophy and chronic findings. 3. Left mastoiditis and left otitis media with bone destruction of the left mastoid air cells. 4. Right maxillary sinusitis.         CTDI: 16.93 mGy DLP:837.44 mGy.cm    This study was performed with techniques to keep radiation doses as low as reasonably achievable (ALARA). Individualized dose reduction techniques using automated exposure control or adjustment of mA and/or kV according to the patient size were employed.   This report was signed and finalized on 1/1/2025 1:19 PM by Carmen Le MD.            MRI Brain Without Contrast    Result Date:  1/1/2025  FINAL REPORT TECHNIQUE: null CLINICAL HISTORY: AMS SEVERE HX OF PREVIOUS STROKE, FALL TODAY, HX OF PREVIOUS SKIN CA OF LEFT EAR. COMPARISON: null FINDINGS: MR of the brain without contrast Comparison: MR/SR - MRI BRAIN W WO CONTRAST - 8/2/24 21:53 EDT CT - CT ANGIOGRAM NECK W WO CONTRAST - 8/2/24 20:54 EDT CT - CT ANGIOGRAM HEAD W CONTRAST - 8/2/24 20:54 EDT CT/SR - CT HEAD WO CONTRAST STROKE PROTOCOL - 8/2/24 20:54 EDT MR/SR - MRI BRAIN W WO CONTRAST - 5/10/23 18:47 EDT Findings: No acute infarction, hemorrhage, mass-effect or herniation. No hydrocephalus. Increased signal intensity is seen in the deep and periventricular white matter on the T2/FLAIR sequences, which most likely represents the sequela of severe chronic small vessel ischemic disease. Chronic lacunar infarctions in the right corona radiata/centrum semiovale and left basal ganglia. No extra-axial fluid collection or mass. Unremarkable sella. Intact flow voids. Normal orbits. Mucosal thickening in the paranasal sinuses may indicate sinusitis. Fluid signal in right mastoid air cells could be secondary to an effusion or mastoiditis. There is osseous destruction at the left mastoid process, which was also present on the prior studies and could be related to the previously seen skin cancer and/or post treatment changes. There is fluid signal in left mastoid air cells which could be secondary to an effusion or mastoiditis.     Impression: Impression: No acute infarction. No posttraumatic findings. Sinusitis could be considered. Bilateral mastoid air cell effusions versus mastoiditis. Osseous destruction of the left mastoid process could be secondary to the previously seen skin cancer and/or posttreatment changes. Correlate clinically for signs/symptoms of infection to exclude osteomyelitis. Authenticated and Electronically Signed by Jaqueline Painter MD on 01/01/2025 06:45:00 PM      Workup to date:CT ABDOMEN AND PELVIS WITH CONTRAST      INDICATION: Possible bladder stone on x-ray.     TECHNIQUE: Thin section axial images were obtained from the lung bases  through the pubic symphysis after oral and intravenous contrast. Coronal  reconstruction images were obtained from the axial data.     COMPARISON: None.     FINDINGS:     Lower chest:  Lung bases are clear.     Liver:  Homogeneous.  No focal lesion.     Gallbladder/biliary system:  Gallbladder is present.   No intrahepatic  or extrahepatic biliary dilatation.     Spleen:  Unremarkable.     Adrenal glands:  Unremarkable. No nodules.     Pancreas: Evaluation limited by motion artifact. No convincing mass or  evidence of pancreatitis.     Kidneys/ureters/bladder:  No hydronephrosis, solid mass or perinephric  stranding. A large calcification in the urinary bladder is consistent  with a bladder stone. This measures 23 mm. There is also irregularity of  the bladder wall which could be related to chronic cystitis. Bladder  diverticula are not excluded near the dome.     GI tract: No evidence of small bowel obstruction or focal small bowel  wall thickening. Normal appendix. No acute colon abnormality.     Pelvic organs: Mild enlargement of the prostate.     Lymph nodes/mesentery/retroperitoneum: There is abnormal attenuation in  the root of the mesentery with small lymph nodes. 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     Abdominal wall: Abdominal wall intact.      Vascular: No acute vascular abnormality.     Free fluid: No free fluid.     Bones: No acute osseous abnormality.     IMPRESSION:  1. Large stone in the urinary bladder.  2. Wall thickening of the urinary bladder with probable diverticula.  This is likely related to chronic cystitis and possibly mild chronic  outlet obstruction.  3. Abnormal attenuation at the root of the mesentery most suggestive of  mesenteric panniculitis.       Results for orders placed during the hospital encounter of 08/02/24    Adult  Transthoracic Echo Complete w/ Color, Spectral and Contrast if necessary per protocol    Interpretation Summary    Left ventricular systolic function is normal. Calculated left ventricular EF = 60.4% Left ventricular ejection fraction appears to be 56 - 60%. Left ventricular diastolic function was normal.    The right ventricular cavity is borderline dilated with normal systolic function.    Mild tricuspid valve regurgitation is present. Estimated right ventricular systolic pressure from tricuspid regurgitation is normal (<35 mmHg).    Saline test results are negative for right to left atrial level shunt.         Diagnoses: Patient with progressive dementia with psychosis.      Comment: Has underlying medical issues including squamous cell carcinoma and bladder cancer.  Also has a large kidney stone.    Plan:  1.  I discussed with Dr. Sanchez Garcia about getting a CSF evaluation by the family has not agreed to it.  2.  To discontinue the Seroquel and to use Geodon 20 mg intramuscular every 4 hours as needed and to calculate how much Geodon is currently being used so that it can be switched to the oral form in divided doses.  3.  To get an EEG performed at the bedside once he is more cooperative.  4.  Discussed about getting inpatient psychiatry involved coming week to help calm him down.    Discussed with the patient's nurse as well as Dr. Sanchez Garcia and he will be followed up by our inpatient teleneurology service.    Spent a total of 30 minutes in face-to-face evaluation and management of the patient using the dedicated telemedicine device without any interruption with the help of the rounding nurse with the patient located at the [facility] and myself at a remote location.    Electronically signed by Esperanza Zaragoza MD, 01/04/25, 10:48 AM EST.

## 2025-01-04 NOTE — PLAN OF CARE
Goal Outcome Evaluation:      VSS. Alert/ Oriented to self\ combative at times. Prn medication on board for aggression and scheduled Seroquel.

## 2025-01-04 NOTE — PROGRESS NOTES
[facility]    Name:  Hardik Natarajan   Age:  79 y.o.  Sex:  male  :  1945  MRN:  [id number]   Visit Number:  [id number]  Admission Date:  2025  Date Of Service:  25  Primary Care Physician:  Won Cuellar MD     LOS: 1 day :    Chief Complaint:      Follow-up; altered mental status    Subjective:    Says he feels just fine today.  No specific concern.    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  Presented to [facility] ED on 2025 with concern for altered mental status.  Last known normal was night prior to presentation.  Also reported having a fall, unclear if it was syncope.  Wife found him on the floor.  On Plavix, no other anticoagulation.  He denied any fevers or chills, chest pain, shortness of air, any other specific concern.  Denied any slurred speech, difficulty moving upper or lower extremities, and any in symmetry.     ED summary: Afebrile, vital signs stable on room air.  ABG pH 7.44, pCO2 34, pO2 82, bicarb 24.  EKG sinus tachycardia rate 107, nonspecific ST-T wave changes.  Opponent 23, ACS not suspected.  Creatinine 1.34, lactate elevated, procalcitonin not elevated.  UDS negative.  Blood cultures collected.  COVID/flu/RSV negative.  CT abdomen/pelvis large stone in urinary bladder, wall thickening probable diverticula, abnormal attenuation of the root of the mesentery most suggestive of mesenteric panniculitis.  CT head no intracranial hemorrhage or evidence of acute large cortical infarct, stable atrophy and chronic findings, left mastoiditis and left otitis media with bone destruction of the left mastoid air cells, right maxillary sinusitis.  CT cervical spine no acute fracture, multilevel  degenerative disc disease.    Review of Systems:     All systems were reviewed and negative except as mentioned in subjective, assessment and plan.    Vital Signs:    Temp:  [97.7 °F (36.5 °C)-98.8 °F (37.1 °C)] 98.4 °F (36.9 °C)  Heart Rate:  [] 80  Resp:  [16-18] 18  BP: (127-142)/(61-97) 135/74    Intake and output:    I/O last 3 completed shifts:  In: 410 [P.O.:410]  Out: 1050 [Urine:1050]  I/O this shift:  In: 120 [P.O.:120]  Out: -     Physical Examination:    General Appearance:  Alert and cooperative.    Head:  Superior ear and surrounding superior auricular area and left side of head scarring with good healing, no erythema, no swelling   Eyes: Conjunctivae and sclerae normal, no icterus. No pallor.   Throat: No oral lesions, no thrush, oral mucosa moist.   Neck: Supple, trachea midline, no thyromegaly.   Lungs:   Breath sounds heard bilaterally equally.  No wheezing or crackles. No Pleural rub or bronchial breathing.   Heart:  Normal S1 and S2, no murmur, no gallop, no rub. No JVD.   Abdomen:   Normal bowel sounds, no masses, no organomegaly. Soft, nontender, nondistended, no rebound tenderness.   Extremities: Supple, no edema, no cyanosis, no clubbing.   Skin: Warm   Neurologic: Alert and oriented to self. No facial asymmetry. Moves all four limbs. No tremors.      Laboratory results:    Results from last 7 days   Lab Units 01/04/25  0937 01/03/25  0646 01/02/25  0543 01/01/25  1221   SODIUM mmol/L 133* 135* 136 136   POTASSIUM mmol/L 4.0 4.5 4.3 4.2   CHLORIDE mmol/L 100 102 105 101   CO2 mmol/L 23.0 22.2 22.4 23.4   BUN mg/dL 18 17 11 9   CREATININE mg/dL 1.27 1.31* 1.30* 1.34*   CALCIUM mg/dL 8.7 8.5* 8.4* 8.9   BILIRUBIN mg/dL  --   --   --  1.1   ALK PHOS U/L  --   --   --  51   ALT (SGPT) U/L  --   --   --  13   AST (SGOT) U/L  --   --   --  23   GLUCOSE mg/dL 95 79 88 99     Results from last 7 days   Lab Units 01/03/25  0646 01/02/25  0543 01/01/25  1221   WBC 10*3/mm3 2.95* 3.23* 4.84    HEMOGLOBIN g/dL 9.4* 9.8* 11.6*   HEMATOCRIT % 28.5* 29.3* 34.0*   PLATELETS 10*3/mm3 118* 123* 141         Results from last 7 days   Lab Units 01/01/25  1406 01/01/25  1221   CK TOTAL U/L  --  216*   HSTROP T ng/L 23* 24*     Results from last 7 days   Lab Units 01/01/25  1627 01/01/25  1620   BLOODCX  No growth at 2 days No growth at 2 days     Recent Labs     01/01/25  1555   PHART 7.455   ZRX1GJD 34.2*   PO2ART 82.6   KLG7YUM 24.0   BASEEXCESS 0.4      I have reviewed the patient's laboratory results.    Radiology results:    XR Shoulder 2+ View Left    Result Date: 1/3/2025   PROCEDURE: XR SHOULDER 2+ VW LEFT-  HISTORY: left shoulder pain; R41.82-Altered mental status, unspecified; N21.0-Calculus in bladder; C44.229-Squamous cell carcinoma of skin of left ear and external auricular canal, no trauma.  COMPARISON: None.  FINDINGS:  A three view exam demonstrates no acute fracture or dislocation. The joint spaces demonstrate decreased subacromial distance suggesting chronic rotator cuff tear. Diffuse osteopenia identified. Mild narrowing of the left AC joint noted. No soft tissue abnormality is seen. No bony destructive lesion seen.       Impression: No acute bony abnormality.  Degenerative change as described.      This report was signed and finalized on 1/3/2025 8:17 AM by Camille Rodriguez MD.     I have reviewed the patient's radiology reports.    Medication Review:     I have reviewed the patient's active and prn medications.     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  Mental status slowly improving, but not completely resolved per family.     Altered mental status  Neurology consultation, recreations appreciated. EEG.  Family has deferred on lumbar puncture.  Geodon IM with plan to transition  to oral forms in divided doses.  No seizure-like activity reported or witnessed, no syncope.  Differential could include vascular dementia.    Fall precautions.  MRI brain no sign of new stroke, no metastasis.  Chronic osseous destruction observed.  Do not suspect osteomyelitis, no sepsis.  CT showed stable chronic findings; left mastoiditis and left otitis media with bone destruction of the left mastoid air cells.  Left mastoiditis, left otitis media with bone destruction of left mastoid air cells  Augmentin.  CKD  Recommend follow-up outpatient.     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  Continue home medications.     Risk Assessment: High  DVT Prophylaxis: Heparin  Code Status: Full code  Diet: Cardiac  Discharge Plan: At least 1-2 more days depending on clinical status.    Updated family Yobany and Leonora during course.    Brian Joseph Kerley, DO  01/04/25  12:04 EST    Dictated utilizing Dragon dictation.    I have reviewed the copied text and it is accurate as of 1/4/2025

## 2025-01-05 LAB
ANION GAP SERPL CALCULATED.3IONS-SCNC: 10.5 MMOL/L (ref 5–15)
BUN SERPL-MCNC: 19 MG/DL (ref 8–23)
BUN/CREAT SERPL: 14.7 (ref 7–25)
CALCIUM SPEC-SCNC: 8.8 MG/DL (ref 8.6–10.5)
CHLORIDE SERPL-SCNC: 99 MMOL/L (ref 98–107)
CO2 SERPL-SCNC: 22.5 MMOL/L (ref 22–29)
CREAT SERPL-MCNC: 1.29 MG/DL (ref 0.76–1.27)
EGFRCR SERPLBLD CKD-EPI 2021: 56.1 ML/MIN/1.73
GLUCOSE SERPL-MCNC: 97 MG/DL (ref 65–99)
POTASSIUM SERPL-SCNC: 4.5 MMOL/L (ref 3.5–5.2)
SODIUM SERPL-SCNC: 132 MMOL/L (ref 136–145)

## 2025-01-05 PROCEDURE — 99232 SBSQ HOSP IP/OBS MODERATE 35: CPT | Performed by: STUDENT IN AN ORGANIZED HEALTH CARE EDUCATION/TRAINING PROGRAM

## 2025-01-05 PROCEDURE — 25010000002 HEPARIN (PORCINE) PER 1000 UNITS: Performed by: STUDENT IN AN ORGANIZED HEALTH CARE EDUCATION/TRAINING PROGRAM

## 2025-01-05 PROCEDURE — 80048 BASIC METABOLIC PNL TOTAL CA: CPT | Performed by: STUDENT IN AN ORGANIZED HEALTH CARE EDUCATION/TRAINING PROGRAM

## 2025-01-05 PROCEDURE — 99232 SBSQ HOSP IP/OBS MODERATE 35: CPT | Performed by: PSYCHIATRY & NEUROLOGY

## 2025-01-05 PROCEDURE — 25010000002 CYANOCOBALAMIN PER 1000 MCG: Performed by: PSYCHIATRY & NEUROLOGY

## 2025-01-05 RX ORDER — ZIPRASIDONE HYDROCHLORIDE 20 MG/1
20 CAPSULE ORAL 2 TIMES DAILY WITH MEALS
Status: DISCONTINUED | OUTPATIENT
Start: 2025-01-05 | End: 2025-01-08

## 2025-01-05 RX ADMIN — Medication 10 ML: at 09:14

## 2025-01-05 RX ADMIN — HEPARIN SODIUM 5000 UNITS: 5000 INJECTION INTRAVENOUS; SUBCUTANEOUS at 09:15

## 2025-01-05 RX ADMIN — OXYCODONE 5 MG: 5 TABLET ORAL at 20:28

## 2025-01-05 RX ADMIN — OXYCODONE 5 MG: 5 TABLET ORAL at 03:02

## 2025-01-05 RX ADMIN — AMOXICILLIN AND CLAVULANATE POTASSIUM 1 TABLET: 875; 125 TABLET, FILM COATED ORAL at 20:28

## 2025-01-05 RX ADMIN — SENNOSIDES 1 TABLET: 8.6 TABLET, FILM COATED ORAL at 20:28

## 2025-01-05 RX ADMIN — CLOPIDOGREL BISULFATE 75 MG: 75 TABLET ORAL at 09:14

## 2025-01-05 RX ADMIN — TAMSULOSIN HYDROCHLORIDE 0.4 MG: 0.4 CAPSULE ORAL at 09:15

## 2025-01-05 RX ADMIN — HEPARIN SODIUM 5000 UNITS: 5000 INJECTION INTRAVENOUS; SUBCUTANEOUS at 20:28

## 2025-01-05 RX ADMIN — DULOXETINE HYDROCHLORIDE 30 MG: 30 CAPSULE, DELAYED RELEASE ORAL at 09:14

## 2025-01-05 RX ADMIN — ZIPRASIDONE HYDROCHLORIDE 20 MG: 20 CAPSULE ORAL at 17:47

## 2025-01-05 RX ADMIN — CYANOCOBALAMIN 1000 MCG: 1000 INJECTION, SOLUTION INTRAMUSCULAR at 09:15

## 2025-01-05 NOTE — PLAN OF CARE
Goal Outcome Evaluation:  Plan of Care Reviewed With: patient        Progress: no change  Outcome Evaluation: VSS on RA. Patient pulled IV out today during shift, MD notified can leave out per Dr. Kerley. No other acute events, plan of care continued.

## 2025-01-05 NOTE — PROGRESS NOTES
[facility]    Name:  Hardik Natarajan   Age:  79 y.o.  Sex:  male  :  1945  MRN:  [id number]   Visit Number:  [id number]  Admission Date:  2025  Date Of Service:  25  Primary Care Physician:  Won Cuellar MD     LOS: 2 days :    Chief Complaint:      Follow-up; altered mental status    Subjective:    Says he feels just fine today.  No specific concern.    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  Presented to [facility] ED on 2025 with concern for altered mental status.  Last known normal was night prior to presentation.  Also reported having a fall, unclear if it was syncope.  Wife found him on the floor.  On Plavix, no other anticoagulation.  He denied any fevers or chills, chest pain, shortness of air, any other specific concern.  Denied any slurred speech, difficulty moving upper or lower extremities, and any in symmetry.     ED summary: Afebrile, vital signs stable on room air.  ABG pH 7.44, pCO2 34, pO2 82, bicarb 24.  EKG sinus tachycardia rate 107, nonspecific ST-T wave changes.  Opponent 23, ACS not suspected.  Creatinine 1.34, lactate elevated, procalcitonin not elevated.  UDS negative.  Blood cultures collected.  COVID/flu/RSV negative.  CT abdomen/pelvis large stone in urinary bladder, wall thickening probable diverticula, abnormal attenuation of the root of the mesentery most suggestive of mesenteric panniculitis.  CT head no intracranial hemorrhage or evidence of acute large cortical infarct, stable atrophy and chronic findings, left mastoiditis and left otitis media with bone destruction of the left mastoid air cells, right maxillary sinusitis.  CT cervical spine no acute fracture, multilevel  degenerative disc disease.    Review of Systems:     All systems were reviewed and negative except as mentioned in subjective, assessment and plan.    Vital Signs:    Temp:  [97.6 °F (36.4 °C)-98.8 °F (37.1 °C)] 98.2 °F (36.8 °C)  Heart Rate:  [] 85  Resp:  [18-20] 18  BP: (116-141)/(69-84) 119/69    Intake and output:    I/O last 3 completed shifts:  In: 300 [P.O.:300]  Out: 1175 [Urine:1175]  I/O this shift:  In: 360 [P.O.:360]  Out: 200 [Urine:200]    Physical Examination:    General Appearance:  Alert and cooperative.    Head:  Superior ear and surrounding superior auricular area and left side of head scarring with good healing, no erythema, no swelling   Eyes: Conjunctivae and sclerae normal, no icterus. No pallor.   Throat: No oral lesions, no thrush, oral mucosa moist.   Neck: Supple, trachea midline, no thyromegaly.   Lungs:   Breath sounds heard bilaterally equally.  No wheezing or crackles. No Pleural rub or bronchial breathing.   Heart:  Normal S1 and S2, no murmur, no gallop, no rub. No JVD.   Abdomen:   Normal bowel sounds, no masses, no organomegaly. Soft, nontender, nondistended, no rebound tenderness.   Extremities: Supple, no edema, no cyanosis, no clubbing.   Skin: Warm   Neurologic: Alert and oriented to self. No facial asymmetry. Moves all four limbs. No tremors.      Laboratory results:    Results from last 7 days   Lab Units 01/05/25  0612 01/04/25  0937 01/03/25  0646 01/02/25  0543 01/01/25  1221   SODIUM mmol/L 132* 133* 135*   < > 136   POTASSIUM mmol/L 4.5 4.0 4.5   < > 4.2   CHLORIDE mmol/L 99 100 102   < > 101   CO2 mmol/L 22.5 23.0 22.2   < > 23.4   BUN mg/dL 19 18 17   < > 9   CREATININE mg/dL 1.29* 1.27 1.31*   < > 1.34*   CALCIUM mg/dL 8.8 8.7 8.5*   < > 8.9   BILIRUBIN mg/dL  --   --   --   --  1.1   ALK PHOS U/L  --   --   --   --  51   ALT (SGPT) U/L  --   --   --   --  13   AST (SGOT) U/L  --   --   --   --  23   GLUCOSE mg/dL 97 95 79   < > 99    < > = values in this  interval not displayed.     Results from last 7 days   Lab Units 01/03/25  0646 01/02/25  0543 01/01/25  1221   WBC 10*3/mm3 2.95* 3.23* 4.84   HEMOGLOBIN g/dL 9.4* 9.8* 11.6*   HEMATOCRIT % 28.5* 29.3* 34.0*   PLATELETS 10*3/mm3 118* 123* 141         Results from last 7 days   Lab Units 01/01/25  1406 01/01/25  1221   CK TOTAL U/L  --  216*   HSTROP T ng/L 23* 24*     Results from last 7 days   Lab Units 01/01/25  1627 01/01/25  1620   BLOODCX  No growth at 3 days No growth at 3 days     Recent Labs     01/01/25  1555   PHART 7.455   STH1KRF 34.2*   PO2ART 82.6   FBO3TDY 24.0   BASEEXCESS 0.4      I have reviewed the patient's laboratory results.    Radiology results:    No radiology results from the last 24 hrs  I have reviewed the patient's radiology reports.    Medication Review:     I have reviewed the patient's active and prn medications.     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  Mental status slowly improving, but not completely resolved per family.     Altered mental status  Neurology consultation, recreations appreciated. EEG.  Family has deferred on lumbar puncture.  Started Geodon p.o.  No seizure-like activity reported or witnessed, no syncope.  Differential could include vascular dementia.    Fall precautions.  MRI brain no sign of new stroke, no metastasis.  Chronic osseous destruction observed.  Do not suspect osteomyelitis, no sepsis.  CT showed stable chronic findings; left mastoiditis and left otitis media with bone destruction of the left mastoid air cells.  Left mastoiditis, left otitis media with bone destruction of left mastoid air cells  Augmentin.  CKD  Recommend follow-up outpatient.     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  Continue home medications.     Risk Assessment: High  DVT Prophylaxis: Heparin  Code Status: Full code  Diet: Cardiac  Discharge Plan: At least 1-2 more days depending on clinical status.  May need placement.     Updated family Yobany and Leonora during course.    Brian Joseph Kerley,   01/05/25  13:00 EST    Dictated utilizing Dragon dictation.    I have reviewed the copied text and it is accurate as of 1/5/2025

## 2025-01-05 NOTE — PROGRESS NOTES
"DOS: 2025  NAME: Hardik Natarajan   : 1945  PCP: Won Cuellar MD  Chief Complaint   Patient presents with    Altered Mental Status    Fall       Chief complaint: Very much awake and alert today and following simple commands well.  Subjective: Not belligerent and was able to sit up at the side of the bed independently.  Not drowsy like yesterday.  Wanted his nurse to scratch his back as it was itchy    Objective:  Vital signs: /69 (BP Location: Left arm, Patient Position: Lying)   Pulse 85   Temp 98.2 °F (36.8 °C) (Oral)   Resp 18   Ht 185.4 cm (73\")   Wt 96.3 kg (212 lb 4.9 oz)   SpO2 96%   BMI 28.01 kg/m²    Gen: NAD, vitals reviewed  MS: Seems to have improved since yesterday and following commands.  CN: Cranials 2-12: No focal deficits noted.  Motor: Muscles of both upper and lower extremities show good bulk power and tone.  Sensory: Normal sensations.  Coordination: Seems to have improved.  Gait: Set up by the side of the bed independently and with help stood up by the side of the bed.  Did not attempt Romberg testing at this point.    ROS:  General: Pleasant gentleman currently improving.  Neurological: Has improved since his admission and following commands better than before.    Laboratory results:  Lab Results   Component Value Date    GLUCOSE 97 2025    CALCIUM 8.8 2025     (L) 2025    K 4.5 2025    CO2 22.5 2025    CL 99 2025    BUN 19 2025    CREATININE 1.29 (H) 2025    BCR 14.7 2025    ANIONGAP 10.5 2025     Lab Results   Component Value Date    WBC 2.95 (L) 2025    HGB 9.4 (L) 2025    HCT 28.5 (L) 2025    MCV 94.1 2025     (L) 2025     Lab Results   Component Value Date    LDL 73 2024            Review of labs: The hemoglobin and hematocrit are low at 9.4 and 28.5 and the white cell count is low at 2950 and platelet count is low at 118,000.  The TSH is normal at 2.7.  " Low vitamin B12 169 noted.     CMP:        Lab 01/05/25  0612 01/04/25  0937 01/03/25  0646 01/02/25  0543 01/01/25  1221   SODIUM 132* 133* 135* 136 136   POTASSIUM 4.5 4.0 4.5 4.3 4.2   CHLORIDE 99 100 102 105 101   CO2 22.5 23.0 22.2 22.4 23.4   ANION GAP 10.5 10.0 10.8 8.6 11.6   BUN 19 18 17 11 9   CREATININE 1.29* 1.27 1.31* 1.30* 1.34*   EGFR 56.1* 57.1* 55.0* 55.5* 53.6*   GLUCOSE 97 95 79 88 99   CALCIUM 8.8 8.7 8.5* 8.4* 8.9   MAGNESIUM  --   --   --   --  1.8   TOTAL PROTEIN  --   --   --   --  6.3   ALBUMIN  --   --   --   --  3.8   GLOBULIN  --   --   --   --  2.5   ALT (SGPT)  --   --   --   --  13   AST (SGOT)  --   --   --   --  23   BILIRUBIN  --   --   --   --  1.1   ALK PHOS  --   --   --   --  51        TSH          9/4/2024    10:16 9/25/2024    09:33 1/1/2025    12:21   TSH   TSH 12.060  5.670  2.700         Lipid Panel          8/3/2024    04:51   Lipid Panel   Total Cholesterol 117    Triglycerides 84    HDL Cholesterol 27    VLDL Cholesterol 17    LDL Cholesterol  73    LDL/HDL Ratio 2.71         Lab Results   Component Value Date    DIHPJZPS83 169 (L) 01/01/2025       Lab Results   Component Value Date    FOLATE 6.35 01/01/2025       Lab Results   Component Value Date    HGBA1C 5.00 08/03/2024           Review and interpretation of imaging: MRI of the brain without IV contrast     Comparison: 08/02/2024     Clinical history: Concern for CVA, left arm weakness     Findings:     Small 1 cm acute infarct in the right periventricular region on series 7, image 19. Decreased signal on the ADC map in this location.     No acute intracranial hemorrhage.     No intracranial masses.     Postcontrast imaging of the brain does not demonstrate any areas of abnormal parenchymal enhancement.     No midline shift.     No abnormal extra-axial fluid collections are seen.     Cerebral volume loss.     Areas of increased FLAIR signal within the cerebral white matter bilaterally are reflective of a nonspecific  demyelinating process, likely ischemic microvascular in nature.     IMPRESSION:  Impression:     1. Small acute infarct in the right periventricular region     2. Moderate cerebral volume loss with moderate cerebral white matter disease, likely ischemic microvascular in nature.           CT Head Without Contrast    Result Date: 1/1/2025  PROCEDURE: CT HEAD WO CONTRAST-  INDICATION: AMS  TECHNIQUE: Multiple axial CT images were performed from the foramen magnum to the vertex without contrast.   Coronal reconstruction images were obtained from the axial data.  COMPARISON: 8/2/2024.  FINDINGS: There is no mass effect or midline shift. There is stable atrophy with ventricular enlargement. There are stable bilateral periventricular hypodensities. There is no intracranial hemorrhage. The posterior fossa is without acute abnormality. There is mucoperiosteal thickening of the right maxillary sinus. There is opacification of the left mastoid air cells with bone destruction. There is soft tissue density in the left middle ear cavity. This was present on the prior exam. No skull fracture.      Impression: 1. No intracranial hemorrhage or evidence of acute large cortical infarct. 2. Stable atrophy and chronic findings. 3. Left mastoiditis and left otitis media with bone destruction of the left mastoid air cells. 4. Right maxillary sinusitis.         CTDI: 16.93 mGy DLP:837.44 mGy.cm    This study was performed with techniques to keep radiation doses as low as reasonably achievable (ALARA). Individualized dose reduction techniques using automated exposure control or adjustment of mA and/or kV according to the patient size were employed.   This report was signed and finalized on 1/1/2025 1:19 PM by Carmen Le MD.            MRI Brain Without Contrast    Result Date: 1/1/2025  FINAL REPORT TECHNIQUE: null CLINICAL HISTORY: AMS SEVERE HX OF PREVIOUS STROKE, FALL TODAY, HX OF PREVIOUS SKIN CA OF LEFT EAR. COMPARISON: null  FINDINGS: MR of the brain without contrast Comparison: MR/SR - MRI BRAIN W WO CONTRAST - 8/2/24 21:53 EDT CT - CT ANGIOGRAM NECK W WO CONTRAST - 8/2/24 20:54 EDT CT - CT ANGIOGRAM HEAD W CONTRAST - 8/2/24 20:54 EDT CT/SR - CT HEAD WO CONTRAST STROKE PROTOCOL - 8/2/24 20:54 EDT MR/SR - MRI BRAIN W WO CONTRAST - 5/10/23 18:47 EDT Findings: No acute infarction, hemorrhage, mass-effect or herniation. No hydrocephalus. Increased signal intensity is seen in the deep and periventricular white matter on the T2/FLAIR sequences, which most likely represents the sequela of severe chronic small vessel ischemic disease. Chronic lacunar infarctions in the right corona radiata/centrum semiovale and left basal ganglia. No extra-axial fluid collection or mass. Unremarkable sella. Intact flow voids. Normal orbits. Mucosal thickening in the paranasal sinuses may indicate sinusitis. Fluid signal in right mastoid air cells could be secondary to an effusion or mastoiditis. There is osseous destruction at the left mastoid process, which was also present on the prior studies and could be related to the previously seen skin cancer and/or post treatment changes. There is fluid signal in left mastoid air cells which could be secondary to an effusion or mastoiditis.     Impression: Impression: No acute infarction. No posttraumatic findings. Sinusitis could be considered. Bilateral mastoid air cell effusions versus mastoiditis. Osseous destruction of the left mastoid process could be secondary to the previously seen skin cancer and/or posttreatment changes. Correlate clinically for signs/symptoms of infection to exclude osteomyelitis. Authenticated and Electronically Signed by Jaqueline Painter MD on 01/01/2025 06:45:00 PM      Workup to date::CT ABDOMEN AND PELVIS WITH CONTRAST     INDICATION: Possible bladder stone on x-ray.     TECHNIQUE: Thin section axial images were obtained from the lung bases  through the pubic symphysis after oral and  intravenous contrast. Coronal  reconstruction images were obtained from the axial data.     COMPARISON: None.     FINDINGS:     Lower chest:  Lung bases are clear.     Liver:  Homogeneous.  No focal lesion.     Gallbladder/biliary system:  Gallbladder is present.   No intrahepatic  or extrahepatic biliary dilatation.     Spleen:  Unremarkable.     Adrenal glands:  Unremarkable. No nodules.     Pancreas: Evaluation limited by motion artifact. No convincing mass or  evidence of pancreatitis.     Kidneys/ureters/bladder:  No hydronephrosis, solid mass or perinephric  stranding. A large calcification in the urinary bladder is consistent  with a bladder stone. This measures 23 mm. There is also irregularity of  the bladder wall which could be related to chronic cystitis. Bladder  diverticula are not excluded near the dome.     GI tract: No evidence of small bowel obstruction or focal small bowel  wall thickening. Normal appendix. No acute colon abnormality.     Pelvic organs: Mild enlargement of the prostate.     Lymph nodes/mesentery/retroperitoneum: There is abnormal attenuation in  the root of the mesentery with small lymph nodes. 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     Abdominal wall: Abdominal wall intact.      Vascular: No acute vascular abnormality.     Free fluid: No free fluid.     Bones: No acute osseous abnormality.     IMPRESSION:  1. Large stone in the urinary bladder.  2. Wall thickening of the urinary bladder with probable diverticula.  This is likely related to chronic cystitis and possibly mild chronic  outlet obstruction.  3. Abnormal attenuation at the root of the mesentery most suggestive of  mesenteric panniculitis.       Results for orders placed during the hospital encounter of 08/02/24    Adult Transthoracic Echo Complete w/ Color, Spectral and Contrast if necessary per protocol    Interpretation Summary    Left ventricular systolic function is normal. Calculated  left ventricular EF = 60.4% Left ventricular ejection fraction appears to be 56 - 60%. Left ventricular diastolic function was normal.    The right ventricular cavity is borderline dilated with normal systolic function.    Mild tricuspid valve regurgitation is present. Estimated right ventricular systolic pressure from tricuspid regurgitation is normal (<35 mmHg).    Saline test results are negative for right to left atrial level shunt.       Diagnoses: Patient with underlying metabolic encephalopathy slowly improving.  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      Comment: Patient more cooperative at this time.    Plan:  1.  Continue physical therapy and Occupational Therapy as before.  2.  It seems the empirical treatment with antibiotics has helped his condition possibly treated his underlying otitis externa.  3.  Also has vitamin B12 deficiency which is currently getting treated with cyanocobalamin.  4.  After completing a 5-day course of intramuscular cyanocobalamin it could be switched to intramuscular once a week for the next 6 weeks and then recheck a vitamin B12 level after that.    Discussed with the care team and he will be followed up by our inpatient teleneurology service.    Spent a total of 30 minutes in face-to-face evaluation and management of the patient using the dedicated telemedicine device without any interruption with the help of the rounding nurse with the patient located at the [facility] and myself at a remote location.    Electronically signed by Esperanza Zaragoza MD, 01/05/25, 11:41 AM EST.

## 2025-01-06 LAB
BACTERIA SPEC AEROBE CULT: NORMAL
BACTERIA SPEC AEROBE CULT: NORMAL

## 2025-01-06 PROCEDURE — 97530 THERAPEUTIC ACTIVITIES: CPT

## 2025-01-06 PROCEDURE — 97110 THERAPEUTIC EXERCISES: CPT

## 2025-01-06 PROCEDURE — 99233 SBSQ HOSP IP/OBS HIGH 50: CPT | Performed by: STUDENT IN AN ORGANIZED HEALTH CARE EDUCATION/TRAINING PROGRAM

## 2025-01-06 PROCEDURE — 25010000002 HEPARIN (PORCINE) PER 1000 UNITS: Performed by: STUDENT IN AN ORGANIZED HEALTH CARE EDUCATION/TRAINING PROGRAM

## 2025-01-06 PROCEDURE — 99232 SBSQ HOSP IP/OBS MODERATE 35: CPT | Performed by: STUDENT IN AN ORGANIZED HEALTH CARE EDUCATION/TRAINING PROGRAM

## 2025-01-06 PROCEDURE — 25010000002 CYANOCOBALAMIN PER 1000 MCG: Performed by: PSYCHIATRY & NEUROLOGY

## 2025-01-06 RX ORDER — CYANOCOBALAMIN 1000 UG/ML
1000 INJECTION, SOLUTION INTRAMUSCULAR; SUBCUTANEOUS
Status: DISCONTINUED | OUTPATIENT
Start: 2025-01-16 | End: 2025-01-15 | Stop reason: HOSPADM

## 2025-01-06 RX ADMIN — CYANOCOBALAMIN 1000 MCG: 1000 INJECTION, SOLUTION INTRAMUSCULAR at 08:35

## 2025-01-06 RX ADMIN — ZIPRASIDONE HYDROCHLORIDE 20 MG: 20 CAPSULE ORAL at 08:35

## 2025-01-06 RX ADMIN — HEPARIN SODIUM 5000 UNITS: 5000 INJECTION INTRAVENOUS; SUBCUTANEOUS at 08:35

## 2025-01-06 RX ADMIN — ZIPRASIDONE HYDROCHLORIDE 20 MG: 20 CAPSULE ORAL at 17:56

## 2025-01-06 RX ADMIN — OXYCODONE 5 MG: 5 TABLET ORAL at 05:52

## 2025-01-06 RX ADMIN — CLOPIDOGREL BISULFATE 75 MG: 75 TABLET ORAL at 08:36

## 2025-01-06 RX ADMIN — DULOXETINE HYDROCHLORIDE 30 MG: 30 CAPSULE, DELAYED RELEASE ORAL at 08:35

## 2025-01-06 RX ADMIN — HEPARIN SODIUM 5000 UNITS: 5000 INJECTION INTRAVENOUS; SUBCUTANEOUS at 21:39

## 2025-01-06 RX ADMIN — LEVOTHYROXINE SODIUM 75 MCG: 0.07 TABLET ORAL at 05:52

## 2025-01-06 RX ADMIN — AMOXICILLIN AND CLAVULANATE POTASSIUM 1 TABLET: 875; 125 TABLET, FILM COATED ORAL at 21:40

## 2025-01-06 RX ADMIN — SENNOSIDES 1 TABLET: 8.6 TABLET, FILM COATED ORAL at 21:40

## 2025-01-06 RX ADMIN — TAMSULOSIN HYDROCHLORIDE 0.4 MG: 0.4 CAPSULE ORAL at 08:35

## 2025-01-06 NOTE — THERAPY TREATMENT NOTE
Patient Name: Hardik Natarajan  : 1945    MRN: [id number]                              Today's Date: 2025       Admit Date: 2025    Visit Dx:     ICD-10-CM ICD-9-CM   1. Altered mental status, unspecified altered mental status type  R41.82 780.97   2. Bladder stone  N21.0 594.1   3. 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    Anemia, unspecified    Nicotine dependence, chewing tobacco, uncomplicated    Neoplasm related pain (acute) (chronic)    Unspecified mastoiditis, left ear    Cellulitis of left external ear    Phlebitis and thrombophlebitis of unspecified site    Gangrene, not elsewhere classified    Acute osteomyelitis    Benign prostatic hyperplasia without lower urinary tract symptoms    Pain    Palliative care by specialist    Encounter for therapeutic drug monitoring    Weakness of left upper extremity    AMS (altered mental status)    Altered mental status     Past Medical History:   Diagnosis Date    Anemia     Arthritis     Cancer      Past Surgical History:   Procedure Laterality Date    NAIL BIOPSY N/A     NECK DISSECTION N/A     PAROTIDECTOMY N/A     SKIN CANCER EXCISION        General Information       Row Name 25 1517          OT Time and Intention    Document Type therapy note (daily note)  -     Mode of Treatment occupational therapy  -     Patient Effort good  -               User Key  (r) = Recorded By, (t) = Taken By, (c) = Cosigned By      Initials Name Provider Type     Trina Wesley Occupational Therapist                     Mobility/ADL's       Row Name 25 1517          Bed Mobility    Comment, (Bed Mobility) pt sitting up in his chair  -       Row Name 25 1517          Sit-Stand Transfer    Sit-Stand [place] (Transfers) minimum assist (75% patient effort);verbal cues  -      Assistive Device (Sit-Stand Transfers) walker, front-wheeled  -     Comment, (Sit-Stand Transfer) performed 2 sit to stand transfers, noted to have B knee buckling  -[facility] Name 01/06/25 1517          Functional Mobility    Patient was able to Ambulate no, other medical factors prevent ambulation  -     Reason Patient was unable to Ambulate Excessive Weakness  -               User Key  (r) = Recorded By, (t) = Taken By, (c) = Cosigned By      Initials Name Provider Type     Trina Wesley Occupational Therapist                   Obj/Interventions       [facility] Name 01/06/25 1518          Shoulder (Therapeutic Exercise)    Shoulder (Therapeutic Exercise) AROM (active range of motion)  -     Shoulder AROM (Therapeutic Exercise) bilateral;flexion;extension;10 repetitions  -[facility] Name 01/06/25 1518          Elbow/Forearm (Therapeutic Exercise)    Elbow/Forearm (Therapeutic Exercise) AROM (active range of motion)  -     Elbow/Forearm AROM (Therapeutic Exercise) bilateral;flexion;extension;10 repetitions  -[facility] Name 01/06/25 1518          Motor Skills    Therapeutic Exercise shoulder;elbow/forearm  -               User Key  (r) = Recorded By, (t) = Taken By, (c) = Cosigned By      Initials Name Provider Type     Trina Wesley Occupational Therapist                   Goals/Plan    No documentation.                  Clinical Impression       [facility] Name 01/06/25 1518          Pain Assessment    Pretreatment Pain Rating 0/10 - no pain  -     Posttreatment Pain Rating 0/10 - no pain  -[facility] Name 01/06/25 1518          Plan of Care Review    Plan of Care Reviewed With patient  -     Progress improving  -     Outcome Evaluation Pt received sitting up in his chair, willing to work with therapy.  pt more alert and was able to perform 2 sit to stand transfers with min assist and verbal cues using .  Pt did not walk today d/t B knee buckling noted.  Pt able to perform UB ex as per flow  sheet.  Pt progressing with therapy.  Cont OT per POC.  -       Row Name 01/06/25 1518          Therapy Plan Review/Discharge Plan (OT)    Anticipated Discharge Disposition (OT) sub acute care setting  sub acute rehab  -       Row Name 01/06/25 1518          Positioning and Restraints    Pre-Treatment Position sitting in chair/recliner  -     Post Treatment Position chair  -     In Chair sitting;call light within reach;encouraged to call for assist;exit alarm on  -               User Key  (r) = Recorded By, (t) = Taken By, (c) = Cosigned By      Initials Name Provider Type    Trina Palacio Occupational Therapist                   Outcome Measures       Row Name 01/06/25 1521          How much help from another is currently needed...    Putting on and taking off regular lower body clothing? 2  -AH     Bathing (including washing, rinsing, and drying) 2  -AH     Toileting (which includes using toilet bed pan or urinal) 2  -AH     Putting on and taking off regular upper body clothing 2  -AH     Taking care of personal grooming (such as brushing teeth) 2  -AH     Eating meals 3  -AH     AM-PAC 6 Clicks Score (OT) 13  -       Row Name 01/06/25 0835          How much help from another person do you currently need...    Turning from your back to your side while in flat bed without using bedrails? 3  -BW     Moving from lying on back to sitting on the side of a flat bed without bedrails? 3  -BW     Moving to and from a bed to a chair (including a wheelchair)? 2  -BW     Standing up from a chair using your arms (e.g., wheelchair, bedside chair)? 3  -BW     Climbing 3-5 steps with a railing? 1  -BW     To walk in hospital room? 2  -BW     AM-PAC 6 Clicks Score (PT) 14  -BW       Row Name 01/06/25 1521          Functional Assessment    Outcome Measure Options AM-PAC 6 Clicks Daily Activity (OT)  -               User Key  (r) = Recorded By, (t) = Taken By, (c) = Cosigned By      Initials Name Provider Type     Trina Palacio Occupational Therapist    Martha Turner, RN Registered Nurse                    Occupational Therapy Education       Title: PT OT SLP Therapies (Done)       Topic: Occupational Therapy (Done)       Point: ADL training (Done)       Description:   Instruct learner(s) on proper safety adaptation and remediation techniques during self care or transfers.   Instruct in proper use of assistive devices.                  Learning Progress Summary            Patient Acceptance, E,TB, VU by  at 1/6/2025 1521    Comment: benefit of activity    Acceptance, E, VU by  at 1/3/2025 1931    Acceptance, E, VU by  at 1/3/2025 1300    Acceptance, E,TB, VU by SD at 1/2/2025 1322    Comment: OT POC   Family Acceptance, E, VU by  at 1/3/2025 1300                      Point: Home exercise program (Done)       Description:   Instruct learner(s) on appropriate technique for monitoring, assisting and/or progressing therapeutic exercises/activities.                  Learning Progress Summary            Patient Acceptance, E,TB, VU by  at 1/6/2025 1521    Comment: benefit of activity    Acceptance, E, VU by  at 1/3/2025 1931    Acceptance, E, VU by  at 1/3/2025 1300   Family Acceptance, E, VU by  at 1/3/2025 1300                      Point: Precautions (Done)       Description:   Instruct learner(s) on prescribed precautions during self-care and functional transfers.                  Learning Progress Summary            Patient Acceptance, E, VU by  at 1/3/2025 1931    Acceptance, E, VU by  at 1/3/2025 1300   Family Acceptance, E, VU by  at 1/3/2025 1300                      Point: Body mechanics (Done)       Description:   Instruct learner(s) on proper positioning and spine alignment during self-care, functional mobility activities and/or exercises.                  Learning Progress Summary            Patient Acceptance, E, VU by  at 1/3/2025 1931    Acceptance, E, VU by  at 1/3/2025 1300    Family Acceptance, E, VU by  at 1/3/2025 1300                                      User Key       Initials Effective Dates Name Provider Type Discipline     06/16/21 -  Trina Wesley Occupational Therapist OT    SD 06/16/21 -  Jaqueline Dasilva OT Occupational Therapist OT     09/30/24 -  Kya Jason, RN Registered Nurse Nurse                  OT Recommendation and Plan     Plan of Care Review  Plan of Care Reviewed With: patient  Progress: improving  Outcome Evaluation: Pt received sitting up in his chair, willing to work with therapy.  pt more alert and was able to perform 2 sit to stand transfers with min assist and verbal cues using RW.  Pt did not walk today d/t B knee buckling noted.  Pt able to perform UB ex as per flow sheet.  Pt progressing with therapy.  Cont OT per POC.     Time Calculation:         Time Calculation- OT       Row Name 01/06/25 1522             Time Calculation- OT    OT Start Time 1153  -      OT Stop Time 1212  -      OT Time Calculation (min) 19 min  -      OT Received On 01/06/25  -      OT Goal Re-Cert Due Date 01/14/25  -         Timed Charges    10044 - OT Therapeutic Exercise Minutes 5  -AH      48918 - OT Therapeutic Activity Minutes 14  -         Total Minutes    Timed Charges Total Minutes 19  -       Total Minutes 19  -AH                User Key  (r) = Recorded By, (t) = Taken By, (c) = Cosigned By      Initials Name Provider Type     Trina Wesley Occupational Therapist                  Therapy Charges for Today       Code Description Service Date Service Provider Modifiers Qty    55056848311 HC OT THERAPEUTIC ACT EA 15 MIN 1/6/2025 Trina Wesley GO 1                 Trina Wesley  1/6/2025

## 2025-01-06 NOTE — PLAN OF CARE
Goal Outcome Evaluation:           Progress: improving  Outcome Evaluation: Pt agreeable to physical therapy. Performed sit <->stand RW x2 reps with min A and VC for upright posture with occ weight shifting and standing approx 1 min each with 1 episode of B knees buckling. performed B LE ex in sitting AP, LAQ, hip abd, marching 1x15 reps with occ VC to focus on task. Con't with PT POC and progress as tolerated

## 2025-01-06 NOTE — PLAN OF CARE
Goal Outcome Evaluation:  Plan of Care Reviewed With: patient        Progress: improving  Outcome Evaluation: Pt received sitting up in his chair, willing to work with therapy.  pt more alert and was able to perform 2 sit to stand transfers with min assist and verbal cues using RW.  Pt did not walk today d/t B knee buckling noted.  Pt able to perform UB ex as per flow sheet.  Pt progressing with therapy.  Cont OT per POC.    Anticipated Discharge Disposition (OT): sub acute care setting (sub acute rehab)

## 2025-01-06 NOTE — PROGRESS NOTES
[facility]    Name:  Hardik Natarajan   Age:  79 y.o.  Sex:  male  :  1945  MRN:  [id number]   Visit Number:  [id number]  Admission Date:  2025  Date Of Service:  25  Primary Care Physician:  Won Cuellar MD     LOS: 3 days :    Chief Complaint:      Follow-up; altered mental status    Subjective:    Says he feels just fine today.  No specific concern.    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  Presented to [facility] ED on 2025 with concern for altered mental status.  Last known normal was night prior to presentation.  Also reported having a fall, unclear if it was syncope.  Wife found him on the floor.  On Plavix, no other anticoagulation.  He denied any fevers or chills, chest pain, shortness of air, any other specific concern.  Denied any slurred speech, difficulty moving upper or lower extremities, and any in symmetry.     ED summary: Afebrile, vital signs stable on room air.  ABG pH 7.44, pCO2 34, pO2 82, bicarb 24.  EKG sinus tachycardia rate 107, nonspecific ST-T wave changes.  Opponent 23, ACS not suspected.  Creatinine 1.34, lactate elevated, procalcitonin not elevated.  UDS negative.  Blood cultures collected.  COVID/flu/RSV negative.  CT abdomen/pelvis large stone in urinary bladder, wall thickening probable diverticula, abnormal attenuation of the root of the mesentery most suggestive of mesenteric panniculitis.  CT head no intracranial hemorrhage or evidence of acute large cortical infarct, stable atrophy and chronic findings, left mastoiditis and left otitis media with bone destruction of the left mastoid air cells, right maxillary sinusitis.  CT cervical spine no acute fracture, multilevel  degenerative disc disease.    Review of Systems:     All systems were reviewed and negative except as mentioned in subjective, assessment and plan.    Vital Signs:    Temp:  [97.4 °F (36.3 °C)-98.9 °F (37.2 °C)] 97.4 °F (36.3 °C)  Heart Rate:  [67-76] 67  Resp:  [16-18] 18  BP: (103-127)/(68-84) 103/71    Intake and output:    I/O last 3 completed shifts:  In: 600 [P.O.:600]  Out: 1300 [Urine:1300]  I/O this shift:  In: 480 [P.O.:480]  Out: -     Physical Examination:    General Appearance:  Alert and cooperative.    Head:  Superior ear and surrounding superior auricular area and left side of head scarring with good healing, no erythema, no swelling   Eyes: Conjunctivae and sclerae normal, no icterus. No pallor.   Throat: No oral lesions, no thrush, oral mucosa moist.   Neck: Supple, trachea midline, no thyromegaly.   Lungs:   Breath sounds heard bilaterally equally.  No wheezing or crackles. No Pleural rub or bronchial breathing.   Heart:  Normal S1 and S2, no murmur, no gallop, no rub. No JVD.   Abdomen:   Normal bowel sounds, no masses, no organomegaly. Soft, nontender, nondistended, no rebound tenderness.   Extremities: Supple, no edema, no cyanosis, no clubbing.   Skin: Warm   Neurologic: Alert and oriented to self. No facial asymmetry. Moves all four limbs. No tremors.      Laboratory results:    Results from last 7 days   Lab Units 01/05/25  0612 01/04/25  0937 01/03/25  0646 01/02/25  0543 01/01/25  1221   SODIUM mmol/L 132* 133* 135*   < > 136   POTASSIUM mmol/L 4.5 4.0 4.5   < > 4.2   CHLORIDE mmol/L 99 100 102   < > 101   CO2 mmol/L 22.5 23.0 22.2   < > 23.4   BUN mg/dL 19 18 17   < > 9   CREATININE mg/dL 1.29* 1.27 1.31*   < > 1.34*   CALCIUM mg/dL 8.8 8.7 8.5*   < > 8.9   BILIRUBIN mg/dL  --   --   --   --  1.1   ALK PHOS U/L  --   --   --   --  51   ALT (SGPT) U/L  --   --   --   --  13   AST (SGOT) U/L  --   --   --   --  23   GLUCOSE mg/dL 97 95 79   < > 99    < > = values in this interval not  displayed.     Results from last 7 days   Lab Units 01/03/25  0646 01/02/25  0543 01/01/25  1221   WBC 10*3/mm3 2.95* 3.23* 4.84   HEMOGLOBIN g/dL 9.4* 9.8* 11.6*   HEMATOCRIT % 28.5* 29.3* 34.0*   PLATELETS 10*3/mm3 118* 123* 141         Results from last 7 days   Lab Units 01/01/25  1406 01/01/25  1221   CK TOTAL U/L  --  216*   HSTROP T ng/L 23* 24*     Results from last 7 days   Lab Units 01/01/25  1627 01/01/25  1620   BLOODCX  No growth at 4 days No growth at 4 days     Recent Labs     01/01/25  1555   PHART 7.455   VCX4LMR 34.2*   PO2ART 82.6   RCP3EUR 24.0   BASEEXCESS 0.4      I have reviewed the patient's laboratory results.    Radiology results:    No radiology results from the last 24 hrs  I have reviewed the patient's radiology reports.    Medication Review:     I have reviewed the patient's active and prn medications.     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  Mental status slowly improving, but not completely resolved per family.  Suspect underlying dementia.     Altered mental status  Dementia, suspected  Neurology consultation, recommendations appreciated. Family has deferred on lumbar puncture.  Started Geodon p.o.  No seizure-like activity reported or witnessed, no syncope.  Differential could include vascular dementia.    Fall precautions.  MRI brain no sign of new stroke, no metastasis.  Chronic osseous destruction observed.  Do not suspect osteomyelitis, no sepsis.  CT showed stable chronic findings; left mastoiditis and left otitis media with bone destruction of the left mastoid air cells.  TSH within normal limits.  Low vitamin B12  Per recommendation neurology; Vitamin B12 IV for 5 days and then IM once a week for 6 weeks and then recheck vitamin B12 level.  Left  mastoiditis, left otitis media with bone destruction of left mastoid air cells  Augmentin.  Possible that that description of left mastoiditis and left otitis media on CT could be a chronic finding.  CKD  Recommend follow-up outpatient.     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  Continue home medications.     Risk Assessment: High  DVT Prophylaxis: Heparin  Code Status: Full code  Diet: Cardiac  Discharge Plan: Awaiting placement LTC.    Updated family Yobany and Leonora during course.    Brian Joseph Kerley, DO  01/06/25  14:48 EST    Dictated utilizing Dragon dictation.    I have reviewed the copied text and it is accurate as of 1/6/2025      Rhomboid Transposition Flap Text: The defect edges were debeveled with a #15 scalpel blade.  Given the location of the defect and the proximity to free margins a rhomboid transposition flap was deemed most appropriate.  Using a sterile surgical marker, an appropriate rhomboid flap was drawn incorporating the defect.    The area thus outlined was incised deep to adipose tissue with a #15 scalpel blade.  The skin margins were undermined to an appropriate distance in all directions utilizing iris scissors.

## 2025-01-06 NOTE — PAYOR COMM NOTE
"To:  Faxon  From: Hortencia Latif RN  Phone: [phone]  Fax: [phone]  NPI: [id number]  TIN: [id number]  Member ID: [id number]   MRN: [id number]    Hardik Browne (79 y.o. Male)       Date of Birth   1945    Social Security Number       Address   [address] [id number]    Home Phone   [phone]    MRN   [id number]       Nondenominational   None    Marital Status                               Admission Date   1/1/25    Admission Type   Emergency    Admitting Provider   Kerley, Brian Joseph, DO    Attending Provider   Kerley, Brian Joseph, DO    [facility]       Discharge Date       Discharge Disposition       Discharge Destination                                 Attending Provider: Kerley, Brian Joseph, DO    Allergies: Asa [Aspirin]    Isolation: None   Infection: None   Code Status: CPR    Ht: 185.4 cm (73\")   Wt: 96.3 kg (212 lb 4.9 oz)    Admission Cmt: None   Principal Problem: AMS (altered mental status) [R41.82]                   Active Insurance as of 1/1/2025       Primary Coverage       Payor Plan Insurance Group Employer/Plan Group    ANTHEM MEDICARE REPLACEMENT ANTHEM MED ADV SNP [id number]       Payor Plan Address Payor Plan Phone Number Payor Plan Fax Number Effective Dates    [address] [phone]  1/1/2024 - None Entered    [facility] [address]         Subscriber Name Subscriber Birth Date Member ID       HARDIK BROWNE 1945 [id number]               Secondary Coverage       Payor Plan Insurance Group Employer/Plan Group    [id number]        Payor Plan Address Payor Plan Phone Number Payor Plan Fax Number Effective Dates    [address]   4/7/2023 - None Entered    [place] [address]         Subscriber Name Subscriber Birth Date Member ID       HARDIK BROWNE 1945 [id number]                     Emergency Contacts        (Rel.) Home Phone Work Phone Mobile " Phone    MAC BROWNE/ZITA (Son) [phone] -- [phone]              Vital Signs (last day)       Date/Time Temp Temp src Pulse Resp BP Patient Position SpO2    01/06/25 1123 97.4 (36.3) Oral -- 18 103/71 Sitting --    01/06/25 0718 97.9 (36.6) Oral -- 18 121/84 Lying --    01/06/25 0345 97.6 (36.4) Oral 67 16 127/76 Lying 95    01/05/25 1930 98.8 (37.1) Oral 76 16 111/78 Lying 96    01/05/25 1521 98.9 (37.2) Oral -- 16 118/68 Sitting --    01/05/25 1124 98.2 (36.8) Oral -- 18 119/69 Lying --    01/05/25 0719 98.8 (37.1) Oral -- 18 129/83 Lying --    01/05/25 0354 98.2 (36.8) Oral -- 20 128/79 Lying --          Current Facility-Administered Medications   Medication Dose Route Frequency Provider Last Rate Last Admin    amoxicillin-clavulanate (AUGMENTIN) 875-125 MG per tablet 1 tablet  1 tablet Oral Q12H Kerley, Brian Joseph, DO   1 tablet at 01/05/25 2028    Calcium Replacement - Follow Nurse / BPA Driven Protocol   Not Applicable PRN Kerley, Brian Joseph, DO        clopidogrel (PLAVIX) tablet 75 mg  75 mg Oral Daily Kerley, Brian Joseph, DO   75 mg at 01/06/25 0836    cyanocobalamin injection 1,000 mcg  1,000 mcg Intramuscular Daily Esperanza Zaragoza MD   1,000 mcg at 01/06/25 0835    DULoxetine (CYMBALTA) DR capsule 30 mg  30 mg Oral Daily Kerley, Brian Joseph, DO   30 mg at 01/06/25 0835    heparin (porcine) 5000 UNIT/ML injection 5,000 Units  5,000 Units Subcutaneous Q12H Kerley, Brian Joseph, DO   5,000 Units at 01/06/25 0835    levothyroxine (SYNTHROID, LEVOTHROID) tablet 75 mcg  75 mcg Oral Q AM Kerley, Brian Joseph, DO   75 mcg at 01/06/25 0552    Magnesium Standard Dose Replacement - Follow Nurse / BPA Driven Protocol   Not Applicable PRN Kerley, Brian Joseph, DO        nitroglycerin (NITROSTAT) SL tablet 0.4 mg  0.4 mg Sublingual Q5 Min PRN Kerley, Brian Joseph, DO        ondansetron (ZOFRAN) injection 4 mg  4 mg Intravenous Q6H PRN Kerley, Brian Joseph, DO        oxyCODONE (ROXICODONE) immediate  release tablet 5 mg  5 mg Oral Q6H PRN Kerley, Brian Joseph, DO   5 mg at 01/06/25 0552    Phosphorus Replacement - Follow Nurse / BPA Driven Protocol   Not Applicable PRN Kerley, Brian Joseph, DO        Potassium Replacement - Follow Nurse / BPA Driven Protocol   Not Applicable PRN Kerley, Brian Joseph, DO        senna (SENOKOT) tablet 1 tablet  1 tablet Oral Nightly Kerley, Brian Joseph, DO   1 tablet at 01/05/25 2028    sodium chloride 0.9 % flush 10 mL  10 mL Intravenous Q12H Kerley, Brian Joseph, DO   10 mL at 01/05/25 0914    sodium chloride 0.9 % flush 10 mL  10 mL Intravenous PRN Kerley, Brian Joseph, DO        sodium chloride 0.9 % infusion 40 mL  40 mL Intravenous PRN Kerley, Brian Joseph, DO        tamsulosin (FLOMAX) 24 hr capsule 0.4 mg  0.4 mg Oral Daily Kerley, Brian Joseph, DO   0.4 mg at 01/06/25 0835    ziprasidone (GEODON) capsule 20 mg  20 mg Oral BID With Meals Kerley, Brian Joseph, DO   20 mg at 01/06/25 0835    ziprasidone (GEODON) injection 20 mg  20 mg Intramuscular Q4H PRN Esperanza Zaragoza MD         Lab Results (last 24 hours)       Procedure Component Value Units Date/Time    Blood Culture - Blood, Hand, Right [[id number]]  (Normal) Collected: 01/01/25 1620    Specimen: Blood from Hand, Right Updated: 01/05/25 1645     Blood Culture No growth at 4 days    Blood Culture - Blood, Hand, Right [[id number]]  (Normal) Collected: 01/01/25 1627    Specimen: Blood from Hand, Right Updated: 01/05/25 1645     Blood Culture No growth at 4 days          Imaging Results (Last 24 Hours)       ** No results found for the last 24 hours. **          Orders (last 24 hrs)        Start     Ordered    01/05/25 1800  ziprasidone (GEODON) capsule 20 mg  2 Times Daily With Meals         01/05/25 1256    01/04/25 1027  ziprasidone (GEODON) injection 20 mg  Every 4 Hours PRN         01/04/25 1027    01/03/25 1115  cyanocobalamin injection 1,000 mcg  Daily         01/03/25 1020    01/02/25 0900   amoxicillin-clavulanate (AUGMENTIN) 875-125 MG per tablet 1 tablet  Every 12 Hours Scheduled         01/02/25 0724    01/02/25 0800  Oral Care  2 Times Daily       01/01/25 1825    01/02/25 0600  levothyroxine (SYNTHROID, LEVOTHROID) tablet 75 mcg  Every Early Morning         01/01/25 1825    01/01/25 2100  senna (SENOKOT) tablet 1 tablet  Nightly         01/01/25 1825    01/01/25 2100  sodium chloride 0.9 % flush 10 mL  Every 12 Hours Scheduled         01/01/25 1825    01/01/25 2100  heparin (porcine) 5000 UNIT/ML injection 5,000 Units  Every 12 Hours Scheduled         01/01/25 1825 01/01/25 2000  DULoxetine (CYMBALTA) DR capsule 30 mg  Daily         01/01/25 1825 01/01/25 2000  Vital Signs  Every 4 Hours       01/01/25 1825 01/01/25 1841  clopidogrel (PLAVIX) tablet 75 mg  Daily         01/01/25 1825    01/01/25 1841  tamsulosin (FLOMAX) 24 hr capsule 0.4 mg  Daily         01/01/25 1825    01/01/25 1826  Intake & Output  Every Shift       01/01/25 1825    01/01/25 1825  Phosphorus Replacement - Follow Nurse / BPA Driven Protocol  As Needed         01/01/25 1825    01/01/25 1825  Calcium Replacement - Follow Nurse / BPA Driven Protocol  As Needed         01/01/25 1825    01/01/25 1825  oxyCODONE (ROXICODONE) immediate release tablet 5 mg  Every 6 Hours PRN         01/01/25 1825    01/01/25 1825  sodium chloride 0.9 % flush 10 mL  As Needed         01/01/25 1825    01/01/25 1825  sodium chloride 0.9 % infusion 40 mL  As Needed         01/01/25 1825    01/01/25 1825  ondansetron (ZOFRAN) injection 4 mg  Every 6 Hours PRN         01/01/25 1825    01/01/25 1825  nitroglycerin (NITROSTAT) SL tablet 0.4 mg  Every 5 Minutes PRN         01/01/25 1825    01/01/25 1825  Potassium Replacement - Follow Nurse / BPA Driven Protocol  As Needed         01/01/25 1825    01/01/25 1825  Magnesium Standard Dose Replacement - Follow Nurse / BPA Driven Protocol  As Needed         01/01/25 1825    01/01/25 0000  Ambulatory  "Referral to Urology         25 1531    Unscheduled  Up With Assistance  As Needed       25 1825                     Physician Progress Notes (most recent note)        Colin Herndon MD at 25 1057          DOS: 2025  NAME: Hardik Natarajan   : 1945  PCP: Won Cuellar MD  Chief Complaint   Patient presents with    Altered Mental Status    Fall       Chief complaint: Altered mental status, vitamin B12 deficiency.  Subjective: Patient seen and examined at the bedside through teleneurology video call with the help of the rounding nurse.  Patient awake, alert and oriented to name only he could not tell me his date of birth, when I asked him about his age he told me 58, follows few simple commands moving extremities.    Objective:  Vital signs: /84 (BP Location: Right arm, Patient Position: Lying)   Pulse 67   Temp 97.9 °F (36.6 °C) (Oral)   Resp 18   Ht 185.4 cm (73\")   Wt 96.3 kg (212 lb 4.9 oz)   SpO2 95%   BMI 28.01 kg/m²    Gen: NAD, vitals reviewed  MS: oriented to name only, slow to respond followed few simple commands struggled with complex commands, recent/remote memory impaired, poor attention/concentration, language grossly intact, no neglect.  CN: visual acuity grossly normal, PERRL, EOMI, no facial droop, no dysarthria  Motor: Moves all extremities against gravity, normal tone  Sensory: intact to light touch all 4 ext.    Laboratory results:  Lab Results   Component Value Date    GLUCOSE 97 2025    CALCIUM 8.8 2025     (L) 2025    K 4.5 2025    CO2 22.5 2025    CL 99 2025    BUN 19 2025    CREATININE 1.29 (H) 2025    BCR 14.7 2025    ANIONGAP 10.5 2025     Lab Results   Component Value Date    WBC 2.95 (L) 2025    HGB 9.4 (L) 2025    HCT 28.5 (L) 2025    MCV 94.1 2025     (L) 2025     Lab Results   Component Value Date    LDL 73 2024        " "    Review of labs: B12 169, folate 6.35, TSH 2.70, CK2 16, procalcitonin 0.16, lactate 0.9    Review and interpretation of imaging: I personally reviewed MRI brain without contrast from January 1, 2025 which showed diffuse brain atrophy, small vessel disease, no acute stroke.  Radiology report reviewed as detailed below.    IMPRESSION:  Impression:     No acute infarction. No posttraumatic findings.     Sinusitis could be considered.     Bilateral mastoid air cell effusions versus mastoiditis. Osseous destruction of the left mastoid process could be secondary to the previously seen skin cancer and/or posttreatment changes. Correlate clinically for signs/symptoms of infection to exclude   osteomyelitis.    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  EEG pending, low suspicion for seizures of epilepsy if no reported seizure-like activity this can be canceled.  2.  Per chart review, family deferred the lumbar puncture  3.  MRI brain with diffuse brain atrophy and small vessel disease, no acute stroke  4.  Continue vitamin B12 replacement. After completing a 5-day course of intramuscular cyanocobalamin it could be switched to intramuscular once a week for the next 6 weeks and then recheck a vitamin B12 level after that.  5.  PT/OT   6.  Management of the invasive stage IV squamous cell carcinoma as per the primary  7.  Patient awaiting placement    Neurology will follow peripherally on the EEG results, low suspicion for epilepsy or seizures, will be available for any further questions.    Management discussed with Dr. Kerley via epic chat.    \"Dictated utilizing Dragon dictation\".    Electronically signed by Colin Herndon MD at 01/06/25 2356          Consult Notes (most recent note)    "     Esperanza Zaragoza MD at 25 0901        Consult Orders    1. Inpatient Neurology Consult Other (see comments) [[id number]] ordered by Kerley, Brian Joseph, DO at 25 0811                 DOS: 2025  NAME: Hardik Natarajan   : 1945  PCP: Won Cuellar MD  CC: Altered mentation over last 24 hours  Referring MD: Sanchez Garcia DO    Neurological Problem and Interval History:  79 y.o. 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  This time he was admitted by Dr. Garcia after he was found altered over the last 24 hours and he was only oriented to himself.  Today when we asked the patient his name with the help of the nurse at the bedside he said he did not know and did not know his date of birth but when the nurses called him by his first name he responded well to it.  It seems he is very sleepy and does not want to be disturbed or examined.  With a lot of difficulty he was only able to follow some simple commands like picking up his arms and moving his legs and showing me his tongue.  However when I asked the nurse to count the fingers he declined to do so.  He denies any headaches.  However he has a lot of aches and pains in his joints.    Past Medical/Surgical Hx:  Past Medical History:   Diagnosis Date    Anemia     Arthritis     Cancer      Past Surgical History:   Procedure Laterality Date    NAIL BIOPSY N/A     NECK DISSECTION N/A     PAROTIDECTOMY N/A     SKIN CANCER EXCISION         Review of Systems:    Constitutional: Patient very sleepy and does not want to be aroused.  Cardiovascular: Denies any chest pain or palpitations.  Respiratory: Denies any shortness of breath  Gastrointestinal: Denies any nausea and vomiting.  Genitourinary: Has adult  diapers in place  Musculoskeletal: Moves all extremities but also has a lot of aches and pains in the joints  Dermatological: No skin breakdown noted but has some bruises in the left forearm.  Neurological: He says he does not know his name or his date of birth and he does not want to cooperate much with examination although he does follow one-step commands.  Psychiatric: Seems to be delirious.  Ophthalmological: No visual changes noted.          Medications On Admission  Medications Prior to Admission   Medication Sig Dispense Refill Last Dose/Taking    acetaminophen (TYLENOL) 325 MG tablet Take 2 tablets by mouth Every 6 (Six) Hours As Needed.   12/31/2024    clopidogrel (PLAVIX) 75 MG tablet Take 1 tablet by mouth Daily.   1/1/2025    cyanocobalamin 1000 MCG/ML injection Inject 0.1 mL into the appropriate muscle as directed by prescriber Every 30 (Thirty) Days.   Past Month    DULoxetine (CYMBALTA) 30 MG capsule Take 1 capsule by mouth Daily.   12/31/2024    finasteride (PROSCAR) 5 MG tablet Take 1 tablet by mouth Daily.   12/31/2024    levothyroxine (SYNTHROID, LEVOTHROID) 75 MCG tablet TAKE ONE (1) TABLET BY MOUTH EVERY DAY 30 tablet 3 12/31/2024    ondansetron (ZOFRAN) 8 MG tablet Take 1 tablet by mouth 3 (Three) Times a Day As Needed for Nausea or Vomiting. 30 tablet 5 Past Week    oxyCODONE (Roxicodone) 5 MG immediate release tablet Take 1 tablet by mouth Every 6 (Six) Hours As Needed for Severe Pain. 20 tablet 0 Past Month    Procto-Med HC 2.5 % rectal cream 1 Application Daily As Needed.   Past Week    senna (SENOKOT) 8.6 MG tablet Take 1 tablet by mouth Daily As Needed.   Past Week    tamsulosin (FLOMAX) 0.4 MG capsule 24 hr capsule Take 1 capsule by mouth.   12/31/2024    diclofenac (VOLTAREN) 75 MG EC tablet        Hydrocortisone, Perianal, (ANUSOL-HC) 2.5 % rectal cream Insert 1 Application into the rectum.   More than a month    hydrOXYzine (ATARAX) 25 MG tablet Take 1 tablet by mouth Every 6 (Six)  Hours As Needed for Itching. (Patient not taking: Reported on 1/1/2025) 60 tablet 2 Not Taking    ondansetron ODT (ZOFRAN-ODT) 8 MG disintegrating tablet Place 1 tablet twice a day by translingual route.          Prior to Admission medications    Medication Sig Start Date End Date Taking? Authorizing Provider   acetaminophen (TYLENOL) 325 MG tablet Take 2 tablets by mouth Every 6 (Six) Hours As Needed.   Yes Radha Santana MD   clopidogrel (PLAVIX) 75 MG tablet Take 1 tablet by mouth Daily. 8/5/24  Yes Radha Santana MD   cyanocobalamin 1000 MCG/ML injection Inject 0.1 mL into the appropriate muscle as directed by prescriber Every 30 (Thirty) Days. 5/17/23  Yes Radha Santana MD   DULoxetine (CYMBALTA) 30 MG capsule Take 1 capsule by mouth Daily. 9/11/23  Yes Radha Santana MD   finasteride (PROSCAR) 5 MG tablet Take 1 tablet by mouth Daily. 9/11/23  Yes Radha Santana MD   levothyroxine (SYNTHROID, LEVOTHROID) 75 MCG tablet TAKE ONE (1) TABLET BY MOUTH EVERY DAY 12/23/24  Yes Benitez Medellin MD   ondansetron (ZOFRAN) 8 MG tablet Take 1 tablet by mouth 3 (Three) Times a Day As Needed for Nausea or Vomiting. 5/16/23  Yes Yady Guajardo APRN   oxyCODONE (Roxicodone) 5 MG immediate release tablet Take 1 tablet by mouth Every 6 (Six) Hours As Needed for Severe Pain. 6/8/23  Yes Keyur Lemos DO   Procto-Med HC 2.5 % rectal cream 1 Application Daily As Needed. 5/9/23  Yes ProviderRadha MD   senna (SENOKOT) 8.6 MG tablet Take 1 tablet by mouth Daily As Needed.   Yes ProviderRadha MD   tamsulosin (FLOMAX) 0.4 MG capsule 24 hr capsule Take 1 capsule by mouth. 5/9/23  Yes Radha Santana MD   diclofenac (VOLTAREN) 75 MG EC tablet  5/24/23   Radha Santana MD   Hydrocortisone, Perianal, (ANUSOL-HC) 2.5 % rectal cream Insert 1 Application into the rectum. 5/9/23   Radha Santana MD   hydrOXYzine (ATARAX) 25 MG tablet Take 1 tablet by mouth Every 6 (Six)  Hours As Needed for Itching.  Patient not taking: Reported on 1/1/2025 8/16/23   Yady GuajardoMARTÍNEZ   ondansetron ODT (ZOFRAN-ODT) 8 MG disintegrating tablet Place 1 tablet twice a day by translingual route.    Provider, Radha, MD        Allergies:  Allergies   Allergen Reactions    Asa [Aspirin] Unknown - Low Severity     Nose bleed       Social Hx:  Social History     Socioeconomic History    Marital status:    Tobacco Use    Smoking status: Never    Smokeless tobacco: Current     Types: Chew   Vaping Use    Vaping status: Never Used   Substance and Sexual Activity    Alcohol use: Not Currently    Drug use: Never    Sexual activity: Defer       Family Hx:  Family History   Problem Relation Age of Onset    Cancer Mother     Cancer Father        Review of Imaging (Interpretation of images not reports): MRI of the brain with and without contrast on August 2, 2024 was interpreted as follows:    FINDINGS:  Exam: MRI of the brain without IV contrast     Comparison: 08/02/2024     Clinical history: Concern for CVA, left arm weakness     Findings:     Small 1 cm acute infarct in the right periventricular region on series 7, image 19. Decreased signal on the ADC map in this location.     No acute intracranial hemorrhage.     No intracranial masses.     Postcontrast imaging of the brain does not demonstrate any areas of abnormal parenchymal enhancement.     No midline shift.     No abnormal extra-axial fluid collections are seen.     Cerebral volume loss.     Areas of increased FLAIR signal within the cerebral white matter bilaterally are reflective of a nonspecific demyelinating process, likely ischemic microvascular in nature.     IMPRESSION:  Impression:     1. Small acute infarct in the right periventricular region     2. Moderate cerebral volume loss with moderate cerebral white matter disease, likely ischemic microvascular in nature.    CT Head Without Contrast    Result Date: 1/1/2025  PROCEDURE: CT  HEAD WO CONTRAST-  INDICATION: AMS  TECHNIQUE: Multiple axial CT images were performed from the foramen magnum to the vertex without contrast.   Coronal reconstruction images were obtained from the axial data.  COMPARISON: 8/2/2024.  FINDINGS: There is no mass effect or midline shift. There is stable atrophy with ventricular enlargement. There are stable bilateral periventricular hypodensities. There is no intracranial hemorrhage. The posterior fossa is without acute abnormality. There is mucoperiosteal thickening of the right maxillary sinus. There is opacification of the left mastoid air cells with bone destruction. There is soft tissue density in the left middle ear cavity. This was present on the prior exam. No skull fracture.      Impression: 1. No intracranial hemorrhage or evidence of acute large cortical infarct. 2. Stable atrophy and chronic findings. 3. Left mastoiditis and left otitis media with bone destruction of the left mastoid air cells. 4. Right maxillary sinusitis.         CTDI: 16.93 mGy DLP:837.44 mGy.cm    This study was performed with techniques to keep radiation doses as low as reasonably achievable (ALARA). Individualized dose reduction techniques using automated exposure control or adjustment of mA and/or kV according to the patient size were employed.   This report was signed and finalized on 1/1/2025 1:19 PM by Carmen Le MD.             MRI Brain Without Contrast    Result Date: 1/1/2025  FINAL REPORT TECHNIQUE: null CLINICAL HISTORY: AMS SEVERE HX OF PREVIOUS STROKE, FALL TODAY, HX OF PREVIOUS SKIN CA OF LEFT EAR. COMPARISON: null FINDINGS: MR of the brain without contrast Comparison: MR/SR - MRI BRAIN W WO CONTRAST - 8/2/24 21:53 EDT CT - CT ANGIOGRAM NECK W WO CONTRAST - 8/2/24 20:54 EDT CT - CT ANGIOGRAM HEAD W CONTRAST - 8/2/24 20:54 EDT CT/SR - CT HEAD WO CONTRAST STROKE PROTOCOL - 8/2/24 20:54 EDT MR/SR - MRI BRAIN W WO CONTRAST - 5/10/23 18:47 EDT Findings: No acute  infarction, hemorrhage, mass-effect or herniation. No hydrocephalus. Increased signal intensity is seen in the deep and periventricular white matter on the T2/FLAIR sequences, which most likely represents the sequela of severe chronic small vessel ischemic disease. Chronic lacunar infarctions in the right corona radiata/centrum semiovale and left basal ganglia. No extra-axial fluid collection or mass. Unremarkable sella. Intact flow voids. Normal orbits. Mucosal thickening in the paranasal sinuses may indicate sinusitis. Fluid signal in right mastoid air cells could be secondary to an effusion or mastoiditis. There is osseous destruction at the left mastoid process, which was also present on the prior studies and could be related to the previously seen skin cancer and/or post treatment changes. There is fluid signal in left mastoid air cells which could be secondary to an effusion or mastoiditis.     Impression: Impression: No acute infarction. No posttraumatic findings. Sinusitis could be considered. Bilateral mastoid air cell effusions versus mastoiditis. Osseous destruction of the left mastoid process could be secondary to the previously seen skin cancer and/or posttreatment changes. Correlate clinically for signs/symptoms of infection to exclude osteomyelitis. Authenticated and Electronically Signed by Jaqueline Painter MD on 01/01/2025 06:45:00 PM      Additional Tests Performed: Urine analysis is as follows:       Latest Reference Range & Units 01/01/25 14:18   Color, UA Yellow, Straw  Yellow   Appearance, UA Clear  Cloudy !   Specific Gravity, UA 1.005 - 1.030  1.009   pH, UA 5.0 - 8.0  7.0   Glucose Negative  Negative   Ketones, UA Negative  Negative   Blood, UA Negative  Negative   Nitrite, UA Negative  Negative   Leukocytes, UA Negative  Negative   Protein, UA Negative  Negative   Bilirubin, UA Negative  Negative   Urobilinogen, UA 0.2 - 1.0 E.U./dL  1.0 E.U./dL   !: Data is abnormal  Results for orders  placed during the hospital encounter of 08/02/24    Adult Transthoracic Echo Complete w/ Color, Spectral and Contrast if necessary per protocol    Interpretation Summary    Left ventricular systolic function is normal. Calculated left ventricular EF = 60.4% Left ventricular ejection fraction appears to be 56 - 60%. Left ventricular diastolic function was normal.    The right ventricular cavity is borderline dilated with normal systolic function.    Mild tricuspid valve regurgitation is present. Estimated right ventricular systolic pressure from tricuspid regurgitation is normal (<35 mmHg).    Saline test results are negative for right to left atrial level shunt.            Laboratory Results:   Lab Results   Component Value Date    GLUCOSE 88 01/02/2025    CALCIUM 8.4 (L) 01/02/2025     01/02/2025    K 4.3 01/02/2025    CO2 22.4 01/02/2025     01/02/2025    BUN 11 01/02/2025    CREATININE 1.30 (H) 01/02/2025    BCR 8.5 01/02/2025    ANIONGAP 8.6 01/02/2025     Lab Results   Component Value Date    WBC 3.23 (L) 01/02/2025    HGB 9.8 (L) 01/02/2025    HCT 29.3 (L) 01/02/2025    MCV 93.9 01/02/2025     (L) 01/02/2025     Lab Results   Component Value Date    CHOL 117 08/03/2024     Lab Results   Component Value Date    HDL 27 (L) 08/03/2024     Lab Results   Component Value Date    LDL 73 08/03/2024     Lab Results   Component Value Date    TRIG 84 08/03/2024     Lab Results   Component Value Date    HGBA1C 5.00 08/03/2024     Lab Results   Component Value Date    INR 1.10 08/02/2024    INR 1.3 (H) 04/07/2023    PROTIME 14.7 08/02/2024     Lab Results   Component Value Date    CBCTMBGR07 303 08/03/2024     Lab Results   Component Value Date    FOLATE 8.81 08/03/2024     TSH          9/4/2024    10:16 9/25/2024    09:33 1/1/2025    12:21   TSH   TSH 12.060  5.670  2.700           Physical Examination:  /85 (BP Location: Left arm, Patient Position: Lying)   Pulse 89   Temp 98.8 °F (37.1 °C)  "(Axillary)   Resp 18   Ht 185.4 cm (73\")   Wt 96.3 kg (212 lb 4.9 oz)   SpO2 95%   BMI 28.01 kg/m²   General Appearance:   Well developed, well nourished, well groomed, alert, and sleepy.  HEENT: Normocephalic.    Neck and Spine: Normal range of motion.  Normal alignment. No mass or tenderness. No bruits.    Extremities:    No edema or deformities. Normal joint ROM.   Skin:    No rashes or birth marks.    Neurological examination:  Higher Integrative  Function: When asked his name and date of birth he says he does not know.  He did not cooperate with counting the fingers.  However he did follow simple commands.  He showed me his tongue and moved all his extremities..  CN II:  Pupils are equal, round, and reactive to light. Normal visual acuity and visual fields.    CN III IV VI: Extraocular movements are full without nystagmus.   CN V:  Normal facial sensation and strength of muscles of mastication.  CN VII:  Facial movements are symmetric. No weakness.  CN VIII: Auditory acuity is normal.  CN IX & X: Symmetric palatal movement.  CN XI:  Sternocleidomastoid and trapezius are normal.  No weakness.  CN XII:  The tongue is midline.  No atrophy or fasciculations.  Motor:  Normal muscle strength, bulk and tone in upper and lower extremities.  No fasciculations, rigidity, spasticity, or abnormal movements.  Sensation: Normal to light touch, pinprick, vibration, temperature, and proprioception in arms and legs. Normal graphesthesia and no extinction on DSS.  Station and Gait: Could not be tested at this time.  Coordination:  Would not cooperate for testing..      Diagnoses / Discussion:  79 y.o. who presents with Sx of acute metabolic encephalopathy versus delirium.    Plan:  EEG at the bedside with photic stimulation has been requested.  Not to use any narcotics or sedatives.  If over the next 24 to 48 hours his condition does not improve he will need CSF evaluation under fluoroscopy..     I have discussed the above " with the care team and he will be followed up by our inpatient teleneurology service..  Time spent with patient: 70 minutes in face-to-face evaluation and management of the patient using the dedicated telemedicine device without any interruption with the help of the rounding nurse with the patient located at the [facility] and myself at a remote location.    Electronically signed by Esperanza Zaragoza MD, 01/02/25, 9:01 AM EST.    Dictated using Dragon dictation.                Electronically signed by Esperanza Zaragoza MD at 01/02/25 0912

## 2025-01-06 NOTE — PLAN OF CARE
Problem: Adult Inpatient Plan of Care  Goal: Plan of Care Review  Outcome: Progressing  Flowsheets (Taken 1/6/2025 1641)  Progress: improving  Outcome Evaluation: No acute events noted today. Pt waiting on placement. Plan of care continued.  Plan of Care Reviewed With: patient  Goal: Patient-Specific Goal (Individualized)  Outcome: Progressing  Goal: Absence of Hospital-Acquired Illness or Injury  Outcome: Progressing  Intervention: Identify and Manage Fall Risk  Recent Flowsheet Documentation  Taken 1/6/2025 1600 by Martha Heath RN  Safety Promotion/Fall Prevention:   safety round/check completed   activity supervised   assistive device/personal items within reach   clutter free environment maintained   fall prevention program maintained  Taken 1/6/2025 1400 by Martha Heath RN  Safety Promotion/Fall Prevention:   safety round/check completed   activity supervised   assistive device/personal items within reach   clutter free environment maintained   fall prevention program maintained   nonskid shoes/slippers when out of bed  Taken 1/6/2025 1200 by Martha Heath RN  Safety Promotion/Fall Prevention:   safety round/check completed   activity supervised   assistive device/personal items within reach   clutter free environment maintained   fall prevention program maintained  Taken 1/6/2025 1000 by Martha Heath RN  Safety Promotion/Fall Prevention:   safety round/check completed   activity supervised   assistive device/personal items within reach   clutter free environment maintained   fall prevention program maintained   nonskid shoes/slippers when out of bed  Taken 1/6/2025 0835 by Martha Heath, RN  Safety Promotion/Fall Prevention:   safety round/check completed   assistive device/personal items within reach   activity supervised   clutter free environment maintained   fall prevention program maintained  Intervention: Prevent Skin Injury  Recent Flowsheet Documentation  Taken 1/6/2025  1600 by Martha Heath RN  Body Position: legs elevated  Taken 1/6/2025 1200 by Martha Heath RN  Body Position: legs elevated  Taken 1/6/2025 1000 by Martha Heath RN  Body Position: legs elevated  Taken 1/6/2025 0835 by Martha Heath RN  Body Position: legs elevated  Intervention: Prevent Infection  Recent Flowsheet Documentation  Taken 1/6/2025 1600 by Martha Heath RN  Infection Prevention:   environmental surveillance performed   rest/sleep promoted  Taken 1/6/2025 1400 by Martha Heath RN  Infection Prevention:   environmental surveillance performed   hand hygiene promoted   rest/sleep promoted  Taken 1/6/2025 1200 by Martha Heath RN  Infection Prevention:   environmental surveillance performed   rest/sleep promoted   single patient room provided  Taken 1/6/2025 1000 by Martha Heath RN  Infection Prevention:   environmental surveillance performed   hand hygiene promoted   rest/sleep promoted  Taken 1/6/2025 0835 by Martha Heath RN  Infection Prevention:   environmental surveillance performed   rest/sleep promoted   single patient room provided  Goal: Optimal Comfort and Wellbeing  Outcome: Progressing  Intervention: Provide Person-Centered Care  Recent Flowsheet Documentation  Taken 1/6/2025 0835 by Martha Heath RN  Trust Relationship/Rapport:   care explained   choices provided   reassurance provided   thoughts/feelings acknowledged  Goal: Readiness for Transition of Care  Outcome: Progressing   Goal Outcome Evaluation:  Plan of Care Reviewed With: patient        Progress: improving  Outcome Evaluation: No acute events noted today. Pt waiting on placement. Plan of care continued.

## 2025-01-06 NOTE — PROGRESS NOTES
"DOS: 2025  NAME: Hardik Natarajan   : 1945  PCP: Won Cuellar MD  Chief Complaint   Patient presents with    Altered Mental Status    Fall       Chief complaint: Altered mental status, vitamin B12 deficiency.  Subjective: Patient seen and examined at the bedside through teleneurology video call with the help of the rounding nurse.  Patient awake, alert and oriented to name only he could not tell me his date of birth, when I asked him about his age he told me 58, follows few simple commands moving extremities.    Objective:  Vital signs: /84 (BP Location: Right arm, Patient Position: Lying)   Pulse 67   Temp 97.9 °F (36.6 °C) (Oral)   Resp 18   Ht 185.4 cm (73\")   Wt 96.3 kg (212 lb 4.9 oz)   SpO2 95%   BMI 28.01 kg/m²    Gen: NAD, vitals reviewed  MS: oriented to name only, slow to respond followed few simple commands struggled with complex commands, recent/remote memory impaired, poor attention/concentration, language grossly intact, no neglect.  CN: visual acuity grossly normal, PERRL, EOMI, no facial droop, no dysarthria  Motor: Moves all extremities against gravity, normal tone  Sensory: intact to light touch all 4 ext.    Laboratory results:  Lab Results   Component Value Date    GLUCOSE 97 2025    CALCIUM 8.8 2025     (L) 2025    K 4.5 2025    CO2 22.5 2025    CL 99 2025    BUN 19 2025    CREATININE 1.29 (H) 2025    BCR 14.7 2025    ANIONGAP 10.5 2025     Lab Results   Component Value Date    WBC 2.95 (L) 2025    HGB 9.4 (L) 2025    HCT 28.5 (L) 2025    MCV 94.1 2025     (L) 2025     Lab Results   Component Value Date    LDL 73 2024            Review of labs: B12 169, folate 6.35, TSH 2.70, CK2 16, procalcitonin 0.16, lactate 0.9    Review and interpretation of imaging: I personally reviewed MRI brain without contrast from 2025 which showed diffuse brain atrophy, " "small vessel disease, no acute stroke.  Radiology report reviewed as detailed below.    IMPRESSION:  Impression:     No acute infarction. No posttraumatic findings.     Sinusitis could be considered.     Bilateral mastoid air cell effusions versus mastoiditis. Osseous destruction of the left mastoid process could be secondary to the previously seen skin cancer and/or posttreatment changes. Correlate clinically for signs/symptoms of infection to exclude   osteomyelitis.    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  EEG pending, low suspicion for seizures of epilepsy if no reported seizure-like activity this can be canceled.  2.  Per chart review, family deferred the lumbar puncture  3.  MRI brain with diffuse brain atrophy and small vessel disease, no acute stroke  4.  Continue vitamin B12 replacement. After completing a 5-day course of intramuscular cyanocobalamin it could be switched to intramuscular once a week for the next 6 weeks and then recheck a vitamin B12 level after that.  5.  PT/OT   6.  Management of the invasive stage IV squamous cell carcinoma as per the primary  7.  Patient awaiting placement    Neurology will follow peripherally on the EEG results, low suspicion for epilepsy or seizures, will be available for any further questions.    Management discussed with Dr. Kerley via epic chat.    \"Dictated utilizing Dragon dictation\".  "

## 2025-01-06 NOTE — THERAPY TREATMENT NOTE
Patient Name: Hardik Natarajan  : 1945    MRN: [id number]                              Today's Date: 2025       Admit Date: 2025    Visit Dx:     ICD-10-CM ICD-9-CM   1. Altered mental status, unspecified altered mental status type  R41.82 780.97   2. Bladder stone  N21.0 594.1   3. 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    Anemia, unspecified    Nicotine dependence, chewing tobacco, uncomplicated    Neoplasm related pain (acute) (chronic)    Unspecified mastoiditis, left ear    Cellulitis of left external ear    Phlebitis and thrombophlebitis of unspecified site    Gangrene, not elsewhere classified    Acute osteomyelitis    Benign prostatic hyperplasia without lower urinary tract symptoms    Pain    Palliative care by specialist    Encounter for therapeutic drug monitoring    Weakness of left upper extremity    AMS (altered mental status)    Altered mental status     Past Medical History:   Diagnosis Date    Anemia     Arthritis     Cancer      Past Surgical History:   Procedure Laterality Date    NAIL BIOPSY N/A     NECK DISSECTION N/A     PAROTIDECTOMY N/A     SKIN CANCER EXCISION        General Information       Row Name 25 1522          Physical Therapy Time and Intention    Document Type therapy note (daily note)  -CC     Mode of Treatment physical therapy  -CC       Row Name 25 1522          General Information    Patient Profile Reviewed yes  -CC     Existing Precautions/Restrictions fall  -CC       Row Name 25 1522          Safety Issues/Impairments Affecting Functional Mobility    Safety Issues Affecting Function (Mobility) insight into deficits/self-awareness;awareness of need for assistance;judgment;problem-solving;safety precaution awareness;safety precautions follow-through/compliance  -CC      Impairments Affecting Function (Mobility) balance;cognition;endurance/activity tolerance;motor control;motor planning;strength;range of motion (ROM)  -CC               User Key  (r) = Recorded By, (t) = Taken By, (c) = Cosigned By      Initials Name Provider Type    Karis Chester PTA Physical Therapist Assistant                   Mobility       Row Name 01/06/25 1523          Gait/Stairs (Locomotion)    Patient was able to Ambulate no, other medical factors prevent ambulation  -CC     Reason Patient was unable to Ambulate Excessive Weakness  -CC               User Key  (r) = Recorded By, (t) = Taken By, (c) = Cosigned By      Initials Name Provider Type    Karis Chester PTA Physical Therapist Assistant                   Obj/Interventions    No documentation.                  Goals/Plan    No documentation.                  Clinical Impression       Row Name 01/06/25 1523          Plan of Care Review    Progress improving  -CC     Outcome Evaluation Pt agreeable to physical therapy. Performed sit <->stand RW x2 reps with min A and VC for upright posture with occ weight shifting and standing approx 1 min each with 1 episode of B knees buckling. performed B LE ex in sitting AP, LAQ, hip abd, marching 1x15 reps with occ VC to focus on task. Con't with PT POC and progress as tolerated  -CC       Row Name 01/06/25 1523          Positioning and Restraints    Pre-Treatment Position sitting in chair/recliner  -CC     Post Treatment Position chair  -CC     In Chair notified nsg;sitting;call light within reach;encouraged to call for assist;exit alarm on  -CC               User Key  (r) = Recorded By, (t) = Taken By, (c) = Cosigned By      Initials Name Provider Type    Karis Chester PTA Physical Therapist Assistant                   Outcome Measures       Row Name 01/06/25 1527 01/06/25 0835       How much help from another person do you currently need...    Turning from your back to your side while in  flat bed without using bedrails? 3  -CC 3  -BW    Moving from lying on back to sitting on the side of a flat bed without bedrails? 3  -CC 3  -BW    Moving to and from a bed to a chair (including a wheelchair)? 2  -CC 2  -BW    Standing up from a chair using your arms (e.g., wheelchair, bedside chair)? 3  -CC 3  -BW    Climbing 3-5 steps with a railing? 1  -CC 1  -BW    To walk in hospital room? 2  -CC 2  -BW    AM-PAC 6 Clicks Score (PT) 14  -CC 14  -BW    Highest Level of Mobility Goal 4 --> Transfer to chair/commode  -CC 4 --> Transfer to chair/commode  -BW      Row Name 01/06/25 1527 01/06/25 1521       Functional Assessment    Outcome Measure Options AM-PAC 6 Clicks Basic Mobility (PT)  - AM-PAC 6 Clicks Daily Activity (OT)  -              User Key  (r) = Recorded By, (t) = Taken By, (c) = Cosigned By      Initials Name Provider Type    Trina Palacio Occupational Therapist    Karis Chester, PTA Physical Therapist Assistant    BW Martha Heath, RN Registered Nurse                                 Physical Therapy Education       Title: PT OT SLP Therapies (Done)       Topic: Physical Therapy (Done)       Point: Mobility training (Done)       Learning Progress Summary            Patient Acceptance, E, VU by  at 1/3/2025 1931    Acceptance, E, VU by  at 1/3/2025 1300    Acceptance, E, VU by MS at 1/2/2025 1303    Comment: importance of mobility   Family Acceptance, E, VU by  at 1/3/2025 1300                      Point: Home exercise program (Done)       Learning Progress Summary            Patient Acceptance, E,TB, VU by CC at 1/6/2025 1529    Acceptance, E, VU by  at 1/3/2025 1931    Acceptance, E, VU by  at 1/3/2025 1300    Acceptance, E, VU by MS at 1/2/2025 1303    Comment: importance of mobility   Family Acceptance, E, VU by  at 1/3/2025 1300                      Point: Body mechanics (Done)       Learning Progress Summary            Patient Acceptance, E, VU by  at 1/3/2025  1931    Acceptance, E, VU by  at 1/3/2025 1300   Family Acceptance, E, VU by  at 1/3/2025 1300                      Point: Precautions (Done)       Learning Progress Summary            Patient Acceptance, E, VU by  at 1/3/2025 1931    Acceptance, E, VU by  at 1/3/2025 1300   Family Acceptance, E, VU by  at 1/3/2025 1300                                      User Key       Initials Effective Dates Name Provider Type Discipline     06/16/21 -  Karis Henderson PTA Physical Therapist Assistant PT    MS 08/22/23 -  Shubham Tierney, RADAH Physical Therapist PT     09/30/24 -  Kya Jason, RN Registered Nurse Nurse                  PT Recommendation and Plan     Progress: improving  Outcome Evaluation: Pt agreeable to physical therapy. Performed sit <->stand RW x2 reps with min A and VC for upright posture with occ weight shifting and standing approx 1 min each with 1 episode of B knees buckling. performed B LE ex in sitting AP, LAQ, hip abd, marching 1x15 reps with occ VC to focus on task. Con't with PT POC and progress as tolerated     Time Calculation:         PT Charges       Row Name 01/06/25 1530             Time Calculation    PT Received On 01/06/25  -CC      PT Goal Re-Cert Due Date 01/12/25  -CC         Time Calculation- PT    Total Timed Code Minutes- PT 25 minute(s)  -CC         Timed Charges    06314 - PT Therapeutic Exercise Minutes 15  -CC      75262 - PT Therapeutic Activity Minutes 10  -CC         Total Minutes    Timed Charges Total Minutes 25  -CC       Total Minutes 25  -CC                User Key  (r) = Recorded By, (t) = Taken By, (c) = Cosigned By      Initials Name Provider Type     Karis Henderson PTA Physical Therapist Assistant                  Therapy Charges for Today       Code Description Service Date Service Provider Modifiers Qty    55577781878 HC PT THER PROC EA 15 MIN 1/6/2025 Karis Henderson PTA GP 1    06063929741 HC PT THERAPEUTIC ACT EA 15 MIN 1/6/2025 Santiago  Karis RAYA, PTA GP 1            PT G-Codes  Outcome Measure Options: AM-PAC 6 Clicks Basic Mobility (PT)  AM-PAC 6 Clicks Score (PT): 14  AM-PAC 6 Clicks Score (OT): 13       Karis Henderson, PTA  1/6/2025

## 2025-01-07 PROCEDURE — 25010000002 ZIPRASIDONE MESYLATE PER 10 MG: Performed by: PSYCHIATRY & NEUROLOGY

## 2025-01-07 PROCEDURE — 25010000002 CYANOCOBALAMIN PER 1000 MCG: Performed by: STUDENT IN AN ORGANIZED HEALTH CARE EDUCATION/TRAINING PROGRAM

## 2025-01-07 PROCEDURE — 25010000002 HEPARIN (PORCINE) PER 1000 UNITS: Performed by: STUDENT IN AN ORGANIZED HEALTH CARE EDUCATION/TRAINING PROGRAM

## 2025-01-07 PROCEDURE — 99231 SBSQ HOSP IP/OBS SF/LOW 25: CPT | Performed by: FAMILY MEDICINE

## 2025-01-07 RX ORDER — POLYETHYLENE GLYCOL 3350 17 G/17G
17 POWDER, FOR SOLUTION ORAL DAILY
Status: DISCONTINUED | OUTPATIENT
Start: 2025-01-07 | End: 2025-01-15 | Stop reason: HOSPADM

## 2025-01-07 RX ORDER — LACTULOSE 10 G/15ML
10 SOLUTION ORAL 2 TIMES DAILY PRN
Status: DISCONTINUED | OUTPATIENT
Start: 2025-01-07 | End: 2025-01-15 | Stop reason: HOSPADM

## 2025-01-07 RX ADMIN — HEPARIN SODIUM 5000 UNITS: 5000 INJECTION INTRAVENOUS; SUBCUTANEOUS at 21:38

## 2025-01-07 RX ADMIN — SENNOSIDES 1 TABLET: 8.6 TABLET, FILM COATED ORAL at 21:39

## 2025-01-07 RX ADMIN — CYANOCOBALAMIN 1000 MCG: 1000 INJECTION, SOLUTION INTRAMUSCULAR at 08:12

## 2025-01-07 RX ADMIN — AMOXICILLIN AND CLAVULANATE POTASSIUM 1 TABLET: 875; 125 TABLET, FILM COATED ORAL at 08:12

## 2025-01-07 RX ADMIN — DULOXETINE HYDROCHLORIDE 30 MG: 30 CAPSULE, DELAYED RELEASE ORAL at 08:12

## 2025-01-07 RX ADMIN — LEVOTHYROXINE SODIUM 75 MCG: 0.07 TABLET ORAL at 05:01

## 2025-01-07 RX ADMIN — CLOPIDOGREL BISULFATE 75 MG: 75 TABLET ORAL at 08:12

## 2025-01-07 RX ADMIN — HEPARIN SODIUM 5000 UNITS: 5000 INJECTION INTRAVENOUS; SUBCUTANEOUS at 08:12

## 2025-01-07 RX ADMIN — ZIPRASIDONE MESYLATE 20 MG: 20 INJECTION, POWDER, LYOPHILIZED, FOR SOLUTION INTRAMUSCULAR at 21:33

## 2025-01-07 RX ADMIN — AMOXICILLIN AND CLAVULANATE POTASSIUM 1 TABLET: 875; 125 TABLET, FILM COATED ORAL at 21:39

## 2025-01-07 RX ADMIN — ZIPRASIDONE HYDROCHLORIDE 20 MG: 20 CAPSULE ORAL at 08:12

## 2025-01-07 RX ADMIN — TAMSULOSIN HYDROCHLORIDE 0.4 MG: 0.4 CAPSULE ORAL at 08:12

## 2025-01-07 RX ADMIN — POLYETHYLENE GLYCOL 3350 17 G: 17 POWDER, FOR SOLUTION ORAL at 10:39

## 2025-01-07 RX ADMIN — ZIPRASIDONE HYDROCHLORIDE 20 MG: 20 CAPSULE ORAL at 17:17

## 2025-01-07 NOTE — PROGRESS NOTES
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    Anemia, unspecified    Nicotine dependence, chewing tobacco, uncomplicated    Neoplasm related pain (acute) (chronic)    Unspecified mastoiditis, left ear    Cellulitis of left external ear    Phlebitis and thrombophlebitis of unspecified site    Gangrene, not elsewhere classified    Acute osteomyelitis    Benign prostatic hyperplasia without lower urinary tract symptoms    Pain    Palliative care by specialist    Encounter for therapeutic drug monitoring    Weakness of left upper extremity    AMS (altered mental status)    Altered mental status        Encounter Information        Trending Narrative     1/7: Pt screened for LOS. No significant wt changes per EMR. Average PO intake 37% x 8 meals. RD will order Boost Plus BID to promote PO intake.      Anthropometrics        Current Height, Weight Height: 185.4 cm (73\")  Weight: 96.3 kg (212 lb 4.9 oz) (01/01/25 1859)   Trending Weight Hx     This admission:              PTA:     Wt Readings from Last 30 Encounters:   01/01/25 1859 96.3 kg (212 lb 4.9 oz)   01/01/25 1149 102 kg (225 lb)   09/25/24 0959 91.4 kg (201 lb 9.6 oz)   09/04/24 0949 94.3 kg (208 lb)   08/14/24 1031 94.4 kg (208 lb 1.6 oz)   08/04/24 0500 95.9 kg (211 lb 6.7 oz)   08/03/24 1059 100 kg (220 lb 7.4 oz)   08/02/24 2331 100 kg (220 lb 7.4 oz)   08/02/24 1953 99.8 kg (220 lb)   07/24/24 0904 96.3 kg (212 lb 4.8 oz)   07/03/24 " 0913 98.2 kg (216 lb 6.4 oz)   06/12/24 1104 98.9 kg (218 lb)   05/22/24 0907 98.4 kg (217 lb)   05/01/24 0940 98.9 kg (218 lb)   04/10/24 0918 98 kg (216 lb)   03/20/24 0918 99.3 kg (219 lb)   02/29/24 0932 97.1 kg (214 lb)   02/07/24 1037 96.2 kg (212 lb)   01/18/24 0857 96.2 kg (212 lb)   12/20/23 0905 95.7 kg (211 lb)   11/29/23 0924 96.2 kg (212 lb)   11/08/23 0918 96.6 kg (213 lb)   10/18/23 0900 95.7 kg (211 lb)   09/27/23 1311 95.7 kg (211 lb)   09/06/23 0900 94.8 kg (209 lb)   08/16/23 0857 95.7 kg (211 lb)   07/26/23 0858 96.6 kg (213 lb)   07/05/23 0906 95.7 kg (211 lb)   06/13/23 0915 95.1 kg (209 lb 11.2 oz)   06/08/23 0309 95.7 kg (211 lb)   06/07/23 1456 95.7 kg (211 lb)   05/22/23 1015 90.6 kg (199 lb 11.2 oz)   05/16/23 1108 90.7 kg (200 lb)   05/16/23 1507 89.7 kg (197 lb 12.8 oz)      BMI kg/m2 Body mass index is 28.01 kg/m².     Labs        Pertinent Labs     Results from last 7 days   Lab Units 01/05/25  0612 01/04/25  0937 01/03/25  0646 01/02/25  0543 01/01/25  1221   SODIUM mmol/L 132* 133* 135*   < > 136   POTASSIUM mmol/L 4.5 4.0 4.5   < > 4.2   CHLORIDE mmol/L 99 100 102   < > 101   CO2 mmol/L 22.5 23.0 22.2   < > 23.4   BUN mg/dL 19 18 17   < > 9   CREATININE mg/dL 1.29* 1.27 1.31*   < > 1.34*   CALCIUM mg/dL 8.8 8.7 8.5*   < > 8.9   BILIRUBIN mg/dL  --   --   --   --  1.1   ALK PHOS U/L  --   --   --   --  51   ALT (SGPT) U/L  --   --   --   --  13   AST (SGOT) U/L  --   --   --   --  23   GLUCOSE mg/dL 97 95 79   < > 99    < > = values in this interval not displayed.       Results from last 7 days   Lab Units 01/03/25  0646 01/02/25  0543 01/01/25  1221   MAGNESIUM mg/dL  --   --  1.8   HEMOGLOBIN g/dL 9.4*   < > 11.6*   HEMATOCRIT % 28.5*   < > 34.0*    < > = values in this interval not displayed.       Lab Results   Component Value Date    HGBA1C 5.00 08/03/2024            Medications       Scheduled Medications amoxicillin-clavulanate, 1 tablet, Oral, Q12H  clopidogrel, 75 mg, Oral,  Daily  cyanocobalamin, 1,000 mcg, Intramuscular, Daily  [START ON 1/16/2025] cyanocobalamin, 1,000 mcg, Intramuscular, Q7 Days  DULoxetine, 30 mg, Oral, Daily  heparin (porcine), 5,000 Units, Subcutaneous, Q12H  levothyroxine, 75 mcg, Oral, Q AM  senna, 1 tablet, Oral, Nightly  sodium chloride, 10 mL, Intravenous, Q12H  tamsulosin, 0.4 mg, Oral, Daily  ziprasidone, 20 mg, Oral, BID With Meals        Infusions       PRN Medications   Calcium Replacement - Follow Nurse / BPA Driven Protocol    Magnesium Standard Dose Replacement - Follow Nurse / BPA Driven Protocol    nitroglycerin    ondansetron    oxyCODONE    Phosphorus Replacement - Follow Nurse / BPA Driven Protocol    Potassium Replacement - Follow Nurse / BPA Driven Protocol    sodium chloride    sodium chloride    ziprasidone     Physical Findings        Trending Physical   Appearance, NFPE    --  Edema  no edema   Bowel Function constipation   Tubes N/a   Chewing/Swallowing WNL   Skin Intact     Estimated/Assessed Needs       Energy Requirements    EST Needs, Method, Wt used 7629-9839 kcal (25-30 kcal/kg CBW)       Protein Requirements    EST Needs, Method, Wt used 76-96 g pro (.8-1 g pro/kg CBW)       Fluid Requirements     Estimated Needs (mL/day) 9908-6113 mL/day       Current Nutrition Orders & Evaluation of Intake       Oral Nutrition     Food Allergies NKFA   Current PO Diet Diet: Cardiac; Healthy Heart (2-3 Na+); Fluid Consistency: Thin (IDDSI 0)   Supplement    PO Evaluation     Trending % PO Intake 1/7: 37% x 8 meals     Enteral Nutrition    Enteral Route    Order, Modulars, Flushes    Residual/Tolerance    TF Observation         Parenteral Nutrition     TPN Route    Total # Days on TPN    TPN Order, Lipid Details    MVI & Trace Element Freq    TPN Observation       Nutrition Diagnosis         Nutrition Dx Problem 1 Predicted suboptimal intake r/t AMS AEB average PO intake 37% x 8 meals.       Nutrition Dx Problem 2        Intervention Goal          Intervention Goal(s) Maintain CBW  PO intake meet >50% of estimated needs  Adhere to ONS     Nutrition Intervention        RD Action Supplement provided     Nutrition Prescription          Diet Prescription Diet: Cardiac; Healthy Heart (2-3 Na+); Fluid Consistency: Thin (IDDSI 0)   Supplement Prescription Boost Plus BID     Enteral Prescription        TPN Prescription      Monitor/Evaluation        Monitor Per protocol, I&O, PO intake, Supplement intake, Pertinent labs, Weight, Skin status, GI status, Symptoms, POC/GOC, Swallow function, Hemodynamic stability     RD to f/up    Electronically signed by:  Jyoti Henderson RD  01/07/25 09:17 EST

## 2025-01-07 NOTE — PROGRESS NOTES
"    [facility]    Name:  Hardik Natarajan   Age:  79 y.o.  Sex:  male  :  1945  MRN:  [id number]   Visit Number:  [id number]  Admission Date:  2025  Date Of Service:  25  Primary Care Physician:  Won Cuellar MD     LOS: 4 days :    Chief Complaint:      Follow-up; altered mental status    Subjective:    Patient seen today.  Unknown baseline.  He was confused at time of visit, somewhat hard of hearing.  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  Presented to [facility] on 2025 with concern for altered mental status.  Last known normal was night prior to presentation.  Also reported having a fall, unclear if it was syncope.  Wife found him on the floor.  On Plavix, no other anticoagulation.  He denied any fevers or chills, chest pain, shortness of air, any other specific concern.  Denied any slurred speech, difficulty moving upper or lower extremities, and any in symmetry.     ED summary: Afebrile, vital signs stable on room air.  ABG pH 7.44, pCO2 34, pO2 82, bicarb 24.  EKG sinus tachycardia rate 107, nonspecific ST-T wave changes.  Opponent 23, ACS not suspected.  Creatinine 1.34, lactate elevated, procalcitonin not elevated.  UDS negative.  Blood cultures collected.  COVID/flu/RSV negative.  CT abdomen/pelvis large stone in urinary bladder, wall thickening probable diverticula, abnormal attenuation of the root of the mesentery most suggestive of mesenteric panniculitis.  CT head no intracranial hemorrhage or evidence of acute large cortical infarct, stable atrophy and chronic findings, left mastoiditis and left otitis media with bone destruction of the left mastoid " "air cells, right maxillary sinusitis.  CT cervical spine no acute fracture, multilevel degenerative disc disease.\"    Appears patient has had ongoing mental status changes throughout hospital stay.  Has been seen by neurology.  Underwent MRI of the brain with no new stroke, there was evidence of chronic changes, possible mastoiditis.  No seizure-like activity.  Was given Geodon.  B12 is being dosed due to low B12 levels.  Was on prescription for Augmentin as well due to possible mastoiditis.    Patient will need placement    Review of Systems:     All systems were reviewed and negative except as mentioned in subjective, assessment and plan.    Vital Signs:    Temp:  [97.4 °F (36.3 °C)-98.2 °F (36.8 °C)] 97.5 °F (36.4 °C)  Heart Rate:  [84-94] 84  Resp:  [16-18] 18  BP: (106-123)/(67-98) 109/67    Intake and output:    I/O last 3 completed shifts:  In: 840 [P.O.:840]  Out: 925 [Urine:925]  I/O this shift:  In: 120 [P.O.:120]  Out: -     Physical Examination:    General Appearance:  Alert and cooperative.    Head:  There is a lesion surrounding the left ear, left side of the head, particularly in the auricular area.  No drainage.   Eyes: Conjunctivae and sclerae normal, no icterus. No pallor.   Throat: No oral lesions, no thrush, oral mucosa moist.   Neck: Supple, trachea midline, no thyromegaly.   Lungs:   Breath sounds heard bilaterally equally.  No wheezing or crackles. No Pleural rub or bronchial breathing.   Heart:  Normal S1 and S2, no murmur, no gallop, no rub. No JVD.   Abdomen:   Normal bowel sounds, no masses, no organomegaly. Soft, nontender, nondistended, no rebound tenderness.   Extremities: Supple, no edema, no cyanosis, no clubbing.   Skin: Warm   Neurologic: Alert and oriented to person only.     Laboratory results:    Results from last 7 days   Lab Units 01/05/25  0612 01/04/25  0937 01/03/25  0646 01/02/25  0543 01/01/25  1221   SODIUM mmol/L 132* 133* 135*   < > 136   POTASSIUM mmol/L 4.5 4.0 4.5   " < > 4.2   CHLORIDE mmol/L 99 100 102   < > 101   CO2 mmol/L 22.5 23.0 22.2   < > 23.4   BUN mg/dL 19 18 17   < > 9   CREATININE mg/dL 1.29* 1.27 1.31*   < > 1.34*   CALCIUM mg/dL 8.8 8.7 8.5*   < > 8.9   BILIRUBIN mg/dL  --   --   --   --  1.1   ALK PHOS U/L  --   --   --   --  51   ALT (SGPT) U/L  --   --   --   --  13   AST (SGOT) U/L  --   --   --   --  23   GLUCOSE mg/dL 97 95 79   < > 99    < > = values in this interval not displayed.     Results from last 7 days   Lab Units 01/03/25  0646 01/02/25  0543 01/01/25  1221   WBC 10*3/mm3 2.95* 3.23* 4.84   HEMOGLOBIN g/dL 9.4* 9.8* 11.6*   HEMATOCRIT % 28.5* 29.3* 34.0*   PLATELETS 10*3/mm3 118* 123* 141         Results from last 7 days   Lab Units 01/01/25  1406 01/01/25  1221   CK TOTAL U/L  --  216*   HSTROP T ng/L 23* 24*     Results from last 7 days   Lab Units 01/01/25  1627 01/01/25  1620   BLOODCX  No growth at 5 days No growth at 5 days     Recent Labs     01/01/25  1555   PHART 7.455   JSX4NIY 34.2*   PO2ART 82.6   HMO8IKS 24.0   BASEEXCESS 0.4      I have reviewed the patient's laboratory results.    Radiology results:    No radiology results from the last 24 hrs  I have reviewed the patient's radiology reports.    Medication Review:     I have reviewed the patient's active and prn medications.     Problem List:      AMS (altered mental status)    Altered mental status      Assessment/Plan:     Confusion:  Neurology following.  Suspicion at this point for possible dementia.  No evidence of stroke, seizure.  Does not currently appear to have CNS involvement of the squamous cell carcinoma, but could consider.  Has been treated for mastoiditis with Augmentin.  Also low B12 receiving injections.    Left upper extremity weakness/hypothyroidism/cancer pain:  Complicates all aspects of care.    With patient's ongoing confusion will need placement.     Risk Assessment: High  DVT Prophylaxis: Heparin  Code Status: Full code  Diet: Cardiac  Discharge Plan:  LTC      Dionna Espitia,   01/07/25  15:29 EST

## 2025-01-07 NOTE — CASE MANAGEMENT/SOCIAL WORK
Continued Stay Note   Deon     Patient Name: Hardik Natarajan  MRN: [id number]  Today's Date: 1/7/2025    Admit Date: 1/1/2025    Plan: nursing assisting with pt today; more confusion today per PT notes; cm will continue to follow   Discharge Plan       Row Name 01/07/25 1141       Plan    Plan nursing assisting with pt today; more confusion today per PT notes; cm will continue to follow                   Discharge Codes    No documentation.                       Morena Siddiqui RN

## 2025-01-07 NOTE — PLAN OF CARE
Problem: Adult Inpatient Plan of Care  Goal: Plan of Care Review  Outcome: Progressing  Goal: Patient-Specific Goal (Individualized)  Outcome: Progressing  Goal: Absence of Hospital-Acquired Illness or Injury  Outcome: Progressing  Intervention: Identify and Manage Fall Risk  Recent Flowsheet Documentation  Taken 1/7/2025 0000 by Leigh Garcia RN  Safety Promotion/Fall Prevention:   safety round/check completed   nonskid shoes/slippers when out of bed   clutter free environment maintained   assistive device/personal items within reach  Taken 1/6/2025 2200 by Leigh Garcia RN  Safety Promotion/Fall Prevention:   safety round/check completed   nonskid shoes/slippers when out of bed   clutter free environment maintained   assistive device/personal items within reach  Taken 1/6/2025 2000 by Leigh Garcia RN  Safety Promotion/Fall Prevention:   safety round/check completed   nonskid shoes/slippers when out of bed   assistive device/personal items within reach   clutter free environment maintained  Intervention: Prevent Skin Injury  Recent Flowsheet Documentation  Taken 1/7/2025 0000 by Leigh Garcia RN  Body Position: legs elevated  Taken 1/6/2025 2200 by Leigh Garcia RN  Body Position:   legs elevated   tilted   right  Taken 1/6/2025 2000 by Leigh Garcia RN  Body Position: legs elevated  Skin Protection: incontinence pads utilized  Intervention: Prevent and Manage VTE (Venous Thromboembolism) Risk  Recent Flowsheet Documentation  Taken 1/6/2025 2000 by Leigh Garcia RN  VTE Prevention/Management: (see MAR) other (see comments)  Intervention: Prevent Infection  Recent Flowsheet Documentation  Taken 1/6/2025 2200 by Leigh Garcia RN  Infection Prevention: environmental surveillance performed  Taken 1/6/2025 2000 by Leigh Garcia RN  Infection Prevention: environmental surveillance performed  Goal: Optimal Comfort and Wellbeing  Outcome: Progressing  Intervention:  Provide Person-Centered Care  Recent Flowsheet Documentation  Taken 1/6/2025 2000 by Leigh Garcia RN  Trust Relationship/Rapport:   care explained   choices provided   thoughts/feelings acknowledged   reassurance provided  Goal: Readiness for Transition of Care  Outcome: Progressing     Problem: Skin Injury Risk Increased  Goal: Skin Health and Integrity  Outcome: Progressing  Intervention: Optimize Skin Protection  Recent Flowsheet Documentation  Taken 1/7/2025 0000 by Leigh Garcia RN  Activity Management: up in chair  Head of Bed (HOB) Positioning: HOB elevated  Taken 1/6/2025 2200 by Leigh Garcia RN  Activity Management: activity minimized  Head of Bed (HOB) Positioning: HOB elevated  Taken 1/6/2025 2000 by Leigh Garcia RN  Activity Management:   activity minimized   up in chair  Pressure Reduction Techniques:   pressure points protected   frequent weight shift encouraged  Head of Bed (HOB) Positioning: HOB elevated  Pressure Reduction Devices: positioning supports utilized  Skin Protection: incontinence pads utilized  Intervention: Promote and Optimize Oral Intake  Recent Flowsheet Documentation  Taken 1/6/2025 2000 by Leigh Garcia RN  Oral Nutrition Promotion: rest periods promoted     Problem: Fall Injury Risk  Goal: Absence of Fall and Fall-Related Injury  Outcome: Progressing  Intervention: Identify and Manage Contributors  Recent Flowsheet Documentation  Taken 1/6/2025 2000 by Leigh Garcia RN  Medication Review/Management: medications reviewed  Intervention: Promote Injury-Free Environment  Recent Flowsheet Documentation  Taken 1/7/2025 0000 by Leigh Garcia RN  Safety Promotion/Fall Prevention:   safety round/check completed   nonskid shoes/slippers when out of bed   clutter free environment maintained   assistive device/personal items within reach  Taken 1/6/2025 2200 by Leigh Garcia RN  Safety Promotion/Fall Prevention:   safety round/check  completed   nonskid shoes/slippers when out of bed   clutter free environment maintained   assistive device/personal items within reach  Taken 1/6/2025 2000 by Leigh Garcia RN  Safety Promotion/Fall Prevention:   safety round/check completed   nonskid shoes/slippers when out of bed   assistive device/personal items within reach   clutter free environment maintained     Problem: Confusion Acute  Goal: Optimal Cognitive Function  Outcome: Progressing  Intervention: Minimize Contributing Factors  Recent Flowsheet Documentation  Taken 1/6/2025 2000 by Leigh Garcia RN  Sensory Stimulation Regulation: care clustered  Reorientation Measures: reorientation provided  Communication Support Strategies: active listening utilized   Goal Outcome Evaluation:        VSS on RA.  Patient was calm and cooperative during shift.  Reorientation and fall prevention education implemented during shift.  No further complaints from patient at this time.  Plan of care ongoing.  Awaiting placement.

## 2025-01-08 PROCEDURE — 97530 THERAPEUTIC ACTIVITIES: CPT

## 2025-01-08 PROCEDURE — 97116 GAIT TRAINING THERAPY: CPT

## 2025-01-08 PROCEDURE — 99232 SBSQ HOSP IP/OBS MODERATE 35: CPT | Performed by: PSYCHIATRY & NEUROLOGY

## 2025-01-08 PROCEDURE — 25010000002 CYANOCOBALAMIN PER 1000 MCG: Performed by: STUDENT IN AN ORGANIZED HEALTH CARE EDUCATION/TRAINING PROGRAM

## 2025-01-08 PROCEDURE — 97535 SELF CARE MNGMENT TRAINING: CPT

## 2025-01-08 PROCEDURE — 25010000002 ZIPRASIDONE MESYLATE PER 10 MG: Performed by: PSYCHIATRY & NEUROLOGY

## 2025-01-08 PROCEDURE — 25010000002 HALOPERIDOL LACTATE PER 5 MG: Performed by: STUDENT IN AN ORGANIZED HEALTH CARE EDUCATION/TRAINING PROGRAM

## 2025-01-08 PROCEDURE — 99232 SBSQ HOSP IP/OBS MODERATE 35: CPT | Performed by: FAMILY MEDICINE

## 2025-01-08 PROCEDURE — 25010000002 HEPARIN (PORCINE) PER 1000 UNITS: Performed by: STUDENT IN AN ORGANIZED HEALTH CARE EDUCATION/TRAINING PROGRAM

## 2025-01-08 RX ORDER — ZIPRASIDONE HYDROCHLORIDE 20 MG/1
20 CAPSULE ORAL 2 TIMES DAILY WITH MEALS
Status: DISCONTINUED | OUTPATIENT
Start: 2025-01-08 | End: 2025-01-08

## 2025-01-08 RX ORDER — DIVALPROEX SODIUM 250 MG/1
250 TABLET, DELAYED RELEASE ORAL EVERY 12 HOURS SCHEDULED
Status: DISCONTINUED | OUTPATIENT
Start: 2025-01-08 | End: 2025-01-11

## 2025-01-08 RX ORDER — HALOPERIDOL 5 MG/ML
5 INJECTION INTRAMUSCULAR EVERY 6 HOURS PRN
Status: DISCONTINUED | OUTPATIENT
Start: 2025-01-08 | End: 2025-01-08

## 2025-01-08 RX ORDER — ZIPRASIDONE HYDROCHLORIDE 20 MG/1
40 CAPSULE ORAL NIGHTLY
Status: DISCONTINUED | OUTPATIENT
Start: 2025-01-08 | End: 2025-01-15 | Stop reason: HOSPADM

## 2025-01-08 RX ADMIN — DIVALPROEX SODIUM 250 MG: 250 TABLET, DELAYED RELEASE ORAL at 20:34

## 2025-01-08 RX ADMIN — AMOXICILLIN AND CLAVULANATE POTASSIUM 1 TABLET: 875; 125 TABLET, FILM COATED ORAL at 14:27

## 2025-01-08 RX ADMIN — HEPARIN SODIUM 5000 UNITS: 5000 INJECTION INTRAVENOUS; SUBCUTANEOUS at 20:34

## 2025-01-08 RX ADMIN — CYANOCOBALAMIN 1000 MCG: 1000 INJECTION, SOLUTION INTRAMUSCULAR at 11:17

## 2025-01-08 RX ADMIN — ZIPRASIDONE HYDROCHLORIDE 40 MG: 20 CAPSULE ORAL at 20:34

## 2025-01-08 RX ADMIN — DULOXETINE HYDROCHLORIDE 30 MG: 30 CAPSULE, DELAYED RELEASE ORAL at 14:28

## 2025-01-08 RX ADMIN — CLOPIDOGREL BISULFATE 75 MG: 75 TABLET ORAL at 14:29

## 2025-01-08 RX ADMIN — HEPARIN SODIUM 5000 UNITS: 5000 INJECTION INTRAVENOUS; SUBCUTANEOUS at 11:17

## 2025-01-08 RX ADMIN — AMOXICILLIN AND CLAVULANATE POTASSIUM 1 TABLET: 875; 125 TABLET, FILM COATED ORAL at 20:34

## 2025-01-08 RX ADMIN — TAMSULOSIN HYDROCHLORIDE 0.4 MG: 0.4 CAPSULE ORAL at 14:27

## 2025-01-08 RX ADMIN — SENNOSIDES 1 TABLET: 8.6 TABLET, FILM COATED ORAL at 20:34

## 2025-01-08 RX ADMIN — HALOPERIDOL LACTATE 5 MG: 5 INJECTION, SOLUTION INTRAMUSCULAR at 00:50

## 2025-01-08 RX ADMIN — DIVALPROEX SODIUM 250 MG: 250 TABLET, DELAYED RELEASE ORAL at 14:27

## 2025-01-08 RX ADMIN — ZIPRASIDONE MESYLATE 20 MG: 20 INJECTION, POWDER, LYOPHILIZED, FOR SOLUTION INTRAMUSCULAR at 02:42

## 2025-01-08 NOTE — THERAPY TREATMENT NOTE
Patient Name: Hardik Natarajan  : 1945    MRN: [id number]                              Today's Date: 2025       Admit Date: 2025    Visit Dx:     ICD-10-CM ICD-9-CM   1. Altered mental status, unspecified altered mental status type  R41.82 780.97   2. Bladder stone  N21.0 594.1   3. 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    Anemia, unspecified    Nicotine dependence, chewing tobacco, uncomplicated    Neoplasm related pain (acute) (chronic)    Unspecified mastoiditis, left ear    Cellulitis of left external ear    Phlebitis and thrombophlebitis of unspecified site    Gangrene, not elsewhere classified    Acute osteomyelitis    Benign prostatic hyperplasia without lower urinary tract symptoms    Pain    Palliative care by specialist    Encounter for therapeutic drug monitoring    Weakness of left upper extremity    AMS (altered mental status)    Altered mental status     Past Medical History:   Diagnosis Date    Anemia     Arthritis     Cancer      Past Surgical History:   Procedure Laterality Date    NAIL BIOPSY N/A     NECK DISSECTION N/A     PAROTIDECTOMY N/A     SKIN CANCER EXCISION        General Information       Row Name 25 1512          OT Time and Intention    Document Type therapy note (daily note)  -     Mode of Treatment occupational therapy  -     Patient Effort good  -       Row Name 25 1512          General Information    Patient Profile Reviewed yes  -               User Key  (r) = Recorded By, (t) = Taken By, (c) = Cosigned By      Initials Name Provider Type     Trina Wesley Occupational Therapist                     Mobility/ADL's       Row Name 25 1513          Bed Mobility    Supine-Sit [place] (Bed Mobility) minimum assist (75% patient effort)  -     Assistive Device (Bed  Mobility) head of bed elevated;bed rails  -[facility] Name 01/08/25 1513          Transfers    Transfers sit-stand transfer  -AH       Row Name 01/08/25 1513          Sit-Stand Transfer    Sit-Stand [place] (Transfers) minimum assist (75% patient effort);verbal cues  -     Assistive Device (Sit-Stand Transfers) walker, front-wheeled  -[facility] Name 01/08/25 1513          Functional Mobility    Functional Mobility- Ind. Level minimum assist (75% patient effort)  -     Functional Mobility- Device walker, front-wheeled  -     Functional Mobility-Distance (Feet) 3  -     Patient was able to Ambulate yes  -AH       Row Name 01/08/25 1513          Bathing Assessment/Intervention    [place] Level (Bathing) lower body;upper body;moderate assist (50% patient effort)  -     Position (Bathing) edge of bed sitting  -AH       Row Name 01/08/25 1513          Upper Body Dressing Assessment/Training    [place] Level (Upper Body Dressing) minimum assist (75% patient effort);don;other (see comments)  other  -AH       Row Name 01/08/25 [facility]3          Grooming Assessment/Training    [place] Level (Grooming) wash face, hands;set up  -               User Key  (r) = Recorded By, (t) = Taken By, (c) = Cosigned By      Initials Name Provider Type     Trina Wesley Occupational Therapist                   Obj/Interventions    No documentation.                  Goals/Plan    No documentation.                  Clinical Impression       Row Name 01/08/25 1516          Pain Assessment    Pretreatment Pain Rating 0/10 - no pain  -     Posttreatment Pain Rating 0/10 - no pain  -AH       Row Name 01/08/25 1516          Plan of Care Review    Plan of Care Reviewed With patient  -     Progress improving  -     Outcome Evaluation Pt received supine in bed and was willing to work with therapy.  Pt sat eob with min assist, completed bathing tasks with mod assist sitting eob.  Pt min assist to stand and min  assist to walk 3' to his chair using RW.  Pt tolerated increased activity today, pt was left sitting reclined in his chair with call light within reach and chair alarm on.  Cont OT per POC.  -       Row Name 01/08/25 1516          Positioning and Restraints    Post Treatment Position chair  -AH     In Chair reclined;call light within reach;encouraged to call for assist;exit alarm on;notified nsg  -               User Key  (r) = Recorded By, (t) = Taken By, (c) = Cosigned By      Initials Name Provider Type    Trina Palacio Occupational Therapist                   Outcome Measures       Row Name 01/08/25 1518          How much help from another is currently needed...    Putting on and taking off regular lower body clothing? 2  -AH     Bathing (including washing, rinsing, and drying) 2  -AH     Toileting (which includes using toilet bed pan or urinal) 2  -AH     Putting on and taking off regular upper body clothing 3  -AH     Taking care of personal grooming (such as brushing teeth) 3  -AH     Eating meals 3  -     AM-PAC 6 Clicks Score (OT) 15  -       Row Name 01/08/25 1513          How much help from another person do you currently need...    Turning from your back to your side while in flat bed without using bedrails? 3  -RM     Moving from lying on back to sitting on the side of a flat bed without bedrails? 3  -RM     Moving to and from a bed to a chair (including a wheelchair)? 3  -RM     Standing up from a chair using your arms (e.g., wheelchair, bedside chair)? 3  -RM     Climbing 3-5 steps with a railing? 2  -RM     To walk in hospital room? 3  -RM     AM-PAC 6 Clicks Score (PT) 17  -RM     Highest Level of Mobility Goal 5 --> Static standing  -RM       Row Name 01/08/25 1518 01/08/25 1513       Functional Assessment    Outcome Measure [facility] AM-PAC 6 Clicks Daily Activity (OT)  - AM-PAC 6 Clicks Basic Mobility (PT)  -RM              User Key  (r) = Recorded By, (t) = Taken By, (c) = Cosigned  By      Initials Name Provider Type    Trina Palacio Occupational Therapist    Kunal Christianson, PTA Physical Therapist Assistant                    Occupational Therapy Education       Title: PT OT SLP Therapies (Done)       Topic: Occupational Therapy (Done)       Point: ADL training (Done)       Description:   Instruct learner(s) on proper safety adaptation and remediation techniques during self care or transfers.   Instruct in proper use of assistive devices.                  Learning Progress Summary            Patient Acceptance, E,TB, VU by  at 1/8/2025 1519    Comment: benefit of activity    Acceptance, E,TB, VU by  at 1/6/2025 1521    Comment: benefit of activity    Acceptance, E, VU by  at 1/3/2025 1931    Acceptance, E, VU by  at 1/3/2025 1300    Acceptance, E,TB, VU by SD at 1/2/2025 1322    Comment: OT POC   Family Acceptance, E, VU by  at 1/3/2025 1300                      Point: Home exercise program (Done)       Description:   Instruct learner(s) on appropriate technique for monitoring, assisting and/or progressing therapeutic exercises/activities.                  Learning Progress Summary            Patient Acceptance, E,TB, VU by  at 1/6/2025 1521    Comment: benefit of activity    Acceptance, E, VU by  at 1/3/2025 1931    Acceptance, E, VU by  at 1/3/2025 1300   Family Acceptance, E, VU by  at 1/3/2025 1300                      Point: Precautions (Done)       Description:   Instruct learner(s) on prescribed precautions during self-care and functional transfers.                  Learning Progress Summary            Patient Acceptance, E, VU by  at 1/3/2025 1931    Acceptance, E, VU by  at 1/3/2025 1300   Family Acceptance, E, VU by  at 1/3/2025 1300                      Point: Body mechanics (Done)       Description:   Instruct learner(s) on proper positioning and spine alignment during self-care, functional mobility activities and/or exercises.                   Learning Progress Summary            Patient Acceptance, E, VU by  at 1/3/2025 1931    Acceptance, E, VU by  at 1/3/2025 1300   Family Acceptance, E, VU by  at 1/3/2025 1300                                      User Key       Initials Effective Dates Name Provider Type [facility] 06/16/21 -  Trina Wesley Occupational Therapist OT    SD 06/16/21 -  Jaqueline Dasilva OT Occupational Therapist OT     09/30/24 -  Kya Jason, RN Registered Nurse Nurse                  OT Recommendation and Plan     Plan of Care Review  Plan of Care Reviewed With: patient  Progress: improving  Outcome Evaluation: Pt received supine in bed and was willing to work with therapy.  Pt sat eob with min assist, completed bathing tasks with mod assist sitting eob.  Pt min assist to stand and min assist to walk 3' to his chair using RW.  Pt tolerated increased activity today, pt was left sitting reclined in his chair with call light within reach and chair alarm on.  Cont OT per POC.     Time Calculation:         Time Calculation- OT       Row Name 01/08/25 1522 01/08/25 1514          Time Calculation- OT    OT Start Time 1349  - --     OT Stop Time 1415  - --     OT Time Calculation (min) 26 min  - --     OT Received On 01/08/25  - --     OT Goal Re-Cert Due Date 01/14/25  - --        Timed Charges    80738 - Gait Training Minutes  -- 10  -RM     27712 - OT Therapeutic Activity Minutes 11  -AH --     65756 - OT Self Care/Mgmt Minutes 15  -AH --        Total Minutes    Timed Charges Total Minutes 26  - 10  -RM      Total Minutes 26  - 10  -RM               User Key  (r) = Recorded By, (t) = Taken By, (c) = Cosigned By      Initials Name Provider Type     Trina Wesley Occupational Therapist     Kunal Harper, PTA Physical Therapist Assistant                  Therapy Charges for Today       Code Description Service Date Service Provider Modifiers Qty    20841810655  OT THERAPEUTIC ACT EA 15 MIN 1/8/2025  Trina Wesley GO 1    89209875849 HC OT SELF CARE/MGMT/TRAIN EA 15 MIN 1/8/2025 Trina Wesley GO 1                 Trina Wesley  1/8/2025

## 2025-01-08 NOTE — THERAPY TREATMENT NOTE
Patient Name: Hardik Natarajan  : 1945    MRN: [id number]                              Today's Date: 2025       Admit Date: 2025    Visit Dx:     ICD-10-CM ICD-9-CM   1. Altered mental status, unspecified altered mental status type  R41.82 780.97   2. Bladder stone  N21.0 594.1   3. 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    Anemia, unspecified    Nicotine dependence, chewing tobacco, uncomplicated    Neoplasm related pain (acute) (chronic)    Unspecified mastoiditis, left ear    Cellulitis of left external ear    Phlebitis and thrombophlebitis of unspecified site    Gangrene, not elsewhere classified    Acute osteomyelitis    Benign prostatic hyperplasia without lower urinary tract symptoms    Pain    Palliative care by specialist    Encounter for therapeutic drug monitoring    Weakness of left upper extremity    AMS (altered mental status)    Altered mental status     Past Medical History:   Diagnosis Date    Anemia     Arthritis     Cancer      Past Surgical History:   Procedure Laterality Date    NAIL BIOPSY N/A     NECK DISSECTION N/A     PAROTIDECTOMY N/A     SKIN CANCER EXCISION        General Information       Row Name 25 1507          Physical Therapy Time and Intention    Document Type therapy note (daily note)  -RM     Mode of Treatment physical therapy  -RM       Row Name 25 1507          General Information    Patient Profile Reviewed yes  -RM     Existing Precautions/Restrictions fall  -RM       Row Name 25 1507          Cognition    Orientation Status (Cognition) oriented to;person;verbal cues/prompts needed for orientation  -RM       Row Name 25 1507          Safety Issues/Impairments Affecting Functional Mobility    Impairments Affecting Function (Mobility) balance;cognition;endurance/activity  tolerance;motor control;motor planning;strength;range of motion (ROM)  -RM               User Key  (r) = Recorded By, (t) = Taken By, (c) = Cosigned By      Initials Name Provider Type    Kunal Christianson, AVL Physical Therapist Assistant                   Mobility       Row Name 01/08/25 1508          Bed Mobility    Supine-Sit [place] (Bed Mobility) minimum assist (75% patient effort);verbal cues  -RM       Row Name 01/08/25 1508          Transfers    Comment, (Transfers) Pt tolerated sitting EOB  for approx 10 minutes  -RM       Row Name 01/08/25 1508          Sit-Stand Transfer    Sit-Stand [place] (Transfers) minimum assist (75% patient effort);verbal cues  -RM     Assistive Device (Sit-Stand Transfers) walker, front-wheeled  -RM       Row Name 01/08/25 1508          Gait/Stairs (Locomotion)    [place] Level (Gait) minimum assist (75% patient effort);nonverbal cues (demo/gesture);verbal cues  -RM     Assistive Device (Gait) walker, front-wheeled  -RM     Distance in Feet (Gait) 3  -RM     Deviations/Abnormal Patterns (Gait) festinating/shuffling;weight shifting decreased  -RM               User Key  (r) = Recorded By, (t) = Taken By, (c) = Cosigned By      Initials Name Provider Type    Kunal Christianson, VAL Physical Therapist Assistant                   Obj/Interventions    No documentation.                  Goals/Plan    No documentation.                  Clinical Impression       Row Name 01/08/25 1512          Pain    Pretreatment Pain Rating 0/10 - no pain  -RM     Posttreatment Pain Rating 0/10 - no pain  -RM     Pre/Posttreatment Pain Comment Pt c/o feeling stiff all over  -RM       Row Name 01/08/25 1512          Plan of Care Review    Plan of Care Reviewed With patient  -RM     Progress improving  -RM     Outcome Evaluation Pt supine in bed and willing to participate with treatment. Pt performed bed mobility, transfers and limited gait  with rw.  See flowsheet for details. Cont  PT per POC progressing to goals as pt tolerates.  -RM       Row Name 01/08/25 1512          Positioning and Restraints    Pre-Treatment Position in bed  -RM     Post Treatment Position chair  -RM     In Chair reclined;call light within reach;encouraged to call for assist;exit alarm on;notified nsg  -RM               User Key  (r) = Recorded By, (t) = Taken By, (c) = Cosigned By      Initials Name Provider Type    Kunal Christianson, PTA Physical Therapist Assistant                   Outcome Measures       Row Name 01/08/25 1513          How much help from another person do you currently need...    Turning from your back to your side while in flat bed without using bedrails? 3  -RM     Moving from lying on back to sitting on the side of a flat bed without bedrails? 3  -RM     Moving to and from a bed to a chair (including a wheelchair)? 3  -RM     Standing up from a chair using your arms (e.g., wheelchair, bedside chair)? 3  -RM     Climbing 3-5 steps with a railing? 2  -RM     To walk in hospital room? 3  -RM     AM-PAC 6 Clicks Score (PT) 17  -RM     Highest Level of Mobility Goal 5 --> Static standing  -RM       Row Name 01/08/25 1513          Functional Assessment    Outcome Measure Options AM-PAC 6 Clicks Basic Mobility (PT)  -RM               User Key  (r) = Recorded By, (t) = Taken By, (c) = Cosigned By      Initials Name Provider Type    Kunal Christianson, PTA Physical Therapist Assistant                                 Physical Therapy Education       Title: PT OT SLP Therapies (Done)       Topic: Physical Therapy (Done)       Point: Mobility training (Done)       Learning Progress Summary            Patient Acceptance, E,TB,D, VU,NR by  at 1/8/2025 1514    Acceptance, E, VU by CARLY at 1/3/2025 1931    Acceptance, E, VU by SJ at 1/3/2025 1300    Acceptance, E, VU by MS at 1/2/2025 1303    Comment: importance of mobility   Family Acceptance, E, VU by  at 1/3/2025 1300                      Point: Home  exercise program (Done)       Learning Progress Summary            Patient Acceptance, E,TB, VU by CC at 1/6/2025 1529    Acceptance, E, VU by SJ at 1/3/2025 1931    Acceptance, E, VU by SJ at 1/3/2025 1300    Acceptance, E, VU by MS at 1/2/2025 1303    Comment: importance of mobility   Family Acceptance, E, VU by SJ at 1/3/2025 1300                      Point: Body mechanics (Done)       Learning Progress Summary            Patient Acceptance, E, VU by SJ at 1/3/2025 1931    Acceptance, E, VU by SJ at 1/3/2025 1300   Family Acceptance, E, VU by SJ at 1/3/2025 1300                      Point: Precautions (Done)       Learning Progress Summary            Patient Acceptance, E, VU by  at 1/3/2025 1931    Acceptance, E, VU by  at 1/3/2025 1300   Family Acceptance, E, VU by  at 1/3/2025 1300                                      User Key       Initials Effective Dates Name Provider Type Discipline     06/16/21 -  Karis Henderson, PTA Physical Therapist Assistant PT     06/16/21 -  Kunal Harper, PTA Physical Therapist Assistant PT    MS 08/22/23 -  Shubham Tierney, PT Physical Therapist PT     09/30/24 -  Kya Jason, RN Registered Nurse Nurse                  PT Recommendation and Plan     Progress: improving  Outcome Evaluation: Pt supine in bed and willing to participate with treatment. Pt performed bed mobility, transfers and limited gait  with rw.  See flowsheet for details. Cont PT per POC progressing to goals as pt tolerates.     Time Calculation:         PT Charges       Row Name 01/08/25 1514             Time Calculation    Start Time 1350  -RM      Stop Time 1415  -RM      Time Calculation (min) 25 min  -RM      PT Received On 01/08/25  -RM      PT Goal Re-Cert Due Date 01/12/25  -RM         Time Calculation- PT    Total Timed Code Minutes- PT 25 minute(s)  -RM         Timed Charges    56746 - Gait Training Minutes  10  -RM      41568 - PT Therapeutic Activity Minutes 15  -RM         Total  Minutes    Timed Charges Total Minutes 25  -RM       Total Minutes 25  -RM                User Key  (r) = Recorded By, (t) = Taken By, (c) = Cosigned By      Initials Name Provider Type    Kunal Christianson, VAL Physical Therapist Assistant                  Therapy Charges for Today       Code Description Service Date Service Provider Modifiers Qty    02247454768 HC GAIT TRAINING EA 15 MIN 1/8/2025 Kunal Harper, PTA GP 1    73580663857 HC PT THERAPEUTIC ACT EA 15 MIN 1/8/2025 Kunal Harper, PTA GP 1            PT G-Codes  Outcome Measure Options: AM-PAC 6 Clicks Basic Mobility (PT)  AM-PAC 6 Clicks Score (PT): 17  AM-PAC 6 Clicks Score (OT): 13       Kunal Harper PTA  1/8/2025

## 2025-01-08 NOTE — CASE MANAGEMENT/SOCIAL WORK
CM spoke with Yobany, pt son regarding DCP. He states he has not had the opportunity to speak with his mom regarding pt DCP, but he would like to honor his dads radha and first try to bring him home and see if he can care for him. He states his mother is elderly, in poor health and requires his assistance also and he has no one to help him. [place] shares [place] [address] [place] [address] and acknowledges seeking emergent medical care could be a problem in inclement weather, if he does return home. However, he is leaning toward bringing him home, unless he speaks with his mom (who is staying with his aunt) and they decide on STR. He is agreeable to  services and thinks [facility] is the only one that services the area. He is agreeable to calling  with update when he is able to speak with his mom and make a decision on dispo.

## 2025-01-08 NOTE — PLAN OF CARE
Goal Outcome Evaluation:slept and very pleasant, remains confused

## 2025-01-08 NOTE — PLAN OF CARE
Goal Outcome Evaluation:  Plan of Care Reviewed With: patient        Progress: improving  Outcome Evaluation: Pt received supine in bed and was willing to work with therapy.  Pt sat eob with min assist, completed bathing tasks with mod assist sitting eob.  Pt min assist to stand and min assist to walk 3' to his chair using RW.  Pt tolerated increased activity today, pt was left sitting reclined in his chair with call light within reach and chair alarm on.  Cont OT per POC.    Anticipated Discharge Disposition (OT): sub acute care setting (sub acute rehab)

## 2025-01-08 NOTE — PLAN OF CARE
Goal Outcome Evaluation:  Plan of Care Reviewed With: patient        Progress: no change          Problem: Adult Inpatient Plan of Care  Goal: Plan of Care Review  Outcome: Progressing  Flowsheets (Taken 1/8/2025 0525)  Progress: no change  Plan of Care Reviewed With: patient  Goal: Patient-Specific Goal (Individualized)  Outcome: Progressing  Goal: Absence of Hospital-Acquired Illness or Injury  Outcome: Progressing  Intervention: Identify and Manage Fall Risk  Recent Flowsheet Documentation  Taken 1/8/2025 0400 by April Guallpa LPN  Safety Promotion/Fall Prevention:   activity supervised   assistive device/personal items within reach   clutter free environment maintained   fall prevention program maintained   nonskid shoes/slippers when out of bed   room organization consistent   safety round/check completed  Taken 1/8/2025 0200 by April Guallpa LPN  Safety Promotion/Fall Prevention:   activity supervised   assistive device/personal items within reach   clutter free environment maintained   fall prevention program maintained   nonskid shoes/slippers when out of bed   room organization consistent   safety round/check completed  Taken 1/8/2025 0046 by Tincher, April, LPN  Safety Promotion/Fall Prevention:   activity supervised   assistive device/personal items within reach   clutter free environment maintained   fall prevention program maintained   nonskid shoes/slippers when out of bed   room organization consistent   safety round/check completed  Taken 1/7/2025 2200 by Tincher, April, LPN  Safety Promotion/Fall Prevention:   activity supervised   assistive device/personal items within reach   clutter free environment maintained   fall prevention program maintained   nonskid shoes/slippers when out of bed   room organization consistent   safety round/check completed  Taken 1/7/2025 2040 by Tincher, April, LPN  Safety Promotion/Fall Prevention:   activity supervised   clutter free environment  maintained   assistive device/personal items within reach   fall prevention program maintained   nonskid shoes/slippers when out of bed   room organization consistent   safety round/check completed  Intervention: Prevent Skin Injury  Recent Flowsheet Documentation  Taken 1/8/2025 0400 by April Guallpa LPN  Body Position: supine  Taken 1/8/2025 0200 by April Guallpa LPN  Body Position: supine  Taken 1/8/2025 0046 by April Guallpa LPN  Body Position: supine  Taken 1/7/2025 2200 by April Guallpa LPN  Body Position: position changed independently  Taken 1/7/2025 2040 by April Guallpa LPN  Body Position: sitting up in bed  Skin Protection:   incontinence pads utilized   pulse oximeter probe site changed  Intervention: Prevent and Manage VTE (Venous Thromboembolism) Risk  Recent Flowsheet Documentation  Taken 1/7/2025 2040 by April Guallpa LPN  VTE Prevention/Management: (see MAR) other (see comments)  Intervention: Prevent Infection  Recent Flowsheet Documentation  Taken 1/8/2025 0400 by April Guallpa LPN  Infection Prevention:   environmental surveillance performed   rest/sleep promoted   single patient room provided  Taken 1/8/2025 0200 by April Guallpa LPN  Infection Prevention:   environmental surveillance performed   rest/sleep promoted   single patient room provided  Taken 1/8/2025 0046 by April Guallpa LPN  Infection Prevention:   environmental surveillance performed   rest/sleep promoted   single patient room provided  Taken 1/7/2025 2200 by April Guallpa LPN  Infection Prevention:   environmental surveillance performed   rest/sleep promoted   single patient room provided  Taken 1/7/2025 2040 by April Guallpa LPN  Infection Prevention:   environmental surveillance performed   rest/sleep promoted   single patient room provided  Goal: Optimal Comfort and Wellbeing  Outcome: Progressing  Intervention: Provide Person-Centered Care  Recent Flowsheet  Documentation  Taken 1/8/2025 0046 by April Guallpa LPN  Trust Relationship/Rapport:   care explained   choices provided   emotional support provided   thoughts/feelings acknowledged  Taken 1/7/2025 2040 by April Guallpa LPN  Trust Relationship/Rapport:   care explained   choices provided   emotional support provided   thoughts/feelings acknowledged  Goal: Readiness for Transition of Care  Outcome: Progressing     Problem: Skin Injury Risk Increased  Goal: Skin Health and Integrity  Outcome: Progressing  Intervention: Optimize Skin Protection  Recent Flowsheet Documentation  Taken 1/8/2025 0400 by April Guallpa LPN  Activity Management: activity encouraged  Head of Bed (HOB) Positioning: HOB elevated  Taken 1/8/2025 0200 by April Guallpa LPN  Activity Management: activity encouraged  Head of Bed (HOB) Positioning: HOB elevated  Taken 1/8/2025 0046 by April Guallpa LPN  Activity Management: activity encouraged  Head of Bed (HOB) Positioning: HOB elevated  Taken 1/7/2025 2200 by April Guallpa LPN  Activity Management: activity encouraged  Head of Bed (HOB) Positioning: HOB elevated  Taken 1/7/2025 2040 by April Guallpa LPN  Activity Management: activity encouraged  Pressure Reduction Techniques: weight shift assistance provided  Head of Bed (HOB) Positioning: HOB elevated  Pressure Reduction Devices: positioning supports utilized  Skin Protection:   incontinence pads utilized   pulse oximeter probe site changed     Problem: Fall Injury Risk  Goal: Absence of Fall and Fall-Related Injury  Outcome: Progressing  Intervention: Promote Injury-Free Environment  Recent Flowsheet Documentation  Taken 1/8/2025 0400 by April Guallpa LPN  Safety Promotion/Fall Prevention:   activity supervised   assistive device/personal items within reach   clutter free environment maintained   fall prevention program maintained   nonskid shoes/slippers when out of bed   room organization  consistent   safety round/check completed  Taken 1/8/2025 0200 by April Guallpa LPN  Safety Promotion/Fall Prevention:   activity supervised   assistive device/personal items within reach   clutter free environment maintained   fall prevention program maintained   nonskid shoes/slippers when out of bed   room organization consistent   safety round/check completed  Taken 1/8/2025 0046 by Tincher, April, LPN  Safety Promotion/Fall Prevention:   activity supervised   assistive device/personal items within reach   clutter free environment maintained   fall prevention program maintained   nonskid shoes/slippers when out of bed   room organization consistent   safety round/check completed  Taken 1/7/2025 2200 by Tincher, April, LPN  Safety Promotion/Fall Prevention:   activity supervised   assistive device/personal items within reach   clutter free environment maintained   fall prevention program maintained   nonskid shoes/slippers when out of bed   room organization consistent   safety round/check completed  Taken 1/7/2025 2040 by Tincher, April, LPN  Safety Promotion/Fall Prevention:   activity supervised   clutter free environment maintained   assistive device/personal items within reach   fall prevention program maintained   nonskid shoes/slippers when out of bed   room organization consistent   safety round/check completed     Problem: Confusion Acute  Goal: Optimal Cognitive Function  Outcome: Progressing  Intervention: Minimize Contributing Factors  Recent Flowsheet Documentation  Taken 1/7/2025 2040 by April Guallpa LPN  Sensory Stimulation Regulation:   lighting decreased   care clustered  Reorientation Measures: reorientation provided  Communication Support Strategies: simple statements used

## 2025-01-08 NOTE — PROGRESS NOTES
"    [facility]    Name:  Hardik Natarajan   Age:  79 y.o.  Sex:  male  :  1945  MRN:  [id number]   Visit Number:  [id number]  Admission Date:  2025  Date Of Service:  25  Primary Care Physician:  Won Cuellar MD     LOS: 5 days :    Chief Complaint:      Follow-up; altered mental status    Subjective:    Patient seen this morning.  Lethargic.  Apparently had agitation overnight, had been given Geodon.    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  Presented to [facility] ED on 2025 with concern for altered mental status.  Last known normal was night prior to presentation.  Also reported having a fall, unclear if it was syncope.  Wife found him on the floor.  On Plavix, no other anticoagulation.  He denied any fevers or chills, chest pain, shortness of air, any other specific concern.  Denied any slurred speech, difficulty moving upper or lower extremities, and any in symmetry.     ED summary: Afebrile, vital signs stable on room air.  ABG pH 7.44, pCO2 34, pO2 82, bicarb 24.  EKG sinus tachycardia rate 107, nonspecific ST-T wave changes.  Opponent 23, ACS not suspected.  Creatinine 1.34, lactate elevated, procalcitonin not elevated.  UDS negative.  Blood cultures collected.  COVID/flu/RSV negative.  CT abdomen/pelvis large stone in urinary bladder, wall thickening probable diverticula, abnormal attenuation of the root of the mesentery most suggestive of mesenteric panniculitis.  CT head no intracranial hemorrhage or evidence of acute large cortical infarct, stable atrophy and chronic findings, left mastoiditis and left otitis media with bone destruction of the left mastoid air cells, right " "maxillary sinusitis.  CT cervical spine no acute fracture, multilevel degenerative disc disease.\"    Appears patient has had ongoing mental status changes throughout hospital stay.  Has been seen by neurology.  Underwent MRI of the brain with no new stroke, there was evidence of chronic changes, possible mastoiditis.  No seizure-like activity.  Was given Geodon.  B12 is being dosed due to low B12 levels.  Was on prescription for Augmentin as well due to possible mastoiditis.    Patient will need placement    Review of Systems:     All systems were reviewed and negative except as mentioned in subjective, assessment and plan.    Vital Signs:    Temp:  [97.3 °F (36.3 °C)-97.5 °F (36.4 °C)] 97.3 °F (36.3 °C)  Heart Rate:  [71] 71  Resp:  [18-20] 20  BP: (109-133)/(57-81) 133/81    Intake and output:    I/O last 3 completed shifts:  In: 840 [P.O.:840]  Out: 200 [Urine:200]  No intake/output data recorded.    Physical Examination:    General Appearance:  Lethargic but arousable   Head:  There is a lesion surrounding the left ear, left side of the head, particularly in the auricular area.  No drainage.   Eyes: Conjunctivae and sclerae normal, no icterus. No pallor.   Throat: No oral lesions, no thrush, oral mucosa moist.   Neck: Supple, trachea midline, no thyromegaly.   Lungs:   Breath sounds heard bilaterally equally.  No wheezing or crackles. No Pleural rub or bronchial breathing.   Heart:  Normal S1 and S2, no murmur, no gallop, no rub. No JVD.   Abdomen:   Normal bowel sounds, no masses, no organomegaly. Soft, nontender, nondistended, no rebound tenderness.   Extremities: Supple, no edema, no cyanosis, no clubbing.   Skin: Warm   Neurologic: Difficult to ascertain due to decreased mental     Laboratory results:    Results from last 7 days   Lab Units 01/05/25  0612 01/04/25  0937 01/03/25  0646 01/02/25  0543 01/01/25  1221   SODIUM mmol/L 132* 133* 135*   < > 136   POTASSIUM mmol/L 4.5 4.0 4.5   < > 4.2   CHLORIDE " mmol/L 99 100 102   < > 101   CO2 mmol/L 22.5 23.0 22.2   < > 23.4   BUN mg/dL 19 18 17   < > 9   CREATININE mg/dL 1.29* 1.27 1.31*   < > 1.34*   CALCIUM mg/dL 8.8 8.7 8.5*   < > 8.9   BILIRUBIN mg/dL  --   --   --   --  1.1   ALK PHOS U/L  --   --   --   --  51   ALT (SGPT) U/L  --   --   --   --  13   AST (SGOT) U/L  --   --   --   --  23   GLUCOSE mg/dL 97 95 79   < > 99    < > = values in this interval not displayed.     Results from last 7 days   Lab Units 01/03/25  0646 01/02/25  0543 01/01/25  1221   WBC 10*3/mm3 2.95* 3.23* 4.84   HEMOGLOBIN g/dL 9.4* 9.8* 11.6*   HEMATOCRIT % 28.5* 29.3* 34.0*   PLATELETS 10*3/mm3 118* 123* 141         Results from last 7 days   Lab Units 01/01/25  1406 01/01/25  1221   CK TOTAL U/L  --  216*   HSTROP T ng/L 23* 24*     Results from last 7 days   Lab Units 01/01/25  1627 01/01/25  1620   BLOODCX  No growth at 5 days No growth at 5 days     Recent Labs     01/01/25  1555   PHART 7.455   DGL3RMX 34.2*   PO2ART 82.6   EIF3VMX 24.0   BASEEXCESS 0.4      I have reviewed the patient's laboratory results.    Radiology results:    No radiology results from the last 24 hrs  I have reviewed the patient's radiology reports.    Medication Review:     I have reviewed the patient's active and prn medications.     Problem List:      AMS (altered mental status)    Altered mental status      Assessment/Plan:     Confusion/agitation:  Neurology following.  Suspicion is for development of dementia.  Does not currently appear to have CNS involvement of the squamous cell carcinoma.  Was given Augmentin for mastoiditis.  Also receiving B12 injections due to low B12 noted.    Does appear to have worsening mental status in the evening, likely sundowning.  Was started on Seroquel.  Will add Depakote for behaviors today.    Left upper extremity weakness/hypothyroidism/cancer pain:  Complicates all aspects of care.    With patient's ongoing confusion will need placement.     Risk Assessment:  High  DVT Prophylaxis: Heparin  Code Status: Full code  Diet: Cardiac  Discharge Plan: LTC      Dionna Espitia DO  01/08/25  09:46 EST

## 2025-01-08 NOTE — PROGRESS NOTES
"DOS: 2025  NAME: Hardik Natarajan   : 1945  PCP: Won Cuellar MD  Chief Complaint   Patient presents with    Altered Mental Status    Fall       Chief complaint: Asleep at this point.  Subjective: Had a rough night and was very agitated.  Needed ziprasidone at least 2 or 3 times intramuscular injections.    Objective:  Vital signs: /81 (BP Location: Left arm, Patient Position: Lying)   Pulse 71   Temp 97.3 °F (36.3 °C) (Axillary)   Resp 20   Ht 185.4 cm (73\")   Wt 96.3 kg (212 lb 4.9 oz)   SpO2 97%   BMI 28.01 kg/m²    Gen: NAD, vitals reviewed  MS: Asleep at this time but had been agitated before.  Patient is only oriented to his name.  CN: Cranials 2-12: Could not be assessed because he is sleep  Motor: Moves all his extremities  Sensory: No sensory loss.  Coordination: Could not be assessed because he is asleep  Gait: Could not be assessed because he is asleep    ROS:  General: Patient exhibiting repeated bouts of delirium.  Neurological: Unable to do a full neurological assessment because of his agitation as well as sleeping from the effects of the intramuscular ziprasidone.    Laboratory results:  Lab Results   Component Value Date    GLUCOSE 97 2025    CALCIUM 8.8 2025     (L) 2025    K 4.5 2025    CO2 22.5 2025    CL 99 2025    BUN 19 2025    CREATININE 1.29 (H) 2025    BCR 14.7 2025    ANIONGAP 10.5 2025     Lab Results   Component Value Date    WBC 2.95 (L) 2025    HGB 9.4 (L) 2025    HCT 28.5 (L) 2025    MCV 94.1 2025     (L) 2025     Lab Results   Component Value Date    LDL 73 2024            Review of labs: Low hemoglobin of 9.4 and low hematocrit of 28.5 and low white cell count of 2950 and low platelet count of 118,000.  The EGFR is borderline at 56.  The vitamin B12 level is low at 169.     CMP:        Lab 25  0612 25  0937 25  0646 25  0543 " 01/01/25  1221   SODIUM 132* 133* 135* 136 136   POTASSIUM 4.5 4.0 4.5 4.3 4.2   CHLORIDE 99 100 102 105 101   CO2 22.5 23.0 22.2 22.4 23.4   ANION GAP 10.5 10.0 10.8 8.6 11.6   BUN 19 18 17 11 9   CREATININE 1.29* 1.27 1.31* 1.30* 1.34*   EGFR 56.1* 57.1* 55.0* 55.5* 53.6*   GLUCOSE 97 95 79 88 99   CALCIUM 8.8 8.7 8.5* 8.4* 8.9   MAGNESIUM  --   --   --   --  1.8   TOTAL PROTEIN  --   --   --   --  6.3   ALBUMIN  --   --   --   --  3.8   GLOBULIN  --   --   --   --  2.5   ALT (SGPT)  --   --   --   --  13   AST (SGOT)  --   --   --   --  23   BILIRUBIN  --   --   --   --  1.1   ALK PHOS  --   --   --   --  51        TSH          9/4/2024    10:16 9/25/2024    09:33 1/1/2025    12:21   TSH   TSH 12.060  5.670  2.700         Lipid Panel          8/3/2024    04:51   Lipid Panel   Total Cholesterol 117    Triglycerides 84    HDL Cholesterol 27    VLDL Cholesterol 17    LDL Cholesterol  73    LDL/HDL Ratio 2.71         Lab Results   Component Value Date    [id number] 169 (L) 01/01/2025       Lab Results   Component Value Date    FOLATE 6.35 01/01/2025       Lab Results   Component Value Date    HGBA1C 5.00 08/03/2024           Review and interpretation of imaging: MRI of the brain without IV contrast     Comparison: 08/02/2024     Clinical history: Concern for CVA, left arm weakness     Findings:     Small 1 cm acute infarct in the right periventricular region on series 7, image 19. Decreased signal on the ADC map in this location.     No acute intracranial hemorrhage.     No intracranial masses.     Postcontrast imaging of the brain does not demonstrate any areas of abnormal parenchymal enhancement.     No midline shift.     No abnormal extra-axial fluid collections are seen.     Cerebral volume loss.     Areas of increased FLAIR signal within the cerebral white matter bilaterally are reflective of a nonspecific demyelinating process, likely ischemic microvascular in nature.     IMPRESSION:  Impression:     1. Small  acute infarct in the right periventricular region     2. Moderate cerebral volume loss with moderate cerebral white matter disease, likely ischemic microvascular in nature.    CT Head Without Contrast    Result Date: 1/1/2025  PROCEDURE: CT HEAD WO CONTRAST-  INDICATION: AMS  TECHNIQUE: Multiple axial CT images were performed from the foramen magnum to the vertex without contrast.   Coronal reconstruction images were obtained from the axial data.  COMPARISON: 8/2/2024.  FINDINGS: There is no mass effect or midline shift. There is stable atrophy with ventricular enlargement. There are stable bilateral periventricular hypodensities. There is no intracranial hemorrhage. The posterior fossa is without acute abnormality. There is mucoperiosteal thickening of the right maxillary sinus. There is opacification of the left mastoid air cells with bone destruction. There is soft tissue density in the left middle ear cavity. This was present on the prior exam. No skull fracture.      Impression: 1. No intracranial hemorrhage or evidence of acute large cortical infarct. 2. Stable atrophy and chronic findings. 3. Left mastoiditis and left otitis media with bone destruction of the left mastoid air cells. 4. Right maxillary sinusitis.         CTDI: 16.93 mGy DLP:837.44 mGy.cm    This study was performed with techniques to keep radiation doses as low as reasonably achievable (ALARA). Individualized dose reduction techniques using automated exposure control or adjustment of mA and/or kV according to the patient size were employed.   This report was signed and finalized on 1/1/2025 1:19 PM by Carmen Le MD.            MRI Brain Without Contrast    Result Date: 1/1/2025  FINAL REPORT TECHNIQUE: null CLINICAL HISTORY: AMS SEVERE HX OF PREVIOUS STROKE, FALL TODAY, HX OF PREVIOUS SKIN CA OF LEFT EAR. COMPARISON: null FINDINGS: MR of the brain without contrast Comparison: MR/SR - MRI BRAIN W WO CONTRAST - 8/2/24 21:53 EDT CT - CT  ANGIOGRAM NECK W WO CONTRAST - 8/2/24 20:54 EDT CT - CT ANGIOGRAM HEAD W CONTRAST - 8/2/24 20:54 EDT CT/SR - CT HEAD WO CONTRAST STROKE PROTOCOL - 8/2/24 20:54 EDT MR/SR - MRI BRAIN W WO CONTRAST - 5/10/23 18:47 EDT Findings: No acute infarction, hemorrhage, mass-effect or herniation. No hydrocephalus. Increased signal intensity is seen in the deep and periventricular white matter on the T2/FLAIR sequences, which most likely represents the sequela of severe chronic small vessel ischemic disease. Chronic lacunar infarctions in the right corona radiata/centrum semiovale and left basal ganglia. No extra-axial fluid collection or mass. Unremarkable sella. Intact flow voids. Normal orbits. Mucosal thickening in the paranasal sinuses may indicate sinusitis. Fluid signal in right mastoid air cells could be secondary to an effusion or mastoiditis. There is osseous destruction at the left mastoid process, which was also present on the prior studies and could be related to the previously seen skin cancer and/or post treatment changes. There is fluid signal in left mastoid air cells which could be secondary to an effusion or mastoiditis.     Impression: Impression: No acute infarction. No posttraumatic findings. Sinusitis could be considered. Bilateral mastoid air cell effusions versus mastoiditis. Osseous destruction of the left mastoid process could be secondary to the previously seen skin cancer and/or posttreatment changes. Correlate clinically for signs/symptoms of infection to exclude osteomyelitis. Authenticated and Electronically Signed by Jaqueline Painter MD on 01/01/2025 06:45:00 PM      Workup to date::CT ABDOMEN AND PELVIS WITH CONTRAST     INDICATION: Possible bladder stone on x-ray.     TECHNIQUE: Thin section axial images were obtained from the lung bases  through the pubic symphysis after oral and intravenous contrast. Coronal  reconstruction images were obtained from the axial data.     COMPARISON: None.      FINDINGS:     Lower chest:  Lung bases are clear.     Liver:  Homogeneous.  No focal lesion.     Gallbladder/biliary system:  Gallbladder is present.   No intrahepatic  or extrahepatic biliary dilatation.     Spleen:  Unremarkable.     Adrenal glands:  Unremarkable. No nodules.     Pancreas: Evaluation limited by motion artifact. No convincing mass or  evidence of pancreatitis.     Kidneys/ureters/bladder:  No hydronephrosis, solid mass or perinephric  stranding. A large calcification in the urinary bladder is consistent  with a bladder stone. This measures 23 mm. There is also irregularity of  the bladder wall which could be related to chronic cystitis. Bladder  diverticula are not excluded near the dome.     GI tract: No evidence of small bowel obstruction or focal small bowel  wall thickening. Normal appendix. No acute colon abnormality.     Pelvic organs: Mild enlargement of the prostate.     Lymph nodes/mesentery/retroperitoneum: There is abnormal attenuation in  the root of the mesentery with small lymph nodes. 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     Abdominal wall: Abdominal wall intact.      Vascular: No acute vascular abnormality.     Free fluid: No free fluid.     Bones: No acute osseous abnormality.     IMPRESSION:  1. Large stone in the urinary bladder.  2. Wall thickening of the urinary bladder with probable diverticula.  This is likely related to chronic cystitis and possibly mild chronic  outlet obstruction.  3. Abnormal attenuation at the root of the mesentery most suggestive of  mesenteric panniculitis.    Results for orders placed during the hospital encounter of 08/02/24    Adult Transthoracic Echo Complete w/ Color, Spectral and Contrast if necessary per protocol    Interpretation Summary    Left ventricular systolic function is normal. Calculated left ventricular EF = 60.4% Left ventricular ejection fraction appears to be 56 - 60%. Left ventricular diastolic  function was normal.    The right ventricular cavity is borderline dilated with normal systolic function.    Mild tricuspid valve regurgitation is present. Estimated right ventricular systolic pressure from tricuspid regurgitation is normal (<35 mmHg).    Saline test results are negative for right to left atrial level shunt.       Diagnoses: Patient with underlying metabolic encephalopathy having overnight episodes of confusion.      Comment: Patient currently on Depakote for behavioral issues.    Plan:  1.  Ziprasidone 40 mg by mouth nightly.  2.  He will definitely benefit from inpatient psychiatry consultation to help control the psychosis.  3.  An EEG has been requested again as the last time it could not be performed because of his agitation.  4.  If with conservative measures he does not improve might consider CSF evaluation under fluoroscopy if family members agreed to it but then the Plavix needs to be held.    Discussed with the patient care team and he will be followed up by inpatient teleneurology service.    Spent a total of 30 minutes in face-to-face evaluation and management of the patient using the dedicated telemedicine device without any interruption with the help of the rounding nurse with the patient located at the [facility] and myself at a remote location.    Electronically signed by Esperanza Zaragoza MD, 01/08/25, 10:11 AM EST.

## 2025-01-08 NOTE — PLAN OF CARE
Goal Outcome Evaluation:  Plan of Care Reviewed With: patient        Progress: improving  Outcome Evaluation: Pt supine in bed and willing to participate with treatment. Pt performed bed mobility, transfers and limited gait  with rw.  See flowsheet for details. Cont PT per POC progressing to goals as pt tolerates.

## 2025-01-09 ENCOUNTER — APPOINTMENT (OUTPATIENT)
Dept: SLEEP MEDICINE | Facility: HOSPITAL | Age: 80
End: 2025-01-09
Payer: MEDICARE

## 2025-01-09 PROCEDURE — 99232 SBSQ HOSP IP/OBS MODERATE 35: CPT | Performed by: FAMILY MEDICINE

## 2025-01-09 PROCEDURE — 95816 EEG AWAKE AND DROWSY: CPT | Performed by: PSYCHIATRY & NEUROLOGY

## 2025-01-09 PROCEDURE — 99232 SBSQ HOSP IP/OBS MODERATE 35: CPT | Performed by: STUDENT IN AN ORGANIZED HEALTH CARE EDUCATION/TRAINING PROGRAM

## 2025-01-09 PROCEDURE — 25010000002 HEPARIN (PORCINE) PER 1000 UNITS: Performed by: STUDENT IN AN ORGANIZED HEALTH CARE EDUCATION/TRAINING PROGRAM

## 2025-01-09 PROCEDURE — 95816 EEG AWAKE AND DROWSY: CPT

## 2025-01-09 PROCEDURE — 25010000002 CYANOCOBALAMIN PER 1000 MCG: Performed by: STUDENT IN AN ORGANIZED HEALTH CARE EDUCATION/TRAINING PROGRAM

## 2025-01-09 RX ADMIN — DIVALPROEX SODIUM 250 MG: 250 TABLET, DELAYED RELEASE ORAL at 09:40

## 2025-01-09 RX ADMIN — CLOPIDOGREL BISULFATE 75 MG: 75 TABLET ORAL at 09:40

## 2025-01-09 RX ADMIN — HEPARIN SODIUM 5000 UNITS: 5000 INJECTION INTRAVENOUS; SUBCUTANEOUS at 21:00

## 2025-01-09 RX ADMIN — CYANOCOBALAMIN 1000 MCG: 1000 INJECTION, SOLUTION INTRAMUSCULAR at 09:40

## 2025-01-09 RX ADMIN — ZIPRASIDONE HYDROCHLORIDE 40 MG: 20 CAPSULE ORAL at 21:00

## 2025-01-09 RX ADMIN — DIVALPROEX SODIUM 250 MG: 250 TABLET, DELAYED RELEASE ORAL at 21:00

## 2025-01-09 RX ADMIN — LEVOTHYROXINE SODIUM 75 MCG: 0.07 TABLET ORAL at 05:41

## 2025-01-09 RX ADMIN — DULOXETINE HYDROCHLORIDE 30 MG: 30 CAPSULE, DELAYED RELEASE ORAL at 09:40

## 2025-01-09 RX ADMIN — SENNOSIDES 1 TABLET: 8.6 TABLET, FILM COATED ORAL at 21:00

## 2025-01-09 RX ADMIN — TAMSULOSIN HYDROCHLORIDE 0.4 MG: 0.4 CAPSULE ORAL at 09:40

## 2025-01-09 RX ADMIN — POLYETHYLENE GLYCOL 3350 17 G: 17 POWDER, FOR SOLUTION ORAL at 09:40

## 2025-01-09 RX ADMIN — HEPARIN SODIUM 5000 UNITS: 5000 INJECTION INTRAVENOUS; SUBCUTANEOUS at 09:40

## 2025-01-09 NOTE — PROGRESS NOTES
"    [facility]    Name:  Hardik Natarajan   Age:  79 y.o.  Sex:  male  :  1945  MRN:  [id number]   Visit Number:  [id number]  Admission Date:  2025  Date Of Service:  25  Primary Care Physician:  Won Cuellar MD     LOS: 6 days :    Chief Complaint:      Follow-up; altered mental status    Subjective:    Patient doing some better this morning.  Slept better overnight.  Discussed plan of care with RN and teleneurologist.    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  Presented to [facility] ED on 2025 with concern for altered mental status.  Last known normal was night prior to presentation.  Also reported having a fall, unclear if it was syncope.  Wife found him on the floor.  On Plavix, no other anticoagulation.  He denied any fevers or chills, chest pain, shortness of air, any other specific concern.  Denied any slurred speech, difficulty moving upper or lower extremities, and any in symmetry.     ED summary: Afebrile, vital signs stable on room air.  ABG pH 7.44, pCO2 34, pO2 82, bicarb 24.  EKG sinus tachycardia rate 107, nonspecific ST-T wave changes.  Opponent 23, ACS not suspected.  Creatinine 1.34, lactate elevated, procalcitonin not elevated.  UDS negative.  Blood cultures collected.  COVID/flu/RSV negative.  CT abdomen/pelvis large stone in urinary bladder, wall thickening probable diverticula, abnormal attenuation of the root of the mesentery most suggestive of mesenteric panniculitis.  CT head no intracranial hemorrhage or evidence of acute large cortical infarct, stable atrophy and chronic findings, left mastoiditis and left otitis media with bone destruction of the left mastoid air " "cells, right maxillary sinusitis.  CT cervical spine no acute fracture, multilevel degenerative disc disease.\"    Appears patient has had ongoing mental status changes throughout hospital stay.  Has been seen by neurology.  Underwent MRI of the brain with no new stroke, there was evidence of chronic changes, possible mastoiditis.  No seizure-like activity.  Was given Geodon.  B12 is being dosed due to low B12 levels.  Was on prescription for Augmentin as well due to possible mastoiditis.    Patient will need placement    Review of Systems:     All systems were reviewed and negative except as mentioned in subjective, assessment and plan.    Vital Signs:    Temp:  [97.5 °F (36.4 °C)-98.6 °F (37 °C)] 97.5 °F (36.4 °C)  Heart Rate:  [68] 68  Resp:  [16-20] 20  BP: (109-147)/(63-95) 122/75    Intake and output:    I/O last 3 completed shifts:  In: 600 [P.O.:600]  Out: -   I/O this shift:  In: 360 [P.O.:360]  Out: -     Physical Examination:    General Appearance:  Awake and alert   Head:  There is a lesion surrounding the left ear, left side of the head, particularly in the auricular area.  No drainage.   Eyes: Conjunctivae and sclerae normal, no icterus. No pallor.   Throat: No oral lesions, no thrush, oral mucosa moist.   Neck: Supple, trachea midline, no thyromegaly.   Lungs:   Breath sounds heard bilaterally equally.  No wheezing or crackles. No Pleural rub or bronchial breathing.   Heart:  Normal S1 and S2, no murmur, no gallop, no rub. No JVD.   Abdomen:   Normal bowel sounds, no masses, no organomegaly. Soft, nontender, nondistended, no rebound tenderness.   Extremities: Supple, no edema, no cyanosis, no clubbing.   Skin: Warm   Neurologic: Hard of hearing, awake and alert     Laboratory results:    Results from last 7 days   Lab Units 01/05/25  0612 01/04/25  0937 01/03/25  0646   SODIUM mmol/L 132* 133* 135*   POTASSIUM mmol/L 4.5 4.0 4.5   CHLORIDE mmol/L 99 100 102   CO2 mmol/L 22.5 23.0 22.2   BUN mg/dL 19 18 " 17   CREATININE mg/dL 1.29* 1.27 1.31*   CALCIUM mg/dL 8.8 8.7 8.5*   GLUCOSE mg/dL 97 95 79     Results from last 7 days   Lab Units 01/03/25  0646   WBC 10*3/mm3 2.95*   HEMOGLOBIN g/dL 9.4*   HEMATOCRIT % 28.5*   PLATELETS 10*3/mm3 118*                     Recent Labs     01/01/25  1555   PHART 7.455   CBR9FRG 34.2*   PO2ART 82.6   IQB8MYJ 24.0   BASEEXCESS 0.4      I have reviewed the patient's laboratory results.    Radiology results:    No radiology results from the last 24 hrs  I have reviewed the patient's radiology reports.    Medication Review:     I have reviewed the patient's active and prn medications.     Problem List:      AMS (altered mental status)    Altered mental status      Assessment/Plan:     Confusion/agitation:  Neurology following.  Suspicion is for development of dementia.  Does not currently appear to have CNS involvement of the squamous cell carcinoma.  Was given Augmentin for mastoiditis.  Also receiving B12 injections due to low B12 noted.    Very likely patient has dementia with sundowning.  Doing better now on Geodon at night with the Depakote.  This will likely be continued long-term.    Left upper extremity weakness/hypothyroidism/cancer pain:  Complicates all aspects of care.    Family was still considering possibly taking him home, however they are now agreeable to continue with placement.  I do think long-term option for placement would be better served, as his behaviors and mental status will likely have further decline.     Risk Assessment: High  DVT Prophylaxis: Heparin  Code Status: Full code  Diet: Cardiac  Discharge Plan: LTC      Dionna Espitia DO  01/09/25  09:41 EST

## 2025-01-09 NOTE — PROGRESS NOTES
"DOS: 2025  NAME: Hardik Natarajan   : 1945  PCP: Won Cuellar MD  Chief Complaint   Patient presents with    Altered Mental Status    Fall       Chief complaint: Altered mental status  Subjective: Patient seen and examined at the bedside via teleneurology video call with the help of the rounding nurse, patient seemed less agitated and more alert compared to yesterday.    Objective:  Vital signs: /75 (BP Location: Right arm, Patient Position: Lying)   Pulse 68   Temp 97.5 °F (36.4 °C) (Oral)   Resp 20   Ht 185.4 cm (73\")   Wt 96.3 kg (212 lb 4.9 oz)   SpO2 100%   BMI 28.01 kg/m²    Gen: NAD, vitals reviewed  MS: oriented x1 which is his baseline, recent/remote memory impaired, fair attention/concentration, language intact, no neglect.  CN: visual acuity grossly normal, PERRL, EOMI, no facial droop, no dysarthria  Motor: Moves all extremities against gravity, normal tone  Sensory: intact to light touch all 4 ext.    Laboratory results:  Lab Results   Component Value Date    GLUCOSE 97 2025    CALCIUM 8.8 2025     (L) 2025    K 4.5 2025    CO2 22.5 2025    CL 99 2025    BUN 19 2025    CREATININE 1.29 (H) 2025    BCR 14.7 2025    ANIONGAP 10.5 2025     Lab Results   Component Value Date    WBC 2.95 (L) 2025    HGB 9.4 (L) 2025    HCT 28.5 (L) 2025    MCV 94.1 2025     (L) 2025     Lab Results   Component Value Date    LDL 73 2024            Review of labs: Above labs reviewed    Review and interpretation of imaging: No new brain imaging to review    Prior workup:  -MRI brain  Impression:     No acute infarction. No posttraumatic findings.     Sinusitis could be considered.     Bilateral mastoid air cell effusions versus mastoiditis. Osseous destruction of the left mastoid process could be secondary to the previously seen skin cancer and/or posttreatment changes. Correlate clinically " for signs/symptoms of infection to exclude   osteomyelitis.     -EEG negative for seizure activity as detailed below    IMPRESSION:     Diffuse cerebral dysfunction of moderate degree, nonspecific.  This is most commonly seen due to toxic/metabolic cause     No evidence for epilepsy is seen      Diagnoses:  -Altered mental status probably delirium aggravated by underlying dementia, low suspicion for CNS infection  -Vitamin B12 deficiency      Plan:  1.  Discussed with the hospitalist, family declined the lumbar puncture.  2.  Follow delirium precautions and continue antipsychotics for agitation as needed  3.  Continue B12 replacement  4.  Follow up with general neurology as an outpatient within 2 months, no further neurology workup needed at this time, will sign off and see again as needed    Management discussed with Dr. Espitia via epic chat.    Addendum:  This was an audio and video enabled telemedicine encounter.

## 2025-01-09 NOTE — DISCHARGE PLACEMENT REQUEST
"STR to LTC placement referral     Katey Browne (79 y.o. Male)       Date of Birth   1945    Social Security Number       [place]   [address] [facility] [place] [address]    Home Phone   [phone]    MRN   [id number]       Jain   None    Marital Status                               Admission Date   1/1/25    Admission Type   Emergency    Admitting Provider   Kerley, Brian Joseph, DO    Attending Provider   Dionna Espitia DO    [facility]       Discharge Date       Discharge Disposition       Discharge Destination                                 Attending Provider: Dionna Espitia DO    Allergies: Asa [Aspirin]    Isolation: None   Infection: None   Code Status: CPR    Ht: 185.4 cm (73\")   Wt: 96.3 kg (212 lb 4.9 oz)    Admission Cmt: None   Principal Problem: AMS (altered mental status) [R41.82]                   Active Insurance as of 1/1/2025       Primary Coverage       Payor Plan Insurance Group Employer/Plan Group    ANTHEM MEDICARE REPLACEMENT ANTHEM MED ADV SNP [id number]       Payor Plan Address Payor Plan Phone Number Payor Plan Fax Number Effective Dates    [address] [phone]  1/1/2024 - None Entered    [facility] [id number]         Subscriber Name Subscriber Birth Date Member ID       KATEY BROWNE 1945 [id number]               Secondary Coverage       Payor Plan Insurance Group Employer/Plan Group    [id number]        Payor Plan Address Payor Plan Phone Number Payor Plan Fax Number Effective Dates    [address]   4/7/2023 - None Entered    [facility] [id number]         Subscriber Name Subscriber Birth Date Member ID       KATEY BROWNE 1945 [id number]                     Emergency Contacts        (Rel.) Home Phone Work Phone Mobile Phone    MAC BROWNE/ZITA (Son) [phone] -- [phone]                 History & Physical        Kerley, Brian " DO Archie at 25 1727            [facility]   HISTORY AND PHYSICAL      Name:  Hardik Natarajan   Age:  79 y.o.  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  Presented to [facility] ED on 2025 with concern for altered mental status.  Last known normal was night prior to presentation.  Also reported having a fall, unclear if it was syncope.  Wife found him on the floor.  On Plavix, no other anticoagulation.  He denied any fevers or chills, chest pain, shortness of air, any other specific concern.  Denied any slurred speech, difficulty moving upper or lower extremities, and any in symmetry.    ED summary: Afebrile, vital signs stable on room air.  ABG pH 7.44, pCO2 34, pO2 82, bicarb 24.  EKG sinus tachycardia rate 107, nonspecific ST-T wave changes.  Opponent 23, ACS not suspected.  Creatinine 1.34, lactate elevated, procalcitonin not elevated.  UDS negative.  Blood cultures collected.  COVID/flu/RSV negative.  CT abdomen/pelvis large stone in urinary bladder, wall thickening probable diverticula, abnormal attenuation of the root of the mesentery most suggestive of mesenteric panniculitis.  CT head no intracranial hemorrhage or evidence of acute large cortical infarct, stable atrophy and chronic findings, left mastoiditis and left otitis media with bone destruction of the left mastoid air cells, right maxillary sinusitis.  CT cervical spine no acute fracture, multilevel degenerative disc disease.    Review Of  Systems:    All systems were reviewed and negative except as mentioned in history of presenting illness, assessment and plan.    Past Medical History: Patient  has a past medical history of Anemia, Arthritis, and Cancer.    Past Surgical History: Patient  has a past surgical history that includes Skin cancer excision; Neck dissection (N/A); Parotidectomy (N/A); and NAIL BIOPSY (N/A).    Social History: Patient  reports that he has never smoked. His smokeless tobacco use includes chew. He reports that he does not currently use alcohol. He reports that he does not use drugs.    Family History:  Patient's family history has been reviewed and found to be noncontributory.     Allergies:      Asa [aspirin]    Home Medications:    Prior to Admission Medications       Prescriptions Last Dose Informant Patient Reported? Taking?    acetaminophen (TYLENOL) 325 MG tablet   Yes No    Take 2 tablets by mouth Every 6 (Six) Hours As Needed.    clopidogrel (PLAVIX) 75 MG tablet   Yes No    Take 1 tablet by mouth Daily.    cyanocobalamin 1000 MCG/ML injection   Yes No    Inject 0.1 mL into the appropriate muscle as directed by prescriber Every 30 (Thirty) Days.    diclofenac (VOLTAREN) 75 MG EC tablet   Yes No    DULoxetine (CYMBALTA) 30 MG capsule   Yes No    Take 1 capsule by mouth Daily.    finasteride (PROSCAR) 5 MG tablet   Yes No    Take 1 tablet by mouth Daily.    Hydrocortisone, Perianal, (ANUSOL-HC) 2.5 % rectal cream   Yes No    Insert 1 Application into the rectum.    hydrOXYzine (ATARAX) 25 MG tablet   No No    Take 1 tablet by mouth Every 6 (Six) Hours As Needed for Itching.    levothyroxine (SYNTHROID, LEVOTHROID) 75 MCG tablet   No No    TAKE ONE (1) TABLET BY MOUTH EVERY DAY    ondansetron (ZOFRAN) 8 MG tablet   No No    Take 1 tablet by mouth 3 (Three) Times a Day As Needed for Nausea or Vomiting.    ondansetron ODT (ZOFRAN-ODT) 8 MG disintegrating tablet   Yes No    Place 1 tablet twice a day by translingual route.  "   oxyCODONE (Roxicodone) 5 MG immediate release tablet   No No    Take 1 tablet by mouth Every 6 (Six) Hours As Needed for Severe Pain.    Procto-Med HC 2.5 % rectal cream   Yes No    1 Application Daily As Needed.    senna (SENOKOT) 8.6 MG tablet   Yes No    Take 1 tablet by mouth Daily As Needed.    tamsulosin (FLOMAX) 0.4 MG capsule 24 hr capsule   Yes No    Take 1 capsule by mouth.          ED Medications:    Medications   sodium chloride 0.9 % bolus 1,000 mL (1,000 mL Intravenous New Bag 1/1/25 1722)   iopamidol (ISOVUE-300) 61 % injection 100 mL (100 mL Intravenous Given 1/1/25 1427)   sodium chloride 0.9 % bolus 1,000 mL (1,000 mL Intravenous New Bag 1/1/25 1557)     Vital Signs:  Temp:  [98 °F (36.7 °C)] 98 °F (36.7 °C)  Heart Rate:  [] 89  Resp:  [18] 18  BP: ()/(55-99) 94/68        01/01/25  1149   Weight: 102 kg (225 lb)     Body mass index is 29.69 kg/m².    Physical Exam:     Most recent vital Signs: BP 94/68   Pulse 89   Temp 98 °F (36.7 °C) (Oral)   Resp 18   Ht 185.4 cm (73\")   Wt 102 kg (225 lb)   SpO2 96%   BMI 29.69 kg/m²     Physical Exam  Constitutional:       General: He is not in acute distress.     Appearance: He is ill-appearing. He is not toxic-appearing.   HENT:      Mouth/Throat:      Mouth: Mucous membranes are moist.   Eyes:      Extraocular Movements: Extraocular movements intact.   Cardiovascular:      Rate and Rhythm: Normal rate and regular rhythm.      Pulses: Normal pulses.      Heart sounds: Normal heart sounds.   Pulmonary:      Effort: Pulmonary effort is normal.      Breath sounds: Normal breath sounds.   Abdominal:      Palpations: Abdomen is soft.      Tenderness: There is no abdominal tenderness.   Musculoskeletal:      Right lower leg: No edema.      Left lower leg: No edema.   Skin:     General: Skin is warm.   Neurological:      General: No focal deficit present.      Mental Status: He is alert.      Comments: Oriented to self   Psychiatric:      " Comments: Agitated         Laboratory data:    I have reviewed the labs done in the emergency room.    Results from last 7 days   Lab Units 01/01/25  1221   SODIUM mmol/L 136   POTASSIUM mmol/L 4.2   CHLORIDE mmol/L 101   CO2 mmol/L 23.4   BUN mg/dL 9   CREATININE mg/dL 1.34*   CALCIUM mg/dL 8.9   BILIRUBIN mg/dL 1.1   ALK PHOS U/L 51   ALT (SGPT) U/L 13   AST (SGOT) U/L 23   GLUCOSE mg/dL 99     Results from last 7 days   Lab Units 01/01/25  1221   WBC 10*3/mm3 4.84   HEMOGLOBIN g/dL 11.6*   HEMATOCRIT % 34.0*   PLATELETS 10*3/mm3 141         Results from last 7 days   Lab Units 01/01/25  1406 01/01/25  1221   CK TOTAL U/L  --  216*   HSTROP T ng/L 23* 24*                 Results from last 7 days   Lab Units 01/01/25  1555   PH, ARTERIAL pH units 7.455   PO2 ART mm Hg 82.6   PCO2, ARTERIAL mm Hg 34.2*   HCO3 ART mmol/L 24.0     Results from last 7 days   Lab Units 01/01/25  1418   COLOR UA  Yellow   GLUCOSE UA  Negative   KETONES UA  Negative   BLOOD UA  Negative   LEUKOCYTES UA  Negative   PH, URINE  7.0   BILIRUBIN UA  Negative   UROBILINOGEN UA  1.0 E.U./dL       Pain Management Panel           No data to display                    Radiology:    CT Abdomen Pelvis With Contrast    Result Date: 1/1/2025  CT ABDOMEN AND PELVIS WITH CONTRAST  INDICATION: Possible bladder stone on x-ray.  TECHNIQUE: Thin section axial images were obtained from the lung bases through the pubic symphysis after oral and intravenous contrast. Coronal reconstruction images were obtained from the axial data.  COMPARISON: None.  FINDINGS:  Lower chest:  Lung bases are clear.  Liver:  Homogeneous.  No focal lesion.  Gallbladder/biliary system:  Gallbladder is present.   No intrahepatic or extrahepatic biliary dilatation.  Spleen:  Unremarkable.  Adrenal glands:  Unremarkable. No nodules.  Pancreas: Evaluation limited by motion artifact. No convincing mass or evidence of pancreatitis.  Kidneys/ureters/bladder:  No hydronephrosis, solid mass  or perinephric stranding. A large calcification in the urinary bladder is consistent with a bladder stone. This measures 23 mm. There is also irregularity of the bladder wall which could be related to chronic cystitis. Bladder diverticula are not excluded near the dome.  GI tract: No evidence of small bowel obstruction or focal small bowel wall thickening. Normal appendix. No acute colon abnormality.  Pelvic organs: Mild enlargement of the prostate.  Lymph nodes/mesentery/retroperitoneum: There is abnormal attenuation in the root of the mesentery with small lymph nodes. 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  Abdominal wall: Abdominal wall intact.  Vascular: No acute vascular abnormality.  Free fluid: No free fluid.  Bones: No acute osseous abnormality.      1. Large stone in the urinary bladder. 2. Wall thickening of the urinary bladder with probable diverticula. This is likely related to chronic cystitis and possibly mild chronic outlet obstruction. 3. 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      CTDI: 9.79 mGy DLP:552.61 mGy.cm       This report was signed and finalized on 1/1/2025 3:15 PM by Carmen Le MD.      CT Cervical Spine Without Contrast    Result Date: 1/1/2025  PROCEDURE: CT CERVICAL SPINE WO CONTRAST-  INDICATION: fall this morning  TECHNIQUE: Thin section axial images were obtained through the cervical spine without contrast.  Coronal and sagittal reconstruction images were obtained from the axial data.  COMPARISON: None.  FINDINGS: Motion artifact limits exam quality. There is no convincing acute fracture. No facet lock. The craniocervical junction appears intact.   There is multilevel degenerative disc disease.   No acute paraspinal abnormality.      No acute fracture within the limitations of the exam. There is motion artifact.  Multilevel degenerative disc disease.  Please see CT head report regarding  findings of the left mastoid air cells where there is mastoiditis and bone destruction.        CTDI: 16.93 mGy DLP:837.44 mGy.cm     This study was performed with techniques to keep radiation doses as low as reasonably achievable (ALARA). Individualized dose reduction techniques using automated exposure control or adjustment of mA and/or kV according to the patient size were employed.   This report was signed and finalized on 1/1/2025 1:22 PM by Carmen Le MD.      CT Head Without Contrast    Result Date: 1/1/2025  PROCEDURE: CT HEAD WO CONTRAST-  INDICATION: AMS  TECHNIQUE: Multiple axial CT images were performed from the foramen magnum to the vertex without contrast.   Coronal reconstruction images were obtained from the axial data.  COMPARISON: 8/2/2024.  FINDINGS: There is no mass effect or midline shift. There is stable atrophy with ventricular enlargement. There are stable bilateral periventricular hypodensities. There is no intracranial hemorrhage. The posterior fossa is without acute abnormality. There is mucoperiosteal thickening of the right maxillary sinus. There is opacification of the left mastoid air cells with bone destruction. There is soft tissue density in the left middle ear cavity. This was present on the prior exam. No skull fracture.      1. No intracranial hemorrhage or evidence of acute large cortical infarct. 2. Stable atrophy and chronic findings. 3. Left mastoiditis and left otitis media with bone destruction of the left mastoid air cells. 4. Right maxillary sinusitis.         CTDI: 16.93 mGy DLP:837.44 mGy.cm    This study was performed with techniques to keep radiation doses as low as reasonably achievable (ALARA). Individualized dose reduction techniques using automated exposure control or adjustment of mA and/or kV according to the patient size were employed.   This report was signed and finalized on 1/1/2025 1:19 PM by Carmen Le MD.      XR Pelvis 1 or 2 View    Result Date:  1/1/2025  PROCEDURE: XR PELVIS 1 OR 2 VW-  HISTORY: fall, AMS  COMPARISON: None.  FINDINGS: An AP view of the pelvis was obtained.  There is no acute osseous abnormality of the pelvis. There is degenerative joint disease of the hips bilaterally. There is a round radiodensity projecting over the midline pelvis. This could be a bladder stone or artifact in or on the patient's clothing. Soft tissues are otherwise without acute abnormality.      No acute osseous abnormality of the pelvis.  Round radiodensity projecting over the midline pelvis as above.        This report was signed and finalized on 1/1/2025 1:15 PM by Carmen Le MD.      XR Chest 1 View    Result Date: 1/1/2025  PROCEDURE: XR CHEST 1 VW-  INDICATION:  fall, AMS  FINDINGS:  A portable view of the chest was obtained.  Comparison is made to a prior exam dated 8/2/2024.   Heart size is normal. Lung volumes are very low. Right perihilar and bibasilar opacities may be atelectasis. Pneumonia is not excluded. No significant pleural effusion or pneumothorax.      Exam limited by low lung volumes. Probable bilateral atelectasis.   This report was signed and finalized on 1/1/2025 1:14 PM by Carmen Le MD.       Assessment/Plan:    Observation general floor admission 1/1/2025 with altered mental status in the setting of known invasive stage IV left ear squamous cell carcinoma with bony mets.    Altered mental status  Left mastoiditis, left otitis media with bone destruction of left mastoid air cells  Neurology consultation, recreations appreciated.  MRI brain.  EEG.   No seizure-like activity reported or witnessed, no syncope.  Differential could include stroke, brain mets.  CT showed stable chronic findings; left mastoiditis and left otitis media with bone destruction of the left mastoid air cells.  Fall precautions.    Chronic: Left ear squamous cell carcinoma stage IV on palliative treatment only, cancer pain, depression, BPH, history of CVA with left  upper extremity weakness and numbness, treatment related hypothyroidism.  Continue home medications.    Risk Assessment: High  DVT Prophylaxis: Heparin  Code Status: Full code  Diet: Cardiac    Advance Care Planning  ACP discussion was held with the patient during this visit. Patient does not have an advance directive, information provided.           Brian Joseph Kerley, DO  01/01/25  17:27 EST    Dictated utilizing Dragon dictation.    Electronically signed by Kerley, Brian Joseph, DO at 01/01/25 1738       Current Facility-Administered Medications   Medication Dose Route Frequency Provider Last Rate Last Admin    Calcium Replacement - Follow Nurse / BPA Driven Protocol   Not Applicable PRN Kerley, Brian Joseph, DO        clopidogrel (PLAVIX) tablet 75 mg  75 mg Oral Daily Kerley, Brian Joseph, DO   75 mg at 01/08/25 1429    cyanocobalamin injection 1,000 mcg  1,000 mcg Intramuscular Daily Kerley, Brian Joseph, DO   1,000 mcg at 01/08/25 1117    [START ON 1/16/2025] cyanocobalamin injection 1,000 mcg  1,000 mcg Intramuscular Q7 Days Kerley, Brian Joseph, DO        divalproex (DEPAKOTE) DR tablet 250 mg  250 mg Oral Q12H Dionna Espitia DO   250 mg at 01/08/25 2034    DULoxetine (CYMBALTA) DR capsule 30 mg  30 mg Oral Daily Kerley, Brian Joseph, DO   30 mg at 01/08/25 1428    heparin (porcine) 5000 UNIT/ML injection 5,000 Units  5,000 Units Subcutaneous Q12H Kerley, Brian Joseph, DO   5,000 Units at 01/08/25 2034    lactulose (CHRONULAC) 10 GM/15ML solution 10 g  10 g Oral BID PRN Dionna Espitia DO        levothyroxine (SYNTHROID, LEVOTHROID) tablet 75 mcg  75 mcg Oral Q AM Kerley, Brian Joseph, DO   75 mcg at 01/09/25 0541    Magnesium Standard Dose Replacement - Follow Nurse / BPA Driven Protocol   Not Applicable PRN Kerley, Brian Joseph, DO        nitroglycerin (NITROSTAT) SL tablet 0.4 mg  0.4 mg Sublingual Q5 Min PRN Kerley, Brian Joseph, DO        ondansetron (ZOFRAN) injection 4 mg  4 mg  Intravenous Q6H PRN Kerley, Brian Joseph, DO        oxyCODONE (ROXICODONE) immediate release tablet 5 mg  5 mg Oral Q6H PRN Kerley, Brian Joseph, DO   5 mg at 25 0552    Phosphorus Replacement - Follow Nurse / BPA Driven Protocol   Not Applicable PRN Kerley, Brian Joseph, DO        polyethylene glycol (MIRALAX) packet 17 g  17 g Oral Daily Dionna Espitia, DO   17 g at 25 1039    Potassium Replacement - Follow Nurse / BPA Driven Protocol   Not Applicable PRN Kerley, Brian Joseph, DO        senna (SENOKOT) tablet 1 tablet  1 tablet Oral Nightly Kerley, Brian Joseph, DO   1 tablet at 25    sodium chloride 0.9 % flush 10 mL  10 mL Intravenous Q12H Kerley, Brian Joseph, DO   10 mL at 25 0914    sodium chloride 0.9 % flush 10 mL  10 mL Intravenous PRN Kerley, Brian Joseph, DO        [Held by provider] sodium chloride 0.9 % infusion 40 mL  40 mL Intravenous PRN Kerley, Brian Joseph, DO        tamsulosin (FLOMAX) 24 hr capsule 0.4 mg  0.4 mg Oral Daily Kerley, Brian Joseph, DO   0.4 mg at 25 1427    ziprasidone (GEODON) capsule 40 mg  40 mg Oral Nightly Dionna Espitia, DO   40 mg at 25    ziprasidone (GEODON) injection 20 mg  20 mg Intramuscular Q4H PRN Esperanza Zaragoza MD   20 mg at 25 0242        Physical Therapy Notes (most recent note)        Kunal Harper, PTA at 25 1350  Version 1 of 1         Patient Name: Hardik Natarajan  : 1945    MRN: [id number]                              Today's Date: 2025       Admit Date: 2025    Visit Dx:     ICD-10-CM ICD-9-CM   1. Altered mental status, unspecified altered mental status type  R41.82 780.97   2. Bladder stone  N21.0 594.1   3. 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    Anemia, unspecified    Nicotine dependence,  chewing tobacco, uncomplicated    Neoplasm related pain (acute) (chronic)    Unspecified mastoiditis, left ear    Cellulitis of left external ear    Phlebitis and thrombophlebitis of unspecified site    Gangrene, not elsewhere classified    Acute osteomyelitis    Benign prostatic hyperplasia without lower urinary tract symptoms    Pain    Palliative care by specialist    Encounter for therapeutic drug monitoring    Weakness of left upper extremity    AMS (altered mental status)    Altered mental status     Past Medical History:   Diagnosis Date    Anemia     Arthritis     Cancer      Past Surgical History:   Procedure Laterality Date    NAIL BIOPSY N/A     NECK DISSECTION N/A     PAROTIDECTOMY N/A     SKIN CANCER EXCISION        General Information       Row Name 01/08/25 1507          Physical Therapy Time and Intention    Document Type therapy note (daily note)  -RM     Mode of Treatment physical therapy  -RM       Row Name 01/08/25 1507          General Information    Patient Profile Reviewed yes  -RM     Existing Precautions/Restrictions fall  -RM       Row Name 01/08/25 1507          Cognition    Orientation Status (Cognition) oriented to;person;verbal cues/prompts needed for orientation  -RM       Row Name 01/08/25 1507          Safety Issues/Impairments Affecting Functional Mobility    Impairments Affecting Function (Mobility) balance;cognition;endurance/activity tolerance;motor control;motor planning;strength;range of motion (ROM)  -RM               User Key  (r) = Recorded By, (t) = Taken By, (c) = Cosigned By      Initials Name Provider Type     Kunal Harper, PTA Physical Therapist Assistant                   Mobility       Row Name 01/08/25 1508          Bed Mobility    Supine-Sit [place] (Bed Mobility) minimum assist (75% patient effort);verbal cues  -       Row Name 01/08/25 150          Transfers    Comment, (Transfers) Pt tolerated sitting EOB  for approx 10 minutes  -       Row Name  01/08/25 1508          Sit-Stand Transfer    Sit-Stand [place] (Transfers) minimum assist (75% patient effort);verbal cues  -RM     Assistive Device (Sit-Stand Transfers) walker, front-wheeled  -RM       Row Name 01/08/25 1508          Gait/Stairs (Locomotion)    [place] Level (Gait) minimum assist (75% patient effort);nonverbal cues (demo/gesture);verbal cues  -RM     Assistive Device (Gait) walker, front-wheeled  -RM     Distance in Feet (Gait) 3  -RM     Deviations/Abnormal Patterns (Gait) festinating/shuffling;weight shifting decreased  -RM               User Key  (r) = Recorded By, (t) = Taken By, (c) = Cosigned By      Initials Name Provider Type    Kunal Christianson, VAL Physical Therapist Assistant                   Obj/Interventions    No documentation.                  Goals/Plan    No documentation.                  Clinical Impression       Row Name 01/08/25 1512          Pain    Pretreatment Pain Rating 0/10 - no pain  -RM     Posttreatment Pain Rating 0/10 - no pain  -RM     Pre/Posttreatment Pain Comment Pt c/o feeling stiff all over  -RM       Row Name 01/08/25 1512          Plan of Care Review    Plan of Care Reviewed With patient  -RM     Progress improving  -RM     Outcome Evaluation Pt supine in bed and willing to participate with treatment. Pt performed bed mobility, transfers and limited gait  with rw.  See flowsheet for details. Cont PT per POC progressing to goals as pt tolerates.  -RM       Row Name 01/08/25 1512          Positioning and Restraints    Pre-Treatment Position in bed  -RM     Post Treatment Position chair  -RM     In Chair reclined;call light within reach;encouraged to call for assist;exit alarm on;notified nsg  -RM               User Key  (r) = Recorded By, (t) = Taken By, (c) = Cosigned By      Initials Name Provider Type    Kunal Christianson, VAL Physical Therapist Assistant                   Outcome Measures       Row Name 01/08/25 1513          How much  help from another person do you currently need...    Turning from your back to your side while in flat bed without using bedrails? 3  -RM     Moving from lying on back to sitting on the side of a flat bed without bedrails? 3  -RM     Moving to and from a bed to a chair (including a wheelchair)? 3  -RM     Standing up from a chair using your arms (e.g., wheelchair, bedside chair)? 3  -RM     Climbing 3-5 steps with a railing? 2  -RM     To walk in hospital room? 3  -RM     AM-PAC 6 Clicks Score (PT) 17  -RM     Highest Level of Mobility Goal 5 --> Static standing  -RM       Row Name 01/08/25 1513          Functional Assessment    Outcome Measure Options AM-PAC 6 Clicks Basic Mobility (PT)  -RM               User Key  (r) = Recorded By, (t) = Taken By, (c) = Cosigned By      Initials Name Provider Type    Kunal Christianson, PTA Physical Therapist Assistant                                 Physical Therapy Education       Title: PT OT SLP Therapies (Done)       Topic: Physical Therapy (Done)       Point: Mobility training (Done)       Learning Progress Summary            Patient Acceptance, E,TB,D, VU,NR by  at 1/8/2025 1514    Acceptance, E, VU by  at 1/3/2025 1931    Acceptance, E, VU by  at 1/3/2025 1300    Acceptance, E, VU by MS at 1/2/2025 1303    Comment: importance of mobility   Family Acceptance, E, VU by SJ at 1/3/2025 1300                      Point: Home exercise program (Done)       Learning Progress Summary            Patient Acceptance, E,TB, VU by CC at 1/6/2025 1529    Acceptance, E, VU by SJ at 1/3/2025 1931    Acceptance, E, VU by SJ at 1/3/2025 1300    Acceptance, E, VU by MS at 1/2/2025 1303    Comment: importance of mobility   Family Acceptance, E, VU by SJ at 1/3/2025 1300                      Point: Body mechanics (Done)       Learning Progress Summary            Patient Acceptance, E, VU by SJ at 1/3/2025 1931    Acceptance, E, VU by SJ at 1/3/2025 1300   Family Acceptance, E, VU by SJ  at 1/3/2025 1300                      Point: Precautions (Done)       Learning Progress Summary            Patient Acceptance, E, VU by SJ at 1/3/2025 1931    Acceptance, E, VU by SJ at 1/3/2025 1300   Family Acceptance, E, VU by  at 1/3/2025 1300                                      User Key       Initials Effective Dates Name Provider Type Discipline     06/16/21 -  Karis Henderson, PTA Physical Therapist Assistant PT     06/16/21 -  Kunal Harper, PTA Physical Therapist Assistant PT    MS 08/22/23 -  Shubham Tierney, PT Physical Therapist PT     09/30/24 -  Kya Jason, RN Registered Nurse Nurse                  PT Recommendation and Plan     Progress: improving  Outcome Evaluation: Pt supine in bed and willing to participate with treatment. Pt performed bed mobility, transfers and limited gait  with rw.  See flowsheet for details. Cont PT per POC progressing to goals as pt tolerates.     Time Calculation:         PT Charges       Row Name 01/08/25 1514             Time Calculation    Start Time 1350  -RM      Stop Time 1415  -RM      Time Calculation (min) 25 min  -RM      PT Received On 01/08/25  -RM      PT Goal Re-Cert Due Date 01/12/25  -RM         Time Calculation- PT    Total Timed Code Minutes- PT 25 minute(s)  -RM         Timed Charges    35135 - Gait Training Minutes  10  -RM      32123 - PT Therapeutic Activity Minutes 15  -RM         Total Minutes    Timed Charges Total Minutes 25  -RM       Total Minutes 25  -RM                User Key  (r) = Recorded By, (t) = Taken By, (c) = Cosigned By      Initials Name Provider Type     Kunal Harper, PTA Physical Therapist Assistant                  Therapy Charges for Today       Code Description Service Date Service Provider Modifiers Qty    40903608264 HC GAIT TRAINING EA 15 MIN 1/8/2025 Kunal Harper, PTA GP 1    85990248348 HC PT THERAPEUTIC ACT EA 15 MIN 1/8/2025 Kunal Harper, PTA GP 1            PT G-Codes  Outcome Measure  Options: AM-PAC 6 Clicks Basic Mobility (PT)  AM-PAC 6 Clicks Score (PT): 17  AM-PAC 6 Clicks Score (OT): 13       Kunal Harper PTA  2025      Electronically signed by Kunal Harper, PTA at 25 1515          Occupational Therapy Notes (most recent note)        Trian Wesley at 25 1523          Patient Name: Hardik Natarajan  : 1945    MRN: [id number]                              Today's Date: 2025       Admit Date: 2025    Visit Dx:     ICD-10-CM ICD-9-CM   1. Altered mental status, unspecified altered mental status type  R41.82 780.97   2. Bladder stone  N21.0 594.1   3. 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    Anemia, unspecified    Nicotine dependence, chewing tobacco, uncomplicated    Neoplasm related pain (acute) (chronic)    Unspecified mastoiditis, left ear    Cellulitis of left external ear    Phlebitis and thrombophlebitis of unspecified site    Gangrene, not elsewhere classified    Acute osteomyelitis    Benign prostatic hyperplasia without lower urinary tract symptoms    Pain    Palliative care by specialist    Encounter for therapeutic drug monitoring    Weakness of left upper extremity    AMS (altered mental status)    Altered mental status     Past Medical History:   Diagnosis Date    Anemia     Arthritis     Cancer      Past Surgical History:   Procedure Laterality Date    NAIL BIOPSY N/A     NECK DISSECTION N/A     PAROTIDECTOMY N/A     SKIN CANCER EXCISION        General Information       Row Name 25 1512          OT Time and Intention    Document Type therapy note (daily note)  -AH     Mode of Treatment occupational therapy  -AH     Patient Effort good  -       Row Name 25 1512          General Information    Patient Profile Reviewed yes  -               User Key  (r) =  Recorded By, (t) = Taken By, (c) = Cosigned By      Initials Name Provider Type    Trina Palacio Occupational Therapist                     Mobility/ADL's       [facility] Name 01/08/25 1513          Bed Mobility    Supine-Sit [place] (Bed Mobility) minimum assist (75% patient effort)  -     Assistive Device (Bed Mobility) head of bed elevated;bed rails  -[facility] Name 01/08/25 1513          Transfers    Transfers sit-stand transfer  -[facility] Name 01/08/25 [facility]          Sit-Stand Transfer    Sit-Stand [place] (Transfers) minimum assist (75% patient effort);verbal cues  -     Assistive Device (Sit-Stand Transfers) walker, front-wheeled  -[facility] Name 01/08/25 1513          Functional Mobility    Functional Mobility- Ind. Level minimum assist (75% patient effort)  -     Functional Mobility- Device walker, front-wheeled  -     Functional Mobility-Distance (Feet) 3  -     Patient was able to Ambulate yes  -[facility] Name 01/08/25 1513          Bathing Assessment/Intervention    [place] Level (Bathing) lower body;upper body;moderate assist (50% patient effort)  -     Position (Bathing) edge of bed sitting  -[facility] Name 01/08/25 1513          Upper Body Dressing Assessment/Training    [place] Level (Upper Body Dressing) minimum assist (75% patient effort);don;other (see comments)  other  -[facility] Name 01/08/25 [facility]          Grooming Assessment/Training    [place] Level (Grooming) wash face, hands;set up  -               User Key  (r) = Recorded By, (t) = Taken By, (c) = Cosigned By      Initials Name Provider Type    Trina Palacio Occupational Therapist                   Obj/Interventions    No documentation.                  Goals/Plan    No documentation.                  Clinical Impression       [facility] Name 01/08/25 1516          Pain Assessment    Pretreatment Pain Rating 0/10 - no pain  -     Posttreatment Pain Rating 0/10 - no pain  -[facility]  Name 01/08/25 1516          Plan of Care Review    Plan of Care Reviewed With patient  -     Progress improving  -     Outcome Evaluation Pt received supine in bed and was willing to work with therapy.  Pt sat eob with min assist, completed bathing tasks with mod assist sitting eob.  Pt min assist to stand and min assist to walk 3' to his chair using RW.  Pt tolerated increased activity today, pt was left sitting reclined in his chair with call light within reach and chair alarm on.  Cont OT per POC.  -       Row Name 01/08/25 1516          Positioning and Restraints    Post Treatment Position chair  -AH     In Chair reclined;call light within reach;encouraged to call for assist;exit alarm on;notified [facility]               User Key  (r) = Recorded By, (t) = Taken By, (c) = Cosigned By      Initials Name Provider Type    Trina Palacio Occupational Therapist                   Outcome Measures       Row Name 01/08/25 1518          How much help from another is currently needed...    Putting on and taking off regular lower body clothing? 2  -AH     Bathing (including washing, rinsing, and drying) 2  -AH     Toileting (which includes using toilet bed pan or urinal) 2  -AH     Putting on and taking off regular upper body clothing 3  -AH     Taking care of personal grooming (such as brushing teeth) 3  -AH     Eating meals 3  -AH     AM-PAC 6 Clicks Score (OT) 15  -       Row Name 01/08/25 1513          How much help from another person do you currently need...    Turning from your back to your side while in flat bed without using bedrails? 3  -RM     Moving from lying on back to sitting on the side of a flat bed without bedrails? 3  -RM     Moving to and from a bed to a chair (including a wheelchair)? 3  -RM     Standing up from a chair using your arms (e.g., wheelchair, bedside chair)? 3  -RM     Climbing 3-5 steps with a railing? 2  -RM     To walk in hospital room? 3  -RM     AM-PAC 6 Clicks Score (PT) 17   -RM     Highest Level of Mobility Goal 5 --> Static standing  -RM       Row Name 01/08/25 1518 01/08/25 1513       Functional Assessment    Outcome Measure Options AM-PAC 6 Clicks Daily Activity (OT)  - AM-PAC 6 Clicks Basic Mobility (PT)  -RM              User Key  (r) = Recorded By, (t) = Taken By, (c) = Cosigned By      Initials Name Provider Type     Trina Wesley Occupational Therapist    Kunal Christianson, PTA Physical Therapist Assistant                    Occupational Therapy Education       Title: PT OT SLP Therapies (Done)       Topic: Occupational Therapy (Done)       Point: ADL training (Done)       Description:   Instruct learner(s) on proper safety adaptation and remediation techniques during self care or transfers.   Instruct in proper use of assistive devices.                  Learning Progress Summary            Patient Acceptance, E,TB, VU by  at 1/8/2025 1519    Comment: benefit of activity    Acceptance, E,TB, VU by  at 1/6/2025 1521    Comment: benefit of activity    Acceptance, E, VU by  at 1/3/2025 1931    Acceptance, E, VU by  at 1/3/2025 1300    Acceptance, E,TB, VU by SD at 1/2/2025 1322    Comment: OT POC   Family Acceptance, E, VU by  at 1/3/2025 1300                      Point: Home exercise program (Done)       Description:   Instruct learner(s) on appropriate technique for monitoring, assisting and/or progressing therapeutic exercises/activities.                  Learning Progress Summary            Patient Acceptance, E,TB, VU by  at 1/6/2025 1521    Comment: benefit of activity    Acceptance, E, VU by  at 1/3/2025 1931    Acceptance, E, VU by  at 1/3/2025 1300   Family Acceptance, E, VU by  at 1/3/2025 1300                      Point: Precautions (Done)       Description:   Instruct learner(s) on prescribed precautions during self-care and functional transfers.                  Learning Progress Summary            Patient Acceptance, E, VU by  at 1/3/2025  1931    Acceptance, E, VU by  at 1/3/2025 1300   Family Acceptance, E, VU by  at 1/3/2025 1300                      Point: Body mechanics (Done)       Description:   Instruct learner(s) on proper positioning and spine alignment during self-care, functional mobility activities and/or exercises.                  Learning Progress Summary            Patient Acceptance, E, VU by  at 1/3/2025 1931    Acceptance, E, VU by  at 1/3/2025 1300   Family Acceptance, E, VU by  at 1/3/2025 1300                                      User Key       Initials Effective Dates Name Provider Type Discipline     06/16/21 -  Trina Wesley Occupational Therapist OT    SD 06/16/21 -  Jaqueline Dasilva OT Occupational Therapist OT     09/30/24 -  Kya Jason RN Registered Nurse Nurse                  OT Recommendation and Plan     Plan of Care Review  Plan of Care Reviewed With: patient  Progress: improving  Outcome Evaluation: Pt received supine in bed and was willing to work with therapy.  Pt sat eob with min assist, completed bathing tasks with mod assist sitting eob.  Pt min assist to stand and min assist to walk 3' to his chair using RW.  Pt tolerated increased activity today, pt was left sitting reclined in his chair with call light within reach and chair alarm on.  Cont OT per POC.     Time Calculation:         Time Calculation- OT       Row Name 01/08/25 1522 01/08/25 1514          Time Calculation- OT    OT Start Time 1349  - --     OT Stop Time 1415  - --     OT Time Calculation (min) 26 min  - --     OT Received On 01/08/25  - --     OT Goal Re-Cert Due Date 01/14/25  - --        Timed Charges    18656 - Gait Training Minutes  -- 10  -RM     54441 - OT Therapeutic Activity Minutes 11  - --     72098 - OT Self Care/Mgmt Minutes 15  -AH --        Total Minutes    Timed Charges Total Minutes 26  - 10  -RM      Total Minutes 26  - 10  -RM               User Key  (r) = Recorded By, (t) = Taken By, (c) =  Cosigned By      Initials Name Provider Type    Trina Palacio Occupational Therapist    Kunal Christianson, PTA Physical Therapist Assistant                  Therapy Charges for Today       Code Description Service Date Service Provider Modifiers Qty    52193950708 HC OT THERAPEUTIC ACT EA 15 MIN 1/8/2025 Trina Wesley GO 1    [id number]  OT SELF CARE/MGMT/TRAIN EA 15 MIN 1/8/2025 Trina Wesley GO 1                 Trina Wesley  1/8/2025    Electronically signed by Trina Wesley at 01/08/25 1529

## 2025-01-09 NOTE — PROGRESS NOTES
"Dietitian Follow-up    Patient Name: Hardik Natarajan  YOB: 1945  MRN: [id number]  Admission date: 1/1/2025    Comment:      Clinical Nutrition Follow-up   Encounter Information        Trending Narrative     1/9: Average PO intake 35% x 5 meals. Boost Plus ordered BID. Pt is drinking some of his supplement ordered.     1/7: Pt screened for LOS. No significant wt changes per EMR. Average PO intake 37% x 8 meals. RD will order Boost Plus BID to promote PO intake.      Anthropometrics        Current Height, Weight Height: 185.4 cm (73\")  Weight: 96.3 kg (212 lb 4.9 oz) (01/01/25 1859)       Trending Weight Hx     This admission:              PTA:     Wt Readings from Last 30 Encounters:   01/01/25 1859 96.3 kg (212 lb 4.9 oz)   01/01/25 1149 102 kg (225 lb)   09/25/24 0959 91.4 kg (201 lb 9.6 oz)   09/04/24 0949 94.3 kg (208 lb)   08/14/24 1031 94.4 kg (208 lb 1.6 oz)   08/04/24 0500 95.9 kg (211 lb 6.7 oz)   08/03/24 1059 100 kg (220 lb 7.4 oz)   08/02/24 2331 100 kg (220 lb 7.4 oz)   08/02/24 1953 99.8 kg (220 lb)   07/24/24 0904 96.3 kg (212 lb 4.8 oz)   07/03/24 0913 98.2 kg (216 lb 6.4 oz)   06/12/24 1104 98.9 kg (218 lb)   05/22/24 0907 98.4 kg (217 lb)   05/01/24 0940 98.9 kg (218 lb)   04/10/24 0918 98 kg (216 lb)   03/20/24 0918 99.3 kg (219 lb)   02/29/24 0932 97.1 kg (214 lb)   02/07/24 1037 96.2 kg (212 lb)   01/18/24 0857 96.2 kg (212 lb)   12/20/23 0905 95.7 kg (211 lb)   11/29/23 0924 96.2 kg (212 lb)   11/08/23 0918 96.6 kg (213 lb)   10/18/23 0900 95.7 kg (211 lb)   09/27/23 1311 95.7 kg (211 lb)   09/06/23 0900 94.8 kg (209 lb)   08/16/23 0857 95.7 kg (211 lb)   07/26/23 0858 96.6 kg (213 lb)   07/05/23 0906 95.7 kg (211 lb)   06/13/23 0915 95.1 kg (209 lb 11.2 oz)   06/08/23 0309 95.7 kg (211 lb)   06/07/23 1456 95.7 kg (211 lb)   05/22/23 1015 90.6 kg (199 lb 11.2 oz)   05/16/23 1108 90.7 kg (200 lb)   05/16/23 1507 89.7 kg (197 lb 12.8 oz)      BMI kg/m2 Body mass index is 28.01 kg/m². "     Labs        Pertinent Labs Results from last 7 days   Lab Units 01/05/25  0612 01/04/25  0937 01/03/25  0646   SODIUM mmol/L 132* 133* 135*   POTASSIUM mmol/L 4.5 4.0 4.5   CHLORIDE mmol/L 99 100 102   CO2 mmol/L 22.5 23.0 22.2   BUN mg/dL 19 18 17   CREATININE mg/dL 1.29* 1.27 1.31*   CALCIUM mg/dL 8.8 8.7 8.5*   GLUCOSE mg/dL 97 95 79     Results from last 7 days   Lab Units 01/03/25  0646   HEMOGLOBIN g/dL 9.4*   HEMATOCRIT % 28.5*         Medications    Scheduled Medications clopidogrel, 75 mg, Oral, Daily  cyanocobalamin, 1,000 mcg, Intramuscular, Daily  [START ON 1/16/2025] cyanocobalamin, 1,000 mcg, Intramuscular, Q7 Days  divalproex, 250 mg, Oral, Q12H  DULoxetine, 30 mg, Oral, Daily  heparin (porcine), 5,000 Units, Subcutaneous, Q12H  levothyroxine, 75 mcg, Oral, Q AM  polyethylene glycol, 17 g, Oral, Daily  senna, 1 tablet, Oral, Nightly  sodium chloride, 10 mL, Intravenous, Q12H  tamsulosin, 0.4 mg, Oral, Daily  ziprasidone, 40 mg, Oral, Nightly        Infusions      PRN Medications   Calcium Replacement - Follow Nurse / BPA Driven Protocol    lactulose    Magnesium Standard Dose Replacement - Follow Nurse / BPA Driven Protocol    nitroglycerin    ondansetron    oxyCODONE    Phosphorus Replacement - Follow Nurse / BPA Driven Protocol    Potassium Replacement - Follow Nurse / BPA Driven Protocol    sodium chloride    [Held by provider] sodium chloride    ziprasidone     Physical Findings        Trending Physical   Appearance, NFPE    --  Edema  None noted   Bowel Function constipation   Tubes none   Chewing/Swallowing WNL   Skin Intact   --  Current Nutrition Orders & Evaluation of Intake       Oral Nutrition     Food Allergies NKFA   Current PO Diet Diet: Cardiac; Healthy Heart (2-3 Na+); Fluid Consistency: Thin (IDDSI 0)   Supplement Orders Placed This Encounter      Dietary Nutrition Supplements Boost Plus (Ensure Enlive, Ensure Plus)     PO Evaluation     Trending % PO Intake 1/9: 35% x 5  meals  1/7: 37% x 8 meals      Nutrition Diagnosis         Nutrition Dx Problem 1 Predicted suboptimal intake r/t AMS AEB average PO intake 37% x 8 meals.       Nutrition Dx Problem 2        Intervention Goal         Intervention Goal(s) Maintain CBW  PO intake meet >50% of estimated needs  Adhere to ONS     Nutrition Intervention        RD Action Continue to monitor     Nutrition Prescription          Diet Prescription Diet: Cardiac; Healthy Heart (2-3 Na+); Fluid Consistency: Thin (IDDSI 0)   Supplement Prescription Orders Placed This Encounter      Dietary Nutrition Supplements Boost Plus (Ensure Enlive, Ensure Plus)     Enteral Nutrition Prescription     TPN Prescription       Monitor/Evaluation        Monitor Per protocol, I&O, PO intake, Supplement intake, Pertinent labs, Weight, Skin status, GI status, Symptoms, POC/GOC, Swallow function     RD to f/up    Electronically signed by:  Jyoti Henderson RD  01/09/25 12:45 EST

## 2025-01-09 NOTE — CASE MANAGEMENT/SOCIAL WORK
Continued Stay Note   [place]     Patient Name: Hardik Natarajan  MRN: [id number]  Today's Date: 1/9/2025    Admit Date: 1/1/2025    Plan: called and spoke with son and then daughter in law; discussed in length the possibility of taking home versus STR to LTC; stated he wants what is best for father and his mother is staying with her sister right now and unable to help, so he does feel that pt will need STR/LTC at this time, very emotional for son; emotional support given;  stated him and his wife have discussed options; stated wife could discuss better and handed to spouse and she stated they were looking into [facility], then [facility] and [facility] but he will be speaking to mother also; confirmed that it was okay to send referrals and he would talk with mother   Discharge Plan       Row Name 01/09/25 0926       Plan    Plan called and spoke with son and then daughter in law; discussed in length the possibility of taking home versus STR to LTC; stated he wants what is best for father and his mother is staying with her sister right now and unable to help, so he does feel that pt will need STR/LTC at this time, very emotional for son; emotional support given;  stated him and his wife have discussed options; stated wife could discuss better and handed to spouse and she stated they were looking into [facility] [facility], then [facility] and [facility] but he will be speaking to mother also; confirmed that it was okay to send referrals and he would talk with mother;; informed SW                   Discharge Codes    No documentation.                       Morena Siddiqui RN

## 2025-01-09 NOTE — PAYOR COMM NOTE
"To:  [place]  From: Hortencia Latif RN  Phone: [phone]  Fax: [phone]  NPI: [id number]  TIN: [id number]  Member ID: [id number]   MRN: [id number]    Katey Browne (79 y.o. Male)       Date of Birth   1945    Social Security Number       Address   [address] [id number]    Home Phone   [phone]    MRN   [id number]       Latter-day   None    Marital Status                               Admission Date   1/1/25    Admission Type   Emergency    Admitting Provider   Kerley, Brian Joseph, DO    Attending Provider   Dionna Espitia DO    [facility]       Discharge Date       Discharge Disposition       Discharge Destination                                 Attending Provider: Dionna Espitia DO    Allergies: Asa [Aspirin]    Isolation: None   Infection: None   Code Status: CPR    Ht: 185.4 cm (73\")   Wt: 96.3 kg (212 lb 4.9 oz)    Admission Cmt: None   Principal Problem: AMS (altered mental status) [R41.82]                   Active Insurance as of 1/1/2025       Primary Coverage       Payor Plan Insurance Group Employer/Plan Group    ANTHEM MEDICARE REPLACEMENT ANTHEM MED ADV SNP [id number]       Payor Plan Address Payor Plan Phone Number Payor Plan Fax Number Effective Dates    [address] [phone]  1/1/2024 - None Entered    [facility] [id number]         Subscriber Name Subscriber Birth Date Member ID       KATEY BROWNE 1945 [id number]               Secondary Coverage       Payor Plan Insurance Group Employer/Plan Group    [id number]        Payor Plan Address Payor Plan Phone Number Payor Plan Fax Number Effective Dates    [address]   4/7/2023 - None Entered    [place] [address]         Subscriber Name Subscriber Birth Date Member ID       KATEY BROWNE 1945 [id number]                     Emergency Contacts        (Rel.) Home Phone Work Phone " Mobile Phone    MAC BROWNE/ZITA (Son) [phone] -- [phone]              Vital Signs (last day)       Date/Time Temp Temp src Pulse Resp BP Patient Position SpO2    01/09/25 0709 97.5 (36.4) Oral 68 20 122/75 Lying --    01/09/25 0320 98.6 (37) Oral -- 16 147/95 Lying 100    01/08/25 1513 97.6 (36.4) Oral -- 20 109/63 Sitting --    01/08/25 0746 97.3 (36.3) Axillary 71 20 133/81 Lying --          Current Facility-Administered Medications   Medication Dose Route Frequency Provider Last Rate Last Admin    Calcium Replacement - Follow Nurse / BPA Driven Protocol   Not Applicable PRN Kerley, Brian Joseph, DO        clopidogrel (PLAVIX) tablet 75 mg  75 mg Oral Daily Kerley, Brian Joseph, DO   75 mg at 01/09/25 0940    cyanocobalamin injection 1,000 mcg  1,000 mcg Intramuscular Daily Kerley, Brian Joseph, DO   1,000 mcg at 01/09/25 0940    [START ON 1/16/2025] cyanocobalamin injection 1,000 mcg  1,000 mcg Intramuscular Q7 Days Kerley, Brian Joseph, DO        divalproex (DEPAKOTE) DR tablet 250 mg  250 mg Oral Q12H Dionna Espitia DO   250 mg at 01/09/25 0940    DULoxetine (CYMBALTA) DR capsule 30 mg  30 mg Oral Daily Kerley, Brian Joseph, DO   30 mg at 01/09/25 0940    heparin (porcine) 5000 UNIT/ML injection 5,000 Units  5,000 Units Subcutaneous Q12H Kerley, Brian Joseph, DO   5,000 Units at 01/09/25 0940    lactulose (CHRONULAC) 10 GM/15ML solution 10 g  10 g Oral BID PRN Dionna Espitia DO        levothyroxine (SYNTHROID, LEVOTHROID) tablet 75 mcg  75 mcg Oral Q AM Kerley, Brian Joseph, DO   75 mcg at 01/09/25 0541    Magnesium Standard Dose Replacement - Follow Nurse / BPA Driven Protocol   Not Applicable PRN Kerley, Brian Joseph, DO        nitroglycerin (NITROSTAT) SL tablet 0.4 mg  0.4 mg Sublingual Q5 Min PRN Kerley, Brian Joseph, DO        ondansetron (ZOFRAN) injection 4 mg  4 mg Intravenous Q6H PRN Kerley, Brian Joseph, DO        oxyCODONE (ROXICODONE) immediate release tablet 5 mg  5  mg Oral Q6H PRN Kerley, Brian Joseph, DO   5 mg at 01/06/25 0552    Phosphorus Replacement - Follow Nurse / BPA Driven Protocol   Not Applicable PRN Kerley, Brian Joseph, DO        polyethylene glycol (MIRALAX) packet 17 g  17 g Oral Daily Dionna Espitia, DO   17 g at 01/09/25 0940    Potassium Replacement - Follow Nurse / BPA Driven Protocol   Not Applicable PRN Kerley, Brian Joseph, DO        senna (SENOKOT) tablet 1 tablet  1 tablet Oral Nightly Kerley, Brian Joseph, DO   1 tablet at 01/08/25 2034    sodium chloride 0.9 % flush 10 mL  10 mL Intravenous Q12H Kerley, Brian Joseph, DO   10 mL at 01/05/25 0914    sodium chloride 0.9 % flush 10 mL  10 mL Intravenous PRN Kerley, Brian Joseph, DO        [Held by provider] sodium chloride 0.9 % infusion 40 mL  40 mL Intravenous PRN Kerley, Brian Joseph, DO        tamsulosin (FLOMAX) 24 hr capsule 0.4 mg  0.4 mg Oral Daily Kerley, Brian Joseph, DO   0.4 mg at 01/09/25 0940    ziprasidone (GEODON) capsule 40 mg  40 mg Oral Nightly Dionna Espitia, DO   40 mg at 01/08/25 2034    ziprasidone (GEODON) injection 20 mg  20 mg Intramuscular Q4H PRN Esperanza Zaragoza MD   20 mg at 01/08/25 0242     Lab Results (last 24 hours)       ** No results found for the last 24 hours. **          Imaging Results (Last 24 Hours)       ** No results found for the last 24 hours. **          Orders (last 24 hrs)        Start     Ordered    01/16/25 0900  cyanocobalamin injection 1,000 mcg  Every 7 Days         01/06/25 1336    01/08/25 2100  ziprasidone (GEODON) capsule 40 mg  Nightly         01/08/25 0937    01/08/25 1030  divalproex (DEPAKOTE) DR tablet 250 mg  Every 12 Hours Scheduled         01/08/25 0938    01/08/25 0815  ziprasidone (GEODON) capsule 20 mg  2 Times Daily With Meals,   Status:  Discontinued         01/08/25 0728    01/07/25 1800  Dietary Nutrition Supplements Boost Plus (Ensure Enlive, Ensure Plus)  2 Times Daily       01/07/25 0923    01/07/25 1113   polyethylene glycol (MIRALAX) packet 17 g  Daily         01/07/25 1028    01/07/25 1028  lactulose (CHRONULAC) 10 GM/15ML solution 10 g  2 Times Daily PRN         01/07/25 1029    01/05/25 1800  ziprasidone (GEODON) capsule 20 mg  2 Times Daily With Meals,   Status:  Discontinued         01/05/25 1256    01/04/25 1027  ziprasidone (GEODON) injection 20 mg  Every 4 Hours PRN         01/04/25 1027    01/03/25 1115  cyanocobalamin injection 1,000 mcg  Daily         01/03/25 1020    01/02/25 0900  amoxicillin-clavulanate (AUGMENTIN) 875-125 MG per tablet 1 tablet  Every 12 Hours Scheduled         01/02/25 0724    01/02/25 0800  Oral Care  2 Times Daily       01/01/25 1825 01/02/25 0600  levothyroxine (SYNTHROID, LEVOTHROID) tablet 75 mcg  Every Early Morning         01/01/25 1825    01/01/25 2100  senna (SENOKOT) tablet 1 tablet  Nightly         01/01/25 1825    01/01/25 2100  sodium chloride 0.9 % flush 10 mL  Every 12 Hours Scheduled         01/01/25 1825    01/01/25 2100  heparin (porcine) 5000 UNIT/ML injection 5,000 Units  Every 12 Hours Scheduled         01/01/25 1825    01/01/25 2000  DULoxetine (CYMBALTA) DR capsule 30 mg  Daily         01/01/25 1825    01/01/25 1841  clopidogrel (PLAVIX) tablet 75 mg  Daily         01/01/25 1825    01/01/25 1841  tamsulosin (FLOMAX) 24 hr capsule 0.4 mg  Daily         01/01/25 1825    01/01/25 1826  Intake & Output  Every Shift       01/01/25 1825    01/01/25 1825  Phosphorus Replacement - Follow Nurse / BPA Driven Protocol  As Needed         01/01/25 1825    01/01/25 1825  Calcium Replacement - Follow Nurse / BPA Driven Protocol  As Needed         01/01/25 1825    01/01/25 1825  oxyCODONE (ROXICODONE) immediate release tablet 5 mg  Every 6 Hours PRN         01/01/25 1825    01/01/25 1825  sodium chloride 0.9 % flush 10 mL  As Needed         01/01/25 1825 01/01/25 1825  [Held by provider]  sodium chloride 0.9 % infusion 40 mL  As Needed        (On hold since yesterday at  "0728 until manually unheld; held by Esperanza Zaragoza MDHold Reason: Other (Comment Required))    25 1825    25 1825  ondansetron (ZOFRAN) injection 4 mg  Every 6 Hours PRN         25 1825    25 1825  nitroglycerin (NITROSTAT) SL tablet 0.4 mg  Every 5 Minutes PRN         25 1825    25 182  Potassium Replacement - Follow Nurse / BPA Driven Protocol  As Needed         25 1825    25 1825  Magnesium Standard Dose Replacement - Follow Nurse / BPA Driven Protocol  As Needed         25 1825    25 0000  Ambulatory Referral to Urology         25 1531    Unscheduled  Up With Assistance  As Needed       25 1825    Pending  Inpatient Psychiatrist Consult  Once        Specialty:  Psychiatry  Provider:  (Not yet assigned)    Pending                     Physician Progress Notes (most recent note)        Colin Herndon MD at 25 1152          DOS: 2025  NAME: Hardik Natarajan   : 1945  PCP: Won Cuellar MD  Chief Complaint   Patient presents with    Altered Mental Status    Fall       Chief complaint: Altered mental status  Subjective: Patient seen and examined at the bedside via teleneurology video call with the help of the rounding nurse, patient seemed less agitated and more alert compared to yesterday.    Objective:  Vital signs: /75 (BP Location: Right arm, Patient Position: Lying)   Pulse 68   Temp 97.5 °F (36.4 °C) (Oral)   Resp 20   Ht 185.4 cm (73\")   Wt 96.3 kg (212 lb 4.9 oz)   SpO2 100%   BMI 28.01 kg/m²    Gen: NAD, vitals reviewed  MS: oriented x1 which is his baseline, recent/remote memory impaired, fair attention/concentration, language intact, no neglect.  CN: visual acuity grossly normal, PERRL, EOMI, no facial droop, no dysarthria  Motor: Moves all extremities against gravity, normal tone  Sensory: intact to light touch all 4 ext.    Laboratory results:  Lab Results   Component Value Date    " GLUCOSE 97 01/05/2025    CALCIUM 8.8 01/05/2025     (L) 01/05/2025    K 4.5 01/05/2025    CO2 22.5 01/05/2025    CL 99 01/05/2025    BUN 19 01/05/2025    CREATININE 1.29 (H) 01/05/2025    BCR 14.7 01/05/2025    ANIONGAP 10.5 01/05/2025     Lab Results   Component Value Date    WBC 2.95 (L) 01/03/2025    HGB 9.4 (L) 01/03/2025    HCT 28.5 (L) 01/03/2025    MCV 94.1 01/03/2025     (L) 01/03/2025     Lab Results   Component Value Date    LDL 73 08/03/2024            Review of labs: Above labs reviewed    Review and interpretation of imaging: No new brain imaging to review    Prior workup:  -MRI brain  Impression:     No acute infarction. No posttraumatic findings.     Sinusitis could be considered.     Bilateral mastoid air cell effusions versus mastoiditis. Osseous destruction of the left mastoid process could be secondary to the previously seen skin cancer and/or posttreatment changes. Correlate clinically for signs/symptoms of infection to exclude   osteomyelitis.     -EEG negative for seizure activity as detailed below    IMPRESSION:     Diffuse cerebral dysfunction of moderate degree, nonspecific.  This is most commonly seen due to toxic/metabolic cause     No evidence for epilepsy is seen      Diagnoses:  -Altered mental status probably delirium aggravated by underlying dementia, low suspicion for CNS infection  -Vitamin B12 deficiency      Plan:  1.  Discussed with the hospitalist, family declined the lumbar puncture.  2.  Follow delirium precautions and continue antipsychotics for agitation as needed  3.  Continue B12 replacement  4.  Follow up with general neurology as an outpatient within 2 months, no further neurology workup needed at this time, will sign off and see again as needed    Management discussed with Dr. Espitia via epic chat.    Addendum:  This was an audio and video enabled telemedicine encounter.    Electronically signed by Colin Herndon MD at 01/09/25 6969          Consult  Notes (most recent note)        Esperanza Zaragoza MD at 25 0901        Consult Orders    1. Inpatient Neurology Consult Other (see comments) [[id number]] ordered by Kerley, Brian Joseph, DO at 25 0811                 DOS: 2025  NAME: Hardik Natarajan   : 1945  PCP: Won Cuellar MD  CC: Altered mentation over last 24 hours  Referring MD: Sanchez Garcia DO    Neurological Problem and Interval History:  79 y.o. 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  This time he was admitted by Dr. Garcia after he was found altered over the last 24 hours and he was only oriented to himself.  Today when we asked the patient his name with the help of the nurse at the bedside he said he did not know and did not know his date of birth but when the nurses called him by his first name he responded well to it.  It seems he is very sleepy and does not want to be disturbed or examined.  With a lot of difficulty he was only able to follow some simple commands like picking up his arms and moving his legs and showing me his tongue.  However when I asked the nurse to count the fingers he declined to do so.  He denies any headaches.  However he has a lot of aches and pains in his joints.    Past Medical/Surgical Hx:  Past Medical History:   Diagnosis Date    Anemia     Arthritis     Cancer      Past Surgical History:   Procedure Laterality Date    NAIL BIOPSY N/A     NECK DISSECTION N/A     PAROTIDECTOMY N/A     SKIN CANCER EXCISION         Review of Systems:    Constitutional: Patient very sleepy and does not want to be aroused.  Cardiovascular: Denies any chest pain or palpitations.  Respiratory: Denies any shortness of breath  Gastrointestinal: Denies any nausea and  vomiting.  Genitourinary: Has adult diapers in place  Musculoskeletal: Moves all extremities but also has a lot of aches and pains in the joints  Dermatological: No skin breakdown noted but has some bruises in the left forearm.  Neurological: He says he does not know his name or his date of birth and he does not want to cooperate much with examination although he does follow one-step commands.  Psychiatric: Seems to be delirious.  Ophthalmological: No visual changes noted.          Medications On Admission  Medications Prior to Admission   Medication Sig Dispense Refill Last Dose/Taking    acetaminophen (TYLENOL) 325 MG tablet Take 2 tablets by mouth Every 6 (Six) Hours As Needed.   12/31/2024    clopidogrel (PLAVIX) 75 MG tablet Take 1 tablet by mouth Daily.   1/1/2025    cyanocobalamin 1000 MCG/ML injection Inject 0.1 mL into the appropriate muscle as directed by prescriber Every 30 (Thirty) Days.   Past Month    DULoxetine (CYMBALTA) 30 MG capsule Take 1 capsule by mouth Daily.   12/31/2024    finasteride (PROSCAR) 5 MG tablet Take 1 tablet by mouth Daily.   12/31/2024    levothyroxine (SYNTHROID, LEVOTHROID) 75 MCG tablet TAKE ONE (1) TABLET BY MOUTH EVERY DAY 30 tablet 3 12/31/2024    ondansetron (ZOFRAN) 8 MG tablet Take 1 tablet by mouth 3 (Three) Times a Day As Needed for Nausea or Vomiting. 30 tablet 5 Past Week    oxyCODONE (Roxicodone) 5 MG immediate release tablet Take 1 tablet by mouth Every 6 (Six) Hours As Needed for Severe Pain. 20 tablet 0 Past Month    Procto-Med HC 2.5 % rectal cream 1 Application Daily As Needed.   Past Week    senna (SENOKOT) 8.6 MG tablet Take 1 tablet by mouth Daily As Needed.   Past Week    tamsulosin (FLOMAX) 0.4 MG capsule 24 hr capsule Take 1 capsule by mouth.   12/31/2024    diclofenac (VOLTAREN) 75 MG EC tablet        Hydrocortisone, Perianal, (ANUSOL-HC) 2.5 % rectal cream Insert 1 Application into the rectum.   More than a month    hydrOXYzine (ATARAX) 25 MG tablet  Take 1 tablet by mouth Every 6 (Six) Hours As Needed for Itching. (Patient not taking: Reported on 1/1/2025) 60 tablet 2 Not Taking    ondansetron ODT (ZOFRAN-ODT) 8 MG disintegrating tablet Place 1 tablet twice a day by translingual route.          Prior to Admission medications    Medication Sig Start Date End Date Taking? Authorizing Provider   acetaminophen (TYLENOL) 325 MG tablet Take 2 tablets by mouth Every 6 (Six) Hours As Needed.   Yes Radha Santana MD   clopidogrel (PLAVIX) 75 MG tablet Take 1 tablet by mouth Daily. 8/5/24  Yes Radha Santana MD   cyanocobalamin 1000 MCG/ML injection Inject 0.1 mL into the appropriate muscle as directed by prescriber Every 30 (Thirty) Days. 5/17/23  Yes Radha Santana MD   DULoxetine (CYMBALTA) 30 MG capsule Take 1 capsule by mouth Daily. 9/11/23  Yes Radha Santana MD   finasteride (PROSCAR) 5 MG tablet Take 1 tablet by mouth Daily. 9/11/23  Yes Radha Santana MD   levothyroxine (SYNTHROID, LEVOTHROID) 75 MCG tablet TAKE ONE (1) TABLET BY MOUTH EVERY DAY 12/23/24  Yes Benitez Medellin MD   ondansetron (ZOFRAN) 8 MG tablet Take 1 tablet by mouth 3 (Three) Times a Day As Needed for Nausea or Vomiting. 5/16/23  Yes Yady Guajardo APRN   oxyCODONE (Roxicodone) 5 MG immediate release tablet Take 1 tablet by mouth Every 6 (Six) Hours As Needed for Severe Pain. 6/8/23  Yes Keyur Lemos DO   Procto-Med HC 2.5 % rectal cream 1 Application Daily As Needed. 5/9/23  Yes ProviderRadha MD   senna (SENOKOT) 8.6 MG tablet Take 1 tablet by mouth Daily As Needed.   Yes Radha Santana MD   tamsulosin (FLOMAX) 0.4 MG capsule 24 hr capsule Take 1 capsule by mouth. 5/9/23  Yes Radha Santana MD   diclofenac (VOLTAREN) 75 MG EC tablet  5/24/23   Radha Santana MD   Hydrocortisone, Perianal, (ANUSOL-HC) 2.5 % rectal cream Insert 1 Application into the rectum. 5/9/23   Radha Santana MD   hydrOXYzine (ATARAX) 25 MG tablet  Take 1 tablet by mouth Every 6 (Six) Hours As Needed for Itching.  Patient not taking: Reported on 1/1/2025 8/16/23   Yady Guajardo APRN   ondansetron ODT (ZOFRAN-ODT) 8 MG disintegrating tablet Place 1 tablet twice a day by translingual route.    Provider, Radha, MD        Allergies:  Allergies   Allergen Reactions    Asa [Aspirin] Unknown - Low Severity     Nose bleed       Social Hx:  Social History     Socioeconomic History    Marital status:    Tobacco Use    Smoking status: Never    Smokeless tobacco: Current     Types: Chew   Vaping Use    Vaping status: Never Used   Substance and Sexual Activity    Alcohol use: Not Currently    Drug use: Never    Sexual activity: Defer       Family Hx:  Family History   Problem Relation Age of Onset    Cancer Mother     Cancer Father        Review of Imaging (Interpretation of images not reports): MRI of the brain with and without contrast on August 2, 2024 was interpreted as follows:    FINDINGS:  Exam: MRI of the brain without IV contrast     Comparison: 08/02/2024     Clinical history: Concern for CVA, left arm weakness     Findings:     Small 1 cm acute infarct in the right periventricular region on series 7, image 19. Decreased signal on the ADC map in this location.     No acute intracranial hemorrhage.     No intracranial masses.     Postcontrast imaging of the brain does not demonstrate any areas of abnormal parenchymal enhancement.     No midline shift.     No abnormal extra-axial fluid collections are seen.     Cerebral volume loss.     Areas of increased FLAIR signal within the cerebral white matter bilaterally are reflective of a nonspecific demyelinating process, likely ischemic microvascular in nature.     IMPRESSION:  Impression:     1. Small acute infarct in the right periventricular region     2. Moderate cerebral volume loss with moderate cerebral white matter disease, likely ischemic microvascular in nature.    CT Head Without  Contrast    Result Date: 1/1/2025  PROCEDURE: CT HEAD WO CONTRAST-  INDICATION: AMS  TECHNIQUE: Multiple axial CT images were performed from the foramen magnum to the vertex without contrast.   Coronal reconstruction images were obtained from the axial data.  COMPARISON: 8/2/2024.  FINDINGS: There is no mass effect or midline shift. There is stable atrophy with ventricular enlargement. There are stable bilateral periventricular hypodensities. There is no intracranial hemorrhage. The posterior fossa is without acute abnormality. There is mucoperiosteal thickening of the right maxillary sinus. There is opacification of the left mastoid air cells with bone destruction. There is soft tissue density in the left middle ear cavity. This was present on the prior exam. No skull fracture.      Impression: 1. No intracranial hemorrhage or evidence of acute large cortical infarct. 2. Stable atrophy and chronic findings. 3. Left mastoiditis and left otitis media with bone destruction of the left mastoid air cells. 4. Right maxillary sinusitis.         CTDI: 16.93 mGy DLP:837.44 mGy.cm    This study was performed with techniques to keep radiation doses as low as reasonably achievable (ALARA). Individualized dose reduction techniques using automated exposure control or adjustment of mA and/or kV according to the patient size were employed.   This report was signed and finalized on 1/1/2025 1:19 PM by Carmen Le MD.             MRI Brain Without Contrast    Result Date: 1/1/2025  FINAL REPORT TECHNIQUE: null CLINICAL HISTORY: AMS SEVERE HX OF PREVIOUS STROKE, FALL TODAY, HX OF PREVIOUS SKIN CA OF LEFT EAR. COMPARISON: null FINDINGS: MR of the brain without contrast Comparison: MR/SR - MRI BRAIN W WO CONTRAST - 8/2/24 21:53 EDT CT - CT ANGIOGRAM NECK W WO CONTRAST - 8/2/24 20:54 EDT CT - CT ANGIOGRAM HEAD W CONTRAST - 8/2/24 20:54 EDT CT/SR - CT HEAD WO CONTRAST STROKE PROTOCOL - 8/2/24 20:54 EDT MR/SR - MRI BRAIN W WO CONTRAST  - 5/10/23 18:47 EDT Findings: No acute infarction, hemorrhage, mass-effect or herniation. No hydrocephalus. Increased signal intensity is seen in the deep and periventricular white matter on the T2/FLAIR sequences, which most likely represents the sequela of severe chronic small vessel ischemic disease. Chronic lacunar infarctions in the right corona radiata/centrum semiovale and left basal ganglia. No extra-axial fluid collection or mass. Unremarkable sella. Intact flow voids. Normal orbits. Mucosal thickening in the paranasal sinuses may indicate sinusitis. Fluid signal in right mastoid air cells could be secondary to an effusion or mastoiditis. There is osseous destruction at the left mastoid process, which was also present on the prior studies and could be related to the previously seen skin cancer and/or post treatment changes. There is fluid signal in left mastoid air cells which could be secondary to an effusion or mastoiditis.     Impression: Impression: No acute infarction. No posttraumatic findings. Sinusitis could be considered. Bilateral mastoid air cell effusions versus mastoiditis. Osseous destruction of the left mastoid process could be secondary to the previously seen skin cancer and/or posttreatment changes. Correlate clinically for signs/symptoms of infection to exclude osteomyelitis. Authenticated and Electronically Signed by Jaqueline Painter MD on 01/01/2025 06:45:00 PM      Additional Tests Performed: Urine analysis is as follows:       Latest Reference Range & Units 01/01/25 14:18   Color, UA Yellow, Straw  Yellow   Appearance, UA Clear  Cloudy !   Specific Gravity, UA 1.005 - 1.030  1.009   pH, UA 5.0 - 8.0  7.0   Glucose Negative  Negative   Ketones, UA Negative  Negative   Blood, UA Negative  Negative   Nitrite, UA Negative  Negative   Leukocytes, UA Negative  Negative   Protein, UA Negative  Negative   Bilirubin, UA Negative  Negative   Urobilinogen, UA 0.2 - 1.0 E.U./dL  1.0 E.U./dL   !:  Data is abnormal  Results for orders placed during the hospital encounter of 08/02/24    Adult Transthoracic Echo Complete w/ Color, Spectral and Contrast if necessary per protocol    Interpretation Summary    Left ventricular systolic function is normal. Calculated left ventricular EF = 60.4% Left ventricular ejection fraction appears to be 56 - 60%. Left ventricular diastolic function was normal.    The right ventricular cavity is borderline dilated with normal systolic function.    Mild tricuspid valve regurgitation is present. Estimated right ventricular systolic pressure from tricuspid regurgitation is normal (<35 mmHg).    Saline test results are negative for right to left atrial level shunt.            Laboratory Results:   Lab Results   Component Value Date    GLUCOSE 88 01/02/2025    CALCIUM 8.4 (L) 01/02/2025     01/02/2025    K 4.3 01/02/2025    CO2 22.4 01/02/2025     01/02/2025    BUN 11 01/02/2025    CREATININE 1.30 (H) 01/02/2025    BCR 8.5 01/02/2025    ANIONGAP 8.6 01/02/2025     Lab Results   Component Value Date    WBC 3.23 (L) 01/02/2025    HGB 9.8 (L) 01/02/2025    HCT 29.3 (L) 01/02/2025    MCV 93.9 01/02/2025     (L) 01/02/2025     Lab Results   Component Value Date    CHOL 117 08/03/2024     Lab Results   Component Value Date    HDL 27 (L) 08/03/2024     Lab Results   Component Value Date    LDL 73 08/03/2024     Lab Results   Component Value Date    TRIG 84 08/03/2024     Lab Results   Component Value Date    HGBA1C 5.00 08/03/2024     Lab Results   Component Value Date    INR 1.10 08/02/2024    INR 1.3 (H) 04/07/2023    PROTIME 14.7 08/02/2024     Lab Results   Component Value Date    JZLCUKVO70 303 08/03/2024     Lab Results   Component Value Date    FOLATE 8.81 08/03/2024     TSH          9/4/2024    10:16 9/25/2024    09:33 1/1/2025    12:21   TSH   TSH 12.060  5.670  2.700           Physical Examination:  /85 (BP Location: Left arm, Patient Position: Lying)    "Pulse 89   Temp 98.8 °F (37.1 °C) (Axillary)   Resp 18   Ht 185.4 cm (73\")   Wt 96.3 kg (212 lb 4.9 oz)   SpO2 95%   BMI 28.01 kg/m²   General Appearance:   Well developed, well nourished, well groomed, alert, and sleepy.  HEENT: Normocephalic.    Neck and Spine: Normal range of motion.  Normal alignment. No mass or tenderness. No bruits.    Extremities:    No edema or deformities. Normal joint ROM.   Skin:    No rashes or birth marks.    Neurological examination:  Higher Integrative  Function: When asked his name and date of birth he says he does not know.  He did not cooperate with counting the fingers.  However he did follow simple commands.  He showed me his tongue and moved all his extremities..  CN II:  Pupils are equal, round, and reactive to light. Normal visual acuity and visual fields.    CN III IV VI: Extraocular movements are full without nystagmus.   CN V:  Normal facial sensation and strength of muscles of mastication.  CN VII:  Facial movements are symmetric. No weakness.  CN VIII: Auditory acuity is normal.  CN IX & X: Symmetric palatal movement.  CN XI:  Sternocleidomastoid and trapezius are normal.  No weakness.  CN XII:  The tongue is midline.  No atrophy or fasciculations.  Motor:  Normal muscle strength, bulk and tone in upper and lower extremities.  No fasciculations, rigidity, spasticity, or abnormal movements.  Sensation: Normal to light touch, pinprick, vibration, temperature, and proprioception in arms and legs. Normal graphesthesia and no extinction on DSS.  Station and Gait: Could not be tested at this time.  Coordination:  Would not cooperate for testing..      Diagnoses / Discussion:  79 y.o. who presents with Sx of acute metabolic encephalopathy versus delirium.    Plan:  EEG at the bedside with photic stimulation has been requested.  Not to use any narcotics or sedatives.  If over the next 24 to 48 hours his condition does not improve he will need CSF evaluation under " fluoroscopy..     I have discussed the above with the care team and he will be followed up by our inpatient teleneurology service..  Time spent with patient: 70 minutes in face-to-face evaluation and management of the patient using the dedicated telemedicine device without any interruption with the help of the rounding nurse with the patient located at the [facility] and myself at a remote location.    Electronically signed by Esperanza Zaragoza MD, 01/02/25, 9:01 AM EST.    Dictated using Dragon dictation.                Electronically signed by Esperanza Zaragoza MD at 01/02/25 0925

## 2025-01-09 NOTE — PLAN OF CARE
Goal Outcome Evaluation:  Plan of Care Reviewed With: patient        Progress: no change               Problem: Adult Inpatient Plan of Care  Goal: Plan of Care Review  Outcome: Progressing  Flowsheets (Taken 1/9/2025 0518)  Progress: no change  Plan of Care Reviewed With: patient  Goal: Patient-Specific Goal (Individualized)  Outcome: Progressing  Goal: Absence of Hospital-Acquired Illness or Injury  Outcome: Progressing  Intervention: Identify and Manage Fall Risk  Recent Flowsheet Documentation  Taken 1/9/2025 0400 by April Guallpa LPN  Safety Promotion/Fall Prevention:   activity supervised   assistive device/personal items within reach   clutter free environment maintained   fall prevention program maintained   nonskid shoes/slippers when out of bed   room organization consistent   safety round/check completed  Taken 1/9/2025 0200 by April Guallpa LPN  Safety Promotion/Fall Prevention:   activity supervised   assistive device/personal items within reach   clutter free environment maintained   fall prevention program maintained   nonskid shoes/slippers when out of bed   room organization consistent   safety round/check completed  Taken 1/9/2025 0000 by Tincher, April, LPN  Safety Promotion/Fall Prevention:   activity supervised   assistive device/personal items within reach   clutter free environment maintained   fall prevention program maintained   nonskid shoes/slippers when out of bed   room organization consistent   safety round/check completed  Taken 1/8/2025 2200 by Tincher, April, LPN  Safety Promotion/Fall Prevention:   activity supervised   assistive device/personal items within reach   clutter free environment maintained   fall prevention program maintained   nonskid shoes/slippers when out of bed   room organization consistent   safety round/check completed  Taken 1/8/2025 2035 by Tincher, April, LPN  Safety Promotion/Fall Prevention:   activity supervised   assistive  device/personal items within reach   clutter free environment maintained   fall prevention program maintained   nonskid shoes/slippers when out of bed   safety round/check completed   room organization consistent  Intervention: Prevent Skin Injury  Recent Flowsheet Documentation  Taken 1/9/2025 0400 by April Guallpa LPN  Body Position: supine  Taken 1/9/2025 0200 by April Guallpa LPN  Body Position: legs elevated  Taken 1/9/2025 0000 by April Guallpa LPN  Body Position:   legs elevated   patient/family refused  Taken 1/8/2025 2200 by April Guallpa LPN  Body Position:   legs elevated   patient/family refused  Taken 1/8/2025 2035 by April Guallpa LPN  Body Position: legs elevated  Intervention: Prevent Infection  Recent Flowsheet Documentation  Taken 1/9/2025 0400 by April Guallpa LPN  Infection Prevention:   environmental surveillance performed   rest/sleep promoted   single patient room provided  Taken 1/9/2025 0200 by April Guallpa LPN  Infection Prevention:   environmental surveillance performed   rest/sleep promoted   single patient room provided  Taken 1/8/2025 2200 by April Guallpa LPN  Infection Prevention:   environmental surveillance performed   rest/sleep promoted   single patient room provided  Taken 1/8/2025 2035 by April Guallpa LPN  Infection Prevention:   environmental surveillance performed   rest/sleep promoted   single patient room provided  Goal: Optimal Comfort and Wellbeing  Outcome: Progressing  Intervention: Provide Person-Centered Care  Recent Flowsheet Documentation  Taken 1/8/2025 2035 by April Guallpa LPN  Trust Relationship/Rapport:   care explained   choices provided   emotional support provided  Goal: Readiness for Transition of Care  Outcome: Progressing     Problem: Skin Injury Risk Increased  Goal: Skin Health and Integrity  Outcome: Progressing  Intervention: Optimize Skin Protection  Recent Flowsheet Documentation  Taken  1/9/2025 0400 by April Guallpa LPN  Activity Management: activity encouraged  Head of Bed (HOB) Positioning: HOB elevated  Taken 1/9/2025 0000 by April Guallpa LPN  Activity Management: up in chair  Taken 1/8/2025 2200 by April Guallpa LPN  Activity Management:   up in chair   activity encouraged  Taken 1/8/2025 2035 by April Guallpa LPN  Activity Management:   up in chair   activity encouraged     Problem: Fall Injury Risk  Goal: Absence of Fall and Fall-Related Injury  Outcome: Progressing  Intervention: Promote Injury-Free Environment  Recent Flowsheet Documentation  Taken 1/9/2025 0400 by April Guallpa LPN  Safety Promotion/Fall Prevention:   activity supervised   assistive device/personal items within reach   clutter free environment maintained   fall prevention program maintained   nonskid shoes/slippers when out of bed   room organization consistent   safety round/check completed  Taken 1/9/2025 0200 by April Guallpa LPN  Safety Promotion/Fall Prevention:   activity supervised   assistive device/personal items within reach   clutter free environment maintained   fall prevention program maintained   nonskid shoes/slippers when out of bed   room organization consistent   safety round/check completed  Taken 1/9/2025 0000 by Tincher, April, LPN  Safety Promotion/Fall Prevention:   activity supervised   assistive device/personal items within reach   clutter free environment maintained   fall prevention program maintained   nonskid shoes/slippers when out of bed   room organization consistent   safety round/check completed  Taken 1/8/2025 2200 by Tincher, April, LPN  Safety Promotion/Fall Prevention:   activity supervised   assistive device/personal items within reach   clutter free environment maintained   fall prevention program maintained   nonskid shoes/slippers when out of bed   room organization consistent   safety round/check completed  Taken 1/8/2025 2035 by  Tincher, April, LPN  Safety Promotion/Fall Prevention:   activity supervised   assistive device/personal items within reach   clutter free environment maintained   fall prevention program maintained   nonskid shoes/slippers when out of bed   safety round/check completed   room organization consistent     Problem: Confusion Acute  Goal: Optimal Cognitive Function  Outcome: Progressing

## 2025-01-09 NOTE — CASE MANAGEMENT/SOCIAL WORK
Case Management/Social Work    Patient Name:  Hardik Natarajan  YOB: 1945  MRN: [id number]  Admit Date:  1/1/2025        SW sent referrals for STR to LTC placement to [facility] per request. SW following.       Electronically signed by:  TAMIR Hall  01/09/25 09:35 EST

## 2025-01-10 PROCEDURE — 97530 THERAPEUTIC ACTIVITIES: CPT

## 2025-01-10 PROCEDURE — 99231 SBSQ HOSP IP/OBS SF/LOW 25: CPT | Performed by: FAMILY MEDICINE

## 2025-01-10 PROCEDURE — 97535 SELF CARE MNGMENT TRAINING: CPT

## 2025-01-10 PROCEDURE — 97116 GAIT TRAINING THERAPY: CPT

## 2025-01-10 PROCEDURE — 25010000002 HEPARIN (PORCINE) PER 1000 UNITS: Performed by: STUDENT IN AN ORGANIZED HEALTH CARE EDUCATION/TRAINING PROGRAM

## 2025-01-10 RX ORDER — ACETAMINOPHEN 325 MG/1
650 TABLET ORAL EVERY 6 HOURS PRN
Status: DISCONTINUED | OUTPATIENT
Start: 2025-01-10 | End: 2025-01-15 | Stop reason: HOSPADM

## 2025-01-10 RX ADMIN — SENNOSIDES 1 TABLET: 8.6 TABLET, FILM COATED ORAL at 20:25

## 2025-01-10 RX ADMIN — HEPARIN SODIUM 5000 UNITS: 5000 INJECTION INTRAVENOUS; SUBCUTANEOUS at 08:14

## 2025-01-10 RX ADMIN — DIVALPROEX SODIUM 250 MG: 250 TABLET, DELAYED RELEASE ORAL at 08:13

## 2025-01-10 RX ADMIN — TAMSULOSIN HYDROCHLORIDE 0.4 MG: 0.4 CAPSULE ORAL at 08:13

## 2025-01-10 RX ADMIN — ZIPRASIDONE HYDROCHLORIDE 40 MG: 20 CAPSULE ORAL at 20:25

## 2025-01-10 RX ADMIN — POLYETHYLENE GLYCOL 3350 17 G: 17 POWDER, FOR SOLUTION ORAL at 08:13

## 2025-01-10 RX ADMIN — LEVOTHYROXINE SODIUM 75 MCG: 0.07 TABLET ORAL at 06:10

## 2025-01-10 RX ADMIN — DIVALPROEX SODIUM 250 MG: 250 TABLET, DELAYED RELEASE ORAL at 20:25

## 2025-01-10 RX ADMIN — CLOPIDOGREL BISULFATE 75 MG: 75 TABLET ORAL at 08:14

## 2025-01-10 RX ADMIN — DULOXETINE HYDROCHLORIDE 30 MG: 30 CAPSULE, DELAYED RELEASE ORAL at 08:13

## 2025-01-10 RX ADMIN — HEPARIN SODIUM 5000 UNITS: 5000 INJECTION INTRAVENOUS; SUBCUTANEOUS at 20:25

## 2025-01-10 NOTE — THERAPY TREATMENT NOTE
Patient Name: Hardik Natarajan  : 1945    MRN: [id number]                              Today's Date: 1/10/2025       Admit Date: 2025    Visit Dx:     ICD-10-CM ICD-9-CM   1. Altered mental status, unspecified altered mental status type  R41.82 780.97   2. Bladder stone  N21.0 594.1   3. 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    Anemia, unspecified    Nicotine dependence, chewing tobacco, uncomplicated    Neoplasm related pain (acute) (chronic)    Unspecified mastoiditis, left ear    Cellulitis of left external ear    Phlebitis and thrombophlebitis of unspecified site    Gangrene, not elsewhere classified    Acute osteomyelitis    Benign prostatic hyperplasia without lower urinary tract symptoms    Pain    Palliative care by specialist    Encounter for therapeutic drug monitoring    Weakness of left upper extremity    AMS (altered mental status)    Altered mental status     Past Medical History:   Diagnosis Date    Anemia     Arthritis     Cancer      Past Surgical History:   Procedure Laterality Date    NAIL BIOPSY N/A     NECK DISSECTION N/A     PAROTIDECTOMY N/A     SKIN CANCER EXCISION        General Information       Row Name 01/10/25 1253          OT Time and Intention    Subjective Information no complaints  -SD     Document Type therapy note (daily note)  -SD     Mode of Treatment occupational therapy  -SD     Patient Effort good  -SD     Symptoms Noted During/After Treatment fatigue  -SD       Row Name 01/10/25 1254          General Information    Patient Profile Reviewed yes  -SD               User Key  (r) = Recorded By, (t) = Taken By, (c) = Cosigned By      Initials Name Provider Type    SD Jaqueline Dasilva OT Occupational Therapist                     Mobility/ADL's       Row Name 01/10/25 1252          Bed Mobility    Bed  Mobility supine-sit  -SD     Supine-Sit [place] (Bed Mobility) minimum assist (75% patient effort)  -SD     Assistive Device (Bed Mobility) bed rails;head of bed elevated  -SD       Row Name 01/10/25 [facility]          Transfers    Transfers sit-stand transfer;toilet transfer  -SD       Row Name 01/10/25 [facility]          Sit-Stand Transfer    Sit-Stand [place] (Transfers) minimum assist (75% patient effort)  -SD     Assistive Device (Sit-Stand Transfers) walker, front-wheeled  -[facility] Name 01/10/25 [facility]          Toilet Transfer    Type (Toilet Transfer) sit-stand;stand-sit  -SD     [place] Level (Toilet Transfer) minimum assist (75% patient effort)  -SD     Assistive Device (Toilet Transfer) commode;grab bars/safety frame;walker, front-wheeled  -[facility] Name 01/10/25 [facility]          Functional Mobility    Functional Mobility- Ind. Level minimum assist (75% patient effort)  -SD     Functional Mobility- Device walker, front-wheeled  -SD     Functional Mobility-Distance (Feet) 16  23  -SD       Row Name 01/10/25 [facility]          Bathing Assessment/Intervention    [place] Level (Bathing) upper body;upper extremities;perineal area;moderate assist (50% patient effort)  -SD       Row Name 01/10/25 [facility]          Upper Body Dressing Assessment/Training    [place] Level (Upper Body Dressing) minimum assist (75% patient effort)  -SD       Row Name 01/10/25 [facility]          Lower Body Dressing Assessment/Training    [place] Level (Lower Body Dressing) don;pants/bottoms;socks;maximum assist (25% patient effort)  -SD       Row Name 01/10/25 [facility]          Grooming Assessment/Training    [place] Level (Grooming) hair care, combing/brushing;wash face, hands;set up  -SD       Row Name 01/10/25 1253          Toileting Assessment/Training    [place] Level (Toileting) adjust/manage clothing;perform perineal hygiene;moderate assist (50% patient effort)  -SD     Comment, (Toileting) incontinence noted in  bed, did walk to bathroom and utilize commode  -SD               User Key  (r) = Recorded By, (t) = Taken By, (c) = Cosigned By      Initials Name Provider Type    Jaqueline Sheppard OT Occupational Therapist                   Obj/Interventions    No documentation.                  Goals/Plan    No documentation.                  Clinical Impression       Row Name 01/10/25 1254          Pain Assessment    Pretreatment Pain Rating 0/10 - no pain  -SD     Posttreatment Pain Rating 0/10 - no pain  -SD       Row Name 01/10/25 1254          Plan of Care Review    Plan of Care Reviewed With patient  -SD     Progress improving  -SD     Outcome Evaluation OT tx completed. Patient is supine in bed, denies pain. Patient performed supine to sit with min A, sit to stand min A, walked 16' to bathroom min A using RW. Patient performed bathing task sitting on commode mod A, UBD and grooming min A, required mod A for toileting. Patient walked 23' back to chair and left sitting call bell and chair alarm. Continue OT POC  -SD       Row Name 01/10/25 1254          Vital Signs    O2 Delivery Pre Treatment room air  -SD     O2 Delivery Intra Treatment room air  -SD     O2 Delivery Post Treatment room air  -SD       Row Name 01/10/25 1254          Positioning and Restraints    Pre-Treatment Position in bed  -SD     Post Treatment Position chair  -SD     In Chair sitting;call light within reach;exit alarm on;encouraged to call for assist  -SD               User Key  (r) = Recorded By, (t) = Taken By, (c) = Cosigned By      Initials Name Provider Type    Jaqueline Sheppard OT Occupational Therapist                   Outcome Measures       Row Name 01/10/25 1256          How much help from another is currently needed...    Putting on and taking off regular lower body clothing? 2  -SD     Bathing (including washing, rinsing, and drying) 2  -SD     Toileting (which includes using toilet bed pan or urinal) 2  -SD     Putting on and taking  off regular upper body clothing 3  -SD     Taking care of personal grooming (such as brushing teeth) 3  -SD     Eating meals 3  -SD     AM-PAC 6 Clicks Score (OT) 15  -SD       Row Name 01/10/25 0800          How much help from another person do you currently need...    Turning from your back to your side while in flat bed without using bedrails? 3  -AM     Moving from lying on back to sitting on the side of a flat bed without bedrails? 3  -AM     Moving to and from a bed to a chair (including a wheelchair)? 3  -AM     Standing up from a chair using your arms (e.g., wheelchair, bedside chair)? 3  -AM     Climbing 3-5 steps with a railing? 3  -AM     To walk in hospital room? 3  -AM     AM-PAC 6 Clicks Score (PT) 18  -AM     Highest Level of Mobility Goal 6 --> Walk 10 steps or more  -AM       Row Name 01/10/25 1256          Functional Assessment    Outcome Measure Options AM-PAC 6 Clicks Daily Activity (OT)  -SD               User Key  (r) = Recorded By, (t) = Taken By, (c) = Cosigned By      Initials Name Provider Type    Jaqueline Sheppard OT Occupational Therapist    AM Mayte Leblanc, RN Registered Nurse                    Occupational Therapy Education       Title: PT OT SLP Therapies (Done)       Topic: Occupational Therapy (Done)       Point: ADL training (Done)       Description:   Instruct learner(s) on proper safety adaptation and remediation techniques during self care or transfers.   Instruct in proper use of assistive devices.                  Learning Progress Summary            Patient Acceptance, E,TB, VU by SD at 1/10/2025 1257    Comment: Safety during tf    Acceptance, E,TB, VU by  at 1/8/2025 1519    Comment: benefit of activity    Acceptance, E,TB, VU by  at 1/6/2025 1521    Comment: benefit of activity    Acceptance, E, VU by  at 1/3/2025 1931    Acceptance, E, VU by  at 1/3/2025 1300    Acceptance, E,TB, VU by SD at 1/2/2025 1322    Comment: OT POC   Family Acceptance, E, VU by  at  1/3/2025 1300                      Point: Home exercise program (Done)       Description:   Instruct learner(s) on appropriate technique for monitoring, assisting and/or progressing therapeutic exercises/activities.                  Learning Progress Summary            Patient Acceptance, E,TB, VU by  at 1/6/2025 1521    Comment: benefit of activity    Acceptance, E, VU by  at 1/3/2025 1931    Acceptance, E, VU by  at 1/3/2025 1300   Family Acceptance, E, VU by  at 1/3/2025 1300                      Point: Precautions (Done)       Description:   Instruct learner(s) on prescribed precautions during self-care and functional transfers.                  Learning Progress Summary            Patient Acceptance, E, VU by  at 1/3/2025 1931    Acceptance, E, VU by  at 1/3/2025 1300   Family Acceptance, E, VU by  at 1/3/2025 1300                      Point: Body mechanics (Done)       Description:   Instruct learner(s) on proper positioning and spine alignment during self-care, functional mobility activities and/or exercises.                  Learning Progress Summary            Patient Acceptance, E, VU by  at 1/3/2025 1931    Acceptance, E, VU by  at 1/3/2025 1300   Family Acceptance, E, VU by  at 1/3/2025 1300                                      User Key       Initials Effective Dates Name Provider Type Discipline     06/16/21 -  Trina Wesley Occupational Therapist OT    SD 06/16/21 -  Jaqueline Dasilva OT Occupational Therapist OT     09/30/24 -  Kya Jason RN Registered Nurse Nurse                  OT Recommendation and Plan  Planned Therapy Interventions (OT): activity tolerance training, adaptive equipment training, BADL retraining, patient/caregiver education/training, ROM/therapeutic exercise, strengthening exercise, transfer/mobility retraining  Therapy Frequency (OT): 3 times/wk (5 times if indicated)  Plan of Care Review  Plan of Care Reviewed With: patient  Progress:  improving  Outcome Evaluation: OT tx completed. Patient is supine in bed, denies pain. Patient performed supine to sit with min A, sit to stand min A, walked 16' to bathroom min A using RW. Patient performed bathing task sitting on commode mod A, UBD and grooming min A, required mod A for toileting. Patient walked 23' back to chair and left sitting call bell and chair alarm. Continue OT POC     Time Calculation:   Evaluation Complexity (OT)  Review Occupational Profile/Medical/Therapy History Complexity: expanded/moderate complexity  Assessment, Occupational Performance/Identification of Deficit Complexity: 3-5 performance deficits  Clinical Decision Making Complexity (OT): detailed assessment/moderate complexity  Overall Complexity of Evaluation (OT): moderate complexity     Time Calculation- OT       Row Name 01/10/25 1258             Time Calculation- OT    OT Start Time 1008  -SD      OT Stop Time 1036  -SD      OT Time Calculation (min) 28 min  -SD      OT Received On 01/10/25  -SD      OT Goal Re-Cert Due Date 01/14/25  -SD         Timed Charges    86839 - OT Therapeutic Activity Minutes 12  -SD      20140 - OT Self Care/Mgmt Minutes 16  -SD         Total Minutes    Timed Charges Total Minutes 28  -SD       Total Minutes 28  -SD                User Key  (r) = Recorded By, (t) = Taken By, (c) = Cosigned By      Initials Name Provider Type    SD Jaqueline Dasilva OT Occupational Therapist                  Therapy Charges for Today       Code Description Service Date Service Provider Modifiers Qty    88057494502  OT THERAPEUTIC ACT EA 15 MIN 1/10/2025 Jaqueline Dasilva OT GO 1    [id number]  OT SELF CARE/MGMT/TRAIN EA 15 MIN 1/10/2025 Jaqueline Dasilva OT GO 1                 Jaqueline Dasilva OT  1/10/2025

## 2025-01-10 NOTE — THERAPY TREATMENT NOTE
Patient Name: Hardik Natarajan  : 1945    MRN: [id number]                              Today's Date: 1/10/2025       Admit Date: 2025    Visit Dx:     ICD-10-CM ICD-9-CM   1. Altered mental status, unspecified altered mental status type  R41.82 780.97   2. Bladder stone  N21.0 594.1   3. 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    Anemia, unspecified    Nicotine dependence, chewing tobacco, uncomplicated    Neoplasm related pain (acute) (chronic)    Unspecified mastoiditis, left ear    Cellulitis of left external ear    Phlebitis and thrombophlebitis of unspecified site    Gangrene, not elsewhere classified    Acute osteomyelitis    Benign prostatic hyperplasia without lower urinary tract symptoms    Pain    Palliative care by specialist    Encounter for therapeutic drug monitoring    Weakness of left upper extremity    AMS (altered mental status)    Altered mental status     Past Medical History:   Diagnosis Date    Anemia     Arthritis     Cancer      Past Surgical History:   Procedure Laterality Date    NAIL BIOPSY N/A     NECK DISSECTION N/A     PAROTIDECTOMY N/A     SKIN CANCER EXCISION        General Information       Row Name 01/10/25 1318          Physical Therapy Time and Intention    Document Type therapy note (daily note)  -RM     Mode of Treatment physical therapy  -RM       Row Name 01/10/25 1318          General Information    Patient Profile Reviewed yes  -RM     Existing Precautions/Restrictions fall  -RM       Row Name 01/10/25 1318          Cognition    Orientation Status (Cognition) oriented to;person;verbal cues/prompts needed for orientation  -RM       Row Name 01/10/25 1318          Safety Issues/Impairments Affecting Functional Mobility    Impairments Affecting Function (Mobility) balance;cognition;endurance/activity  tolerance;motor control;motor planning;strength;range of motion (ROM)  -RM               User Key  (r) = Recorded By, (t) = Taken By, (c) = Cosigned By      Initials Name Provider Type    Kunal Christianson PTA Physical Therapist Assistant                   Mobility       Row Name 01/10/25 1319          Bed Mobility    Comment, (Bed Mobility) Pt UIC  -RM       Row Name 01/10/25 1319          Sit-Stand Transfer    Sit-Stand [place] (Transfers) contact guard;minimum assist (75% patient effort)  -RM     Assistive Device (Sit-Stand Transfers) walker, front-wheeled  -RM       Row Name 01/10/25 1319          Gait/Stairs (Locomotion)    Fred[place]ick Level (Gait) minimum assist (75% patient effort);nonverbal cues (demo/gesture);verbal cues;contact guard  -RM     Assistive Device (Gait) walker, front-wheeled  -RM     Patient was able to Ambulate yes  -RM     Distance in Feet (Gait) 75  -RM     Deviations/Abnormal Patterns (Gait) base of support, wide;stride length decreased  -RM     Bilateral Gait Deviations forward flexed posture;heel strike decreased  -RM               User Key  (r) = Recorded By, (t) = Taken By, (c) = Cosigned By      Initials Name Provider Type    Kunal Christianson, VAL Physical Therapist Assistant                   Obj/Interventions    No documentation.                  Goals/Plan    No documentation.                  Clinical Impression       Row Name 01/10/25 1321          Pain    Pretreatment Pain Rating 0/10 - no pain  -RM     Posttreatment Pain Rating 0/10 - no pain  -RM       Row Name 01/10/25 1321          Plan of Care Review    Plan of Care Reviewed With patient  -RM     Progress improving  -RM     Outcome Evaluation Pt sitting UIC and willing to work with therapy.  Pt performed transfers and gait this treatment. Pt tolerated increased activity as well as with decreased assistance.  See flowsheet for details . Cont PT per POC progressing to goals as pt tolerates.  -RM       Row Name  01/10/25 1321          Positioning and Restraints    Pre-Treatment Position sitting in chair/recliner  -RM     Post Treatment Position chair  -RM     In Chair reclined;call light within reach;encouraged to call for assist;exit alarm on;notified nsg  -RM               User Key  (r) = Recorded By, (t) = Taken By, (c) = Cosigned By      Initials Name Provider Type    Kunal Christianson PTA Physical Therapist Assistant                   Outcome Measures       Row Name 01/10/25 1325 01/10/25 0800       How much help from another person do you currently need...    Turning from your back to your side while in flat bed without using bedrails? 3  -RM 3  -AM    Moving from lying on back to sitting on the side of a flat bed without bedrails? 3  -RM 3  -AM    Moving to and from a bed to a chair (including a wheelchair)? 3  -RM 3  -AM    Standing up from a chair using your arms (e.g., wheelchair, bedside chair)? 3  -RM 3  -AM    Climbing 3-5 steps with a railing? 2  -RM 3  -AM    To walk in hospital room? 3  -RM 3  -AM    AM-PAC 6 Clicks Score (PT) 17  -RM 18  -AM    Highest Level of Mobility Goal 5 --> Static standing  -RM 6 --> Walk 10 steps or more  -AM      Row Name 01/10/25 1325 01/10/25 1256       Functional Assessment    Outcome Measure Options AM-PAC 6 Clicks Basic Mobility (PT)  -RM AM-PAC 6 Clicks Daily Activity (OT)  -SD              User Key  (r) = Recorded By, (t) = Taken By, (c) = Cosigned By      Initials Name Provider Type    Kunal Christianson PTA Physical Therapist Assistant    Jaqueline Sheppard OT Occupational Therapist    Mayte Beckwith, RN Registered Nurse                                 Physical Therapy Education       Title: PT OT SLP Therapies (Done)       Topic: Physical Therapy (Done)       Point: Mobility training (Done)       Learning Progress Summary            Patient Acceptance, E,TB,D, VU,NR by  at 1/10/2025 1325    Acceptance, E,TB,D, VU,NR by  at 1/8/2025 1514    Acceptance, E,  VU by SJ at 1/3/2025 1931    Acceptance, E, VU by SJ at 1/3/2025 1300    Acceptance, E, VU by MS at 1/2/2025 1303    Comment: importance of mobility   Family Acceptance, E, VU by SJ at 1/3/2025 1300                      Point: Home exercise program (Done)       Learning Progress Summary            Patient Acceptance, E,TB, VU by CC at 1/6/2025 1529    Acceptance, E, VU by SJ at 1/3/2025 1931    Acceptance, E, VU by SJ at 1/3/2025 1300    Acceptance, E, VU by MS at 1/2/2025 1303    Comment: importance of mobility   Family Acceptance, E, VU by SJ at 1/3/2025 1300                      Point: Body mechanics (Done)       Learning Progress Summary            Patient Acceptance, E, VU by SJ at 1/3/2025 1931    Acceptance, E, VU by SJ at 1/3/2025 1300   Family Acceptance, E, VU by SJ at 1/3/2025 1300                      Point: Precautions (Done)       Learning Progress Summary            Patient Acceptance, E, VU by  at 1/3/2025 1931    Acceptance, E, VU by SJ at 1/3/2025 1300   Family Acceptance, E, VU by SJ at 1/3/2025 1300                                      User Key       Initials Effective Dates Name Provider Type Discipline     06/16/21 -  Karis Henderson, PTA Physical Therapist Assistant PT     06/16/21 -  Kunal Harper, PTA Physical Therapist Assistant PT    MS 08/22/23 -  Shubham Tierney, RADHA Physical Therapist PT     09/30/24 -  Kya Jason, RN Registered Nurse Nurse                  PT Recommendation and Plan     Progress: improving  Outcome Evaluation: Pt sitting UIC and willing to work with therapy.  Pt performed transfers and gait this treatment. Pt tolerated increased activity as well as with decreased assistance.  See flowsheet for details . Cont PT per POC progressing to goals as pt tolerates.     Time Calculation:         PT Charges       Row Name 01/10/25 1326             Time Calculation    Start Time 1250  -RM      Stop Time 1320  -RM      Time Calculation (min) 30 min  -RM      PT  Received On 01/10/25  -RM      PT Goal Re-Cert Due Date 01/12/25  -RM         Time Calculation- PT    Total Timed Code Minutes- PT 30 minute(s)  -RM         Timed Charges    63631 - Gait Training Minutes  15  -RM      16879 - PT Therapeutic Activity Minutes 15  -RM         Total Minutes    Timed Charges Total Minutes 30  -RM       Total Minutes 30  -RM                User Key  (r) = Recorded By, (t) = Taken By, (c) = Cosigned By      Initials Name Provider Type    Kunal Christianson, PTA Physical Therapist Assistant                  Therapy Charges for Today       Code Description Service Date Service Provider Modifiers Qty    [id number] HC GAIT TRAINING EA 15 MIN 1/10/2025 Kunal Harper, PTA GP 1    91288223070 HC PT THERAPEUTIC ACT EA 15 MIN 1/10/2025 Kunal Harper, PTA GP 1            PT G-Codes  Outcome Measure Options: AM-PAC 6 Clicks Basic Mobility (PT)  AM-PAC 6 Clicks Score (PT): 17  AM-PAC 6 Clicks Score (OT): 15       Kunal Harper PTA  1/10/2025

## 2025-01-10 NOTE — DISCHARGE PLACEMENT REQUEST
"[facility] Documentation     Katey Browne (79 y.o. Male)       Date of Birth   1945    Social Security Number       Address   [address] [facility] [address]    Home Phone   [phone]    MRN   [id number]       Hindu   None    Marital Status                               Admission Date   1/1/25    Admission Type   Emergency    Admitting Provider   Kerley, Brian Joseph, DO    Attending Provider   Dionna Espitia DO    [facility]       Discharge Date       Discharge Disposition       Discharge Destination                                 Attending Provider: Dionna Espitia DO    Allergies: Asa [Aspirin]    Isolation: None   Infection: None   Code Status: CPR    Ht: 185.4 cm (73\")   Wt: 96.3 kg (212 lb 4.9 oz)    Admission Cmt: None   Principal Problem: AMS (altered mental status) [R41.82]                   Active Insurance as of 1/1/2025       Primary Coverage       Payor Plan Insurance Group Employer/Plan Group    ANTHEM MEDICARE REPLACEMENT ANTHEM MED ADV SNP [id number]       Payor Plan Address Payor Plan Phone Number Payor Plan Fax Number Effective Dates    [address] [phone]  1/1/2024 - None Entered    [facility] [id number]         Subscriber Name Subscriber Birth Date Member ID       KATEY BROWNE 1945 [id number]               Secondary Coverage       Payor Plan Insurance Group Employer/Plan Group    [id number]        Payor Plan Address Payor Plan Phone Number Payor Plan Fax Number Effective Dates    [address]   4/7/2023 - None Entered    [facility] [id number]         Subscriber Name Subscriber Birth Date Member ID       KTAEY BROWNE 1945 [id number]                     Emergency Contacts        (Rel.) Home Phone Work Phone Mobile Phone    MAC BROWNE/ZITA (Son) [phone] -- [phone]                 History & Physical        Kerley, Brian Joseph, " DO at 25 1727            [facility]   HISTORY AND PHYSICAL      Name:  Hardik Natarajan   Age:  79 y.o.  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  Presented to [facility] ED on 2025 with concern for altered mental status.  Last known normal was night prior to presentation.  Also reported having a fall, unclear if it was syncope.  Wife found him on the floor.  On Plavix, no other anticoagulation.  He denied any fevers or chills, chest pain, shortness of air, any other specific concern.  Denied any slurred speech, difficulty moving upper or lower extremities, and any in symmetry.    ED summary: Afebrile, vital signs stable on room air.  ABG pH 7.44, pCO2 34, pO2 82, bicarb 24.  EKG sinus tachycardia rate 107, nonspecific ST-T wave changes.  Opponent 23, ACS not suspected.  Creatinine 1.34, lactate elevated, procalcitonin not elevated.  UDS negative.  Blood cultures collected.  COVID/flu/RSV negative.  CT abdomen/pelvis large stone in urinary bladder, wall thickening probable diverticula, abnormal attenuation of the root of the mesentery most suggestive of mesenteric panniculitis.  CT head no intracranial hemorrhage or evidence of acute large cortical infarct, stable atrophy and chronic findings, left mastoiditis and left otitis media with bone destruction of the left mastoid air cells, right maxillary sinusitis.  CT cervical spine no acute fracture, multilevel degenerative disc disease.    Review Of  Systems:    All systems were reviewed and negative except as mentioned in history of presenting illness, assessment and plan.    Past Medical History: Patient  has a past medical history of Anemia, Arthritis, and Cancer.    Past Surgical History: Patient  has a past surgical history that includes Skin cancer excision; Neck dissection (N/A); Parotidectomy (N/A); and NAIL BIOPSY (N/A).    Social History: Patient  reports that he has never smoked. His smokeless tobacco use includes chew. He reports that he does not currently use alcohol. He reports that he does not use drugs.    Family History:  Patient's family history has been reviewed and found to be noncontributory.     Allergies:      Asa [aspirin]    Home Medications:    Prior to Admission Medications       Prescriptions Last Dose Informant Patient Reported? Taking?    acetaminophen (TYLENOL) 325 MG tablet   Yes No    Take 2 tablets by mouth Every 6 (Six) Hours As Needed.    clopidogrel (PLAVIX) 75 MG tablet   Yes No    Take 1 tablet by mouth Daily.    cyanocobalamin 1000 MCG/ML injection   Yes No    Inject 0.1 mL into the appropriate muscle as directed by prescriber Every 30 (Thirty) Days.    diclofenac (VOLTAREN) 75 MG EC tablet   Yes No    DULoxetine (CYMBALTA) 30 MG capsule   Yes No    Take 1 capsule by mouth Daily.    finasteride (PROSCAR) 5 MG tablet   Yes No    Take 1 tablet by mouth Daily.    Hydrocortisone, Perianal, (ANUSOL-HC) 2.5 % rectal cream   Yes No    Insert 1 Application into the rectum.    hydrOXYzine (ATARAX) 25 MG tablet   No No    Take 1 tablet by mouth Every 6 (Six) Hours As Needed for Itching.    levothyroxine (SYNTHROID, LEVOTHROID) 75 MCG tablet   No No    TAKE ONE (1) TABLET BY MOUTH EVERY DAY    ondansetron (ZOFRAN) 8 MG tablet   No No    Take 1 tablet by mouth 3 (Three) Times a Day As Needed for Nausea or Vomiting.    ondansetron ODT (ZOFRAN-ODT) 8 MG disintegrating tablet   Yes No    Place 1 tablet twice a day by translingual route.  "   oxyCODONE (Roxicodone) 5 MG immediate release tablet   No No    Take 1 tablet by mouth Every 6 (Six) Hours As Needed for Severe Pain.    Procto-Med HC 2.5 % rectal cream   Yes No    1 Application Daily As Needed.    senna (SENOKOT) 8.6 MG tablet   Yes No    Take 1 tablet by mouth Daily As Needed.    tamsulosin (FLOMAX) 0.4 MG capsule 24 hr capsule   Yes No    Take 1 capsule by mouth.          ED Medications:    Medications   sodium chloride 0.9 % bolus 1,000 mL (1,000 mL Intravenous New Bag 1/1/25 1722)   iopamidol (ISOVUE-300) 61 % injection 100 mL (100 mL Intravenous Given 1/1/25 1427)   sodium chloride 0.9 % bolus 1,000 mL (1,000 mL Intravenous New Bag 1/1/25 1557)     Vital Signs:  Temp:  [98 °F (36.7 °C)] 98 °F (36.7 °C)  Heart Rate:  [] 89  Resp:  [18] 18  BP: ()/(55-99) 94/68        01/01/25  1149   Weight: 102 kg (225 lb)     Body mass index is 29.69 kg/m².    Physical Exam:     Most recent vital Signs: BP 94/68   Pulse 89   Temp 98 °F (36.7 °C) (Oral)   Resp 18   Ht 185.4 cm (73\")   Wt 102 kg (225 lb)   SpO2 96%   BMI 29.69 kg/m²     Physical Exam  Constitutional:       General: He is not in acute distress.     Appearance: He is ill-appearing. He is not toxic-appearing.   HENT:      Mouth/Throat:      Mouth: Mucous membranes are moist.   Eyes:      Extraocular Movements: Extraocular movements intact.   Cardiovascular:      Rate and Rhythm: Normal rate and regular rhythm.      Pulses: Normal pulses.      Heart sounds: Normal heart sounds.   Pulmonary:      Effort: Pulmonary effort is normal.      Breath sounds: Normal breath sounds.   Abdominal:      Palpations: Abdomen is soft.      Tenderness: There is no abdominal tenderness.   Musculoskeletal:      Right lower leg: No edema.      Left lower leg: No edema.   Skin:     General: Skin is warm.   Neurological:      General: No focal deficit present.      Mental Status: He is alert.      Comments: Oriented to self   Psychiatric:      " Comments: Agitated         Laboratory data:    I have reviewed the labs done in the emergency room.    Results from last 7 days   Lab Units 01/01/25  1221   SODIUM mmol/L 136   POTASSIUM mmol/L 4.2   CHLORIDE mmol/L 101   CO2 mmol/L 23.4   BUN mg/dL 9   CREATININE mg/dL 1.34*   CALCIUM mg/dL 8.9   BILIRUBIN mg/dL 1.1   ALK PHOS U/L 51   ALT (SGPT) U/L 13   AST (SGOT) U/L 23   GLUCOSE mg/dL 99     Results from last 7 days   Lab Units 01/01/25  1221   WBC 10*3/mm3 4.84   HEMOGLOBIN g/dL 11.6*   HEMATOCRIT % 34.0*   PLATELETS 10*3/mm3 141         Results from last 7 days   Lab Units 01/01/25  1406 01/01/25  1221   CK TOTAL U/L  --  216*   HSTROP T ng/L 23* 24*                 Results from last 7 days   Lab Units 01/01/25  1555   PH, ARTERIAL pH units 7.455   PO2 ART mm Hg 82.6   PCO2, ARTERIAL mm Hg 34.2*   HCO3 ART mmol/L 24.0     Results from last 7 days   Lab Units 01/01/25  1418   COLOR UA  Yellow   GLUCOSE UA  Negative   KETONES UA  Negative   BLOOD UA  Negative   LEUKOCYTES UA  Negative   PH, URINE  7.0   BILIRUBIN UA  Negative   UROBILINOGEN UA  1.0 E.U./dL       Pain Management Panel           No data to display                    Radiology:    CT Abdomen Pelvis With Contrast    Result Date: 1/1/2025  CT ABDOMEN AND PELVIS WITH CONTRAST  INDICATION: Possible bladder stone on x-ray.  TECHNIQUE: Thin section axial images were obtained from the lung bases through the pubic symphysis after oral and intravenous contrast. Coronal reconstruction images were obtained from the axial data.  COMPARISON: None.  FINDINGS:  Lower chest:  Lung bases are clear.  Liver:  Homogeneous.  No focal lesion.  Gallbladder/biliary system:  Gallbladder is present.   No intrahepatic or extrahepatic biliary dilatation.  Spleen:  Unremarkable.  Adrenal glands:  Unremarkable. No nodules.  Pancreas: Evaluation limited by motion artifact. No convincing mass or evidence of pancreatitis.  Kidneys/ureters/bladder:  No hydronephrosis, solid mass  or perinephric stranding. A large calcification in the urinary bladder is consistent with a bladder stone. This measures 23 mm. There is also irregularity of the bladder wall which could be related to chronic cystitis. Bladder diverticula are not excluded near the dome.  GI tract: No evidence of small bowel obstruction or focal small bowel wall thickening. Normal appendix. No acute colon abnormality.  Pelvic organs: Mild enlargement of the prostate.  Lymph nodes/mesentery/retroperitoneum: There is abnormal attenuation in the root of the mesentery with small lymph nodes. 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  Abdominal wall: Abdominal wall intact.  Vascular: No acute vascular abnormality.  Free fluid: No free fluid.  Bones: No acute osseous abnormality.      1. Large stone in the urinary bladder. 2. Wall thickening of the urinary bladder with probable diverticula. This is likely related to chronic cystitis and possibly mild chronic outlet obstruction. 3. 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      CTDI: 9.79 mGy DLP:552.61 mGy.cm       This report was signed and finalized on 1/1/2025 3:15 PM by Carmen Le MD.      CT Cervical Spine Without Contrast    Result Date: 1/1/2025  PROCEDURE: CT CERVICAL SPINE WO CONTRAST-  INDICATION: fall this morning  TECHNIQUE: Thin section axial images were obtained through the cervical spine without contrast.  Coronal and sagittal reconstruction images were obtained from the axial data.  COMPARISON: None.  FINDINGS: Motion artifact limits exam quality. There is no convincing acute fracture. No facet lock. The craniocervical junction appears intact.   There is multilevel degenerative disc disease.   No acute paraspinal abnormality.      No acute fracture within the limitations of the exam. There is motion artifact.  Multilevel degenerative disc disease.  Please see CT head report regarding  findings of the left mastoid air cells where there is mastoiditis and bone destruction.        CTDI: 16.93 mGy DLP:837.44 mGy.cm     This study was performed with techniques to keep radiation doses as low as reasonably achievable (ALARA). Individualized dose reduction techniques using automated exposure control or adjustment of mA and/or kV according to the patient size were employed.   This report was signed and finalized on 1/1/2025 1:22 PM by Carmen Le MD.      CT Head Without Contrast    Result Date: 1/1/2025  PROCEDURE: CT HEAD WO CONTRAST-  INDICATION: AMS  TECHNIQUE: Multiple axial CT images were performed from the foramen magnum to the vertex without contrast.   Coronal reconstruction images were obtained from the axial data.  COMPARISON: 8/2/2024.  FINDINGS: There is no mass effect or midline shift. There is stable atrophy with ventricular enlargement. There are stable bilateral periventricular hypodensities. There is no intracranial hemorrhage. The posterior fossa is without acute abnormality. There is mucoperiosteal thickening of the right maxillary sinus. There is opacification of the left mastoid air cells with bone destruction. There is soft tissue density in the left middle ear cavity. This was present on the prior exam. No skull fracture.      1. No intracranial hemorrhage or evidence of acute large cortical infarct. 2. Stable atrophy and chronic findings. 3. Left mastoiditis and left otitis media with bone destruction of the left mastoid air cells. 4. Right maxillary sinusitis.         CTDI: 16.93 mGy DLP:837.44 mGy.cm    This study was performed with techniques to keep radiation doses as low as reasonably achievable (ALARA). Individualized dose reduction techniques using automated exposure control or adjustment of mA and/or kV according to the patient size were employed.   This report was signed and finalized on 1/1/2025 1:19 PM by Carmen Le MD.      XR Pelvis 1 or 2 View    Result Date:  1/1/2025  PROCEDURE: XR PELVIS 1 OR 2 VW-  HISTORY: fall, AMS  COMPARISON: None.  FINDINGS: An AP view of the pelvis was obtained.  There is no acute osseous abnormality of the pelvis. There is degenerative joint disease of the hips bilaterally. There is a round radiodensity projecting over the midline pelvis. This could be a bladder stone or artifact in or on the patient's clothing. Soft tissues are otherwise without acute abnormality.      No acute osseous abnormality of the pelvis.  Round radiodensity projecting over the midline pelvis as above.        This report was signed and finalized on 1/1/2025 1:15 PM by Carmen Le MD.      XR Chest 1 View    Result Date: 1/1/2025  PROCEDURE: XR CHEST 1 VW-  INDICATION:  fall, AMS  FINDINGS:  A portable view of the chest was obtained.  Comparison is made to a prior exam dated 8/2/2024.   Heart size is normal. Lung volumes are very low. Right perihilar and bibasilar opacities may be atelectasis. Pneumonia is not excluded. No significant pleural effusion or pneumothorax.      Exam limited by low lung volumes. Probable bilateral atelectasis.   This report was signed and finalized on 1/1/2025 1:14 PM by Carmen Le MD.       Assessment/Plan:    Observation general floor admission 1/1/2025 with altered mental status in the setting of known invasive stage IV left ear squamous cell carcinoma with bony mets.    Altered mental status  Left mastoiditis, left otitis media with bone destruction of left mastoid air cells  Neurology consultation, recreations appreciated.  MRI brain.  EEG.   No seizure-like activity reported or witnessed, no syncope.  Differential could include stroke, brain mets.  CT showed stable chronic findings; left mastoiditis and left otitis media with bone destruction of the left mastoid air cells.  Fall precautions.    Chronic: Left ear squamous cell carcinoma stage IV on palliative treatment only, cancer pain, depression, BPH, history of CVA with left  upper extremity weakness and numbness, treatment related hypothyroidism.  Continue home medications.    Risk Assessment: High  DVT Prophylaxis: Heparin  Code Status: Full code  Diet: Cardiac    Advance Care Planning  ACP discussion was held with the patient during this visit. Patient does not have an advance directive, information provided.           Brian Joseph Kerley, DO  01/01/25  17:27 EST    Dictated utilizing Dragon dictation.    Electronically signed by Kerley, Brian Joseph, DO at 01/01/25 0668       Current Facility-Administered Medications   Medication Dose Route Frequency Provider Last Rate Last Admin    acetaminophen (TYLENOL) tablet 650 mg  650 mg Oral Q6H PRN Dionna Espitia DO        Calcium Replacement - Follow Nurse / BPA Driven Protocol   Not Applicable PRN Kerley, Brian Joseph, DO        clopidogrel (PLAVIX) tablet 75 mg  75 mg Oral Daily Kerley, Brian Joseph, DO   75 mg at 01/10/25 0814    [START ON 1/16/2025] cyanocobalamin injection 1,000 mcg  1,000 mcg Intramuscular Q7 Days Kerley, Brian Joseph, DO        divalproex (DEPAKOTE) DR tablet 250 mg  250 mg Oral Q12H Dionna Espitia DO   250 mg at 01/10/25 0813    DULoxetine (CYMBALTA) DR capsule 30 mg  30 mg Oral Daily Kerley, Brian Joseph, DO   30 mg at 01/10/25 0813    heparin (porcine) 5000 UNIT/ML injection 5,000 Units  5,000 Units Subcutaneous Q12H Kerley, Brian Joseph, DO   5,000 Units at 01/10/25 0814    lactulose (CHRONULAC) 10 GM/15ML solution 10 g  10 g Oral BID PRN Dionna Espitia DO        levothyroxine (SYNTHROID, LEVOTHROID) tablet 75 mcg  75 mcg Oral Q AM Kerley, Brian Joseph, DO   75 mcg at 01/10/25 0610    Magnesium Standard Dose Replacement - Follow Nurse / BPA Driven Protocol   Not Applicable PRN Kerley, Brian Joseph, DO        nitroglycerin (NITROSTAT) SL tablet 0.4 mg  0.4 mg Sublingual Q5 Min PRN Kerley, Brian Joseph, DO        ondansetron (ZOFRAN) injection 4 mg  4 mg Intravenous Q6H PRN Kerley, Brian  "DO Archie        oxyCODONE (ROXICODONE) immediate release tablet 5 mg  5 mg Oral Q6H PRN Kerley, Brian Joseph, DO   5 mg at 25 0552    Phosphorus Replacement - Follow Nurse / BPA Driven Protocol   Not Applicable PRN Kerley, Brian Joseph, DO        polyethylene glycol (MIRALAX) packet 17 g  17 g Oral Daily Dionna Espitia DO   17 g at 01/10/25 0813    Potassium Replacement - Follow Nurse / BPA Driven Protocol   Not Applicable PRN Kerley, Brian Joseph, DO        senna (SENOKOT) tablet 1 tablet  1 tablet Oral Nightly Kerley, Brian Joseph, DO   1 tablet at 25 2100    sodium chloride 0.9 % flush 10 mL  10 mL Intravenous Q12H Kerley, Brian Joseph, DO   10 mL at 25 0914    sodium chloride 0.9 % flush 10 mL  10 mL Intravenous PRN Kerley, Brian Joseph, DO        [Held by provider] sodium chloride 0.9 % infusion 40 mL  40 mL Intravenous PRN Kerley, Brian Joseph, DO        tamsulosin (FLOMAX) 24 hr capsule 0.4 mg  0.4 mg Oral Daily Kerley, Brian Joseph, DO   0.4 mg at 01/10/25 0813    ziprasidone (GEODON) capsule 40 mg  40 mg Oral Nightly Dionna Espitia DO   40 mg at 25 2100        Physician Progress Notes (most recent note)        Colin Herndon MD at 25 1152          DOS: 2025  NAME: Hardik Natarajan   : 1945  PCP: Won Cuellar MD  Chief Complaint   Patient presents with    Altered Mental Status    Fall       Chief complaint: Altered mental status  Subjective: Patient seen and examined at the bedside via teleneurology video call with the help of the rounding nurse, patient seemed less agitated and more alert compared to yesterday.    Objective:  Vital signs: /75 (BP Location: Right arm, Patient Position: Lying)   Pulse 68   Temp 97.5 °F (36.4 °C) (Oral)   Resp 20   Ht 185.4 cm (73\")   Wt 96.3 kg (212 lb 4.9 oz)   SpO2 100%   BMI 28.01 kg/m²    Gen: NAD, vitals reviewed  MS: oriented x1 which is his baseline, recent/remote memory impaired, " fair attention/concentration, language intact, no neglect.  CN: visual acuity grossly normal, PERRL, EOMI, no facial droop, no dysarthria  Motor: Moves all extremities against gravity, normal tone  Sensory: intact to light touch all 4 ext.    Laboratory results:  Lab Results   Component Value Date    GLUCOSE 97 01/05/2025    CALCIUM 8.8 01/05/2025     (L) 01/05/2025    K 4.5 01/05/2025    CO2 22.5 01/05/2025    CL 99 01/05/2025    BUN 19 01/05/2025    CREATININE 1.29 (H) 01/05/2025    BCR 14.7 01/05/2025    ANIONGAP 10.5 01/05/2025     Lab Results   Component Value Date    WBC 2.95 (L) 01/03/2025    HGB 9.4 (L) 01/03/2025    HCT 28.5 (L) 01/03/2025    MCV 94.1 01/03/2025     (L) 01/03/2025     Lab Results   Component Value Date    LDL 73 08/03/2024            Review of labs: Above labs reviewed    Review and interpretation of imaging: No new brain imaging to review    Prior workup:  -MRI brain  Impression:     No acute infarction. No posttraumatic findings.     Sinusitis could be considered.     Bilateral mastoid air cell effusions versus mastoiditis. Osseous destruction of the left mastoid process could be secondary to the previously seen skin cancer and/or posttreatment changes. Correlate clinically for signs/symptoms of infection to exclude   osteomyelitis.     -EEG negative for seizure activity as detailed below    IMPRESSION:     Diffuse cerebral dysfunction of moderate degree, nonspecific.  This is most commonly seen due to toxic/metabolic cause     No evidence for epilepsy is seen      Diagnoses:  -Altered mental status probably delirium aggravated by underlying dementia, low suspicion for CNS infection  -Vitamin B12 deficiency      Plan:  1.  Discussed with the hospitalist, family declined the lumbar puncture.  2.  Follow delirium precautions and continue antipsychotics for agitation as needed  3.  Continue B12 replacement  4.  Follow up with general neurology as an outpatient within 2 months,  no further neurology workup needed at this time, will sign off and see again as needed    Management discussed with Dr. Espitia via epic chat.    Addendum:  This was an audio and video enabled telemedicine encounter.    Electronically signed by Colin Herndon MD at 25 1158          Physical Therapy Notes (most recent note)        Kunal Harper, PTA   Physical Therapist Assistant  Physical Therapy     Therapy Treatment Note      Signed     Date of Service: 25 1350  Creation Time: 25 1515     Signed       Expand All Collapse All    Patient Name: Hardik Natarajan                   : 1945                          MRN: [id number]                              Today's Date: 2025                                     Admit Date: 2025               Visit Dx:   Visit Diagnosis       ICD-10-CM ICD-9-CM   1. Altered mental status, unspecified altered mental status type  R41.82 780.97   2. Bladder stone  N21.0 594.1   3. 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    Anemia, unspecified    Nicotine dependence, chewing tobacco, uncomplicated    Neoplasm related pain (acute) (chronic)    Unspecified mastoiditis, left ear    Cellulitis of left external ear    Phlebitis and thrombophlebitis of unspecified site    Gangrene, not elsewhere classified    Acute osteomyelitis    Benign prostatic hyperplasia without lower urinary tract symptoms    Pain    Palliative care by specialist    Encounter for therapeutic drug monitoring    Weakness of left upper extremity    AMS (altered mental status)    Altered mental status         Medical History        Past Medical History:   Diagnosis Date    Anemia      Arthritis      Cancer           Surgical History         Past Surgical History:   Procedure Laterality Date     NAIL BIOPSY N/A      NECK DISSECTION N/A      PAROTIDECTOMY N/A      SKIN CANCER EXCISION               General Information         Row Name 01/08/25 1507                 Physical Therapy Time and Intention     Document Type therapy note (daily note)  -       Mode of Treatment physical therapy  -          Row Name 01/08/25 1507                 General Information     Patient Profile Reviewed yes  -RM       Existing Precautions/Restrictions fall  -RM          Row Name 01/08/25 1507                 Cognition     Orientation Status (Cognition) oriented to;person;verbal cues/prompts needed for orientation  -          Row Name 01/08/25 1507                 Safety Issues/Impairments Affecting Functional Mobility     Impairments Affecting Function (Mobility) balance;cognition;endurance/activity tolerance;motor control;motor planning;strength;range of motion (ROM)  -RM                       User Key  (r) = Recorded By, (t) = Taken By, (c) = Cosigned By        Initials Name Provider Type     RM Kunal Harper, PTA Physical Therapist Assistant                            Mobility         Row Name 01/08/25 1508                 Bed Mobility     Supine-Sit [place] (Bed Mobility) minimum assist (75% patient effort);verbal cues  -          Row Name 01/08/25 1508                 Transfers     Comment, (Transfers) Pt tolerated sitting EOB  for approx 10 minutes  -          Row Name 01/08/25 1508                 Sit-Stand Transfer     Sit-Stand [place] (Transfers) minimum assist (75% patient effort);verbal cues  -       Assistive Device (Sit-Stand Transfers) walker, front-wheeled  -          Row Name 01/08/25 1508                 Gait/Stairs (Locomotion)     [place] Level (Gait) minimum assist (75% patient effort);nonverbal cues (demo/gesture);verbal cues  -RM       Assistive Device (Gait) walker, front-wheeled  -RM       Distance in Feet (Gait) 3  -RM       Deviations/Abnormal Patterns (Gait)  festinating/shuffling;weight shifting decreased  -RM                       User Key  (r) = Recorded By, (t) = Taken By, (c) = Cosigned By        Initials Name Provider Type     Kunal Christianson, VAL Physical Therapist Assistant                            Obj/Interventions    No documentation.                              Goals/Plan    No documentation.                        Clinical Impression         Row Name 01/08/25 1512                 Pain     Pretreatment Pain Rating 0/10 - no pain  -RM       Posttreatment Pain Rating 0/10 - no pain  -RM       Pre/Posttreatment Pain Comment Pt c/o feeling stiff all over  -RM          Row Name 01/08/25 1512                 Plan of Care Review     Plan of Care Reviewed With patient  -RM       Progress improving  -RM       Outcome Evaluation Pt supine in bed and willing to participate with treatment. Pt performed bed mobility, transfers and limited gait  with rw.  See flowsheet for details. Cont PT per POC progressing to goals as pt tolerates.  -RM          Row Name 01/08/25 1512                 Positioning and Restraints     Pre-Treatment Position in bed  -RM       Post Treatment Position chair  -RM       In Chair reclined;call light within reach;encouraged to call for assist;exit alarm on;notified nsg  -RM                       User Key  (r) = Recorded By, (t) = Taken By, (c) = Cosigned By        Initials Name Provider Type     Kunal Christianson, VAL Physical Therapist Assistant                            Outcome Measures         Row Name 01/08/25 1513                 How much help from another person do you currently need...     Turning from your back to your side while in flat bed without using bedrails? 3  -RM       Moving from lying on back to sitting on the side of a flat bed without bedrails? 3  -RM       Moving to and from a bed to a chair (including a wheelchair)? 3  -RM       Standing up from a chair using your arms (e.g., wheelchair, bedside chair)? 3  -RM        Climbing 3-5 steps with a railing? 2  -RM       To walk in hospital room? 3  -RM       AM-PAC 6 Clicks Score (PT) 17  -RM       Highest Level of Mobility Goal 5 --> Static standing  -RM          Row Name 01/08/25 1513                 Functional Assessment     Outcome Measure Options AM-PAC 6 Clicks Basic Mobility (PT)  -RM                       User Key  (r) = Recorded By, (t) = Taken By, (c) = Cosigned By        Initials Name Provider Type     Kunal Christianson, PTA Physical Therapist Assistant                          Physical Therapy Education            Title: PT OT SLP Therapies (Done)         Topic: Physical Therapy (Done)         Point: Mobility training (Done)         Learning Progress Summary             Patient Acceptance, E,TB,D, VU,NR by  at 1/8/2025 1514     Acceptance, E, VU by  at 1/3/2025 1931     Acceptance, E, VU by SJ at 1/3/2025 1300     Acceptance, E, VU by MS at 1/2/2025 1303     Comment: importance of mobility   Family Acceptance, E, VU by  at 1/3/2025 1300                            Point: Home exercise program (Done)         Learning Progress Summary             Patient Acceptance, E,TB, VU by CC at 1/6/2025 1529     Acceptance, E, VU by SJ at 1/3/2025 1931     Acceptance, E, VU by SJ at 1/3/2025 1300     Acceptance, E, VU by MS at 1/2/2025 1303     Comment: importance of mobility   Family Acceptance, E, VU by SJ at 1/3/2025 1300                            Point: Body mechanics (Done)         Learning Progress Summary             Patient Acceptance, E, VU by SJ at 1/3/2025 1931     Acceptance, E, VU by SJ at 1/3/2025 1300   Family Acceptance, E, VU by SJ at 1/3/2025 1300                            Point: Precautions (Done)         Learning Progress Summary             Patient Acceptance, E, VU by SJ at 1/3/2025 1931     Acceptance, E, VU by SJ at 1/3/2025 1300   Family Acceptance, E, VU by SJ at 1/3/2025 1300                                                User Key          Initials Effective Dates Name Provider Type Discipline     CC 06/16/21 -  Karis Henderson, VAL Physical Therapist Assistant PT     RM 06/16/21 -  Kunal Harper, VAL Physical Therapist Assistant PT     MS 08/22/23 -  Shubham Tierney, PT Physical Therapist PT     SJ 09/30/24 -  Kya Jason RN Registered Nurse Nurse                          PT Recommendation and Plan  Progress: improving  Outcome Evaluation: Pt supine in bed and willing to participate with treatment. Pt performed bed mobility, transfers and limited gait  with rw.  See flowsheet for details. Cont PT per POC progressing to goals as pt tolerates.      Time Calculation:        PT Charges         Row Name 01/08/25 1514                       Time Calculation     Start Time 1350  -RM         Stop Time 1415  -RM         Time Calculation (min) 25 min  -RM         PT Received On 01/08/25  -RM         PT Goal Re-Cert Due Date 01/12/25  -RM                 Time Calculation- PT     Total Timed Code Minutes- PT 25 minute(s)  -RM                 Timed Charges     08751 - Gait Training Minutes  10  -RM         64373 - PT Therapeutic Activity Minutes 15  -RM                 Total Minutes     Timed Charges Total Minutes 25  -RM          Total Minutes 25  -RM                      User Key  (r) = Recorded By, (t) = Taken By, (c) = Cosigned By        Initials Name Provider Type      Kunal Harper, PTA Physical Therapist Assistant                       Therapy Charges for Today         Code Description Service Date Service Provider Modifiers Qty     99362624203 HC GAIT TRAINING EA 15 MIN 1/8/2025 Kunal Harper, PTA GP 1     89928758683 HC PT THERAPEUTIC ACT EA 15 MIN 1/8/2025 Kunal Harper, PTA GP 1                PT G-Codes  Outcome Measure Options: AM-PAC 6 Clicks Basic Mobility (PT)  AM-PAC 6 Clicks Score (PT): 17  AM-PAC 6 Clicks Score (OT): 13     Kunal Harper PTA              1/8/2025                                          Therapy Eval PT        Shubham Tierney, PT   Physical Therapist  Physical Therapy     Therapy Evaluation      Signed     Date of Service: 25 1305  Creation Time: 25     Signed       Expand All Collapse All    Patient Name: Hardik Natarajan                   : 1945                          MRN: [id number]                              Today's Date: 2025                                     Admit Date: 2025               Visit Dx:   Visit Diagnosis       ICD-10-CM ICD-9-CM   1. Altered mental status, unspecified altered mental status type  R41.82 780.97   2. Bladder stone  N21.0 594.1   3. 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    Anemia, unspecified    Nicotine dependence, chewing tobacco, uncomplicated    Neoplasm related pain (acute) (chronic)    Unspecified mastoiditis, left ear    Cellulitis of left external ear    Phlebitis and thrombophlebitis of unspecified site    Gangrene, not elsewhere classified    Acute osteomyelitis    Benign prostatic hyperplasia without lower urinary tract symptoms    Pain    Palliative care by specialist    Encounter for therapeutic drug monitoring    Weakness of left upper extremity    AMS (altered mental status)         Medical History        Past Medical History:   Diagnosis Date    Anemia      Arthritis      Cancer           Surgical History         Past Surgical History:   Procedure Laterality Date    NAIL BIOPSY N/A      NECK DISSECTION N/A      PAROTIDECTOMY N/A      SKIN CANCER EXCISION               General Information         Row Name 25 1229                 Physical Therapy Time and Intention     Document Type evaluation  -MS       Mode of Treatment physical therapy  -MS          Row Name 25 1229                 General Information     Patient Profile Reviewed  yes  -MS       Prior Level of Function all household mobility;community mobility  -MS       Existing Precautions/Restrictions fall  -MS       Barriers to Rehab medically complex;previous functional deficit;cognitive status  -MS          Row Name 01/02/25 1229                 Living Environment     People in Home spouse  -MS          Row Name 01/02/25 1229                 Home Main Entrance     Number of Stairs, Main Entrance none  -MS          Row Name 01/02/25 1229                 Cognition     Orientation Status (Cognition) oriented x 4  -MS          Row Name 01/02/25 1229                 Safety Issues/Impairments Affecting Functional Mobility     Safety Issues Affecting Function (Mobility) insight into deficits/self-awareness;safety precautions follow-through/compliance;positioning of assistive device;awareness of need for assistance;sequencing abilities;safety precaution awareness  -MS       Impairments Affecting Function (Mobility) endurance/activity tolerance;cognition;strength  -MS       Cognitive Impairments, Mobility Safety/Performance awareness, need for assistance;safety precaution awareness;safety precaution follow-through;insight into deficits/self-awareness  -MS                       User Key  (r) = Recorded By, (t) = Taken By, (c) = Cosigned By        Initials Name Provider Type     MS Shubham Tierney, PT Physical Therapist                            Mobility         Row Name 01/02/25 1248                 Bed Mobility     Bed Mobility supine-sit  -MS       Supine-Sit [place] (Bed Mobility) maximum assist (25% patient effort);2 person assist  -MS       Assistive Device (Bed Mobility) bed rails;head of bed elevated  -MS          Row Name 01/02/25 1248                 Sit-Stand Transfer     Sit-Stand [place] (Transfers) maximum assist (25% patient effort);moderate assist (50% patient effort);2 person assist  -MS       Assistive Device (Sit-Stand Transfers) other (see comments)  HHA  -MS           Row Name 01/02/25 1248                 Gait/Stairs (Locomotion)     Patient was able to Ambulate no, other medical factors prevent ambulation  -MS                       User Key  (r) = Recorded By, (t) = Taken By, (c) = Cosigned By        Initials Name Provider Type     Shubham Banda, PT Physical Therapist                            Obj/Interventions         Row Name 01/02/25 1249                 Range of Motion Comprehensive     General Range of Motion bilateral lower extremity ROM WFL  -MS          Row Name 01/02/25 1249                 Strength Comprehensive (MMT)     General Manual Muscle Testing (MMT) Assessment lower extremity strength deficits identified  -MS       Comment, General Manual Muscle Testing (MMT) Assessment B LE 3-/5  -MS          Row Name 01/02/25 1249                 Sensory Assessment (Somatosensory)     Sensory Assessment (Somatosensory) sensation intact  -MS                       User Key  (r) = Recorded By, (t) = Taken By, (c) = Cosigned By        Initials Name Provider Type     MS Shubham Tierney, PT Physical Therapist                            Goals/Plan         Row Name 01/02/25 1302                 Bed Mobility Goal 1 (PT)     Activity/Assistive Device (Bed Mobility Goal 1, PT) bed mobility activities, all  -MS       [place] Level/Cues Needed (Bed Mobility Goal 1, PT) modified independence  -MS       Time Frame (Bed Mobility Goal 1, PT) long term goal (LTG);2 weeks  -MS          Row Name 01/02/25 1302                 Transfer Goal 1 (PT)     Activity/Assistive Device (Transfer Goal 1, PT) transfers, all;sit-to-stand/stand-to-sit  -MS       [place] Level/Cues Needed (Transfer Goal 1, PT) modified independence  -MS       Time Frame (Transfer Goal 1, PT) long term goal (LTG);2 weeks  -MS          Row Name 01/02/25 1302                 Gait Training Goal 1 (PT)     Activity/Assistive Device (Gait Training Goal 1, PT) gait (walking locomotion);assistive device use  -MS        [place] Level (Gait Training Goal 1, PT) standby assist  -MS       Distance (Gait Training Goal 1, PT) 50ft  -MS       Time Frame (Gait Training Goal 1, PT) short term goal (STG);10 days  -MS          Row Name 01/02/25 1302                 Patient Education Goal (PT)     Activity (Patient Education Goal, PT) ther exer x15 reps ea  -MS       [place]/Cues/Accuracy (Memory Goal 2, PT) demonstrates adequately  -MS       Time Frame (Patient Education Goal, PT) short term goal (STG);10 days  -MS          Row Name 01/02/25 1302                 Therapy Assessment/Plan (PT)     Planned Therapy Interventions (PT) balance training;bed mobility training;gait training;home exercise program;neuromuscular re-education;ROM (range of motion);stair training;strengthening;patient/family education;transfer training  -MS                    User Key  (r) = Recorded By, (t) = Taken By, (c) = Cosigned By        Initials Name Provider Type     Shubham Banda, PT Physical Therapist                         Clinical Impression         Row Name 01/02/25 1252                 Pain     Pretreatment Pain Rating 0/10 - no pain  -MS       Posttreatment Pain Rating 0/10 - no pain  -MS          Row Name 01/02/25 1252                 Plan of Care Review     Plan of Care Reviewed With patient  -MS       Progress no change  -MS       Outcome Evaluation Pt participated in PT eval this date. Pt agreeable to PT eval, oriented to name. Pt transferred to EOB with mod-maxA x2. Pt at EOB able to maintain balance with minimal support. Pt stood with HHA and max-modA x2. But after standing Pt began writhing with difficulty following commands. RN and PCT notified and assisted with rolling Pt to get clean linens on bed. Pt comfortable lying on his right side after tx. Pt is expected to benefit from skilled PT tx during this inpatient stay and would benefit from STR at d/c.  -MS          Row Name 01/02/25 1252                 Therapy  Assessment/Plan (PT)     Rehab Potential (PT) good  -MS       Criteria for Skilled Interventions Met (PT) yes;meets criteria  -MS       Therapy Frequency (PT) 5 times/wk  -MS          Row Name 01/02/25 1252                 Vital Signs     O2 Delivery Pre Treatment room air  -MS       O2 Delivery Intra Treatment room air  -MS       O2 Delivery Post Treatment room air  -MS       Pre Patient Position Supine  -MS       Intra Patient Position Standing  -MS       Post Patient Position Supine  -MS          Row Name 01/02/25 1252                 Positioning and Restraints     Pre-Treatment Position in bed  -MS       Post Treatment Position bed  -MS       In Bed side lying right;call light within reach;encouraged to call for assist  -MS                       User Key  (r) = Recorded By, (t) = Taken By, (c) = Cosigned By        Initials Name Provider Type     Shubham Banda, RADHA Physical Therapist                            Outcome Measures         Row Name 01/02/25 1303 01/02/25 0800            How much help from another person do you currently need...     Turning from your back to your side while in flat bed without using bedrails? 3  -MS 3  -KK     Moving from lying on back to sitting on the side of a flat bed without bedrails? 3  -MS 3  -KK     Moving to and from a bed to a chair (including a wheelchair)? 3  -MS 3  -KK     Standing up from a chair using your arms (e.g., wheelchair, bedside chair)? 3  -MS 3  -KK     Climbing 3-5 steps with a railing? 3  -MS 3  -KK     To walk in hospital room? 3  -MS 3  -KK     AM-PAC 6 Clicks Score (PT) 18  -MS 18  -KK                     User Key  (r) = Recorded By, (t) = Taken By, (c) = Cosigned By        Initials Name Provider Type     Shubham Banda, PT Physical Therapist     Marcie Mckeon, RN Registered Nurse                          Physical Therapy Education            Title: PT OT SLP Therapies (In Progress)         Topic: Physical Therapy (In Progress)         Point:  Mobility training (Done)         Learning Progress Summary             Patient Acceptance, E, VU by MS at 1/2/2025 1303     Comment: importance of mobility                            Point: Home exercise program (Done)         Learning Progress Summary             Patient Acceptance, E, VU by MS at 1/2/2025 1303     Comment: importance of mobility                            Point: Body mechanics (Not Started)         Learner Progress:  Not documented in this visit.                  Point: Precautions (Not Started)         Learner Progress:  Not documented in this visit.                                      User Key         Initials Effective Dates Name Provider Type Discipline     MS 08/22/23 -  Shubham Tierney, PT Physical Therapist PT                          PT Recommendation and Plan  Planned Therapy Interventions (PT): balance training, bed mobility training, gait training, home exercise program, neuromuscular re-education, ROM (range of motion), stair training, strengthening, patient/family education, transfer training  Progress: no change  Outcome Evaluation: Pt participated in PT eval this date. Pt agreeable to PT eval, oriented to name. Pt transferred to EOB with mod-maxA x2. Pt at EOB able to maintain balance with minimal support. Pt stood with HHA and max-modA x2. But after standing Pt began writhing with difficulty following commands. RN and PCT notified and assisted with rolling Pt to get clean linens on bed. Pt comfortable lying on his right side after tx. Pt is expected to benefit from skilled PT tx during this inpatient stay and would benefit from STR at d/c.      Time Calculation:   PT Evaluation Complexity  History, PT Evaluation Complexity: 1-2 personal factors and/or comorbidities  Examination of Body Systems (PT Eval Complexity): total of 3 or more elements  Clinical Presentation (PT Evaluation Complexity): evolving  Clinical Decision Making (PT Evaluation Complexity): moderate  complexity  Overall Complexity (PT Evaluation Complexity): moderate complexity       PT Charges         Row Name 25 1304                       Time Calculation     Start Time 1026  -MS         PT Received On 25  -MS         PT Goal Re-Cert Due Date 25  -MS                 Untimed Charges     PT Eval/Re-eval Minutes 56  -MS                 Total Minutes     Untimed Charges Total Minutes 56  -MS          Total Minutes 56  -MS                      User Key  (r) = Recorded By, (t) = Taken By, (c) = Cosigned By        Initials Name Provider Type     Shubham Banda, PT Physical Therapist                       Therapy Charges for Today         Code Description Service Date Service Provider Modifiers Qty     71116393472 HC PT EVAL MOD COMPLEXITY 4 2025 Shubham Tierney, PT GP 1                PT G-Codes  AM-PAC 6 Clicks Score (PT): 18  PT Discharge Summary  Anticipated Discharge Disposition (PT): home with assist, home with home health     Shubham Tierney PT               2025                                       Occupational Therapy Notes (most recent note)        Trina Wesley   Occupational Therapist  Occupational Therapy     Therapy Treatment Note      Signed     Date of Service: 25 1523  Creation Time: 25 1523     Signed       Expand All Collapse All    Patient Name: Hardik Natarajan                   : 1945                          MRN: [id number]                              Today's Date: 2025                                     Admit Date: 2025               Visit Dx:   Visit Diagnosis       ICD-10-CM ICD-9-CM   1. Altered mental status, unspecified altered mental status type  R41.82 780.97   2. Bladder stone  N21.0 594.1   3. 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     Anemia, unspecified    Nicotine dependence, chewing tobacco, uncomplicated    Neoplasm related pain (acute) (chronic)    Unspecified mastoiditis, left ear    Cellulitis of left external ear    Phlebitis and thrombophlebitis of unspecified site    Gangrene, not elsewhere classified    Acute osteomyelitis    Benign prostatic hyperplasia without lower urinary tract symptoms    Pain    Palliative care by specialist    Encounter for therapeutic drug monitoring    Weakness of left upper extremity    AMS (altered mental status)    Altered mental status         Medical History        Past Medical History:   Diagnosis Date    Anemia      Arthritis      Cancer           Surgical History         Past Surgical History:   Procedure Laterality Date    NAIL BIOPSY N/A      NECK DISSECTION N/A      PAROTIDECTOMY N/A      SKIN CANCER EXCISION               General Information         [facility] Name 01/08/25 1512                 OT Time and Intention     Document Type therapy note (daily note)  -       Mode of Treatment occupational therapy  -       Patient Effort good  -[facility] Name 01/08/25 1512                 General Information     Patient Profile Reviewed yes  -                       User Key  (r) = Recorded By, (t) = Taken By, (c) = Cosigned By        Initials Name Provider Type      Trina Wesley Occupational Therapist                                     Mobility/ADL's         Row Name 01/08/25 1513                 Bed Mobility     Supine-Sit Person (Bed Mobility) minimum assist (75% patient effort)  -       Assistive Device (Bed Mobility) head of bed elevated;bed rails  -          Row Name 01/08/25 1513                 Transfers     Transfers sit-stand transfer  -[facility] Name 01/08/25 1513                 Sit-Stand Transfer     Sit-Stand Person (Transfers) minimum assist (75% patient effort);verbal cues  -       Assistive Device (Sit-Stand Transfers) walker, front-wheeled  -           [facility] Name 01/08/25 1513                 Functional Mobility     Functional Mobility- Ind. Level minimum assist (75% patient effort)  -       Functional Mobility- Device walker, front-wheeled  -       Functional Mobility-Distance (Feet) 3  -       Patient was able to Ambulate yes  -[facility] Name 01/08/25 1513                 Bathing Assessment/Intervention     [place] Level (Bathing) lower body;upper body;moderate assist (50% patient effort)  -       Position (Bathing) edge of bed sitting  -[facility] Name 01/08/25 1513                 Upper Body Dressing Assessment/Training     [place] Level (Upper Body Dressing) minimum assist (75% patient effort);don;other (see comments)  other  -[facility] Name 01/08/25 1513                 Grooming Assessment/Training     [place] Level (Grooming) wash face, hands;set up  -                       User Key  (r) = Recorded By, (t) = Taken By, (c) = Cosigned By        Initials Name Provider Type      Trina Wesley Occupational Therapist                            Obj/Interventions    No documentation.                              Goals/Plan    No documentation.                        Clinical Impression         [facility] Name 01/08/25 1516                 Pain Assessment     Pretreatment Pain Rating 0/10 - no pain  -       Posttreatment Pain Rating 0/10 - no pain  -AH          Row Name 01/08/25 1516                 Plan of Care Review     Plan of Care Reviewed With patient  -       Progress improving  -       Outcome Evaluation Pt received supine in bed and was willing to work with therapy.  Pt sat eob with min assist, completed bathing tasks with mod assist sitting eob.  Pt min assist to stand and min assist to walk 3' to his chair using RW.  Pt tolerated increased activity today, pt was left sitting reclined in his chair with call light within reach and chair alarm on.  Cont OT per POC.  -[facility] Name 01/08/25 1516                  Positioning and Restraints     Post Treatment Position chair  -       In Chair reclined;call light within reach;encouraged to call for assist;exit alarm on;notified nsg  -                       User Key  (r) = Recorded By, (t) = Taken By, (c) = Cosigned By        Initials Name Provider Type     Trina Palacio Occupational Therapist                            Outcome Measures         Row Name 01/08/25 1518                 How much help from another is currently needed...     Putting on and taking off regular lower body clothing? 2  -AH       Bathing (including washing, rinsing, and drying) 2  -AH       Toileting (which includes using toilet bed pan or urinal) 2  -AH       Putting on and taking off regular upper body clothing 3  -AH       Taking care of personal grooming (such as brushing teeth) 3  -AH       Eating meals 3  -       AM-PAC 6 Clicks Score (OT) 15  -AH          Row Name 01/08/25 1513                 How much help from another person do you currently need...     Turning from your back to your side while in flat bed without using bedrails? 3  -RM       Moving from lying on back to sitting on the side of a flat bed without bedrails? 3  -RM       Moving to and from a bed to a chair (including a wheelchair)? 3  -RM       Standing up from a chair using your arms (e.g., wheelchair, bedside chair)? 3  -RM       Climbing 3-5 steps with a railing? 2  -RM       To walk in hospital room? 3  -RM       AM-PAC 6 Clicks Score (PT) 17  -RM       Highest Level of Mobility Goal 5 --> Static standing  -RM          Row Name 01/08/25 1518 01/08/25 1513            Functional Assessment     Outcome Measure Options AM-PAC 6 Clicks Daily Activity (OT)  - AM-PAC 6 Clicks Basic Mobility (PT)  -RM                     User Key  (r) = Recorded By, (t) = Taken By, (c) = Cosigned By        Initials Name Provider Type     Trina Palacio Occupational Therapist     Kunal Christianson, PTA Physical Therapist Assistant                              Occupational Therapy Education            Title: PT OT SLP Therapies (Done)         Topic: Occupational Therapy (Done)         Point: ADL training (Done)         Description:   Instruct learner(s) on proper safety adaptation and remediation techniques during self care or transfers.   Instruct in proper use of assistive devices.                       Learning Progress Summary             Patient Acceptance, E,TB, VU by  at 1/8/2025 1519     Comment: benefit of activity     Acceptance, E,TB, VU by  at 1/6/2025 1521     Comment: benefit of activity     Acceptance, E, VU by  at 1/3/2025 1931     Acceptance, E, VU by  at 1/3/2025 1300     Acceptance, E,TB, VU by SD at 1/2/2025 1322     Comment: OT POC   Family Acceptance, E, VU by  at 1/3/2025 1300                            Point: Home exercise program (Done)         Description:   Instruct learner(s) on appropriate technique for monitoring, assisting and/or progressing therapeutic exercises/activities.                       Learning Progress Summary             Patient Acceptance, E,TB, VU by  at 1/6/2025 1521     Comment: benefit of activity     Acceptance, E, VU by  at 1/3/2025 1931     Acceptance, E, VU by  at 1/3/2025 1300   Family Acceptance, E, VU by  at 1/3/2025 1300                            Point: Precautions (Done)         Description:   Instruct learner(s) on prescribed precautions during self-care and functional transfers.                       Learning Progress Summary             Patient Acceptance, E, VU by  at 1/3/2025 1931     Acceptance, E, VU by  at 1/3/2025 1300   Family Acceptance, E, VU by  at 1/3/2025 1300                            Point: Body mechanics (Done)         Description:   Instruct learner(s) on proper positioning and spine alignment during self-care, functional mobility activities and/or exercises.                       Learning Progress Summary             Patient Acceptance, E, VU by   at 1/3/2025 1931     Acceptance, E, VU by  at 1/3/2025 1300   Family Acceptance, E, VU by  at 1/3/2025 1300                                                User Key         Initials Effective Dates Name Provider Type [facility] 06/16/21 -  Trina Wesley Occupational Therapist OT     SD 06/16/21 -  Jaqueline Dasilva OT Occupational Therapist OT      09/30/24 -  Kya Jason, RN Registered Nurse Nurse                          OT Recommendation and Plan  Plan of Care Review  Plan of Care Reviewed With: patient  Progress: improving  Outcome Evaluation: Pt received supine in bed and was willing to work with therapy.  Pt sat eob with min assist, completed bathing tasks with mod assist sitting eob.  Pt min assist to stand and min assist to walk 3' to his chair using RW.  Pt tolerated increased activity today, pt was left sitting reclined in his chair with call light within reach and chair alarm on.  Cont OT per POC.      Time Calculation:        Time Calculation- OT         Row Name 01/08/25 1522 01/08/25 1514                  Time Calculation- OT     OT Start Time 1349  - --       OT Stop Time 1415  - --       OT Time Calculation (min) 26 min  - --       OT Received On 01/08/25  - --       OT Goal Re-Cert Due Date 01/14/25  - --               Timed Charges     90799 - Gait Training Minutes  -- 10  -RM       57909 - OT Therapeutic Activity Minutes 11  -AH --       02475 - OT Self Care/Mgmt Minutes 15  -AH --               Total Minutes     Timed Charges Total Minutes 26  -AH 10  -RM        Total Minutes 26  - 10  -RM                    User Key  (r) = Recorded By, (t) = Taken By, (c) = Cosigned By        Initials Name Provider Type      Trina Wesley Occupational Therapist      Kunal Harper, PTA Physical Therapist Assistant                       Therapy Charges for Today         Code Description Service Date Service Provider Modifiers Qty     68471362583 HC OT THERAPEUTIC ACT EA 15 MIN  2025 LupilloTrina LESLIE 1     06320328476 HC OT SELF CARE/MGMT/TRAIN EA 15 MIN 2025 Lupillo Trina LESLIE 1                   Trina Wesley                       2025                    Therapy Eval OT     Jaqueline Dasilva OT   Occupational Therapist  Specialty: Occupational Therapy     Therapy Evaluation      Signed     Date of Service: 25 1323  Creation Time: 25     Signed       Expand All Collapse All    Patient Name: Hardik Natarajan                   : 1945                          MRN: [id number]                              Today's Date: 2025                                     Admit Date: 2025               Visit Dx:   Visit Diagnosis       ICD-10-CM ICD-9-CM   1. Altered mental status, unspecified altered mental status type  R41.82 780.97   2. Bladder stone  N21.0 594.1   3. 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    Anemia, unspecified    Nicotine dependence, chewing tobacco, uncomplicated    Neoplasm related pain (acute) (chronic)    Unspecified mastoiditis, left ear    Cellulitis of left external ear    Phlebitis and thrombophlebitis of unspecified site    Gangrene, not elsewhere classified    Acute osteomyelitis    Benign prostatic hyperplasia without lower urinary tract symptoms    Pain    Palliative care by specialist    Encounter for therapeutic drug monitoring    Weakness of left upper extremity    AMS (altered mental status)         Medical History        Past Medical History:   Diagnosis Date    Anemia      Arthritis      Cancer           Surgical History         Past Surgical History:   Procedure Laterality Date    NAIL BIOPSY N/A      NECK DISSECTION N/A      PAROTIDECTOMY N/A      SKIN CANCER EXCISION               General Information         Row Name 25 1302                  OT Time and Intention     Subjective Information no complaints  -SD       Document Type evaluation  -SD       Mode of Treatment occupational therapy  -SD       Patient Effort fair  -SD       Symptoms Noted During/After Treatment increased pain  -SD          Row Name 01/02/25 1304                 General Information     Patient Profile Reviewed yes  -SD       Prior Level of Function independent:;all household mobility;ADL's  -SD       Existing Precautions/Restrictions fall  -SD       Barriers to Rehab medically complex;previous functional deficit;cognitive status;hearing deficit  -SD          Row Name 01/02/25 1304                 Living Environment     People in Home spouse  -SD          Row Name 01/02/25 1304                 Home Main Entrance     Number of Stairs, Main Entrance none  -SD          Row Name 01/02/25 1304                 Stairs Within Home, Primary     Number of Stairs, Within Home, Primary none  -SD          Row Name 01/02/25 1304                 Cognition     Orientation Status (Cognition) oriented to;person;verbal cues/prompts needed for orientation  -SD          Row Name 01/02/25 1304                 Safety Issues/Impairments Affecting Functional Mobility     Safety Issues Affecting Function (Mobility) safety precautions follow-through/compliance;safety precaution awareness;sequencing abilities;insight into deficits/self-awareness;impulsivity;awareness of need for assistance;ability to follow commands;judgment  -SD       Impairments Affecting Function (Mobility) balance;cognition;endurance/activity tolerance;motor control;motor planning;pain;strength;range of motion (ROM);postural/trunk control  -SD                       User Key  (r) = Recorded By, (t) = Taken By, (c) = Cosigned By        Initials Name Provider Type     SD Jaqueline Dasilva OT Occupational Therapist                                     Mobility/ADL's         Row Name 01/02/25 1306                 Bed Mobility      Bed Mobility supine-sit;sit-supine  -SD       Supine-Sit [place] (Bed Mobility) maximum assist (25% patient effort);2 person assist  -SD       Sit-Supine [place] (Bed Mobility) maximum assist (25% patient effort);2 person assist  -SD       Bed Mobility, Safety Issues decreased use of arms for pushing/pulling;decreased use of legs for bridging/pushing;impaired trunk control for bed mobility;cognitive deficits limit understanding  -SD       Assistive Device (Bed Mobility) bed rails;head of bed elevated;repositioning sheet  -[facility] Name 01/02/25 1306                 Transfers     Transfers sit-stand transfer  -[facility] Name 01/02/25 1306                 Sit-Stand Transfer     Sit-Stand [place] (Transfers) maximum assist (25% patient effort);moderate assist (50% patient effort);2 person assist  -SD       Comment, (Sit-Stand Transfer) HHA, gait belt  -[facility] Name 01/02/25 1306                 Functional Mobility     Functional Mobility- Ind. Level not appropriate to assess  -[facility] Name 01/02/25 1306                 Activities of Daily Living     BADL Assessment/Intervention bathing;upper body dressing;lower body dressing;grooming;feeding;toileting  -[facility] Name 01/02/25 1306                 Bathing Assessment/Intervention     [place] Level (Bathing) maximum assist (25% patient effort);dependent (less than 25% patient effort)  -[facility] Name 01/02/25 1306                 Upper Body Dressing Assessment/Training     [place] Level (Upper Body Dressing) maximum assist (25% patient effort)  -[facility] Name 01/02/25 1306                 Lower Body Dressing Assessment/Training     [place] Level (Lower Body Dressing) dependent (less than 25% patient effort)  -[facility] Name 01/02/25 1306                 Grooming Assessment/Training     [place] Level (Grooming) maximum assist (25% patient effort)  -[facility] Name 01/02/25 1306                  Self-Feeding Assessment/Training     [place] Level (Feeding) maximum assist (25% patient effort)  -SD          Row Name 01/02/25 1306                 Toileting Assessment/Training     [place] Level (Toileting) dependent (less than 25% patient effort)  -SD                       User Key  (r) = Recorded By, (t) = Taken By, (c) = Cosigned By        Initials Name Provider Type     SD Jaqueline Dasilva OT Occupational Therapist                            Obj/Interventions         Row Name 01/02/25 1308                 Range of Motion Comprehensive     General Range of Motion bilateral upper extremity ROM WFL  -SD          [facility] Name 01/02/25 1308                 Strength Comprehensive (MMT)     General Manual Muscle Testing (MMT) Assessment upper extremity strength deficits identified  -SD       Comment, General Manual Muscle Testing (MMT) Assessment unable to follow commands for MMT  -SD                       User Key  (r) = Recorded By, (t) = Taken By, (c) = Cosigned By        Initials Name Provider Type     SD Jaqueline Dasilva OT Occupational Therapist                            Goals/Plan         [facility] Name 01/02/25 1320                 Transfer Goal 1 (OT)     Activity/Assistive Device (Transfer Goal 1, OT) sit-to-stand/stand-to-sit;walker, rolling  -SD       [place] Level/Cues Needed (Transfer Goal 1, OT) minimum assist (75% or more patient effort)  -SD       Time Frame (Transfer Goal 1, OT) long term goal (LTG)  -SD       Progress/Outcome (Transfer Goal 1, OT) goal ongoing  -SD          [facility] Name 01/02/25 1320                 Dressing Goal 1 (OT)     Activity/Device (Dressing Goal 1, OT) lower body dressing  -SD       [place]/Cues Needed (Dressing Goal 1, OT) moderate assist (50-74% patient effort)  -SD       Time Frame (Dressing Goal 1, OT) 2 weeks  -SD       Progress/Outcome (Dressing Goal 1, OT) goal ongoing  -SD          [facility] Name 01/02/25 1320                 Toileting Goal 1  (OT)     Activity/Device (Toileting Goal 1, OT) toileting skills, all;commode  -SD       [place] Level/Cues Needed (Toileting Goal 1, OT) moderate assist (50-74% patient effort)  -SD       Time Frame (Toileting Goal 1, OT) 2 weeks  -SD       Progress/Outcome (Toileting Goal 1, OT) goal ongoing  -SD          Row Name 01/02/25 1320                 Strength Goal 1 (OT)     Strength Goal 1 (OT) Patient to perform UB ther ex as tolerated  -SD       Time Frame (Strength Goal 1, OT) long term goal (LTG)  -SD       Progress/Outcome (Strength Goal 1, OT) goal ongoing  -SD          Row Name 01/02/25 1320                 Therapy Assessment/Plan (OT)     Planned Therapy Interventions (OT) activity tolerance training;adaptive equipment training;BADL retraining;patient/caregiver education/training;ROM/therapeutic exercise;strengthening exercise;transfer/mobility retraining  -SD                    User Key  (r) = Recorded By, (t) = Taken By, (c) = Cosigned By        Initials Name Provider Type     Jaqueline Sheppard OT Occupational Therapist                         Clinical Impression         Row Name 01/02/25 1308                 Pain Assessment     Pretreatment Pain Rating 0/10 - no pain  -SD       Posttreatment Pain Rating 0/10 - no pain  -SD          Row Name 01/02/25 1308                 Plan of Care Review     Plan of Care Reviewed With patient;family  -SD       Progress no change  -SD       Outcome Evaluation OT eval completed. Patient is supine in bed, gown is partially removed and patient appears to be sleeping. Son and DIL are present at bedside and provide history. Patient lives with his wife in a one level on a basement; does not access basement. Patient walks with a RW, is normally ind with ADLs. Patient wakes to name and is oriented to person only with cues, able to follow one step commands with increased cues for initiation. Patient moved to EOB max A x 2, requires max verbal and physical cues for sequencing.  Patient able to sit EOB with min A. Patient's brief noted to be wet. Patient performed sit to stand with mod A x 2 with gait belt and HHA, stood briefly and unable to get brief changed. Attempted second sit to stand using RW with patient unable to come to stand, c/o increased pain of LUE and L flank area. Became increasingly agitated. Patient returned to supine max A x 2, by this time not following any commands in order to perform bed mobility and get brief changed. PCT present to assist. Patient placed in left sidelying, bed alarm on and notified RN. Patient to continue with OT services to address deficits in strength, endurance, balance, mobility and ADL performance. Would benefit from STR for more prolonged opportunity for skilled therapy services.  -SD          Row Name 01/02/25 1308                 Therapy Assessment/Plan (OT)     Patient/Family Therapy Goal Statement (OT) improve mental status  -SD       Rehab Potential (OT) good  -SD       Criteria for Skilled Therapeutic Interventions Met (OT) skilled treatment is necessary  -SD       Therapy Frequency (OT) 3 times/wk  5 times if indicated  -SD          Row Name 01/02/25 1308                 Therapy Plan Review/Discharge Plan (OT)     Anticipated Discharge Disposition (OT) inpatient rehabilitation facility  -SD          Row Name 01/02/25 1308                 Vital Signs     O2 Delivery Pre Treatment room air  -SD       O2 Delivery Intra Treatment room air  -SD       O2 Delivery Post Treatment room air  -SD          Row Name 01/02/25 1308                 Positioning and Restraints     Pre-Treatment Position in bed  -SD       Post Treatment Position bed  -SD       In Bed side lying right;call light within reach;encouraged to call for assist;exit alarm on;notified nsg  -SD                       User Key  (r) = Recorded By, (t) = Taken By, (c) = Cosigned By        Initials Name Provider Type     Jaqueline Sheppard, OT Occupational Therapist                             Outcome Measures         Row Name 01/02/25 1321                 How much help from another is currently needed...     Putting on and taking off regular lower body clothing? 1  -SD       Bathing (including washing, rinsing, and drying) 1  -SD       Toileting (which includes using toilet bed pan or urinal) 1  -SD       Putting on and taking off regular upper body clothing 2  -SD       Taking care of personal grooming (such as brushing teeth) 2  -SD       Eating meals 2  -SD       AM-PAC 6 Clicks Score (OT) 9  -SD          Row Name 01/02/25 1303 01/02/25 0800            How much help from another person do you currently need...     Turning from your back to your side while in flat bed without using bedrails? 3  -MS 3  -KK     Moving from lying on back to sitting on the side of a flat bed without bedrails? 3  -MS 3  -KK     Moving to and from a bed to a chair (including a wheelchair)? 3  -MS 3  -KK     Standing up from a chair using your arms (e.g., wheelchair, bedside chair)? 3  -MS 3  -KK     Climbing 3-5 steps with a railing? 3  -MS 3  -KK     To walk in hospital room? 3  -MS 3  -KK     AM-PAC 6 Clicks Score (PT) 18  -MS 18  -KK        Row Name 01/02/25 1321                 Functional Assessment     Outcome Measure Options AM-PAC 6 Clicks Daily Activity (OT)  -SD                       User Key  (r) = Recorded By, (t) = Taken By, (c) = Cosigned By        Initials Name Provider Type     Jaqueline Sheppard OT Occupational Therapist     Shubham Banda, PT Physical Therapist     KK Marcie Longoria, RN Registered Nurse                             Occupational Therapy Education            Title: PT OT SLP Therapies (In Progress)         Topic: Occupational Therapy (In Progress)         Point: ADL training (Done)         Description:   Instruct learner(s) on proper safety adaptation and remediation techniques during self care or transfers.   Instruct in proper use of assistive devices.                        Learning Progress Summary             Patient Acceptance, E,TB, VU by SD at 1/2/2025 1322     Comment: OT POC                            Point: Home exercise program (Not Started)         Description:   Instruct learner(s) on appropriate technique for monitoring, assisting and/or progressing therapeutic exercises/activities.                       Learner Progress:  Not documented in this visit.                  Point: Precautions (Not Started)         Description:   Instruct learner(s) on prescribed precautions during self-care and functional transfers.                       Learner Progress:  Not documented in this visit.                  Point: Body mechanics (Not Started)         Description:   Instruct learner(s) on proper positioning and spine alignment during self-care, functional mobility activities and/or exercises.                       Learner Progress:  Not documented in this visit.                                      User Key         Initials Effective Dates Name Provider Type Discipline     SD 06/16/21 -  Jaqueline Dasilva OT Occupational Therapist OT                          OT Recommendation and Plan  Planned Therapy Interventions (OT): activity tolerance training, adaptive equipment training, BADL retraining, patient/caregiver education/training, ROM/therapeutic exercise, strengthening exercise, transfer/mobility retraining  Therapy Frequency (OT): 3 times/wk (5 times if indicated)  Plan of Care Review  Plan of Care Reviewed With: patient, family  Progress: no change  Outcome Evaluation: OT eval completed. Patient is supine in bed, gown is partially removed and patient appears to be sleeping. Son and DIL are present at bedside and provide history. Patient lives with his wife in a one level on a basement; does not access basement. Patient walks with a RW, is normally ind with ADLs. Patient wakes to name and is oriented to person only with cues, able to follow one step commands with increased cues for  initiation. Patient moved to EOB max A x 2, requires max verbal and physical cues for sequencing. Patient able to sit EOB with min A. Patient's brief noted to be wet. Patient performed sit to stand with mod A x 2 with gait belt and HHA, stood briefly and unable to get brief changed. Attempted second sit to stand using RW with patient unable to come to stand, c/o increased pain of LUE and L flank area. Became increasingly agitated. Patient returned to supine max A x 2, by this time not following any commands in order to perform bed mobility and get brief changed. PCT present to assist. Patient placed in left sidelying, bed alarm on and notified RN. Patient to continue with OT services to address deficits in strength, endurance, balance, mobility and ADL performance. Would benefit from STR for more prolonged opportunity for skilled therapy services.      Time Calculation:   Evaluation Complexity (OT)  Review Occupational Profile/Medical/Therapy History Complexity: expanded/moderate complexity  Assessment, Occupational Performance/Identification of Deficit Complexity: 3-5 performance deficits  Clinical Decision Making Complexity (OT): detailed assessment/moderate complexity  Overall Complexity of Evaluation (OT): moderate complexity       Time Calculation- OT         Row Name 01/02/25 1323                       Time Calculation- OT     OT Start Time 1026  -SD         OT Received On 01/02/25  -SD         OT Goal Re-Cert Due Date 01/14/25  -SD                 Untimed Charges     OT Eval/Re-eval Minutes 60  -SD                 Total Minutes     Untimed Charges Total Minutes 60  -SD          Total Minutes 60  -SD                      User Key  (r) = Recorded By, (t) = Taken By, (c) = Cosigned By        Initials Name Provider Type     Jaqueline Sheppard OT Occupational Therapist                       Therapy Charges for Today         Code Description Service Date Service Provider Modifiers Qty     [id number]  OT EVAL  MOD COMPLEXITY 4 1/2/2025 Vidya, Jaqueline, OT GO 1                   Jaqueline Dasilva, DAVION                       1/2/2025

## 2025-01-10 NOTE — PLAN OF CARE
Goal Outcome Evaluation:  Plan of Care Reviewed With: patient        Progress: no change          Problem: Adult Inpatient Plan of Care  Goal: Plan of Care Review  Outcome: Progressing  Flowsheets (Taken 1/10/2025 0602)  Progress: no change  Plan of Care Reviewed With: patient  Goal: Patient-Specific Goal (Individualized)  Outcome: Progressing  Goal: Absence of Hospital-Acquired Illness or Injury  Outcome: Progressing  Intervention: Identify and Manage Fall Risk  Recent Flowsheet Documentation  Taken 1/10/2025 0400 by April Guallpa LPN  Safety Promotion/Fall Prevention:   activity supervised   assistive device/personal items within reach   clutter free environment maintained   fall prevention program maintained   room organization consistent   safety round/check completed   nonskid shoes/slippers when out of bed  Taken 1/10/2025 0200 by April Guallpa LPN  Safety Promotion/Fall Prevention:   activity supervised   clutter free environment maintained   assistive device/personal items within reach   fall prevention program maintained   nonskid shoes/slippers when out of bed   room organization consistent   safety round/check completed  Taken 1/9/2025 2200 by Tincher, April, LPN  Safety Promotion/Fall Prevention:   activity supervised   assistive device/personal items within reach   clutter free environment maintained   fall prevention program maintained   nonskid shoes/slippers when out of bed   room organization consistent   safety round/check completed  Taken 1/9/2025 2058 by Tincher, April, LPN  Safety Promotion/Fall Prevention:   activity supervised   assistive device/personal items within reach   clutter free environment maintained   fall prevention program maintained   nonskid shoes/slippers when out of bed   room organization consistent   safety round/check completed  Intervention: Prevent Skin Injury  Recent Flowsheet Documentation  Taken 1/10/2025 0400 by April Guallpa LPN  Body  Position: supine  Taken 1/10/2025 0200 by April Guallpa LPN  Body Position:   position changed independently   supine  Taken 1/9/2025 2200 by April Guallpa LPN  Body Position:   supine   position changed independently  Taken 1/9/2025 2058 by April Guallpa LPN  Body Position: sitting up in bed  Intervention: Prevent Infection  Recent Flowsheet Documentation  Taken 1/9/2025 2200 by April Guallpa LPN  Infection Prevention:   environmental surveillance performed   rest/sleep promoted   single patient room provided  Taken 1/9/2025 2058 by April Guallpa LPN  Infection Prevention:   environmental surveillance performed   rest/sleep promoted   single patient room provided  Goal: Optimal Comfort and Wellbeing  Outcome: Progressing  Intervention: Provide Person-Centered Care  Recent Flowsheet Documentation  Taken 1/9/2025 2058 by April Guallpa LPN  Trust Relationship/Rapport:   care explained   choices provided   emotional support provided  Goal: Readiness for Transition of Care  Outcome: Progressing     Problem: Skin Injury Risk Increased  Goal: Skin Health and Integrity  Outcome: Progressing  Intervention: Optimize Skin Protection  Recent Flowsheet Documentation  Taken 1/10/2025 0400 by April Guallpa LPN  Activity Management: activity encouraged  Head of Bed (HOB) Positioning: HOB elevated  Taken 1/10/2025 0200 by April Guallpa LPN  Activity Management: activity encouraged  Head of Bed (HOB) Positioning: HOB elevated  Taken 1/9/2025 2200 by April Guallpa LPN  Activity Management: activity encouraged  Head of Bed (HOB) Positioning: HOB elevated  Taken 1/9/2025 2058 by April Guallpa LPN  Activity Management: activity encouraged  Head of Bed (HOB) Positioning: HOB elevated     Problem: Fall Injury Risk  Goal: Absence of Fall and Fall-Related Injury  Outcome: Progressing  Intervention: Promote Injury-Free Environment  Recent Flowsheet Documentation  Taken 1/10/2025  0400 by April Guallpa LPN  Safety Promotion/Fall Prevention:   activity supervised   assistive device/personal items within reach   clutter free environment maintained   fall prevention program maintained   room organization consistent   safety round/check completed   nonskid shoes/slippers when out of bed  Taken 1/10/2025 0200 by April Guallpa LPN  Safety Promotion/Fall Prevention:   activity supervised   clutter free environment maintained   assistive device/personal items within reach   fall prevention program maintained   nonskid shoes/slippers when out of bed   room organization consistent   safety round/check completed  Taken 1/9/2025 2200 by Tincher, April, LPN  Safety Promotion/Fall Prevention:   activity supervised   assistive device/personal items within reach   clutter free environment maintained   fall prevention program maintained   nonskid shoes/slippers when out of bed   room organization consistent   safety round/check completed  Taken 1/9/2025 2058 by Tincher, April, LPN  Safety Promotion/Fall Prevention:   activity supervised   assistive device/personal items within reach   clutter free environment maintained   fall prevention program maintained   nonskid shoes/slippers when out of bed   room organization consistent   safety round/check completed     Problem: Confusion Acute  Goal: Optimal Cognitive Function  Outcome: Progressing  Intervention: Minimize Contributing Factors  Recent Flowsheet Documentation  Taken 1/9/2025 2058 by April Guallpa LPN  Sensory Stimulation Regulation:   care clustered   lighting decreased   quiet environment promoted

## 2025-01-10 NOTE — PLAN OF CARE
Goal Outcome Evaluation:  Plan of Care Reviewed With: patient        Progress: improving  Outcome Evaluation: Pt sitting UIC and willing to work with therapy.  Pt performed transfers and gait this treatment. Pt tolerated increased activity as well as with decreased assistance.  See flowsheet for details . Cont PT per POC progressing to goals as pt tolerates.

## 2025-01-10 NOTE — CASE MANAGEMENT/SOCIAL WORK
DCP; STR to LTC. [facility] offered a bed. Spoke with son Yobany and Yobany's wife Leonora over the phone. He agreed to gave pre-cert started. SW updated. Yobany was informed STR will be private room but LTC will likely be semi-private. CM continues to follow for any needs.    12:00 [facility] rescinded the offer. Referral still pending for [facility].

## 2025-01-10 NOTE — PROGRESS NOTES
"    [facility]    Name:  Hardik Natarajan   Age:  79 y.o.  Sex:  male  :  1945  MRN:  [id number]   Visit Number:  [id number]  Admission Date:  2025  Date Of Service:  01/10/25  Primary Care Physician:  Won Cuellar MD     LOS: 7 days :    Chief Complaint:      Follow-up; altered mental status    Subjective:    Patient continues to do well today.  He has had no further issues with agitation at nighttime after being on scheduled Geodon.  Behavior during the day has been normal.  He remains hard of hearing.  He is working with therapy.    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  Presented to [facility] ED on 2025 with concern for altered mental status.  Last known normal was night prior to presentation.  Also reported having a fall, unclear if it was syncope.  Wife found him on the floor.  On Plavix, no other anticoagulation.  He denied any fevers or chills, chest pain, shortness of air, any other specific concern.  Denied any slurred speech, difficulty moving upper or lower extremities, and any in symmetry.     ED summary: Afebrile, vital signs stable on room air.  ABG pH 7.44, pCO2 34, pO2 82, bicarb 24.  EKG sinus tachycardia rate 107, nonspecific ST-T wave changes.  Opponent 23, ACS not suspected.  Creatinine 1.34, lactate elevated, procalcitonin not elevated.  UDS negative.  Blood cultures collected.  COVID/flu/RSV negative.  CT abdomen/pelvis large stone in urinary bladder, wall thickening probable diverticula, abnormal attenuation of the root of the mesentery most suggestive of mesenteric panniculitis.  CT head no intracranial hemorrhage or evidence of acute large cortical infarct, stable " "atrophy and chronic findings, left mastoiditis and left otitis media with bone destruction of the left mastoid air cells, right maxillary sinusitis.  CT cervical spine no acute fracture, multilevel degenerative disc disease.\"    Appears patient has had ongoing mental status changes throughout hospital stay.  Has been seen by neurology.  Underwent MRI of the brain with no new stroke, there was evidence of chronic changes, possible mastoiditis.  No seizure-like activity.  Was given Geodon.  B12 is being dosed due to low B12 levels.  Was on prescription for Augmentin as well due to possible mastoiditis.    Patient will need placement    Review of Systems:     All systems were reviewed and negative except as mentioned in subjective, assessment and plan.    Vital Signs:    Temp:  [97.8 °F (36.6 °C)-98.2 °F (36.8 °C)] 98.2 °F (36.8 °C)  Heart Rate:  [] 109  Resp:  [16-18] 18  BP: ()/(60-90) 95/60    Intake and output:    I/O last 3 completed shifts:  In: 600 [P.O.:600]  Out: 150 [Urine:150]  I/O this shift:  In: 240 [P.O.:240]  Out: -     Physical Examination:    General Appearance:  Awake and alert   Head:  There is a lesion surrounding the left ear, left side of the head, particularly in the auricular area.  No drainage.   Eyes: Conjunctivae and sclerae normal, no icterus. No pallor.   Throat: No oral lesions, no thrush, oral mucosa moist.   Neck: Supple, trachea midline, no thyromegaly.   Lungs:   Breath sounds heard bilaterally equally.  No wheezing or crackles. No Pleural rub or bronchial breathing.   Heart:  Normal S1 and S2, no murmur, no gallop, no rub. No JVD.   Abdomen:   Normal bowel sounds, no masses, no organomegaly. Soft, nontender, nondistended, no rebound tenderness.   Extremities: Supple, no edema, no cyanosis, no clubbing.   Skin: Warm   Neurologic: Hard of hearing, awake and alert, appears improved     Laboratory results:    Results from last 7 days   Lab Units 01/05/25  0612 01/04/25  0937 "   SODIUM mmol/L 132* 133*   POTASSIUM mmol/L 4.5 4.0   CHLORIDE mmol/L 99 100   CO2 mmol/L 22.5 23.0   BUN mg/dL 19 18   CREATININE mg/dL 1.29* 1.27   CALCIUM mg/dL 8.8 8.7   GLUCOSE mg/dL 97 95                           Recent Labs     01/01/25  1555   PHART 7.455   [id number] 34.2*   PO2ART 82.6   [id number] 24.0   BASEEXCESS 0.4      I have reviewed the patient's laboratory results.    Radiology results:    EEG    Result Date: 1/9/2025  Reason for referral: 79 y.o.male with altered mental status Technical Summary:  A 16 channel digital EEG was performed using a cap for electrode placement.  A single EKG lead is present.  Duration:  20 minutes Findings: The patient is awake.  The background shows diffuse medium amplitude 4 to 5 Hz intermixed delta and theta activity which is present symmetrically over both hemispheres.  Electrode artifact is prominent over the right hemispheric leads.  Low amplitude fast activity is seen at times as a study proceeds.  Stage II sleep is not clearly seen.  Photic stimulation does not change the background.  Hyperventilation does not elicit abnormality.  No focal features are seen.  No epileptiform activity is present. Video: None Technical quality: Fair.  Some electrode artifact present EKG: Regular, 70 bpm SUMMARY: Moderate generalized slow No epileptiform activity or focal features are seen     Impression: Diffuse cerebral dysfunction of moderate degree, nonspecific.  This is most commonly seen due to toxic/metabolic cause No evidence for epilepsy is seen This report is transcribed using the Dragon dictation system.     I have reviewed the patient's radiology reports.    Medication Review:     I have reviewed the patient's active and prn medications.     Problem List:      AMS (altered mental status)    Altered mental status      Assessment/Plan:     Confusion/agitation:  Neurology following.  Suspicion is for development of dementia.  Does not currently appear to have CNS involvement  of the squamous cell carcinoma.  Was given Augmentin for mastoiditis.  Also receiving B12 injections due to low B12 noted.    Very likely patient has dementia with sundowning.  Mental status is significantly improved and he is resting better at nighttime with the Geodon and Depakote regimen.    Left upper extremity weakness/hypothyroidism/cancer pain:  Complicates all aspects of care.    Awaiting placement at this time.  His behavior has been significantly improved over the last 3 to 4 days and has had no issues at night and sleeping better now.     Risk Assessment: High  DVT Prophylaxis: Heparin  Code Status: Full code  Diet: Cardiac  Discharge Plan: LTC      Dionna Espitia DO  01/10/25  12:59 EST

## 2025-01-10 NOTE — CASE MANAGEMENT/SOCIAL WORK
Case Management/Social Work    Patient Name:  Hardik Natarajan  YOB: 1945  MRN: [id number]  Admit Date:  1/1/2025        [facility]  can offer bed. SW updated CM. SW following.     [facility] denied referral. SW updated CM. SW also sent message to Mayte/Signature regarding referral sent for [facility]. SW following.     09:37 EST family agreeable to [facility]. SW will start pre-cert. SW following.     09:58 EST auth is approved through [facility]. Pt is currently receiving PO scheduled Geodon. SW asking facility is okay to accept on this. SW following.     10:13 EST Facility able to accept on PO scheduled Geodon. Facility is seeing if pt can admit today.     11:52 EST [facility]  contacted this SW stating they can no longer accept this pt due to history and does not have private room at this time. SW will update CM. CLARISSA sent message to Mayte/Signature to review for [facility]. CLARISSA following.       Electronically signed by:  TAMIR Hall  01/10/25 09:15 EST

## 2025-01-10 NOTE — PLAN OF CARE
Goal Outcome Evaluation:  Plan of Care Reviewed With: patient        Progress: improving  Outcome Evaluation: OT tx completed. Patient is supine in bed, denies pain. Patient performed supine to sit with min A, sit to stand min A, walked 16' to bathroom min A using RW. Patient performed bathing task sitting on commode mod A, UBD and grooming min A, required mod A for toileting. Patient walked 23' back to chair and left sitting call bell and chair alarm. Continue OT POC    Anticipated Discharge Disposition (OT): inpatient rehabilitation facility

## 2025-01-11 PROCEDURE — 99232 SBSQ HOSP IP/OBS MODERATE 35: CPT | Performed by: FAMILY MEDICINE

## 2025-01-11 PROCEDURE — 25010000002 HEPARIN (PORCINE) PER 1000 UNITS: Performed by: STUDENT IN AN ORGANIZED HEALTH CARE EDUCATION/TRAINING PROGRAM

## 2025-01-11 RX ORDER — DIVALPROEX SODIUM 250 MG/1
250 TABLET, FILM COATED, EXTENDED RELEASE ORAL DAILY
Status: DISCONTINUED | OUTPATIENT
Start: 2025-01-12 | End: 2025-01-15 | Stop reason: HOSPADM

## 2025-01-11 RX ADMIN — POLYETHYLENE GLYCOL 3350 17 G: 17 POWDER, FOR SOLUTION ORAL at 08:19

## 2025-01-11 RX ADMIN — HEPARIN SODIUM 5000 UNITS: 5000 INJECTION INTRAVENOUS; SUBCUTANEOUS at 08:20

## 2025-01-11 RX ADMIN — HEPARIN SODIUM 5000 UNITS: 5000 INJECTION INTRAVENOUS; SUBCUTANEOUS at 20:07

## 2025-01-11 RX ADMIN — SENNOSIDES 1 TABLET: 8.6 TABLET, FILM COATED ORAL at 20:07

## 2025-01-11 RX ADMIN — ZIPRASIDONE HYDROCHLORIDE 40 MG: 20 CAPSULE ORAL at 20:07

## 2025-01-11 RX ADMIN — CLOPIDOGREL BISULFATE 75 MG: 75 TABLET ORAL at 08:21

## 2025-01-11 RX ADMIN — DIVALPROEX SODIUM 250 MG: 250 TABLET, DELAYED RELEASE ORAL at 08:21

## 2025-01-11 RX ADMIN — TAMSULOSIN HYDROCHLORIDE 0.4 MG: 0.4 CAPSULE ORAL at 08:21

## 2025-01-11 RX ADMIN — OXYCODONE 5 MG: 5 TABLET ORAL at 20:07

## 2025-01-11 RX ADMIN — LEVOTHYROXINE SODIUM 75 MCG: 0.07 TABLET ORAL at 05:08

## 2025-01-11 RX ADMIN — DULOXETINE HYDROCHLORIDE 30 MG: 30 CAPSULE, DELAYED RELEASE ORAL at 08:21

## 2025-01-11 NOTE — PLAN OF CARE
Goal Outcome Evaluation:         No acute events noted last night. Pt resting comfortably with no agitation.VSS . Care continues

## 2025-01-11 NOTE — PROGRESS NOTES
"    [facility]    Name:  Hardik Natarajan   Age:  79 y.o.  Sex:  male  :  1945  MRN:  [id number]   Visit Number:  [id number]  Admission Date:  2025  Date Of Service:  25  Primary Care Physician:  Won Cuellar MD     LOS: 8 days :    Chief Complaint:      Follow-up; altered mental status    Subjective:    Patient overall doing okay today.  He is hard of hearing but was alert and oriented to being in the hospital.  He notes he would like to go home, discussed that working on placement.    I did talk with his son, Yobany, over the phone and updated him on plan of care.  He is agreement with placement as well.    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  Presented to [facility] ED on [facility]25 with concern for altered mental status.  Last known normal was night prior to presentation.  Also reported having a fall, unclear if it was syncope.  Wife found him on the floor.  On Plavix, no other anticoagulation.  He denied any fevers or chills, chest pain, shortness of air, any other specific concern.  Denied any slurred speech, difficulty moving upper or lower extremities, and any in symmetry.     ED summary: Afebrile, vital signs stable on room air.  ABG pH 7.44, pCO2 34, pO2 82, bicarb 24.  EKG sinus tachycardia rate 107, nonspecific ST-T wave changes.  Opponent 23, ACS not suspected.  Creatinine 1.34, lactate elevated, procalcitonin not elevated.  UDS negative.  Blood cultures collected.  COVID/flu/RSV negative.  CT abdomen/pelvis large stone in urinary bladder, wall thickening probable diverticula, abnormal attenuation of the root of the mesentery most suggestive of mesenteric panniculitis.  CT head no " "intracranial hemorrhage or evidence of acute large cortical infarct, stable atrophy and chronic findings, left mastoiditis and left otitis media with bone destruction of the left mastoid air cells, right maxillary sinusitis.  CT cervical spine no acute fracture, multilevel degenerative disc disease.\"    Appears patient has had ongoing mental status changes throughout hospital stay.  Has been seen by neurology.  Underwent MRI of the brain with no new stroke, there was evidence of chronic changes, possible mastoiditis.  No seizure-like activity.  Was given Geodon.  B12 is being dosed due to low B12 levels.  Was on prescription for Augmentin as well due to possible mastoiditis.    Patient will need placement    Review of Systems:     All systems were reviewed and negative except as mentioned in subjective, assessment and plan.    Vital Signs:    Temp:  [97.7 °F (36.5 °C)-98.6 °F (37 °C)] 98.6 °F (37 °C)  Heart Rate:  [100] 100  Resp:  [18-20] 20  BP: (106-119)/(62-68) 119/64    Intake and output:    I/O last 3 completed shifts:  In: 600 [P.O.:600]  Out: 150 [Urine:150]  I/O this shift:  In: 240 [P.O.:240]  Out: -     Physical Examination:    General Appearance:  Awake and alert   Head:  There is a lesion surrounding the left ear, left side of the head, particularly in the auricular area.  No drainage.   Eyes: Conjunctivae and sclerae normal, no icterus. No pallor.   Throat: No oral lesions, no thrush, oral mucosa moist.   Neck: Supple, trachea midline, no thyromegaly.   Lungs:   Breath sounds heard bilaterally equally.  No wheezing or crackles. No Pleural rub or bronchial breathing.   Heart:  Normal S1 and S2, no murmur, no gallop, no rub. No JVD.   Abdomen:   Normal bowel sounds, no masses, no organomegaly. Soft, nontender, nondistended, no rebound tenderness.   Extremities: Supple, no edema, no cyanosis, no clubbing.   Skin: Warm   Neurologic: Hard of hearing, awake and alert, appears improved, stable condition "     Laboratory results:    Results from last 7 days   Lab Units 01/05/25  0612   SODIUM mmol/L 132*   POTASSIUM mmol/L 4.5   CHLORIDE mmol/L 99   CO2 mmol/L 22.5   BUN mg/dL 19   CREATININE mg/dL 1.29*   CALCIUM mg/dL 8.8   GLUCOSE mg/dL 97                           Recent Labs     01/01/25  1555   PHART 7.455   [id number] 34.2*   PO2ART 82.6   [id number] 24.0   BASEEXCESS 0.4      I have reviewed the patient's laboratory results.    Radiology results:    No radiology results from the last 24 hrs  I have reviewed the patient's radiology reports.    Medication Review:     I have reviewed the patient's active and prn medications.     Problem List:      AMS (altered mental status)    Altered mental status      Assessment/Plan:     Confusion/agitation:  Patient has extensive workup in regards to his confusion.  He is MRI was showing chronic infarcts, there was possible mastoiditis he completed a course of Augmentin.  He had been on B12 injections due to low B12 noted.    Patient most likely does have evidence of vascular dementia.  He has been started on oral Geodon for the last 4 days and is doing well, no further restlessness or issues at night.  He is on low-dose Depakote.  We are currently awaiting placement.    Left upper extremity weakness/hypothyroidism/cancer pain:  Complicates all aspects of care.    Patient's mental status is closer to baseline, this may be his new baseline.  He has had no further issues with agitation and restlessness in almost a week now.    Talked with family, Yobany, over the phone today     Risk Assessment: High  DVT Prophylaxis: Heparin  Code Status: Full code  Diet: Cardiac  Discharge Plan: LTC      Dionna Espitia DO  01/11/25  14:28 EST

## 2025-01-11 NOTE — PLAN OF CARE
Goal Outcome Evaluation:              Outcome Evaluation: Pt rested well throughout the shift. VVS. Plan to discharge to long term care facility when avaliable.

## 2025-01-12 LAB
ANION GAP SERPL CALCULATED.3IONS-SCNC: 8.7 MMOL/L (ref 5–15)
BUN SERPL-MCNC: 23 MG/DL (ref 8–23)
BUN/CREAT SERPL: 14.1 (ref 7–25)
CALCIUM SPEC-SCNC: 9.4 MG/DL (ref 8.6–10.5)
CHLORIDE SERPL-SCNC: 101 MMOL/L (ref 98–107)
CO2 SERPL-SCNC: 28.3 MMOL/L (ref 22–29)
CREAT SERPL-MCNC: 1.63 MG/DL (ref 0.76–1.27)
DEPRECATED RDW RBC AUTO: 47.3 FL (ref 37–54)
EGFRCR SERPLBLD CKD-EPI 2021: 42.3 ML/MIN/1.73
ERYTHROCYTE [DISTWIDTH] IN BLOOD BY AUTOMATED COUNT: 13.7 % (ref 12.3–15.4)
GLUCOSE SERPL-MCNC: 98 MG/DL (ref 65–99)
HCT VFR BLD AUTO: 35.2 % (ref 37.5–51)
HGB BLD-MCNC: 11.7 G/DL (ref 13–17.7)
MCH RBC QN AUTO: 31.2 PG (ref 26.6–33)
MCHC RBC AUTO-ENTMCNC: 33.2 G/DL (ref 31.5–35.7)
MCV RBC AUTO: 93.9 FL (ref 79–97)
PLATELET # BLD AUTO: 202 10*3/MM3 (ref 140–450)
PMV BLD AUTO: 9.5 FL (ref 6–12)
POTASSIUM SERPL-SCNC: 4.6 MMOL/L (ref 3.5–5.2)
RBC # BLD AUTO: 3.75 10*6/MM3 (ref 4.14–5.8)
SODIUM SERPL-SCNC: 138 MMOL/L (ref 136–145)
WBC NRBC COR # BLD AUTO: 2.76 10*3/MM3 (ref 3.4–10.8)

## 2025-01-12 PROCEDURE — 97110 THERAPEUTIC EXERCISES: CPT

## 2025-01-12 PROCEDURE — 25010000002 HEPARIN (PORCINE) PER 1000 UNITS: Performed by: STUDENT IN AN ORGANIZED HEALTH CARE EDUCATION/TRAINING PROGRAM

## 2025-01-12 PROCEDURE — 80048 BASIC METABOLIC PNL TOTAL CA: CPT | Performed by: FAMILY MEDICINE

## 2025-01-12 PROCEDURE — 97116 GAIT TRAINING THERAPY: CPT

## 2025-01-12 PROCEDURE — 85027 COMPLETE CBC AUTOMATED: CPT | Performed by: FAMILY MEDICINE

## 2025-01-12 PROCEDURE — 99231 SBSQ HOSP IP/OBS SF/LOW 25: CPT | Performed by: FAMILY MEDICINE

## 2025-01-12 RX ADMIN — ZIPRASIDONE HYDROCHLORIDE 40 MG: 20 CAPSULE ORAL at 20:33

## 2025-01-12 RX ADMIN — CLOPIDOGREL BISULFATE 75 MG: 75 TABLET ORAL at 08:03

## 2025-01-12 RX ADMIN — DULOXETINE HYDROCHLORIDE 30 MG: 30 CAPSULE, DELAYED RELEASE ORAL at 08:03

## 2025-01-12 RX ADMIN — POLYETHYLENE GLYCOL 3350 17 G: 17 POWDER, FOR SOLUTION ORAL at 08:02

## 2025-01-12 RX ADMIN — TAMSULOSIN HYDROCHLORIDE 0.4 MG: 0.4 CAPSULE ORAL at 08:03

## 2025-01-12 RX ADMIN — DIVALPROEX SODIUM 250 MG: 250 TABLET, EXTENDED RELEASE ORAL at 08:03

## 2025-01-12 RX ADMIN — HEPARIN SODIUM 5000 UNITS: 5000 INJECTION INTRAVENOUS; SUBCUTANEOUS at 08:03

## 2025-01-12 RX ADMIN — HEPARIN SODIUM 5000 UNITS: 5000 INJECTION INTRAVENOUS; SUBCUTANEOUS at 20:32

## 2025-01-12 RX ADMIN — OXYCODONE 5 MG: 5 TABLET ORAL at 20:33

## 2025-01-12 RX ADMIN — SENNOSIDES 1 TABLET: 8.6 TABLET, FILM COATED ORAL at 20:33

## 2025-01-12 RX ADMIN — LEVOTHYROXINE SODIUM 75 MCG: 0.07 TABLET ORAL at 05:22

## 2025-01-12 NOTE — PLAN OF CARE
Goal Outcome Evaluation:              Outcome Evaluation: No acute changes. VSS. Plan to discharge to long term care facility when becomes avaliable.

## 2025-01-12 NOTE — PAYOR COMM NOTE
"To:  ANTHEM  From: Hortencia Latif RN  Phone: [phone]  Fax: [phone]  NPI: [id number]  TIN: [id number]  Member ID: [id number]   MRN: [id number]    Katey Browne (79 y.o. Male)       Date of Birth   1945    Social Security Number       Address   [address] [id number]    Home Phone   [phone]    MRN   [id number]       Muslim   None    Marital Status                               Admission Date   1/1/25    Admission Type   Emergency    Admitting Provider   Kerley, Brian Joseph, DO    Attending Provider   Dionna Espitia DO    Department, Room/Bed   [facility]       Discharge Date       Discharge Disposition       Discharge Destination                                 Attending Provider: Dionna Espitia DO    Allergies: Asa [Aspirin]    Isolation: None   Infection: None   Code Status: CPR    Ht: 185.4 cm (73\")   Wt: 96.3 kg (212 lb 4.9 oz)    Admission Cmt: None   Principal Problem: AMS (altered mental status) [R41.82]                   Active Insurance as of 1/1/2025       Primary Coverage       Payor Plan Insurance Group Employer/Plan Group    ANTHEM MEDICARE REPLACEMENT ANTHEM MED ADV SNP [id number]       Payor Plan Address Payor Plan Phone Number Payor Plan Fax Number Effective Dates    [address] [phone]  1/1/2024 - None Entered    [facility] [address]         Subscriber Name Subscriber Birth Date Member ID       KATEY BROWNE 1945 [id number]               Secondary Coverage       Payor Plan Insurance Group Employer/Plan Group    [id number]        Payor Plan Address Payor Plan Phone Number Payor Plan Fax Number Effective Dates    [address]   4/7/2023 - None Entered    [place] [address]         Subscriber Name Subscriber Birth Date Member ID       KATEY BROWNE 1945 [id number]                     Emergency Contacts        (Rel.) Home Phone Work Phone " Mobile Phone    MAC BROWNE/ZITA (Son) [phone] -- [phone]              Vital Signs (last day)       Date/Time Temp Temp src Pulse Resp BP Patient Position SpO2    01/12/25 0942 -- -- 85 -- -- -- 95    01/12/25 0700 98.7 (37.1) Oral -- 20 106/79 Lying --    01/11/25 1856 97.7 (36.5) Oral -- 20 113/69 Lying --    01/11/25 1119 98.6 (37) Oral 100 20 119/64 Lying 100    01/11/25 0300 97.8 (36.6) Oral 100 20 110/62 Lying --          Current Facility-Administered Medications   Medication Dose Route Frequency Provider Last Rate Last Admin    acetaminophen (TYLENOL) tablet 650 mg  650 mg Oral Q6H PRN Dionna Espitia DO        Calcium Replacement - Follow Nurse / BPA Driven Protocol   Not Applicable PRN Kerley, Brian Joseph, DO        clopidogrel (PLAVIX) tablet 75 mg  75 mg Oral Daily Kerley, Brian Joseph, DO   75 mg at 01/12/25 0803    [START ON 1/16/2025] cyanocobalamin injection 1,000 mcg  1,000 mcg Intramuscular Q7 Days Kerley, Brian Joseph, DO        divalproex (DEPAKOTE ER) 24 hr tablet 250 mg  250 mg Oral Daily Dionna Espitia DO   250 mg at 01/12/25 0803    DULoxetine (CYMBALTA) DR capsule 30 mg  30 mg Oral Daily Kerley, Brian Joseph, DO   30 mg at 01/12/25 0803    heparin (porcine) 5000 UNIT/ML injection 5,000 Units  5,000 Units Subcutaneous Q12H Kerley, Brian Joseph, DO   5,000 Units at 01/12/25 0803    lactulose (CHRONULAC) 10 GM/15ML solution 10 g  10 g Oral BID PRN Dionna Espitia DO        levothyroxine (SYNTHROID, LEVOTHROID) tablet 75 mcg  75 mcg Oral Q AM Kerley, Brian Joseph, DO   75 mcg at 01/12/25 0522    Magnesium Standard Dose Replacement - Follow Nurse / BPA Driven Protocol   Not Applicable PRN Kerley, Brian Joseph, DO        nitroglycerin (NITROSTAT) SL tablet 0.4 mg  0.4 mg Sublingual Q5 Min PRN Kerley, Brian Joseph,         ondansetron (ZOFRAN) injection 4 mg  4 mg Intravenous Q6H PRN Kerley, Brian Joseph,         oxyCODONE (ROXICODONE) immediate release  tablet 5 mg  5 mg Oral Q6H PRN Kerley, Brian Joseph, DO   5 mg at 01/11/25 2007    Phosphorus Replacement - Follow Nurse / BPA Driven Protocol   Not Applicable PRN Kerley, Brian Joseph, DO        polyethylene glycol (MIRALAX) packet 17 g  17 g Oral Daily Dionna Espitia, DO   17 g at 01/12/25 0802    Potassium Replacement - Follow Nurse / BPA Driven Protocol   Not Applicable PRN Kerley, Brian Joseph, DO        senna (SENOKOT) tablet 1 tablet  1 tablet Oral Nightly Kerley, Brian Joseph, DO   1 tablet at 01/11/25 2007    sodium chloride 0.9 % flush 10 mL  10 mL Intravenous PRN Kerley, Brian Joseph, DO        [Held by provider] sodium chloride 0.9 % infusion 40 mL  40 mL Intravenous PRN Kerley, Brian Joseph, DO        tamsulosin (FLOMAX) 24 hr capsule 0.4 mg  0.4 mg Oral Daily Kerley, Brian Joseph, DO   0.4 mg at 01/12/25 0803    ziprasidone (GEODON) capsule 40 mg  40 mg Oral Nightly Dionna Espitia, DO   40 mg at 01/11/25 2007     Lab Results (last 24 hours)       Procedure Component Value Units Date/Time    Basic Metabolic Panel [[id number]]  (Abnormal) Collected: 01/12/25 0812    Specimen: Blood Updated: 01/12/25 0833     Glucose 98 mg/dL      BUN 23 mg/dL      Creatinine 1.63 mg/dL      Sodium 138 mmol/L      Potassium 4.6 mmol/L      Chloride 101 mmol/L      CO2 28.3 mmol/L      Calcium 9.4 mg/dL      BUN/Creatinine Ratio 14.1     Anion Gap 8.7 mmol/L      eGFR 42.3 mL/min/1.73     Narrative:      GFR Categories in Chronic Kidney Disease (CKD)      GFR Category          GFR (mL/min/1.73)    Interpretation  G1                     90 or greater         Normal or high (1)  G2                      60-89                Mild decrease (1)  G3a                   45-59                Mild to moderate decrease  G3b                   30-44                Moderate to severe decrease  G4                    15-29                Severe decrease  G5                    14 or less           Kidney  failure          (1)In the absence of evidence of kidney disease, neither GFR category G1 or G2 fulfill the criteria for CKD.    eGFR calculation 2021 CKD-EPI creatinine equation, which does not include race as a factor    CBC (No Diff) [[id number]]  (Abnormal) Collected: 01/12/25 0812    Specimen: Blood Updated: 01/12/25 0818     WBC 2.76 10*3/mm3      RBC 3.75 10*6/mm3      Hemoglobin 11.7 g/dL      Hematocrit 35.2 %      MCV 93.9 fL      MCH 31.2 pg      MCHC 33.2 g/dL      RDW 13.7 %      RDW-SD 47.3 fl      MPV 9.5 fL      Platelets 202 10*3/mm3           Imaging Results (Last 24 Hours)       ** No results found for the last 24 hours. **          Orders (last 24 hrs)        Start     Ordered    01/16/25 0900  cyanocobalamin injection 1,000 mcg  Every 7 Days         01/06/25 1336    01/12/25 0900  divalproex (DEPAKOTE ER) 24 hr tablet 250 mg  Daily         01/11/25 1401    01/12/25 0740  Basic Metabolic Panel  Once         01/12/25 0739    01/12/25 0740  CBC (No Diff)  Once         01/12/25 0739    01/10/25 0940  acetaminophen (TYLENOL) tablet 650 mg  Every 6 Hours PRN         01/10/25 0940    01/08/25 2100  ziprasidone (GEODON) capsule 40 mg  Nightly         01/08/25 0937    01/08/25 1030  divalproex (DEPAKOTE) DR tablet 250 mg  Every 12 Hours Scheduled,   Status:  Discontinued         01/08/25 0938    01/07/25 1800  Dietary Nutrition Supplements Boost Plus (Ensure Enlive, Ensure Plus)  2 Times Daily       01/07/25 0923    01/07/25 1115  polyethylene glycol (MIRALAX) packet 17 g  Daily         01/07/25 1028    01/07/25 1028  lactulose (CHRONULAC) 10 GM/15ML solution 10 g  2 Times Daily PRN         01/07/25 1029    01/02/25 0800  Oral Care  2 Times Daily       01/01/25 1825    01/02/25 0600  levothyroxine (SYNTHROID, LEVOTHROID) tablet 75 mcg  Every Early Morning         01/01/25 1825    01/01/25 2100  senna (SENOKOT) tablet 1 tablet  Nightly         01/01/25 1825    01/01/25 2100  heparin (porcine) 5000 UNIT/ML  injection 5,000 Units  Every 12 Hours Scheduled         25 1825    25  DULoxetine (CYMBALTA) DR capsule 30 mg  Daily         25 18225 184  clopidogrel (PLAVIX) tablet 75 mg  Daily         25 1825    25 184  tamsulosin (FLOMAX) 24 hr capsule 0.4 mg  Daily         25 18225 182  Intake & Output  Every Shift       25 18225 182  Phosphorus Replacement - Follow Nurse / BPA Driven Protocol  As Needed         25 1825    25 182  Calcium Replacement - Follow Nurse / BPA Driven Protocol  As Needed         25 18225 182  oxyCODONE (ROXICODONE) immediate release tablet 5 mg  Every 6 Hours PRN         25 1825    25 182  sodium chloride 0.9 % flush 10 mL  As Needed         25 1825    25 182  [Held by provider]  sodium chloride 0.9 % infusion 40 mL  As Needed        (On hold since 2025 at 0728 until manually unheld; held by Esperanza Zaragoza MDHold Reason: Other (Comment Required))    25 1825    25 182  ondansetron (ZOFRAN) injection 4 mg  Every 6 Hours PRN         25 1825    25 182  nitroglycerin (NITROSTAT) SL tablet 0.4 mg  Every 5 Minutes PRN         25 1825    25 1825  Potassium Replacement - Follow Nurse / BPA Driven Protocol  As Needed         25 1825    25 1825  Magnesium Standard Dose Replacement - Follow Nurse / BPA Driven Protocol  As Needed         25 1825    25 0000  Ambulatory Referral to Urology         25 1531    Unscheduled  Up With Assistance  As Needed       25 1825                     Physician Progress Notes (most recent note)        Dionna Espitia DO at 25 1428              [facility]    PROGRESS NOTE    Name:  Hardik Natarajan   Age:  79 y.o.  Sex:  male  :  1945  MRN:  [id number]   Visit Number:  [id number]  Admission Date:  2025  Date  "Of Service:  01/11/25  Primary Care Physician:  Won Cuellar MD     LOS: 8 days :    Chief Complaint:      Follow-up; altered mental status    Subjective:    Patient overall doing okay today.  He is hard of hearing but was alert and oriented to being in the hospital.  He notes he would like to go home, discussed that working on placement.    I did talk with his son, Yobany, over the phone and updated him on plan of care.  He is agreement with placement as well.    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  Presented to [facility] ED on 1/1/2025 with concern for altered mental status.  Last known normal was night prior to presentation.  Also reported having a fall, unclear if it was syncope.  Wife found him on the floor.  On Plavix, no other anticoagulation.  He denied any fevers or chills, chest pain, shortness of air, any other specific concern.  Denied any slurred speech, difficulty moving upper or lower extremities, and any in symmetry.     ED summary: Afebrile, vital signs stable on room air.  ABG pH 7.44, pCO2 34, pO2 82, bicarb 24.  EKG sinus tachycardia rate 107, nonspecific ST-T wave changes.  Opponent 23, ACS not suspected.  Creatinine 1.34, lactate elevated, procalcitonin not elevated.  UDS negative.  Blood cultures collected.  COVID/flu/RSV negative.  CT abdomen/pelvis large stone in urinary bladder, wall thickening probable diverticula, abnormal attenuation of the root of the mesentery most suggestive of mesenteric panniculitis.  CT head no intracranial hemorrhage or evidence of acute large cortical infarct, stable atrophy and chronic findings, left mastoiditis and left otitis media with bone destruction of the left mastoid air cells, right " "maxillary sinusitis.  CT cervical spine no acute fracture, multilevel degenerative disc disease.\"    Appears patient has had ongoing mental status changes throughout hospital stay.  Has been seen by neurology.  Underwent MRI of the brain with no new stroke, there was evidence of chronic changes, possible mastoiditis.  No seizure-like activity.  Was given Geodon.  B12 is being dosed due to low B12 levels.  Was on prescription for Augmentin as well due to possible mastoiditis.    Patient will need placement    Review of Systems:     All systems were reviewed and negative except as mentioned in subjective, assessment and plan.    Vital Signs:    Temp:  [97.7 °F (36.5 °C)-98.6 °F (37 °C)] 98.6 °F (37 °C)  Heart Rate:  [100] 100  Resp:  [18-20] 20  BP: (106-119)/(62-68) 119/64    Intake and output:    I/O last 3 completed shifts:  In: 600 [P.O.:600]  Out: 150 [Urine:150]  I/O this shift:  In: 240 [P.O.:240]  Out: -     Physical Examination:    General Appearance:  Awake and alert   Head:  There is a lesion surrounding the left ear, left side of the head, particularly in the auricular area.  No drainage.   Eyes: Conjunctivae and sclerae normal, no icterus. No pallor.   Throat: No oral lesions, no thrush, oral mucosa moist.   Neck: Supple, trachea midline, no thyromegaly.   Lungs:   Breath sounds heard bilaterally equally.  No wheezing or crackles. No Pleural rub or bronchial breathing.   Heart:  Normal S1 and S2, no murmur, no gallop, no rub. No JVD.   Abdomen:   Normal bowel sounds, no masses, no organomegaly. Soft, nontender, nondistended, no rebound tenderness.   Extremities: Supple, no edema, no cyanosis, no clubbing.   Skin: Warm   Neurologic: Hard of hearing, awake and alert, appears improved, stable condition     Laboratory results:    Results from last 7 days   Lab Units 01/05/25  0612   SODIUM mmol/L 132*   POTASSIUM mmol/L 4.5   CHLORIDE mmol/L 99   CO2 mmol/L 22.5   BUN mg/dL 19   CREATININE mg/dL 1.29* "   CALCIUM mg/dL 8.8   GLUCOSE mg/dL 97                           Recent Labs     25  1555   PHART 7.455   YIV7WCT 34.2*   PO2ART 82.6   VYZ0NCE 24.0   BASEEXCESS 0.4      I have reviewed the patient's laboratory results.    Radiology results:    No radiology results from the last 24 hrs  I have reviewed the patient's radiology reports.    Medication Review:     I have reviewed the patient's active and prn medications.     Problem List:      AMS (altered mental status)    Altered mental status      Assessment/Plan:     Confusion/agitation:  Patient has extensive workup in regards to his confusion.  He is MRI was showing chronic infarcts, there was possible mastoiditis he completed a course of Augmentin.  He had been on B12 injections due to low B12 noted.    Patient most likely does have evidence of vascular dementia.  He has been started on oral Geodon for the last 4 days and is doing well, no further restlessness or issues at night.  He is on low-dose Depakote.  We are currently awaiting placement.    Left upper extremity weakness/hypothyroidism/cancer pain:  Complicates all aspects of care.    Patient's mental status is closer to baseline, this may be his new baseline.  He has had no further issues with agitation and restlessness in almost a week now.    Talked with family, Yobany, over the phone today     Risk Assessment: High  DVT Prophylaxis: Heparin  Code Status: Full code  Diet: Cardiac  Discharge Plan: LTC      Dionna Espitia DO  25  14:28 EST          Electronically signed by Dionna Espitia DO at 25 1429          Consult Notes (most recent note)        Esperanza Zaragoza MD at 25 0901        Consult Orders    1. Inpatient Neurology Consult Other (see comments) [[id number]] ordered by Kerley, Brian Joseph, DO at 25 0811                 DOS: 2025  NAME: Hardik Natarajan   : 1945  PCP: Won Cuellar MD  CC: Altered mentation over last 24 hours  Referring  MD: Sanchez Garcia,     Neurological Problem and Interval History:  79 y.o. 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  This time he was admitted by Dr. Garcia after he was found altered over the last 24 hours and he was only oriented to himself.  Today when we asked the patient his name with the help of the nurse at the bedside he said he did not know and did not know his date of birth but when the nurses called him by his first name he responded well to it.  It seems he is very sleepy and does not want to be disturbed or examined.  With a lot of difficulty he was only able to follow some simple commands like picking up his arms and moving his legs and showing me his tongue.  However when I asked the nurse to count the fingers he declined to do so.  He denies any headaches.  However he has a lot of aches and pains in his joints.    Past Medical/Surgical Hx:  Past Medical History:   Diagnosis Date    Anemia     Arthritis     Cancer      Past Surgical History:   Procedure Laterality Date    NAIL BIOPSY N/A     NECK DISSECTION N/A     PAROTIDECTOMY N/A     SKIN CANCER EXCISION         Review of Systems:    Constitutional: Patient very sleepy and does not want to be aroused.  Cardiovascular: Denies any chest pain or palpitations.  Respiratory: Denies any shortness of breath  Gastrointestinal: Denies any nausea and vomiting.  Genitourinary: Has adult diapers in place  Musculoskeletal: Moves all extremities but also has a lot of aches and pains in the joints  Dermatological: No skin breakdown noted but has some bruises in the left forearm.  Neurological: He says he does not know his name or his date of birth and he does not want to cooperate much with examination although he does  follow one-step commands.  Psychiatric: Seems to be delirious.  Ophthalmological: No visual changes noted.          Medications On Admission  Medications Prior to Admission   Medication Sig Dispense Refill Last Dose/Taking    acetaminophen (TYLENOL) 325 MG tablet Take 2 tablets by mouth Every 6 (Six) Hours As Needed.   12/31/2024    clopidogrel (PLAVIX) 75 MG tablet Take 1 tablet by mouth Daily.   1/1/2025    cyanocobalamin 1000 MCG/ML injection Inject 0.1 mL into the appropriate muscle as directed by prescriber Every 30 (Thirty) Days.   Past Month    DULoxetine (CYMBALTA) 30 MG capsule Take 1 capsule by mouth Daily.   12/31/2024    finasteride (PROSCAR) 5 MG tablet Take 1 tablet by mouth Daily.   12/31/2024    levothyroxine (SYNTHROID, LEVOTHROID) 75 MCG tablet TAKE ONE (1) TABLET BY MOUTH EVERY DAY 30 tablet 3 12/31/2024    ondansetron (ZOFRAN) 8 MG tablet Take 1 tablet by mouth 3 (Three) Times a Day As Needed for Nausea or Vomiting. 30 tablet 5 Past Week    oxyCODONE (Roxicodone) 5 MG immediate release tablet Take 1 tablet by mouth Every 6 (Six) Hours As Needed for Severe Pain. 20 tablet 0 Past Month    Procto-Med HC 2.5 % rectal cream 1 Application Daily As Needed.   Past Week    senna (SENOKOT) 8.6 MG tablet Take 1 tablet by mouth Daily As Needed.   Past Week    tamsulosin (FLOMAX) 0.4 MG capsule 24 hr capsule Take 1 capsule by mouth.   12/31/2024    diclofenac (VOLTAREN) 75 MG EC tablet        Hydrocortisone, Perianal, (ANUSOL-HC) 2.5 % rectal cream Insert 1 Application into the rectum.   More than a month    hydrOXYzine (ATARAX) 25 MG tablet Take 1 tablet by mouth Every 6 (Six) Hours As Needed for Itching. (Patient not taking: Reported on 1/1/2025) 60 tablet 2 Not Taking    ondansetron ODT (ZOFRAN-ODT) 8 MG disintegrating tablet Place 1 tablet twice a day by translingual route.          Prior to Admission medications    Medication Sig Start Date End Date Taking? Authorizing Provider   acetaminophen (TYLENOL)  325 MG tablet Take 2 tablets by mouth Every 6 (Six) Hours As Needed.   Yes Radha Santana MD   clopidogrel (PLAVIX) 75 MG tablet Take 1 tablet by mouth Daily. 8/5/24  Yes Radha Santana MD   cyanocobalamin 1000 MCG/ML injection Inject 0.1 mL into the appropriate muscle as directed by prescriber Every 30 (Thirty) Days. 5/17/23  Yes Radha Santana MD   DULoxetine (CYMBALTA) 30 MG capsule Take 1 capsule by mouth Daily. 9/11/23  Yes Radha Santana MD   finasteride (PROSCAR) 5 MG tablet Take 1 tablet by mouth Daily. 9/11/23  Yes Radha Santana MD   levothyroxine (SYNTHROID, LEVOTHROID) 75 MCG tablet TAKE ONE (1) TABLET BY MOUTH EVERY DAY 12/23/24  Yes Benitez Medellin MD   ondansetron (ZOFRAN) 8 MG tablet Take 1 tablet by mouth 3 (Three) Times a Day As Needed for Nausea or Vomiting. 5/16/23  Yes Yady Guajardo APRN   oxyCODONE (Roxicodone) 5 MG immediate release tablet Take 1 tablet by mouth Every 6 (Six) Hours As Needed for Severe Pain. 6/8/23  Yes Keyur Lemos DO   Procto-Med HC 2.5 % rectal cream 1 Application Daily As Needed. 5/9/23  Yes Radha Santana MD   senna (SENOKOT) 8.6 MG tablet Take 1 tablet by mouth Daily As Needed.   Yes Radha Santana MD   tamsulosin (FLOMAX) 0.4 MG capsule 24 hr capsule Take 1 capsule by mouth. 5/9/23  Yes Radha Santana MD   diclofenac (VOLTAREN) 75 MG EC tablet  5/24/23   Radha Santana MD   Hydrocortisone, Perianal, (ANUSOL-HC) 2.5 % rectal cream Insert 1 Application into the rectum. 5/9/23   Radha Santana MD   hydrOXYzine (ATARAX) 25 MG tablet Take 1 tablet by mouth Every 6 (Six) Hours As Needed for Itching.  Patient not taking: Reported on 1/1/2025 8/16/23   Yady Guajardo APRN   ondansetron ODT (ZOFRAN-ODT) 8 MG disintegrating tablet Place 1 tablet twice a day by translingual route.    Provider, Historical, MD        Allergies:  Allergies   Allergen Reactions    Asa [Aspirin] Unknown - Low Severity     Nose  bleed       Social Hx:  Social History     Socioeconomic History    Marital status:    Tobacco Use    Smoking status: Never    Smokeless tobacco: Current     Types: Chew   Vaping Use    Vaping status: Never Used   Substance and Sexual Activity    Alcohol use: Not Currently    Drug use: Never    Sexual activity: Defer       Family Hx:  Family History   Problem Relation Age of Onset    Cancer Mother     Cancer Father        Review of Imaging (Interpretation of images not reports): MRI of the brain with and without contrast on August 2, 2024 was interpreted as follows:    FINDINGS:  Exam: MRI of the brain without IV contrast     Comparison: 08/02/2024     Clinical history: Concern for CVA, left arm weakness     Findings:     Small 1 cm acute infarct in the right periventricular region on series 7, image 19. Decreased signal on the ADC map in this location.     No acute intracranial hemorrhage.     No intracranial masses.     Postcontrast imaging of the brain does not demonstrate any areas of abnormal parenchymal enhancement.     No midline shift.     No abnormal extra-axial fluid collections are seen.     Cerebral volume loss.     Areas of increased FLAIR signal within the cerebral white matter bilaterally are reflective of a nonspecific demyelinating process, likely ischemic microvascular in nature.     IMPRESSION:  Impression:     1. Small acute infarct in the right periventricular region     2. Moderate cerebral volume loss with moderate cerebral white matter disease, likely ischemic microvascular in nature.    CT Head Without Contrast    Result Date: 1/1/2025  PROCEDURE: CT HEAD WO CONTRAST-  INDICATION: AMS  TECHNIQUE: Multiple axial CT images were performed from the foramen magnum to the vertex without contrast.   Coronal reconstruction images were obtained from the axial data.  COMPARISON: 8/2/2024.  FINDINGS: There is no mass effect or midline shift. There is stable atrophy with ventricular enlargement.  There are stable bilateral periventricular hypodensities. There is no intracranial hemorrhage. The posterior fossa is without acute abnormality. There is mucoperiosteal thickening of the right maxillary sinus. There is opacification of the left mastoid air cells with bone destruction. There is soft tissue density in the left middle ear cavity. This was present on the prior exam. No skull fracture.      Impression: 1. No intracranial hemorrhage or evidence of acute large cortical infarct. 2. Stable atrophy and chronic findings. 3. Left mastoiditis and left otitis media with bone destruction of the left mastoid air cells. 4. Right maxillary sinusitis.         CTDI: 16.93 mGy DLP:837.44 mGy.cm    This study was performed with techniques to keep radiation doses as low as reasonably achievable (ALARA). Individualized dose reduction techniques using automated exposure control or adjustment of mA and/or kV according to the patient size were employed.   This report was signed and finalized on 1/1/2025 1:19 PM by Carmen Le MD.             MRI Brain Without Contrast    Result Date: 1/1/2025  FINAL REPORT TECHNIQUE: null CLINICAL HISTORY: AMS SEVERE HX OF PREVIOUS STROKE, FALL TODAY, HX OF PREVIOUS SKIN CA OF LEFT EAR. COMPARISON: null FINDINGS: MR of the brain without contrast Comparison: MR/SR - MRI BRAIN W WO CONTRAST - 8/2/24 21:53 EDT CT - CT ANGIOGRAM NECK W WO CONTRAST - 8/2/24 20:54 EDT CT - CT ANGIOGRAM HEAD W CONTRAST - 8/2/24 20:54 EDT CT/SR - CT HEAD WO CONTRAST STROKE PROTOCOL - 8/2/24 20:54 EDT MR/SR - MRI BRAIN W WO CONTRAST - 5/10/23 18:47 EDT Findings: No acute infarction, hemorrhage, mass-effect or herniation. No hydrocephalus. Increased signal intensity is seen in the deep and periventricular white matter on the T2/FLAIR sequences, which most likely represents the sequela of severe chronic small vessel ischemic disease. Chronic lacunar infarctions in the right corona radiata/centrum semiovale and left  basal ganglia. No extra-axial fluid collection or mass. Unremarkable sella. Intact flow voids. Normal orbits. Mucosal thickening in the paranasal sinuses may indicate sinusitis. Fluid signal in right mastoid air cells could be secondary to an effusion or mastoiditis. There is osseous destruction at the left mastoid process, which was also present on the prior studies and could be related to the previously seen skin cancer and/or post treatment changes. There is fluid signal in left mastoid air cells which could be secondary to an effusion or mastoiditis.     Impression: Impression: No acute infarction. No posttraumatic findings. Sinusitis could be considered. Bilateral mastoid air cell effusions versus mastoiditis. Osseous destruction of the left mastoid process could be secondary to the previously seen skin cancer and/or posttreatment changes. Correlate clinically for signs/symptoms of infection to exclude osteomyelitis. Authenticated and Electronically Signed by Jaqueline Painter MD on 01/01/2025 06:45:00 PM      Additional Tests Performed: Urine analysis is as follows:       Latest Reference Range & Units 01/01/25 14:18   Color, UA Yellow, Straw  Yellow   Appearance, UA Clear  Cloudy !   Specific Gravity, UA 1.005 - 1.030  1.009   pH, UA 5.0 - 8.0  7.0   Glucose Negative  Negative   Ketones, UA Negative  Negative   Blood, UA Negative  Negative   Nitrite, UA Negative  Negative   Leukocytes, UA Negative  Negative   Protein, UA Negative  Negative   Bilirubin, UA Negative  Negative   Urobilinogen, UA 0.2 - 1.0 E.U./dL  1.0 E.U./dL   !: Data is abnormal  Results for orders placed during the hospital encounter of 08/02/24    Adult Transthoracic Echo Complete w/ Color, Spectral and Contrast if necessary per protocol    Interpretation Summary    Left ventricular systolic function is normal. Calculated left ventricular EF = 60.4% Left ventricular ejection fraction appears to be 56 - 60%. Left ventricular diastolic  "function was normal.    The right ventricular cavity is borderline dilated with normal systolic function.    Mild tricuspid valve regurgitation is present. Estimated right ventricular systolic pressure from tricuspid regurgitation is normal (<35 mmHg).    Saline test results are negative for right to left atrial level shunt.            Laboratory Results:   Lab Results   Component Value Date    GLUCOSE 88 01/02/2025    CALCIUM 8.4 (L) 01/02/2025     01/02/2025    K 4.3 01/02/2025    CO2 22.4 01/02/2025     01/02/2025    BUN 11 01/02/2025    CREATININE 1.30 (H) 01/02/2025    BCR 8.5 01/02/2025    ANIONGAP 8.6 01/02/2025     Lab Results   Component Value Date    WBC 3.23 (L) 01/02/2025    HGB 9.8 (L) 01/02/2025    HCT 29.3 (L) 01/02/2025    MCV 93.9 01/02/2025     (L) 01/02/2025     Lab Results   Component Value Date    CHOL 117 08/03/2024     Lab Results   Component Value Date    HDL 27 (L) 08/03/2024     Lab Results   Component Value Date    LDL 73 08/03/2024     Lab Results   Component Value Date    TRIG 84 08/03/2024     Lab Results   Component Value Date    HGBA1C 5.00 08/03/2024     Lab Results   Component Value Date    INR 1.10 08/02/2024    INR 1.3 (H) 04/07/2023    PROTIME 14.7 08/02/2024     Lab Results   Component Value Date    CFIEZHFI35 303 08/03/2024     Lab Results   Component Value Date    FOLATE 8.81 08/03/2024     TSH          9/4/2024    10:16 9/25/2024    09:33 1/1/2025    12:21   TSH   TSH 12.060  5.670  2.700           Physical Examination:  /85 (BP Location: Left arm, Patient Position: Lying)   Pulse 89   Temp 98.8 °F (37.1 °C) (Axillary)   Resp 18   Ht 185.4 cm (73\")   Wt 96.3 kg (212 lb 4.9 oz)   SpO2 95%   BMI 28.01 kg/m²   General Appearance:   Well developed, well nourished, well groomed, alert, and sleepy.  HEENT: Normocephalic.    Neck and Spine: Normal range of motion.  Normal alignment. No mass or tenderness. No bruits.    Extremities:    No edema or " deformities. Normal joint ROM.   Skin:    No rashes or birth marks.    Neurological examination:  Higher Integrative  Function: When asked his name and date of birth he says he does not know.  He did not cooperate with counting the fingers.  However he did follow simple commands.  He showed me his tongue and moved all his extremities..  CN II:  Pupils are equal, round, and reactive to light. Normal visual acuity and visual fields.    CN III IV VI: Extraocular movements are full without nystagmus.   CN V:  Normal facial sensation and strength of muscles of mastication.  CN VII:  Facial movements are symmetric. No weakness.  CN VIII: Auditory acuity is normal.  CN IX & X: Symmetric palatal movement.  CN XI:  Sternocleidomastoid and trapezius are normal.  No weakness.  CN XII:  The tongue is midline.  No atrophy or fasciculations.  Motor:  Normal muscle strength, bulk and tone in upper and lower extremities.  No fasciculations, rigidity, spasticity, or abnormal movements.  Sensation: Normal to light touch, pinprick, vibration, temperature, and proprioception in arms and legs. Normal graphesthesia and no extinction on DSS.  Station and Gait: Could not be tested at this time.  Coordination:  Would not cooperate for testing..      Diagnoses / Discussion:  79 y.o. who presents with Sx of acute metabolic encephalopathy versus delirium.    Plan:  EEG at the bedside with photic stimulation has been requested.  Not to use any narcotics or sedatives.  If over the next 24 to 48 hours his condition does not improve he will need CSF evaluation under fluoroscopy..     I have discussed the above with the care team and he will be followed up by our inpatient teleneurology service..  Time spent with patient: 70 minutes in face-to-face evaluation and management of the patient using the dedicated telemedicine device without any interruption with the help of the rounding nurse with the patient located at the [facility] and myself at a remote location.    Electronically signed by Esperanza Zaragoza MD, 01/02/25, 9:01 AM EST.    Dictated using Dragon dictation.                Electronically signed by Esperanza Zaragoza MD at 01/02/25 0912

## 2025-01-12 NOTE — THERAPY TREATMENT NOTE
Patient Name: Hardik Natarajan  : 1945    MRN: [id number]                              Today's Date: 2025       Admit Date: 2025    Visit Dx:     ICD-10-CM ICD-9-CM   1. Altered mental status, unspecified altered mental status type  R41.82 780.97   2. Bladder stone  N21.0 594.1   3. 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    Anemia, unspecified    Nicotine dependence, chewing tobacco, uncomplicated    Neoplasm related pain (acute) (chronic)    Unspecified mastoiditis, left ear    Cellulitis of left external ear    Phlebitis and thrombophlebitis of unspecified site    Gangrene, not elsewhere classified    Acute osteomyelitis    Benign prostatic hyperplasia without lower urinary tract symptoms    Pain    Palliative care by specialist    Encounter for therapeutic drug monitoring    Weakness of left upper extremity    AMS (altered mental status)    Altered mental status     Past Medical History:   Diagnosis Date    Anemia     Arthritis     Cancer      Past Surgical History:   Procedure Laterality Date    NAIL BIOPSY N/A     NECK DISSECTION N/A     PAROTIDECTOMY N/A     SKIN CANCER EXCISION        General Information       Row Name 25 1838          Physical Therapy Time and Intention    Document Type therapy note (daily note)  -CC     Mode of Treatment physical therapy  -CC       Row Name 25 496          General Information    Patient Profile Reviewed yes  -CC     Existing Precautions/Restrictions fall  -CC       Row Name 25 1833          Safety Issues/Impairments Affecting Functional Mobility    Safety Issues Affecting Function (Mobility) awareness of need for assistance;positioning of assistive device;safety precaution awareness;safety precautions follow-through/compliance;insight into deficits/self-awareness  -CC      Impairments Affecting Function (Mobility) balance;cognition;endurance/activity tolerance;motor control;motor planning;strength;range of motion (ROM)  -CC               User Key  (r) = Recorded By, (t) = Taken By, (c) = Cosigned By      Initials Name Provider Type    Karis Chester PTA Physical Therapist Assistant                   Mobility       Row Name 01/12/25 1838          Gait/Stairs (Locomotion)    Patient was able to Ambulate yes  -CC     Distance in Feet (Gait) 78  -CC               User Key  (r) = Recorded By, (t) = Taken By, (c) = Cosigned By      Initials Name Provider Type    Karis Chester PTA Physical Therapist Assistant                   Obj/Interventions    No documentation.                  Goals/Plan    No documentation.                  Clinical Impression       Row Name 01/12/25 1839          Pain    Pretreatment Pain Rating 0/10 - no pain  -CC     Posttreatment Pain Rating 0/10 - no pain  -CC       Row Name 01/12/25 1839          Plan of Care Review    Plan of Care Reviewed With patient  -CC     Progress improving  -CC     Outcome Evaluation Pt agreeable to physical therapy. Performed sit <->stand RW with CGA with increased time, amb with RW 78 feet with 25% VC for closer proximity to AD, ambs with unequal step length fluccuating btw wide NEIL and narrow NEIL with VC to correct and VC for safe transitioning from RW to chair, square self to chair vs side sitting. Performed B LE ex in sitting with occ VC and TC for correct tech LAQ, hip abd, marching and reclined AP and QS 1x15 reps. Con't withPT POC and progress as tolerated  -CC       Row Name 01/12/25 1839          Positioning and Restraints    Pre-Treatment Position sitting in chair/recliner  -CC     Post Treatment Position chair  -CC     In Chair notified nsg;reclined;call light within reach;encouraged to call for assist;exit alarm on  -CC               User Key  (r) = Recorded By, (t) = Taken By, (c) = Cosigned By      Initials  Name Provider Type    Karis Chester PTA Physical Therapist Assistant                   Outcome Measures       Row Name 01/12/25 1843 01/12/25 0800       How much help from another person do you currently need...    Turning from your back to your side while in flat bed without using bedrails? 3  -CC 3  -EM (r)  HS (c)    Moving from lying on back to sitting on the side of a flat bed without bedrails? 3  -CC 3  -EM (r)  HS (c)    Moving to and from a bed to a chair (including a wheelchair)? 3  -CC 3  -EM (r)  HS (c)    Standing up from a chair using your arms (e.g., wheelchair, bedside chair)? 3  -CC 3  -EM (r)  HS (c)    Climbing 3-5 steps with a railing? 2  -CC 3  -EM (r)  HS (c)    To walk in hospital room? 3  -CC 3  -EM (r)  HS (c)    AM-PAC 6 Clicks Score (PT) 17  -CC 18  -EM    Highest Level of Mobility Goal 5 --> Static standing  -CC 6 --> Walk 10 steps or more  -EM      Row Name 01/12/25 1843          Functional Assessment    Outcome Measure Options AM-PAC 6 Clicks Basic Mobility (PT)  -CC               User Key  (r) = Recorded By, (t) = Taken By, (c) = Cosigned By      Initials Name Provider Type     Karis Henderson PTA Physical Therapist Assistant    Verna Lu RN Registered Nurse    Christie Huynh RNA Registered Nurse                                 Physical Therapy Education       Title: PT OT SLP Therapies (Done)       Topic: Physical Therapy (Done)       Point: Mobility training (Done)       Learning Progress Summary            Patient Acceptance, E,TB,D, VU,NR by  at 1/10/2025 1325    Acceptance, E,TB,D, VU,NR by  at 1/8/2025 1514    Acceptance, E, VU by  at 1/3/2025 1931    Acceptance, E, VU by  at 1/3/2025 1300    Acceptance, E, VU by MS at 1/2/2025 1303    Comment: importance of mobility   Family Acceptance, E, VU by  at 1/3/2025 1300                      Point: Home exercise program (Done)       Learning Progress Summary            Patient Acceptance, E,TB, VU by   at 1/12/2025 1844    Acceptance, E,TB, VU by  at 1/6/2025 1529    Acceptance, E, VU by  at 1/3/2025 1931    Acceptance, E, VU by  at 1/3/2025 1300    Acceptance, E, VU by MS at 1/2/2025 1303    Comment: importance of mobility   Family Acceptance, E, VU by  at 1/3/2025 1300                      Point: Body mechanics (Done)       Learning Progress Summary            Patient Acceptance, E, VU by  at 1/3/2025 1931    Acceptance, E, VU by SJ at 1/3/2025 1300   Family Acceptance, E, VU by SJ at 1/3/2025 1300                      Point: Precautions (Done)       Learning Progress Summary            Patient Acceptance, E, VU by  at 1/3/2025 1931    Acceptance, E, VU by  at 1/3/2025 1300   Family Acceptance, E, VU by  at 1/3/2025 1300                                      User Key       Initials Effective Dates Name Provider Type Discipline    CC 06/16/21 -  Karis Henderson, PTA Physical Therapist Assistant PT     06/16/21 -  Kunal Harper, PTA Physical Therapist Assistant PT    MS 08/22/23 -  Shubham Tierney, PT Physical Therapist PT     09/30/24 -  Kya Jason, RN Registered Nurse Nurse                  PT Recommendation and Plan     Progress: improving  Outcome Evaluation: Pt agreeable to physical therapy. Performed sit <->stand RW with CGA with increased time, amb with RW 78 feet with 25% VC for closer proximity to AD, ambs with unequal step length fluccuating btw wide NEIL and narrow NEIL with VC to correct and VC for safe transitioning from RW to chair, square self to chair vs side sitting. Performed B LE ex in sitting with occ VC and TC for correct tech LAQ, hip abd, marching and reclined AP and QS 1x15 reps. Con't withPT POC and progress as tolerated     Time Calculation:         PT Charges       Row Name 01/12/25 1844             Time Calculation    PT Received On 01/12/25  -      PT Goal Re-Cert Due Date 01/12/25  -CC         Time Calculation- PT    Total Timed Code Minutes- PT 30  minute(s)  -CC         Timed Charges    64560 - PT Therapeutic Exercise Minutes 15  -CC      30438 - Gait Training Minutes  15  -CC         Total Minutes    Timed Charges Total Minutes 30  -CC       Total Minutes 30  -CC                User Key  (r) = Recorded By, (t) = Taken By, (c) = Cosigned By      Initials Name Provider Type    CC Karis Henderson PTA Physical Therapist Assistant                  Therapy Charges for Today       Code Description Service Date Service Provider Modifiers Qty    13786888555 HC PT THER PROC EA 15 MIN 1/12/2025 Karis Henderson PTA GP 1    01190745301 HC GAIT TRAINING EA 15 MIN 1/12/2025 Karis Henderson PTA GP 1            PT G-Codes  Outcome Measure Options: AM-PAC 6 Clicks Basic Mobility (PT)  AM-PAC 6 Clicks Score (PT): 17  AM-PAC 6 Clicks Score (OT): 15       Karis Henderson PTA  1/12/2025

## 2025-01-12 NOTE — PLAN OF CARE
Goal Outcome Evaluation:         VSS. No events noted. Care continues.

## 2025-01-12 NOTE — PLAN OF CARE
Goal Outcome Evaluation:  Plan of Care Reviewed With: patient        Progress: improving  Outcome Evaluation: Pt agreeable to physical therapy. Performed sit <->stand RW with CGA with increased time, amb with RW 78 feet with 25% VC for closer proximity to AD, ambs with unequal step length fluccuating btw wide NEIL and narrow NEIL with VC to correct and VC for safe transitioning from RW to chair, square self to chair vs side sitting. Performed B LE ex in sitting with occ VC and TC for correct tech LAQ, hip abd, marching and reclined AP and QS 1x15 reps. Con't withPT POC and progress as tolerated

## 2025-01-12 NOTE — PROGRESS NOTES
"    [facility]    Name:  Hardik Natarajan   Age:  79 y.o.  Sex:  male  :  1945  MRN:  [id number]   Visit Number:  [id number]  Admission Date:  2025  Date Of Service:  25  Primary Care Physician:  Won Cuellar MD     LOS: 9 days :    Chief Complaint:      Follow-up; altered mental status    Subjective:    Patient stable.  No acute changes overnight.  Very hard of hearing but otherwise awake and alert and knows he is in the hospital.    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  Presented to [facility] ED on 2025 with concern for altered mental status.  Last known normal was night prior to presentation.  Also reported having a fall, unclear if it was syncope.  Wife found him on the floor.  On Plavix, no other anticoagulation.  He denied any fevers or chills, chest pain, shortness of air, any other specific concern.  Denied any slurred speech, difficulty moving upper or lower extremities, and any in symmetry.     ED summary: Afebrile, vital signs stable on room air.  ABG pH 7.44, pCO2 34, pO2 82, bicarb 24.  EKG sinus tachycardia rate 107, nonspecific ST-T wave changes.  Opponent 23, ACS not suspected.  Creatinine 1.34, lactate elevated, procalcitonin not elevated.  UDS negative.  Blood cultures collected.  COVID/flu/RSV negative.  CT abdomen/pelvis large stone in urinary bladder, wall thickening probable diverticula, abnormal attenuation of the root of the mesentery most suggestive of mesenteric panniculitis.  CT head no intracranial hemorrhage or evidence of acute large cortical infarct, stable atrophy and chronic findings, left mastoiditis and left otitis media with bone destruction of the left " "mastoid air cells, right maxillary sinusitis.  CT cervical spine no acute fracture, multilevel degenerative disc disease.\"    Appears patient has had ongoing mental status changes throughout hospital stay.  Has been seen by neurology.  Underwent MRI of the brain with no new stroke, there was evidence of chronic changes, possible mastoiditis.  No seizure-like activity.  Was given Geodon.  B12 is being dosed due to low B12 levels.  Was on prescription for Augmentin as well due to possible mastoiditis.    Patient will need placement    Review of Systems:     All systems were reviewed and negative except as mentioned in subjective, assessment and plan.    Vital Signs:    Temp:  [97.7 °F (36.5 °C)-98.7 °F (37.1 °C)] 98.7 °F (37.1 °C)  Heart Rate:  [85] 85  Resp:  [20] 20  BP: (106-113)/(69-79) 106/79    Intake and output:    I/O last 3 completed shifts:  In: 240 [P.O.:240]  Out: -   I/O this shift:  In: 240 [P.O.:240]  Out: -     Physical Examination:    General Appearance:  Awake and alert   Head:  There is a lesion surrounding the left ear, left side of the head, particularly in the auricular area.  No drainage.   Eyes: Conjunctivae and sclerae normal, no icterus. No pallor.   Throat: No oral lesions, no thrush, oral mucosa moist.   Neck: Supple, trachea midline, no thyromegaly.   Lungs:   Breath sounds heard bilaterally equally.  No wheezing or crackles. No Pleural rub or bronchial breathing.   Heart:  Normal S1 and S2, no murmur, no gallop, no rub. No JVD.   Abdomen:   Normal bowel sounds, no masses, no organomegaly. Soft, nontender, nondistended, no rebound tenderness.   Extremities: Supple, no edema, no cyanosis, no clubbing.   Skin: Warm   Neurologic: Hard of hearing, awake and alert, appears improved, stable condition, likely at baseline     Laboratory results:    Results from last 7 days   Lab Units 01/12/25  0812   SODIUM mmol/L 138   POTASSIUM mmol/L 4.6   CHLORIDE mmol/L 101   CO2 mmol/L 28.3   BUN mg/dL 23 "   CREATININE mg/dL 1.63*   CALCIUM mg/dL 9.4   GLUCOSE mg/dL 98     Results from last 7 days   Lab Units 01/12/25  0812   WBC 10*3/mm3 2.76*   HEMOGLOBIN g/dL 11.7*   HEMATOCRIT % 35.2*   PLATELETS 10*3/mm3 202                       Recent Labs     01/01/25  1555   PHART 7.455   MTS2OFY 34.2*   PO2ART 82.6   YRB3MIX 24.0   BASEEXCESS 0.4      I have reviewed the patient's laboratory results.    Radiology results:    No radiology results from the last 24 hrs  I have reviewed the patient's radiology reports.    Medication Review:     I have reviewed the patient's active and prn medications.     Problem List:      AMS (altered mental status)    Altered mental status      Assessment/Plan:     Confusion/agitation:  Patient has extensive workup in regards to his confusion.  He is MRI was showing chronic infarcts, there was possible mastoiditis he completed a course of Augmentin.  He had been on B12 injections due to low B12 noted.    Patient most likely does have evidence of vascular dementia.  He has been started on oral Geodon for the last week and is doing well, no further restlessness or issues at night.  He is on low-dose Depakote.  We are currently awaiting placement.    Left upper extremity weakness/hypothyroidism/cancer pain:  Complicates all aspects of care.    Patient's mental status is closer to baseline, this may be his new baseline.  He has had no further issues with agitation and restlessness in almost a week now.    Talked with family, Yobany, over the phone today on 1/11/2025     Risk Assessment: High  DVT Prophylaxis: Heparin  Code Status: Full code  Diet: Cardiac  Discharge Plan: LTC      Dionna Espitia DO  01/12/25  12:54 EST

## 2025-01-13 PROCEDURE — 25010000002 HEPARIN (PORCINE) PER 1000 UNITS: Performed by: STUDENT IN AN ORGANIZED HEALTH CARE EDUCATION/TRAINING PROGRAM

## 2025-01-13 PROCEDURE — 97164 PT RE-EVAL EST PLAN CARE: CPT

## 2025-01-13 PROCEDURE — 99231 SBSQ HOSP IP/OBS SF/LOW 25: CPT | Performed by: FAMILY MEDICINE

## 2025-01-13 PROCEDURE — 97168 OT RE-EVAL EST PLAN CARE: CPT

## 2025-01-13 PROCEDURE — 25810000003 SODIUM CHLORIDE 0.9 % SOLUTION: Performed by: FAMILY MEDICINE

## 2025-01-13 RX ORDER — SODIUM CHLORIDE 9 MG/ML
75 INJECTION, SOLUTION INTRAVENOUS CONTINUOUS
Status: ACTIVE | OUTPATIENT
Start: 2025-01-13 | End: 2025-01-13

## 2025-01-13 RX ADMIN — ZIPRASIDONE HYDROCHLORIDE 40 MG: 20 CAPSULE ORAL at 21:56

## 2025-01-13 RX ADMIN — TAMSULOSIN HYDROCHLORIDE 0.4 MG: 0.4 CAPSULE ORAL at 08:49

## 2025-01-13 RX ADMIN — DIVALPROEX SODIUM 250 MG: 250 TABLET, EXTENDED RELEASE ORAL at 08:49

## 2025-01-13 RX ADMIN — DULOXETINE HYDROCHLORIDE 30 MG: 30 CAPSULE, DELAYED RELEASE ORAL at 08:49

## 2025-01-13 RX ADMIN — POLYETHYLENE GLYCOL 3350 17 G: 17 POWDER, FOR SOLUTION ORAL at 08:48

## 2025-01-13 RX ADMIN — CLOPIDOGREL BISULFATE 75 MG: 75 TABLET ORAL at 08:49

## 2025-01-13 RX ADMIN — SODIUM CHLORIDE 75 ML/HR: 9 INJECTION, SOLUTION INTRAVENOUS at 11:13

## 2025-01-13 RX ADMIN — HEPARIN SODIUM 5000 UNITS: 5000 INJECTION INTRAVENOUS; SUBCUTANEOUS at 21:57

## 2025-01-13 RX ADMIN — LEVOTHYROXINE SODIUM 75 MCG: 0.07 TABLET ORAL at 05:27

## 2025-01-13 RX ADMIN — SENNOSIDES 1 TABLET: 8.6 TABLET, FILM COATED ORAL at 21:57

## 2025-01-13 RX ADMIN — HEPARIN SODIUM 5000 UNITS: 5000 INJECTION INTRAVENOUS; SUBCUTANEOUS at 08:48

## 2025-01-13 NOTE — PROGRESS NOTES
"Dietitian Follow-up    Patient Name: Hardik Natarajan  YOB: 1945  MRN: [id number]  Admission date: 1/1/2025    Comment:      Clinical Nutrition Follow-up   Encounter Information        Trending Narrative     1/13: PO intake averaging 25% x 3 meals. Supplementation ordered.    1/9: Average PO intake 35% x 5 meals. Boost Plus ordered BID. Pt is drinking some of his supplement ordered.     1/7: Pt screened for LOS. No significant wt changes per EMR. Average PO intake 37% x 8 meals. RD will order Boost Plus BID to promote PO intake.      Anthropometrics        Current Height, Weight Height: 185.4 cm (73\")  Weight: 96.3 kg (212 lb 4.9 oz) (01/01/25 1859)       Trending Weight Hx     This admission:              PTA:     Wt Readings from Last 30 Encounters:   01/01/25 1859 96.3 kg (212 lb 4.9 oz)   01/01/25 1149 102 kg (225 lb)   09/25/24 0959 91.4 kg (201 lb 9.6 oz)   09/04/24 0949 94.3 kg (208 lb)   08/14/24 1031 94.4 kg (208 lb 1.6 oz)   08/04/24 0500 95.9 kg (211 lb 6.7 oz)   08/03/24 1059 100 kg (220 lb 7.4 oz)   08/02/24 2331 100 kg (220 lb 7.4 oz)   08/02/24 1953 99.8 kg (220 lb)   07/24/24 0904 96.3 kg (212 lb 4.8 oz)   07/03/24 0913 98.2 kg (216 lb 6.4 oz)   06/12/24 1104 98.9 kg (218 lb)   05/22/24 0907 98.4 kg (217 lb)   05/01/24 0940 98.9 kg (218 lb)   04/10/24 0918 98 kg (216 lb)   03/20/24 0918 99.3 kg (219 lb)   02/29/24 0932 97.1 kg (214 lb)   02/07/24 1037 96.2 kg (212 lb)   01/18/24 0857 96.2 kg (212 lb)   12/20/23 0905 95.7 kg (211 lb)   11/29/23 0924 96.2 kg (212 lb)   11/08/23 0918 96.6 kg (213 lb)   10/18/23 0900 95.7 kg (211 lb)   09/27/23 1311 95.7 kg (211 lb)   09/06/23 0900 94.8 kg (209 lb)   08/16/23 0857 95.7 kg (211 lb)   07/26/23 0858 96.6 kg (213 lb)   07/05/23 0906 95.7 kg (211 lb)   06/13/23 0915 95.1 kg (209 lb 11.2 oz)   06/08/23 0309 95.7 kg (211 lb)   06/07/23 1456 95.7 kg (211 lb)   05/22/23 1015 90.6 kg (199 lb 11.2 oz)   05/16/23 1108 90.7 kg (200 lb)   05/16/23 1507 " 89.7 kg (197 lb 12.8 oz)      BMI kg/m2 Body mass index is 28.01 kg/m².     Labs        Pertinent Labs Results from last 7 days   Lab Units 01/12/25  0812   SODIUM mmol/L 138   POTASSIUM mmol/L 4.6   CHLORIDE mmol/L 101   CO2 mmol/L 28.3   BUN mg/dL 23   CREATININE mg/dL 1.63*   CALCIUM mg/dL 9.4   GLUCOSE mg/dL 98     Results from last 7 days   Lab Units 01/12/25  0812   HEMOGLOBIN g/dL 11.7*   HEMATOCRIT % 35.2*         Medications    Scheduled Medications clopidogrel, 75 mg, Oral, Daily  [START ON 1/16/2025] cyanocobalamin, 1,000 mcg, Intramuscular, Q7 Days  divalproex, 250 mg, Oral, Daily  DULoxetine, 30 mg, Oral, Daily  heparin (porcine), 5,000 Units, Subcutaneous, Q12H  levothyroxine, 75 mcg, Oral, Q AM  polyethylene glycol, 17 g, Oral, Daily  senna, 1 tablet, Oral, Nightly  tamsulosin, 0.4 mg, Oral, Daily  ziprasidone, 40 mg, Oral, Nightly        Infusions      PRN Medications   acetaminophen    Calcium Replacement - Follow Nurse / BPA Driven Protocol    lactulose    Magnesium Standard Dose Replacement - Follow Nurse / BPA Driven Protocol    nitroglycerin    ondansetron    oxyCODONE    Phosphorus Replacement - Follow Nurse / BPA Driven Protocol    Potassium Replacement - Follow Nurse / BPA Driven Protocol    sodium chloride    [Held by provider] sodium chloride     Physical Findings        Trending Physical   Appearance, NFPE    --  Edema  None noted   Bowel Function LBM: 1/12   Tubes none   Chewing/Swallowing WNL   Skin Intact   --  Current Nutrition Orders & Evaluation of Intake       Oral Nutrition     Food Allergies NKFA   Current PO Diet Diet: Cardiac; Healthy Heart (2-3 Na+); Fluid Consistency: Thin (IDDSI 0)   Supplement Orders Placed This Encounter      Dietary Nutrition Supplements Boost Plus (Ensure Enlive, Ensure Plus)     PO Evaluation     Trending % PO Intake 1/13: 25% x 3 meals   1/9: 35% x 5 meals  1/7: 37% x 8 meals      Nutrition Diagnosis         Nutrition Dx Problem 1 Predicted suboptimal  intake r/t AMS AEB average PO intake 37% x 8 meals.       Nutrition Dx Problem 2        Intervention Goal         Intervention Goal(s) PO intake to meet >50% of estimated needs  Adhere to supplement ordered  Maintain current body weight       Nutrition Intervention        RD Action Continue to monitor     Nutrition Prescription          Diet Prescription Diet: Cardiac; Healthy Heart (2-3 Na+); Fluid Consistency: Thin (IDDSI 0)   Supplement Prescription Orders Placed This Encounter      Dietary Nutrition Supplements Boost Plus (Ensure Enlive, Ensure Plus)     Enteral Nutrition Prescription     TPN Prescription       Monitor/Evaluation        Monitor Per protocol, I&O, PO intake, Supplement intake, Pertinent labs, Weight, Skin status, GI status, Symptoms, POC/GOC, Swallow function     RD to f/up    Electronically signed by:  John Sol RD  01/13/25 10:04 EST

## 2025-01-13 NOTE — CASE MANAGEMENT/SOCIAL WORK
Case Management/Social Work    Patient Name:  Hardik Natarajan  YOB: 1945  MRN: [id number]  Admit Date:  1/1/2025        CLARISSA left message for Mayte/Signature to see if was able to review referral for [facility]. CLARISSA following.     13:56 EST CLARISSA sent referrals to [place] and [place] per request of family. CLARISSA following.       Electronically signed by:  TAMIR Hall  01/13/25 09:07 EST

## 2025-01-13 NOTE — PLAN OF CARE
Problem: Adult Inpatient Plan of Care  Goal: Plan of Care Review  Outcome: Progressing  Flowsheets (Taken 1/13/2025 1642)  Progress: improving  Plan of Care Reviewed With: patient  Goal: Patient-Specific Goal (Individualized)  Outcome: Progressing  Goal: Absence of Hospital-Acquired Illness or Injury  Outcome: Progressing  Intervention: Identify and Manage Fall Risk  Recent Flowsheet Documentation  Taken 1/13/2025 1600 by Martha Heath RN  Safety Promotion/Fall Prevention:   safety round/check completed   assistive device/personal items within reach   activity supervised   clutter free environment maintained   fall prevention program maintained  Taken 1/13/2025 1400 by Martha Heath RN  Safety Promotion/Fall Prevention:   safety round/check completed   activity supervised   assistive device/personal items within reach   clutter free environment maintained   fall prevention program maintained   nonskid shoes/slippers when out of bed  Taken 1/13/2025 1200 by Martha Heath RN  Safety Promotion/Fall Prevention:   safety round/check completed   activity supervised   assistive device/personal items within reach   clutter free environment maintained   gait belt  Taken 1/13/2025 1000 by Martha Heath RN  Safety Promotion/Fall Prevention:   safety round/check completed   activity supervised   assistive device/personal items within reach   clutter free environment maintained   fall prevention program maintained   nonskid shoes/slippers when out of bed   room organization consistent  Taken 1/13/2025 0848 by Martha Heath RN  Safety Promotion/Fall Prevention:   activity supervised   clutter free environment maintained   assistive device/personal items within reach   safety round/check completed  Intervention: Prevent Skin Injury  Recent Flowsheet Documentation  Taken 1/13/2025 1600 by Martha Heath RN  Body Position: position changed independently  Taken 1/13/2025 1400 by Martha Heath  RN  Body Position:   turned   left  Taken 1/13/2025 1200 by Martha Heath RN  Body Position: position changed independently  Taken 1/13/2025 1000 by Martha Heath RN  Body Position:   sitting up in bed   position changed independently  Taken 1/13/2025 0848 by Martha Heath RN  Body Position: position changed independently  Intervention: Prevent and Manage VTE (Venous Thromboembolism) Risk  Recent Flowsheet Documentation  Taken 1/13/2025 0848 by Martha Heath RN  VTE Prevention/Management: (see MAR)   bilateral   SCDs (sequential compression devices) off  Intervention: Prevent Infection  Recent Flowsheet Documentation  Taken 1/13/2025 1600 by Martha Heath RN  Infection Prevention:   environmental surveillance performed   rest/sleep promoted   single patient room provided  Taken 1/13/2025 1400 by Martha Heath RN  Infection Prevention:   environmental surveillance performed   hand hygiene promoted   rest/sleep promoted  Taken 1/13/2025 1200 by Martha Heath RN  Infection Prevention:   environmental surveillance performed   rest/sleep promoted   single patient room provided  Taken 1/13/2025 1000 by Martha Heath RN  Infection Prevention:   equipment surfaces disinfected   environmental surveillance performed   single patient room provided   hand hygiene promoted  Taken 1/13/2025 0848 by Martha Heath RN  Infection Prevention:   environmental surveillance performed   rest/sleep promoted  Goal: Optimal Comfort and Wellbeing  Outcome: Progressing  Intervention: Provide Person-Centered Care  Recent Flowsheet Documentation  Taken 1/13/2025 0848 by Martha Heath RN  Trust Relationship/Rapport:   care explained   choices provided   thoughts/feelings acknowledged  Goal: Readiness for Transition of Care  Outcome: Progressing   Goal Outcome Evaluation:  Plan of Care Reviewed With: patient        Progress: improving  Outcome Evaluation: No acute events noted today. Pt waiting on  placement. Plan of care continued.

## 2025-01-13 NOTE — THERAPY RE-EVALUATION
Patient Name: Hardik Natarajan  : 1945    MRN: [id number]                              Today's Date: 2025       Admit Date: 2025    Visit Dx:     ICD-10-CM ICD-9-CM   1. Altered mental status, unspecified altered mental status type  R41.82 780.97   2. Bladder stone  N21.0 594.1   3. 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    Anemia, unspecified    Nicotine dependence, chewing tobacco, uncomplicated    Neoplasm related pain (acute) (chronic)    Unspecified mastoiditis, left ear    Cellulitis of left external ear    Phlebitis and thrombophlebitis of unspecified site    Gangrene, not elsewhere classified    Acute osteomyelitis    Benign prostatic hyperplasia without lower urinary tract symptoms    Pain    Palliative care by specialist    Encounter for therapeutic drug monitoring    Weakness of left upper extremity    AMS (altered mental status)    Altered mental status     Past Medical History:   Diagnosis Date    Anemia     Arthritis     Cancer      Past Surgical History:   Procedure Laterality Date    NAIL BIOPSY N/A     NECK DISSECTION N/A     PAROTIDECTOMY N/A     SKIN CANCER EXCISION        General Information       Row Name 25 1604          OT Time and Intention    Document Type re-evaluation  -     Mode of Treatment occupational therapy  -     Patient Effort good  -     Symptoms Noted During/After Treatment fatigue  -       Row Name 25 1604          General Information    Patient Profile Reviewed yes  -     Existing Precautions/Restrictions fall  -     Barriers to Rehab medically complex;previous functional deficit;cognitive status  -       Row Name 25 1604          Living Environment    People in Home spouse  -       Row Name 25 1604          Home Main Entrance    Number of Stairs, Main  Entrance none  -[facility] Name 01/13/25 1604          Stairs Within Home, Primary    Number of Stairs, Within Home, Primary none  -[facility] Name 01/13/25 1604          Cognition    Orientation Status (Cognition) oriented to;person;verbal cues/prompts needed for orientation  -[facility] Name 01/13/25 1604          Safety Issues/Impairments Affecting Functional Mobility    Safety Issues Affecting Function (Mobility) insight into deficits/self-awareness;safety precaution awareness;safety precautions follow-through/compliance  -     Impairments Affecting Function (Mobility) balance;cognition;endurance/activity tolerance;motor control;motor planning;strength;range of motion (ROM)  -     Cognitive Impairments, Mobility Safety/Performance awareness, need for assistance;safety precaution awareness;safety precaution follow-through  -               User Key  (r) = Recorded By, (t) = Taken By, (c) = Cosigned By      Initials Name Provider Type     Trina Wesley Occupational Therapist                     Mobility/ADL's       [facility] Name 01/13/25 1608          Bed Mobility    Bed Mobility supine-sit  -     Supine-Sit [place] (Bed Mobility) modified independence  -     Assistive Device (Bed Mobility) head of bed elevated;bed rails  -[facility] Name 01/13/25 1608          Sit-Stand Transfer    Sit-Stand [place] (Transfers) minimum assist (75% patient effort);verbal cues  -     Assistive Device (Sit-Stand Transfers) walker, front-wheeled  -[facility] Name 01/13/25 1608          Functional Mobility    Functional Mobility- Ind. Level minimum assist (75% patient effort);2 person assist required;verbal cues required  -     Functional Mobility- Device walker, front-wheeled  -     Functional Mobility-Distance (Feet) 40  -     Patient was able to Ambulate yes  -[facility] Name 01/13/25 1608          Bathing Assessment/Intervention    [place] Level (Bathing) minimum assist (75% patient  effort)  -AH       Row Name 01/13/25 1608          Upper Body Dressing Assessment/Training    [place] Level (Upper Body Dressing) set up  -AH       Row Name 01/13/25 1608          Lower Body Dressing Assessment/Training    [place] Level (Lower Body Dressing) minimum assist (75% patient effort)  -AH       Row Name 01/13/25 1608          Grooming Assessment/Training    [place] Level (Grooming) set up  -AH       Row Name 01/13/25 1608          Self-Feeding Assessment/Training    [place] Level (Feeding) set up  -AH       Row Name 01/13/25 1608          Toileting Assessment/Training    [place] Level (Toileting) moderate assist (50% patient effort)  -               User Key  (r) = Recorded By, (t) = Taken By, (c) = Cosigned By      Initials Name Provider Type     Trina Wesley Occupational Therapist                   Obj/Interventions       Row Name 01/13/25 1612          Sensory Assessment (Somatosensory)    Sensory Assessment (Somatosensory) sensation intact  -AH       Row Name 01/13/25 1612          Vision Assessment/Intervention    Visual Impairment/Limitations WFL  -AH       Row Name 01/13/25 1612          Range of Motion Comprehensive    General Range of Motion bilateral upper extremity ROM [facility]     Comment, General Range of Motion LUE limited shoulder flexion ~80 degrees  -AH       Row Name 01/13/25 1612          Strength Comprehensive (MMT)    Comment, General Manual Muscle Testing (MMT) Assessment BUE [facility]               User Key  (r) = Recorded By, (t) = Taken By, (c) = Cosigned By      Initials Name Provider Type    Trina Palacio Occupational Therapist                   Goals/Plan       Row Name 01/13/25 1616          Transfer Goal 1 (OT)    Activity/Assistive Device (Transfer Goal 1, OT) sit-to-stand/stand-to-sit;walker, rolling  -     [place] Level/Cues Needed (Transfer Goal 1, OT) contact guard required  -     Time Frame (Transfer Goal 1, OT) long term goal  (LTG);1 week  -     Progress/Outcome (Transfer Goal 1, OT) goal ongoing;goal revised this date  -       Row Name 01/13/25 1616          Bathing Goal 1 (OT)    Activity/Device (Bathing Goal 1, OT) bathing skills, all  -     [place] Level/Cues Needed (Bathing Goal 1, OT) minimum assist (75% or more patient effort)  -     Time Frame (Bathing Goal 1, OT) long term goal (LTG);5 days  -     Progress/Outcomes (Bathing Goal 1, OT) goal ongoing  -       Row Name 01/13/25 1616          Dressing Goal 1 (OT)    Activity/Device (Dressing Goal 1, OT) lower body dressing  -     [place]/Cues Needed (Dressing Goal 1, OT) minimum assist (75% or more patient effort)  -     Time Frame (Dressing Goal 1, OT) by discharge  -     Progress/Outcome (Dressing Goal 1, OT) goal ongoing;goal revised this date  -       Row Name 01/13/25 1616          Toileting Goal 1 (OT)    Activity/Device (Toileting Goal 1, OT) toileting skills, all;commode  -     [place] Level/Cues Needed (Toileting Goal 1, OT) minimum assist (75% or more patient effort)  -     Time Frame (Toileting Goal 1, OT) long term goal (LTG);1 week  -     Progress/Outcome (Toileting Goal 1, OT) goal ongoing;goal revised this date  -       Row Name 01/13/25 1616          Strength Goal 1 (OT)    Strength Goal 1 (OT) Patient to perform UB ther ex as tolerated  -     Time Frame (Strength Goal 1, OT) long term goal (LTG)  -     Progress/Outcome (Strength Goal 1, OT) goal ongoing;goal revised this date  -       Row Name 01/13/25 1616          Therapy Assessment/Plan (OT)    Planned Therapy Interventions (OT) activity tolerance training;BADL retraining;patient/caregiver education/training;transfer/mobility retraining;strengthening exercise  -               User Key  (r) = Recorded By, (t) = Taken By, (c) = Cosigned By      Initials Name Provider Type    Trina Palacio Occupational Therapist                   Clinical Impression       Row  Name 01/13/25 1612          Pain Assessment    Pretreatment Pain Rating 0/10 - no pain  -     Posttreatment Pain Rating 0/10 - no pain  -     Pain Side/Orientation generalized  -     Pain Management Interventions exercise or physical activity utilized  -[facility] Name 01/13/25 1612          Plan of Care Review    Plan of Care Reviewed With patient  -     Progress improving  -     Outcome Evaluation Pt seen for OT re-evaluation today.  Pt presents supine in bed and was independent to sit eob, stood with min assist and verbal cues for safety using RW>  Pt walked 40' with min assist of 2 using RW.  Pt needed verbal and physical cues for walker placement and safety with ambulation.  Pt min/mod assist for bathing and dressing tasks.  Pt is expected to benefit from continued therapy to improve his strength and independence with ADL tasks.  -[facility] Name 01/13/25 1612          Therapy Assessment/Plan (OT)    Rehab Potential (OT) good  -AH     Criteria for Skilled Therapeutic Interventions Met (OT) yes;skilled treatment is necessary  -     Therapy Frequency (OT) 3 times/wk  -[facility] Name 01/13/25 1612          Therapy Plan Review/Discharge Plan (OT)    Anticipated Discharge Disposition (OT) sub acute care setting  sub acute rehab  -       Row Name 01/13/25 1612          Positioning and Restraints    Pre-Treatment Position in bed  -     Post Treatment Position chair  -     In Chair reclined;call light within reach;encouraged to call for assist;exit alarm on  [facility]               User Key  (r) = Recorded By, (t) = Taken By, (c) = Cosigned By      Initials Name Provider Type     Trina Wesley Occupational Therapist                   Outcome Measures       Row Name 01/13/25 1618          How much help from another is currently needed...    Putting on and taking off regular lower body clothing? 2  -     Bathing (including washing, rinsing, and drying) 2  -     Toileting (which includes using  toilet bed pan or urinal) 2  -AH     Putting on and taking off regular upper body clothing 3  -AH     Taking care of personal grooming (such as brushing teeth) 3  -AH     Eating meals 3  -AH     AM-PAC 6 Clicks Score (OT) 15  -AH       Row Name 01/13/25 1545 01/13/25 0848       How much help from another person do you currently need...    Turning from your back to your side while in flat bed without using bedrails? 3  -MS 3  -BW    Moving from lying on back to sitting on the side of a flat bed without bedrails? 3  -MS 3  -BW    Moving to and from a bed to a chair (including a wheelchair)? 3  -MS 3  -BW    Standing up from a chair using your arms (e.g., wheelchair, bedside chair)? 3  -MS 3  -BW    Climbing 3-5 steps with a railing? 2  -MS 2  -BW    To walk in hospital room? 2  -MS 3  -BW    AM-PAC 6 Clicks Score (PT) 16  -MS 17  -BW    Highest Level of Mobility Goal 5 --> Static standing  -MS 5 --> Static standing  -BW      Row Name 01/13/25 1618          Functional Assessment    Outcome Measure Options AM-PAC 6 Clicks Daily Activity (OT)  -               User Key  (r) = Recorded By, (t) = Taken By, (c) = Cosigned By      Initials Name Provider Type    Trina Palacio Occupational Therapist    Shubham Banda, PT Physical Therapist    Martha Turner, RN Registered Nurse                    Occupational Therapy Education       Title: PT OT SLP Therapies (Done)       Topic: Occupational Therapy (Done)       Point: ADL training (Done)       Description:   Instruct learner(s) on proper safety adaptation and remediation techniques during self care or transfers.   Instruct in proper use of assistive devices.                  Learning Progress Summary            Patient Acceptance, E,TB, VU by  at 1/13/2025 1618    Comment: purpose of OT re-evaluation    Acceptance, E,TB, VU by SD at 1/10/2025 1257    Comment: Safety during tf    Acceptance, E,TB, VU by  at 1/8/2025 1519    Comment: benefit of activity     Acceptance, E,TB, VU by  at 1/6/2025 1521    Comment: benefit of activity    Acceptance, E, VU by  at 1/3/2025 1931    Acceptance, E, VU by  at 1/3/2025 1300    Acceptance, E,TB, VU by SD at 1/2/2025 1322    Comment: OT POC   Family Acceptance, E, VU by  at 1/3/2025 1300                      Point: Home exercise program (Done)       Description:   Instruct learner(s) on appropriate technique for monitoring, assisting and/or progressing therapeutic exercises/activities.                  Learning Progress Summary            Patient Acceptance, E,TB, VU by  at 1/6/2025 1521    Comment: benefit of activity    Acceptance, E, VU by  at 1/3/2025 1931    Acceptance, E, VU by  at 1/3/2025 1300   Family Acceptance, E, VU by  at 1/3/2025 1300                      Point: Precautions (Done)       Description:   Instruct learner(s) on prescribed precautions during self-care and functional transfers.                  Learning Progress Summary            Patient Acceptance, E, VU by  at 1/3/2025 1931    Acceptance, E, VU by  at 1/3/2025 1300   Family Acceptance, E, VU by  at 1/3/2025 1300                      Point: Body mechanics (Done)       Description:   Instruct learner(s) on proper positioning and spine alignment during self-care, functional mobility activities and/or exercises.                  Learning Progress Summary            Patient Acceptance, E, VU by  at 1/3/2025 1931    Acceptance, E, VU by  at 1/3/2025 1300   Family Acceptance, E, VU by  at 1/3/2025 1300                                      User Key       Initials Effective Dates Name Provider Type Discipline     06/16/21 -  Trina Wesley Occupational Therapist OT    SD 06/16/21 -  Jaqueline Dasilva OT Occupational Therapist OT     09/30/24 -  Kya Jason, RN Registered Nurse Nurse                  OT Recommendation and Plan  Planned Therapy Interventions (OT): activity tolerance training, BADL retraining, patient/caregiver  education/training, transfer/mobility retraining, strengthening exercise  Therapy Frequency (OT): 3 times/wk  Plan of Care Review  Plan of Care Reviewed With: patient  Progress: improving  Outcome Evaluation: Pt seen for OT re-evaluation today.  Pt presents supine in bed and was independent to sit eob, stood with min assist and verbal cues for safety using RW>  Pt walked 40' with min assist of 2 using RW.  Pt needed verbal and physical cues for walker placement and safety with ambulation.  Pt min/mod assist for bathing and dressing tasks.  Pt is expected to benefit from continued therapy to improve his strength and independence with ADL tasks.     Time Calculation:         Time Calculation- OT       Row Name 01/13/25 1619             Time Calculation- OT    OT Start Time 1455  -AH      OT Received On 01/13/25  -      OT Goal Re-Cert Due Date 01/23/25  -         Untimed Charges    OT Eval/Re-eval Minutes 30  -AH         Total Minutes    Untimed Charges Total Minutes 30  -AH       Total Minutes 30  -AH                User Key  (r) = Recorded By, (t) = Taken By, (c) = Cosigned By      Initials Name Provider Type    Trina Palacio Occupational Therapist                  Therapy Charges for Today       Code Description Service Date Service Provider Modifiers Qty    [id number]  OT RE-EVAL 2 1/13/2025 Trina Wesley GO 1                 Trina Wesley  1/13/2025

## 2025-01-13 NOTE — PLAN OF CARE
Goal Outcome Evaluation:  Plan of Care Reviewed With: patient        Progress: improving  Outcome Evaluation: Pt seen for OT re-evaluation today.  Pt presents supine in bed and was independent to sit eob, stood with min assist and verbal cues for safety using RW>  Pt walked 40' with min assist of 2 using RW.  Pt needed verbal and physical cues for walker placement and safety with ambulation.  Pt min/mod assist for bathing and dressing tasks.  Pt is expected to benefit from continued therapy to improve his strength and independence with ADL tasks.    Anticipated Discharge Disposition (OT): sub acute care setting (sub acute rehab)

## 2025-01-13 NOTE — PLAN OF CARE
Goal Outcome Evaluation:  Plan of Care Reviewed With: patient        Progress: improving  Outcome Evaluation: Pt participated in PT re-assessment today. Pt was MI for sup- sit t/f. Pt ambulated 40ft but required frequent directiosn and cues for use of Rwx. Pt demonstrated B LE weakness. Pt would continue to benefit from skilled PT tx and STR at d/c.    Anticipated Discharge Disposition (PT): home with assist, home with home health                         See [facility] report.

## 2025-01-13 NOTE — CASE MANAGEMENT/SOCIAL WORK
DCP; STR to [facility]. Spoke with son Yobany and daughter in law Leonora over the phone. We are still waiting to hear back from [facility]. Yobany agreed to send referrals out to [facility]. IMM delivered at the time of this conversation. CM continues to follow for any needs.

## 2025-01-13 NOTE — PLAN OF CARE
Goal Outcome Evaluation:      No acute events noted from shift. VSS. Patient currently waiting on placement.Care continues.

## 2025-01-13 NOTE — THERAPY RE-EVALUATION
Patient Name: Hardik Natarajan  : 1945    MRN: [id number]                              Today's Date: 2025       Admit Date: 2025    Visit Dx:     ICD-10-CM ICD-9-CM   1. Altered mental status, unspecified altered mental status type  R41.82 780.97   2. Bladder stone  N21.0 594.1   3. 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    Anemia, unspecified    Nicotine dependence, chewing tobacco, uncomplicated    Neoplasm related pain (acute) (chronic)    Unspecified mastoiditis, left ear    Cellulitis of left external ear    Phlebitis and thrombophlebitis of unspecified site    Gangrene, not elsewhere classified    Acute osteomyelitis    Benign prostatic hyperplasia without lower urinary tract symptoms    Pain    Palliative care by specialist    Encounter for therapeutic drug monitoring    Weakness of left upper extremity    AMS (altered mental status)    Altered mental status     Past Medical History:   Diagnosis Date    Anemia     Arthritis     Cancer      Past Surgical History:   Procedure Laterality Date    NAIL BIOPSY N/A     NECK DISSECTION N/A     PAROTIDECTOMY N/A     SKIN CANCER EXCISION        General Information       Row Name 25 1537          Physical Therapy Time and Intention    Document Type re-evaluation  -MS     Mode of Treatment physical therapy  -MS       Row Name 25 1537          General Information    Patient Profile Reviewed yes  -MS     Prior Level of Function independent:;all household mobility;community mobility  -MS     Existing Precautions/Restrictions fall  -MS     Barriers to Rehab medically complex;previous functional deficit;cognitive status;hearing deficit  -MS       Row Name 25 1537          Living Environment    People in Home spouse  -MS       Row Name 25 1537          Home Main Entrance     Number of Stairs, Main Entrance none  -MS       Row Name 01/13/25 1537          Stairs Within Home, Primary    Number of Stairs, Within Home, Primary none  -MS       Row Name 01/13/25 1537          Cognition    Orientation Status (Cognition) oriented to;person;verbal cues/prompts needed for orientation  -MS       Row Name 01/13/25 1537          Safety Issues/Impairments Affecting Functional Mobility    Safety Issues Affecting Function (Mobility) insight into deficits/self-awareness;safety precautions follow-through/compliance;positioning of assistive device;awareness of need for assistance;safety precaution awareness;sequencing abilities  -MS     Impairments Affecting Function (Mobility) balance;cognition;endurance/activity tolerance;motor control;motor planning;strength;range of motion (ROM)  -MS     Cognitive Impairments, Mobility Safety/Performance awareness, need for assistance;safety precaution awareness;safety precaution follow-through  -MS               User Key  (r) = Recorded By, (t) = Taken By, (c) = Cosigned By      Initials Name Provider Type    MS Shubham Tierney PT Physical Therapist                   Mobility       Row Name 01/13/25 1539          Bed Mobility    Bed Mobility supine-sit  -MS     Supine-Sit [place] (Bed Mobility) modified independence  -MS     Assistive Device (Bed Mobility) bed rails;head of bed elevated  -MS       Row Name 01/13/25 1539          Sit-Stand Transfer    Sit-Stand [place] (Transfers) minimum assist (75% patient effort)  -MS     Assistive Device (Sit-Stand Transfers) walker, front-wheeled  -MS       Row Name 01/13/25 1539          Gait/Stairs (Locomotion)    [place] Level (Gait) minimum assist (75% patient effort);2 person assist;verbal cues;nonverbal cues (demo/gesture)  -MS     Assistive Device (Gait) walker, front-wheeled  -MS     Patient was able to Ambulate yes  -MS     Distance in Feet (Gait) 40  -MS     Deviations/Abnormal Patterns (Gait) base of  support, wide;stride length decreased  assist with manipulating Rwx  -MS     Bilateral Gait Deviations forward flexed posture;heel strike decreased  -MS               User Key  (r) = Recorded By, (t) = Taken By, (c) = Cosigned By      Initials Name Provider Type    Shubham Banda, PT Physical Therapist                   Obj/Interventions       Row Name 01/13/25 1546          Range of Motion Comprehensive    General Range of Motion bilateral lower extremity ROM WFL  -MS       Row Name 01/13/25 1545          Strength Comprehensive (MMT)    General Manual Muscle Testing (MMT) Assessment lower extremity strength deficits identified  -MS     Comment, General Manual Muscle Testing (MMT) Assessment B LE 4-/5  -MS               User Key  (r) = Recorded By, (t) = Taken By, (c) = Cosigned By      Initials Name Provider Type    Shubham Banda, PT Physical Therapist                   Goals/Plan       [facility] Name 01/13/25 1542          Bed Mobility Goal 1 (PT)    Progress/Outcomes (Bed Mobility Goal 1, PT) goal met  -MS       [facility] Name 01/13/25 3465          Transfer Goal 1 (PT)    Activity/Assistive Device (Transfer Goal 1, PT) transfers, all;sit-to-stand/stand-to-sit  -MS     [place] Level/Cues Needed (Transfer Goal 1, PT) modified independence  -MS     Time Frame (Transfer Goal 1, PT) long term goal (LTG);2 weeks  -MS     Progress/Outcome (Transfer Goal 1, PT) continuing progress toward goal  -MS       [facility] Name 01/13/25 1546          Gait Training Goal 1 (PT)    Activity/Assistive Device (Gait Training Goal 1, PT) gait (walking locomotion);assistive device use  -MS     [place] Level (Gait Training Goal 1, PT) standby assist  -MS     Distance (Gait Training Goal 1, PT) 50ft  -MS     Time Frame (Gait Training Goal 1, PT) short term goal (STG);10 days  -MS     Progress/Outcome (Gait Training Goal 1, PT) good progress toward goal  -MS       [facility] Name 01/13/25 1545          Patient Education Goal (PT)    Activity  (Patient Education Goal, PT) ther exer x15 reps ea  -MS     [place]/Cues/Accuracy (Memory Goal 2, PT) demonstrates adequately  -MS     Time Frame (Patient Education Goal, PT) short term goal (STG);10 days  -MS     Progress/Outcome (Patient Education Goal, PT) good progress toward goal  -MS       Row Name 01/13/25 9600          Therapy Assessment/Plan (PT)    Planned Therapy Interventions (PT) balance training;bed mobility training;gait training;ROM (range of motion);stair training;neuromuscular re-education;home exercise program;patient/family education;transfer training;strengthening  -MS               User Key  (r) = Recorded By, (t) = Taken By, (c) = Cosigned By      Initials Name Provider Type    MS Shubham Tierney, PT Physical Therapist                   Clinical Impression       Row Name 01/13/25 5294          Pain    Pretreatment Pain Rating 0/10 - no pain  -MS     Posttreatment Pain Rating 0/10 - no pain  -MS       Row Name 01/13/25 3321          Plan of Care Review    Plan of Care Reviewed With patient  -MS     Progress improving  -MS     Outcome Evaluation Pt participated in PT re-assessment today. Pt was MI for sup- sit t/f. Pt ambulated 40ft but required frequent directiosn and cues for use of Rwx. Pt demonstrated B LE weakness. Pt would continue to benefit from skilled PT tx and STR at d/c.  -MS       Row Name 01/13/25 0647          Therapy Assessment/Plan (PT)    Rehab Potential (PT) good  -MS     Criteria for Skilled Interventions Met (PT) yes;meets criteria  -MS     Therapy Frequency (PT) 5 times/wk  -MS       Row Name 01/13/25 2723          Vital Signs    O2 Delivery Pre Treatment room air  -MS     O2 Delivery Intra Treatment room air  -MS     O2 Delivery Post Treatment room air  -MS     Pre Patient Position Supine  -MS     Intra Patient Position Standing  -MS     Post Patient Position Sitting  -MS       Row Name 01/13/25 8360          Positioning and Restraints    Pre-Treatment Position in  bed  -MS     Post Treatment Position chair  -MS     In Chair reclined;call light within reach;encouraged to call for assist;exit alarm on  -MS               User Key  (r) = Recorded By, (t) = Taken By, (c) = Cosigned By      Initials Name Provider Type    Shubham Banda, PT Physical Therapist                   Outcome Measures       Row Name 01/13/25 1545 01/13/25 0848       How much help from another person do you currently need...    Turning from your back to your side while in flat bed without using bedrails? 3  -MS 3  -BW    Moving from lying on back to sitting on the side of a flat bed without bedrails? 3  -MS 3  -BW    Moving to and from a bed to a chair (including a wheelchair)? 3  -MS 3  -BW    Standing up from a chair using your arms (e.g., wheelchair, bedside chair)? 3  -MS 3  -BW    Climbing 3-5 steps with a railing? 2  -MS 2  -BW    To walk in hospital room? 2  -MS 3  -BW    AM-PAC 6 Clicks Score (PT) 16  -MS 17  -BW    Highest Level of Mobility Goal 5 --> Static standing  -MS 5 --> Static standing  -BW              User Key  (r) = Recorded By, (t) = Taken By, (c) = Cosigned By      Initials Name Provider Type    Shubham Banda, RADHA Physical Therapist     Martha Heath RN Registered Nurse                                 Physical Therapy Education       Title: PT OT SLP Therapies (Done)       Topic: Physical Therapy (Done)       Point: Mobility training (Done)       Learning Progress Summary            Patient Acceptance, E, VU by MS at 1/13/2025 1546    Comment: importance of mobility    Acceptance, E,TB,D, VU,NR by  at 1/10/2025 1325    Acceptance, E,TB,D, VU,NR by  at 1/8/2025 1514    Acceptance, E, VU by  at 1/3/2025 1931    Acceptance, E, VU by  at 1/3/2025 1300    Acceptance, E, VU by MS at 1/2/2025 1303    Comment: importance of mobility   Family Acceptance, E, VU by  at 1/3/2025 1300                      Point: Home exercise program (Done)       Learning Progress Summary             Patient Acceptance, E, VU by MS at 1/13/2025 1546    Comment: importance of mobility    Acceptance, E,TB, VU by CC at 1/12/2025 1844    Acceptance, E,TB, VU by CC at 1/6/2025 1529    Acceptance, E, VU by SJ at 1/3/2025 1931    Acceptance, E, VU by SJ at 1/3/2025 1300    Acceptance, E, VU by MS at 1/2/2025 1303    Comment: importance of mobility   Family Acceptance, E, VU by SJ at 1/3/2025 1300                      Point: Body mechanics (Done)       Learning Progress Summary            Patient Acceptance, E, VU by SJ at 1/3/2025 1931    Acceptance, E, VU by SJ at 1/3/2025 1300   Family Acceptance, E, VU by SJ at 1/3/2025 1300                      Point: Precautions (Done)       Learning Progress Summary            Patient Acceptance, E, VU by  at 1/3/2025 1931    Acceptance, E, VU by  at 1/3/2025 1300   Family Acceptance, E, VU by  at 1/3/2025 1300                                      User Key       Initials Effective Dates Name Provider Type Discipline     06/16/21 -  Karis Henderson PTA Physical Therapist Assistant PT     06/16/21 -  Kunal Harper, PTA Physical Therapist Assistant PT    MS 08/22/23 -  Shubham Tierney PT Physical Therapist PT     09/30/24 -  Kya Jason, RN Registered Nurse Nurse                  PT Recommendation and Plan  Planned Therapy Interventions (PT): balance training, bed mobility training, gait training, ROM (range of motion), stair training, neuromuscular re-education, home exercise program, patient/family education, transfer training, strengthening  Progress: improving  Outcome Evaluation: Pt participated in PT re-assessment today. Pt was MI for sup- sit t/f. Pt ambulated 40ft but required frequent directiosn and cues for use of Rwx. Pt demonstrated B LE weakness. Pt would continue to benefit from skilled PT tx and STR at d/c.     Time Calculation:   PT Evaluation Complexity  History, PT Evaluation Complexity: 1-2 personal factors and/or  comorbidities  Examination of Body Systems (PT Eval Complexity): total of 3 or more elements  Clinical Presentation (PT Evaluation Complexity): evolving  Clinical Decision Making (PT Evaluation Complexity): moderate complexity  Overall Complexity (PT Evaluation Complexity): moderate complexity     PT Charges       Row Name 01/13/25 1547             Time Calculation    Start Time 1458  -MS      PT Received On 01/13/25  -MS      PT Goal Re-Cert Due Date 01/23/25  -MS         Untimed Charges    PT Eval/Re-eval Minutes 30  -MS         Total Minutes    Untimed Charges Total Minutes 30  -MS       Total Minutes 30  -MS                User Key  (r) = Recorded By, (t) = Taken By, (c) = Cosigned By      Initials Name Provider Type    MS Shubham Tierney, PT Physical Therapist                  Therapy Charges for Today       Code Description Service Date Service Provider Modifiers Qty    09347132657 HC PT RE-EVAL ESTABLISHED PLAN 2 1/13/2025 Shubham Tierney, RADHA GP 1            PT G-Codes  Outcome Measure Options: AM-PAC 6 Clicks Basic Mobility (PT)  AM-PAC 6 Clicks Score (PT): 16  AM-PAC 6 Clicks Score (OT): 15  PT Discharge Summary  Anticipated Discharge Disposition (PT): home with assist, home with home health    Shubham Tierney PT  1/13/2025

## 2025-01-13 NOTE — PROGRESS NOTES
"    [facility]    Name:  Hardik Natarajan   Age:  79 y.o.  Sex:  male  :  1945  MRN:  [id number]   Visit Number:  [id number]  Admission Date:  2025  Date Of Service:  25  Primary Care Physician:  Won Cuellar MD     LOS: 10 days :    Chief Complaint:      Follow-up; altered mental status    Subjective:    Patient overall stable.  Labs showing some dehydration, and will give some fluids today.  Will have nursing ensure her oral intake.  He was somewhat groggy but able to answer simple questions.    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  Presented to [facility] ED on 2025 with concern for altered mental status.  Last known normal was night prior to presentation.  Also reported having a fall, unclear if it was syncope.  Wife found him on the floor.  On Plavix, no other anticoagulation.  He denied any fevers or chills, chest pain, shortness of air, any other specific concern.  Denied any slurred speech, difficulty moving upper or lower extremities, and any in symmetry.     ED summary: Afebrile, vital signs stable on room air.  ABG pH 7.44, pCO2 34, pO2 82, bicarb 24.  EKG sinus tachycardia rate 107, nonspecific ST-T wave changes.  Opponent 23, ACS not suspected.  Creatinine 1.34, lactate elevated, procalcitonin not elevated.  UDS negative.  Blood cultures collected.  COVID/flu/RSV negative.  CT abdomen/pelvis large stone in urinary bladder, wall thickening probable diverticula, abnormal attenuation of the root of the mesentery most suggestive of mesenteric panniculitis.  CT head no intracranial hemorrhage or evidence of acute large cortical infarct, stable atrophy and chronic findings, left " "mastoiditis and left otitis media with bone destruction of the left mastoid air cells, right maxillary sinusitis.  CT cervical spine no acute fracture, multilevel degenerative disc disease.\"    Appears patient has had ongoing mental status changes throughout hospital stay.  Has been seen by neurology.  Underwent MRI of the brain with no new stroke, there was evidence of chronic changes, possible mastoiditis.  No seizure-like activity.  Was given Geodon.  B12 is being dosed due to low B12 levels.  Was on prescription for Augmentin as well due to possible mastoiditis.    Patient will need placement    Review of Systems:     All systems were reviewed and negative except as mentioned in subjective, assessment and plan.    Vital Signs:    Temp:  [96.8 °F (36 °C)-97.3 °F (36.3 °C)] 96.8 °F (36 °C)  Heart Rate:  [] 76  Resp:  [18-20] 20  BP: (110-118)/(83-85) 118/83    Intake and output:    I/O last 3 completed shifts:  In: 460 [P.O.:460]  Out: 200 [Urine:200]  I/O this shift:  In: 240 [P.O.:240]  Out: -     Physical Examination:    General Appearance:  Awake and alert   Head:  There is a lesion surrounding the left ear, left side of the head, particularly in the auricular area.  No drainage.   Eyes: Conjunctivae and sclerae normal, no icterus. No pallor.   Throat: No oral lesions, no thrush, oral mucosa moist.   Neck: Supple, trachea midline, no thyromegaly.   Lungs:   Breath sounds heard bilaterally equally.  No wheezing or crackles. No Pleural rub or bronchial breathing.   Heart:  Normal S1 and S2, no murmur, no gallop, no rub. No JVD.   Abdomen:   Normal bowel sounds, no masses, no organomegaly. Soft, nontender, nondistended, no rebound tenderness.   Extremities: Supple, no edema, no cyanosis, no clubbing.   Skin: Warm   Neurologic: Hard of hearing, awake and alert, appears improved, stable condition, no significant change     Laboratory results:    Results from last 7 days   Lab Units 01/12/25  0812   SODIUM " mmol/L 138   POTASSIUM mmol/L 4.6   CHLORIDE mmol/L 101   CO2 mmol/L 28.3   BUN mg/dL 23   CREATININE mg/dL 1.63*   CALCIUM mg/dL 9.4   GLUCOSE mg/dL 98     Results from last 7 days   Lab Units 01/12/25  0812   WBC 10*3/mm3 2.76*   HEMOGLOBIN g/dL 11.7*   HEMATOCRIT % 35.2*   PLATELETS 10*3/mm3 202                       Recent Labs     01/01/25  1555   PHART 7.455   TAQ5GVK 34.2*   PO2ART 82.6   LZB2ORG 24.0   BASEEXCESS 0.4      I have reviewed the patient's laboratory results.    Radiology results:    No radiology results from the last 24 hrs  I have reviewed the patient's radiology reports.    Medication Review:     I have reviewed the patient's active and prn medications.     Problem List:      AMS (altered mental status)    Altered mental status      Assessment/Plan:     Confusion/agitation:  Patient has extensive workup in regards to his confusion.  He is MRI was showing chronic infarcts, there was possible mastoiditis he completed a course of Augmentin.  He had been on B12 injections due to low B12 noted.    Patient most likely does have evidence of vascular dementia.  He has been started on oral Geodon for the last week and is doing well, no further restlessness or issues at night.  He is on low-dose Depakote.  We are currently awaiting placement.    Left upper extremity weakness/hypothyroidism/cancer pain:  Complicates all aspects of care.    Patient's mental status is closer to baseline, this may be his new baseline.  No issues with agitation or restlessness in a week.    Talked with family, Yobany, over the phone today on 1/11/2025     Risk Assessment: High  DVT Prophylaxis: Heparin  Code Status: Full code  Diet: Cardiac  Discharge Plan: LTC      Dionna Espitia DO  01/13/25  10:12 EST

## 2025-01-14 PROCEDURE — 97530 THERAPEUTIC ACTIVITIES: CPT

## 2025-01-14 PROCEDURE — 25010000002 HEPARIN (PORCINE) PER 1000 UNITS: Performed by: STUDENT IN AN ORGANIZED HEALTH CARE EDUCATION/TRAINING PROGRAM

## 2025-01-14 PROCEDURE — 97535 SELF CARE MNGMENT TRAINING: CPT

## 2025-01-14 PROCEDURE — 99232 SBSQ HOSP IP/OBS MODERATE 35: CPT | Performed by: INTERNAL MEDICINE

## 2025-01-14 PROCEDURE — 97116 GAIT TRAINING THERAPY: CPT

## 2025-01-14 RX ADMIN — ACETAMINOPHEN 650 MG: 325 TABLET, FILM COATED ORAL at 18:12

## 2025-01-14 RX ADMIN — POLYETHYLENE GLYCOL 3350 17 G: 17 POWDER, FOR SOLUTION ORAL at 09:35

## 2025-01-14 RX ADMIN — ZIPRASIDONE HYDROCHLORIDE 40 MG: 20 CAPSULE ORAL at 20:03

## 2025-01-14 RX ADMIN — CLOPIDOGREL BISULFATE 75 MG: 75 TABLET ORAL at 09:35

## 2025-01-14 RX ADMIN — HEPARIN SODIUM 5000 UNITS: 5000 INJECTION INTRAVENOUS; SUBCUTANEOUS at 09:35

## 2025-01-14 RX ADMIN — TAMSULOSIN HYDROCHLORIDE 0.4 MG: 0.4 CAPSULE ORAL at 09:35

## 2025-01-14 RX ADMIN — DULOXETINE HYDROCHLORIDE 30 MG: 30 CAPSULE, DELAYED RELEASE ORAL at 09:35

## 2025-01-14 RX ADMIN — DIVALPROEX SODIUM 250 MG: 250 TABLET, EXTENDED RELEASE ORAL at 09:35

## 2025-01-14 RX ADMIN — LEVOTHYROXINE SODIUM 75 MCG: 0.07 TABLET ORAL at 06:31

## 2025-01-14 RX ADMIN — HEPARIN SODIUM 5000 UNITS: 5000 INJECTION INTRAVENOUS; SUBCUTANEOUS at 20:03

## 2025-01-14 RX ADMIN — SENNOSIDES 1 TABLET: 8.6 TABLET, FILM COATED ORAL at 20:03

## 2025-01-14 NOTE — CASE MANAGEMENT/SOCIAL WORK
Case Management/Social Work    Patient Name:  Hardik Natarajan  YOB: 1945  MRN: [id number]  Admit Date:  1/1/2025      Nabila spoke with Norah from [place]. Onsite went well and the facility is able to accept pt for admission tomorrow, 1-15. Team updated via secure chat.       Electronically signed by:  TAMIR Naidu  01/14/25 14:01 EST

## 2025-01-14 NOTE — PLAN OF CARE
Goal Outcome Evaluation:  Plan of Care Reviewed With: patient        Progress: improving  Outcome Evaluation: Pt supine in bed and willing to participate with treatment.  Pt performed bed mobility, transfers, gait training and static/dynamic standing balance.   See flowsheet for details. Cont PT per POC progressing to goals as pt tolerates.

## 2025-01-14 NOTE — THERAPY TREATMENT NOTE
Patient Name: Hardik Natarajan  : 1945    MRN: [id number]                              Today's Date: 2025       Admit Date: 2025    Visit Dx:     ICD-10-CM ICD-9-CM   1. Altered mental status, unspecified altered mental status type  R41.82 780.97   2. Bladder stone  N21.0 594.1   3. 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    Anemia, unspecified    Nicotine dependence, chewing tobacco, uncomplicated    Neoplasm related pain (acute) (chronic)    Unspecified mastoiditis, left ear    Cellulitis of left external ear    Phlebitis and thrombophlebitis of unspecified site    Gangrene, not elsewhere classified    Acute osteomyelitis    Benign prostatic hyperplasia without lower urinary tract symptoms    Pain    Palliative care by specialist    Encounter for therapeutic drug monitoring    Weakness of left upper extremity    AMS (altered mental status)    Altered mental status     Past Medical History:   Diagnosis Date    Anemia     Arthritis     Cancer      Past Surgical History:   Procedure Laterality Date    NAIL BIOPSY N/A     NECK DISSECTION N/A     PAROTIDECTOMY N/A     SKIN CANCER EXCISION        General Information       Row Name 25 1223          Physical Therapy Time and Intention    Document Type therapy note (daily note)  -RM     Mode of Treatment physical therapy  -RM       Row Name 25 1223          General Information    Patient Profile Reviewed yes  -RM       Row Name 25 1223          Cognition    Orientation Status (Cognition) oriented to;person;verbal cues/prompts needed for orientation  -RM       Row Name 25 1223          Safety Issues/Impairments Affecting Functional Mobility    Impairments Affecting Function (Mobility) balance;cognition;endurance/activity tolerance;motor control;motor  planning;strength;range of motion (ROM)  -RM               User Key  (r) = Recorded By, (t) = Taken By, (c) = Cosigned By      Initials Name Provider Type    Kunal Christianson, VAL Physical Therapist Assistant                   Mobility       Row Name 01/14/25 1224          Bed Mobility    Supine-Sit [place] (Bed Mobility) minimum assist (75% patient effort);verbal cues;nonverbal cues (demo/gesture)  -RM     Assistive Device (Bed Mobility) head of bed elevated;bed rails  -RM       Row Name 01/14/25 1224          Sit-Stand Transfer    Sit-Stand [place] (Transfers) minimum assist (75% patient effort);verbal cues  -RM     Assistive Device (Sit-Stand Transfers) walker, front-wheeled  -RM       Row Name 01/14/25 1224          Gait/Stairs (Locomotion)    [place] Level (Gait) minimum assist (75% patient effort);verbal cues;nonverbal cues (demo/gesture)  -RM     Assistive Device (Gait) walker, front-wheeled  -RM     Patient was able to Ambulate yes  -RM     Distance in Feet (Gait) 26  plus 16 '  with seated rest  -RM     Deviations/Abnormal Patterns (Gait) base of support, wide;stride length decreased  -RM     Bilateral Gait Deviations forward flexed posture;heel strike decreased  -RM               User Key  (r) = Recorded By, (t) = Taken By, (c) = Cosigned By      Initials Name Provider Type    Kunal Christianson, VAL Physical Therapist Assistant                   Obj/Interventions    No documentation.                  Goals/Plan    No documentation.                  Clinical Impression       Row Name 01/14/25 1238          Pain    Pretreatment Pain Rating 0/10 - no pain  -RM     Posttreatment Pain Rating 0/10 - no pain  -RM       Row Name 01/14/25 1238          Plan of Care Review    Plan of Care Reviewed With patient  -RM     Progress improving  -RM     Outcome Evaluation Pt supine in bed and willing to participate with treatment.  Pt performed bed mobility, transfers, gait training and static/dynamic  standing balance.   See flowsheet for details. Cont PT per POC progressing to goals as pt tolerates.  -RM       Row Name 01/14/25 1238          Positioning and Restraints    Pre-Treatment Position in bed  -RM     Post Treatment Position chair  -RM     In Chair reclined;call light within reach;encouraged to call for assist;exit alarm on;notified nsg  -RM               User Key  (r) = Recorded By, (t) = Taken By, (c) = Cosigned By      Initials Name Provider Type    Kunal Christianson, VAL Physical Therapist Assistant                   Outcome Measures       Row Name 01/14/25 1251 01/14/25 0900       How much help from another person do you currently need...    Turning from your back to your side while in flat bed without using bedrails? 3  -RM 3  -SR (r)  BW (c)    Moving from lying on back to sitting on the side of a flat bed without bedrails? 3  -RM 3  -SR (r)  BW (c)    Moving to and from a bed to a chair (including a wheelchair)? 3  -RM 3  -SR (r)  BW (c)    Standing up from a chair using your arms (e.g., wheelchair, bedside chair)? 3  -RM 3  -SR (r)  BW (c)    Climbing 3-5 steps with a railing? 2  -RM 2  -SR (r)  BW (c)    To walk in hospital room? 3  -RM 2  -SR (r)  BW (c)    AM-PAC 6 Clicks Score (PT) 17  -RM 16  -SR    Highest Level of Mobility Goal 5 --> Static standing  -RM 5 --> Static standing  -SR      Row Name 01/14/25 1251          Functional Assessment    Outcome Measure Options AM-PAC 6 Clicks Basic Mobility (PT)  -RM               User Key  (r) = Recorded By, (t) = Taken By, (c) = Cosigned By      Initials Name Provider Type    Kunal Christianson, PTA Physical Therapist Assistant    Martha Turner RN Registered Nurse    Aaliyah Keyes RNA Registered Nurse                                 Physical Therapy Education       Title: PT OT SLP Therapies (Done)       Topic: Physical Therapy (Done)       Point: Mobility training (Done)       Learning Progress Summary            Patient  Acceptance, E,TB,D, VU,NR by  at 1/14/2025 1252    Acceptance, E, VU by MS at 1/13/2025 1546    Comment: importance of mobility    Acceptance, E,TB,D, VU,NR by  at 1/10/2025 1325    Acceptance, E,TB,D, VU,NR by  at 1/8/2025 1514    Acceptance, E, VU by SJ at 1/3/2025 1931    Acceptance, E, VU by SJ at 1/3/2025 1300    Acceptance, E, VU by MS at 1/2/2025 1303    Comment: importance of mobility   Family Acceptance, E, VU by SJ at 1/3/2025 1300                      Point: Home exercise program (Done)       Learning Progress Summary            Patient Acceptance, E, VU by MS at 1/13/2025 1546    Comment: importance of mobility    Acceptance, E,TB, VU by CC at 1/12/2025 1844    Acceptance, E,TB, VU by CC at 1/6/2025 1529    Acceptance, E, VU by SJ at 1/3/2025 1931    Acceptance, E, VU by SJ at 1/3/2025 1300    Acceptance, E, VU by MS at 1/2/2025 1303    Comment: importance of mobility   Family Acceptance, E, VU by SJ at 1/3/2025 1300                      Point: Body mechanics (Done)       Learning Progress Summary            Patient Acceptance, E, VU by  at 1/3/2025 1931    Acceptance, E, VU by SJ at 1/3/2025 1300   Family Acceptance, E, VU by SJ at 1/3/2025 1300                      Point: Precautions (Done)       Learning Progress Summary            Patient Acceptance, E, VU by  at 1/3/2025 1931    Acceptance, E, VU by  at 1/3/2025 1300   Family Acceptance, E, VU by  at 1/3/2025 1300                                      User Key       Initials Effective Dates Name Provider Type Discipline    CC 06/16/21 -  Karis Henderson, PTA Physical Therapist Assistant PT     06/16/21 -  Kunal Harper PTA Physical Therapist Assistant PT    MS 08/22/23 -  Shubham Tierney, RADHA Physical Therapist PT     09/30/24 -  Kya Jason, RN Registered Nurse Nurse                  PT Recommendation and Plan     Progress: improving  Outcome Evaluation: Pt supine in bed and willing to participate with treatment.  Pt  performed bed mobility, transfers, gait training and static/dynamic standing balance.   See flowsheet for details. Cont PT per POC progressing to goals as pt tolerates.     Time Calculation:         PT Charges       Row Name 01/14/25 1252             Time Calculation    Start Time 1128  -RM      Stop Time 1158  -RM      Time Calculation (min) 30 min  -RM      PT Received On 01/14/25  -RM      PT Goal Re-Cert Due Date 01/23/25  -RM         Time Calculation- PT    Total Timed Code Minutes- PT 30 minute(s)  -RM         Timed Charges    04571 - Gait Training Minutes  15  -RM      47036 - PT Therapeutic Activity Minutes 15  -RM         Total Minutes    Timed Charges Total Minutes 30  -RM       Total Minutes 30  -RM                User Key  (r) = Recorded By, (t) = Taken By, (c) = Cosigned By      Initials Name Provider Type    Kunal Christianson, VAL Physical Therapist Assistant                  Therapy Charges for Today       Code Description Service Date Service Provider Modifiers Qty    98993956605 HC GAIT TRAINING EA 15 MIN 1/14/2025 Kunal Harper, PTA GP 1    64702401616 HC PT THERAPEUTIC ACT EA 15 MIN 1/14/2025 Kunal Harper, PTA GP 1            PT G-Codes  Outcome Measure Options: AM-PAC 6 Clicks Basic Mobility (PT)  AM-PAC 6 Clicks Score (PT): 17  AM-PAC 6 Clicks Score (OT): 15       Kunal Harper PTA  1/14/2025

## 2025-01-14 NOTE — DISCHARGE PLACEMENT REQUEST
"STR referral     Katey Browne (79 y.o. Male)       Date of Birth   1945    Social Security Number       Address   [address] [facility] [address] [address]    Home Phone   [phone]    MRN   [id number]       Jewish   None    Marital Status                               Admission Date   1/1/25    Admission Type   Emergency    Admitting Provider   Kerley, Brian Joseph, DO    Attending Provider   Robert Clemente DO    Department, Room/Bed   [facility] TELEMETRY 3, 313/1       Discharge Date       Discharge Disposition       Discharge Destination                                 Attending Provider: Robert Clemente DO    Allergies: Asa [Aspirin]    Isolation: None   Infection: None   Code Status: CPR    Ht: 185.4 cm (73\")   Wt: 96.3 kg (212 lb 4.9 oz)    Admission Cmt: None   Principal Problem: AMS (altered mental status) [R41.82]                   Active Insurance as of 1/1/2025       Primary Coverage       Payor Plan Insurance Group Employer/Plan Group    ANTHEM MEDICARE REPLACEMENT ANTHEM MED ADV SNP [id number]       Payor Plan Address Payor Plan Phone Number Payor Plan Fax Number Effective Dates    [address] [phone]  1/1/2024 - None Entered    [facility] [id number]         Subscriber Name Subscriber Birth Date Member ID       KATEY BROWNE 1945 [id number]               Secondary Coverage       Payor Plan Insurance Group Employer/Plan Group    [id number]        Payor Plan Address Payor Plan Phone Number Payor Plan Fax Number Effective Dates    [address]   4/7/2023 - None Entered    [facility] [id number]         Subscriber Name Subscriber Birth Date Member ID       KATEY BROWNE 1945 [id number]                     Emergency Contacts        (Rel.) Home Phone Work Phone Mobile Phone    MAC BROWNE/ZITA (Son) [phone] -- [phone]                 History & Physical        Kerley, Brian Joseph, DO at 01/01/25 " 1727            [facility]   HISTORY AND PHYSICAL      Name:  Hardik Natarajan   Age:  79 y.o.  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  Presented to [facility] ED on 2025 with concern for altered mental status.  Last known normal was night prior to presentation.  Also reported having a fall, unclear if it was syncope.  Wife found him on the floor.  On Plavix, no other anticoagulation.  He denied any fevers or chills, chest pain, shortness of air, any other specific concern.  Denied any slurred speech, difficulty moving upper or lower extremities, and any in symmetry.    ED summary: Afebrile, vital signs stable on room air.  ABG pH 7.44, pCO2 34, pO2 82, bicarb 24.  EKG sinus tachycardia rate 107, nonspecific ST-T wave changes.  Opponent 23, ACS not suspected.  Creatinine 1.34, lactate elevated, procalcitonin not elevated.  UDS negative.  Blood cultures collected.  COVID/flu/RSV negative.  CT abdomen/pelvis large stone in urinary bladder, wall thickening probable diverticula, abnormal attenuation of the root of the mesentery most suggestive of mesenteric panniculitis.  CT head no intracranial hemorrhage or evidence of acute large cortical infarct, stable atrophy and chronic findings, left mastoiditis and left otitis media with bone destruction of the left mastoid air cells, right maxillary sinusitis.  CT cervical spine no acute fracture, multilevel degenerative disc disease.    Review Of Systems:    All systems were  reviewed and negative except as mentioned in history of presenting illness, assessment and plan.    Past Medical History: Patient  has a past medical history of Anemia, Arthritis, and Cancer.    Past Surgical History: Patient  has a past surgical history that includes Skin cancer excision; Neck dissection (N/A); Parotidectomy (N/A); and NAIL BIOPSY (N/A).    Social History: Patient  reports that he has never smoked. His smokeless tobacco use includes chew. He reports that he does not currently use alcohol. He reports that he does not use drugs.    Family History:  Patient's family history has been reviewed and found to be noncontributory.     Allergies:      Asa [aspirin]    Home Medications:    Prior to Admission Medications       Prescriptions Last Dose Informant Patient Reported? Taking?    acetaminophen (TYLENOL) 325 MG tablet   Yes No    Take 2 tablets by mouth Every 6 (Six) Hours As Needed.    clopidogrel (PLAVIX) 75 MG tablet   Yes No    Take 1 tablet by mouth Daily.    cyanocobalamin 1000 MCG/ML injection   Yes No    Inject 0.1 mL into the appropriate muscle as directed by prescriber Every 30 (Thirty) Days.    diclofenac (VOLTAREN) 75 MG EC tablet   Yes No    DULoxetine (CYMBALTA) 30 MG capsule   Yes No    Take 1 capsule by mouth Daily.    finasteride (PROSCAR) 5 MG tablet   Yes No    Take 1 tablet by mouth Daily.    Hydrocortisone, Perianal, (ANUSOL-HC) 2.5 % rectal cream   Yes No    Insert 1 Application into the rectum.    hydrOXYzine (ATARAX) 25 MG tablet   No No    Take 1 tablet by mouth Every 6 (Six) Hours As Needed for Itching.    levothyroxine (SYNTHROID, LEVOTHROID) 75 MCG tablet   No No    TAKE ONE (1) TABLET BY MOUTH EVERY DAY    ondansetron (ZOFRAN) 8 MG tablet   No No    Take 1 tablet by mouth 3 (Three) Times a Day As Needed for Nausea or Vomiting.    ondansetron ODT (ZOFRAN-ODT) 8 MG disintegrating tablet   Yes No    Place 1 tablet twice a day by translingual route.    oxyCODONE (Roxicodone) 5  "MG immediate release tablet   No No    Take 1 tablet by mouth Every 6 (Six) Hours As Needed for Severe Pain.    Procto-Med HC 2.5 % rectal cream   Yes No    1 Application Daily As Needed.    senna (SENOKOT) 8.6 MG tablet   Yes No    Take 1 tablet by mouth Daily As Needed.    tamsulosin (FLOMAX) 0.4 MG capsule 24 hr capsule   Yes No    Take 1 capsule by mouth.          ED Medications:    Medications   sodium chloride 0.9 % bolus 1,000 mL (1,000 mL Intravenous New Bag 1/1/25 1722)   iopamidol (ISOVUE-300) 61 % injection 100 mL (100 mL Intravenous Given 1/1/25 1427)   sodium chloride 0.9 % bolus 1,000 mL (1,000 mL Intravenous New Bag 1/1/25 1557)     Vital Signs:  Temp:  [98 °F (36.7 °C)] 98 °F (36.7 °C)  Heart Rate:  [] 89  Resp:  [18] 18  BP: ()/(55-99) 94/68        01/01/25  1149   Weight: 102 kg (225 lb)     Body mass index is 29.69 kg/m².    Physical Exam:     Most recent vital Signs: BP 94/68   Pulse 89   Temp 98 °F (36.7 °C) (Oral)   Resp 18   Ht 185.4 cm (73\")   Wt 102 kg (225 lb)   SpO2 96%   BMI 29.69 kg/m²     Physical Exam  Constitutional:       General: He is not in acute distress.     Appearance: He is ill-appearing. He is not toxic-appearing.   HENT:      Mouth/Throat:      Mouth: Mucous membranes are moist.   Eyes:      Extraocular Movements: Extraocular movements intact.   Cardiovascular:      Rate and Rhythm: Normal rate and regular rhythm.      Pulses: Normal pulses.      Heart sounds: Normal heart sounds.   Pulmonary:      Effort: Pulmonary effort is normal.      Breath sounds: Normal breath sounds.   Abdominal:      Palpations: Abdomen is soft.      Tenderness: There is no abdominal tenderness.   Musculoskeletal:      Right lower leg: No edema.      Left lower leg: No edema.   Skin:     General: Skin is warm.   Neurological:      General: No focal deficit present.      Mental Status: He is alert.      Comments: Oriented to self   Psychiatric:      Comments: Agitated "         Laboratory data:    I have reviewed the labs done in the emergency room.    Results from last 7 days   Lab Units 01/01/25  1221   SODIUM mmol/L 136   POTASSIUM mmol/L 4.2   CHLORIDE mmol/L 101   CO2 mmol/L 23.4   BUN mg/dL 9   CREATININE mg/dL 1.34*   CALCIUM mg/dL 8.9   BILIRUBIN mg/dL 1.1   ALK PHOS U/L 51   ALT (SGPT) U/L 13   AST (SGOT) U/L 23   GLUCOSE mg/dL 99     Results from last 7 days   Lab Units 01/01/25  1221   WBC 10*3/mm3 4.84   HEMOGLOBIN g/dL 11.6*   HEMATOCRIT % 34.0*   PLATELETS 10*3/mm3 141         Results from last 7 days   Lab Units 01/01/25  1406 01/01/25  1221   CK TOTAL U/L  --  216*   HSTROP T ng/L 23* 24*                 Results from last 7 days   Lab Units 01/01/25  1555   PH, ARTERIAL pH units 7.455   PO2 ART mm Hg 82.6   PCO2, ARTERIAL mm Hg 34.2*   HCO3 ART mmol/L 24.0     Results from last 7 days   Lab Units 01/01/25  1418   COLOR UA  Yellow   GLUCOSE UA  Negative   KETONES UA  Negative   BLOOD UA  Negative   LEUKOCYTES UA  Negative   PH, URINE  7.0   BILIRUBIN UA  Negative   UROBILINOGEN UA  1.0 E.U./dL       Pain Management Panel           No data to display                    Radiology:    CT Abdomen Pelvis With Contrast    Result Date: 1/1/2025  CT ABDOMEN AND PELVIS WITH CONTRAST  INDICATION: Possible bladder stone on x-ray.  TECHNIQUE: Thin section axial images were obtained from the lung bases through the pubic symphysis after oral and intravenous contrast. Coronal reconstruction images were obtained from the axial data.  COMPARISON: None.  FINDINGS:  Lower chest:  Lung bases are clear.  Liver:  Homogeneous.  No focal lesion.  Gallbladder/biliary system:  Gallbladder is present.   No intrahepatic or extrahepatic biliary dilatation.  Spleen:  Unremarkable.  Adrenal glands:  Unremarkable. No nodules.  Pancreas: Evaluation limited by motion artifact. No convincing mass or evidence of pancreatitis.  Kidneys/ureters/bladder:  No hydronephrosis, solid mass or perinephric  stranding. A large calcification in the urinary bladder is consistent with a bladder stone. This measures 23 mm. There is also irregularity of the bladder wall which could be related to chronic cystitis. Bladder diverticula are not excluded near the dome.  GI tract: No evidence of small bowel obstruction or focal small bowel wall thickening. Normal appendix. No acute colon abnormality.  Pelvic organs: Mild enlargement of the prostate.  Lymph nodes/mesentery/retroperitoneum: There is abnormal attenuation in the root of the mesentery with small lymph nodes. 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  Abdominal wall: Abdominal wall intact.  Vascular: No acute vascular abnormality.  Free fluid: No free fluid.  Bones: No acute osseous abnormality.      1. Large stone in the urinary bladder. 2. Wall thickening of the urinary bladder with probable diverticula. This is likely related to chronic cystitis and possibly mild chronic outlet obstruction. 3. 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      CTDI: 9.79 mGy DLP:552.61 mGy.cm       This report was signed and finalized on 1/1/2025 3:15 PM by Carmen Le MD.      CT Cervical Spine Without Contrast    Result Date: 1/1/2025  PROCEDURE: CT CERVICAL SPINE WO CONTRAST-  INDICATION: fall this morning  TECHNIQUE: Thin section axial images were obtained through the cervical spine without contrast.  Coronal and sagittal reconstruction images were obtained from the axial data.  COMPARISON: None.  FINDINGS: Motion artifact limits exam quality. There is no convincing acute fracture. No facet lock. The craniocervical junction appears intact.   There is multilevel degenerative disc disease.   No acute paraspinal abnormality.      No acute fracture within the limitations of the exam. There is motion artifact.  Multilevel degenerative disc disease.  Please see CT head report regarding findings of the left  mastoid air cells where there is mastoiditis and bone destruction.        CTDI: 16.93 mGy DLP:837.44 mGy.cm     This study was performed with techniques to keep radiation doses as low as reasonably achievable (ALARA). Individualized dose reduction techniques using automated exposure control or adjustment of mA and/or kV according to the patient size were employed.   This report was signed and finalized on 1/1/2025 1:22 PM by Carmen Le MD.      CT Head Without Contrast    Result Date: 1/1/2025  PROCEDURE: CT HEAD WO CONTRAST-  INDICATION: AMS  TECHNIQUE: Multiple axial CT images were performed from the foramen magnum to the vertex without contrast.   Coronal reconstruction images were obtained from the axial data.  COMPARISON: 8/2/2024.  FINDINGS: There is no mass effect or midline shift. There is stable atrophy with ventricular enlargement. There are stable bilateral periventricular hypodensities. There is no intracranial hemorrhage. The posterior fossa is without acute abnormality. There is mucoperiosteal thickening of the right maxillary sinus. There is opacification of the left mastoid air cells with bone destruction. There is soft tissue density in the left middle ear cavity. This was present on the prior exam. No skull fracture.      1. No intracranial hemorrhage or evidence of acute large cortical infarct. 2. Stable atrophy and chronic findings. 3. Left mastoiditis and left otitis media with bone destruction of the left mastoid air cells. 4. Right maxillary sinusitis.         CTDI: 16.93 mGy DLP:837.44 mGy.cm    This study was performed with techniques to keep radiation doses as low as reasonably achievable (ALARA). Individualized dose reduction techniques using automated exposure control or adjustment of mA and/or kV according to the patient size were employed.   This report was signed and finalized on 1/1/2025 1:19 PM by Carmen Le MD.      XR Pelvis 1 or 2 View    Result Date: 1/1/2025  PROCEDURE:  XR PELVIS 1 OR 2 VW-  HISTORY: fall, AMS  COMPARISON: None.  FINDINGS: An AP view of the pelvis was obtained.  There is no acute osseous abnormality of the pelvis. There is degenerative joint disease of the hips bilaterally. There is a round radiodensity projecting over the midline pelvis. This could be a bladder stone or artifact in or on the patient's clothing. Soft tissues are otherwise without acute abnormality.      No acute osseous abnormality of the pelvis.  Round radiodensity projecting over the midline pelvis as above.        This report was signed and finalized on 1/1/2025 1:15 PM by Carmen Le MD.      XR Chest 1 View    Result Date: 1/1/2025  PROCEDURE: XR CHEST 1 VW-  INDICATION:  fall, AMS  FINDINGS:  A portable view of the chest was obtained.  Comparison is made to a prior exam dated 8/2/2024.   Heart size is normal. Lung volumes are very low. Right perihilar and bibasilar opacities may be atelectasis. Pneumonia is not excluded. No significant pleural effusion or pneumothorax.      Exam limited by low lung volumes. Probable bilateral atelectasis.   This report was signed and finalized on 1/1/2025 1:14 PM by Carmen Le MD.       Assessment/Plan:    Observation general floor admission 1/1/2025 with altered mental status in the setting of known invasive stage IV left ear squamous cell carcinoma with bony mets.    Altered mental status  Left mastoiditis, left otitis media with bone destruction of left mastoid air cells  Neurology consultation, recreations appreciated.  MRI brain.  EEG.   No seizure-like activity reported or witnessed, no syncope.  Differential could include stroke, brain mets.  CT showed stable chronic findings; left mastoiditis and left otitis media with bone destruction of the left mastoid air cells.  Fall precautions.    Chronic: Left ear squamous cell carcinoma stage IV on palliative treatment only, cancer pain, depression, BPH, history of CVA with left upper extremity  weakness and numbness, treatment related hypothyroidism.  Continue home medications.    Risk Assessment: High  DVT Prophylaxis: Heparin  Code Status: Full code  Diet: Cardiac    Advance Care Planning  ACP discussion was held with the patient during this visit. Patient does not have an advance directive, information provided.           Brian Joseph Kerley, DO  01/01/25  17:27 EST    Dictated utilizing Dragon dictation.    Electronically signed by Kerley, Brian Joseph, DO at 01/01/25 4950       Current Facility-Administered Medications   Medication Dose Route Frequency Provider Last Rate Last Admin    acetaminophen (TYLENOL) tablet 650 mg  650 mg Oral Q6H PRN Dionna Espitia DO        Calcium Replacement - Follow Nurse / BPA Driven Protocol   Not Applicable PRN Kerley, Brian Joseph, DO        clopidogrel (PLAVIX) tablet 75 mg  75 mg Oral Daily Kerley, Brian Joseph, DO   75 mg at 01/14/25 0935    [START ON 1/16/2025] cyanocobalamin injection 1,000 mcg  1,000 mcg Intramuscular Q7 Days Kerley, Brian Joseph, DO        divalproex (DEPAKOTE ER) 24 hr tablet 250 mg  250 mg Oral Daily Dionna Espitia DO   250 mg at 01/14/25 0935    DULoxetine (CYMBALTA) DR capsule 30 mg  30 mg Oral Daily Kerley, Brian Joseph, DO   30 mg at 01/14/25 0935    heparin (porcine) 5000 UNIT/ML injection 5,000 Units  5,000 Units Subcutaneous Q12H Kerley, Brian Joseph, DO   5,000 Units at 01/14/25 0935    lactulose (CHRONULAC) 10 GM/15ML solution 10 g  10 g Oral BID PRN Dionna Espitia DO        levothyroxine (SYNTHROID, LEVOTHROID) tablet 75 mcg  75 mcg Oral Q AM Kerley, Brian Joseph, DO   75 mcg at 01/14/25 0631    Magnesium Standard Dose Replacement - Follow Nurse / BPA Driven Protocol   Not Applicable PRN Kerley, Brian Joseph, DO        nitroglycerin (NITROSTAT) SL tablet 0.4 mg  0.4 mg Sublingual Q5 Min PRN Kerley, Brian Joseph, DO        ondansetron (ZOFRAN) injection 4 mg  4 mg Intravenous Q6H PRN Kerley, Brian Joseph,  "DO        oxyCODONE (ROXICODONE) immediate release tablet 5 mg  5 mg Oral Q6H PRN Kerley, Brian Joseph, DO   5 mg at 25    Phosphorus Replacement - Follow Nurse / BPA Driven Protocol   Not Applicable PRN Kerley, Brian Joseph, DO        polyethylene glycol (MIRALAX) packet 17 g  17 g Oral Daily Dionna Espitia DO   17 g at 25 0935    Potassium Replacement - Follow Nurse / BPA Driven Protocol   Not Applicable PRN Kerley, Brian Joseph, DO        senna (SENOKOT) tablet 1 tablet  1 tablet Oral Nightly Kerley, Brian Joseph, DO   1 tablet at 25    sodium chloride 0.9 % flush 10 mL  10 mL Intravenous PRN Kerley, Brian Joseph, DO        [Held by provider] sodium chloride 0.9 % infusion 40 mL  40 mL Intravenous PRN Kerley, Brian Joseph, DO        tamsulosin (FLOMAX) 24 hr capsule 0.4 mg  0.4 mg Oral Daily Kerley, Brian Joseph, DO   0.4 mg at 25    ziprasidone (GEODON) capsule 40 mg  40 mg Oral Nightly Dionna Espitia,    40 mg at 25 215        Physician Progress Notes (most recent note)        Dionna Espitia DO at 25 1012              [facility]    [facility]    Name:  Hardik Natarajan   Age:  79 y.o.  Sex:  male  :  1945  MRN:  [id number]   Visit Number:  [id number]  Admission Date:  2025  Date Of Service:  25  Primary Care Physician:  Won Cuellar MD     LOS: 10 days :    Chief Complaint:      Follow-up; altered mental status    Subjective:    Patient overall stable.  Labs showing some dehydration, and will give some fluids today.  Will have nursing ensure her oral intake.  He was somewhat groggy but able to answer simple questions.    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  Presented to [facility] ED on 1/1/2025 with concern for altered mental status.  Last known normal was night prior to presentation.  Also reported having a fall, unclear if it was syncope.  Wife found him on the floor.  On Plavix, no other anticoagulation.  He denied any fevers or chills, chest pain, shortness of air, any other specific concern.  Denied any slurred speech, difficulty moving upper or lower extremities, and any in symmetry.     ED summary: Afebrile, vital signs stable on room air.  ABG pH 7.44, pCO2 34, pO2 82, bicarb 24.  EKG sinus tachycardia rate 107, nonspecific ST-T wave changes.  Opponent 23, ACS not suspected.  Creatinine 1.34, lactate elevated, procalcitonin not elevated.  UDS negative.  Blood cultures collected.  COVID/flu/RSV negative.  CT abdomen/pelvis large stone in urinary bladder, wall thickening probable diverticula, abnormal attenuation of the root of the mesentery most suggestive of mesenteric panniculitis.  CT head no intracranial hemorrhage or evidence of acute large cortical infarct, stable atrophy and chronic findings, left mastoiditis and left otitis media with bone destruction of the left mastoid air cells, right maxillary sinusitis.  CT cervical spine no acute fracture, multilevel degenerative disc disease.\"    Appears patient has had ongoing mental status changes throughout hospital stay.  Has been seen by neurology.  Underwent MRI of the brain with no new stroke, there was evidence of chronic changes, possible mastoiditis.  No seizure-like activity.  Was given Geodon.  B12 is being dosed due to low B12 levels.  Was on prescription for Augmentin as well due to possible mastoiditis.    Patient will need placement    Review of Systems:     All systems were reviewed and negative except as mentioned in subjective, assessment and plan.    Vital Signs:    Temp:  [96.8 °F (36 °C)-97.3 °F (36.3 °C)] 96.8 °F (36 °C)  Heart Rate:  [] 76  Resp: "  [18-20] 20  BP: (110-118)/(83-85) 118/83    Intake and output:    I/O last 3 completed shifts:  In: 460 [P.O.:460]  Out: 200 [Urine:200]  I/O this shift:  In: 240 [P.O.:240]  Out: -     Physical Examination:    General Appearance:  Awake and alert   Head:  There is a lesion surrounding the left ear, left side of the head, particularly in the auricular area.  No drainage.   Eyes: Conjunctivae and sclerae normal, no icterus. No pallor.   Throat: No oral lesions, no thrush, oral mucosa moist.   Neck: Supple, trachea midline, no thyromegaly.   Lungs:   Breath sounds heard bilaterally equally.  No wheezing or crackles. No Pleural rub or bronchial breathing.   Heart:  Normal S1 and S2, no murmur, no gallop, no rub. No JVD.   Abdomen:   Normal bowel sounds, no masses, no organomegaly. Soft, nontender, nondistended, no rebound tenderness.   Extremities: Supple, no edema, no cyanosis, no clubbing.   Skin: Warm   Neurologic: Hard of hearing, awake and alert, appears improved, stable condition, no significant change     Laboratory results:    Results from last 7 days   Lab Units 01/12/25  0812   SODIUM mmol/L 138   POTASSIUM mmol/L 4.6   CHLORIDE mmol/L 101   CO2 mmol/L 28.3   BUN mg/dL 23   CREATININE mg/dL 1.63*   CALCIUM mg/dL 9.4   GLUCOSE mg/dL 98     Results from last 7 days   Lab Units 01/12/25  0812   WBC 10*3/mm3 2.76*   HEMOGLOBIN g/dL 11.7*   HEMATOCRIT % 35.2*   PLATELETS 10*3/mm3 202                       Recent Labs     01/01/25  1555   PHART 7.455   TVX1BLL 34.2*   PO2ART 82.6   MOI2KFN 24.0   BASEEXCESS 0.4      I have reviewed the patient's laboratory results.    Radiology results:    No radiology results from the last 24 hrs  I have reviewed the patient's radiology reports.    Medication Review:     I have reviewed the patient's active and prn medications.     Problem List:      AMS (altered mental status)    Altered mental status      Assessment/Plan:     Confusion/agitation:  Patient has extensive workup  in regards to his confusion.  He is MRI was showing chronic infarcts, there was possible mastoiditis he completed a course of Augmentin.  He had been on B12 injections due to low B12 noted.    Patient most likely does have evidence of vascular dementia.  He has been started on oral Geodon for the last week and is doing well, no further restlessness or issues at night.  He is on low-dose Depakote.  We are currently awaiting placement.    Left upper extremity weakness/hypothyroidism/cancer pain:  Complicates all aspects of care.    Patient's mental status is closer to baseline, this may be his new baseline.  No issues with agitation or restlessness in a week.    Talked with family, Yobany, over the phone today on 2025     Risk Assessment: High  DVT Prophylaxis: Heparin  Code Status: Full code  Diet: Cardiac  Discharge Plan: LTC      Dionna Espitia DO  25  10:12 EST          Electronically signed by Dionna Espitia DO at 25 1013          Physical Therapy Notes (most recent note)        Shubham Tierney, PT at 25 1548  Version 1 of 1         Patient Name: Hardik Natarajan  : 1945    MRN: [id number]                              Today's Date: 2025       Admit Date: 2025    Visit Dx:     ICD-10-CM ICD-9-CM   1. Altered mental status, unspecified altered mental status type  R41.82 780.97   2. Bladder stone  N21.0 594.1   3. 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    Anemia, unspecified    Nicotine dependence, chewing tobacco, uncomplicated    Neoplasm related pain (acute) (chronic)    Unspecified mastoiditis, left ear    Cellulitis of left external ear    Phlebitis and thrombophlebitis of unspecified site    Gangrene, not elsewhere classified    Acute osteomyelitis    Benign prostatic hyperplasia without lower  urinary tract symptoms    Pain    Palliative care by specialist    Encounter for therapeutic drug monitoring    Weakness of left upper extremity    AMS (altered mental status)    Altered mental status     Past Medical History:   Diagnosis Date    Anemia     Arthritis     Cancer      Past Surgical History:   Procedure Laterality Date    NAIL BIOPSY N/A     NECK DISSECTION N/A     PAROTIDECTOMY N/A     SKIN CANCER EXCISION        General Information       Row Name 01/13/25 1537          Physical Therapy Time and Intention    Document Type re-evaluation  -MS     Mode of Treatment physical therapy  -MS       Row Name 01/13/25 1537          General Information    Patient Profile Reviewed yes  -MS     Prior Level of Function independent:;all household mobility;community mobility  -MS     Existing Precautions/Restrictions fall  -MS     Barriers to Rehab medically complex;previous functional deficit;cognitive status;hearing deficit  -MS       Row Name 01/13/25 1537          Living Environment    People in Home spouse  -MS       Row Name 01/13/25 1537          Home Main Entrance    Number of Stairs, Main Entrance none  -MS       Row Name 01/13/25 1537          Stairs Within Home, Primary    Number of Stairs, Within Home, Primary none  -MS       Row Name 01/13/25 1537          Cognition    Orientation Status (Cognition) oriented to;person;verbal cues/prompts needed for orientation  -MS       Row Name 01/13/25 1537          Safety Issues/Impairments Affecting Functional Mobility    Safety Issues Affecting Function (Mobility) insight into deficits/self-awareness;safety precautions follow-through/compliance;positioning of assistive device;awareness of need for assistance;safety precaution awareness;sequencing abilities  -MS     Impairments Affecting Function (Mobility) balance;cognition;endurance/activity tolerance;motor control;motor planning;strength;range of motion (ROM)  -MS     Cognitive Impairments, Mobility  Safety/Performance awareness, need for assistance;safety precaution awareness;safety precaution follow-through  -MS               User Key  (r) = Recorded By, (t) = Taken By, (c) = Cosigned By      Initials Name Provider Type    Shubham Banda PT Physical Therapist                   Mobility       Row Name 01/13/25 1539          Bed Mobility    Bed Mobility supine-sit  -MS     Supine-Sit [place] (Bed Mobility) modified independence  -MS     Assistive Device (Bed Mobility) bed rails;head of bed elevated  -MS       Row Name 01/13/25 1539          Sit-Stand Transfer    Sit-Stand [place] (Transfers) minimum assist (75% patient effort)  -MS     Assistive Device (Sit-Stand Transfers) walker, front-wheeled  -MS       Row Name 01/13/25 1539          Gait/Stairs (Locomotion)    [place] Level (Gait) minimum assist (75% patient effort);2 person assist;verbal cues;nonverbal cues (demo/gesture)  -MS     Assistive Device (Gait) walker, front-wheeled  -MS     Patient was able to Ambulate yes  -MS     Distance in Feet (Gait) 40  -MS     Deviations/Abnormal Patterns (Gait) base of support, wide;stride length decreased  assist with manipulating Rwx  -MS     Bilateral Gait Deviations forward flexed posture;heel strike decreased  -MS               User Key  (r) = Recorded By, (t) = Taken By, (c) = Cosigned By      Initials Name Provider Type    Shubham Banda PT Physical Therapist                   Obj/Interventions       Row Name 01/13/25 1543          Range of Motion Comprehensive    General Range of Motion bilateral lower extremity ROM WFL  -MS       Row Name 01/13/25 1542          Strength Comprehensive (MMT)    General Manual Muscle Testing (MMT) Assessment lower extremity strength deficits identified  -MS     Comment, General Manual Muscle Testing (MMT) Assessment B LE 4-/5  -MS               User Key  (r) = Recorded By, (t) = Taken By, (c) = Cosigned By      Initials Name Provider Type    MS Tierney  Shubham, PT Physical Therapist                   Goals/Plan       Row Name 01/13/25 1545          Bed Mobility Goal 1 (PT)    Progress/Outcomes (Bed Mobility Goal 1, PT) goal met  -MS       Row Name 01/13/25 1545          Transfer Goal 1 (PT)    Activity/Assistive Device (Transfer Goal 1, PT) transfers, all;sit-to-stand/stand-to-sit  -MS     [place] Level/Cues Needed (Transfer Goal 1, PT) modified independence  -MS     Time Frame (Transfer Goal 1, PT) long term goal (LTG);2 weeks  -MS     Progress/Outcome (Transfer Goal 1, PT) continuing progress toward goal  -MS       Row Name 01/13/25 1545          Gait Training Goal 1 (PT)    Activity/Assistive Device (Gait Training Goal 1, PT) gait (walking locomotion);assistive device use  -MS     [place] Level (Gait Training Goal 1, PT) standby assist  -MS     Distance (Gait Training Goal 1, PT) 50ft  -MS     Time Frame (Gait Training Goal 1, PT) short term goal (STG);10 days  -MS     Progress/Outcome (Gait Training Goal 1, PT) good progress toward goal  -MS       Row Name 01/13/25 1545          Patient Education Goal (PT)    Activity (Patient Education Goal, PT) ther exer x15 reps ea  -MS     [place]/Cues/Accuracy (Memory Goal 2, PT) demonstrates adequately  -MS     Time Frame (Patient Education Goal, PT) short term goal (STG);10 days  -MS     Progress/Outcome (Patient Education Goal, PT) good progress toward goal  -MS       Row Name 01/13/25 1545          Therapy Assessment/Plan (PT)    Planned Therapy Interventions (PT) balance training;bed mobility training;gait training;ROM (range of motion);stair training;neuromuscular re-education;home exercise program;patient/family education;transfer training;strengthening  -MS               User Key  (r) = Recorded By, (t) = Taken By, (c) = Cosigned By      Initials Name Provider Type    Shubham Banda, PT Physical Therapist                   Clinical Impression       Row Name 01/13/25 1543          Pain     Pretreatment Pain Rating 0/10 - no pain  -MS     Posttreatment Pain Rating 0/10 - no pain  -MS       Row Name 01/13/25 1543          Plan of Care Review    Plan of Care Reviewed With patient  -MS     Progress improving  -MS     Outcome Evaluation Pt participated in PT re-assessment today. Pt was MI for sup- sit t/f. Pt ambulated 40ft but required frequent directiosn and cues for use of Rwx. Pt demonstrated B LE weakness. Pt would continue to benefit from skilled PT tx and STR at d/c.  -MS       Row Name 01/13/25 1543          Therapy Assessment/Plan (PT)    Rehab Potential (PT) good  -MS     Criteria for Skilled Interventions Met (PT) yes;meets criteria  -MS     Therapy Frequency (PT) 5 times/wk  -MS       Row Name 01/13/25 1543          Vital Signs    O2 Delivery Pre Treatment room air  -MS     O2 Delivery Intra Treatment room air  -MS     O2 Delivery Post Treatment room air  -MS     Pre Patient Position Supine  -MS     Intra Patient Position Standing  -MS     Post Patient Position Sitting  -MS       Row Name 01/13/25 1543          Positioning and Restraints    Pre-Treatment Position in bed  -MS     Post Treatment Position chair  -MS     In Chair reclined;call light within reach;encouraged to call for assist;exit alarm on  -MS               User Key  (r) = Recorded By, (t) = Taken By, (c) = Cosigned By      Initials Name Provider Type    Shubham Banda, PT Physical Therapist                   Outcome Measures       Row Name 01/13/25 1545 01/13/25 0848       How much help from another person do you currently need...    Turning from your back to your side while in flat bed without using bedrails? 3  -MS 3  -BW    Moving from lying on back to sitting on the side of a flat bed without bedrails? 3  -MS 3  -BW    Moving to and from a bed to a chair (including a wheelchair)? 3  -MS 3  -BW    Standing up from a chair using your arms (e.g., wheelchair, bedside chair)? 3  -MS 3  -BW    Climbing 3-5 steps with a railing?  2  -MS 2  -BW    To walk in hospital room? 2  -MS 3  -BW    AM-PAC 6 Clicks Score (PT) 16  -MS 17  -BW    Highest Level of Mobility Goal 5 --> Static standing  -MS 5 --> Static standing  -BW              User Key  (r) = Recorded By, (t) = Taken By, (c) = Cosigned By      Initials Name Provider Type    Shubham Banda, PT Physical Therapist    Martha Turner, RN Registered Nurse                                 Physical Therapy Education       Title: PT OT SLP Therapies (Done)       Topic: Physical Therapy (Done)       Point: Mobility training (Done)       Learning Progress Summary            Patient Acceptance, E, VU by MS at 1/13/2025 1546    Comment: importance of mobility    Acceptance, E,TB,D, VU,NR by  at 1/10/2025 1325    Acceptance, E,TB,D, VU,NR by  at 1/8/2025 1514    Acceptance, E, VU by  at 1/3/2025 1931    Acceptance, E, VU by  at 1/3/2025 1300    Acceptance, E, VU by MS at 1/2/2025 1303    Comment: importance of mobility   Family Acceptance, E, VU by  at 1/3/2025 1300                      Point: Home exercise program (Done)       Learning Progress Summary            Patient Acceptance, E, VU by MS at 1/13/2025 1546    Comment: importance of mobility    Acceptance, E,TB, VU by  at 1/12/2025 1844    Acceptance, E,TB, VU by  at 1/6/2025 1529    Acceptance, E, VU by  at 1/3/2025 1931    Acceptance, E, VU by  at 1/3/2025 1300    Acceptance, E, VU by MS at 1/2/2025 1303    Comment: importance of mobility   Family Acceptance, E, VU by  at 1/3/2025 1300                      Point: Body mechanics (Done)       Learning Progress Summary            Patient Acceptance, E, VU by  at 1/3/2025 1931    Acceptance, E, VU by  at 1/3/2025 1300   Family Acceptance, E, VU by  at 1/3/2025 1300                      Point: Precautions (Done)       Learning Progress Summary            Patient Acceptance, E, VU by  at 1/3/2025 1931    Acceptance, E, VU by  at 1/3/2025 1300   Family  Acceptance, E, VU by  at 1/3/2025 1300                                      User Key       Initials Effective Dates Name Provider Type Discipline    CC 06/16/21 -  Karis Henderson, PTA Physical Therapist Assistant PT     06/16/21 -  Kunal Harper, PTA Physical Therapist Assistant PT    MS 08/22/23 -  Shubham Tierney PT Physical Therapist PT     09/30/24 -  Kya Jason, RN Registered Nurse Nurse                  PT Recommendation and Plan  Planned Therapy Interventions (PT): balance training, bed mobility training, gait training, ROM (range of motion), stair training, neuromuscular re-education, home exercise program, patient/family education, transfer training, strengthening  Progress: improving  Outcome Evaluation: Pt participated in PT re-assessment today. Pt was MI for sup- sit t/f. Pt ambulated 40ft but required frequent directiosn and cues for use of Rwx. Pt demonstrated B LE weakness. Pt would continue to benefit from skilled PT tx and STR at d/c.     Time Calculation:   PT Evaluation Complexity  History, PT Evaluation Complexity: 1-2 personal factors and/or comorbidities  Examination of Body Systems (PT Eval Complexity): total of 3 or more elements  Clinical Presentation (PT Evaluation Complexity): evolving  Clinical Decision Making (PT Evaluation Complexity): moderate complexity  Overall Complexity (PT Evaluation Complexity): moderate complexity     PT Charges       Row Name 01/13/25 1547             Time Calculation    Start Time 1458  -MS      PT Received On 01/13/25  -MS      PT Goal Re-Cert Due Date 01/23/25  -MS         Untimed Charges    PT Eval/Re-eval Minutes 30  -MS         Total Minutes    Untimed Charges Total Minutes 30  -MS       Total Minutes 30  -MS                User Key  (r) = Recorded By, (t) = Taken By, (c) = Cosigned By      Initials Name Provider Type    MS Shubham Tierney, PT Physical Therapist                  Therapy Charges for Today       Code Description Service  Date Service Provider Modifiers Qty    [id number] HC PT RE-EVAL ESTABLISHED PLAN 2 2025 Shubham Tierney, PT GP 1            PT G-Codes  Outcome Measure Options: AM-PAC 6 Clicks Basic Mobility (PT)  AM-PAC 6 Clicks Score (PT): 16  AM-PAC 6 Clicks Score (OT): 15  PT Discharge Summary  Anticipated Discharge Disposition (PT): home with assist, home with home health    Shubham Tierney PT  2025      Electronically signed by Shubham Tierney, PT at 25 1540          Occupational Therapy Notes (most recent note)        Trina Wesley at 25 1620          Patient Name: Hardik Natarajan  : 1945    MRN: [id number]                              Today's Date: 2025       Admit Date: 2025    Visit Dx:     ICD-10-CM ICD-9-CM   1. Altered mental status, unspecified altered mental status type  R41.82 780.97   2. Bladder stone  N21.0 594.1   3. 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    Anemia, unspecified    Nicotine dependence, chewing tobacco, uncomplicated    Neoplasm related pain (acute) (chronic)    Unspecified mastoiditis, left ear    Cellulitis of left external ear    Phlebitis and thrombophlebitis of unspecified site    Gangrene, not elsewhere classified    Acute osteomyelitis    Benign prostatic hyperplasia without lower urinary tract symptoms    Pain    Palliative care by specialist    Encounter for therapeutic drug monitoring    Weakness of left upper extremity    AMS (altered mental status)    Altered mental status     Past Medical History:   Diagnosis Date    Anemia     Arthritis     Cancer      Past Surgical History:   Procedure Laterality Date    NAIL BIOPSY N/A     NECK DISSECTION N/A     PAROTIDECTOMY N/A     SKIN CANCER EXCISION        General Information       Row Name 25 1604          OT Time and Intention     Document Type re-evaluation  -     Mode of Treatment occupational therapy  -     Patient Effort good  -     Symptoms Noted During/After Treatment fatigue  -[facility] Name 01/13/25 1604          General Information    Patient Profile Reviewed yes  -     Existing Precautions/Restrictions fall  -     Barriers to Rehab medically complex;previous functional deficit;cognitive status  -[facility] Name 01/13/25 1604          Living Environment    People in Home spouse  -[facility] Name 01/13/25 1604          Home Main Entrance    Number of Stairs, Main Entrance none  -[facility] Name 01/13/25 1604          Stairs Within Home, Primary    Number of Stairs, Within Home, Primary none  -[facility] Name 01/13/25 1604          Cognition    Orientation Status (Cognition) oriented to;person;verbal cues/prompts needed for orientation  -[facility] Name 01/13/25 1604          Safety Issues/Impairments Affecting Functional Mobility    Safety Issues Affecting Function (Mobility) insight into deficits/self-awareness;safety precaution awareness;safety precautions follow-through/compliance  -     Impairments Affecting Function (Mobility) balance;cognition;endurance/activity tolerance;motor control;motor planning;strength;range of motion (ROM)  -     Cognitive Impairments, Mobility Safety/Performance awareness, need for assistance;safety precaution awareness;safety precaution follow-through  -               User Key  (r) = Recorded By, (t) = Taken By, (c) = Cosigned By      Initials Name Provider Type     Trina Wesley Occupational Therapist                     Mobility/ADL's       [facility] Name 01/13/25 1608          Bed Mobility    Bed Mobility supine-sit  -     Supine-Sit [place] (Bed Mobility) modified independence  -     Assistive Device (Bed Mobility) head of bed elevated;bed rails  -[facility] Name 01/13/25 1608          Sit-Stand Transfer    Sit-Stand [place] (Transfers) minimum assist  (75% patient effort);verbal cues  -     Assistive Device (Sit-Stand Transfers) walker, front-wheeled  -[facility] Name 01/13/25 1608          Functional Mobility    Functional Mobility- Ind. Level minimum assist (75% patient effort);2 person assist required;verbal cues required  -     Functional Mobility- Device walker, front-wheeled  -     Functional Mobility-Distance (Feet) 40  -     Patient was able to Ambulate yes  -AH       Row Name 01/13/25 1608          Bathing Assessment/Intervention    [place] Level (Bathing) minimum assist (75% patient effort)  -AH       Row Name 01/13/25 1608          Upper Body Dressing Assessment/Training    [place] Level (Upper Body Dressing) set up  -AH       Row Name 01/13/25 1608          Lower Body Dressing Assessment/Training    [place] Level (Lower Body Dressing) minimum assist (75% patient effort)  -AH       Row Name 01/13/25 1608          Grooming Assessment/Training    [place] Level (Grooming) set up  -AH       Row Name 01/13/25 1608          Self-Feeding Assessment/Training    [place] Level (Feeding) set up  -AH       Row Name 01/13/25 1608          Toileting Assessment/Training    [place] Level (Toileting) moderate assist (50% patient effort)  -               User Key  (r) = Recorded By, (t) = Taken By, (c) = Cosigned By      Initials Name Provider Type    Trina Palacio Occupational Therapist                   Obj/Interventions       [facility] Name 01/13/25 1612          Sensory Assessment (Somatosensory)    Sensory Assessment (Somatosensory) sensation intact  -AH       Row Name 01/13/25 1612          Vision Assessment/Intervention    Visual Impairment/Limitations WFL  -[facility] Name 01/13/25 1612          Range of Motion Comprehensive    General Range of Motion bilateral upper extremity ROM WFL  [facility]     Comment, General Range of Motion LUE limited shoulder flexion ~80 degrees  -[facility] Name 01/13/25 1612          Strength  Comprehensive (MMT)    Comment, General Manual Muscle Testing (MMT) Assessment E [facility]  -               User Key  (r) = Recorded By, (t) = Taken By, (c) = Cosigned By      Initials Name Provider Type     Trina Wesley Occupational Therapist                   Goals/Plan       Row Name 01/13/25 1616          Transfer Goal 1 (OT)    Activity/Assistive Device (Transfer Goal 1, OT) sit-to-stand/stand-to-sit;walker, rolling  -     [place] Level/Cues Needed (Transfer Goal 1, OT) contact guard required  -     Time Frame (Transfer Goal 1, OT) long term goal (LTG);1 week  -     Progress/Outcome (Transfer Goal 1, OT) goal ongoing;goal revised this date  -       Row Name 01/13/25 1616          Bathing Goal 1 (OT)    Activity/Device (Bathing Goal 1, OT) bathing skills, all  -     [place] Level/Cues Needed (Bathing Goal 1, OT) minimum assist (75% or more patient effort)  -     Time Frame (Bathing Goal 1, OT) long term goal (LTG);5 days  -     Progress/Outcomes (Bathing Goal 1, OT) goal ongoing  -       Row Name 01/13/25 1616          Dressing Goal 1 (OT)    Activity/Device (Dressing Goal 1, OT) lower body dressing  -     [place]/Cues Needed (Dressing Goal 1, OT) minimum assist (75% or more patient effort)  -     Time Frame (Dressing Goal 1, OT) by discharge  -     Progress/Outcome (Dressing Goal 1, OT) goal ongoing;goal revised this date  -       Row Name 01/13/25 1616          Toileting Goal 1 (OT)    Activity/Device (Toileting Goal 1, OT) toileting skills, all;commode  -     [place] Level/Cues Needed (Toileting Goal 1, OT) minimum assist (75% or more patient effort)  -     Time Frame (Toileting Goal 1, OT) long term goal (LTG);1 week  -     Progress/Outcome (Toileting Goal 1, OT) goal ongoing;goal revised this date  -       Row Name 01/13/25 1616          Strength Goal 1 (OT)    Strength Goal 1 (OT) Patient to perform UB ther ex as tolerated  -     Time Frame (Strength  Goal 1, OT) long term goal (LTG)  -     Progress/Outcome (Strength Goal 1, OT) goal ongoing;goal revised this date  -[facility] Name 01/13/25 1616          Therapy Assessment/Plan (OT)    Planned Therapy Interventions (OT) activity tolerance training;BADL retraining;patient/caregiver education/training;transfer/mobility retraining;strengthening exercise  -               User Key  (r) = Recorded By, (t) = Taken By, (c) = Cosigned By      Initials Name Provider Type     Trina Wesley Occupational Therapist                   Clinical Impression       [facility] Name 01/13/25 1612          Pain Assessment    Pretreatment Pain Rating 0/10 - no pain  -     Posttreatment Pain Rating 0/10 - no pain  -     Pain Side/Orientation generalized  -     Pain Management Interventions exercise or physical activity utilized  -[facility] Name 01/13/25 1612          Plan of Care Review    Plan of Care Reviewed With patient  -     Progress improving  -     Outcome Evaluation Pt seen for OT re-evaluation today.  Pt presents supine in bed and was independent to sit eob, stood with min assist and verbal cues for safety using RW>  Pt walked 40' with min assist of 2 using RW.  Pt needed verbal and physical cues for walker placement and safety with ambulation.  Pt min/mod assist for bathing and dressing tasks.  Pt is expected to benefit from continued therapy to improve his strength and independence with ADL tasks.  -[facility] Name 01/13/25 1612          Therapy Assessment/Plan (OT)    Rehab Potential (OT) good  -     Criteria for Skilled Therapeutic Interventions Met (OT) yes;skilled treatment is necessary  -     Therapy Frequency (OT) 3 times/wk  -[facility] Name 01/13/25 1612          Therapy Plan Review/Discharge Plan (OT)    Anticipated Discharge Disposition (OT) sub acute care setting  sub acute rehab  -[facility] Name 01/13/25 1612          Positioning and Restraints    Pre-Treatment Position in bed  -      Post Treatment Position chair  -AH     In Chair reclined;call light within reach;encouraged to call for assist;exit alarm on  -AH               User Key  (r) = Recorded By, (t) = Taken By, (c) = Cosigned By      Initials Name Provider Type    Trina Palacio Occupational Therapist                   Outcome Measures       Row Name 01/13/25 1618          How much help from another is currently needed...    Putting on and taking off regular lower body clothing? 2  -AH     Bathing (including washing, rinsing, and drying) 2  -AH     Toileting (which includes using toilet bed pan or urinal) 2  -AH     Putting on and taking off regular upper body clothing 3  -AH     Taking care of personal grooming (such as brushing teeth) 3  -AH     Eating meals 3  -AH     AM-PAC 6 Clicks Score (OT) 15  -AH       Row Name 01/13/25 1545 01/13/25 0848       How much help from another person do you currently need...    Turning from your back to your side while in flat bed without using bedrails? 3  -MS 3  -BW    Moving from lying on back to sitting on the side of a flat bed without bedrails? 3  -MS 3  -BW    Moving to and from a bed to a chair (including a wheelchair)? 3  -MS 3  -BW    Standing up from a chair using your arms (e.g., wheelchair, bedside chair)? 3  -MS 3  -BW    Climbing 3-5 steps with a railing? 2  -MS 2  -BW    To walk in hospital room? 2  -MS 3  -BW    AM-PAC 6 Clicks Score (PT) 16  -MS 17  -BW    Highest Level of Mobility Goal 5 --> Static standing  -MS 5 --> Static standing  -BW      Row Name 01/13/25 1618          Functional Assessment    Outcome Measure Options AM-PAC 6 Clicks Daily Activity (OT)  -               User Key  (r) = Recorded By, (t) = Taken By, (c) = Cosigned By      Initials Name Provider Type    Trina Palacio Occupational Therapist    Shubham Banda, PT Physical Therapist    Martha Turner, RN Registered Nurse                    Occupational Therapy Education       Title: PT OT SLP  Therapies (Done)       Topic: Occupational Therapy (Done)       Point: ADL training (Done)       Description:   Instruct learner(s) on proper safety adaptation and remediation techniques during self care or transfers.   Instruct in proper use of assistive devices.                  Learning Progress Summary            Patient Acceptance, E,TB, VU by  at 1/13/2025 1618    Comment: purpose of OT re-evaluation    Acceptance, E,TB, VU by SD at 1/10/2025 1257    Comment: Safety during tf    Acceptance, E,TB, VU by  at 1/8/2025 1519    Comment: benefit of activity    Acceptance, E,TB, VU by  at 1/6/2025 1521    Comment: benefit of activity    Acceptance, E, VU by  at 1/3/2025 1931    Acceptance, E, VU by  at 1/3/2025 1300    Acceptance, E,TB, VU by SD at 1/2/2025 1322    Comment: OT POC   Family Acceptance, E, VU by  at 1/3/2025 1300                      Point: Home exercise program (Done)       Description:   Instruct learner(s) on appropriate technique for monitoring, assisting and/or progressing therapeutic exercises/activities.                  Learning Progress Summary            Patient Acceptance, E,TB, VU by  at 1/6/2025 1521    Comment: benefit of activity    Acceptance, E, VU by  at 1/3/2025 1931    Acceptance, E, VU by  at 1/3/2025 1300   Family Acceptance, E, VU by  at 1/3/2025 1300                      Point: Precautions (Done)       Description:   Instruct learner(s) on prescribed precautions during self-care and functional transfers.                  Learning Progress Summary            Patient Acceptance, E, VU by  at 1/3/2025 1931    Acceptance, E, VU by  at 1/3/2025 1300   Family Acceptance, E, VU by  at 1/3/2025 1300                      Point: Body mechanics (Done)       Description:   Instruct learner(s) on proper positioning and spine alignment during self-care, functional mobility activities and/or exercises.                  Learning Progress Summary            Patient  Acceptance, E, VU by  at 1/3/2025 1931    Acceptance, E, VU by  at 1/3/2025 1300   Family Acceptance, E, VU by  at 1/3/2025 1300                                      User Key       Initials Effective Dates Name Provider Type Discipline     06/16/21 -  Trina Wesley Occupational Therapist OT    SD 06/16/21 -  Jaqueline Dasilva OT Occupational Therapist OT     09/30/24 -  Kya Jason, RN Registered Nurse Nurse                  OT Recommendation and Plan  Planned Therapy Interventions (OT): activity tolerance training, BADL retraining, patient/caregiver education/training, transfer/mobility retraining, strengthening exercise  Therapy Frequency (OT): 3 times/wk  Plan of Care Review  Plan of Care Reviewed With: patient  Progress: improving  Outcome Evaluation: Pt seen for OT re-evaluation today.  Pt presents supine in bed and was independent to sit eob, stood with min assist and verbal cues for safety using RW>  Pt walked 40' with min assist of 2 using RW.  Pt needed verbal and physical cues for walker placement and safety with ambulation.  Pt min/mod assist for bathing and dressing tasks.  Pt is expected to benefit from continued therapy to improve his strength and independence with ADL tasks.     Time Calculation:         Time Calculation- OT       Row Name 01/13/25 1619             Time Calculation- OT    OT Start Time 1455  -      OT Received On 01/13/25  -      OT Goal Re-Cert Due Date 01/23/25  -         Untimed Charges    OT Eval/Re-eval Minutes 30  -AH         Total Minutes    Untimed Charges Total Minutes 30  -AH       Total Minutes 30  -AH                User Key  (r) = Recorded By, (t) = Taken By, (c) = Cosigned By      Initials Name Provider Type     Trina Wesley Occupational Therapist                  Therapy Charges for Today       Code Description Service Date Service Provider Modifiers Qty    55339705012  OT RE-EVAL 2 1/13/2025 Trina Wesley GO 1                 Trina   Lupillo  1/13/2025    Electronically signed by Trina Wesley at 01/13/25 5532

## 2025-01-14 NOTE — THERAPY TREATMENT NOTE
Patient Name: Hardik Natarajan  : 1945    MRN: [id number]                              Today's Date: 2025       Admit Date: 2025    Visit Dx:     ICD-10-CM ICD-9-CM   1. Altered mental status, unspecified altered mental status type  R41.82 780.97   2. Bladder stone  N21.0 594.1   3. 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    Anemia, unspecified    Nicotine dependence, chewing tobacco, uncomplicated    Neoplasm related pain (acute) (chronic)    Unspecified mastoiditis, left ear    Cellulitis of left external ear    Phlebitis and thrombophlebitis of unspecified site    Gangrene, not elsewhere classified    Acute osteomyelitis    Benign prostatic hyperplasia without lower urinary tract symptoms    Pain    Palliative care by specialist    Encounter for therapeutic drug monitoring    Weakness of left upper extremity    AMS (altered mental status)    Altered mental status     Past Medical History:   Diagnosis Date    Anemia     Arthritis     Cancer      Past Surgical History:   Procedure Laterality Date    NAIL BIOPSY N/A     NECK DISSECTION N/A     PAROTIDECTOMY N/A     SKIN CANCER EXCISION        General Information       Row Name 25 1337          OT Time and Intention    Document Type therapy note (daily note)  -     Mode of Treatment occupational therapy  -     Patient Effort good  -       Row Name 25 4022          General Information    Patient Profile Reviewed yes  -               User Key  (r) = Recorded By, (t) = Taken By, (c) = Cosigned By      Initials Name Provider Type     Trina Wesley Occupational Therapist                     Mobility/ADL's       Row Name 25 2581          Bed Mobility    Bed Mobility supine-sit  -     Supine-Sit [place] (Bed Mobility) minimum assist (75% patient  effort);verbal cues;nonverbal cues (demo/gesture)  -     Assistive Device (Bed Mobility) head of bed elevated;bed rails  -[facility] Name 01/14/25 1358          Sit-Stand Transfer    Sit-Stand [place] (Transfers) minimum assist (75% patient effort);verbal cues  -     Assistive Device (Sit-Stand Transfers) walker, front-wheeled  -       Row Name 01/14/25 1358          Toilet Transfer    Type (Toilet Transfer) sit-stand;stand-sit  -     [place] Level (Toilet Transfer) minimum assist (75% patient effort)  -     Assistive Device (Toilet Transfer) commode;grab bars/safety frame;walker, front-wheeled  -       Row Name 01/14/25 1358          Functional Mobility    Functional Mobility- Ind. Level minimum assist (75% patient effort);verbal cues required  -     Functional Mobility- Device walker, front-wheeled  -     Functional Mobility-Distance (Feet) 16  -     Functional Mobility- Comment pt walked 16' and then 24'  -     Patient was able to Ambulate yes  -[facility] Name 01/14/25 1358          Bathing Assessment/Intervention    [place] Level (Bathing) bathing skills;minimum assist (75% patient effort)  -     Position (Bathing) unsupported sitting  -[facility] Name 01/14/25 1358          Upper Body Dressing Assessment/Training    [place] Level (Upper Body Dressing) minimum assist (75% patient effort)  -[facility] Name 01/14/25 135          Lower Body Dressing Assessment/Training    [place] Level (Lower Body Dressing) supervision  -[facility] Name 01/14/25 1358          Grooming Assessment/Training    [place] Level (Grooming) wash face, hands;standby assist  -[facility] Name 01/14/25 [facility]          Toileting Assessment/Training    [place] Level (Toileting) adjust/manage clothing;perform perineal hygiene;standby assist  -               User Key  (r) = Recorded By, (t) = Taken By, (c) = Cosigned By      Initials Name Provider Type    Trina Palacio  Occupational Therapist                   Obj/Interventions    No documentation.                  Goals/Plan    No documentation.                  Clinical Impression       Row Name 01/14/25 1410          Pain Assessment    Pretreatment Pain Rating 0/10 - no pain  -     Posttreatment Pain Rating 0/10 - no pain  -       Row Name 01/14/25 1410          Plan of Care Review    Plan of Care Reviewed With patient  -     Progress improving  -     Outcome Evaluation Pt received supine in bed, sat eob with min assist, stood with min assist and walked to the bathroom with min assist using RW.  Pt able to complete toileting, bathing and LBD tasks while in the bathroom.  Pt supervision/sba for toileting and LBD, min assist for bathing.  Pt stood at sink and performed grooming tasks with sba.  Pt walked 24' to his chair with min assist using RW.   Pt progressing with therapy.  Cont OT per POC>  -       Row Name 01/14/25 1410          Positioning and Restraints    Pre-Treatment Position in bed  -     Post Treatment Position chair  -     In Chair reclined;call light within reach;encouraged to call for assist;exit alarm on;notified [facility]               User Key  (r) = Recorded By, (t) = Taken By, (c) = Cosigned By      Initials Name Provider Type    Trina Palacio Occupational Therapist                   Outcome Measures       Row Name 01/14/25 1413          How much help from another is currently needed...    Putting on and taking off regular lower body clothing? 3  -AH     Bathing (including washing, rinsing, and drying) 3  -AH     Toileting (which includes using toilet bed pan or urinal) 3  -AH     Putting on and taking off regular upper body clothing 3  -AH     Taking care of personal grooming (such as brushing teeth) 3  -AH     Eating meals 4  -AH     AM-PAC 6 Clicks Score (OT) 19  -       Row Name 01/14/25 1251 01/14/25 0900       How much help from another person do you currently need...    Turning from  your back to your side while in flat bed without using bedrails? 3  -RM 3  -SR (r)  BW (c)    Moving from lying on back to sitting on the side of a flat bed without bedrails? 3  -RM 3  -SR (r)  BW (c)    Moving to and from a bed to a chair (including a wheelchair)? 3  -RM 3  -SR (r)  BW (c)    Standing up from a chair using your arms (e.g., wheelchair, bedside chair)? 3  -RM 3  -SR (r)  BW (c)    Climbing 3-5 steps with a railing? 2  -RM 2  -SR (r)  BW (c)    To walk in hospital room? 3  -RM 2  -SR (r)  BW (c)    AM-PAC 6 Clicks Score (PT) 17  -RM 16  -SR    Highest Level of Mobility Goal 5 --> Static standing  -RM 5 --> Static standing  -SR      Row Name 01/14/25 1413 01/14/25 1251       Functional Assessment    Outcome Measure Options AM-PAC 6 Clicks Daily Activity (OT)  - AM-PAC 6 Clicks Basic Mobility (PT)  -              User Key  (r) = Recorded By, (t) = Taken By, (c) = Cosigned By      Initials Name Provider Type     Trina Wesley Occupational Therapist    Kunal Christianson, PTA Physical Therapist Assistant    Martha Turner, RN Registered Nurse    SR Aaliyah Quevedo RNA Registered Nurse                    Occupational Therapy Education       Title: PT OT SLP Therapies (Done)       Topic: Occupational Therapy (Done)       Point: ADL training (Done)       Description:   Instruct learner(s) on proper safety adaptation and remediation techniques during self care or transfers.   Instruct in proper use of assistive devices.                  Learning Progress Summary            Patient Acceptance, E,TB, VU by  at 1/14/2025 1413    Comment: benefit of working with therapy    Acceptance, E,TB, VU by  at 1/13/2025 1618    Comment: purpose of OT re-evaluation    Acceptance, E,TB, VU by SD at 1/10/2025 1257    Comment: Safety during tf    Acceptance, E,TB, VU by  at 1/8/2025 1519    Comment: benefit of activity    Acceptance, E,TB, VU by  at 1/6/2025 1521    Comment: benefit of activity     Acceptance, E, VU by  at 1/3/2025 1931    Acceptance, E, VU by  at 1/3/2025 1300    Acceptance, E,TB, VU by SD at 1/2/2025 1322    Comment: OT POC   Family Acceptance, E, VU by  at 1/3/2025 1300                      Point: Home exercise program (Done)       Description:   Instruct learner(s) on appropriate technique for monitoring, assisting and/or progressing therapeutic exercises/activities.                  Learning Progress Summary            Patient Acceptance, E,TB, VU by  at 1/6/2025 1521    Comment: benefit of activity    Acceptance, E, VU by  at 1/3/2025 1931    Acceptance, E, VU by SJ at 1/3/2025 1300   Family Acceptance, E, VU by  at 1/3/2025 1300                      Point: Precautions (Done)       Description:   Instruct learner(s) on prescribed precautions during self-care and functional transfers.                  Learning Progress Summary            Patient Acceptance, E, VU by  at 1/3/2025 1931    Acceptance, E, VU by  at 1/3/2025 1300   Family Acceptance, E, VU by  at 1/3/2025 1300                      Point: Body mechanics (Done)       Description:   Instruct learner(s) on proper positioning and spine alignment during self-care, functional mobility activities and/or exercises.                  Learning Progress Summary            Patient Acceptance, E, VU by  at 1/3/2025 1931    Acceptance, E, VU by  at 1/3/2025 1300   Family Acceptance, E, VU by  at 1/3/2025 1300                                      User Key       Initials Effective Dates Name Provider Type Discipline     06/16/21 -  Trina Wesley Occupational Therapist OT    SD 06/16/21 -  Jaqueline Dasilva OT Occupational Therapist OT     09/30/24 -  Kya Jason, RN Registered Nurse Nurse                  OT Recommendation and Plan  Planned Therapy Interventions (OT): activity tolerance training, BADL retraining, patient/caregiver education/training, transfer/mobility retraining, strengthening exercise  Therapy  Frequency (OT): 3 times/wk  Plan of Care Review  Plan of Care Reviewed With: patient  Progress: improving  Outcome Evaluation: Pt received supine in bed, sat eob with min assist, stood with min assist and walked to the bathroom with min assist using RW.  Pt able to complete toileting, bathing and LBD tasks while in the bathroom.  Pt supervision/sba for toileting and LBD, min assist for bathing.  Pt stood at sink and performed grooming tasks with sba.  Pt walked 24' to his chair with min assist using RW.   Pt progressing with therapy.  Cont OT per POC>     Time Calculation:         Time Calculation- OT       Row Name 01/14/25 1414 01/14/25 1252          Time Calculation- OT    OT Start Time 1129  -AH --     OT Stop Time 1155  -AH --     OT Time Calculation (min) 26 min  -AH --     OT Received On 01/14/25  - --     OT Goal Re-Cert Due Date 01/23/25  - --        Timed Charges    17995 - Gait Training Minutes  -- 15  -RM     47981 - OT Therapeutic Activity Minutes 8  -AH --     18818 - OT Self Care/Mgmt Minutes 18  -AH --        Total Minutes    Timed Charges Total Minutes 26  -AH 15  -RM      Total Minutes 26  -AH 15  -RM               User Key  (r) = Recorded By, (t) = Taken By, (c) = Cosigned By      Initials Name Provider Type    Trian Palacio Occupational Therapist    Kunal Christianson, PTA Physical Therapist Assistant                  Therapy Charges for Today       Code Description Service Date Service Provider Modifiers Qty    12474215146 HC OT RE-EVAL 2 1/13/2025 Trina Wesley    37553498158 HC OT THERAPEUTIC ACT EA 15 MIN 1/14/2025 Trina Wesley 1    13033443294 HC OT SELF CARE/MGMT/TRAIN EA 15 MIN 1/14/2025 Trina Wesley 1                 Trina Wesley  1/14/2025

## 2025-01-14 NOTE — PROGRESS NOTES
"    [facility]    Name:  Hardik Natarajan   Age:  79 y.o.  Sex:  male  :  1945  MRN:  [id number]   Visit Number:  [id number]  Admission Date:  2025  Date Of Service:  25  Primary Care Physician:  Won Cuellar MD     LOS: 11 days :    Chief Complaint:      confusion    Subjective:    2025: Previous provider documentation reviewed.  Awaiting placement.  Referrals have been sent. Discussed with Yobany/Leonora by phone.  Currently pleasant. Sleeping good.    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  Presented to [facility] ED on 2025 with concern for altered mental status.  Last known normal was night prior to presentation.  Also reported having a fall, unclear if it was syncope.  Wife found him on the floor.  On Plavix, no other anticoagulation.  He denied any fevers or chills, chest pain, shortness of air, any other specific concern.  Denied any slurred speech, difficulty moving upper or lower extremities, and any in symmetry.     ED summary: Afebrile, vital signs stable on room air.  ABG pH 7.44, pCO2 34, pO2 82, bicarb 24.  EKG sinus tachycardia rate 107, nonspecific ST-T wave changes.  Opponent 23, ACS not suspected.  Creatinine 1.34, lactate elevated, procalcitonin not elevated.  UDS negative.  Blood cultures collected.  COVID/flu/RSV negative.  CT abdomen/pelvis large stone in urinary bladder, wall thickening probable diverticula, abnormal attenuation of the root of the mesentery most suggestive of mesenteric panniculitis.  CT head no intracranial hemorrhage or evidence of acute large cortical infarct, stable atrophy and chronic findings, left mastoiditis and left otitis media with bone " "destruction of the left mastoid air cells, right maxillary sinusitis.  CT cervical spine no acute fracture, multilevel degenerative disc disease.\"     Appears patient has had ongoing mental status changes throughout hospital stay.  Has been seen by neurology.  Underwent MRI of the brain with no new stroke, there was evidence of chronic changes, possible mastoiditis.  No seizure-like activity.  Was given Geodon.  B12 is being dosed due to low B12 levels.  Was on prescription for Augmentin as well due to possible mastoiditis.  Edited by: Robert Clemente DO at 1/14/2025 1811     Review of Systems:     All systems were reviewed and negative except as mentioned in subjective, assessment and plan.    Vital Signs:    Temp:  [97.9 °F (36.6 °C)-98.4 °F (36.9 °C)] 98.4 °F (36.9 °C)  Resp:  [18-22] 22  BP: (121-143)/(67-90) 137/79    Intake and output:    I/O last 3 completed shifts:  In: 1824.1 [P.O.:940; I.V.:884.1]  Out: 525 [Urine:525]  I/O this shift:  In: 600 [P.O.:600]  Out: -     Physical Examination:    Constitutional: No acute distress, awake, alert  HENT: NCAT, mucous membranes moist, [place], lesion surrounding left ear left side of head, no drainage  Respiratory: Clear to auscultation bilaterally, respiratory effort normal   Cardiovascular: RRR, no murmurs, rubs, or gallops  Gastrointestinal: Positive bowel sounds, soft, nontender, nondistended  Musculoskeletal: No bilateral ankle edema  Psychiatric: Appropriate affect, cooperative  Neurologic: Oriented to self only, speech clear  Skin: No rashes  Edited by: Robert Clemente DO at 1/14/2025 1809     Laboratory results:    Results from last 7 days   Lab Units 01/12/25  0812   SODIUM mmol/L 138   POTASSIUM mmol/L 4.6   CHLORIDE mmol/L 101   CO2 mmol/L 28.3   BUN mg/dL 23   CREATININE mg/dL 1.63*   CALCIUM mg/dL 9.4   GLUCOSE mg/dL 98     Results from last 7 days   Lab Units 01/12/25  0812   WBC 10*3/mm3 2.76*   HEMOGLOBIN g/dL 11.7*   HEMATOCRIT % 35.2* "   PLATELETS 10*3/mm3 202                 Recent Labs     01/01/25  1555   PHART 7.455   EWE9SRK 34.2*   PO2ART 82.6   YUY2VPD 24.0   BASEEXCESS 0.4      I have reviewed the patient's laboratory results.    Radiology results:    No radiology results from the last 24 hrs  I have reviewed the patient's radiology reports.    Medication Review:     I have reviewed the patient's active and prn medications.     Problem List:      AMS (altered mental status)    Altered mental status      Assessment/Plan:    AMS (altered mental status)    Altered mental status        Assessment/Plan:     Confusion/agitation:  Patient has extensive workup in regards to his confusion.  He is MRI was showing chronic infarcts, there was possible mastoiditis he completed a course of Augmentin.  He had been on B12 injections due to low B12 noted.     Patient most likely does have evidence of vascular dementia.  He has been started on oral Geodon for the last week and is doing well, no further restlessness or issues at night.  He is on low-dose Depakote.  We are currently awaiting placement.     Left upper extremity weakness/hypothyroidism/cancer pain:  Complicates all aspects of care.     Patient's mental status is closer to baseline, this may be his new baseline.  No issues with agitation or restlessness in a week.    Risk Assessment: High  DVT Prophylaxis: Heparin  Code Status: Full code  Diet: Cardiac  Discharge Plan: LTC pending  Edited by: Robert Clemente DO at 1/14/2025 1809           Robert Clemente DO  01/14/25  18:11 EST    Dictated utilizing Dragon dictation.

## 2025-01-14 NOTE — PLAN OF CARE
Goal Outcome Evaluation:  Plan of Care Reviewed With: patient        Progress: improving  Outcome Evaluation: Pt received supine in bed, sat eob with min assist, stood with min assist and walked to the bathroom with min assist using RW.  Pt able to complete toileting, bathing and LBD tasks while in the bathroom.  Pt supervision/sba for toileting and LBD, min assist for bathing.  Pt stood at sink and performed grooming tasks with sba.  Pt walked 24' to his chair with min assist using RW.   Pt progressing with therapy.  Cont OT per POC>    Anticipated Discharge Disposition (OT): sub acute care setting (sub acute rehab)

## 2025-01-14 NOTE — CASE MANAGEMENT/SOCIAL WORK
Case Management/Social Work    Patient Name:  Hardik Natarajan  YOB: 1945  MRN: [id number]  Admit Date:  1/1/2025        CLARISSA has not heard from Mayte/Carlos regarding [facility]. CLARISSA sent a message for Rupinder who states referral is currently in review. CLARISSA also sent message to Ravindra to see if was able to review. CLARISSA following.     09:43 EST Ravindra is reviewing referral. Signature/Kamari Avila asked for referral be re-sent. SW re-sent per request. SW following.     09:53 EST Rupinder can offer. CLARISSA updated CM. If agreeable, CLARISSA will start pre-cert. SW following.     10:09 EST CLARISSA updated auth through [facility] to [place]. Pt can admit to facility prior to 1/16. CLARISSA following,.     10:37 EST Rupinder states they will have to complete onsite prior to admission due to getting PO geodon scheduled. SW following.     Electronically signed by:  TAMIR Hall  01/14/25 08:41 EST

## 2025-01-14 NOTE — PLAN OF CARE
Goal Outcome Evaluation:           Progress: improving  Outcome Evaluation: VSS on room air.  Oriented to self.  Continuous IV fluids discontinued per order (see MAR).  No complaints or acute events noted during shift.  Waiting for placement.  Continue with plan of care.

## 2025-01-15 VITALS
WEIGHT: 212.3 LBS | RESPIRATION RATE: 20 BRPM | TEMPERATURE: 97.7 F | OXYGEN SATURATION: 98 % | DIASTOLIC BLOOD PRESSURE: 78 MMHG | BODY MASS INDEX: 28.14 KG/M2 | HEIGHT: 73 IN | SYSTOLIC BLOOD PRESSURE: 130 MMHG | HEART RATE: 74 BPM

## 2025-01-15 DIAGNOSIS — C44.229 SQUAMOUS CELL CARCINOMA, EAR, LEFT: ICD-10-CM

## 2025-01-15 DIAGNOSIS — G89.3 CANCER RELATED PAIN: ICD-10-CM

## 2025-01-15 LAB
ANION GAP SERPL CALCULATED.3IONS-SCNC: 9.3 MMOL/L (ref 5–15)
BUN SERPL-MCNC: 20 MG/DL (ref 8–23)
BUN/CREAT SERPL: 15.3 (ref 7–25)
CALCIUM SPEC-SCNC: 8.6 MG/DL (ref 8.6–10.5)
CHLORIDE SERPL-SCNC: 101 MMOL/L (ref 98–107)
CO2 SERPL-SCNC: 23.7 MMOL/L (ref 22–29)
CREAT SERPL-MCNC: 1.31 MG/DL (ref 0.76–1.27)
DEPRECATED RDW RBC AUTO: 47.8 FL (ref 37–54)
EGFRCR SERPLBLD CKD-EPI 2021: 55 ML/MIN/1.73
ERYTHROCYTE [DISTWIDTH] IN BLOOD BY AUTOMATED COUNT: 13.9 % (ref 12.3–15.4)
GLUCOSE SERPL-MCNC: 96 MG/DL (ref 65–99)
HCT VFR BLD AUTO: 33.1 % (ref 37.5–51)
HGB BLD-MCNC: 11.1 G/DL (ref 13–17.7)
MCH RBC QN AUTO: 31.3 PG (ref 26.6–33)
MCHC RBC AUTO-ENTMCNC: 33.5 G/DL (ref 31.5–35.7)
MCV RBC AUTO: 93.2 FL (ref 79–97)
PLATELET # BLD AUTO: 155 10*3/MM3 (ref 140–450)
PMV BLD AUTO: 10 FL (ref 6–12)
POTASSIUM SERPL-SCNC: 4.1 MMOL/L (ref 3.5–5.2)
RBC # BLD AUTO: 3.55 10*6/MM3 (ref 4.14–5.8)
SODIUM SERPL-SCNC: 134 MMOL/L (ref 136–145)
WBC NRBC COR # BLD AUTO: 3.48 10*3/MM3 (ref 3.4–10.8)

## 2025-01-15 PROCEDURE — 99239 HOSP IP/OBS DSCHRG MGMT >30: CPT | Performed by: INTERNAL MEDICINE

## 2025-01-15 PROCEDURE — 80048 BASIC METABOLIC PNL TOTAL CA: CPT | Performed by: INTERNAL MEDICINE

## 2025-01-15 PROCEDURE — 25010000002 HEPARIN (PORCINE) PER 1000 UNITS: Performed by: STUDENT IN AN ORGANIZED HEALTH CARE EDUCATION/TRAINING PROGRAM

## 2025-01-15 PROCEDURE — 85027 COMPLETE CBC AUTOMATED: CPT | Performed by: INTERNAL MEDICINE

## 2025-01-15 RX ORDER — DIVALPROEX SODIUM 250 MG/1
250 TABLET, FILM COATED, EXTENDED RELEASE ORAL DAILY
Qty: 30 TABLET | Refills: 0 | Status: SHIPPED | OUTPATIENT
Start: 2025-01-16 | End: 2025-02-15

## 2025-01-15 RX ORDER — OXYCODONE HYDROCHLORIDE 5 MG/1
5 TABLET ORAL EVERY 6 HOURS PRN
Qty: 120 TABLET | Refills: 0 | Status: SHIPPED | OUTPATIENT
Start: 2025-01-15

## 2025-01-15 RX ORDER — ZIPRASIDONE HYDROCHLORIDE 40 MG/1
40 CAPSULE ORAL NIGHTLY
Qty: 30 CAPSULE | Refills: 0 | Status: SHIPPED | OUTPATIENT
Start: 2025-01-15 | End: 2025-02-14

## 2025-01-15 RX ORDER — OXYCODONE HYDROCHLORIDE 5 MG/1
5 TABLET ORAL EVERY 6 HOURS PRN
Qty: 5 TABLET | Refills: 0 | Status: SHIPPED | OUTPATIENT
Start: 2025-01-15 | End: 2025-01-15 | Stop reason: SDUPTHER

## 2025-01-15 RX ADMIN — DIVALPROEX SODIUM 250 MG: 250 TABLET, EXTENDED RELEASE ORAL at 09:20

## 2025-01-15 RX ADMIN — POLYETHYLENE GLYCOL 3350 17 G: 17 POWDER, FOR SOLUTION ORAL at 09:20

## 2025-01-15 RX ADMIN — HEPARIN SODIUM 5000 UNITS: 5000 INJECTION INTRAVENOUS; SUBCUTANEOUS at 09:20

## 2025-01-15 RX ADMIN — CLOPIDOGREL BISULFATE 75 MG: 75 TABLET ORAL at 09:20

## 2025-01-15 RX ADMIN — LEVOTHYROXINE SODIUM 75 MCG: 0.07 TABLET ORAL at 06:14

## 2025-01-15 RX ADMIN — DULOXETINE HYDROCHLORIDE 30 MG: 30 CAPSULE, DELAYED RELEASE ORAL at 09:20

## 2025-01-15 RX ADMIN — TAMSULOSIN HYDROCHLORIDE 0.4 MG: 0.4 CAPSULE ORAL at 09:20

## 2025-01-15 NOTE — DISCHARGE SUMMARY
"    [facility]   DIS[facility]GE SUMMARY      Name:  Hardik Natarajan   Age:  79 y.o.  Sex:  male  :  1945  MRN:  [id number]   Visit Number:  [id number]    Admission Date:  2025  Date of Discharge:  1/15/2025  Primary Care Physician:  Won Cuellar MD    Important issues to note:    Start: Geodon and depakote  Stop: nothing  Follow up: PCP and Urology  Brief Summary: Presented with agitation and confusion due to vascular dementia. Initially had intiation of geodon and depakote which improved to calm and resting well by the time of discharge.    Discharge Diagnoses:       AMS (altered mental status)    Altered mental status        Problem List:     Active Hospital Problems    Diagnosis  POA    **AMS (altered mental status) [R41.82]  Yes    Altered mental status [R41.82]  Yes      Resolved Hospital Problems   No resolved problems to display.     Presenting Problem:    Chief Complaint   Patient presents with    Altered Mental Status    Fall      Consults:     Consulting Physician(s)         Provider   Role Specialty     Margaux Silva MD      Consulting Physician Psychiatry     Jonathan Villagran MD      Consulting Physician Neurology          Procedures Performed:            1/15/2025: Placement today. Called Yobany/Leonora no answer.  Currently pleasant. Sleeping good.    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  Presented to [facility] ED on 2025 with concern for altered mental status.  Last known normal was night prior to presentation.  Also reported having a fall, unclear if it was syncope.  Wife found him on the floor.  On Plavix, no other anticoagulation.  He denied any fevers or chills, chest pain, shortness of air, any " "other specific concern.  Denied any slurred speech, difficulty moving upper or lower extremities, and any in symmetry.     ED summary: Afebrile, vital signs stable on room air.  ABG pH 7.44, pCO2 34, pO2 82, bicarb 24.  EKG sinus tachycardia rate 107, nonspecific ST-T wave changes.  Opponent 23, ACS not suspected.  Creatinine 1.34, lactate elevated, procalcitonin not elevated.  UDS negative.  Blood cultures collected.  COVID/flu/RSV negative.  CT abdomen/pelvis large stone in urinary bladder, wall thickening probable diverticula, abnormal attenuation of the root of the mesentery most suggestive of mesenteric panniculitis.  CT head no intracranial hemorrhage or evidence of acute large cortical infarct, stable atrophy and chronic findings, left mastoiditis and left otitis media with bone destruction of the left mastoid air cells, right maxillary sinusitis.  CT cervical spine no acute fracture, multilevel degenerative disc disease.\"     Appears patient has had ongoing mental status changes throughout hospital stay.  Has been seen by neurology.  Underwent MRI of the brain with no new stroke, there was evidence of chronic changes, possible mastoiditis.  No seizure-like activity.  Was given Geodon.  B12 is being dosed due to low B12 levels.  Was on prescription for Augmentin as well due to possible mastoiditis.  Edited by: Robert Clemente,  at 1/15/2025 0934  AMS (altered mental status)    Altered mental status        Assessment/Plan:     Confusion/agitation:  Patient has extensive workup in regards to his confusion.  He is MRI was showing chronic infarcts, there was possible mastoiditis he completed a course of Augmentin.  He had been on B12 injections due to low B12 noted.     Patient most likely does have evidence of vascular dementia.  He has been started on oral Geodon for the last week and is doing well, no further restlessness or issues at night.  He is on low-dose Depakote.  Doing well 1/15/25     Left upper " extremity weakness/hypothyroidism/cancer pain:  Complicates all aspects of care.     Patient's mental status is closer to baseline, this may be his new baseline.  No issues with agitation or restlessness in a week.      DVT Prophylaxis: Heparin  Code Status: Full code  Diet: Cardiac  Discharge Plan: LTC pending, [facility] today  Edited by: Robert Clemente DO at 1/15/2025 0936      Vital Signs:    Temp:  [97.7 °F (36.5 °C)-98.7 °F (37.1 °C)] 97.7 °F (36.5 °C)  Heart Rate:  [] 74  Resp:  [20-22] 20  BP: (113-143)/(62-82) 130/78    Physical Exam:    Constitutional: No acute distress, awake, alert  HENT: NCAT, mucous membranes moist, [place], lesion surrounding left ear left side of head, no drainage  Respiratory: Clear to auscultation bilaterally, respiratory effort normal   Cardiovascular: RRR, no murmurs, rubs, or gallops  Gastrointestinal: Positive bowel sounds, soft, nontender, nondistended  Musculoskeletal: No bilateral ankle edema  Psychiatric: Appropriate affect, cooperative  Neurologic: Oriented to self only, speech clear  Skin: No rashes  Exam stable 1/15/25    Pertinent Lab Results:     Results from last 7 days   Lab Units 01/15/25  0800 01/12/25  0812   SODIUM mmol/L 134* 138   POTASSIUM mmol/L 4.1 4.6   CHLORIDE mmol/L 101 101   CO2 mmol/L 23.7 28.3   BUN mg/dL 20 23   CREATININE mg/dL 1.31* 1.63*   CALCIUM mg/dL 8.6 9.4   GLUCOSE mg/dL 96 98     Results from last 7 days   Lab Units 01/15/25  0800 01/12/25  0812   WBC 10*3/mm3 3.48 2.76*   HEMOGLOBIN g/dL 11.1* 11.7*   HEMATOCRIT % 33.1* 35.2*   PLATELETS 10*3/mm3 155 202                                   Pertinent Radiology Results:    Imaging Results (All)       Procedure Component Value Units Date/Time    XR Shoulder 2+ View Left [[id number]] Collected: 01/03/25 0816     Updated: 01/03/25 0819    Narrative:         PROCEDURE: XR SHOULDER 2+ VW LEFT-     HISTORY: left shoulder pain; R41.82-Altered mental status, unspecified;  N21.0-Calculus in  bladder; C44.229-Squamous cell carcinoma of skin of  left ear and external auricular canal, no trauma.     COMPARISON: None.     FINDINGS:  A three view exam demonstrates no acute fracture or  dislocation. The joint spaces demonstrate decreased subacromial distance  suggesting chronic rotator cuff tear. Diffuse osteopenia identified.  Mild narrowing of the left AC joint noted. No soft tissue abnormality is  seen. No bony destructive lesion seen.          Impression:      No acute bony abnormality.     Degenerative change as described.                 This report was signed and finalized on 1/3/2025 8:17 AM by Camille Rodriguez MD.       MRI Brain Without Contrast [[id number]] Collected: 01/01/25 1845     Updated: 01/01/25 1847    Narrative:      FINAL REPORT    TECHNIQUE:  null    CLINICAL HISTORY:  AMS SEVERE HX OF PREVIOUS STROKE, FALL TODAY, HX OF PREVIOUS  SKIN CA OF LEFT EAR.    COMPARISON:  null    FINDINGS:  MR of the brain without contrast    Comparison: MR/SR - MRI BRAIN W WO CONTRAST - 8/2/24 21:53 EDT    CT - CT ANGIOGRAM NECK W WO CONTRAST - 8/2/24 20:54 EDT    CT - CT ANGIOGRAM HEAD W CONTRAST - 8/2/24 20:54 EDT    CT/SR - CT HEAD WO CONTRAST STROKE PROTOCOL - 8/2/24 20:54 EDT    MR/SR - MRI BRAIN W WO CONTRAST - 5/10/23 18:47 EDT    Findings:    No acute infarction, hemorrhage, mass-effect or herniation.    No hydrocephalus.    Increased signal intensity is seen in the deep and periventricular white matter on the T2/FLAIR sequences, which most likely represents the sequela of severe chronic small vessel ischemic disease. Chronic lacunar infarctions in the right corona   radiata/centrum semiovale and left basal ganglia.    No extra-axial fluid collection or mass.    Unremarkable sella.    Intact flow voids.    Normal orbits.    Mucosal thickening in the paranasal sinuses may indicate sinusitis. Fluid signal in right mastoid air cells could be secondary to an effusion or mastoiditis. There is osseous  destruction at the left mastoid process, which was also present on the prior   studies and could be related to the previously seen skin cancer and/or post treatment changes. There is fluid signal in left mastoid air cells which could be secondary to an effusion or mastoiditis.      Impression:      Impression:    No acute infarction. No posttraumatic findings.    Sinusitis could be considered.    Bilateral mastoid air cell effusions versus mastoiditis. Osseous destruction of the left mastoid process could be secondary to the previously seen skin cancer and/or posttreatment changes. Correlate clinically for signs/symptoms of infection to exclude   osteomyelitis.    Authenticated and Electronically Signed by Jaqueline Painter MD  on 01/01/2025 06:45:00 PM    CT Abdomen Pelvis With Contrast [[id number]] Collected: 01/01/25 1507     Updated: 01/01/25 1517    Narrative:      CT ABDOMEN AND PELVIS WITH CONTRAST     INDICATION: Possible bladder stone on x-ray.     TECHNIQUE: Thin section axial images were obtained from the lung bases  through the pubic symphysis after oral and intravenous contrast. Coronal  reconstruction images were obtained from the axial data.     COMPARISON: None.     FINDINGS:     Lower chest:  Lung bases are clear.     Liver:  Homogeneous.  No focal lesion.     Gallbladder/biliary system:  Gallbladder is present.   No intrahepatic  or extrahepatic biliary dilatation.     Spleen:  Unremarkable.     Adrenal glands:  Unremarkable. No nodules.     Pancreas: Evaluation limited by motion artifact. No convincing mass or  evidence of pancreatitis.     Kidneys/ureters/bladder:  No hydronephrosis, solid mass or perinephric  stranding. A large calcification in the urinary bladder is consistent  with a bladder stone. This measures 23 mm. There is also irregularity of  the bladder wall which could be related to chronic cystitis. Bladder  diverticula are not excluded near the dome.     GI tract: No evidence of  small bowel obstruction or focal small bowel  wall thickening. Normal appendix. No acute colon abnormality.     Pelvic organs: Mild enlargement of the prostate.     Lymph nodes/mesentery/retroperitoneum: There is abnormal attenuation in  the root of the mesentery with small lymph nodes. 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     Abdominal wall: Abdominal wall intact.      Vascular: No acute vascular abnormality.     Free fluid: No free fluid.     Bones: No acute osseous abnormality.       Impression:      1. Large stone in the urinary bladder.  2. Wall thickening of the urinary bladder with probable diverticula.  This is likely related to chronic cystitis and possibly mild chronic  outlet obstruction.  3. Abnormal attenuation at the root of the mesentery most suggestive of  mesenteric panniculitis.                 CTDI: 9.79 mGy  DLP:552.61 mGy.cm                    This report was signed and finalized on 1/1/2025 3:15 PM by Carmen Le MD.       CT Cervical Spine Without Contrast [[id number]] Collected: 01/01/25 1319     Updated: 01/01/25 1324    Narrative:      PROCEDURE: CT CERVICAL SPINE WO CONTRAST-     INDICATION: fall this morning     TECHNIQUE: Thin section axial images were obtained through the cervical  spine without contrast.  Coronal and sagittal reconstruction images were  obtained from the axial data.     COMPARISON: None.     FINDINGS: Motion artifact limits exam quality. There is no convincing  acute fracture. No facet lock. The craniocervical junction appears  intact.   There is multilevel degenerative disc disease.   No acute  paraspinal abnormality.       Impression:      No acute fracture within the limitations of the exam. There  is motion artifact.     Multilevel degenerative disc disease.     Please see CT head report regarding findings of the left mastoid air  cells where there is mastoiditis and bone destruction.                       CTDI: 16.93  mGy  DLP:837.44 mGy.cm              This study was performed with techniques to keep radiation doses as low  as reasonably achievable (ALARA). Individualized dose reduction  techniques using automated exposure control or adjustment of mA and/or  kV according to the patient size were employed.        This report was signed and finalized on 1/1/2025 1:22 PM by Carmen Le MD.       CT Head Without Contrast [[id number]] Collected: 01/01/25 1315     Updated: 01/01/25 1321    Narrative:      PROCEDURE: CT HEAD WO CONTRAST-     INDICATION: AMS     TECHNIQUE: Multiple axial CT images were performed from the foramen  magnum to the vertex without contrast.   Coronal reconstruction images  were obtained from the axial data.     COMPARISON: 8/2/2024.     FINDINGS: There is no mass effect or midline shift. There is stable  atrophy with ventricular enlargement. There are stable bilateral  periventricular hypodensities. There is no intracranial hemorrhage. The  posterior fossa is without acute abnormality. There is mucoperiosteal  thickening of the right maxillary sinus. There is opacification of the  left mastoid air cells with bone destruction. There is soft tissue  density in the left middle ear cavity. This was present on the prior  exam. No skull fracture.       Impression:      1. No intracranial hemorrhage or evidence of acute large cortical  infarct.  2. Stable atrophy and chronic findings.  3. Left mastoiditis and left otitis media with bone destruction of the  left mastoid air cells.  4. Right maxillary sinusitis.                          CTDI: 16.93 mGy  DLP:837.44 mGy.cm           This study was performed with techniques to keep radiation doses as low  as reasonably achievable (ALARA). Individualized dose reduction  techniques using automated exposure control or adjustment of mA and/or  kV according to the patient size were employed.        This report was signed and finalized on 1/1/2025 1:19 PM by Carmen Le  MD.       XR Pelvis 1 or 2 View [[id number]] Collected: 01/01/25 1314     Updated: 01/01/25 1317    Narrative:      PROCEDURE: XR PELVIS 1 OR 2 VW-     HISTORY: fall, AMS     COMPARISON: None.     FINDINGS: An AP view of the pelvis was obtained.  There is no acute  osseous abnormality of the pelvis. There is degenerative joint disease  of the hips bilaterally. There is a round radiodensity projecting over  the midline pelvis. This could be a bladder stone or artifact in or on  the patient's clothing. Soft tissues are otherwise without acute  abnormality.        Impression:      No acute osseous abnormality of the pelvis.     Round radiodensity projecting over the midline pelvis as above.                       This report was signed and finalized on 1/1/2025 1:15 PM by Carmen Le MD.       XR Chest 1 View [[id number]] Collected: 01/01/25 1313     Updated: 01/01/25 1316    Narrative:      PROCEDURE: XR CHEST 1 VW-     INDICATION:  fall, AMS     FINDINGS:  A portable view of the chest was obtained.  Comparison is  made to a prior exam dated 8/2/2024.   Heart size is normal. Lung  volumes are very low. Right perihilar and bibasilar opacities may be  atelectasis. Pneumonia is not excluded. No significant pleural effusion  or pneumothorax.       Impression:      Exam limited by low lung volumes. Probable bilateral  atelectasis.        This report was signed and finalized on 1/1/2025 1:14 PM by Carmen Le MD.               Echo:    Results for orders placed during the hospital encounter of 08/02/24    Adult Transthoracic Echo Complete w/ Color, Spectral and Contrast if necessary per protocol    Interpretation Summary    Left ventricular systolic function is normal. Calculated left ventricular EF = 60.4% Left ventricular ejection fraction appears to be 56 - 60%. Left ventricular diastolic function was normal.    The right ventricular cavity is borderline dilated with normal systolic function.    Mild tricuspid valve  regurgitation is present. Estimated right ventricular systolic pressure from tricuspid regurgitation is normal (<35 mmHg).    Saline test results are negative for right to left atrial level shunt.    Condition on Discharge:      Stable.    Code status during the hospital stay:    Code Status and Medical Interventions: CPR (Attempt to Resuscitate); Full Support   Ordered at: 01/01/25 1736     Code Status (Patient has no pulse and is not breathing):    CPR (Attempt to Resuscitate)     Medical Interventions (Patient has pulse or is breathing):    Full Support     Discharge Disposition:    Skilled Nursing Facility (DC - External)    Discharge Medications:       Discharge Medications        New Medications        Instructions Start Date   divalproex 250 MG 24 hr tablet  Commonly known as: DEPAKOTE ER   250 mg, Oral, Daily   Start Date: January 16, 2025     naloxone 4 MG/0.1ML nasal spray  Commonly known as: NARCAN   Call 911. Don't prime. [place] in 1 nostril for overdose. Repeat in 2-3 minutes in other nostril if no or minimal breathing/responsiveness.      ziprasidone 40 MG capsule  Commonly known as: GEODON   40 mg, Oral, Nightly             Continue These Medications        Instructions Start Date   acetaminophen 325 MG tablet  Commonly known as: TYLENOL   650 mg, Every 6 Hours PRN      clopidogrel 75 MG tablet  Commonly known as: PLAVIX   1 tablet, Daily      cyanocobalamin 1000 MCG/ML injection   100 mcg, Every 30 Days      diclofenac 75 MG EC tablet  Commonly known as: VOLTAREN       DULoxetine 30 MG capsule  Commonly known as: CYMBALTA   Take 1 capsule by mouth Daily.      finasteride 5 MG tablet  Commonly known as: PROSCAR   Take 1 tablet by mouth Daily.      Hydrocortisone (Perianal) 2.5 % rectal cream  Commonly known as: ANUSOL-HC   1 application , Rectal      Procto-Med HC 2.5 % rectal cream  Generic drug: Hydrocortisone (Perianal)   Daily PRN      levothyroxine 75 MCG tablet  Commonly known as: SYNTHROID,  LEVOTHROID   TAKE ONE (1) TABLET BY MOUTH EVERY DAY      ondansetron 8 MG tablet  Commonly known as: ZOFRAN   8 mg, Oral, 3 Times Daily PRN      ondansetron ODT 8 MG disintegrating tablet  Commonly known as: ZOFRAN-ODT   Place 1 tablet twice a day by translingual route.      oxyCODONE 5 MG immediate release tablet  Commonly known as: Roxicodone   5 mg, Oral, Every 6 Hours PRN      senna 8.6 MG tablet  Commonly known as: SENOKOT   1 tablet, Daily PRN      tamsulosin 0.4 MG capsule 24 hr capsule  Commonly known as: FLOMAX   0.4 mg             Stop These Medications      hydrOXYzine 25 MG tablet  Commonly known as: ATARAX            Discharge Diet:     Diet Instructions       Advance Diet As Tolerated -Target Diet: Cardiac      Target Diet: Cardiac          Activity at Discharge:       Follow-up Appointments:    Additional Instructions for the Follow-ups that You Need to Schedule       Discharge Follow-up with PCP   As directed       Currently Documented PCP:    Won Cuellar MD    PCP Phone Number:    [phone]     Follow Up Details: 1 week               Contact information for follow-up providers       Schedule an appointment as soon as possible for a visit  with Won Cuellar MD.    Specialty: Family Medicine  Why: for further outpatient evaluation if symptoms persist  Contact information:  [address] [place] [address] [phone]               Schedule an appointment as soon as possible for a visit  with [facility] UROLOGY.    Specialty: Urology  Why: For further outpatient evaluation and definitive care of bladder stone  Contact information:  [address] [facility] [address] [address] [phone]  Additional information:  Phone Number: [phone] Location is shortly past the [place] [employer] going [address]bound on [address]             Go to  [facility] EMERGENCY DEPARTMENT.    Specialty: Emergency Medicine  Why: As needed, If  symptoms worsen  Contact information:  [address]  [facility] [address] [phone]             Won Cuellar MD .    Specialty: Family Medicine  Why: 1 week  Contact information:  [address] [facility] [address] [phone]                       Contact information for after-discharge care       Destination       [facility] .    Service: Skilled Nursing  Contact information:  [address] [facility] [address] [phone]                                 No future appointments.  Test Results Pending at Discharge:           Robert Clemente DO  01/15/25  09:38 EST    Time: I spent 45 minutes on this discharge activity which included: face-to-face encounter with the patient, reviewing the data in the system, coordination of the care with the nursing staff as well as consultants, documentation, and entering orders.     Dictated utilizing Dragon dictation.

## 2025-01-15 NOTE — TELEPHONE ENCOUNTER
(NEW ADMIT)    [facility] REQUESTING MED REFILL FOR OXYCODONE 5 MG.    DIRECTIONS OXYCODONE 5 MG  1 TAB PO Q 6 HRS PRN.

## 2025-01-15 NOTE — PLAN OF CARE
Goal Outcome Evaluation: Patient being discharged to [place] via EMS today

## 2025-01-15 NOTE — PLAN OF CARE
Goal Outcome Evaluation:      VSS during shift. No acute changes noted during shift. Pt to transport to [facility]. No s/s of distress noted at this time.

## 2025-01-16 ENCOUNTER — TELEPHONE (OUTPATIENT)
Dept: ONCOLOGY | Facility: CLINIC | Age: 80
End: 2025-01-16
Payer: MEDICARE

## 2025-01-16 ENCOUNTER — OFFICE VISIT (OUTPATIENT)
Dept: UROLOGY | Facility: CLINIC | Age: 80
End: 2025-01-16
Payer: MEDICARE

## 2025-01-16 VITALS
SYSTOLIC BLOOD PRESSURE: 150 MMHG | BODY MASS INDEX: 28.1 KG/M2 | HEIGHT: 73 IN | WEIGHT: 212 LBS | OXYGEN SATURATION: 94 % | TEMPERATURE: 98 F | HEART RATE: 91 BPM | DIASTOLIC BLOOD PRESSURE: 70 MMHG

## 2025-01-16 DIAGNOSIS — N21.0 BLADDER STONE: Primary | ICD-10-CM

## 2025-01-16 NOTE — TELEPHONE ENCOUNTER
RN called patient's son back and talked with him. He has a lot of questions regarding patient's new potential diagnosis of dementia and new medications they started him on in the hospital. Patient is currently at [facility] in [place] and Dr. Rojo is visiting him tomorrow at the nursing home. RN told son that Dr. Rojo would be better equipped to answer his questions as we only follow his cancer care, and he has not been on treatment for a couple of months. We will plan on a visit in 3-4 weeks to check in on him. Patient's son v/u and appreciation.

## 2025-01-16 NOTE — TELEPHONE ENCOUNTER
RN called provided number back, no answer. Unable to LVM. Will try back around 3 or 4 this afternoon.

## 2025-01-16 NOTE — LETTER
January 16, 2025     Juan Rojo DO  [address] [facility] [address]  [facility] [address]    Patient: Hardik Natarajan   YOB: 1945   Date of Visit: 1/16/2025     Dear Dr. Alia DO:    Thank you for referring Hardik Natarajan to me for evaluation. Below are the relevant portions of my assessment and plan of care.    If you have questions, please do not hesitate to call me. I look forward to following Hardik along with you.    Difficult situation.  Patient has 0 medical insider decisional capacity, but is not bothered at all by large bladder stone.  Please let me know if there are any issues with him having blood in the urine or urinary tract infections in the future.       Sincerely,        Anshul Wang MD        CC: No Recipients      Progress Notes:  Chief Complaint  Bladder Stone    No ref. provider found    HPI  Mr. Natarajan is a 79 y.o. male who presents for further management of nephrolithiasis.    He presented to ER and was diagnosed with a large bladder stone. He presents today for follow up.  He is unaware that he has a large bladder stone.  He cannot tell me anything about his health, and denies previously having radiation for locally advanced ear/head neck cancer..  No fever, chills, nausea, vomiting.  No dysuria.  Denies gross hematuria or new or bothersome lower urinary tract symptoms.      Past Medical History  Past Medical History:   Diagnosis Date   • Anemia    • Arthritis    • Cancer        Past Surgical History  Past Surgical History:   Procedure Laterality Date   • NAIL BIOPSY N/A    • NECK DISSECTION N/A    • PAROTIDECTOMY N/A    • SKIN CANCER EXCISION         Medications    Current Outpatient Medications:   •  acetaminophen (TYLENOL) 325 MG tablet, Take 2 tablets by mouth Every 6 (Six) Hours As Needed., Disp: , Rfl:   •  clopidogrel (PLAVIX) 75 MG tablet, Take 1 tablet by mouth Daily., Disp: , Rfl:   •  cyanocobalamin 1000 MCG/ML injection, Inject 0.1 mL into the appropriate  muscle as directed by prescriber Every 30 (Thirty) Days., Disp: , Rfl:   •  divalproex (DEPAKOTE ER) 250 MG 24 hr tablet, Take 1 tablet by mouth Daily for 30 days., Disp: 30 tablet, Rfl: 0  •  DULoxetine (CYMBALTA) 30 MG capsule, Take 1 capsule by mouth Daily., Disp: , Rfl:   •  finasteride (PROSCAR) 5 MG tablet, Take 1 tablet by mouth Daily., Disp: , Rfl:   •  Hydrocortisone, Perianal, (ANUSOL-HC) 2.5 % rectal cream, Insert 1 Application into the rectum., Disp: , Rfl:   •  naloxone (NARCAN) 4 MG/0.1ML nasal spray, Call 911. Don't prime. [place] in 1 nostril for overdose. Repeat in 2-3 minutes in other nostril if no or minimal breathing/responsiveness., Disp: 2 each, Rfl: 0  •  ondansetron (ZOFRAN) 8 MG tablet, Take 1 tablet by mouth 3 (Three) Times a Day As Needed for Nausea or Vomiting., Disp: 30 tablet, Rfl: 5  •  oxyCODONE (Roxicodone) 5 MG immediate release tablet, Take 1 tablet by mouth Every 6 (Six) Hours As Needed for Severe Pain., Disp: 120 tablet, Rfl: 0  •  senna (SENOKOT) 8.6 MG tablet, Take 1 tablet by mouth Daily As Needed., Disp: , Rfl:   •  tamsulosin (FLOMAX) 0.4 MG capsule 24 hr capsule, Take 1 capsule by mouth., Disp: , Rfl:   •  ziprasidone (GEODON) 40 MG capsule, Take 1 capsule by mouth Every Night for 30 days., Disp: 30 capsule, Rfl: 0  •  diclofenac (VOLTAREN) 75 MG EC tablet, , Disp: , Rfl:   •  levothyroxine (SYNTHROID, LEVOTHROID) 75 MCG tablet, TAKE ONE (1) TABLET BY MOUTH EVERY DAY, Disp: 30 tablet, Rfl: 3  •  ondansetron ODT (ZOFRAN-ODT) 8 MG disintegrating tablet, Place 1 tablet twice a day by translingual route. (Patient not taking: Reported on 1/16/2025), Disp: , Rfl:   •  Procto-Med HC 2.5 % rectal cream, 1 Application Daily As Needed. (Patient not taking: Reported on 1/16/2025), Disp: , Rfl:   No current facility-administered medications for this visit.    Allergies  Allergies   Allergen Reactions   • Asa [Aspirin] Unknown - Low Severity     Nose bleed       Social History  Social  "History     Socioeconomic History   • Marital status:    Tobacco Use   • Smoking status: Never   • Smokeless tobacco: Current     Types: Chew   Vaping Use   • Vaping status: Never Used   Substance and Sexual Activity   • Alcohol use: Not Currently   • Drug use: Never   • Sexual activity: Defer       Family History  Family History   Problem Relation Age of Onset   • Cancer Mother    • Cancer Father          Physical Exam  Visit Vitals  /70   Pulse 91   Temp 98 °F (36.7 °C)   Ht 185.4 cm (73\")   Wt 96.2 kg (212 lb)   SpO2 94%   BMI 27.97 kg/m²     Physical exam was notable for obese.     Labs  Lab Results   Component Value Date    GLUCOSE 96 01/15/2025    BUN 20 01/15/2025    CREATININE 1.31 (H) 01/15/2025    BCR 15.3 01/15/2025    K 4.1 01/15/2025    CO2 23.7 01/15/2025    CALCIUM 8.6 01/15/2025    ALBUMIN 3.8 01/01/2025    AST 23 01/01/2025    ALT 13 01/01/2025       Lab Results   Component Value Date    WBC 3.48 01/15/2025    HGB 11.1 (L) 01/15/2025    HCT 33.1 (L) 01/15/2025    MCV 93.2 01/15/2025     01/15/2025       LDH   Date Value Ref Range Status   05/01/2024 155 135 - 225 U/L Final       Lab Results   Component Value Date    CALCIUM 8.6 01/15/2025       Brief Urine Lab Results  (Last result in the past 365 days)        Color   Clarity   Blood   Leuk Est   Nitrite   Protein   CREAT   Urine HCG        01/01/25 1418 Yellow   Cloudy   Negative   Negative   Negative   Negative                   No results found for: \"URINECX\"    )No components found for: \"STONEANALYSI\"      Radiologic Studies  CT Abdomen Pelvis With Contrast  Result Date: 1/1/2025  1. Large stone in the urinary bladder. 2. Wall thickening of the urinary bladder with probable diverticula. This is likely related to chronic cystitis and possibly mild chronic outlet obstruction. 3. 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      CTDI: 9.79 mGy DLP:552.61 mGy.cm       This report " was signed and finalized on 1/1/2025 3:15 PM by Carmen Le MD.          I have personally reviewed these labs and images.      Assessment  Mr. Natarajan is a 79 y.o. male with history of BPH on Flomax and finasteride, recently seen in the ER with 3 cm bladder stone.  History of vascular disease / CVA and on Plavix.  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 No POA with him. Pt unable to answer questions about his health, and very [place]. 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    Patient lacks deicsional capacity, but presents today with no complaints.  He does not have any pain, dysuria, gross hematuria, or any recently documented lower urinary tract symptoms.  He has 0 insight into his current health status.  He has been at [facility] for 2 days and presents with a chaperone that knows nothing about his health and does not know who his POA is.  Patient is high risk for any surgery.    Plan  1.  Decision made to not perform cystolitholapaxy for bladder stone, to observe for now.  2.  Follow-up in 6 months with [facility].  Follow-up sooner if he starts having urinary tract infections or gross hematuria.  I discussed with his chaperone that in the future we need to have a power of  with him for any decisions made regarding surgery.

## 2025-01-16 NOTE — PROGRESS NOTES
Chief Complaint  Bladder Stone    No ref. provider found    HPI  Mr. Naatrajan is a 79 y.o. male who presents for further management of nephrolithiasis.    He presented to ER and was diagnosed with a large bladder stone. He presents today for follow up.  He is unaware that he has a large bladder stone.  He cannot tell me anything about his health, and denies previously having radiation for locally advanced ear/head neck cancer..  No fever, chills, nausea, vomiting.  No dysuria.  Denies gross hematuria or new or bothersome lower urinary tract symptoms.      Past Medical History  Past Medical History:   Diagnosis Date    Anemia     Arthritis     Cancer        Past Surgical History  Past Surgical History:   Procedure Laterality Date    NAIL BIOPSY N/A     NECK DISSECTION N/A     PAROTIDECTOMY N/A     SKIN CANCER EXCISION         Medications    Current Outpatient Medications:     acetaminophen (TYLENOL) 325 MG tablet, Take 2 tablets by mouth Every 6 (Six) Hours As Needed., Disp: , Rfl:     clopidogrel (PLAVIX) 75 MG tablet, Take 1 tablet by mouth Daily., Disp: , Rfl:     cyanocobalamin 1000 MCG/ML injection, Inject 0.1 mL into the appropriate muscle as directed by prescriber Every 30 (Thirty) Days., Disp: , Rfl:     divalproex (DEPAKOTE ER) 250 MG 24 hr tablet, Take 1 tablet by mouth Daily for 30 days., Disp: 30 tablet, Rfl: 0    DULoxetine (CYMBALTA) 30 MG capsule, Take 1 capsule by mouth Daily., Disp: , Rfl:     finasteride (PROSCAR) 5 MG tablet, Take 1 tablet by mouth Daily., Disp: , Rfl:     Hydrocortisone, Perianal, (ANUSOL-HC) 2.5 % rectal cream, Insert 1 Application into the rectum., Disp: , Rfl:     naloxone (NARCAN) 4 MG/0.1ML nasal spray, Call 911. Don't prime. [place] in 1 nostril for overdose. Repeat in 2-3 minutes in other nostril if no or minimal breathing/responsiveness., Disp: 2 each, Rfl: 0    ondansetron (ZOFRAN) 8 MG tablet, Take 1 tablet by mouth 3 (Three) Times a Day As Needed for Nausea or Vomiting.,  "Disp: 30 tablet, Rfl: 5    oxyCODONE (Roxicodone) 5 MG immediate release tablet, Take 1 tablet by mouth Every 6 (Six) Hours As Needed for Severe Pain., Disp: 120 tablet, Rfl: 0    senna (SENOKOT) 8.6 MG tablet, Take 1 tablet by mouth Daily As Needed., Disp: , Rfl:     tamsulosin (FLOMAX) 0.4 MG capsule 24 hr capsule, Take 1 capsule by mouth., Disp: , Rfl:     ziprasidone (GEODON) 40 MG capsule, Take 1 capsule by mouth Every Night for 30 days., Disp: 30 capsule, Rfl: 0    diclofenac (VOLTAREN) 75 MG EC tablet, , Disp: , Rfl:     levothyroxine (SYNTHROID, LEVOTHROID) 75 MCG tablet, TAKE ONE (1) TABLET BY MOUTH EVERY DAY, Disp: 30 tablet, Rfl: 3    ondansetron ODT (ZOFRAN-ODT) 8 MG disintegrating tablet, Place 1 tablet twice a day by translingual route. (Patient not taking: Reported on 1/16/2025), Disp: , Rfl:     Procto-Med HC 2.5 % rectal cream, 1 Application Daily As Needed. (Patient not taking: Reported on 1/16/2025), Disp: , Rfl:   No current facility-administered medications for this visit.    Allergies  Allergies   Allergen Reactions    Asa [Aspirin] Unknown - Low Severity     Nose bleed       Social History  Social History     Socioeconomic History    Marital status:    Tobacco Use    Smoking status: Never    Smokeless tobacco: Current     Types: Chew   Vaping Use    Vaping status: Never Used   Substance and Sexual Activity    Alcohol use: Not Currently    Drug use: Never    Sexual activity: Defer       Family History  Family History   Problem Relation Age of Onset    Cancer Mother     Cancer Father          Physical Exam  Visit Vitals  /70   Pulse 91   Temp 98 °F (36.7 °C)   Ht 185.4 cm (73\")   Wt 96.2 kg (212 lb)   SpO2 94%   BMI 27.97 kg/m²     Physical exam was notable for obese.     Labs  Lab Results   Component Value Date    GLUCOSE 96 01/15/2025    BUN 20 01/15/2025    CREATININE 1.31 (H) 01/15/2025    BCR 15.3 01/15/2025    K 4.1 01/15/2025    CO2 23.7 01/15/2025    CALCIUM 8.6 01/15/2025 " "   ALBUMIN 3.8 01/01/2025    AST 23 01/01/2025    ALT 13 01/01/2025       Lab Results   Component Value Date    WBC 3.48 01/15/2025    HGB 11.1 (L) 01/15/2025    HCT 33.1 (L) 01/15/2025    MCV 93.2 01/15/2025     01/15/2025       LDH   Date Value Ref Range Status   05/01/2024 155 135 - 225 U/L Final       Lab Results   Component Value Date    CALCIUM 8.6 01/15/2025       Brief Urine Lab Results  (Last result in the past 365 days)        Color   Clarity   Blood   Leuk Est   Nitrite   Protein   CREAT   Urine HCG        01/01/25 1418 Yellow   Cloudy   Negative   Negative   Negative   Negative                   No results found for: \"URINECX\"    )No components found for: \"STONEANALYSI\"      Radiologic Studies  CT Abdomen Pelvis With Contrast  Result Date: 1/1/2025  1. Large stone in the urinary bladder. 2. Wall thickening of the urinary bladder with probable diverticula. This is likely related to chronic cystitis and possibly mild chronic outlet obstruction. 3. 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      CTDI: 9.79 mGy DLP:552.61 mGy.cm       This report was signed and finalized on 1/1/2025 3:15 PM by Carmen Le MD.          I have personally reviewed these labs and images.      Assessment  Mr. Natarajan is a 79 y.o. male with history of BPH on Flomax and finasteride, recently seen in the ER with 3 cm bladder stone.  History of vascular disease / CVA and on Plavix.  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 No POA with him. Pt unable to answer questions about his health, and very [place]. 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    Patient lacks deicsional capacity, but presents today with no complaints.  He does not have any pain, dysuria, gross hematuria, or any recently documented lower urinary tract symptoms.  He has 0 insight into his " current health status.  He has been at [facility] for 2 days and presents with a chaperone that knows nothing about his health and does not know who his POA is.  Patient is high risk for any surgery.    Plan  1.  Decision made to not perform cystolitholapaxy for bladder stone, to observe for now.  2.  Follow-up in 6 months with [facility].  Follow-up sooner if he starts having urinary tract infections or gross hematuria.  I discussed with his chaperone that in the future we need to have a power of  with him for any decisions made regarding surgery.

## 2025-01-16 NOTE — TELEPHONE ENCOUNTER
----- Message from Buddy RAYA sent at 1/16/2025  9:13 AM EST -----  Patient was d/c from [facility] to [facility] - wanting to know what they need to do as in seeing Dr. Medellin and treatment.  Said he fell on 1/1 and lost memory for a bit and got his memory back some but not back 100%. Not really mobile like he was.         [phone] - home number - if no answer will be back by 3 or 4 today.

## 2025-01-16 NOTE — PAYOR COMM NOTE
"To:  Cheney  From: Hortencia Latif RN  Phone: [phone]  Fax: [phone]  NPI: [id number]  TIN: [id number]  Member ID: [id number]   MRN: [id number]    Hardik Browne (79 y.o. Male)       Date of Birth   1945    Social Security Number       Address   [address] [id number]    Home Phone   [phone]    MRN   [id number]       Church   None    Marital Status                               Admission Date   1/1/25    Admission Type   Emergency    Admitting Provider   Kerley, Brian Joseph, DO    Attending Provider       [facility]       Discharge Date   1/15/2025    Discharge Disposition   Skilled Nursing Facility ([place] - External)    Discharge Destination                                 Attending Provider: (none)   Allergies: Asa [Aspirin]    Isolation: None   Infection: None   Code Status: Prior    Ht: 185.4 cm (73\")   Wt: 96.3 kg (212 lb 4.9 oz)    Admission Cmt: None   Principal Problem: AMS (altered mental status) [R41.82]                   Active Insurance as of 1/1/2025       Primary Coverage       Payor Plan Insurance Group Employer/Plan Group    ANTHEM MEDICARE REPLACEMENT ANTHEM MED ADV SNP [id number]       Payor Plan Address Payor Plan Phone Number Payor Plan Fax Number Effective Dates    [address] [phone]  1/1/2024 - None Entered    [facility] [address]         Subscriber Name Subscriber Birth Date Member ID       HARDIK BROWNE 1945 [id number]               Secondary Coverage       Payor Plan Insurance Group Employer/Plan Group    [id number]        Payor Plan Address Payor Plan Phone Number Payor Plan Fax Number Effective Dates    [address]   4/7/2023 - None Entered    [facility] [place] [address]         Subscriber Name Subscriber Birth Date Member ID       HARDIK BROWNE 1945 [id number]                     Emergency Contacts        (Rel.) Home Phone Work Phone " "Mobile Phone    MICHAELA BROWNE (Son) [phone] -- [phone]                 Discharge Summary        Robert Clemente, DO at 01/15/25 0938              [facility]   DISCHARGE SUMMARY      Name:  Hardik Browne   Age:  79 y.o.  Sex:  male  :  1945  MRN:  [id number]   Visit Number:  [id number]    Admission Date:  2025  Date of Discharge:  1/15/2025  Primary Care Physician:  Won Cuellar MD    Important issues to note:    Start: Geodon and depakote  Stop: nothing  Follow up: PCP and Urology  Brief Summary: Presented with agitation and confusion due to vascular dementia. Initially had intiation of geodon and depakote which improved to calm and resting well by the time of discharge.    Discharge Diagnoses:       AMS (altered mental status)    Altered mental status        Problem List:     Active Hospital Problems    Diagnosis  POA    **AMS (altered mental status) [R41.82]  Yes    Altered mental status [R41.82]  Yes      Resolved Hospital Problems   No resolved problems to display.     Presenting Problem:    Chief Complaint   Patient presents with    Altered Mental Status    Fall      Consults:     Consulting Physician(s)         Provider   Role Specialty     Margaux Silva MD      Consulting Physician Psychiatry     Jonathan Villagran MD      Consulting Physician Neurology          Procedures Performed:            1/15/2025: Placement today. Called Yobany/Leonora no answer.  Currently pleasant. Sleeping good.    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  Presented to [facility] ED on 2025 with concern for altered mental status.  Last known normal was night prior to presentation.  Also reported having a fall, " "unclear if it was syncope.  Wife found him on the floor.  On Plavix, no other anticoagulation.  He denied any fevers or chills, chest pain, shortness of air, any other specific concern.  Denied any slurred speech, difficulty moving upper or lower extremities, and any in symmetry.     ED summary: Afebrile, vital signs stable on room air.  ABG pH 7.44, pCO2 34, pO2 82, bicarb 24.  EKG sinus tachycardia rate 107, nonspecific ST-T wave changes.  Opponent 23, ACS not suspected.  Creatinine 1.34, lactate elevated, procalcitonin not elevated.  UDS negative.  Blood cultures collected.  COVID/flu/RSV negative.  CT abdomen/pelvis large stone in urinary bladder, wall thickening probable diverticula, abnormal attenuation of the root of the mesentery most suggestive of mesenteric panniculitis.  CT head no intracranial hemorrhage or evidence of acute large cortical infarct, stable atrophy and chronic findings, left mastoiditis and left otitis media with bone destruction of the left mastoid air cells, right maxillary sinusitis.  CT cervical spine no acute fracture, multilevel degenerative disc disease.\"     Appears patient has had ongoing mental status changes throughout hospital stay.  Has been seen by neurology.  Underwent MRI of the brain with no new stroke, there was evidence of chronic changes, possible mastoiditis.  No seizure-like activity.  Was given Geodon.  B12 is being dosed due to low B12 levels.  Was on prescription for Augmentin as well due to possible mastoiditis.  Edited by: Robert Clemente,  at 1/15/2025 0934  AMS (altered mental status)    Altered mental status        Assessment/Plan:     Confusion/agitation:  Patient has extensive workup in regards to his confusion.  He is MRI was showing chronic infarcts, there was possible mastoiditis he completed a course of Augmentin.  He had been on B12 injections due to low B12 noted.     Patient most likely does have evidence of vascular dementia.  He has been " started on oral Geodon for the last week and is doing well, no further restlessness or issues at night.  He is on low-dose Depakote.  Doing well 1/15/25     Left upper extremity weakness/hypothyroidism/cancer pain:  Complicates all aspects of care.     Patient's mental status is closer to baseline, this may be his new baseline.  No issues with agitation or restlessness in a week.      DVT Prophylaxis: Heparin  Code Status: Full code  Diet: Cardiac  Discharge Plan: LTC pending, [place] today  Edited by: Robert Clemente,  at 1/15/2025 0936      Vital Signs:    Temp:  [97.7 °F (36.5 °C)-98.7 °F (37.1 °C)] 97.7 °F (36.5 °C)  Heart Rate:  [] 74  Resp:  [20-22] 20  BP: (113-143)/(62-82) 130/78    Physical Exam:    Constitutional: No acute distress, awake, alert  HENT: NCAT, mucous membranes moist, [place], lesion surrounding left ear left side of head, no drainage  Respiratory: Clear to auscultation bilaterally, respiratory effort normal   Cardiovascular: RRR, no murmurs, rubs, or gallops  Gastrointestinal: Positive bowel sounds, soft, nontender, nondistended  Musculoskeletal: No bilateral ankle edema  Psychiatric: Appropriate affect, cooperative  Neurologic: Oriented to self only, speech clear  Skin: No rashes  Exam stable 1/15/25    Pertinent Lab Results:     Results from last 7 days   Lab Units 01/15/25  0800 01/12/25  0812   SODIUM mmol/L 134* 138   POTASSIUM mmol/L 4.1 4.6   CHLORIDE mmol/L 101 101   CO2 mmol/L 23.7 28.3   BUN mg/dL 20 23   CREATININE mg/dL 1.31* 1.63*   CALCIUM mg/dL 8.6 9.4   GLUCOSE mg/dL 96 98     Results from last 7 days   Lab Units 01/15/25  0800 01/12/25  0812   WBC 10*3/mm3 3.48 2.76*   HEMOGLOBIN g/dL 11.1* 11.7*   HEMATOCRIT % 33.1* 35.2*   PLATELETS 10*3/mm3 155 202                                   Pertinent Radiology Results:    Imaging Results (All)       Procedure Component Value Units Date/Time    XR Shoulder 2+ View Left [[id number]] Collected: 01/03/25 0816     Updated:  01/03/25 0819    Narrative:         PROCEDURE: XR SHOULDER 2+ VW LEFT-     HISTORY: left shoulder pain; R41.82-Altered mental status, unspecified;  N21.0-Calculus in bladder; C44.229-Squamous cell carcinoma of skin of  left ear and external auricular canal, no trauma.     COMPARISON: None.     FINDINGS:  A three view exam demonstrates no acute fracture or  dislocation. The joint spaces demonstrate decreased subacromial distance  suggesting chronic rotator cuff tear. Diffuse osteopenia identified.  Mild narrowing of the left AC joint noted. No soft tissue abnormality is  seen. No bony destructive lesion seen.          Impression:      No acute bony abnormality.     Degenerative change as described.                 This report was signed and finalized on 1/3/2025 8:17 AM by Camille Rodriguez MD.       MRI Brain Without Contrast [[id number]] Collected: 01/01/25 1845     Updated: 01/01/25 1847    Narrative:      FINAL REPORT    TECHNIQUE:  null    CLINICAL HISTORY:  AMS SEVERE HX OF PREVIOUS STROKE, FALL TODAY, HX OF PREVIOUS  SKIN CA OF LEFT EAR.    COMPARISON:  null    FINDINGS:  MR of the brain without contrast    Comparison: MR/SR - MRI BRAIN W WO CONTRAST - 8/2/24 21:53 EDT    CT - CT ANGIOGRAM NECK W WO CONTRAST - 8/2/24 20:54 EDT    CT - CT ANGIOGRAM HEAD W CONTRAST - 8/2/24 20:54 EDT    CT/SR - CT HEAD WO CONTRAST STROKE PROTOCOL - 8/2/24 20:54 EDT    MR/SR - MRI BRAIN W WO CONTRAST - 5/10/23 18:47 EDT    Findings:    No acute infarction, hemorrhage, mass-effect or herniation.    No hydrocephalus.    Increased signal intensity is seen in the deep and periventricular white matter on the T2/FLAIR sequences, which most likely represents the sequela of severe chronic small vessel ischemic disease. Chronic lacunar infarctions in the right corona   radiata/centrum semiovale and left basal ganglia.    No extra-axial fluid collection or mass.    Unremarkable sella.    Intact flow voids.    Normal orbits.    Mucosal  thickening in the paranasal sinuses may indicate sinusitis. Fluid signal in right mastoid air cells could be secondary to an effusion or mastoiditis. There is osseous destruction at the left mastoid process, which was also present on the prior   studies and could be related to the previously seen skin cancer and/or post treatment changes. There is fluid signal in left mastoid air cells which could be secondary to an effusion or mastoiditis.      Impression:      Impression:    No acute infarction. No posttraumatic findings.    Sinusitis could be considered.    Bilateral mastoid air cell effusions versus mastoiditis. Osseous destruction of the left mastoid process could be secondary to the previously seen skin cancer and/or posttreatment changes. Correlate clinically for signs/symptoms of infection to exclude   osteomyelitis.    Authenticated and Electronically Signed by Jaqueline Painter MD  on 01/01/2025 06:45:00 PM    CT Abdomen Pelvis With Contrast [[id number]] Collected: 01/01/25 1507     Updated: 01/01/25 1517    Narrative:      CT ABDOMEN AND PELVIS WITH CONTRAST     INDICATION: Possible bladder stone on x-ray.     TECHNIQUE: Thin section axial images were obtained from the lung bases  through the pubic symphysis after oral and intravenous contrast. Coronal  reconstruction images were obtained from the axial data.     COMPARISON: None.     FINDINGS:     Lower chest:  Lung bases are clear.     Liver:  Homogeneous.  No focal lesion.     Gallbladder/biliary system:  Gallbladder is present.   No intrahepatic  or extrahepatic biliary dilatation.     Spleen:  Unremarkable.     Adrenal glands:  Unremarkable. No nodules.     Pancreas: Evaluation limited by motion artifact. No convincing mass or  evidence of pancreatitis.     Kidneys/ureters/bladder:  No hydronephrosis, solid mass or perinephric  stranding. A large calcification in the urinary bladder is consistent  with a bladder stone. This measures 23 mm. There is  also irregularity of  the bladder wall which could be related to chronic cystitis. Bladder  diverticula are not excluded near the dome.     GI tract: No evidence of small bowel obstruction or focal small bowel  wall thickening. Normal appendix. No acute colon abnormality.     Pelvic organs: Mild enlargement of the prostate.     Lymph nodes/mesentery/retroperitoneum: There is abnormal attenuation in  the root of the mesentery with small lymph nodes. 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     Abdominal wall: Abdominal wall intact.      Vascular: No acute vascular abnormality.     Free fluid: No free fluid.     Bones: No acute osseous abnormality.       Impression:      1. Large stone in the urinary bladder.  2. Wall thickening of the urinary bladder with probable diverticula.  This is likely related to chronic cystitis and possibly mild chronic  outlet obstruction.  3. Abnormal attenuation at the root of the mesentery most suggestive of  mesenteric panniculitis.                 CTDI: 9.79 mGy  DLP:552.61 mGy.cm                    This report was signed and finalized on 1/1/2025 3:15 PM by Carmen Le MD.       CT Cervical Spine Without Contrast [[id number]] Collected: 01/01/25 1319     Updated: 01/01/25 1324    Narrative:      PROCEDURE: CT CERVICAL SPINE WO CONTRAST-     INDICATION: fall this morning     TECHNIQUE: Thin section axial images were obtained through the cervical  spine without contrast.  Coronal and sagittal reconstruction images were  obtained from the axial data.     COMPARISON: None.     FINDINGS: Motion artifact limits exam quality. There is no convincing  acute fracture. No facet lock. The craniocervical junction appears  intact.   There is multilevel degenerative disc disease.   No acute  paraspinal abnormality.       Impression:      No acute fracture within the limitations of the exam. There  is motion artifact.     Multilevel degenerative disc disease.      Please see CT head report regarding findings of the left mastoid air  cells where there is mastoiditis and bone destruction.                       CTDI: 16.93 mGy  DLP:837.44 mGy.cm              This study was performed with techniques to keep radiation doses as low  as reasonably achievable (ALARA). Individualized dose reduction  techniques using automated exposure control or adjustment of mA and/or  kV according to the patient size were employed.        This report was signed and finalized on 1/1/2025 1:22 PM by Carmen Le MD.       CT Head Without Contrast [[id number]] Collected: 01/01/25 1315     Updated: 01/01/25 1321    Narrative:      PROCEDURE: CT HEAD WO CONTRAST-     INDICATION: AMS     TECHNIQUE: Multiple axial CT images were performed from the foramen  magnum to the vertex without contrast.   Coronal reconstruction images  were obtained from the axial data.     COMPARISON: 8/2/2024.     FINDINGS: There is no mass effect or midline shift. There is stable  atrophy with ventricular enlargement. There are stable bilateral  periventricular hypodensities. There is no intracranial hemorrhage. The  posterior fossa is without acute abnormality. There is mucoperiosteal  thickening of the right maxillary sinus. There is opacification of the  left mastoid air cells with bone destruction. There is soft tissue  density in the left middle ear cavity. This was present on the prior  exam. No skull fracture.       Impression:      1. No intracranial hemorrhage or evidence of acute large cortical  infarct.  2. Stable atrophy and chronic findings.  3. Left mastoiditis and left otitis media with bone destruction of the  left mastoid air cells.  4. Right maxillary sinusitis.                          CTDI: 16.93 mGy  DLP:837.44 mGy.cm           This study was performed with techniques to keep radiation doses as low  as reasonably achievable (ALARA). Individualized dose reduction  techniques using automated exposure  control or adjustment of mA and/or  kV according to the patient size were employed.        This report was signed and finalized on 1/1/2025 1:19 PM by Carmen Le MD.       XR Pelvis 1 or 2 View [[id number]] Collected: 01/01/25 1314     Updated: 01/01/25 1317    Narrative:      PROCEDURE: XR PELVIS 1 OR 2 VW-     HISTORY: fall, AMS     COMPARISON: None.     FINDINGS: An AP view of the pelvis was obtained.  There is no acute  osseous abnormality of the pelvis. There is degenerative joint disease  of the hips bilaterally. There is a round radiodensity projecting over  the midline pelvis. This could be a bladder stone or artifact in or on  the patient's clothing. Soft tissues are otherwise without acute  abnormality.        Impression:      No acute osseous abnormality of the pelvis.     Round radiodensity projecting over the midline pelvis as above.                       This report was signed and finalized on 1/1/2025 1:15 PM by Carmen Le MD.       XR Chest 1 View [[id number]] Collected: 01/01/25 1313     Updated: 01/01/25 1316    Narrative:      PROCEDURE: XR CHEST 1 VW-     INDICATION:  fall, AMS     FINDINGS:  A portable view of the chest was obtained.  Comparison is  made to a prior exam dated 8/2/2024.   Heart size is normal. Lung  volumes are very low. Right perihilar and bibasilar opacities may be  atelectasis. Pneumonia is not excluded. No significant pleural effusion  or pneumothorax.       Impression:      Exam limited by low lung volumes. Probable bilateral  atelectasis.        This report was signed and finalized on 1/1/2025 1:14 PM by Carmen Le MD.               Echo:    Results for orders placed during the hospital encounter of 08/02/24    Adult Transthoracic Echo Complete w/ Color, Spectral and Contrast if necessary per protocol    Interpretation Summary    Left ventricular systolic function is normal. Calculated left ventricular EF = 60.4% Left ventricular ejection fraction appears to be  56 - 60%. Left ventricular diastolic function was normal.    The right ventricular cavity is borderline dilated with normal systolic function.    Mild tricuspid valve regurgitation is present. Estimated right ventricular systolic pressure from tricuspid regurgitation is normal (<35 mmHg).    Saline test results are negative for right to left atrial level shunt.    Condition on Discharge:      Stable.    Code status during the hospital stay:    Code Status and Medical Interventions: CPR (Attempt to Resuscitate); Full Support   Ordered at: 01/01/25 9830     Code Status (Patient has no pulse and is not breathing):    CPR (Attempt to Resuscitate)     Medical Interventions (Patient has pulse or is breathing):    Full Support     Discharge Disposition:    Skilled Nursing Facility ([place] - External)    Discharge Medications:       Discharge Medications        New Medications        Instructions Start Date   divalproex 250 MG 24 hr tablet  Commonly known as: DEPAKOTE ER   250 mg, Oral, Daily   Start Date: January 16, 2025     naloxone 4 MG/0.1ML nasal spray  Commonly known as: NARCAN   Call 911. Don't prime. [place] in 1 nostril for overdose. Repeat in 2-3 minutes in other nostril if no or minimal breathing/responsiveness.      ziprasidone 40 MG capsule  Commonly known as: GEODON   40 mg, Oral, Nightly             Continue These Medications        Instructions Start Date   acetaminophen 325 MG tablet  Commonly known as: TYLENOL   650 mg, Every 6 Hours PRN      clopidogrel 75 MG tablet  Commonly known as: PLAVIX   1 tablet, Daily      cyanocobalamin 1000 MCG/ML injection   100 mcg, Every 30 Days      diclofenac 75 MG EC tablet  Commonly known as: VOLTAREN       DULoxetine 30 MG capsule  Commonly known as: CYMBALTA   Take 1 capsule by mouth Daily.      finasteride 5 MG tablet  Commonly known as: PROSCAR   Take 1 tablet by mouth Daily.      Hydrocortisone (Perianal) 2.5 % rectal cream  Commonly known as: ANUSOL-HC   1 application ,  Rectal      Procto-Med HC 2.5 % rectal cream  Generic drug: Hydrocortisone (Perianal)   Daily PRN      levothyroxine 75 MCG tablet  Commonly known as: SYNTHROID, LEVOTHROID   TAKE ONE (1) TABLET BY MOUTH EVERY DAY      ondansetron 8 MG tablet  Commonly known as: ZOFRAN   8 mg, Oral, 3 Times Daily PRN      ondansetron ODT 8 MG disintegrating tablet  Commonly known as: ZOFRAN-ODT   Place 1 tablet twice a day by translingual route.      oxyCODONE 5 MG immediate release tablet  Commonly known as: Roxicodone   5 mg, Oral, Every 6 Hours PRN      senna 8.6 MG tablet  Commonly known as: SENOKOT   1 tablet, Daily PRN      tamsulosin 0.4 MG capsule 24 hr capsule  Commonly known as: FLOMAX   0.4 mg             Stop These Medications      hydrOXYzine 25 MG tablet  Commonly known as: ATARAX            Discharge Diet:     Diet Instructions       Advance Diet As Tolerated -Target Diet: Cardiac      Target Diet: Cardiac          Activity at Discharge:       Follow-up Appointments:    Additional Instructions for the Follow-ups that You Need to Schedule       Discharge Follow-up with PCP   As directed       Currently Documented PCP:    Won Cuellar MD    PCP Phone Number:    [phone]     Follow Up Details: 1 week               Contact information for follow-up providers       Schedule an appointment as soon as possible for a visit  with Won Cuellar MD.    Specialty: Family Medicine  Why: for further outpatient evaluation if symptoms persist  Contact information:  [facility] [facility] [id number] [phone]               Schedule an appointment as soon as possible for a visit  with [facility] UROLOGY.    Specialty: Urology  Why: For further outpatient evaluation and definitive care of bladder stone  Contact information:  [address]  [facility] [address] [phone]  Additional information:  Phone Number: [phone] Location is shortly past the [place] [employer] [place]  [address] on [address]             Go to  [facility] EMERGENCY DEPARTMENT.    Specialty: Emergency Medicine  Why: As needed, If symptoms worsen  Contact information:  [address] [facility] [address] [phone]             Won Cuellar MD .    Specialty: Family Medicine  Why: 1 week  Contact information:  [address] [facility] [address]  [phone]                       Contact information for after-discharge care       Destination       [facility] .    Service: Skilled Nursing  Contact information:  [address] [facility] [address] [phone]                                 No future appointments.  Test Results Pending at Discharge:           Robert Clemente DO  01/15/25  09:38 EST    Time: I spent 45 minutes on this discharge activity which included: face-to-face encounter with the patient, reviewing the data in the system, coordination of the care with the nursing staff as well as consultants, documentation, and entering orders.     Dictated utilizing Dragon dictation.        Electronically signed by Robert Clemente DO at 01/15/25 0953

## 2025-01-17 ENCOUNTER — NURSING HOME (OUTPATIENT)
Dept: INTERNAL MEDICINE | Facility: CLINIC | Age: 80
End: 2025-01-17
Payer: MEDICARE

## 2025-01-17 VITALS
HEIGHT: 73 IN | BODY MASS INDEX: 27.1 KG/M2 | HEART RATE: 77 BPM | TEMPERATURE: 97 F | WEIGHT: 204.5 LBS | OXYGEN SATURATION: 97 % | RESPIRATION RATE: 17 BRPM | DIASTOLIC BLOOD PRESSURE: 70 MMHG | SYSTOLIC BLOOD PRESSURE: 129 MMHG

## 2025-01-17 DIAGNOSIS — Z51.5 PALLIATIVE CARE BY SPECIALIST: ICD-10-CM

## 2025-01-17 DIAGNOSIS — F01.B11 MODERATE VASCULAR DEMENTIA WITH AGITATION: Primary | ICD-10-CM

## 2025-01-17 DIAGNOSIS — C44.229 SQUAMOUS CELL CARCINOMA, EAR, LEFT: ICD-10-CM

## 2025-01-17 DIAGNOSIS — E03.9 ACQUIRED HYPOTHYROIDISM: ICD-10-CM

## 2025-01-17 DIAGNOSIS — G89.3 CANCER RELATED PAIN: ICD-10-CM

## 2025-01-17 DIAGNOSIS — K59.04 CHRONIC IDIOPATHIC CONSTIPATION: ICD-10-CM

## 2025-01-17 DIAGNOSIS — N40.0 BENIGN PROSTATIC HYPERPLASIA WITHOUT LOWER URINARY TRACT SYMPTOMS: ICD-10-CM

## 2025-01-17 NOTE — LETTER
Nursing Home History and Physical       Juan Rojo DO  [address] [place] [address] Phone: [phone]  Fax: [phone]     PATIENT NAME: Hardik Natarajan                                                                          YOB: 1945           DATE OF SERVICE: 01/17/2025  FACILITY: [facility]    CHIEF COMPLAINT:  Nursing facility admission    History of Present Illness  The patient is an 79-year-old male who is a new patient admission at a nursing facility. He was recently hospitalized at [facility] on 01/01/2025 for altered mental status secondary to vascular dementia. During his hospital stay, he was started on a regimen of Geodon and Depakote, which improved his mental status. Despite a thorough workup for acute illness, no specific causes for the altered mental status were identified. Due to debility and weakness, he was subsequently transferred to this facility for further care and rehabilitation.    He reports satisfactory progress since his admission to the nursing facility and is actively participating in physical therapy sessions.    MEDICATIONS  Geodon, Depakote, levothyroxine, oxycodone, Flomax, Proscar       PAST MEDICAL & SURGICAL HISTORY:   Past Medical History:   Diagnosis Date   • Anemia    • Arthritis    • Cancer       Past Surgical History:   Procedure Laterality Date   • NAIL BIOPSY N/A    • NECK DISSECTION N/A    • PAROTIDECTOMY N/A    • SKIN CANCER EXCISION           MEDICATIONS:  I have reviewed and reconciled the patients medication list in the patients chart at the skilled nursing facility on 01/17/2025.      ALLERGIES:  Allergies   Allergen Reactions   • Asa [Aspirin] Unknown - Low Severity     Nose bleed         SOCIAL HISTORY:  Social History     Socioeconomic History   • Marital status:    Tobacco Use   • Smoking status: Never     Passive exposure: Never   • Smokeless tobacco: Current     Types: Chew   Vaping Use   • Vaping status:  "Never Used   Substance and Sexual Activity   • Alcohol use: Not Currently   • Drug use: Never   • Sexual activity: Defer       FAMILY HISTORY:  Family History   Problem Relation Age of Onset   • Cancer Mother    • Cancer Father         REVIEW OF SYSTEMS:  Review of Systems    PHYSICAL EXAMINATION:   VITAL SIGNS: /70   Pulse 77   Temp 97 °F (36.1 °C)   Resp 17   Ht 185.4 cm (73\")   Wt 92.8 kg (204 lb 8 oz)   SpO2 97%   BMI 26.98 kg/m²     Physical Exam  Vitals and nursing note reviewed.   Constitutional:       Appearance: Normal appearance. He is well-developed.      Comments: Frail elderly male. NAD   HENT:      Head: Normocephalic and atraumatic.      Nose: Nose normal.      Mouth/Throat:      Mouth: Mucous membranes are moist.      Pharynx: No oropharyngeal exudate.   Eyes:      General: No scleral icterus.     Conjunctiva/sclera: Conjunctivae normal.      Pupils: Pupils are equal, round, and reactive to light.   Neck:      Thyroid: No thyromegaly.   Cardiovascular:      Rate and Rhythm: Normal rate and regular rhythm.      Heart sounds: Murmur heard.      No friction rub. No gallop.   Pulmonary:      Effort: Pulmonary effort is normal. No respiratory distress.      Breath sounds: Normal breath sounds. No wheezing.   Abdominal:      General: Bowel sounds are normal. There is no distension.      Palpations: Abdomen is soft.      Tenderness: There is no abdominal tenderness.   Musculoskeletal:         General: No deformity or signs of injury.      Cervical back: Normal range of motion and neck supple.   Lymphadenopathy:      Cervical: No cervical adenopathy.   Skin:     General: Skin is warm and dry.      Findings: No rash.   Neurological:      Mental Status: He is alert and oriented to person, place, and time.   Psychiatric:         Mood and Affect: Mood normal.         Behavior: Behavior normal.         RECORDS REVIEW:   Discharge Summary [facility] 1/15/2025  Results      ASSESSMENT   Diagnoses and all " orders for this visit:    1. Moderate vascular dementia with agitation (Primary)    2. Squamous cell carcinoma, ear, left    3. Cancer related pain    4. Palliative care by specialist    5. Benign prostatic hyperplasia without lower urinary tract symptoms    6. Acquired hypothyroidism    7. Chronic idiopathic constipation        Assessment & Plan  1. Vascular dementia with confusion and agitation.  He has been stabilized on a regimen of Geodon and Depakote. He is advised to continue with his current medication regimen.    2. Hypothyroidism.  He will continue taking levothyroxine as prescribed.    3. 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  He is stable on oxycodone 5 mg as needed for pain management.    4. Constipation.  He is stable on his current bowel regimen.    5. Benign prostatic hyperplasia (BPH).  He will continue taking Flomax and Proscar as prescribed.         [x]  Discussed Patient in detail with nursing/staff, addressed all needs today.     [x]  Plan of Care Reviewed   [x]  PT/OT Reviewed   [x]  Order Changes  []  Discharge Plans Reviewed  [x]  Advance Directive on file with Nursing Home.   [x]  POA on file with Nursing Home.    [x]  Code Status listed and reviewed.     Juan Rojo DO.  1/18/2025      **Part of this note may be an electronic transcription/translation of spoken language to printed text using the Dragon Dictation System.**    Patient or patient representative verbalized consent for the use of Ambient Listening during the visit with  Juan Rojo DO for chart documentation. 1/18/2025  15:07 EST

## 2025-01-18 NOTE — PROGRESS NOTES
Nursing Home History and Physical       Juan Rojo DO  [address] [place] [address] Phone: [phone]  Fax: [phone]     PATIENT NAME: Hardik Natarajan                                                                          YOB: 1945           DATE OF SERVICE: 01/17/2025  FACILITY: [facility]    CHIEF COMPLAINT:  Nursing facility admission    History of Present Illness  The patient is an 79-year-old male who is a new patient admission at a nursing facility. He was recently hospitalized at [facility] on 01/01/2025 for altered mental status secondary to vascular dementia. During his hospital stay, he was started on a regimen of Geodon and Depakote, which improved his mental status. Despite a thorough workup for acute illness, no specific causes for the altered mental status were identified. Due to debility and weakness, he was subsequently transferred to this facility for further care and rehabilitation.    He reports satisfactory progress since his admission to the nursing facility and is actively participating in physical therapy sessions.    MEDICATIONS  Geodon, Depakote, levothyroxine, oxycodone, Flomax, Proscar       PAST MEDICAL & SURGICAL HISTORY:   Past Medical History:   Diagnosis Date    Anemia     Arthritis     Cancer       Past Surgical History:   Procedure Laterality Date    NAIL BIOPSY N/A     NECK DISSECTION N/A     PAROTIDECTOMY N/A     SKIN CANCER EXCISION           MEDICATIONS:  I have reviewed and reconciled the patients medication list in the patients chart at the skilled nursing facility on 01/17/2025.      ALLERGIES:  Allergies   Allergen Reactions    Asa [Aspirin] Unknown - Low Severity     Nose bleed         SOCIAL HISTORY:  Social History     Socioeconomic History    Marital status:    Tobacco Use    Smoking status: Never     Passive exposure: Never    Smokeless tobacco: Current     Types: Chew   Vaping Use    Vaping status: Never Used  "  Substance and Sexual Activity    Alcohol use: Not Currently    Drug use: Never    Sexual activity: Defer       FAMILY HISTORY:  Family History   Problem Relation Age of Onset    Cancer Mother     Cancer Father         REVIEW OF SYSTEMS:  Review of Systems    PHYSICAL EXAMINATION:   VITAL SIGNS: /70   Pulse 77   Temp 97 °F (36.1 °C)   Resp 17   Ht 185.4 cm (73\")   Wt 92.8 kg (204 lb 8 oz)   SpO2 97%   BMI 26.98 kg/m²     Physical Exam  Vitals and nursing note reviewed.   Constitutional:       Appearance: Normal appearance. He is well-developed.      Comments: Frail elderly male. NAD   HENT:      Head: Normocephalic and atraumatic.      Nose: Nose normal.      Mouth/Throat:      Mouth: Mucous membranes are moist.      Pharynx: No oropharyngeal exudate.   Eyes:      General: No scleral icterus.     Conjunctiva/sclera: Conjunctivae normal.      Pupils: Pupils are equal, round, and reactive to light.   Neck:      Thyroid: No thyromegaly.   Cardiovascular:      Rate and Rhythm: Normal rate and regular rhythm.      Heart sounds: Murmur heard.      No friction rub. No gallop.   Pulmonary:      Effort: Pulmonary effort is normal. No respiratory distress.      Breath sounds: Normal breath sounds. No wheezing.   Abdominal:      General: Bowel sounds are normal. There is no distension.      Palpations: Abdomen is soft.      Tenderness: There is no abdominal tenderness.   Musculoskeletal:         General: No deformity or signs of injury.      Cervical back: Normal range of motion and neck supple.   Lymphadenopathy:      Cervical: No cervical adenopathy.   Skin:     General: Skin is warm and dry.      Findings: No rash.   Neurological:      Mental Status: He is alert and oriented to person, place, and time.   Psychiatric:         Mood and Affect: Mood normal.         Behavior: Behavior normal.         RECORDS REVIEW:   Discharge Summary [facility] 1/15/2025  Results      ASSESSMENT   Diagnoses and all orders for this " visit:    1. Moderate vascular dementia with agitation (Primary)    2. Squamous cell carcinoma, ear, left    3. Cancer related pain    4. Palliative care by specialist    5. Benign prostatic hyperplasia without lower urinary tract symptoms    6. Acquired hypothyroidism    7. Chronic idiopathic constipation        Assessment & Plan  1. Vascular dementia with confusion and agitation.  He has been stabilized on a regimen of Geodon and Depakote. He is advised to continue with his current medication regimen.    2. Hypothyroidism.  He will continue taking levothyroxine as prescribed.    3. 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  He is stable on oxycodone 5 mg as needed for pain management.    4. Constipation.  He is stable on his current bowel regimen.    5. Benign prostatic hyperplasia (BPH).  He will continue taking Flomax and Proscar as prescribed.         [x]  Discussed Patient in detail with nursing/staff, addressed all needs today.     [x]  Plan of Care Reviewed   [x]  PT/OT Reviewed   [x]  Order Changes  []  Discharge Plans Reviewed  [x]  Advance Directive on file with Nursing Home.   [x]  POA on file with Nursing Home.    [x]  Code Status listed and reviewed.     Juan Rojo DO.  1/18/2025      **Part of this note may be an electronic transcription/translation of spoken language to printed text using the Dragon Dictation System.**    Patient or patient representative verbalized consent for the use of Ambient Listening during the visit with  Juan Rojo DO for chart documentation. 1/18/2025  15:07 EST     What Type Of Note Output Would You Prefer (Optional)?: Bullet Format Hpi Title: Evaluation of Skin Lesions

## 2025-01-21 ENCOUNTER — NURSING HOME (OUTPATIENT)
Dept: INTERNAL MEDICINE | Facility: CLINIC | Age: 80
End: 2025-01-21
Payer: MEDICARE

## 2025-01-21 VITALS
WEIGHT: 208.8 LBS | BODY MASS INDEX: 27.67 KG/M2 | RESPIRATION RATE: 18 BRPM | SYSTOLIC BLOOD PRESSURE: 109 MMHG | DIASTOLIC BLOOD PRESSURE: 66 MMHG | OXYGEN SATURATION: 96 % | HEIGHT: 73 IN | TEMPERATURE: 97.6 F | HEART RATE: 71 BPM

## 2025-01-21 DIAGNOSIS — N40.0 BENIGN PROSTATIC HYPERPLASIA WITHOUT LOWER URINARY TRACT SYMPTOMS: ICD-10-CM

## 2025-01-21 DIAGNOSIS — G89.3 CANCER RELATED PAIN: ICD-10-CM

## 2025-01-21 DIAGNOSIS — K59.04 CHRONIC IDIOPATHIC CONSTIPATION: ICD-10-CM

## 2025-01-21 DIAGNOSIS — F01.B11 MODERATE VASCULAR DEMENTIA WITH AGITATION: ICD-10-CM

## 2025-01-21 DIAGNOSIS — C44.229 SQUAMOUS CELL CARCINOMA, EAR, LEFT: ICD-10-CM

## 2025-01-21 DIAGNOSIS — Z51.5 PALLIATIVE CARE BY SPECIALIST: ICD-10-CM

## 2025-01-21 DIAGNOSIS — E03.9 ACQUIRED HYPOTHYROIDISM: Primary | ICD-10-CM

## 2025-01-21 NOTE — LETTER
Nursing Home Progress Note        Juan Rojo DO   [address] [place] [address] Phone: [phone]  Fax: [phone]     PATIENT NAME: Hardik Natarajan                                                                          YOB: 1945           DATE OF SERVICE: 01/21/2025  FACILITY:  [facility]      CHIEF COMPLAINT:  Chronic Medical Management    History of Present Illness  Patient was seen in the nursing facility for concerns of recent lab work which was obtained recently.  Patient had elevated TSH of 7.58.  Due to concerns of poor compliance with the thyroid medications, pill count was requested to ensure patient was being given his thyroid medications.  Nurses verified that the patient had been taking his medications as scheduled at 6 AM.  Patient otherwise felt well and had no other specific complaints or concerns.  No signs of acute infection or acute distress       PAST MEDICAL & SURGICAL HISTORY:   Past Medical History:   Diagnosis Date   • Anemia    • Arthritis    • Cancer       Past Surgical History:   Procedure Laterality Date   • NAIL BIOPSY N/A    • NECK DISSECTION N/A    • PAROTIDECTOMY N/A    • SKIN CANCER EXCISION           MEDICATIONS:  I have reviewed and reconciled the patients medication list in the patients chart at the skilled nursing facility today, 01/21/2025.      ALLERGIES:  Allergies   Allergen Reactions   • Asa [Aspirin] Unknown - Low Severity     Nose bleed         SOCIAL HISTORY:  Social History     Socioeconomic History   • Marital status:    Tobacco Use   • Smoking status: Never     Passive exposure: Never   • Smokeless tobacco: Current     Types: Chew   Vaping Use   • Vaping status: Never Used   Substance and Sexual Activity   • Alcohol use: Not Currently   • Drug use: Never   • Sexual activity: Defer       FAMILY HISTORY:  Family History   Problem Relation Age of Onset   • Cancer Mother    • Cancer Father        REVIEW OF SYSTEMS:  Review of  "Systems     PHYSICAL EXAMINATION:     VITAL SIGNS:  /66   Pulse 71   Temp 97.6 °F (36.4 °C)   Resp 18   Ht 185.4 cm (73\")   Wt 94.7 kg (208 lb 12.8 oz)   SpO2 96%   BMI 27.55 kg/m²     Physical Exam  Vitals and nursing note reviewed.   Constitutional:       Appearance: Normal appearance. He is well-developed.      Comments: Frail elderly male. NAD   HENT:      Head: Normocephalic and atraumatic.      Nose: Nose normal.      Mouth/Throat:      Mouth: Mucous membranes are moist.      Pharynx: No oropharyngeal exudate.   Eyes:      General: No scleral icterus.     Conjunctiva/sclera: Conjunctivae normal.      Pupils: Pupils are equal, round, and reactive to light.   Neck:      Thyroid: No thyromegaly.   Cardiovascular:      Rate and Rhythm: Normal rate and regular rhythm.      Heart sounds: Murmur heard.      No friction rub. No gallop.   Pulmonary:      Effort: Pulmonary effort is normal. No respiratory distress.      Breath sounds: Normal breath sounds. No wheezing.   Abdominal:      General: Bowel sounds are normal. There is no distension.      Palpations: Abdomen is soft.      Tenderness: There is no abdominal tenderness.   Musculoskeletal:         General: No deformity or signs of injury.      Cervical back: Normal range of motion and neck supple.   Lymphadenopathy:      Cervical: No cervical adenopathy.   Skin:     General: Skin is warm and dry.      Findings: No rash.   Neurological:      Mental Status: He is alert and oriented to person, place, and time.   Psychiatric:         Mood and Affect: Mood normal.         Behavior: Behavior normal.       RECORDS REVIEW:   Results  Labs: 1/20/2025 CBC and CMP reviewed.  WBC 2.86, hemoglobin 10.2, creatinine 1.17, sodium 139.  TSH 7.58      ASSESSMENT     Diagnoses and all orders for this visit:    1. Acquired hypothyroidism (Primary)    2. Squamous cell carcinoma, ear, left    3. Cancer related pain    4. Palliative care by specialist    5. Moderate " vascular dementia with agitation    6. Benign prostatic hyperplasia without lower urinary tract symptoms    7. Chronic idiopathic constipation        Assessment & Plan      Hypothyroidism.  He will continue taking levothyroxine as prescribed.  TSH was noted to be high but this may be reactive.  Prior to admitting, patient had normal TSH on multiple occasions (Reviewed on epic).  TSH to be repeated in one month.  Synthroid timing of dose was chaged from 6 am to 8am to ensure compliance.     Vascular dementia with confusion and agitation.  He has been stabilized on a regimen of Geodon and Depakote. He is advised to continue with his current medication regimen.    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  He is stable on oxycodone 5 mg as needed for pain management.     Constipation.  He is stable on his current bowel regimen.      Benign prostatic hyperplasia (BPH).  He will continue taking Flomax and Proscar as prescribed.            [x]  Discussed Patient in detail with nursing/staff, addressed all needs today.     [x]  Plan of Care Reviewed   []  PT/OT Reviewed   []  Order Changes  []  Discharge Plans Reviewed  [x]  Advance Directive on file with Nursing Home.   [x]  POA on file with Nursing Home.    [x]  Code Status listed and reviewed.     I confirm accuracy of unchanged data/findings including physical exam and plan which have been carried forward from previous visit, as well as I have updated appropriately those that have changed        Juan Rojo DO.  1/22/2025      Patient or patient representative verbalized consent for the use of Ambient Listening during the visit with  Juan Rojo DO for chart documentation. 1/22/2025  13:44 EST

## 2025-01-21 NOTE — PROGRESS NOTES
Nursing Home Progress Note        Juan Rojo DO   [address] [place] [address] Phone: [phone]  Fax: [phone]     PATIENT NAME: Hardik Natarajan                                                                          YOB: 1945           DATE OF SERVICE: 01/21/2025  FACILITY:  [facility]      CHIEF COMPLAINT:  Chronic Medical Management    History of Present Illness  Patient was seen in the nursing facility for concerns of recent lab work which was obtained recently.  Patient had elevated TSH of 7.58.  Due to concerns of poor compliance with the thyroid medications, pill count was requested to ensure patient was being given his thyroid medications.  Nurses verified that the patient had been taking his medications as scheduled at 6 AM.  Patient otherwise felt well and had no other specific complaints or concerns.  No signs of acute infection or acute distress       PAST MEDICAL & SURGICAL HISTORY:   Past Medical History:   Diagnosis Date    Anemia     Arthritis     Cancer       Past Surgical History:   Procedure Laterality Date    NAIL BIOPSY N/A     NECK DISSECTION N/A     PAROTIDECTOMY N/A     SKIN CANCER EXCISION           MEDICATIONS:  I have reviewed and reconciled the patients medication list in the patients chart at the skilled nursing facility today, 01/21/2025.      ALLERGIES:  Allergies   Allergen Reactions    Asa [Aspirin] Unknown - Low Severity     Nose bleed         SOCIAL HISTORY:  Social History     Socioeconomic History    Marital status:    Tobacco Use    Smoking status: Never     Passive exposure: Never    Smokeless tobacco: Current     Types: Chew   Vaping Use    Vaping status: Never Used   Substance and Sexual Activity    Alcohol use: Not Currently    Drug use: Never    Sexual activity: Defer       FAMILY HISTORY:  Family History   Problem Relation Age of Onset    Cancer Mother     Cancer Father        REVIEW OF SYSTEMS:  Review of Systems     PHYSICAL  "EXAMINATION:     VITAL SIGNS:  /66   Pulse 71   Temp 97.6 °F (36.4 °C)   Resp 18   Ht 185.4 cm (73\")   Wt 94.7 kg (208 lb 12.8 oz)   SpO2 96%   BMI 27.55 kg/m²     Physical Exam  Vitals and nursing note reviewed.   Constitutional:       Appearance: Normal appearance. He is well-developed.      Comments: Frail elderly male. NAD   HENT:      Head: Normocephalic and atraumatic.      Nose: Nose normal.      Mouth/Throat:      Mouth: Mucous membranes are moist.      Pharynx: No oropharyngeal exudate.   Eyes:      General: No scleral icterus.     Conjunctiva/sclera: Conjunctivae normal.      Pupils: Pupils are equal, round, and reactive to light.   Neck:      Thyroid: No thyromegaly.   Cardiovascular:      Rate and Rhythm: Normal rate and regular rhythm.      Heart sounds: Murmur heard.      No friction rub. No gallop.   Pulmonary:      Effort: Pulmonary effort is normal. No respiratory distress.      Breath sounds: Normal breath sounds. No wheezing.   Abdominal:      General: Bowel sounds are normal. There is no distension.      Palpations: Abdomen is soft.      Tenderness: There is no abdominal tenderness.   Musculoskeletal:         General: No deformity or signs of injury.      Cervical back: Normal range of motion and neck supple.   Lymphadenopathy:      Cervical: No cervical adenopathy.   Skin:     General: Skin is warm and dry.      Findings: No rash.   Neurological:      Mental Status: He is alert and oriented to person, place, and time.   Psychiatric:         Mood and Affect: Mood normal.         Behavior: Behavior normal.       RECORDS REVIEW:   Results  Labs: 1/20/2025 CBC and CMP reviewed.  WBC 2.86, hemoglobin 10.2, creatinine 1.17, sodium 139.  TSH 7.58      ASSESSMENT     Diagnoses and all orders for this visit:    1. Acquired hypothyroidism (Primary)    2. Squamous cell carcinoma, ear, left    3. Cancer related pain    4. Palliative care by specialist    5. Moderate vascular dementia with " agitation    6. Benign prostatic hyperplasia without lower urinary tract symptoms    7. Chronic idiopathic constipation        Assessment & Plan      Hypothyroidism.  He will continue taking levothyroxine as prescribed.  TSH was noted to be high but this may be reactive.  Prior to admitting, patient had normal TSH on multiple occasions (Reviewed on epic).  TSH to be repeated in one month.  Synthroid timing of dose was chaged from 6 am to 8am to ensure compliance.     Vascular dementia with confusion and agitation.  He has been stabilized on a regimen of Geodon and Depakote. He is advised to continue with his current medication regimen.    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  He is stable on oxycodone 5 mg as needed for pain management.     Constipation.  He is stable on his current bowel regimen.      Benign prostatic hyperplasia (BPH).  He will continue taking Flomax and Proscar as prescribed.            [x]  Discussed Patient in detail with nursing/staff, addressed all needs today.     [x]  Plan of Care Reviewed   []  PT/OT Reviewed   []  Order Changes  []  Discharge Plans Reviewed  [x]  Advance Directive on file with Nursing Home.   [x]  POA on file with Nursing Home.    [x]  Code Status listed and reviewed.     I confirm accuracy of unchanged data/findings including physical exam and plan which have been carried forward from previous visit, as well as I have updated appropriately those that have changed        Juan Rojo DO.  1/22/2025      Patient or patient representative verbalized consent for the use of Ambient Listening during the visit with  Juan Rojo DO for chart documentation. 1/22/2025  13:44 EST

## 2025-03-12 ENCOUNTER — NURSING HOME (OUTPATIENT)
Dept: FAMILY MEDICINE CLINIC | Facility: CLINIC | Age: 80
End: 2025-03-12
Payer: MEDICARE

## 2025-03-12 VITALS
DIASTOLIC BLOOD PRESSURE: 55 MMHG | BODY MASS INDEX: 26.96 KG/M2 | RESPIRATION RATE: 18 BRPM | HEART RATE: 80 BPM | OXYGEN SATURATION: 96 % | TEMPERATURE: 97.8 F | HEIGHT: 73 IN | WEIGHT: 203.4 LBS | SYSTOLIC BLOOD PRESSURE: 108 MMHG

## 2025-03-12 DIAGNOSIS — R43.2 LOSS OF TASTE: ICD-10-CM

## 2025-03-12 DIAGNOSIS — R09.81 SINUS CONGESTION: ICD-10-CM

## 2025-03-12 DIAGNOSIS — J20.9 ACUTE BRONCHITIS, UNSPECIFIED ORGANISM: Primary | ICD-10-CM

## 2025-03-12 DIAGNOSIS — Z79.899 MEDICATION MANAGEMENT: ICD-10-CM

## 2025-03-12 NOTE — LETTER
Nursing Home Follow Up Note      Ashwin Aldana DO []   MARTÍNEZ Peters [x]  [address] [place] [address]  Phone: [phone]  Fax: [phone] Kennedy Olvera MD []    Juan Rojo DO []   [address] [place] [address]  Phone: [phone]  Fax: [phone]     PATIENT NAME: Hardik Natarajan                                                                          YOB: 1945           DATE OF SERVICE: 03/12/2025  FACILITY:  [x][facility]   [] [facility]   [] [facility]   [] [facility]   [] Other ______________________________________________________________________      CHIEF COMPLAINT:    Cough and congestion       HISTORY OF PRESENT ILLNESS:     80 yo male being seen today for complaints of cough and congestion for the past couple days. Feels congestion in his chest, but can not cough anything up. Cough medication has helped cough some. Also have some sinus congestion and drainage, can not taste his food. Has not had any fever or hypoxia, but does feel a little more short of breath. Covid test negative yesterday.     PAST MEDICAL & SURGICAL HISTORY:   Past Medical History:   Diagnosis Date   • Anemia    • Arthritis    • Cancer       Past Surgical History:   Procedure Laterality Date   • NAIL BIOPSY N/A    • NECK DISSECTION N/A    • PAROTIDECTOMY N/A    • SKIN CANCER EXCISION           MEDICATIONS:  I have reviewed and reconciled the patients medication list in the patients chart at the skilled nursing facility today.      ALLERGIES:    Allergies   Allergen Reactions   • Asa [Aspirin] Unknown - Low Severity     Nose bleed         SOCIAL HISTORY:    Social History     Socioeconomic History   • Marital status:    Tobacco Use   • Smoking status: Never     Passive exposure: Never   • Smokeless tobacco: Current     Types: Chew   Vaping Use   • Vaping status: Never Used   Substance and Sexual Activity   • Alcohol use: Not Currently   • Drug use: Never   • Sexual activity:  "Defer       FAMILY HISTORY:    Family History   Problem Relation Age of Onset   • Cancer Mother    • Cancer Father        REVIEW OF SYSTEMS:    Review of Systems   Constitutional:  Positive for appetite change (decreased taste). Negative for activity change, chills, diaphoresis, fatigue and fever.   HENT:  Positive for congestion, rhinorrhea and sinus pressure. Negative for mouth sores, nosebleeds, postnasal drip, sneezing, sore throat and trouble swallowing.    Eyes:  Negative for pain, discharge, redness, itching and visual disturbance.   Respiratory:  Positive for cough, chest tightness, shortness of breath and wheezing. Negative for apnea and choking.    Cardiovascular:  Negative for chest pain, palpitations and leg swelling.   Gastrointestinal:  Negative for abdominal distention, abdominal pain, constipation, diarrhea, nausea, vomiting and indigestion.   Genitourinary:  Negative for decreased urine volume, difficulty urinating, dysuria, flank pain, frequency, hematuria, urgency and urinary incontinence.   Musculoskeletal:  Positive for arthralgias (chronic). Negative for myalgias.   Skin:  Negative for color change, dry skin and rash.   Neurological:  Negative for dizziness, weakness, headache, memory problem and confusion.   Psychiatric/Behavioral:  Negative for behavioral problems, dysphoric mood, hallucinations, sleep disturbance and depressed mood. The patient is not nervous/anxious.          PHYSICAL EXAMINATION:   VITAL SIGNS:   Vitals:    03/12/25 1349   BP: 108/55   Pulse: 80   Resp: 18   Temp: 97.8 °F (36.6 °C)   SpO2: 96%   Weight: 92.3 kg (203 lb 6.4 oz)   Height: 185.4 cm (73\")        Physical Exam  Vitals and nursing note reviewed.   Constitutional:       General: He is not in acute distress.     Appearance: Normal appearance.   HENT:      Nose: Nose normal. Congestion and rhinorrhea present.   Eyes:      Conjunctiva/sclera: Conjunctivae normal.   Cardiovascular:      Rate and Rhythm: Normal rate " and regular rhythm.   Pulmonary:      Effort: Pulmonary effort is normal.      Breath sounds: Examination of the right-upper field reveals wheezing. Examination of the left-upper field reveals wheezing.   Abdominal:      General: Bowel sounds are normal. There is no distension.      Palpations: Abdomen is soft.      Tenderness: There is no abdominal tenderness.   Skin:     General: Skin is warm and dry.      Findings: No rash.   Neurological:      General: No focal deficit present.      Mental Status: He is alert and oriented to person, place, and time.   Psychiatric:         Mood and Affect: Mood and affect normal.         Speech: Speech normal.         Behavior: Behavior normal.         Cognition and Memory: Cognition and memory normal.         RECORDS REVIEW:   I have reviewed records in [facility]    ASSESSMENT     Diagnoses and all orders for this visit:    1. Acute bronchitis, unspecified organism (Primary)    2. Medication management    3. Sinus congestion    4. Loss of taste        PLAN    Acute bronchitis  Medication management   Loss of taste  Sinus congestion  Plan:  -Covid test negative 3/11  -CXR pending   -Mucinex 600 mg po bid x 5 days.   -Duo Nebs q 6 hours prn    Nursing encouraged to keep me informed of any acute changes, lack of improvement, or any new concerning symptoms.     Continue supportive care for all ADLs.    [x]  Discussed Patient in detail with nursing/staff, addressed all needs today.     [x]  Plan of Care Reviewed   []  PT/OT Reviewed   [x]  Order Changes  []  Discharge Plans Reviewed  [x]  Advance Directive on file with Nursing Home.   [x]  POA on file with Nursing Home.   [x]  Code Status listed: []  Full Code   []  DNR              MARTÍNEZ Salgado.

## 2025-03-14 NOTE — PROGRESS NOTES
Nursing Home Follow Up Note      Ashwin Aldana DO []   MARTÍNEZ Peters [x]  [address] [place] [address]  Phone: [phone]  Fax: [phone] Kennedy Olvera MD []    Juan Rojo DO []   [address] [place] [address]  Phone: [phone]  Fax: [phone]     PATIENT NAME: Hardik Natarajan                                                                          YOB: 1945           DATE OF SERVICE: 03/12/2025  FACILITY:  [x][facility]   [] [facility]   [] [facility]   [] [facility]   [] Other ______________________________________________________________________      CHIEF COMPLAINT:    Cough and congestion       HISTORY OF PRESENT ILLNESS:     78 yo male being seen today for complaints of cough and congestion for the past couple days. Feels congestion in his chest, but can not cough anything up. Cough medication has helped cough some. Also have some sinus congestion and drainage, can not taste his food. Has not had any fever or hypoxia, but does feel a little more short of breath. Covid test negative yesterday.     PAST MEDICAL & SURGICAL HISTORY:   Past Medical History:   Diagnosis Date    Anemia     Arthritis     Cancer       Past Surgical History:   Procedure Laterality Date    NAIL BIOPSY N/A     NECK DISSECTION N/A     PAROTIDECTOMY N/A     SKIN CANCER EXCISION           MEDICATIONS:  I have reviewed and reconciled the patients medication list in the patients chart at the skilled nursing facility today.      ALLERGIES:    Allergies   Allergen Reactions    Asa [Aspirin] Unknown - Low Severity     Nose bleed         SOCIAL HISTORY:    Social History     Socioeconomic History    Marital status:    Tobacco Use    Smoking status: Never     Passive exposure: Never    Smokeless tobacco: Current     Types: Chew   Vaping Use    Vaping status: Never Used   Substance and Sexual Activity    Alcohol use: Not Currently    Drug use: Never    Sexual activity: Defer  "      FAMILY HISTORY:    Family History   Problem Relation Age of Onset    Cancer Mother     Cancer Father        REVIEW OF SYSTEMS:    Review of Systems   Constitutional:  Positive for appetite change (decreased taste). Negative for activity change, chills, diaphoresis, fatigue and fever.   HENT:  Positive for congestion, rhinorrhea and sinus pressure. Negative for mouth sores, nosebleeds, postnasal drip, sneezing, sore throat and trouble swallowing.    Eyes:  Negative for pain, discharge, redness, itching and visual disturbance.   Respiratory:  Positive for cough, chest tightness, shortness of breath and wheezing. Negative for apnea and choking.    Cardiovascular:  Negative for chest pain, palpitations and leg swelling.   Gastrointestinal:  Negative for abdominal distention, abdominal pain, constipation, diarrhea, nausea, vomiting and indigestion.   Genitourinary:  Negative for decreased urine volume, difficulty urinating, dysuria, flank pain, frequency, hematuria, urgency and urinary incontinence.   Musculoskeletal:  Positive for arthralgias (chronic). Negative for myalgias.   Skin:  Negative for color change, dry skin and rash.   Neurological:  Negative for dizziness, weakness, headache, memory problem and confusion.   Psychiatric/Behavioral:  Negative for behavioral problems, dysphoric mood, hallucinations, sleep disturbance and depressed mood. The patient is not nervous/anxious.          PHYSICAL EXAMINATION:   VITAL SIGNS:   Vitals:    03/12/25 1349   BP: 108/55   Pulse: 80   Resp: 18   Temp: 97.8 °F (36.6 °C)   SpO2: 96%   Weight: 92.3 kg (203 lb 6.4 oz)   Height: 185.4 cm (73\")        Physical Exam  Vitals and nursing note reviewed.   Constitutional:       General: He is not in acute distress.     Appearance: Normal appearance.   HENT:      Nose: Nose normal. Congestion and rhinorrhea present.   Eyes:      Conjunctiva/sclera: Conjunctivae normal.   Cardiovascular:      Rate and Rhythm: Normal rate and " regular rhythm.   Pulmonary:      Effort: Pulmonary effort is normal.      Breath sounds: Examination of the right-upper field reveals wheezing. Examination of the left-upper field reveals wheezing.   Abdominal:      General: Bowel sounds are normal. There is no distension.      Palpations: Abdomen is soft.      Tenderness: There is no abdominal tenderness.   Skin:     General: Skin is warm and dry.      Findings: No rash.   Neurological:      General: No focal deficit present.      Mental Status: He is alert and oriented to person, place, and time.   Psychiatric:         Mood and Affect: Mood and affect normal.         Speech: Speech normal.         Behavior: Behavior normal.         Cognition and Memory: Cognition and memory normal.         RECORDS REVIEW:   I have reviewed records in [facility]    ASSESSMENT     Diagnoses and all orders for this visit:    1. Acute bronchitis, unspecified organism (Primary)    2. Medication management    3. Sinus congestion    4. Loss of taste        PLAN    Acute bronchitis  Medication management   Loss of taste  Sinus congestion  Plan:  -Covid test negative 3/11  -CXR pending   -Mucinex 600 mg po bid x 5 days.   -Duo Nebs q 6 hours prn    Nursing encouraged to keep me informed of any acute changes, lack of improvement, or any new concerning symptoms.     Continue supportive care for all ADLs.    [x]  Discussed Patient in detail with nursing/staff, addressed all needs today.     [x]  Plan of Care Reviewed   []  PT/OT Reviewed   [x]  Order Changes  []  Discharge Plans Reviewed  [x]  Advance Directive on file with Nursing Home.   [x]  POA on file with Nursing Home.   [x]  Code Status listed: []  Full Code   []  DNR              MARTÍNEZ Salgado.

## 2025-06-17 ENCOUNTER — NURSING HOME (OUTPATIENT)
Dept: INTERNAL MEDICINE | Facility: CLINIC | Age: 80
End: 2025-06-17
Payer: MEDICAID

## 2025-06-17 VITALS
RESPIRATION RATE: 18 BRPM | HEART RATE: 61 BPM | DIASTOLIC BLOOD PRESSURE: 60 MMHG | TEMPERATURE: 97.3 F | HEIGHT: 73 IN | WEIGHT: 204 LBS | SYSTOLIC BLOOD PRESSURE: 114 MMHG | OXYGEN SATURATION: 98 % | BODY MASS INDEX: 27.04 KG/M2

## 2025-06-17 DIAGNOSIS — K59.04 CHRONIC IDIOPATHIC CONSTIPATION: ICD-10-CM

## 2025-06-17 DIAGNOSIS — E03.9 ACQUIRED HYPOTHYROIDISM: Primary | ICD-10-CM

## 2025-06-17 DIAGNOSIS — G89.3 NEOPLASM RELATED PAIN (ACUTE) (CHRONIC): ICD-10-CM

## 2025-06-17 DIAGNOSIS — C44.229 SQUAMOUS CELL CARCINOMA OF SKIN OF LEFT EAR AND EXTERNAL AURICULAR CANAL: ICD-10-CM

## 2025-06-17 DIAGNOSIS — N40.0 BENIGN PROSTATIC HYPERPLASIA WITHOUT LOWER URINARY TRACT SYMPTOMS: ICD-10-CM

## 2025-06-17 DIAGNOSIS — F01.B11 MODERATE VASCULAR DEMENTIA WITH AGITATION: ICD-10-CM

## 2025-06-17 NOTE — LETTER
"  Nursing Home Progress Note        Juan Rojo DO   [address] [place] [address] Phone: [phone]  Fax: [phone]     PATIENT NAME: Hardik Natarajan                                                                          YOB: 1945           DATE OF SERVICE: 06/17/2025  FACILITY:  [facility]      CHIEF COMPLAINT:  Chronic Medical Management    History of Present Illness         PAST MEDICAL & SURGICAL HISTORY:   Past Medical History:   Diagnosis Date    Anemia     Arthritis     Cancer       Past Surgical History:   Procedure Laterality Date    NAIL BIOPSY N/A     NECK DISSECTION N/A     PAROTIDECTOMY N/A     SKIN CANCER EXCISION           MEDICATIONS:  I have reviewed and reconciled the patients medication list in the patients chart at the [facility] nursing facility today, 06/17/2025.      ALLERGIES:  Allergies   Allergen Reactions    Asa [Aspirin] Unknown - Low Severity     Nose bleed         SOCIAL HISTORY:  Social History     Socioeconomic History    Marital status:    Tobacco Use    Smoking status: Never     Passive exposure: Never    Smokeless tobacco: Current     Types: Chew   Vaping Use    Vaping status: Never Used   Substance and Sexual Activity    Alcohol use: Not Currently    Drug use: Never    Sexual activity: Defer       FAMILY HISTORY:  Family History   Problem Relation Age of Onset    Cancer Mother     Cancer Father        REVIEW OF SYSTEMS:  Review of Systems     PHYSICAL EXAMINATION:     VITAL SIGNS:  /60   Pulse 61   Temp 97.3 °F (36.3 °C)   Resp 18   Ht 185.4 cm (73\")   Wt 92.5 kg (204 lb)   SpO2 98%   BMI 26.91 kg/m²     Physical Exam  Vitals and nursing note reviewed.   Constitutional:       Appearance: Normal appearance. He is well-developed.      Comments: Frail elderly male. NAD   HENT:      Head: Normocephalic and atraumatic.      Nose: Nose normal.      Mouth/Throat:      Mouth: Mucous membranes are moist.      Pharynx: No oropharyngeal " exudate.   Eyes:      General: No scleral icterus.     Conjunctiva/sclera: Conjunctivae normal.      Pupils: Pupils are equal, round, and reactive to light.   Neck:      Thyroid: No thyromegaly.   Cardiovascular:      Rate and Rhythm: Normal rate and regular rhythm.      Heart sounds: Murmur heard.      No friction rub. No gallop.   Pulmonary:      Effort: Pulmonary effort is normal. No respiratory distress.      Breath sounds: Normal breath sounds. No wheezing.   Abdominal:      General: Bowel sounds are normal. There is no distension.      Palpations: Abdomen is soft.      Tenderness: There is no abdominal tenderness.   Musculoskeletal:         General: No deformity or signs of injury.      Cervical back: Normal range of motion and neck supple.   Lymphadenopathy:      Cervical: No cervical adenopathy.   Skin:     General: Skin is warm and dry.      Findings: No rash.   Neurological:      Mental Status: He is alert and oriented to person, place, and time.   Psychiatric:         Mood and Affect: Mood normal.         Behavior: Behavior normal.     RECORDS REVIEW:   Results        ASSESSMENT     There are no diagnoses linked to this encounter.    Assessment & Plan      Hypothyroidism.  He will continue taking levothyroxine as prescribed.  TSH was noted to be high but this may be reactive.  Prior to admitting, patient had normal TSH on multiple occasions (Reviewed on epic).  TSH to be repeated in one month.  Synthroid timing of dose was chaged from 6 am to 8am to ensure compliance.     Vascular dementia with confusion and agitation.  He has been stabilized on a regimen of Geodon and Depakote. He is advised to continue with his current medication regimen.    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  He is stable on oxycodone 5 mg as needed for pain management.     Constipation.  He is stable on his current bowel regimen.      Benign prostatic hyperplasia (BPH).  He will continue taking  Flomax and Proscar as prescribed.        [x]  Discussed Patient in detail with nursing/staff, addressed all needs today.     [x]  Plan of Care Reviewed   []  PT/OT Reviewed   []  Order Changes  []  Discharge Plans Reviewed  [x]  Advance Directive on file with Nursing Home.   [x]  POA on file with Nursing Home.    [x]  Code Status listed and reviewed.     I confirm accuracy of unchanged data/findings including physical exam and plan which have been carried forward from previous visit, as well as I have updated appropriately those that have changed        Steph Xavier MA.  6/17/2025      Patient or patient representative verbalized consent for the use of Ambient Listening during the visit with  Juan Rojo DO for chart documentation. 6/17/2025  17:00 EDT

## 2025-06-17 NOTE — PROGRESS NOTES
Nursing Home Progress Note        Juan Rojo DO   [address] [place] [address] Phone: [phone]  Fax: [phone]     PATIENT NAME: Hardik Natarajan                                                                          YOB: 1945           DATE OF SERVICE: 06/17/2025  FACILITY:  [facility]      CHIEF COMPLAINT:  Chronic Medical Management    History of Present Illness  The patient is an 80-year-old male with a history of hypothyroidism, anxiety, depression, and mood disorder who is seen in the nursing facility for routine medical management. On exam today, patient was laying comfortably in his bed with no specific complaints or concerns. His recent lab work was reviewed and discussed with him today.       PAST MEDICAL & SURGICAL HISTORY:   Past Medical History:   Diagnosis Date    Anemia     Arthritis     Cancer       Past Surgical History:   Procedure Laterality Date    NAIL BIOPSY N/A     NECK DISSECTION N/A     PAROTIDECTOMY N/A     SKIN CANCER EXCISION           MEDICATIONS:  I have reviewed and reconciled the patients medication list in the patients chart at the skilled nursing facility today, 06/17/2025.      ALLERGIES:  Allergies   Allergen Reactions    Asa [Aspirin] Unknown - Low Severity     Nose bleed         SOCIAL HISTORY:  Social History     Socioeconomic History    Marital status:    Tobacco Use    Smoking status: Never     Passive exposure: Never    Smokeless tobacco: Current     Types: Chew   Vaping Use    Vaping status: Never Used   Substance and Sexual Activity    Alcohol use: Not Currently    Drug use: Never    Sexual activity: Defer       FAMILY HISTORY:  Family History   Problem Relation Age of Onset    Cancer Mother     Cancer Father        REVIEW OF SYSTEMS:  Review of Systems   Constitutional:  Negative for chills, fatigue and fever.   HENT:  Negative for congestion, ear pain, rhinorrhea, sinus pressure and sore throat.    Eyes:  Negative for visual  "disturbance.   Respiratory:  Negative for cough, chest tightness, shortness of breath and wheezing.    Cardiovascular:  Negative for chest pain, palpitations and leg swelling.   Gastrointestinal:  Negative for abdominal pain, blood in stool, constipation, diarrhea, nausea and vomiting.   Endocrine: Negative for polydipsia and polyuria.   Genitourinary:  Negative for dysuria and hematuria.   Musculoskeletal:  Negative for arthralgias and back pain.   Skin:  Negative for rash.   Neurological:  Negative for dizziness, light-headedness, numbness and headaches.   Psychiatric/Behavioral:  Negative for dysphoric mood and sleep disturbance. The patient is not nervous/anxious.         PHYSICAL EXAMINATION:     VITAL SIGNS:  /60   Pulse 61   Temp 97.3 °F (36.3 °C)   Resp 18   Ht 185.4 cm (73\")   Wt 92.5 kg (204 lb)   SpO2 98%   BMI 26.91 kg/m²     Physical Exam  Vitals and nursing note reviewed.   Constitutional:       Appearance: Normal appearance. He is well-developed.      Comments: Frail elderly male. NAD   HENT:      Head: Normocephalic and atraumatic.      Nose: Nose normal.      Mouth/Throat:      Mouth: Mucous membranes are moist.      Pharynx: No oropharyngeal exudate.   Eyes:      General: No scleral icterus.     Conjunctiva/sclera: Conjunctivae normal.      Pupils: Pupils are equal, round, and reactive to light.   Neck:      Thyroid: No thyromegaly.   Cardiovascular:      Rate and Rhythm: Normal rate and regular rhythm.      Heart sounds: Murmur heard.      No friction rub. No gallop.   Pulmonary:      Effort: Pulmonary effort is normal. No respiratory distress.      Breath sounds: Normal breath sounds. No wheezing.   Abdominal:      General: Bowel sounds are normal. There is no distension.      Palpations: Abdomen is soft.      Tenderness: There is no abdominal tenderness.   Musculoskeletal:         General: No deformity or signs of injury.      Cervical back: Normal range of motion and neck supple. "   Lymphadenopathy:      Cervical: No cervical adenopathy.   Skin:     General: Skin is warm and dry.      Findings: No rash.   Neurological:      Mental Status: He is alert and oriented to person, place, and time.   Psychiatric:         Mood and Affect: Mood normal.         Behavior: Behavior normal.       RECORDS REVIEW:   Results  Laboratory Studies  Labs dated for 4/21/2025, presented creatinine of 1.2, glucose 81, potassium 4.1. TSH 2.427. WBC 3.3, hemoglobin 10.4, platelets 136.      ASSESSMENT     Diagnoses and all orders for this visit:    1. Acquired hypothyroidism (Primary)    2. Benign prostatic hyperplasia without lower urinary tract symptoms    3. Moderate vascular dementia with agitation    4. Chronic idiopathic constipation    5. 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 Neoplasm related pain (acute) (chronic)        Assessment & Plan      Hypothyroidism.  He will continue taking levothyroxine as prescribed.  TSH was noted to be high but this may be reactive.  Prior to admitting, patient had normal TSH on multiple occasions (Reviewed on epic).  TSH to be repeated in one month.  Synthroid timing of dose was chaged from 6 am to 8am to ensure compliance.     Vascular dementia with confusion and agitation.  He has been stabilized on a regimen of Geodon and Depakote. He is advised to continue with his current medication regimen.    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  He is stable on oxycodone 5 mg as needed for pain management.     Constipation.  He is stable on his current bowel regimen.      Benign prostatic hyperplasia (BPH).  He will continue taking Flomax and Proscar as prescribed.          [x]  Discussed Patient in detail with nursing/staff, addressed all needs today.     [x]  Plan of Care Reviewed   []  PT/OT Reviewed   []  Order Changes  []  Discharge Plans Reviewed  [x]  Advance Directive on file with Nursing Home.   [x]  POA  on file with Nursing Home.    [x]  Code Status listed and reviewed.     I confirm accuracy of unchanged data/findings including physical exam and plan which have been carried forward from previous visit, as well as I have updated appropriately those that have changed        Juan Rojo DO.  6/29/2025      Patient or patient representative verbalized consent for the use of Ambient Listening during the visit with  Juan Rojo DO for chart documentation. 6/29/2025  17:00 EDT

## 2025-08-19 ENCOUNTER — NURSING HOME (OUTPATIENT)
Dept: INTERNAL MEDICINE | Facility: CLINIC | Age: 80
End: 2025-08-19
Payer: MEDICARE

## 2025-08-19 VITALS
OXYGEN SATURATION: 94 % | WEIGHT: 202 LBS | DIASTOLIC BLOOD PRESSURE: 75 MMHG | HEART RATE: 78 BPM | SYSTOLIC BLOOD PRESSURE: 108 MMHG | HEIGHT: 73 IN | TEMPERATURE: 98.1 F | RESPIRATION RATE: 18 BRPM | BODY MASS INDEX: 26.77 KG/M2

## 2025-08-19 DIAGNOSIS — G89.3 CANCER RELATED PAIN: ICD-10-CM

## 2025-08-19 DIAGNOSIS — C44.229 SQUAMOUS CELL CARCINOMA OF SKIN OF LEFT EAR AND EXTERNAL AURICULAR CANAL: ICD-10-CM

## 2025-08-19 DIAGNOSIS — N40.0 BENIGN PROSTATIC HYPERPLASIA WITHOUT LOWER URINARY TRACT SYMPTOMS: ICD-10-CM

## 2025-08-19 DIAGNOSIS — E03.9 ACQUIRED HYPOTHYROIDISM: ICD-10-CM

## 2025-08-19 DIAGNOSIS — K59.04 CHRONIC IDIOPATHIC CONSTIPATION: ICD-10-CM

## 2025-08-19 DIAGNOSIS — F01.B11 MODERATE VASCULAR DEMENTIA WITH AGITATION: Primary | ICD-10-CM
